# Patient Record
Sex: FEMALE | Race: WHITE | Employment: OTHER | URBAN - METROPOLITAN AREA
[De-identification: names, ages, dates, MRNs, and addresses within clinical notes are randomized per-mention and may not be internally consistent; named-entity substitution may affect disease eponyms.]

---

## 2017-01-25 ENCOUNTER — HOSPITAL ENCOUNTER (OUTPATIENT)
Dept: CT IMAGING | Age: 67
Discharge: HOME | End: 2017-01-25
Attending: ORTHOPAEDIC SURGERY
Payer: MEDICARE

## 2017-01-25 DIAGNOSIS — M48.061 SPINAL STENOSIS, LUMBAR REGION, WITHOUT NEUROGENIC CLAUDICATION: ICD-10-CM

## 2017-01-25 DIAGNOSIS — M43.10 ACQUIRED SPONDYLOLISTHESIS: ICD-10-CM

## 2017-01-25 PROCEDURE — 72131 CT LUMBAR SPINE W/O DYE: CPT

## 2017-03-22 ENCOUNTER — HOSPITAL ENCOUNTER (OUTPATIENT)
Dept: PREADMISSION TESTING | Age: 67
Discharge: HOME | End: 2017-03-22
Payer: MEDICARE

## 2017-03-22 VITALS
TEMPERATURE: 97.7 F | WEIGHT: 292.25 LBS | BODY MASS INDEX: 49.89 KG/M2 | HEIGHT: 64 IN | HEART RATE: 54 BPM | DIASTOLIC BLOOD PRESSURE: 76 MMHG | SYSTOLIC BLOOD PRESSURE: 118 MMHG

## 2017-03-22 LAB
ABO + RH BLD: NORMAL
ANION GAP BLD CALC-SCNC: 6 MMOL/L (ref 5–15)
APPEARANCE UR: CLEAR
ATRIAL RATE: 53 BPM
BACTERIA URNS QL MICRO: NEGATIVE /HPF
BILIRUB UR QL: NEGATIVE
BLOOD GROUP ANTIBODIES SERPL: NORMAL
BUN SERPL-MCNC: 13 MG/DL (ref 6–20)
BUN/CREAT SERPL: 15 (ref 12–20)
CALCIUM SERPL-MCNC: 9 MG/DL (ref 8.5–10.1)
CALCULATED P AXIS, ECG09: 34 DEGREES
CALCULATED R AXIS, ECG10: 1 DEGREES
CALCULATED T AXIS, ECG11: 23 DEGREES
CHLORIDE SERPL-SCNC: 102 MMOL/L (ref 97–108)
CO2 SERPL-SCNC: 32 MMOL/L (ref 21–32)
COLOR UR: NORMAL
CREAT SERPL-MCNC: 0.88 MG/DL (ref 0.55–1.02)
DIAGNOSIS, 93000: NORMAL
EPITH CASTS URNS QL MICRO: NORMAL /LPF
ERYTHROCYTE [DISTWIDTH] IN BLOOD BY AUTOMATED COUNT: 14.7 % (ref 11.5–14.5)
EST. AVERAGE GLUCOSE BLD GHB EST-MCNC: 143 MG/DL
GLUCOSE SERPL-MCNC: 108 MG/DL (ref 65–100)
GLUCOSE UR STRIP.AUTO-MCNC: NEGATIVE MG/DL
HBA1C MFR BLD: 6.6 % (ref 4.2–6.3)
HCT VFR BLD AUTO: 41.5 % (ref 35–47)
HGB BLD-MCNC: 13.3 G/DL (ref 11.5–16)
HGB UR QL STRIP: NEGATIVE
HYALINE CASTS URNS QL MICRO: NORMAL /LPF (ref 0–5)
INR PPP: 1 (ref 0.9–1.1)
KETONES UR QL STRIP.AUTO: NEGATIVE MG/DL
LEUKOCYTE ESTERASE UR QL STRIP.AUTO: NEGATIVE
MCH RBC QN AUTO: 28.7 PG (ref 26–34)
MCHC RBC AUTO-ENTMCNC: 32 G/DL (ref 30–36.5)
MCV RBC AUTO: 89.6 FL (ref 80–99)
NITRITE UR QL STRIP.AUTO: NEGATIVE
P-R INTERVAL, ECG05: 166 MS
PH UR STRIP: 6 [PH] (ref 5–8)
PLATELET # BLD AUTO: 322 K/UL (ref 150–400)
POTASSIUM SERPL-SCNC: 4.1 MMOL/L (ref 3.5–5.1)
PROT UR STRIP-MCNC: NEGATIVE MG/DL
PROTHROMBIN TIME: 10.3 SEC (ref 9–11.1)
Q-T INTERVAL, ECG07: 448 MS
QRS DURATION, ECG06: 76 MS
QTC CALCULATION (BEZET), ECG08: 420 MS
RBC # BLD AUTO: 4.63 M/UL (ref 3.8–5.2)
RBC #/AREA URNS HPF: NORMAL /HPF (ref 0–5)
SODIUM SERPL-SCNC: 140 MMOL/L (ref 136–145)
SP GR UR REFRACTOMETRY: 1.02 (ref 1–1.03)
SPECIMEN EXP DATE BLD: NORMAL
UA: UC IF INDICATED,UAUC: NORMAL
UROBILINOGEN UR QL STRIP.AUTO: 1 EU/DL (ref 0.2–1)
VENTRICULAR RATE, ECG03: 53 BPM
WBC # BLD AUTO: 6.5 K/UL (ref 3.6–11)
WBC URNS QL MICRO: NORMAL /HPF (ref 0–4)

## 2017-03-22 PROCEDURE — 86900 BLOOD TYPING SEROLOGIC ABO: CPT | Performed by: ORTHOPAEDIC SURGERY

## 2017-03-22 PROCEDURE — 36415 COLL VENOUS BLD VENIPUNCTURE: CPT | Performed by: ORTHOPAEDIC SURGERY

## 2017-03-22 PROCEDURE — 93005 ELECTROCARDIOGRAM TRACING: CPT

## 2017-03-22 PROCEDURE — 85610 PROTHROMBIN TIME: CPT | Performed by: ORTHOPAEDIC SURGERY

## 2017-03-22 PROCEDURE — 85027 COMPLETE CBC AUTOMATED: CPT | Performed by: ORTHOPAEDIC SURGERY

## 2017-03-22 PROCEDURE — 81001 URINALYSIS AUTO W/SCOPE: CPT | Performed by: ORTHOPAEDIC SURGERY

## 2017-03-22 PROCEDURE — 83036 HEMOGLOBIN GLYCOSYLATED A1C: CPT | Performed by: ORTHOPAEDIC SURGERY

## 2017-03-22 PROCEDURE — 80048 BASIC METABOLIC PNL TOTAL CA: CPT | Performed by: ORTHOPAEDIC SURGERY

## 2017-03-22 RX ORDER — METOPROLOL TARTRATE 25 MG/1
12.5 TABLET, FILM COATED ORAL
COMMUNITY

## 2017-03-22 RX ORDER — LEVOTHYROXINE SODIUM 75 UG/1
112 TABLET ORAL
COMMUNITY
End: 2022-07-06 | Stop reason: ALTCHOICE

## 2017-03-22 RX ORDER — PAROXETINE HYDROCHLORIDE 40 MG/1
40 TABLET, FILM COATED ORAL
COMMUNITY

## 2017-03-22 NOTE — PERIOP NOTES
PREOPERATIVE INSTRUCTIONS REVIEWED WITH PATIENT. PATIENT GIVEN SIX PACKS OF CHG WIPES. INSTRUCTIONS (REVIEWED IN CLASS) ON USE OF CHG WIPES. PATIENT GIVEN SSI INFECTION SHEET. MRSA/MMSA TREATMENT INSTRUCTION SHEET GIVEN WITH AN EXPLANATION TO PATIENT THAT THEY WILL BE NOTIFIED IF TREATMENT INSTRUCTIONS NEED TO BE INITIATED. PATIENT WAS GIVEN THE OPPORTUNITY TO ASK QUESTIONS ON THE INFORMATION PROVIDED.

## 2017-03-22 NOTE — PROGRESS NOTES
Problem: Patient Education: Go to Patient Education Activity  Goal: Patient/Family Education  Outcome: Progressing Towards Goal  Patient attended pre-op spine class. Questions, concerns, and comments were addressed.

## 2017-03-23 NOTE — PERIOP NOTES
CALLED DR. CANTOR'S OFFICE AND LEFT A VOICEMAIL FOR ARMANI ABOUT ABNORMAL LABS (HGBA1C 6.6). ALL LABS AND EKG HAVE BEEN FAXED OVER TO OFFICE.

## 2017-03-24 LAB
BACTERIA SPEC CULT: NORMAL
BACTERIA SPEC CULT: NORMAL
SERVICE CMNT-IMP: NORMAL

## 2017-03-28 NOTE — H&P
Leticia Caldwell. Kota  Location: [place] [address] [facility]  Patient #: [id number]  : 1950   / Language: Lili Heading / Race: White  Female      History of Present Illness  The patient is a 74year old female who presents for a Recheck of Acute lumbar back pain. The last clinic visit was 5 week(s) ago. Management changes made at the last visit include ordering test(s) (CT Scan). Symptoms include pain. Symptoms are located in the left low back. The pain radiates to the left posterior thigh, left lower leg and left foot. The patient describes the pain as sharp and burning. Onset was gradual 2 month(s) ago. The symptoms occur intermittently. The patient describes this pain as severe and worsening. Symptoms are exacerbated by standing, recumbency, lifting and bending. Symptoms are relieved by rest and opioid analgesics. Current treatment includes opioid analgesics. Pertinent medical history includes previous back surgery (Lumbar fusion 11 with Dr Leoncio Ann). The patient was previously evaluated in this clinic 5 week(s) ago. Past evaluation has included x-ray of the lumbar spine. Past treatment has included opioid analgesics and back surgery. Problem List/Past Medical   Spondylolisthesis (Acquired) (738.4  M43.10)   Lumbar stenosis with neurogenic claudication (724.03  M48.06)     Allergies  No Known Drug Allergies 12/15/2016    Family History  Thyroid problems  Mother. [de-identified] Father, Mother, Son. Heart Disease  Father. Social History  Current work status  Retired. Caffeine use  Tea. No alcohol use   Tobacco use  Never smoker. Tobacco / smoke exposure   No drug use   Seat Belt Use  Always uses seat belts.     Medication History   Medications Reconciled     Pregnancy / Birth History   Pregnant  No.    Past Surgical History   Thyroidectomy; Subtotal   Hemorrhoidectomy   Hysterectomy  non-cancerous: partial  Spinal Surgery   Gallbladder Surgery  laparoscopic  Total Knee Replacement  bilateral  Tonsillectomy   Spinal Fusion  lower back  Other Eye Surgery  bilateral    Diagnostic Studies History   Lumbar Spine X-ray  Date: 12/15/2016, Results: Review of the x-rays demonstrate a stable fusion at L4-5. There is now an anterolisthesis at L3 for approximately 4 mm. CT Scan  Lumbar, Date: 1/25/2017, Results: Review of the CT scan shows she has severe degeneration and facet hypertrophy at L3-4 causing severe spinal stenosis. She also has a grade 1 anterolisthesis at that level. She has a stable L4-5 fusion. Other Problems  Anxiety Disorder   Unspecified Diagnosis   Bladder Problems   Migraine Headache   Gastroesophageal Reflux Disease   Other disease, cancer, significant illness   Thyroid Disease   Osteoporosis   Unspecified Diagnosis         Review of Systems   General Present- Seasonal Allergies and Weight Gain. Not Present- Appetite Loss, Chills, Fatigue, Fever, HIV Exposure, Night Sweats, Persistent Infections and Weight Loss. Skin Not Present- Itching, Nail Changes, Poor Wound Healing, Rash, Skin Color Changes, Suspicious Lesions and Yellowish Skin Color. HEENT Not Present- Decreased Hearing, Double Vision, Earache, Hoarseness, Jaundice/Yellow Eyes, Loose Teeth, Nose Bleed, Ringing in the Ears and Sore Throat. Respiratory Not Present- Bloody sputum, Chronic Cough, Difficulty Breathing, Snoring, Wakes up from Sleep Wheezing or Short of Breath and Wheezing. Gastrointestinal Not Present- Abdominal Pain, Black, Tarry Stool, Change in Bowel Habits, Cirrhosis, Constipation, Diarrhea, Difficulty Swallowing, Nausea and Vomiting. Female Genitourinary Not Present- Blood in Urine, Frequency, Painful Urination, Pelvic Pain, Trouble Starting Urinary Stream and Urgency. Musculoskeletal Present- Back Pain. Not Present- Joint Pain, Joint Stiffness and Joint Swelling. Neurological Present- Unsteadiness.  Not Present- Fainting, Headaches, Memory Loss, Numbness, Seizures, Tingling, Tremor and Weakness. Psychiatric Present- Anxiety. Not Present- Bipolar and Depression. Endocrine Not Present- Cold Intolerance, Excessive Hunger, Excessive Thirst, Excessive Urination and Heat Intolerance. Hematology Present- Enlarged Lymph Nodes. Not Present- Abnormal Bruising , Excessive bleeding and Skin Discoloration. Vitals   Weight: 286 lb   Height: 64 in   Body Surface Area: 2.28 m²   Body Mass Index: 49.09 kg/m²        Physical Exam   Neurologic  Sensory  Light Touch - Intact - Globally. Overall Assessment of Muscle Strength and Tone reveals  Lower Extremities - Right Iliopsoas - 4-/5. Left Iliopsoas - 4-/5. Right Tibialis Anterior - 5/5. Left Tibialis Anterior - 4/5. Right Gastroc-Soleus - 5/5. Left Gastroc-Soleus - 5/5. Right EHL - 5/5. Left EHL - 5/5. General Assessment of Reflexes  Right Ankle - Clonus is not present. Left Ankle - Clonus is not present. Reflexes (Dermatomes)  2/2 Normal - Left Achilles (L5-S2), Left Knee (L2-4), Right Achilles (L5-S2) and Right Knee (L2-4). Musculoskeletal  Global Assessment  Examination of related systems reveals - well-developed, well-nourished, in no acute distress, alert and oriented x 3. Gait and Station - normal gait and station and normal posture. Right Lower Extremity - normal strength and tone, normal range of motion without pain and no instability, subluxation or laxity. Left Lower Extremity - normal strength and tone, normal range of motion without pain and no instability, subluxation or laxity. Spine/Ribs/Pelvis  Cervical Spine - Examination of the cervical spine reveals - no tenderness to palpation, no pain, no swelling, edema or erythema, normal cervical spine movements and normal sensation. Thoracic (Dorsal) Spine - Examination of the thoracic spine reveals - no tenderness over thoracic vertebrae, no pain, normal sensation and normal thoracic spine movements.  Lumbosacral Spine - Examination of the lumbosacral spine reveals - no known fractures or deformities. Inspection and Palpation - Tenderness - moderate. Assessment of pain reveals the following findings - The pain is characterized as - severe, burning, deep, pain accumulates with activity and pain increases with sustained postures. Location - pain refers to lower back bilaterally. ROJM - Trunk Extension - 15 degrees. Lumbar Spine Flexion - 35 °. Lumbosacral Spine - Functional Testing - Babinski Test negative, Prone Knee Bending Test negative, Slump Test negative, Straight Leg Raising Test negative. Assessment & Plan  Spondylolisthesis (Acquired) (738.4  M43.10)  Lumbar stenosis with neurogenic claudication (724.03  M48.06)  Impression: The patient continues with severe neurogenic claudication as well as left-sided radicular symptoms. She has severe spinal stenosis at L3-4 as well as a grade 1 anterolisthesis. She is a candidate for a revision laminectomy from L2-L4 with a revision fusion from L3-L5. Risk and benefits were discussed with her. The risks and benefits were discussed at length with the patient and the patient has elected to proceed. Indications for surgery include failed conservative treatment. Alternative treatments, risks and the perioperative course were discussed with the patient. All questions were answered. The risks and benefits of the procedure were explained. Benefits include definitive diagnosis, relief of pain, elimination of deformity and improved function. Risks of surgery including bleeding, infection, weakness, numbness, CSF leak, failure to improve symptoms, exacerbation of medical co-morbidities and even death were discussed with the patient. Current Plans  Continued Vicodin 5-300MG, 1 (one) Tablet three times daily, as needed, #60, 02/27/2017, No Refill.   Treatment options were discussed with the patient in full.(V65.49)  Current Plans  Presurgical planning was preformed with the patient today  Surgery to be scheduled  Pt Education - How to access health information online: discussed with patient and provided information. Signed by Rosina Wilkins MD    Date of Surgery Update:  Bobby Pinto was seen and examined. History and physical has been reviewed. The patient has been examined.  There have been no significant clinical changes since the completion of the originally dated History and Physical.    Signed By: Rosina Wilkins MD     April 3, 2017 7:47 AM

## 2017-04-03 ENCOUNTER — ANESTHESIA (OUTPATIENT)
Dept: SURGERY | Age: 67
DRG: 460 | End: 2017-04-03
Payer: MEDICARE

## 2017-04-03 ENCOUNTER — APPOINTMENT (OUTPATIENT)
Dept: GENERAL RADIOLOGY | Age: 67
DRG: 460 | End: 2017-04-03
Attending: ORTHOPAEDIC SURGERY
Payer: MEDICARE

## 2017-04-03 ENCOUNTER — HOSPITAL ENCOUNTER (INPATIENT)
Age: 67
LOS: 3 days | Discharge: NURSING, REHAB OR LONG-TERM CARE | DRG: 460 | End: 2017-04-06
Attending: ORTHOPAEDIC SURGERY | Admitting: ORTHOPAEDIC SURGERY
Payer: MEDICARE

## 2017-04-03 ENCOUNTER — ANESTHESIA EVENT (OUTPATIENT)
Dept: SURGERY | Age: 67
DRG: 460 | End: 2017-04-03
Payer: MEDICARE

## 2017-04-03 PROBLEM — M48.062 LUMBAR STENOSIS WITH NEUROGENIC CLAUDICATION: Status: ACTIVE | Noted: 2017-04-03

## 2017-04-03 LAB
GLUCOSE BLD STRIP.AUTO-MCNC: 117 MG/DL (ref 65–100)
SERVICE CMNT-IMP: ABNORMAL

## 2017-04-03 PROCEDURE — 76010000173 HC OR TIME 3 TO 3.5 HR INTENSV-TIER 1: Performed by: ORTHOPAEDIC SURGERY

## 2017-04-03 PROCEDURE — 74011250636 HC RX REV CODE- 250/636

## 2017-04-03 PROCEDURE — 77030029099 HC BN WAX SSPC -A: Performed by: ORTHOPAEDIC SURGERY

## 2017-04-03 PROCEDURE — 77030018859 HC TBNG IRR BPLR MALS J&J -B: Performed by: ORTHOPAEDIC SURGERY

## 2017-04-03 PROCEDURE — 74011250636 HC RX REV CODE- 250/636: Performed by: ANESTHESIOLOGY

## 2017-04-03 PROCEDURE — 77030014650 HC SEAL MTRX FLOSEL BAXT -C: Performed by: ORTHOPAEDIC SURGERY

## 2017-04-03 PROCEDURE — 77030013079 HC BLNKT BAIR HGGR 3M -A: Performed by: ANESTHESIOLOGY

## 2017-04-03 PROCEDURE — 0ST20ZZ RESECTION OF LUMBAR VERTEBRAL DISC, OPEN APPROACH: ICD-10-PCS | Performed by: ORTHOPAEDIC SURGERY

## 2017-04-03 PROCEDURE — 74011250636 HC RX REV CODE- 250/636: Performed by: PHYSICIAN ASSISTANT

## 2017-04-03 PROCEDURE — 77030034479 HC ADH SKN CLSR PRINEO J&J -B: Performed by: ORTHOPAEDIC SURGERY

## 2017-04-03 PROCEDURE — 77030008684 HC TU ET CUF COVD -B: Performed by: ANESTHESIOLOGY

## 2017-04-03 PROCEDURE — 76060000037 HC ANESTHESIA 3 TO 3.5 HR: Performed by: ORTHOPAEDIC SURGERY

## 2017-04-03 PROCEDURE — 76210000017 HC OR PH I REC 1.5 TO 2 HR: Performed by: ORTHOPAEDIC SURGERY

## 2017-04-03 PROCEDURE — 77030012406 HC DRN WND PENRS BARD -A: Performed by: ORTHOPAEDIC SURGERY

## 2017-04-03 PROCEDURE — 77030020782 HC GWN BAIR PAWS FLX 3M -B

## 2017-04-03 PROCEDURE — 77030026438 HC STYL ET INTUB CARD -A: Performed by: ANESTHESIOLOGY

## 2017-04-03 PROCEDURE — 77030012890

## 2017-04-03 PROCEDURE — 0SG00AJ FUSION OF LUMBAR VERTEBRAL JOINT WITH INTERBODY FUSION DEVICE, POSTERIOR APPROACH, ANTERIOR COLUMN, OPEN APPROACH: ICD-10-PCS | Performed by: ORTHOPAEDIC SURGERY

## 2017-04-03 PROCEDURE — 77030034475 HC MISC IMPL SPN: Performed by: ORTHOPAEDIC SURGERY

## 2017-04-03 PROCEDURE — 77030032490 HC SLV COMPR SCD KNE COVD -B: Performed by: ORTHOPAEDIC SURGERY

## 2017-04-03 PROCEDURE — 8E0WXBF COMPUTER ASSISTED PROCEDURE OF TRUNK REGION, WITH FLUOROSCOPY: ICD-10-PCS | Performed by: ORTHOPAEDIC SURGERY

## 2017-04-03 PROCEDURE — 77030021413 HC ROD SPN CRV GLBM -D: Performed by: ORTHOPAEDIC SURGERY

## 2017-04-03 PROCEDURE — 72100 X-RAY EXAM L-S SPINE 2/3 VWS: CPT

## 2017-04-03 PROCEDURE — 74011000250 HC RX REV CODE- 250

## 2017-04-03 PROCEDURE — 65270000029 HC RM PRIVATE

## 2017-04-03 PROCEDURE — 77030031139 HC SUT VCRL2 J&J -A: Performed by: ORTHOPAEDIC SURGERY

## 2017-04-03 PROCEDURE — C1713 ANCHOR/SCREW BN/BN,TIS/BN: HCPCS | Performed by: ORTHOPAEDIC SURGERY

## 2017-04-03 PROCEDURE — 82962 GLUCOSE BLOOD TEST: CPT

## 2017-04-03 PROCEDURE — 77030018836 HC SOL IRR NACL ICUM -A: Performed by: ORTHOPAEDIC SURGERY

## 2017-04-03 PROCEDURE — 77030034850: Performed by: ORTHOPAEDIC SURGERY

## 2017-04-03 PROCEDURE — 76000 FLUOROSCOPY <1 HR PHYS/QHP: CPT

## 2017-04-03 PROCEDURE — 77030004391 HC BUR FLUT MEDT -C: Performed by: ORTHOPAEDIC SURGERY

## 2017-04-03 DEVICE — 5.5MM CURVED ROD, TITANIUM ALLOY, 65MM LENGTH
Type: IMPLANTABLE DEVICE | Site: SPINE LUMBAR | Status: FUNCTIONAL
Brand: CREO

## 2017-04-03 DEVICE — GRAFT BNE SUB 20CC 2 4MM GRAN GROWTH FACT ALLGRFT OSTEOAMP: Type: IMPLANTABLE DEVICE | Site: SPINE LUMBAR | Status: FUNCTIONAL

## 2017-04-03 DEVICE — 5.5MM CURVED ROD, TITANIUM ALLOY, 70MM LENGTH
Type: IMPLANTABLE DEVICE | Site: SPINE LUMBAR | Status: FUNCTIONAL
Brand: CREO

## 2017-04-03 DEVICE — GRAFT BNE SUB M W20XH7XL30MM COMPRESSIBLE SPNG STRP PROVIDE: Type: IMPLANTABLE DEVICE | Site: SPINE LUMBAR | Status: FUNCTIONAL

## 2017-04-03 RX ORDER — BUTALBITAL, ACETAMINOPHEN AND CAFFEINE 50; 325; 40 MG/1; MG/1; MG/1
1 TABLET ORAL
Status: DISCONTINUED | OUTPATIENT
Start: 2017-04-03 | End: 2017-04-06 | Stop reason: HOSPADM

## 2017-04-03 RX ORDER — SODIUM CHLORIDE, SODIUM LACTATE, POTASSIUM CHLORIDE, CALCIUM CHLORIDE 600; 310; 30; 20 MG/100ML; MG/100ML; MG/100ML; MG/100ML
INJECTION, SOLUTION INTRAVENOUS
Status: DISCONTINUED | OUTPATIENT
Start: 2017-04-03 | End: 2017-04-03 | Stop reason: HOSPADM

## 2017-04-03 RX ORDER — OXYCODONE HYDROCHLORIDE 5 MG/1
5 TABLET ORAL
Status: DISCONTINUED | OUTPATIENT
Start: 2017-04-03 | End: 2017-04-06 | Stop reason: HOSPADM

## 2017-04-03 RX ORDER — MIDAZOLAM HYDROCHLORIDE 1 MG/ML
0.5 INJECTION, SOLUTION INTRAMUSCULAR; INTRAVENOUS
Status: DISCONTINUED | OUTPATIENT
Start: 2017-04-03 | End: 2017-04-03 | Stop reason: HOSPADM

## 2017-04-03 RX ORDER — ROPIVACAINE HYDROCHLORIDE 5 MG/ML
150 INJECTION, SOLUTION EPIDURAL; INFILTRATION; PERINEURAL AS NEEDED
Status: DISCONTINUED | OUTPATIENT
Start: 2017-04-03 | End: 2017-04-03 | Stop reason: HOSPADM

## 2017-04-03 RX ORDER — DIPHENHYDRAMINE HYDROCHLORIDE 50 MG/ML
12.5 INJECTION, SOLUTION INTRAMUSCULAR; INTRAVENOUS
Status: ACTIVE | OUTPATIENT
Start: 2017-04-03 | End: 2017-04-04

## 2017-04-03 RX ORDER — PAROXETINE HYDROCHLORIDE 20 MG/1
40 TABLET, FILM COATED ORAL DAILY
Status: DISCONTINUED | OUTPATIENT
Start: 2017-04-04 | End: 2017-04-06 | Stop reason: HOSPADM

## 2017-04-03 RX ORDER — POLYETHYLENE GLYCOL 3350 17 G/17G
17 POWDER, FOR SOLUTION ORAL DAILY
Status: DISCONTINUED | OUTPATIENT
Start: 2017-04-04 | End: 2017-04-06 | Stop reason: HOSPADM

## 2017-04-03 RX ORDER — PROPOFOL 10 MG/ML
INJECTION, EMULSION INTRAVENOUS
Status: DISCONTINUED | OUTPATIENT
Start: 2017-04-03 | End: 2017-04-03 | Stop reason: HOSPADM

## 2017-04-03 RX ORDER — ONDANSETRON 2 MG/ML
4 INJECTION INTRAMUSCULAR; INTRAVENOUS AS NEEDED
Status: DISCONTINUED | OUTPATIENT
Start: 2017-04-03 | End: 2017-04-03 | Stop reason: HOSPADM

## 2017-04-03 RX ORDER — METOPROLOL TARTRATE 25 MG/1
12.5 TABLET, FILM COATED ORAL
Status: DISCONTINUED | OUTPATIENT
Start: 2017-04-03 | End: 2017-04-06 | Stop reason: HOSPADM

## 2017-04-03 RX ORDER — MIDAZOLAM HYDROCHLORIDE 1 MG/ML
INJECTION, SOLUTION INTRAMUSCULAR; INTRAVENOUS AS NEEDED
Status: DISCONTINUED | OUTPATIENT
Start: 2017-04-03 | End: 2017-04-03 | Stop reason: HOSPADM

## 2017-04-03 RX ORDER — SODIUM CHLORIDE 9 MG/ML
25 INJECTION, SOLUTION INTRAVENOUS CONTINUOUS
Status: DISCONTINUED | OUTPATIENT
Start: 2017-04-03 | End: 2017-04-03 | Stop reason: HOSPADM

## 2017-04-03 RX ORDER — OXYCODONE HYDROCHLORIDE 5 MG/1
10 TABLET ORAL
Status: DISCONTINUED | OUTPATIENT
Start: 2017-04-03 | End: 2017-04-06 | Stop reason: HOSPADM

## 2017-04-03 RX ORDER — SODIUM CHLORIDE 9 MG/ML
125 INJECTION, SOLUTION INTRAVENOUS CONTINUOUS
Status: DISPENSED | OUTPATIENT
Start: 2017-04-03 | End: 2017-04-04

## 2017-04-03 RX ORDER — ONDANSETRON 2 MG/ML
INJECTION INTRAMUSCULAR; INTRAVENOUS AS NEEDED
Status: DISCONTINUED | OUTPATIENT
Start: 2017-04-03 | End: 2017-04-03 | Stop reason: HOSPADM

## 2017-04-03 RX ORDER — GLYCOPYRROLATE 0.2 MG/ML
INJECTION INTRAMUSCULAR; INTRAVENOUS AS NEEDED
Status: DISCONTINUED | OUTPATIENT
Start: 2017-04-03 | End: 2017-04-03 | Stop reason: HOSPADM

## 2017-04-03 RX ORDER — AMOXICILLIN 250 MG
1 CAPSULE ORAL 2 TIMES DAILY
Status: DISCONTINUED | OUTPATIENT
Start: 2017-04-03 | End: 2017-04-06 | Stop reason: HOSPADM

## 2017-04-03 RX ORDER — MORPHINE SULFATE 2 MG/ML
2 INJECTION, SOLUTION INTRAMUSCULAR; INTRAVENOUS
Status: ACTIVE | OUTPATIENT
Start: 2017-04-03 | End: 2017-04-04

## 2017-04-03 RX ORDER — LEVOTHYROXINE SODIUM 75 UG/1
75 TABLET ORAL
Status: DISCONTINUED | OUTPATIENT
Start: 2017-04-04 | End: 2017-04-06 | Stop reason: HOSPADM

## 2017-04-03 RX ORDER — FACIAL-BODY WIPES
10 EACH TOPICAL DAILY PRN
Status: DISCONTINUED | OUTPATIENT
Start: 2017-04-05 | End: 2017-04-06 | Stop reason: HOSPADM

## 2017-04-03 RX ORDER — ACETAMINOPHEN 325 MG/1
650 TABLET ORAL EVERY 6 HOURS
Status: DISCONTINUED | OUTPATIENT
Start: 2017-04-04 | End: 2017-04-06 | Stop reason: HOSPADM

## 2017-04-03 RX ORDER — METOPROLOL TARTRATE 25 MG/1
25 TABLET, FILM COATED ORAL DAILY
Status: DISCONTINUED | OUTPATIENT
Start: 2017-04-04 | End: 2017-04-06 | Stop reason: HOSPADM

## 2017-04-03 RX ORDER — SODIUM CHLORIDE, SODIUM LACTATE, POTASSIUM CHLORIDE, CALCIUM CHLORIDE 600; 310; 30; 20 MG/100ML; MG/100ML; MG/100ML; MG/100ML
1000 INJECTION, SOLUTION INTRAVENOUS CONTINUOUS
Status: DISCONTINUED | OUTPATIENT
Start: 2017-04-03 | End: 2017-04-03 | Stop reason: HOSPADM

## 2017-04-03 RX ORDER — MIDAZOLAM HYDROCHLORIDE 1 MG/ML
1 INJECTION, SOLUTION INTRAMUSCULAR; INTRAVENOUS AS NEEDED
Status: DISCONTINUED | OUTPATIENT
Start: 2017-04-03 | End: 2017-04-03 | Stop reason: HOSPADM

## 2017-04-03 RX ORDER — HYDROMORPHONE HYDROCHLORIDE 1 MG/ML
0.2 INJECTION, SOLUTION INTRAMUSCULAR; INTRAVENOUS; SUBCUTANEOUS
Status: COMPLETED | OUTPATIENT
Start: 2017-04-03 | End: 2017-04-03

## 2017-04-03 RX ORDER — SODIUM CHLORIDE 0.9 % (FLUSH) 0.9 %
5-10 SYRINGE (ML) INJECTION AS NEEDED
Status: DISCONTINUED | OUTPATIENT
Start: 2017-04-03 | End: 2017-04-06 | Stop reason: HOSPADM

## 2017-04-03 RX ORDER — MORPHINE SULFATE 5 MG/ML
INJECTION, SOLUTION INTRAVENOUS
Status: DISCONTINUED | OUTPATIENT
Start: 2017-04-03 | End: 2017-04-05

## 2017-04-03 RX ORDER — ROCURONIUM BROMIDE 10 MG/ML
INJECTION, SOLUTION INTRAVENOUS AS NEEDED
Status: DISCONTINUED | OUTPATIENT
Start: 2017-04-03 | End: 2017-04-03 | Stop reason: HOSPADM

## 2017-04-03 RX ORDER — NEOSTIGMINE METHYLSULFATE 1 MG/ML
INJECTION INTRAVENOUS AS NEEDED
Status: DISCONTINUED | OUTPATIENT
Start: 2017-04-03 | End: 2017-04-03 | Stop reason: HOSPADM

## 2017-04-03 RX ORDER — MORPHINE SULFATE 10 MG/ML
2 INJECTION, SOLUTION INTRAMUSCULAR; INTRAVENOUS
Status: DISCONTINUED | OUTPATIENT
Start: 2017-04-03 | End: 2017-04-03 | Stop reason: HOSPADM

## 2017-04-03 RX ORDER — SUCCINYLCHOLINE CHLORIDE 20 MG/ML
INJECTION INTRAMUSCULAR; INTRAVENOUS AS NEEDED
Status: DISCONTINUED | OUTPATIENT
Start: 2017-04-03 | End: 2017-04-03 | Stop reason: HOSPADM

## 2017-04-03 RX ORDER — CYCLOBENZAPRINE HCL 10 MG
10 TABLET ORAL
Status: DISCONTINUED | OUTPATIENT
Start: 2017-04-03 | End: 2017-04-06 | Stop reason: HOSPADM

## 2017-04-03 RX ORDER — FENTANYL CITRATE 50 UG/ML
50 INJECTION, SOLUTION INTRAMUSCULAR; INTRAVENOUS AS NEEDED
Status: DISCONTINUED | OUTPATIENT
Start: 2017-04-03 | End: 2017-04-03 | Stop reason: HOSPADM

## 2017-04-03 RX ORDER — OMEPRAZOLE 40 MG/1
40 CAPSULE, DELAYED RELEASE ORAL DAILY
Status: DISCONTINUED | OUTPATIENT
Start: 2017-04-04 | End: 2017-04-03 | Stop reason: CLARIF

## 2017-04-03 RX ORDER — SODIUM CHLORIDE 0.9 % (FLUSH) 0.9 %
5-10 SYRINGE (ML) INJECTION AS NEEDED
Status: DISCONTINUED | OUTPATIENT
Start: 2017-04-03 | End: 2017-04-03 | Stop reason: HOSPADM

## 2017-04-03 RX ORDER — DEXAMETHASONE SODIUM PHOSPHATE 4 MG/ML
INJECTION, SOLUTION INTRA-ARTICULAR; INTRALESIONAL; INTRAMUSCULAR; INTRAVENOUS; SOFT TISSUE AS NEEDED
Status: DISCONTINUED | OUTPATIENT
Start: 2017-04-03 | End: 2017-04-03 | Stop reason: HOSPADM

## 2017-04-03 RX ORDER — FENTANYL CITRATE 50 UG/ML
25 INJECTION, SOLUTION INTRAMUSCULAR; INTRAVENOUS
Status: DISCONTINUED | OUTPATIENT
Start: 2017-04-03 | End: 2017-04-03 | Stop reason: HOSPADM

## 2017-04-03 RX ORDER — NALOXONE HYDROCHLORIDE 0.4 MG/ML
0.4 INJECTION, SOLUTION INTRAMUSCULAR; INTRAVENOUS; SUBCUTANEOUS AS NEEDED
Status: DISCONTINUED | OUTPATIENT
Start: 2017-04-03 | End: 2017-04-06 | Stop reason: HOSPADM

## 2017-04-03 RX ORDER — PROPOFOL 10 MG/ML
INJECTION, EMULSION INTRAVENOUS AS NEEDED
Status: DISCONTINUED | OUTPATIENT
Start: 2017-04-03 | End: 2017-04-03 | Stop reason: HOSPADM

## 2017-04-03 RX ORDER — PANTOPRAZOLE SODIUM 40 MG/1
40 TABLET, DELAYED RELEASE ORAL
Status: DISCONTINUED | OUTPATIENT
Start: 2017-04-04 | End: 2017-04-06 | Stop reason: HOSPADM

## 2017-04-03 RX ORDER — DIPHENHYDRAMINE HYDROCHLORIDE 50 MG/ML
12.5 INJECTION, SOLUTION INTRAMUSCULAR; INTRAVENOUS AS NEEDED
Status: DISCONTINUED | OUTPATIENT
Start: 2017-04-03 | End: 2017-04-03 | Stop reason: HOSPADM

## 2017-04-03 RX ORDER — ONDANSETRON 2 MG/ML
4 INJECTION INTRAMUSCULAR; INTRAVENOUS
Status: ACTIVE | OUTPATIENT
Start: 2017-04-03 | End: 2017-04-04

## 2017-04-03 RX ORDER — OXYCODONE HYDROCHLORIDE 5 MG/1
5 TABLET ORAL AS NEEDED
Status: DISCONTINUED | OUTPATIENT
Start: 2017-04-03 | End: 2017-04-03 | Stop reason: HOSPADM

## 2017-04-03 RX ORDER — FENTANYL CITRATE 50 UG/ML
INJECTION, SOLUTION INTRAMUSCULAR; INTRAVENOUS AS NEEDED
Status: DISCONTINUED | OUTPATIENT
Start: 2017-04-03 | End: 2017-04-03 | Stop reason: HOSPADM

## 2017-04-03 RX ORDER — SODIUM CHLORIDE 0.9 % (FLUSH) 0.9 %
5-10 SYRINGE (ML) INJECTION EVERY 8 HOURS
Status: DISCONTINUED | OUTPATIENT
Start: 2017-04-04 | End: 2017-04-06 | Stop reason: HOSPADM

## 2017-04-03 RX ORDER — LIDOCAINE HYDROCHLORIDE 20 MG/ML
INJECTION, SOLUTION EPIDURAL; INFILTRATION; INTRACAUDAL; PERINEURAL AS NEEDED
Status: DISCONTINUED | OUTPATIENT
Start: 2017-04-03 | End: 2017-04-03 | Stop reason: HOSPADM

## 2017-04-03 RX ORDER — PHENYLEPHRINE HCL IN 0.9% NACL 0.4MG/10ML
SYRINGE (ML) INTRAVENOUS AS NEEDED
Status: DISCONTINUED | OUTPATIENT
Start: 2017-04-03 | End: 2017-04-03 | Stop reason: HOSPADM

## 2017-04-03 RX ORDER — KETAMINE HYDROCHLORIDE 10 MG/ML
INJECTION, SOLUTION INTRAMUSCULAR; INTRAVENOUS AS NEEDED
Status: DISCONTINUED | OUTPATIENT
Start: 2017-04-03 | End: 2017-04-03 | Stop reason: HOSPADM

## 2017-04-03 RX ORDER — KETOROLAC TROMETHAMINE 30 MG/ML
15 INJECTION, SOLUTION INTRAMUSCULAR; INTRAVENOUS EVERY 6 HOURS
Status: COMPLETED | OUTPATIENT
Start: 2017-04-03 | End: 2017-04-04

## 2017-04-03 RX ORDER — SODIUM CHLORIDE, SODIUM LACTATE, POTASSIUM CHLORIDE, CALCIUM CHLORIDE 600; 310; 30; 20 MG/100ML; MG/100ML; MG/100ML; MG/100ML
100 INJECTION, SOLUTION INTRAVENOUS CONTINUOUS
Status: DISCONTINUED | OUTPATIENT
Start: 2017-04-03 | End: 2017-04-03 | Stop reason: HOSPADM

## 2017-04-03 RX ORDER — ALPRAZOLAM 0.5 MG/1
0.5 TABLET ORAL
Status: DISCONTINUED | OUTPATIENT
Start: 2017-04-03 | End: 2017-04-06

## 2017-04-03 RX ORDER — LIDOCAINE HYDROCHLORIDE 10 MG/ML
0.1 INJECTION, SOLUTION EPIDURAL; INFILTRATION; INTRACAUDAL; PERINEURAL AS NEEDED
Status: DISCONTINUED | OUTPATIENT
Start: 2017-04-03 | End: 2017-04-03 | Stop reason: HOSPADM

## 2017-04-03 RX ORDER — SODIUM CHLORIDE 0.9 % (FLUSH) 0.9 %
5-10 SYRINGE (ML) INJECTION EVERY 8 HOURS
Status: DISCONTINUED | OUTPATIENT
Start: 2017-04-03 | End: 2017-04-03 | Stop reason: HOSPADM

## 2017-04-03 RX ORDER — HYDROMORPHONE HYDROCHLORIDE 1 MG/ML
INJECTION, SOLUTION INTRAMUSCULAR; INTRAVENOUS; SUBCUTANEOUS AS NEEDED
Status: DISCONTINUED | OUTPATIENT
Start: 2017-04-03 | End: 2017-04-03 | Stop reason: HOSPADM

## 2017-04-03 RX ADMIN — PROPOFOL 125 MCG/KG/MIN: 10 INJECTION, EMULSION INTRAVENOUS at 14:14

## 2017-04-03 RX ADMIN — DEXAMETHASONE SODIUM PHOSPHATE 8 MG: 4 INJECTION, SOLUTION INTRA-ARTICULAR; INTRALESIONAL; INTRAMUSCULAR; INTRAVENOUS; SOFT TISSUE at 14:20

## 2017-04-03 RX ADMIN — ROCURONIUM BROMIDE 10 MG: 10 INJECTION, SOLUTION INTRAVENOUS at 14:09

## 2017-04-03 RX ADMIN — CEFAZOLIN 3 G: 1 INJECTION, POWDER, FOR SOLUTION INTRAMUSCULAR; INTRAVENOUS; PARENTERAL at 14:19

## 2017-04-03 RX ADMIN — HYDROMORPHONE HYDROCHLORIDE 0.2 MG: 1 INJECTION, SOLUTION INTRAMUSCULAR; INTRAVENOUS; SUBCUTANEOUS at 18:00

## 2017-04-03 RX ADMIN — KETOROLAC TROMETHAMINE 15 MG: 30 INJECTION, SOLUTION INTRAMUSCULAR at 18:30

## 2017-04-03 RX ADMIN — HYDROMORPHONE HYDROCHLORIDE 0.2 MG: 1 INJECTION, SOLUTION INTRAMUSCULAR; INTRAVENOUS; SUBCUTANEOUS at 17:45

## 2017-04-03 RX ADMIN — Medication 120 MCG: at 14:32

## 2017-04-03 RX ADMIN — FENTANYL CITRATE 25 MCG: 50 INJECTION, SOLUTION INTRAMUSCULAR; INTRAVENOUS at 18:00

## 2017-04-03 RX ADMIN — FENTANYL CITRATE 50 MCG: 50 INJECTION, SOLUTION INTRAMUSCULAR; INTRAVENOUS at 14:09

## 2017-04-03 RX ADMIN — Medication 40 MCG: at 16:48

## 2017-04-03 RX ADMIN — HYDROMORPHONE HYDROCHLORIDE 0.2 MG: 1 INJECTION, SOLUTION INTRAMUSCULAR; INTRAVENOUS; SUBCUTANEOUS at 18:20

## 2017-04-03 RX ADMIN — Medication 80 MCG: at 14:24

## 2017-04-03 RX ADMIN — HYDROMORPHONE HYDROCHLORIDE 0.2 MG: 1 INJECTION, SOLUTION INTRAMUSCULAR; INTRAVENOUS; SUBCUTANEOUS at 18:10

## 2017-04-03 RX ADMIN — SODIUM CHLORIDE, SODIUM LACTATE, POTASSIUM CHLORIDE, CALCIUM CHLORIDE: 600; 310; 30; 20 INJECTION, SOLUTION INTRAVENOUS at 15:44

## 2017-04-03 RX ADMIN — Medication 40 MCG: at 14:42

## 2017-04-03 RX ADMIN — HYDROMORPHONE HYDROCHLORIDE 0.2 MG: 1 INJECTION, SOLUTION INTRAMUSCULAR; INTRAVENOUS; SUBCUTANEOUS at 18:30

## 2017-04-03 RX ADMIN — SUCCINYLCHOLINE CHLORIDE 160 MG: 20 INJECTION INTRAMUSCULAR; INTRAVENOUS at 14:09

## 2017-04-03 RX ADMIN — MIDAZOLAM HYDROCHLORIDE 0.5 MG: 1 INJECTION, SOLUTION INTRAMUSCULAR; INTRAVENOUS at 18:20

## 2017-04-03 RX ADMIN — FENTANYL CITRATE 50 MCG: 50 INJECTION, SOLUTION INTRAMUSCULAR; INTRAVENOUS at 14:46

## 2017-04-03 RX ADMIN — SODIUM CHLORIDE 125 ML/HR: 900 INJECTION, SOLUTION INTRAVENOUS at 18:30

## 2017-04-03 RX ADMIN — MIDAZOLAM HYDROCHLORIDE 2 MG: 1 INJECTION, SOLUTION INTRAMUSCULAR; INTRAVENOUS at 14:05

## 2017-04-03 RX ADMIN — HYDROMORPHONE HYDROCHLORIDE 0.25 MG: 1 INJECTION, SOLUTION INTRAMUSCULAR; INTRAVENOUS; SUBCUTANEOUS at 15:44

## 2017-04-03 RX ADMIN — HYDROMORPHONE HYDROCHLORIDE 0.25 MG: 1 INJECTION, SOLUTION INTRAMUSCULAR; INTRAVENOUS; SUBCUTANEOUS at 16:51

## 2017-04-03 RX ADMIN — GLYCOPYRROLATE 0.4 MG: 0.2 INJECTION INTRAMUSCULAR; INTRAVENOUS at 16:49

## 2017-04-03 RX ADMIN — Medication 40 MCG: at 15:24

## 2017-04-03 RX ADMIN — PROPOFOL 200 MG: 10 INJECTION, EMULSION INTRAVENOUS at 14:09

## 2017-04-03 RX ADMIN — LIDOCAINE HYDROCHLORIDE 100 MG: 20 INJECTION, SOLUTION EPIDURAL; INFILTRATION; INTRACAUDAL; PERINEURAL at 14:09

## 2017-04-03 RX ADMIN — NEOSTIGMINE METHYLSULFATE 2 MG: 1 INJECTION INTRAVENOUS at 16:49

## 2017-04-03 RX ADMIN — HYDROMORPHONE HYDROCHLORIDE 0.25 MG: 1 INJECTION, SOLUTION INTRAMUSCULAR; INTRAVENOUS; SUBCUTANEOUS at 15:08

## 2017-04-03 RX ADMIN — SODIUM CHLORIDE, SODIUM LACTATE, POTASSIUM CHLORIDE, CALCIUM CHLORIDE: 600; 310; 30; 20 INJECTION, SOLUTION INTRAVENOUS at 14:05

## 2017-04-03 RX ADMIN — Medication: at 20:24

## 2017-04-03 RX ADMIN — HYDROMORPHONE HYDROCHLORIDE 0.25 MG: 1 INJECTION, SOLUTION INTRAMUSCULAR; INTRAVENOUS; SUBCUTANEOUS at 16:29

## 2017-04-03 RX ADMIN — MIDAZOLAM HYDROCHLORIDE 0.5 MG: 1 INJECTION, SOLUTION INTRAMUSCULAR; INTRAVENOUS at 18:00

## 2017-04-03 RX ADMIN — Medication: at 18:39

## 2017-04-03 RX ADMIN — KETAMINE HYDROCHLORIDE 30 MG: 10 INJECTION, SOLUTION INTRAMUSCULAR; INTRAVENOUS at 14:20

## 2017-04-03 RX ADMIN — SODIUM CHLORIDE, SODIUM LACTATE, POTASSIUM CHLORIDE, AND CALCIUM CHLORIDE 1000 ML: 600; 310; 30; 20 INJECTION, SOLUTION INTRAVENOUS at 13:18

## 2017-04-03 RX ADMIN — ONDANSETRON 4 MG: 2 INJECTION INTRAMUSCULAR; INTRAVENOUS at 14:19

## 2017-04-03 RX ADMIN — ROCURONIUM BROMIDE 15 MG: 10 INJECTION, SOLUTION INTRAVENOUS at 14:46

## 2017-04-03 RX ADMIN — KETAMINE HYDROCHLORIDE 10 MG: 10 INJECTION, SOLUTION INTRAMUSCULAR; INTRAVENOUS at 15:48

## 2017-04-03 NOTE — PERIOP NOTES
TRANSFER - OUT REPORT:    Verbal report given to NURSE RHODA(name) on Bobby Pinto  being transferred to 536(unit) for routine post - op       Report consisted of patients Situation, Background, Assessment and   Recommendations(SBAR). Time Pre op antibiotic given:ANCEF 3 gm @1419  Anesthesia Stop time: 5856  Rea Present on Transfer to floor:yes  Order for Era on Chart:yes    Information from the following report(s) SBAR, OR Summary, Intake/Output, MAR, Med Rec Status and Cardiac Rhythm NSR was reviewed with the receiving nurse. Opportunity for questions and clarification was provided. Is the patient on 02? YES       L/Min 2         Is the patient on a monitor? NO    Is the nurse transporting with the patient? NO    Surgical Waiting Area notified of patient's transfer from PACU? YES      The following personal items collected during your admission accompanied patient upon transfer:   Dental Appliance: Dental Appliances:  Other (comment) (front teeth bonded, crowns top molars)  Vision:    Hearing Aid:    Jewelry:    Clothing: Clothing: Other (comment) (clothing)  Other Valuables:    Valuables sent to safe:

## 2017-04-03 NOTE — ANESTHESIA PREPROCEDURE EVALUATION
Anesthetic History     PONV          Review of Systems / Medical History  Patient summary reviewed, nursing notes reviewed and pertinent labs reviewed    Pulmonary  Within defined limits                 Neuro/Psych         Headaches and psychiatric history     Cardiovascular    Hypertension        Dysrhythmias : SVT           GI/Hepatic/Renal     GERD           Endo/Other      Hypothyroidism  Morbid obesity and arthritis     Other Findings              Physical Exam    Airway  Mallampati: II  TM Distance: > 6 cm  Neck ROM: normal range of motion   Mouth opening: Normal     Cardiovascular  Regular rate and rhythm,  S1 and S2 normal,  no murmur, click, rub, or gallop             Dental    Dentition: Lower dentition intact and Upper dentition intact     Pulmonary  Breath sounds clear to auscultation               Abdominal  GI exam deferred       Other Findings            Anesthetic Plan    ASA: 3  Anesthesia type: general          Induction: Intravenous  Anesthetic plan and risks discussed with: Patient

## 2017-04-03 NOTE — IP AVS SNAPSHOT
[address] 
[phone] Patient: Krystle Proctor MRN: [id number] :1950 You are allergic to the following Allergen Reactions Adhesive Tape-Silicones Rash Aloe Vera Rash Chlorhexidine Rash Tussin (Dextromethorphan Hbr) Palpitations Recent Documentation Height Weight BMI OB Status Smoking Status 1.626 m 132.6 kg 50.16 kg/m2 Hysterectomy Never Smoker Emergency Contacts Name Discharge Info Relation Home Work Mobile Boo Escudero DISCHARGE CAREGIVER [3] Spouse [3] [phone] About your hospitalization You were admitted on:  April 3, 2017 You last received care in the:  [address] [place] [address] You were discharged on:  2017 Unit phone number:  [phone] Why you were hospitalized Your primary diagnosis was:  Not on File Your diagnoses also included:  Lumbar Stenosis With Neurogenic Claudication Providers Seen During Your Hospitalizations Provider Role Specialty Primary office phone Constantino Dela Cruz MD Attending Provider Orthopedic Surgery [phone] Your Primary Care Physician (PCP) Primary Care Physician Office Phone Office Fax Rochele Antis [facility] Follow-up Information Follow up With Details Comments Contact Info Luz Ross MD   [address] 
[phone] [address] Go on 2017 inpatient rehab 2309 [facility] 6200 [facility] 
[phone] Current Discharge Medication List  
  
START taking these medications Dose & Instructions Dispensing Information Comments Morning Noon Evening Bedtime  
 oxyCODONE IR 5 mg immediate release tablet Commonly known as:  Pocahontas Muster Your last dose was:     
   
Your next dose is:    
   
   
 Dose:  5-10 mg  
 Take 1-2 Tabs by mouth every three (3) hours as needed. Max Daily Amount: 80 mg.  
 Quantity:  60 Tab Refills:  0 CONTINUE these medications which have NOT CHANGED Dose & Instructions Dispensing Information Comments Morning Noon Evening Bedtime FIORICET -40 mg per tablet Generic drug:  butalbital-acetaminophen-caffeine Your last dose was: Your next dose is:    
   
   
 Dose:  1 Tab Take 1 Tab by mouth every six (6) hours as needed for Pain. Refills:  0  
     
   
   
   
  
 levothyroxine 75 mcg tablet Commonly known as:  SYNTHROID Your last dose was: Your next dose is:    
   
   
 Dose:  75 mcg Take 75 mcg by mouth Daily (before breakfast). Refills:  0  
     
   
   
   
  
 * metoprolol tartrate 25 mg tablet Commonly known as:  LOPRESSOR Your last dose was: Your next dose is:    
   
   
 Dose:  25 mg Take 25 mg by mouth daily. Refills:  0  
     
   
   
   
  
 * metoprolol tartrate 25 mg tablet Commonly known as:  LOPRESSOR Your last dose was: Your next dose is:    
   
   
 Dose:  12.5 mg Take 12.5 mg by mouth nightly. Refills:  0  
     
   
   
   
  
 omeprazole 40 mg capsule Commonly known as:  PRILOSEC Your last dose was: Your next dose is:    
   
   
 Dose:  40 mg Take 40 mg by mouth daily. Refills:  0 PARoxetine 40 mg tablet Commonly known as:  PAXIL Your last dose was: Your next dose is:    
   
   
 Dose:  40 mg Take 40 mg by mouth daily. Refills:  0  
     
   
   
   
  
 XANAX 0.5 mg tablet Generic drug:  ALPRAZolam  
   
Your last dose was: Your next dose is:    
   
   
 Dose:  0.5 mg Take 0.5 mg by mouth two (2) times daily as needed. Refills:  0  
     
   
   
   
  
 * Notice:   This list has 2 medication(s) that are the same as other medications prescribed for you. Read the directions carefully, and ask your doctor or other care provider to review them with you. Where to Get Your Medications Information on where to get these meds will be given to you by the nurse or doctor. ! Ask your nurse or doctor about these medications  
  oxyCODONE IR 5 mg immediate release tablet Discharge Instructions Patient meets criteria for BUNDLED PAYMENT  
for Care Improvement Initiative Criteria Contact Information for Orthopedic Nurse Navigator:     
TRENA Khan, RN-BC 
E:[phone] I:[phone] E:[phone] After Hospital Care Plan:  Discharge Instructions Lumbar Fusion Surgery Dr. Agustina Bryant Patient Name: Theresa Dunham Date of procedure: 4/3/2017  Date of discharge: 4/5/2017 Procedure: Procedure(s): 
L2-4 REVISION LAMINECTOMY/ L3-5 REVISION FUSION  PCP: Mackenzie Clifford MD 
 
Follow up appointments 
-follow up with Dr. Dr. Agustina Bryant in 2 weeks. Call [phone] to make an appointment as soon as you get home from the hospital. 
 
[address] [place], [place] [address]y: ____________________   phone: _______________________ The agency will contact you to arrange dates/times for visits. Please call them if you do not hear from them within 24 hours after you are discharged Physical therapy 3 times a week for 3 weeks Nursing-initial assessment and as needed When to call your Orthopaedic Surgeon: 
-Signs of infection-if your incision is red; continues to have drainage; drainage has a foul odor or if you have a persistent fever over 101 degrees for 24 hours 
-Nausea or vomiting, severe headache 
-Loss of bowel or bladder function, inability to urinate 
-Changes in sensation in your arms or legs (numbness, tingling, loss of color) -Increased weakness-greater than before your surgery 
-Severe pain or pain not relieved by medications 
-Signs of a blood clot in your leg-calf pain, tenderness, redness, swelling of lower leg When to call your Primary Care Physician: 
-Concerns about medical conditions such as diabetes, high blood pressure, asthma, congestive heart failure 
-Call if blood sugars are elevated, persistent headache or dizziness, coughing or congestion, constipation or diarrhea, burning with urination, abnormal heart rate When to call 911 and go to the nearest emergency room: 
-Acute onset of chest pain, shortness of breath, difficulty breathing Activity 
-You are going home a well person, be as active as possible. Your only exercise should be walking. Start with short frequent walks and increase your walking distance each day. 
-Limit the amount of time you sit to 20-30 minute intervals. Sitting for prolonged periods of time will be uncomfortable for you following surgery. 
-Do NOT lift anything over 5 pounds 
-Do NOT do any straining, twisting or bending 
-When you are in bed, you may lay on your back or on either side. Do NOT lie on your stomach Brace 
-If you have a back brace, you should wear your brace at all times when you are out of bed. Do not wear the brace while in bed or showering. 
-Remember to always wear a cotton t-shirt underneath your brace. 
-Do not bend or twist when your brace is off Diet 
-Resume usual diet; drink plenty of fluids; eat foods high in fiber 
-It is important to have regular bowel movements. Pain medications may cause constipation. You may want to take a stool softener (such as Senokot-S or Colace) to prevent constipation. 
 -If constipation occurs, take a laxative (such as Dulcolax tablets, Milk of Magnesia, or a suppository). Laxatives should only be used if the above preventable measures have failed and you still have not had a bowel movement after three days Driving 
-You may not drive or return to work until instructed by your physician. However, you may ride in the car for short periods of time. Incision Care Your incision has been closed with absorbable sutures and the Dermabond Prineo skin closure system. This is a combination of a mesh and a liquid adhesive that will assist with healing. The mesh is to remain on your incision for 2 weeks. A dry dressing (ABD and tape) will be placed over it and should be changed daily. Please make sure to wash your hands prior to touching your dressing. You may take brief showers but do not run the water directly onto the wound. After your shower, blot your incision dry with a soft towel and replace the dry dressing. Do not allow the tape to come in contact with the mesh. Do not rub or apply any lotions or ointments to your incision site. Do not soak or scrub your wound. The mesh dressing will be removed during your two week follow-up appointment. If you experience drainage leaking from underneath the mesh or if it peels off before 2 weeks, please contact your orthopedic surgeons office. Showering 
-You may shower in approximately 4 days after your surgery.   
-Leave the dressing on during your shower. Do NOT allow the water to run directly onto your dressing. Once you get out of the shower, gently pat the dressing dry. -Reminder- your brace can be removed while showering. Remember to not bend or twist while your brace is off.   
-Do not take a tub bath. Preventing blood clots 
-You have been given T.E.D. stockings to wear. Continue to wear these for 7 days after your discharge. Put them on in the morning and take them off at night.   
-They are used to increase your circulation and prevent blood clots from forming in your legs 
-T. E.D. stockings can be machine washed, temperature not to exceed 160° F (71°C) and machine dried for 15 to 20 minutes, temperature not to exceed 250° F (121°C). Pain management 
-Take pain medication as prescribed; decrease the amount you use as your pain lessens -DO not wait until you are in extreme pain to take your medication. 
-Avoid alcoholic beverages while taking pain medication Pain Medication Safety DO: 
-Read the Medication Guide  
-Take your medicine exactly as prescribed  
-Store your medicine away from children and in a safe place  
-Flush unused medicine down the toilet  
-Call your healthcare provider for medical advice about side effects. You may report side effects to FDA at 3-691-FDA-6377.  
-Please be aware that many medications contain Tylenol. We do not want you to over medicate so please read the information below as a guide. Do not take more than 4 Grams of Tylenol in a 24 hour period. (There are 1000 milligrams in one Gram) Percocet contains 325 mg of Tylenol per tablet (do not take more than 12 tablets in 24 hours) Lortab contains 500 mg of Tylenol per tablet (do not take more than 8 tablets in 24 hours) Norco contains 325 mg of Tylenol per tablet (do not take more than 12 tablets in 24 hours). DO NOT: 
-Do not give your medicine to others  
-Do not take medicine unless it was prescribed for you  
-Do not stop taking your medicine without talking to your healthcare provider  
-Do not break, chew, crush, dissolve, or inject your medicine. If you cannot swallow your medicine whole, talk to your healthcare provider. 
-Do not drink alcohol while taking this medicine 
-Do not take anti-inflammatory medications or aspirin unless instructed by your     physician. Discharge Orders None Introducing [facility]! Dear Darío Andrade: Thank you for requesting a [employer] account. Our records indicate that you already have an active [employer] account.   You can access your account anytime at [email or web address] Did you know that you can access your hospital and ER discharge instructions at any time in [employer]? You can also review all of your test results from your hospital stay or ER visit. Additional Information If you have questions, please visit the Frequently Asked Questions section of the [employer] website at [email or web address] Remember, [employer] is NOT to be used for urgent needs. For medical emergencies, dial 911. Now available from your iPhone and Android! General Information Please provide this summary of care documentation to your next provider. Patient Signature:  ____________________________________________________________ Date:  ____________________________________________________________  
  
Vickie Storey Provider Signature:  ____________________________________________________________ Date:  ____________________________________________________________

## 2017-04-04 LAB
ANION GAP BLD CALC-SCNC: 9 MMOL/L (ref 5–15)
BUN SERPL-MCNC: 9 MG/DL (ref 6–20)
BUN/CREAT SERPL: 12 (ref 12–20)
CALCIUM SERPL-MCNC: 8.4 MG/DL (ref 8.5–10.1)
CHLORIDE SERPL-SCNC: 105 MMOL/L (ref 97–108)
CO2 SERPL-SCNC: 27 MMOL/L (ref 21–32)
CREAT SERPL-MCNC: 0.78 MG/DL (ref 0.55–1.02)
GLUCOSE BLD STRIP.AUTO-MCNC: 130 MG/DL (ref 65–100)
GLUCOSE SERPL-MCNC: 145 MG/DL (ref 65–100)
HGB BLD-MCNC: 12.5 G/DL (ref 11.5–16)
POTASSIUM SERPL-SCNC: 4.2 MMOL/L (ref 3.5–5.1)
SERVICE CMNT-IMP: ABNORMAL
SODIUM SERPL-SCNC: 141 MMOL/L (ref 136–145)

## 2017-04-04 PROCEDURE — 36415 COLL VENOUS BLD VENIPUNCTURE: CPT | Performed by: PHYSICIAN ASSISTANT

## 2017-04-04 PROCEDURE — 97535 SELF CARE MNGMENT TRAINING: CPT

## 2017-04-04 PROCEDURE — 80048 BASIC METABOLIC PNL TOTAL CA: CPT | Performed by: PHYSICIAN ASSISTANT

## 2017-04-04 PROCEDURE — 82962 GLUCOSE BLOOD TEST: CPT

## 2017-04-04 PROCEDURE — G8987 SELF CARE CURRENT STATUS: HCPCS

## 2017-04-04 PROCEDURE — 97530 THERAPEUTIC ACTIVITIES: CPT

## 2017-04-04 PROCEDURE — 74011250636 HC RX REV CODE- 250/636: Performed by: ORTHOPAEDIC SURGERY

## 2017-04-04 PROCEDURE — 65270000029 HC RM PRIVATE

## 2017-04-04 PROCEDURE — 97165 OT EVAL LOW COMPLEX 30 MIN: CPT

## 2017-04-04 PROCEDURE — 85018 HEMOGLOBIN: CPT | Performed by: PHYSICIAN ASSISTANT

## 2017-04-04 PROCEDURE — G8979 MOBILITY GOAL STATUS: HCPCS

## 2017-04-04 PROCEDURE — G8978 MOBILITY CURRENT STATUS: HCPCS

## 2017-04-04 PROCEDURE — 74011250636 HC RX REV CODE- 250/636: Performed by: PHYSICIAN ASSISTANT

## 2017-04-04 PROCEDURE — 97162 PT EVAL MOD COMPLEX 30 MIN: CPT

## 2017-04-04 PROCEDURE — 74011250637 HC RX REV CODE- 250/637: Performed by: PHYSICIAN ASSISTANT

## 2017-04-04 PROCEDURE — 97116 GAIT TRAINING THERAPY: CPT

## 2017-04-04 PROCEDURE — G8988 SELF CARE GOAL STATUS: HCPCS

## 2017-04-04 RX ORDER — LORAZEPAM 2 MG/ML
1 INJECTION INTRAMUSCULAR
Status: DISCONTINUED | OUTPATIENT
Start: 2017-04-04 | End: 2017-04-06 | Stop reason: HOSPADM

## 2017-04-04 RX ADMIN — ACETAMINOPHEN 650 MG: 325 TABLET, FILM COATED ORAL at 06:41

## 2017-04-04 RX ADMIN — PANTOPRAZOLE SODIUM 40 MG: 40 TABLET, DELAYED RELEASE ORAL at 06:41

## 2017-04-04 RX ADMIN — OXYCODONE HYDROCHLORIDE 10 MG: 5 TABLET ORAL at 20:15

## 2017-04-04 RX ADMIN — LORAZEPAM 1 MG: 2 INJECTION INTRAMUSCULAR at 18:26

## 2017-04-04 RX ADMIN — ACETAMINOPHEN 650 MG: 325 TABLET, FILM COATED ORAL at 23:02

## 2017-04-04 RX ADMIN — LEVOTHYROXINE SODIUM 75 MCG: 75 TABLET ORAL at 06:41

## 2017-04-04 RX ADMIN — CEFAZOLIN 3 G: 1 INJECTION, POWDER, FOR SOLUTION INTRAMUSCULAR; INTRAVENOUS; PARENTERAL at 08:17

## 2017-04-04 RX ADMIN — DOCUSATE SODIUM AND SENNOSIDES 1 TABLET: 8.6; 5 TABLET, FILM COATED ORAL at 19:18

## 2017-04-04 RX ADMIN — ALPRAZOLAM 0.5 MG: 0.5 TABLET ORAL at 07:00

## 2017-04-04 RX ADMIN — ACETAMINOPHEN 650 MG: 325 TABLET, FILM COATED ORAL at 00:15

## 2017-04-04 RX ADMIN — PAROXETINE HYDROCHLORIDE 40 MG: 20 TABLET, FILM COATED ORAL at 08:40

## 2017-04-04 RX ADMIN — POLYETHYLENE GLYCOL 3350 17 G: 17 POWDER, FOR SOLUTION ORAL at 08:41

## 2017-04-04 RX ADMIN — Medication 10 ML: at 15:48

## 2017-04-04 RX ADMIN — CEFAZOLIN 3 G: 1 INJECTION, POWDER, FOR SOLUTION INTRAMUSCULAR; INTRAVENOUS; PARENTERAL at 00:14

## 2017-04-04 RX ADMIN — OXYCODONE HYDROCHLORIDE 10 MG: 5 TABLET ORAL at 12:55

## 2017-04-04 RX ADMIN — PHENOL 1 SPRAY: 1.4 SPRAY ORAL at 08:43

## 2017-04-04 RX ADMIN — KETOROLAC TROMETHAMINE 15 MG: 30 INJECTION, SOLUTION INTRAMUSCULAR at 06:42

## 2017-04-04 RX ADMIN — METOPROLOL TARTRATE 12.5 MG: 25 TABLET ORAL at 23:02

## 2017-04-04 RX ADMIN — OXYCODONE HYDROCHLORIDE 10 MG: 5 TABLET ORAL at 23:06

## 2017-04-04 RX ADMIN — OXYCODONE HYDROCHLORIDE 10 MG: 5 TABLET ORAL at 15:48

## 2017-04-04 RX ADMIN — KETOROLAC TROMETHAMINE 15 MG: 30 INJECTION, SOLUTION INTRAMUSCULAR at 00:15

## 2017-04-04 RX ADMIN — DOCUSATE SODIUM AND SENNOSIDES 1 TABLET: 8.6; 5 TABLET, FILM COATED ORAL at 08:40

## 2017-04-04 RX ADMIN — ACETAMINOPHEN 650 MG: 325 TABLET, FILM COATED ORAL at 12:54

## 2017-04-04 RX ADMIN — KETOROLAC TROMETHAMINE 15 MG: 30 INJECTION, SOLUTION INTRAMUSCULAR at 12:56

## 2017-04-04 RX ADMIN — ALPRAZOLAM 0.5 MG: 0.5 TABLET ORAL at 18:24

## 2017-04-04 RX ADMIN — ACETAMINOPHEN 650 MG: 325 TABLET, FILM COATED ORAL at 19:18

## 2017-04-04 RX ADMIN — DOCUSATE SODIUM AND SENNOSIDES 1 TABLET: 8.6; 5 TABLET, FILM COATED ORAL at 00:18

## 2017-04-04 NOTE — PROGRESS NOTES
Problem: Self Care Deficits Care Plan (Adult)  Goal: *Acute Goals and Plan of Care (Insert Text)  Occupational Therapy Goals  Initiated 4/4/2017    1. Patient will perform lower body dressing with supervision/set-up using AE PRN within 7 days. 2. Patient will perform lower body dressing with supervision/set-up using most appropriate DME within 7 days. 3. Patient will grooming seated at the sink at supervision/set-up within 7 days. 4. Patient will don/doff back brace at supervision/set-up within 7 days. 5. Patient will verbalize/demonstrate 3/3 back precautions during ADL tasks without cues within 7 days. OCCUPATIONAL THERAPY EVALUATION  Patient: Russ Cartagena (89 y.o. female)  Date: 4/4/2017  Primary Diagnosis: STENOSIS,SPONDILOLITHESIS  Lumbar stenosis with neurogenic claudication  Procedure(s) (LRB):  L2-4 REVISION LAMINECTOMY/ L3-5 REVISION FUSION (N/A) 1 Day Post-Op   Precautions:   Back, Fall (TLSO when OOB)      ASSESSMENT :  Based on the objective data described below, the patient presents with generalized weakness, decreased functional reach to feet (baseline for patient;  assists with some LB ADLs), decreased standing tolerance (baseline as well; must sit for grooming at sink and cooking tasks), increased back pain with activity and limitations due to back precautions. Pt is below her functional baseline and currently requires MOD A for LB ADLs, MIN A for standing balance/assist for standing ADLs and MIN A for functional transfers. Pt able to doff her TLSO with MIN A but required total assist to anuel her TLSO (per PT). Pt with extensive medical history and reported being nonambulatory/wc bound prior to her first back surgery but with IP rehab was able to progress to being ambulatory for short distances and able to drive, complete ADLs with MIN A and complete some IADLs on her own such as grocery shopping.   Pt hoping to further increase her function after this back surgery and would benefit from going to inpatient rehab to maximize her functional outcome. Patient will benefit from skilled intervention to address the above impairments. Patients rehabilitation potential is considered to be Good  Factors which may influence rehabilitation potential include:   [ ]             None noted  [X]             Mental ability/status  [ ]             Medical condition  [X]             Home/family situation and support systems  [X]             Safety awareness  [ ]             Pain tolerance/management  [ ]             Other:        PLAN :  Recommendations and Planned Interventions:  [X]               Self Care Training                  [X]        Therapeutic Activities  [X]               Functional Mobility Training    [ ]        Cognitive Retraining  [X]               Therapeutic Exercises           [X]        Endurance Activities  [X]               Balance Training                   [ ]        Neuromuscular Re-Education  [ ]               Visual/Perceptual Training     [X]   Home Safety Training  [X]               Patient Education                 [X]        Family Training/Education  [ ]               Other (comment):     Frequency/Duration: Patient will be followed by occupational therapy 5 times a week to address goals. Discharge Recommendations: Inpatient Rehab  Further Equipment Recommendations for Discharge: TBD       SUBJECTIVE:   Patient stated At home I have to sit to brush my teeth.       OBJECTIVE DATA SUMMARY:   HISTORY:   Past Medical History:   Diagnosis Date    Adverse effect of anesthesia       O2 DROPS WITH ANESTHESIA    Arthritis       KNEES    Cancer ([facility] [place] [address] 2015     tonsils and lymph nodes.   Removed 3-2015 with radiation    GERD (gastroesophageal reflux disease)      Hypertension       NO LONGER ON MEDICATION    Hypothyroid      Migraine      Nausea & vomiting      Obesity      Other ill-defined conditions       chronic back pain    Psychiatric disorder       anxiety    Psychiatric disorder       panic ATTACKS    SVT (supraventricular tachycardia) (HCC)      Ulcerative colitis       Past Surgical History:   Procedure Laterality Date    COLONOSCOPY,DIAGNOSTIC   11/19/2015          HX GI         colonscopy    HX HEENT   03/2015     tonsillectomy (CANCER) AND NECK LYMPH NODES    HX HEENT   2008     PARATHYROID REMOVAL    HX HEENT Left 2002     EYE LASER    HX HYSTERECTOMY   1985    HX KNEE ARTHROSCOPY   1995     left knee surgery, TOTAL LT  and Right KNEE 2013    HX KNEE REPLACEMENT Bilateral 2013, 2014    HX LAP CHOLECYSTECTOMY   2002    HX LUMBAR FUSION   2011     SPINAL FUSION with hardware L4-L5    HX ORTHOPAEDIC   1990     CYST REMOVED RIGHT WRIST    HX OTHER SURGICAL         cyst removal right wrist    HX OTHER SURGICAL   2001, 2016     hemorrhoidectomy X2    HX UROLOGICAL   2010     bladder surgery(interstim device)    AR EGD TRANSORAL BIOPSY SINGLE/MULTIPLE   5/20/2013              Prior Level of Function/Home Situation: Pt was wc bound/non ambulatory for aprox 1 year prior to her first back surgery (2011) but had progressed to being ambulatory using a RW for short distances and able to complete ADLs with MIN A and use of compensatory techniques.   Pt is able to drive and grocery shop for herself but still has limited standing tolerance  Expanded or extensive additional review of patient history:      Home Situation  Home Environment: Private residence  Wheelchair Ramp: Yes  One/Two Story Residence: One story (ramp into family room)  Living Alone: No  Support Systems: Spouse/Significant Other/Partner, Family member(s)  Patient Expects to be Discharged to[de-identified] Unknown  Current DME Used/Available at Home: Wheelchair, Walker, rolling, Tub transfer bench, Raised toilet seat, Adaptive dressing aides  Tub or Shower Type: Tub/Shower combination  [X]  Right hand dominant             [ ]  Left hand dominant     EXAMINATION OF PERFORMANCE DEFICITS:  Cognitive/Behavioral Status:  Neurologic State: Alert; Appropriate for age  Orientation Level: Appropriate for age  Cognition: Appropriate decision making; Appropriate for age attention/concentration; Appropriate safety awareness; Follows commands  Perception: Appears intact  Perseveration: No perseveration noted  Safety/Judgement: Awareness of environment; Fall prevention     Skin: bandage c/d/i; hemovac intact     Edema: None noted     Hearing:        Vision/Perceptual:              Range of Motion:  AROM: Within functional limits     Strength:  Strength: Within functional limits     Coordination:  Coordination: Within functional limits  Fine Motor Skills-Upper: Left Intact; Right Intact    Gross Motor Skills-Upper: Left Intact; Right Intact     Tone & Sensation:  Tone: Normal  Sensation: Intact     Balance:  Sitting: Intact  Standing: Impaired  Standing - Static: Good  Standing - Dynamic : Fair     Functional Mobility and Transfers for ADLs:  Bed Mobility:  Supine to Sit: Minimum assistance;Assist x1  Scooting: Supervision     Transfers:  Sit to Stand: Contact guard assistance;Assist x2; Additional time  Stand to Sit: Contact guard assistance;Assist x1;Additional time  Toilet Transfer : Contact guard assistance     ADL Assessment:  Feeding: Independent     Oral Facial Hygiene/Grooming: Independent (seated; unable to stand at her sink at home)     Bathing: Moderate assistance (will need LHS)     Upper Body Dressing: Setup (MOD A to doff TLSO)     Lower Body Dressing: Moderate assistance (initial instruction on AE for LB dressing)     Toileting: Minimum assistance     ADL Intervention and task modifications:     Upper Body Dressing Assistance  Orthotics(Brace):  (able to doff TLSO with MOD A)     Lower Body Dressing Assistance  Socks: Moderate assistance (to doff/anuel socks using AE)  Leg Crossed Method Used: No (Unable at baseline)  Position Performed: Seated in chair  Adaptive Equipment Used: Reacher;Sock aid; Walker     Cognitive Retraining  Safety/Judgement: Awareness of environment; Fall prevention        Functional Measure:  Barthel Index:      Bathin  Bladder: 0  Bowels: 10  Groomin  Dressin  Feeding: 10  Mobility: 0  Stairs: 0  Toilet Use: 5  Transfer (Bed to Chair and Back): 10  Total: 45         Barthel and G-code impairment scale:  Percentage of impairment CH  0% CI  1-19% CJ  20-39% CK  40-59% CL  60-79% CM  80-99% CN  100%   Barthel Score 0-100 100 99-80 79-60 59-40 20-39 1-19    0   Barthel Score 0-20 20 17-19 13-16 9-12 5-8 1-4 0      The Barthel ADL Index: Guidelines  1. The index should be used as a record of what a patient does, not as a record of what a patient could do. 2. The main aim is to establish degree of independence from any help, physical or verbal, however minor and for whatever reason. 3. The need for supervision renders the patient not independent. 4. A patient's performance should be established using the best available evidence. Asking the patient, friends/relatives and nurses are the usual sources, but direct observation and common sense are also important. However direct testing is not needed. 5. Usually the patient's performance over the preceding 24-48 hours is important, but occasionally longer periods will be relevant. 6. Middle categories imply that the patient supplies over 50 per cent of the effort. 7. Use of aids to be independent is allowed. Becky Wallis., Barthel, D.W. ([id number]). Functional evaluation: the Barthel Index. [address] [facility] (14)2. Jamar Flores, TJJ.[email or web address], Curtis Lemus., Nora Hayes., [place], [address] [place] [address] (). Measuring the change indisability after inpatient rehabilitation; comparison of the responsiveness of the Barthel Index and Functional Hopkins Measure. Journal of Neurology, Neurosurgery, and Psychiatry, 66(4), 555-900.   Minal Amador N.J.A, Augusta Nguyen  W.J.ALEXX, & Bernie Patino, M.A. (2004.) Assessment of post-stroke quality of life in cost-effectiveness studies: The usefulness of the Barthel Index and the EuroQoL-5D. Quality of Life Research, 13, 387-82         G codes: In compliance with CMSs Claims Based Outcome Reporting, the following G-code set was chosen for this patient based on their primary functional limitation being treated: The outcome measure chosen to determine the severity of the functional limitation was the Barthel Index with a score of 45/100 which was correlated with the impairment scale. · Self Care:               - CURRENT STATUS:    CK - 40%-59% impaired, limited or restricted               - GOAL STATUS:           CJ - 20%-39% impaired, limited or restricted               - D/C STATUS:                       ---------------To be determined---------------      Occupational Therapy Evaluation Charge Determination   History Examination Decision-Making   LOW Complexity : Brief history review  LOW Complexity : 1-3 performance deficits relating to physical, cognitive , or psychosocial skils that result in activity limitations and / or participation restrictions  LOW Complexity : No comorbidities that affect functional and no verbal or physical assistance needed to complete eval tasks       Based on the above components, the patient evaluation is determined to be of the following complexity level: LOW   Pain:  Pain Scale 1: Numeric (0 - 10)  Pain Intensity 1: 6  Pain Location 1: Back  Pain Orientation 1: Posterior  Pain Description 1: Aching  Pain Intervention(s) 1: Encouraged PCA  Activity Tolerance:   No s/s of distress; no reports of dizziness; no significant anxiety and responded well to redirection today     Please refer to the flowsheet for vital signs taken during this treatment.   After treatment:   [ ] Patient left in no apparent distress sitting up in chair  [X] Patient left in no apparent distress in bed  [X] Call bell left within reach  [X] Nursing notified  [X] Caregiver present ( present in room)  [ ] Bed alarm activated      COMMUNICATION/EDUCATION:   The patients plan of care was discussed with: Physical Therapist and Registered Nurse.  [X] Home safety education was provided and the patient/caregiver indicated understanding. [X] Patient/family have participated as able in goal setting and plan of care. [X] Patient/family agree to work toward stated goals and plan of care. [ ] Patient understands intent and goals of therapy, but is neutral about his/her participation. [ ] Patient is unable to participate in goal setting and plan of care. This patients plan of care is appropriate for delegation to [facility].      Thank you for this referral.  Salinas Hamilton, OT  Time Calculation: 24 mins

## 2017-04-04 NOTE — ANESTHESIA POSTPROCEDURE EVALUATION
Post-Anesthesia Evaluation and Assessment    Patient: Era Alford MRN: [id number]  SSN: [id number]    YOB: 1950  Age: 74 y.o. Sex: female       Cardiovascular Function/Vital Signs  Visit Vitals    /70    Pulse 83    Temp 36.4 °C (97.5 °F)    Resp 14    Ht 5' 4\" (1.626 m)    Wt 132.6 kg (292 lb 4 oz)    SpO2 94%    BMI 50.16 kg/m2       Patient is status post general anesthesia for Procedure(s):  L2-4 REVISION LAMINECTOMY/ L3-5 REVISION FUSION. Nausea/Vomiting: None    Postoperative hydration reviewed and adequate. Pain:  Pain Scale 1: FLACC (04/03/17 1900)  Pain Intensity 1: 3 (04/03/17 1255)   Managed    Neurological Status:   Neuro (WDL): Within Defined Limits (04/03/17 1730)   At baseline    Mental Status and Level of Consciousness: Arousable    Pulmonary Status:   O2 Device: Nasal cannula (04/03/17 1900)   Adequate oxygenation and airway patent    Complications related to anesthesia: None    Post-anesthesia assessment completed.  No concerns    Signed By: Lazarus Don, MD     April 3, 2017

## 2017-04-04 NOTE — PROGRESS NOTES
Orthopedic Spine Progress Note  Post Op day: 1 Day Post-Op    2017 7:47 AM   Admit Date: 4/3/2017  Procedure: Procedure(s):  L2-4 REVISION LAMINECTOMY/ L3-5 REVISION FUSION    Subjective:     Jaspal Samayoa has no complaints. Some difficulty with anxiety/panic attack overnight. Pain well controlled overall. Tolerating diet. No N/V. Pain Control:   Pain Assessment  Pain Scale 1: Numeric (0 - 10)  Pain Intensity 1: 8  Pain Onset 1: post op  Pain Location 1: Back  Pain Orientation 1: Lower, Left, Right  Pain Description 1: Aching  Pain Intervention(s) 1: Medication (see MAR)    Objective:          Physical Exam:  General:  Alert and oriented. No acute distress. Heart:  Respirations unlabored. Abdomen:   Extremities: Soft, non-tender. No evidence of cyanosis. Pulses palpable in both upper and lower extremities. Neurologic:  Musculoskeletal:  No new motor deficits. Neurovascular exam within normal limits. Sensation stable. Motor: unchanged C5-T1 and L2-S1. Neema's sign negative in bilateral lower extremities. Calves soft, nontender upon palpation and with passive twitch. Moves both upper and lower extremities. Incision: clean, dry, and intact. No significant erythema or swelling. No active drainage noted. Vital Signs:    Blood pressure 121/79, pulse 79, temperature 97.6 °F (36.4 °C), resp. rate 16, height 5' 4\" (1.626 m), weight 132.6 kg (292 lb 4 oz), SpO2 97 %.   Temp (24hrs), Av.6 °F (36.4 °C), Min:97.4 °F (36.3 °C), Max:98 °F (36.7 °C)      LAB:    Recent Labs      17   0411   HGB  12.5     Lab Results   Component Value Date/Time    Sodium 141 2017 04:11 AM    Potassium 4.2 2017 04:11 AM    Chloride 105 2017 04:11 AM    CO2 27 2017 04:11 AM    Glucose 145 2017 04:11 AM    BUN 9 2017 04:11 AM    Creatinine 0.78 2017 04:11 AM    Calcium 8.4 2017 04:11 AM       Intake/Output:    1901 -  0700  In:  [I.V.:2000]  Out: 1 [Urine:600; Drains:220]    PT/OT:   Gait:                    Assessment:   Patient is 1 Day Post-Op s/p Procedure(s):  L2-4 REVISION LAMINECTOMY/ L3-5 REVISION FUSION    Plan:     1. Continue PT/OT  2. Continue established methods of pain control  3. VTE Prophylaxes - TEDS &/or SCDs   4. Continue with current meds for anxiety. Will consult psych if ongoing difficulty. 5. Discharge pending  6.        Signed By: Kate Ness PA-C

## 2017-04-04 NOTE — OP NOTES
[address] [place] [facility] [place] [address], [address]   OP NOTE       Name:  Henrry Mansfield   MR#:  [id number]   :  1950   Account #:  [de-identified]    Surgery Date:  2017   Date of Adm:  2017       PREOPERATIVE DIAGNOSES    1. L3-L4 spondylolisthesis. 2. Lumbar stenosis, L2-L3, L3-L4, status post L4-L5 fusion. POSTOPERATIVE DIAGNOSES    1. L3-L4 spondylolisthesis. 2. Lumbar stenosis, L2-L3, L3-L4, status post L4-L5 fusion. PROCEDURES PERFORMED    1. Exploration of fusion with revision laminectomy, L2-L3 and L3-L4. 2. Posterior lumbar fusion revision, L3-L5. 3. Posterior segmentation L3-L5. 4. Transforaminal interbody fusion, L3-L4. 5. Allograft and autograft. 6. Posterior Lydia osteotomy at L3-L4. SURGEON: Romy Rebollar MD    ASSISTANT: Olga Duncan PA-C      ESTIMATED BLOOD LOSS: Minimal.    SPECIMENS REMOVED: None    ANESTHESIA:  None    COMPLICATIONS: None. INDICATIONS: This is a pleasant 47-year-old female who is   approximately 6 years out from a lumbar fusion at L4-L5. Now with   new instability at L4-L5 with spondylolisthesis as well as stenosis. She   failed conservative treatment, understood and elected to proceed. DESCRIPTION OF PROCEDURE: The patient was identified. She was   brought to the operative suite and underwent general anesthesia   without difficulty. The patient had preop neuromonitoring placed, as   well as antibiotics. The patient placed in the prone position and all   bony prominences well padded and the back was prepped and draped   sterilely. Time-out was performed. We opened the previous surgical   incision, dissected down with exposure to the thoracodorsal fascia split   again with exposure the level of the L4 and L5 fusion. The fusion was   solid with good posterolateral bone graft material noted.  We exposed   the remainder L3-L4 facet as well as the transverse process of L3 and   the remainder of the fusion mass at L4-L5. Deep retractors were placed   bilaterally. Flouroscopy was brought in for localization of appropriate level, at   which time we then started the revision laminectomy. We removed the   remainder of the spinous process of L4 as well as the inferior portion of   L3. The patient had severe lateral recess stenosis at L3-L4 due the   facet hypertrophy as well as ligamentum hypertrophy. Partial   facetectomies were performed as well as removal of the remainder of   ligamentum and a [id number] decompression from L3 to L4 was   then performed. At that time, we then removed instrumentation at the   L4 and L5 level and replaced it with Globus CREO AMP screws, 6.5 x   45 mm screws at L4 and L5 bilaterally. We then cannulated the L3   pedicles using standard bony landmarks, tested with a ball-tipped   probe as well as neuromonitoring. No placed 6.5 x 50 mm Globus   CREO AMP screws there. Wounds were irrigated with a dilute   Betadine solution followed by pulse irrigation. We then did an   annulotomy on the left-hand side and complete diskectomy. This   allowed access to the disk space. We did remove the disk with pituitary   rongeurs and curved curettes. We then rasped the endplates. We   placed a medium OsteoAMP sponge in the anterior 1/3 column from   around to the contralateral side, and then we placed a single Globus   RISE cage 10 x 26 x 8 mm, which was expanded to approximately 12   mm, at which time we irrigated the wound one more time, decorticated   the previous fusion mass as well as the transverse process of L3 and   the L3-L4 facet then packed with local bone graft as well as OsteoAMP   granules into the posterolateral gutters. Longitudinal rods were   attached to the pedicle screws with set screws. Compression across of   the L3-L4 osteotomy site. Final tightening performed. Final x-rays   demonstrated stable fixation.  Interrupted 0 Vicryl in the fascial layer, 2-  0 Vicryl subcutaneous layer, and 3-0 Vicryl in skin after a medium   Hemovac placed. The patient returned to the PACU in stable condition.         MD CHIQUI Robles / YOVANNY   D:  04/03/2017   18:52   T:  04/04/2017   06:38   Job #:  [id number]

## 2017-04-04 NOTE — PROGRESS NOTES
Problem: Mobility Impaired (Adult and Pediatric)  Goal: *Acute Goals and Plan of Care (Insert Text)  Physical Therapy Goals  Initiated 4/4/2017    1. Patient will move from supine to sit and sit to supine and roll side to side in bed with independence within 4 days. 2. Patient will perform sit to stand with modified independence within 4 days. 3. Patient will ambulate with modified independence for 150 feet with the least restrictive device within 4 days. 4. Patient will verbalize and demonstrate understanding of spinal precautions (No bending, lifting greater than 5 lbs, or twisting; log-roll technique; frequent repositioning as instructed) within 4 days. PHYSICAL THERAPY TREATMENT  Patient: Jasmyn Guerrero (60 y.o. female)  Date: 4/4/2017  Diagnosis: STENOSIS,SPONDILOLITHESIS  Lumbar stenosis with neurogenic claudication <principal problem not specified>  Procedure(s) (LRB):  L2-4 REVISION LAMINECTOMY/ L3-5 REVISION FUSION (N/A) 1 Day Post-Op  Precautions: Back, Fall (TLSO when OOB)      ASSESSMENT:  Pt is making slow, steady gains, sitting edge of bed with nurse when therapist entered room, assisted with donning briefs, pt stood at beside bed with CGA x 2 to pull up briefs, she had slight sway anteriorly and provided with minimal assistance to correct balance. Pt had no significant loss of balance and safely assisted back to bed.  Pt was a bit anxious but with reassurance calmed down and able to progress to ambulation with rolling walker x 20 feet with CGA x 2, pt c/o BLE weakness and fatigued quickly, pt reports good pain control, 2/10, pt may benefit from rehab versus home health pending progress  Progression toward goals:   [ ]      Improving appropriately and progressing toward goals  [X]      Improving slowly and progressing toward goals  [ ]      Not making progress toward goals and plan of care will be adjusted       PLAN:  Patient continues to benefit from skilled intervention to address the above impairments. Continue treatment per established plan of care. Discharge Recommendations:  Rehab, Home Health and To Be Determined  Further Equipment Recommendations for Discharge:  May need bariatric rolling walker       SUBJECTIVE:   Patient stated Oh I almost fell!  Pt referring to swaying while standing but no overt loss of balance and assisted to sit edge of bed safely. The patient stated 3/3 back precautions. Reviewed all 3 with patient. OBJECTIVE DATA SUMMARY:   Critical Behavior:  Neurologic State: Alert, Appropriate for age  Orientation Level: Appropriate for age  Cognition: Appropriate decision making, Appropriate for age attention/concentration, Appropriate safety awareness, Follows commands  Safety/Judgement: Awareness of environment, Fall prevention  Functional Mobility Training:  Bed Mobility:  Pt was assisted to sitting edge of bed with nurse     Scooting: Supervision        Brace donned with  maximal assistance   Transfers:   Pt stood at bedside with CGA x 2 to pull up her briefs. She had slight sway anteriorly and provided with minimal assistance to correct balance. Pt had no significant loss of balance and safely assisted back to bed. Pt a bit anxious and stating \"I almost fell\"  Sit to Stand: Contact guard assistance;Assist x2; Additional time  Stand to Sit: Contact guard assistance;Assist x1;Additional time                             Balance:  Sitting: Intact  Standing: Impaired  Standing - Static: Good  Standing - Dynamic : Fair  Ambulation/Gait Training:  Distance (ft): 20 Feet (ft)  Assistive Device: Brace/Splint;Gait belt;Walker, rolling  Ambulation - Level of Assistance: Contact guard assistance;Assist x2        Gait Abnormalities: Antalgic;Decreased step clearance, pt c/o BLE weakness        Base of Support: Widened     Speed/Catherine: Pace decreased (<100 feet/min)  Step Length: Right shortened;Left shortened              Pain:  Pain Scale 1: Numeric (0 - 10)  Pain Intensity 1: 2  Pain Location 1: Back  Pain Orientation 1: Posterior  Pain Description 1: Aching  Pain Intervention(s) 1: see MAR  Activity Tolerance:   Fair, tolerated increased activity, no c/o lightheadedness       After treatment:   [X]  Patient left in no apparent distress sitting up in chair  [ ]  Patient left in no apparent distress in bed  [X]  Call bell left within reach  [X]  Nursing notified  [ ]  Caregiver present  [ ]  Bed alarm activated      COMMUNICATION/COLLABORATION:   The patients plan of care was discussed with: Registered Nurse     Gris Rincon   Time Calculation: 16 mins

## 2017-04-04 NOTE — PROGRESS NOTES
Problem: Mobility Impaired (Adult and Pediatric)  Goal: *Acute Goals and Plan of Care (Insert Text)  Physical Therapy Goals  Initiated 4/4/2017    1. Patient will move from supine to sit and sit to supine and roll side to side in bed with independence within 4 days. 2. Patient will perform sit to stand with modified independence within 4 days. 3. Patient will ambulate with modified independence for 150 feet with the least restrictive device within 4 days. 4. Patient will verbalize and demonstrate understanding of spinal precautions (No bending, lifting greater than 5 lbs, or twisting; log-roll technique; frequent repositioning as instructed) within 4 days.   PHYSICAL THERAPY EVALUATION  Patient: Elizabeth Owens (4 y.o. female)  Date: 4/4/2017  Primary Diagnosis: STENOSIS,SPONDILOLITHESIS  Lumbar stenosis with neurogenic claudication  Procedure(s) (LRB):  L2-4 REVISION LAMINECTOMY/ L3-5 REVISION FUSION (N/A) 1 Day Post-Op   Precautions:   Back, Fall,No bending, no lifting greater than 5 lbs, no twisting, log-roll technique, repositioning every 20-30 min except when sleeping, brace when OOB      ASSESSMENT :  Based on the objective data described below, the patient presents with decreased activity tolerance due to back pain, low BP, anxiety(history of panic attacks), morbid obesity, presents with generalized weakness, decreased standing balance and impaired functional mobility from her baseline level of function, pt reports she mostly stays in the bed at home, ambulates within her home while holding onto furniture, wall, etc, prepares her meals but needs to take frequent breaks in a chair, she drives and does her grocery shopping, pt uses the scooter at the store, and ambulates short distances with a rollator in the community, reports she had some falls in the past year, pt completed supine to sit with minimal assist x 1, sit to stand with CGA x 2, and side stepped to bedside chair with rolling walker and minimal assist x 2, standing BP 96/81, sitting in chair post-activity /56, asymptomatic, pt remained calm with reassurance and explanation of each step during therapy, pt reports moderate back pain but agreeable to remain up in chair, pt may benefit from rehab versus home health PT pending progress     Patient will benefit from skilled intervention to address the above impairments. Patients rehabilitation potential is considered to be Good  Factors which may influence rehabilitation potential include:   [ ]         None noted  [ ]         Mental ability/status  [ ]         Medical condition  [ ]         Home/family situation and support systems  [ ]         Safety awareness  [X]         Pain tolerance/management  [X]         Other: morbid obesity       PLAN :  Recommendations and Planned Interventions:  [X]           Bed Mobility Training             [ ]    Neuromuscular Re-Education  [X]           Transfer Training                   [X]    Orthotic/Prosthetic Training  [X]           Gait Training                         [ ]    Modalities  [ ]           Therapeutic Exercises           [ ]    Edema Management/Control  [ ]           Therapeutic Activities            [X]    Patient and Family Training/Education  [ ]           Other (comment):     Frequency/Duration: Patient will be followed by physical therapy  twice daily to address goals. Discharge Recommendations: Rehab, Home Health and To Be Determined  Further Equipment Recommendations for Discharge: to be determined, pt has a rollator, may need bariatric rolling walker       SUBJECTIVE:   Patient stated I have panic attacks.   Pt only anxious when speaking about her panic attacks. Reviewed back precautions and sitting restrictions. OBJECTIVE DATA SUMMARY:   HISTORY:    Past Medical History:   Diagnosis Date    Adverse effect of anesthesia       O2 DROPS WITH ANESTHESIA    Arthritis       KNEES    Cancer ([facility] [address] 2015     tonsils and lymph nodes.   Removed 3-2015 with radiation    GERD (gastroesophageal reflux disease)      Hypertension       NO LONGER ON MEDICATION    Hypothyroid      Migraine      Nausea & vomiting      Obesity      Other ill-defined conditions       chronic back pain    Psychiatric disorder       anxiety    Psychiatric disorder       panic ATTACKS    [facility]ra[facility]ricular tachycardia) ([facility] [address]      Ulcerative colitis       Past Surgical History:   Procedure Laterality Date    COLONOSCOPY,DIAGNOSTIC   11/19/2015          HX GI         colonscopy    HX HEENT   03/2015     tonsillectomy (CANCER) AND NECK LYMPH NODES    HX HEENT   2008     PARATHYROID REMOVAL    HX HEENT Left 2002     EYE LASER    HX HYSTERECTOMY   1985    HX KNEE ARTHROSCOPY   1995     left knee surgery, TOTAL LT  and Right KNEE 2013    HX KNEE REPLACEMENT Bilateral 2013, 2014    HX LAP CHOLECYSTECTOMY   2002    HX LUMBAR FUSION   2011     SPINAL FUSION with hardware L4-L5    HX ORTHOPAEDIC   1990     CYST REMOVED RIGHT WRIST    HX OTHER SURGICAL         cyst removal right wrist    HX OTHER SURGICAL   2001, 2016     hemorrhoidectomy X2    HX UROLOGICAL   2010     bladder surgery(interstim device)    NC EGD TRANSORAL BIOPSY SINGLE/MULTIPLE   5/20/2013           Prior Level of Function/Home Situation: pt reports she ambulates around her household, holds onto furniture, wall, etc, stays in the bed most of the time per pt, prepare her meals but needs to sit in a chair frequently, drives and goes to grocery store, uses a scooter in the stores, pt reports she uses a rollator when she ambulates short distances in the community, pt reports some \"bad\" falls in the past year   Personal factors and/or comorbidities impacting plan of care:      Home Situation  Home Environment: Private residence  Wheelchair Ramp: Yes  One/Two Story Residence: One story (ramp into family room)  Living Alone: No  Support Systems: Spouse/Significant Other/Partner, Family member(s)  Patient Expects to be Discharged to[de-identified] Unknown  Current DME Used/Available at Home: Wheelchair, Walker, rolling, Tub transfer bench, Raised toilet seat, Adaptive dressing aides  Tub or Shower Type: Tub/Shower combination     EXAMINATION/PRESENTATION/DECISION MAKING:   Critical Behavior:  Neurologic State: Alert, Appropriate for age  Orientation Level: Appropriate for age  Cognition: Appropriate decision making, Appropriate for age attention/concentration, Appropriate safety awareness, Follows commands  Safety/Judgement: Awareness of environment, Fall prevention        Skin:  Dressing, drain intact     Range Of Motion:  AROM: Within functional limits, BLE limited due to body habitus                       Strength:    Strength: generally decreased,  functional                    Tone & Sensation:   Tone: Normal              Sensation: Intact               Coordination:  Coordination: Within functional limits        Functional Mobility:  Bed Mobility:     Supine to Sit: Minimum assistance;Assist x1, uses bed rail     Scooting: Supervision   Donned TLSO with maximum assist  Transfers:  Sit to Stand: Contact guard assistance;Assist x2; Additional time, stands in place with walker support  Stand to Sit: Contact guard assistance;Assist x1;Additional time                       Balance:   Sitting: Intact  Standing: Impaired  Standing - Static: Good  Standing - Dynamic : Fair  Ambulation/Gait Training:   side stepped to chair with rolling walker and minimal assist  x 2         Functional Assessment:    Tinetti test:    Sitting Balance: 1  Arises: 1  Attempts to Rise: 1  Immediate Standing Balance: 1  Standing Balance: 1  Nudged: 0  Eyes Closed: 0  Turn 360 Degrees - Continuous/Discontinuous: 0  Turn 360 Degrees - Steady/Unsteady: 0  Sitting Down: 1  Balance Score: 6  Indication of Gait: 0  R Step Length/Height: 0  L Step Length/Height: 0  R Foot Clearance: 0  L Foot Clearance: 0  Step Symmetry: 0  Step Continuity: 0  Path: 0  Trunk: 0  Walking Time: 0  Gait Score: 0  Total Score: 6       Tinetti Test and G-code impairment scale:  Percentage of Impairment CH    0%   CI    1-19% CJ    20-39% CK    40-59% CL    60-79% CM    80-99% CN     100%   Tinetti  Score 0-28 28 23-27 17-22 12-16 6-11 1-5 0       Tinetti Tool Score Risk of Falls  <19 = High Fall Risk  19-24 = Moderate Fall Risk  25-28 = Low Fall Risk  Tinetti ME. Performance-Oriented Assessment of Mobility Problems in Elderly Patients. [facility] 64; [id number]. (Scoring Description: PT Bulletin Feb. 10, 1993)    Older adults: Bushra Horan et al, 2009; n = 1000 [facility] elderly evaluated with ABC, GILMA, ADL, and IADL)  · Mean GILMA score for males aged 69-68 years = 26.21(3.40)  · Mean GILMA score for females age 69-68 years = 25.16(4.30)  · Mean GILMA score for males over 80 years = 23.29(6.02)  · Mean GILMA score for females over 80 years = 17.20(8.32)       In compliance with CMSs Claims Based Outcome Reporting, the following G-code set was chosen for this patient based on their primary functional limitation being treated: The outcome measure chosen to determine the severity of the functional limitation was the Tinetti with a score of 6/28 which was correlated with the impairment scale.     ? Mobility - Walking and Moving Around:     - CURRENT STATUS: CL - 60%-79% impaired, limited or restricted    - GOAL STATUS: CK - 40%-59% impaired, limited or restricted    - D/C STATUS:  ---------------To be determined---------------                           Physical Therapy Evaluation Charge Determination   History Examination Presentation Decision-Making   MEDIUM  Complexity : 1-2 comorbidities / personal factors will impact the outcome/ POC  MEDIUM Complexity : 3 Standardized tests and measures addressing body structure, function, activity limitation and / or participation in recreation  LOW Complexity : Stable, uncomplicated  MEDIUM Complexity : FOTO score of 26-74      Based on the above components, the patient evaluation is determined to be of the following complexity level: LOW      Pain:  Pain Scale 1: Numeric (0 - 10)  Pain Intensity 1: 6  Pain Location 1: Back  Pain Orientation 1: Posterior  Pain Description 1: Aching  Pain Intervention(s) 1: Encouraged PCA  Activity Tolerance:   Fair, pt anxious but did well with reassurance and explanation for each step of mobility, BP 96/81 with standing, sitting in chair /56, pt denies dizziness   Please refer to the flowsheet for vital signs taken during this treatment. After treatment:   [X]         Patient left in no apparent distress sitting up in chair  [ ]         Patient left in no apparent distress in bed  [X]         Call bell left within reach  [X]         Nursing notified  [ ]         Caregiver present  [ ]         Bed alarm activated      COMMUNICATION/EDUCATION:   The patients plan of care was discussed with: Registered Nurse.  [X]         Fall prevention education was provided and the patient/caregiver indicated understanding. [ ]         Patient/family have participated as able in goal setting and plan of care. [X]         Patient/family agree to work toward stated goals and plan of care. [ ]         Patient understands intent and goals of therapy, but is neutral about his/her participation. [ ]         Patient is unable to participate in goal setting and plan of care.      Thank you for this referral.  Gris Marcus   Time Calculation: 28 mins

## 2017-04-05 LAB — HGB BLD-MCNC: 10.7 G/DL (ref 11.5–16)

## 2017-04-05 PROCEDURE — 74011250637 HC RX REV CODE- 250/637: Performed by: PHYSICIAN ASSISTANT

## 2017-04-05 PROCEDURE — 36415 COLL VENOUS BLD VENIPUNCTURE: CPT | Performed by: PHYSICIAN ASSISTANT

## 2017-04-05 PROCEDURE — 65270000029 HC RM PRIVATE

## 2017-04-05 PROCEDURE — 74011250637 HC RX REV CODE- 250/637: Performed by: NURSE PRACTITIONER

## 2017-04-05 PROCEDURE — 85018 HEMOGLOBIN: CPT | Performed by: PHYSICIAN ASSISTANT

## 2017-04-05 PROCEDURE — 97535 SELF CARE MNGMENT TRAINING: CPT

## 2017-04-05 PROCEDURE — 97530 THERAPEUTIC ACTIVITIES: CPT

## 2017-04-05 PROCEDURE — 97116 GAIT TRAINING THERAPY: CPT

## 2017-04-05 PROCEDURE — C1758 CATHETER, URETERAL: HCPCS

## 2017-04-05 RX ORDER — FACIAL-BODY WIPES
10 EACH TOPICAL DAILY PRN
Status: DISCONTINUED | OUTPATIENT
Start: 2017-04-05 | End: 2017-04-05 | Stop reason: SDUPTHER

## 2017-04-05 RX ORDER — ADHESIVE BANDAGE
30 BANDAGE TOPICAL
Status: COMPLETED | OUTPATIENT
Start: 2017-04-05 | End: 2017-04-05

## 2017-04-05 RX ADMIN — Medication 10 ML: at 13:28

## 2017-04-05 RX ADMIN — OXYCODONE HYDROCHLORIDE 10 MG: 5 TABLET ORAL at 06:37

## 2017-04-05 RX ADMIN — LEVOTHYROXINE SODIUM 75 MCG: 75 TABLET ORAL at 06:36

## 2017-04-05 RX ADMIN — OXYCODONE HYDROCHLORIDE 10 MG: 5 TABLET ORAL at 10:57

## 2017-04-05 RX ADMIN — ACETAMINOPHEN 650 MG: 325 TABLET, FILM COATED ORAL at 06:36

## 2017-04-05 RX ADMIN — OXYCODONE HYDROCHLORIDE 10 MG: 5 TABLET ORAL at 03:34

## 2017-04-05 RX ADMIN — ACETAMINOPHEN 650 MG: 325 TABLET, FILM COATED ORAL at 17:53

## 2017-04-05 RX ADMIN — POLYETHYLENE GLYCOL 3350 17 G: 17 POWDER, FOR SOLUTION ORAL at 10:57

## 2017-04-05 RX ADMIN — Medication 10 ML: at 10:59

## 2017-04-05 RX ADMIN — OXYCODONE HYDROCHLORIDE 10 MG: 5 TABLET ORAL at 15:07

## 2017-04-05 RX ADMIN — PANTOPRAZOLE SODIUM 40 MG: 40 TABLET, DELAYED RELEASE ORAL at 06:36

## 2017-04-05 RX ADMIN — MAGNESIUM HYDROXIDE 30 ML: 400 SUSPENSION ORAL at 10:56

## 2017-04-05 RX ADMIN — DOCUSATE SODIUM AND SENNOSIDES 1 TABLET: 8.6; 5 TABLET, FILM COATED ORAL at 10:57

## 2017-04-05 RX ADMIN — ACETAMINOPHEN 650 MG: 325 TABLET, FILM COATED ORAL at 13:27

## 2017-04-05 RX ADMIN — PAROXETINE HYDROCHLORIDE 40 MG: 20 TABLET, FILM COATED ORAL at 10:56

## 2017-04-05 RX ADMIN — DOCUSATE SODIUM AND SENNOSIDES 1 TABLET: 8.6; 5 TABLET, FILM COATED ORAL at 17:53

## 2017-04-05 NOTE — PROGRESS NOTES
RN was called to pt room. Pt was exhibiting signs of a panic attack -- concerns for respiratory distress, Rapid Response was called. Charge RN entered to assist. Respiratory, CCU, Ozzy, RN Supervisor came in. Determined that symptoms were result of a panic attack. Paged Dr. Judy Shepherd. Call was returned by colleague. Orders received as in chart.

## 2017-04-05 NOTE — PROGRESS NOTES
Problem: Mobility Impaired (Adult and Pediatric)  Goal: *Acute Goals and Plan of Care (Insert Text)  Physical Therapy Goals  Initiated 4/4/2017    1. Patient will move from supine to sit and sit to supine and roll side to side in bed with independence within 4 days. 2. Patient will perform sit to stand with modified independence within 4 days. 3. Patient will ambulate with modified independence for 150 feet with the least restrictive device within 4 days. 4. Patient will verbalize and demonstrate understanding of spinal precautions (No bending, lifting greater than 5 lbs, or twisting; log-roll technique; frequent repositioning as instructed) within 4 days.    PHYSICAL THERAPY TREATMENT  Patient: Layne Ge (58 y.o. female)  Date: 4/5/2017  Diagnosis: STENOSIS,SPONDILOLITHESIS  Lumbar stenosis with neurogenic claudication <principal problem not specified>  Procedure(s) (LRB):  L2-4 REVISION LAMINECTOMY/ L3-5 REVISION FUSION (N/A) 2 Days Post-Op  Precautions: Back, Fall,No bending, no lifting greater than 5 lbs, no twisting, log-roll technique, repositioning every 20-30 min except when sleeping, brace when OOB      ASSESSMENT:  Pt is making slow, steady progress, she continues to have high anxiety and had a near panic attack while sitting on edge of bed, with reassurance and distraction pt able to calm down and participate with therapy,  pt completed log rolling with CGA and cueing, sit to stand with CGA x 2, tolerated ambulation with a rolling walker x 30 feet with minimal assist x 2, pt assisted to bedside chair with breakfast tray set up, pt sat ~ 10 minutes and then reported she needed to return to the bed due to feeling poorly, pt assisted back to bed with minimal assist x 2, her anxiety is limiting her progress, pt concerned about returning home with her  caring for her, pt may benefit from rehab verus home with home health pending progress     Progression toward goals:  [ ]      Improving appropriately and progressing toward goals  [X]      Improving slowly and progressing toward goals  [ ]      Not making progress toward goals and plan of care will be adjusted       PLAN:  Patient continues to benefit from skilled intervention to address the above impairments. Continue treatment per established plan of care. Discharge Recommendations:  Rehab, Home Health and To Be Determined  Further Equipment Recommendations for Discharge: To be determined        SUBJECTIVE:   Patient stated I am going to have another panic attack.   Pt had increased difficulty breathing but was distracted and able to work through her anxiety with reassurance. Pt also voiced concern of her  caring for her at home. Reports he has early dementia. The patient stated 3/3 back precautions. Reviewed all 3 with patient. OBJECTIVE DATA SUMMARY:   Critical Behavior:  Neurologic State: Alert, Appropriate for age  Orientation Level: Oriented X4  Cognition: Follows commands, Appropriate safety awareness, Appropriate for age attention/concentration, Appropriate decision making  Safety/Judgement: Awareness of environment, Fall prevention, Good awareness of safety precautions  Functional Mobility Training:  Bed Mobility:  Log Rolling: Contact guard assistance; Additional time;Assist x1  Supine to Sit: Contact guard assistance; Additional time;Assist x1  Sit to Supine: Minimum assistance; Additional time;Assist x1, pt was assisted back to bed after sitting ~ 10 min due to feeling more anxious   Scooting: Supervision        Brace donned with  maximal assistance   Transfers:  Sit to Stand: Contact guard assistance; Additional time;Assist x2  Stand to Sit: Contact guard assistance; Additional time;Assist x1                       Balance:  Sitting: Intact  Standing: Impaired  Standing - Static: Good  Standing - Dynamic : Good  Ambulation/Gait Training:  Distance (ft): 30 Feet (ft)  Assistive Device: Brace/Splint;Gait belt;Walker, rolling  Ambulation - Level of Assistance: Minimal assistance;Assist x1;Additional time        Gait Abnormalities: Antalgic;Decreased step clearance;Trunk sway increased        Base of Support: Widened     Speed/Catherine: Slow  Step Length: Left shortened;Right shortened              Pain:  Pain Scale 1: Numeric (0 - 10)  Pain Intensity 1: 3  Pain Location 1: Back  Pain Orientation 1: Lower  Pain Description 1: Aching  Pain Intervention(s) 1: Medication (see MAR)  Activity Tolerance:   Poor due to high anxiety and near panic attack     After treatment:   [ ]  Patient left in no apparent distress sitting up in chair  [X]  Patient left in no apparent distress in bed  [X]  Call bell left within reach  [X]  Nursing notified  [ ]  Caregiver present  [ ]  Bed alarm activated      COMMUNICATION/COLLABORATION:   The patients plan of care was discussed with: Registered Nurse

## 2017-04-05 NOTE — PROGRESS NOTES
Spiritual Care Partner Volunteer visited patient in [address] [place] [address] on 04/05/17. Documented by:    Gualberto Nash M.S. MKasandraDiv.   [address] [place] [address] ([phone])

## 2017-04-05 NOTE — PROGRESS NOTES
Problem: Mobility Impaired (Adult and Pediatric)  Goal: *Acute Goals and Plan of Care (Insert Text)  Physical Therapy Goals  Initiated 4/4/2017    1. Patient will move from supine to sit and sit to supine and roll side to side in bed with independence within 4 days. 2. Patient will perform sit to stand with modified independence within 4 days. 3. Patient will ambulate with modified independence for 150 feet with the least restrictive device within 4 days. 4. Patient will verbalize and demonstrate understanding of spinal precautions (No bending, lifting greater than 5 lbs, or twisting; log-roll technique; frequent repositioning as instructed) within 4 days.    PHYSICAL THERAPY TREATMENT  Patient: Bonifacio Stevenson (53 y.o. female)  Date: 4/5/2017  Diagnosis: STENOSIS,SPONDILOLITHESIS  Lumbar stenosis with neurogenic claudication <principal problem not specified>  Procedure(s) (LRB):  L2-4 REVISION LAMINECTOMY/ L3-5 REVISION FUSION (N/A) 2 Days Post-Op  Precautions: Back, Fall, No bending, no lifting greater than 5 lbs, no twisting, log-roll technique, repositioning every 20-30 min except when sleeping, brace when OOB      ASSESSMENT:  Pt is making slow, steady progress, pt was less anxious and agreeable to ambulate with therapy, pt completed log roll transfer to edge of bed with CGA and use a bed rail, sit to stand with CGA x 2, tolerated ambulation with rolling walker x 50 feet with minimal assist, agreeable to remain up in chair, pt reports she did tolerate sitting in chair earlier today x 40 min, encouraged pt to increase her frequency of sitting in chair and suggested that she sit in chair for dinner, pt voices good understanding of the back precautions, pt may benefit from rehab versus home health PT pending progress       Progression toward goals:  [ ]      Improving appropriately and progressing toward goals  [X]      Improving slowly and progressing toward goals  [ ]      Not making progress toward goals and plan of care will be adjusted       PLAN:  Patient continues to benefit from skilled intervention to address the above impairments. Continue treatment per established plan of care. Discharge Recommendations:  Rehab, Home Health and To Be Determined  Further Equipment Recommendations for Discharge: To be determined        SUBJECTIVE:   Patient stated You are always so cheerful.    Pt had a friend visiting and was less anxious. The patient stated 3/3 back precautions. Reviewed all 3 and sitting restrictions with patient. OBJECTIVE DATA SUMMARY:   Critical Behavior:  Neurologic State: Alert, Appropriate for age  Orientation Level: Oriented X4  Cognition: Follows commands, Appropriate safety awareness, Appropriate for age attention/concentration, Appropriate decision making  Safety/Judgement: Awareness of environment, Fall prevention, Good awareness of safety precautions  Functional Mobility Training:  Bed Mobility:  Log Rolling: Contact guard assistance; Additional time;Assist x1  Supine to Sit: Contact guard assistance; Additional time;Assist x1  Scooting: Supervision   pt uses bed rail to assist with transfer     Brace donned with  maximal assistance   Transfers:  Sit to Stand: Contact guard assistance; Additional time;Assist x2  Stand to Sit: Contact guard assistance; Additional time;Assist x1                             Balance:  Sitting: Intact  Standing: Impaired  Standing - Static: Good  Standing - Dynamic : Good  Ambulation/Gait Training:  Distance (ft): 50 Feet (ft)  Assistive Device: Brace/Splint;Gait belt;Walker, rolling  Ambulation - Level of Assistance: Minimal assistance;Assist x1;Additional time        Gait Abnormalities: Antalgic;Decreased step clearance        Base of Support: Widened     Speed/Catherine: Slow  Step Length: Left shortened;Right shortened              Pain:  Pain Scale 1: Numeric (0 - 10)  Pain Intensity 1: 2  Pain Location 1: Back  Pain Orientation 1: Lower  Pain Description 1: Aching  Pain Intervention(s) 1: Medication (see MAR)  Activity Tolerance:   Improved tolerance noted, less anxious      After treatment:   [X]  Patient left in no apparent distress sitting up in chair  [ ]  Patient left in no apparent distress in bed  [X]  Call bell left within reach  [X]  Nursing notified  [ ]  Caregiver present  [ ]  Bed alarm activated      COMMUNICATION/COLLABORATION:   The patients plan of care was discussed with: Registered Nurse     Gris Mukherjeeer   Time Calculation: 15 mins

## 2017-04-05 NOTE — PROGRESS NOTES
Problem: Self Care Deficits Care Plan (Adult)  Goal: *Acute Goals and Plan of Care (Insert Text)  Occupational Therapy Goals  Initiated 4/4/2017    1. Patient will perform lower body dressing with supervision/set-up using AE PRN within 7 days. 2. Patient will perform lower body dressing with supervision/set-up using most appropriate DME within 7 days. 3. Patient will grooming seated at the sink at supervision/set-up within 7 days. 4. Patient will don/doff back brace at supervision/set-up within 7 days. 5. Patient will verbalize/demonstrate 3/3 back precautions during ADL tasks without cues within 7 days. OCCUPATIONAL THERAPY TREATMENT  Patient: Bobby Pinto (51 y.o. female)  Date: 4/5/2017  Diagnosis: STENOSIS,SPONDILOLITHESIS  Lumbar stenosis with neurogenic claudication <principal problem not specified>  Procedure(s) (LRB):  L2-4 REVISION LAMINECTOMY/ L3-5 REVISION FUSION (N/A) 2 Days Post-Op  Precautions: Back, Fall (TLSO when OOB) No bending, no lifting greater than 5 lbs, no twisting, log-roll technique, repositioning every 20-30 min except when sleeping, TLSO brace when OOB      ASSESSMENT:  Pt making steady progress with acute therapy. Pt is able to recall 3/3 back precautions with min cues and demonstrate adherence with bed mobility and transfers with supervision. Pt reports minimal pain during tasks, occasionally SOB and demonstrating anxiety, however, easily redirected to tasks. Pt completed bed mobility at supervision, transfers CGA using RW with no LOB. Pt does require assistance to don TLSO brace seated EOB, however, states  will be able to assists at home. Education/training provided on AE/DME and toileting safety, as she wears briefs during night and has  assists with toilet hygiene.  Training provided on use of toilet tongs to increase her independence while maintaining adherence with back precautions; pt verbalized understanding of use and states she may purchase them once discharged. Pt educated on importance of OOB activity and participation in ADLs as she was initially hesitant to work with OT. Pt states she may be discharging to rehab (as she has been there in the past), however, pending her progression with acute therapy, may benefit from [facility] and discharge home with  assistance. Will continue to assess. Next session recommend bathroom mobility and standing ADLs. Progression toward goals:  [X]          Improving appropriately and progressing toward goals  [ ]          Improving slowly and progressing toward goals  [ ]          Not making progress toward goals and plan of care will be adjusted       PLAN:  Patient continues to benefit from skilled intervention to address the above impairments. Continue treatment per established plan of care. Discharge Recommendations:  Rehab versus Home Health OT pending progress with acute therapy  Further Equipment Recommendations for Discharge: Toilet tongs, sock aide       SUBJECTIVE:   Patient stated I am not myself today; I had a panic attack yesterday.   The patient stated 3/3 back precautions. Reviewed all 3 with patient. OBJECTIVE DATA SUMMARY:   Cognitive/Behavioral Status:  Neurologic State: Alert, Appropriate for age  Orientation Level: Oriented X4  Cognition: Follows commands, Appropriate safety awareness, Appropriate for age attention/concentration, Appropriate decision making  Safety/Judgement: Awareness of environment, Fall prevention, Good awareness of safety precautions, Home safety, Insight into deficits     Functional Mobility and Transfers for ADLs:  Bed Mobility:  Supine to Sit: Supervision; Additional time     Transfers:  Sit to Stand: Contact guard assistance; Additional time;Assist x1        Balance:  Sitting: Intact  Standing: Intact; With support  Standing - Static: Good  Standing - Dynamic : Good     ADL Intervention and Instruction:                          Upper Body Dressing Assistance  Orthotics(Brace): Maximum assistance (to anuel)     Lower Body Dressing Assistance  Slip on Shoes Without Back: Supervision/set-up           Cognitive Retraining  Safety/Judgement: Awareness of environment; Fall prevention;Good awareness of safety precautions; Home safety; Insight into deficits     Dressing brace: Patient instructed and demonstrated to don/doff velcro on brace using dominant side, keeping non-dominant side intact. Patient instructed and demonstrated in meantime of being able to stand with back against wall to don/doff brace, to don/doff seated using lap and bed/chair surface to support brace while manipulating. Dressing lower body: Patient instructed to don brace first and on the benefits to remain seated to don all clothing to increase independence with precautions and pain management. Toileting: Patient instructed on the benefits of using flushable wet wipes and toilet tongs if decreased reach or pain for aleja care. Also, the benefits of a reacher to aid in clothing management. Home safety: Patient instructed and indicated understanding on home modifications and safety (raise height of ADL objects, appropriate height of chair surfaces, recliner safety, change of floor surfaces, clear pathways) to increase independence and fall prevention with RW. Standing: Patient instructed and indicated understanding to walk up to sink/counter top/surfaces, step into walker, square off while using objects, slide objects along surfaces, to increase adherence to back precautions and fall prevention. Patient instructed to increase amount of time standing in order to increase independence and tolerance with ADLs. During prolonged standing, can open cabinet door or place foot on stool to decrease spinal pressure/increase pain.    Tub transfer: Patient instructed and indicated understanding regarding when it is safe to begin transfer into tub (complete stairs with PT, advance exercises with PT high enough to clear tub height, and while clothes donned practice with another person present). Patient instructed and indicated understanding to use the same technique as used with stairs when entering and exiting tub (\"up with good leg, down with bad leg\"). Pt has/uses tub transfer bench. Patient instructed and indicated understanding the benefits of maintaining activity tolerance, functional mobility, and independence with self care tasks during acute stay  to ensure safe return home and to baseline. Encouraged patient to increase frequency and duration OOB, not sitting longer than 30 mins without marching/walking with staff, be out of bed for all meals, perform daily ADLs (as approved by RN/MD regarding bathing etc), and performing functional mobility to/from bathroom. Patient instruction and indicated understanding on body mechanics, ergonomics and gravitational force on the spine during different body positions to plan activities in prep for return home to complete instrumental ADLs and back to work safely. Activity Tolerance:   VSS. Good tolerance. Please refer to the flowsheet for vital signs taken during this treatment.   After treatment:   [X] Patient left in no apparent distress sitting up in chair  [ ] Patient left in no apparent distress in bed  [X] Call bell left within reach  [X] Nursing notified  [ ] Caregiver present  [ ] Bed alarm activated      COMMUNICATION/COLLABORATION:   The patients plan of care was discussed with: Physical Therapist, Registered Nurse and      Gris Muniz OT  Time Calculation: 18 mins

## 2017-04-05 NOTE — PROGRESS NOTES
Chart reviewed for transitions of care needs. Pt admitted on 4/3/17 for L2-4 Revision Laminectomy/L3-5 Revision Fusion. Pt had previous surgery in 2011 and went to rehab post operatively @ [address] [facility]. Pt lives with her spouse in a one level home with a ramp to entrance. Pt has a walker, wheelchair, tub transfer bench, and elevated toilet seat at home. Pt does not use home O2 at baseline. Pt identifies support from her family (lives with spouse and adult son) and friends who live close by. Verified PCP is Dr. Catrina Fu who pt last saw for pre-op clearance. Preferred pharmacy is [facility] on [address]. CM discussed disposition recommendations for rehab vs home health pending progress. Pt has used home health in the past ([facility]). Pt agrees with pursuing rehab as her  is experiencing his own health problems (early dementia) and can provide limited support at home. Pt prefers to return to [address] [facility] for inpatient rehab. With pt permission, CM sent referral to [address] [place] [address] via [employer]. Awaiting response and evaluation for placement. Pt will need BLS transportation at time of discharge to rehab if accepted. CM discussed back up plan if pt is not accepted to inpatient rehab. CM provided opportunity for questions/concerns. None voiced at this time. Emotional support given. Anticipate discharge in 1-2 days to rehab if accepted. CM will continue to follow to confirm final disposition needs. Care Management Interventions  PCP Verified by CM:  Yes (Dr. Catrina Fu)  Mode of Transport at Discharge: BLS  Transition of Care Consult (CM Consult): Discharge Planning  MyChart Signup: No  Discharge Durable Medical Equipment: No  Health Maintenance Reviewed: Yes  Physical Therapy Consult: Yes  Occupational Therapy Consult: Yes  Speech Therapy Consult: No  Current Support Network: Lives with Spouse, Own Home, Family Lives Nearby  Confirm Follow Up Transport: Family  Plan discussed with Pt/Family/Caregiver: Yes  Freedom of Choice Offered: Yes  Discharge Location  Discharge Placement: Rehab hospital/unit acute ([address] referral sent )    KRISTOFER Parikh

## 2017-04-05 NOTE — PROGRESS NOTES
Orthopedic Spine Progress Note  Post Op day: 2 Days Post-Op    2017 7:30 AM   Admit Date: 4/3/2017  Procedure: Procedure(s):  L2-4 REVISION LAMINECTOMY/ L3-5 REVISION FUSION    Subjective:     June Gomez has no complaints. Mild anxiety yesterday but participated well with PT. Some sob. Tolerating diet. No N/V. Pain Control:   Pain Assessment  Pain Scale 1: Numeric (0 - 10)  Pain Intensity 1: 6  Pain Onset 1: post op  Pain Location 1: Back  Pain Orientation 1: Lower  Pain Description 1: Aching  Pain Intervention(s) 1: Medication (see MAR)    Objective:          Physical Exam:  General:  Alert and oriented. No acute distress. Heart:  Respirations unlabored. Abdomen:   Extremities: Soft, non-tender. No evidence of cyanosis. Pulses palpable in both upper and lower extremities. Neurologic:  Musculoskeletal:  No new motor deficits. Neurovascular exam within normal limits. Sensation stable. Motor: unchanged C5-T1 and L2-S1. Neema's sign negative in bilateral lower extremities. Calves soft, nontender upon palpation and with passive twitch. Moves both upper and lower extremities. Incision: clean, dry, and intact. No significant erythema or swelling. No active drainage noted. Vital Signs:    Blood pressure 115/73, pulse 76, temperature 97.9 °F (36.6 °C), resp. rate 18, height 5' 4\" (1.626 m), weight 132.6 kg (292 lb 4 oz), SpO2 100 %.   Temp (24hrs), Av.7 °F (36.5 °C), Min:97.6 °F (36.4 °C), Max:97.9 °F (36.6 °C)      LAB:    Recent Labs      17   0335   HGB  10.7*     Lab Results   Component Value Date/Time    Sodium 141 2017 04:11 AM    Potassium 4.2 2017 04:11 AM    Chloride 105 2017 04:11 AM    CO2 27 2017 04:11 AM    Glucose 145 2017 04:11 AM    BUN 9 2017 04:11 AM    Creatinine 0.78 2017 04:11 AM    Calcium 8.4 2017 04:11 AM       Intake/Output: 07 - 04/05 1900  In: -   Out: 90 [Drains:90]  1901 -  0700  In: -   Out: 0773 [Urine:825; Drains:480]    PT/OT:   Gait:  Gait  Base of Support: Widened  Speed/Catherine: Pace decreased (<100 feet/min)  Step Length: Right shortened, Left shortened  Gait Abnormalities: Antalgic, Decreased step clearance  Ambulation - Level of Assistance: Contact guard assistance, Assist x2  Distance (ft): 20 Feet (ft)  Assistive Device: Brace/Splint, Gait belt, Walker, rolling                 Assessment:   Patient is 2 Days Post-Op s/p Procedure(s):  L2-4 REVISION LAMINECTOMY/ L3-5 REVISION FUSION    Plan:     1. Continue PT/OT  2. Continue established methods of pain control  3. VTE Prophylaxes - TEDS &/or SCDs   4. Continue eval for rehab  5. Continue to monitor for anxiety  6.    Cxray if continues with SOB although likely anxiety related      Signed By: Sandi Florence MD

## 2017-04-05 NOTE — DISCHARGE INSTRUCTIONS
Patient meets criteria for   BUNDLED PAYMENT   for Care Improvement Initiative Criteria    Contact Information for Orthopedic Nurse Navigator:      TRENA Artis, RN-BC  F:[phone]  Y:[phone]  T:[phone]      After Hospital Care Plan:  Discharge Instructions Lumbar Fusion Surgery   Dr. Adriane Garcia   Patient Name: Aurelio Kenyon    Date of procedure: 4/3/2017  Date of discharge: 4/5/2017    Procedure: Procedure(s):  L2-4 REVISION LAMINECTOMY/ L3-5 REVISION FUSION  PCP: Clau Larry MD    Follow up appointments  -follow up with Dr. Dr. Adriane Garcia in 2 weeks. Call [phone] to make an appointment as soon as you get home from the hospital.    [address] [place], [place] [address]y: ____________________   phone: _______________________  The agency will contact you to arrange dates/times for visits.   Please call them if you do not hear from them within 24 hours after you are discharged  Physical therapy 3 times a week for 3 weeks  Nursing-initial assessment and as needed    When to call your Orthopaedic Surgeon:  -Signs of infection-if your incision is red; continues to have drainage; drainage has a foul odor or if you have a persistent fever over 101 degrees for 24 hours  -Nausea or vomiting, severe headache  -Loss of bowel or bladder function, inability to urinate  -Changes in sensation in your arms or legs (numbness, tingling, loss of color)  -Increased weakness-greater than before your surgery  -Severe pain or pain not relieved by medications  -Signs of a blood clot in your leg-calf pain, tenderness, redness, swelling of lower leg    When to call your Primary Care Physician:  -Concerns about medical conditions such as diabetes, high blood pressure, asthma, congestive heart failure  -Call if blood sugars are elevated, persistent headache or dizziness, coughing or congestion, constipation or diarrhea, burning with urination, abnormal heart rate    When to call 531 and go to the nearest emergency room:  -Acute onset of chest pain, shortness of breath, difficulty breathing    Activity  -You are going home a well person, be as active as possible. Your only exercise should be walking. Start with short frequent walks and increase your walking distance each day.  -Limit the amount of time you sit to 20-30 minute intervals. Sitting for prolonged periods of time will be uncomfortable for you following surgery.  -Do NOT lift anything over 5 pounds  -Do NOT do any straining, twisting or bending  -When you are in bed, you may lay on your back or on either side. Do NOT lie on your stomach    Brace  -If you have a back brace, you should wear your brace at all times when you are out of bed. Do not wear the brace while in bed or showering.  -Remember to always wear a cotton t-shirt underneath your brace.  -Do not bend or twist when your brace is off    Diet  -Resume usual diet; drink plenty of fluids; eat foods high in fiber  -It is important to have regular bowel movements. Pain medications may cause constipation. You may want to take a stool softener (such as Senokot-S or Colace) to prevent constipation.   -If constipation occurs, take a laxative (such as Dulcolax tablets, Milk of Magnesia, or a suppository). Laxatives should only be used if the above preventable measures have failed and you still have not had a bowel movement after three days    Driving  -You may not drive or return to work until instructed by your physician. However, you may ride in the car for short periods of time. Incision Care  Your incision has been closed with absorbable sutures and the Dermabond Prineo skin closure system. This is a combination of a mesh and a liquid adhesive that will assist with healing. The mesh is to remain on your incision for 2 weeks. A dry dressing (ABD and tape) will be placed over it and should be changed daily. Please make sure to wash your hands prior to touching your dressing.      You may take brief showers but do not run the water directly onto the wound. After your shower, blot your incision dry with a soft towel and replace the dry dressing. Do not allow the tape to come in contact with the mesh. Do not rub or apply any lotions or ointments to your incision site. Do not soak or scrub your wound. The mesh dressing will be removed during your two week follow-up appointment. If you experience drainage leaking from underneath the mesh or if it peels off before 2 weeks, please contact your orthopedic surgeons office. Showering  -You may shower in approximately 4 days after your surgery.    -Leave the dressing on during your shower. Do NOT allow the water to run directly onto your dressing. Once you get out of the shower, gently pat the dressing dry. -Reminder- your brace can be removed while showering. Remember to not bend or twist while your brace is off.    -Do not take a tub bath. Preventing blood clots  -You have been given T.E.D. stockings to wear. Continue to wear these for 7 days after your discharge. Put them on in the morning and take them off at night.    -They are used to increase your circulation and prevent blood clots from forming in your legs  -T. E.D. stockings can be machine washed, temperature not to exceed 160° F (71°C) and machine dried for 15 to 20 minutes, temperature not to exceed 250° F (121°C). Pain management  -Take pain medication as prescribed; decrease the amount you use as your pain lessens  -DO not wait until you are in extreme pain to take your medication.  -Avoid alcoholic beverages while taking pain medication    Pain Medication Safety  DO:  -Read the Medication Guide   -Take your medicine exactly as prescribed   -Store your medicine away from children and in a safe place   -Flush unused medicine down the toilet   -Call your healthcare provider for medical advice about side effects.  You may report side effects to FDA at 4-332-FDA-5871.   -Please be aware that many medications contain Tylenol. We do not want you to over medicate so please read the information below as a guide. Do not take more than 4 Grams of Tylenol in a 24 hour period. (There are 1000 milligrams in one Gram)                                                                                                                                                                                                                                                Percocet contains 325 mg of Tylenol per tablet (do not take more than 12 tablets in 24 hours)  Lortab contains 500 mg of Tylenol per tablet (do not take more than 8 tablets in 24 hours)  Norco contains 325 mg of Tylenol per tablet (do not take more than 12 tablets in 24 hours). DO NOT:  -Do not give your medicine to others   -Do not take medicine unless it was prescribed for you   -Do not stop taking your medicine without talking to your healthcare provider   -Do not break, chew, crush, dissolve, or inject your medicine. If you cannot swallow your medicine whole, talk to your healthcare provider.  -Do not drink alcohol while taking this medicine  -Do not take anti-inflammatory medications or aspirin unless instructed by your     physician.

## 2017-04-06 ENCOUNTER — HOSPITAL ENCOUNTER (OUTPATIENT)
Age: 67
Discharge: HOME | End: 2017-04-14
Attending: PHYSICAL MEDICINE & REHABILITATION | Admitting: PHYSICAL MEDICINE & REHABILITATION

## 2017-04-06 ENCOUNTER — APPOINTMENT (OUTPATIENT)
Dept: GENERAL RADIOLOGY | Age: 67
DRG: 460 | End: 2017-04-06
Attending: PHYSICIAN ASSISTANT
Payer: MEDICARE

## 2017-04-06 VITALS
HEIGHT: 64 IN | TEMPERATURE: 98.2 F | WEIGHT: 292.25 LBS | OXYGEN SATURATION: 94 % | DIASTOLIC BLOOD PRESSURE: 63 MMHG | RESPIRATION RATE: 17 BRPM | HEART RATE: 85 BPM | BODY MASS INDEX: 49.89 KG/M2 | SYSTOLIC BLOOD PRESSURE: 139 MMHG

## 2017-04-06 LAB
APPEARANCE UR: ABNORMAL
BACTERIA URNS QL MICRO: NEGATIVE /HPF
BILIRUB UR QL: NEGATIVE
COLOR UR: ABNORMAL
EPITH CASTS URNS QL MICRO: ABNORMAL /LPF
GLUCOSE UR STRIP.AUTO-MCNC: NEGATIVE MG/DL
HGB UR QL STRIP: NEGATIVE
HYALINE CASTS URNS QL MICRO: ABNORMAL /LPF (ref 0–5)
KETONES UR QL STRIP.AUTO: NEGATIVE MG/DL
LEUKOCYTE ESTERASE UR QL STRIP.AUTO: NEGATIVE
NITRITE UR QL STRIP.AUTO: NEGATIVE
PH UR STRIP: 7.5 [PH] (ref 5–8)
PROT UR STRIP-MCNC: NEGATIVE MG/DL
RBC #/AREA URNS HPF: ABNORMAL /HPF (ref 0–5)
SP GR UR REFRACTOMETRY: 1.02 (ref 1–1.03)
UROBILINOGEN UR QL STRIP.AUTO: 1 EU/DL (ref 0.2–1)
WBC URNS QL MICRO: ABNORMAL /HPF (ref 0–4)

## 2017-04-06 PROCEDURE — 74011250637 HC RX REV CODE- 250/637: Performed by: PHYSICAL MEDICINE & REHABILITATION

## 2017-04-06 PROCEDURE — 87186 SC STD MICRODIL/AGAR DIL: CPT | Performed by: PHYSICAL MEDICINE & REHABILITATION

## 2017-04-06 PROCEDURE — 87086 URINE CULTURE/COLONY COUNT: CPT | Performed by: PHYSICAL MEDICINE & REHABILITATION

## 2017-04-06 PROCEDURE — 87077 CULTURE AEROBIC IDENTIFY: CPT | Performed by: PHYSICAL MEDICINE & REHABILITATION

## 2017-04-06 PROCEDURE — 74011000250 HC RX REV CODE- 250: Performed by: PHYSICIAN ASSISTANT

## 2017-04-06 PROCEDURE — 81001 URINALYSIS AUTO W/SCOPE: CPT | Performed by: PHYSICAL MEDICINE & REHABILITATION

## 2017-04-06 PROCEDURE — 71010 XR CHEST PORT: CPT

## 2017-04-06 PROCEDURE — 74011250637 HC RX REV CODE- 250/637: Performed by: PHYSICIAN ASSISTANT

## 2017-04-06 RX ORDER — FACIAL-BODY WIPES
10 EACH TOPICAL DAILY PRN
Status: DISCONTINUED | OUTPATIENT
Start: 2017-04-06 | End: 2017-04-14 | Stop reason: HOSPADM

## 2017-04-06 RX ORDER — ACETAMINOPHEN 325 MG/1
650 TABLET ORAL 3 TIMES DAILY
Status: DISCONTINUED | OUTPATIENT
Start: 2017-04-06 | End: 2017-04-14 | Stop reason: HOSPADM

## 2017-04-06 RX ORDER — POLYETHYLENE GLYCOL 3350 17 G/17G
17 POWDER, FOR SOLUTION ORAL DAILY
Status: DISCONTINUED | OUTPATIENT
Start: 2017-04-07 | End: 2017-04-14 | Stop reason: HOSPADM

## 2017-04-06 RX ORDER — LEVOTHYROXINE SODIUM 75 UG/1
75 TABLET ORAL
Status: DISCONTINUED | OUTPATIENT
Start: 2017-04-07 | End: 2017-04-14 | Stop reason: HOSPADM

## 2017-04-06 RX ORDER — PANTOPRAZOLE SODIUM 40 MG/1
40 TABLET, DELAYED RELEASE ORAL
Status: DISCONTINUED | OUTPATIENT
Start: 2017-04-07 | End: 2017-04-14 | Stop reason: HOSPADM

## 2017-04-06 RX ORDER — ALPRAZOLAM 0.5 MG/1
0.5 TABLET ORAL 2 TIMES DAILY
Status: DISCONTINUED | OUTPATIENT
Start: 2017-04-06 | End: 2017-04-06 | Stop reason: HOSPADM

## 2017-04-06 RX ORDER — OXYCODONE HYDROCHLORIDE 5 MG/1
5-10 TABLET ORAL
Qty: 60 TAB | Refills: 0 | Status: SHIPPED | OUTPATIENT
Start: 2017-04-06 | End: 2018-05-27

## 2017-04-06 RX ORDER — BUTALBITAL, ACETAMINOPHEN AND CAFFEINE 50; 325; 40 MG/1; MG/1; MG/1
1 TABLET ORAL
Status: DISCONTINUED | OUTPATIENT
Start: 2017-04-06 | End: 2017-04-14 | Stop reason: HOSPADM

## 2017-04-06 RX ORDER — AMOXICILLIN 250 MG
1 CAPSULE ORAL 2 TIMES DAILY
Status: DISCONTINUED | OUTPATIENT
Start: 2017-04-06 | End: 2017-04-14 | Stop reason: HOSPADM

## 2017-04-06 RX ORDER — ADHESIVE BANDAGE
30 BANDAGE TOPICAL DAILY PRN
Status: DISCONTINUED | OUTPATIENT
Start: 2017-04-06 | End: 2017-04-14 | Stop reason: HOSPADM

## 2017-04-06 RX ORDER — IPRATROPIUM BROMIDE AND ALBUTEROL SULFATE 2.5; .5 MG/3ML; MG/3ML
3 SOLUTION RESPIRATORY (INHALATION)
Status: DISCONTINUED | OUTPATIENT
Start: 2017-04-06 | End: 2017-04-06 | Stop reason: HOSPADM

## 2017-04-06 RX ORDER — PAROXETINE HYDROCHLORIDE 20 MG/1
40 TABLET, FILM COATED ORAL DAILY
Status: DISCONTINUED | OUTPATIENT
Start: 2017-04-07 | End: 2017-04-14 | Stop reason: HOSPADM

## 2017-04-06 RX ORDER — METOPROLOL TARTRATE 25 MG/1
12.5 TABLET, FILM COATED ORAL
Status: DISCONTINUED | OUTPATIENT
Start: 2017-04-06 | End: 2017-04-14 | Stop reason: HOSPADM

## 2017-04-06 RX ORDER — OXYCODONE HYDROCHLORIDE 5 MG/1
5 TABLET ORAL
Status: DISCONTINUED | OUTPATIENT
Start: 2017-04-06 | End: 2017-04-14 | Stop reason: HOSPADM

## 2017-04-06 RX ORDER — OXYCODONE HYDROCHLORIDE 5 MG/1
10 TABLET ORAL
Status: DISCONTINUED | OUTPATIENT
Start: 2017-04-06 | End: 2017-04-14 | Stop reason: HOSPADM

## 2017-04-06 RX ORDER — ONDANSETRON 4 MG/1
4 TABLET, ORALLY DISINTEGRATING ORAL
Status: DISCONTINUED | OUTPATIENT
Start: 2017-04-06 | End: 2017-04-14 | Stop reason: HOSPADM

## 2017-04-06 RX ORDER — ACETAMINOPHEN 325 MG/1
650 TABLET ORAL
Status: DISCONTINUED | OUTPATIENT
Start: 2017-04-06 | End: 2017-04-14 | Stop reason: HOSPADM

## 2017-04-06 RX ORDER — CYCLOBENZAPRINE HCL 10 MG
10 TABLET ORAL
Status: DISCONTINUED | OUTPATIENT
Start: 2017-04-06 | End: 2017-04-14 | Stop reason: HOSPADM

## 2017-04-06 RX ORDER — METOPROLOL TARTRATE 25 MG/1
25 TABLET, FILM COATED ORAL DAILY
Status: DISCONTINUED | OUTPATIENT
Start: 2017-04-07 | End: 2017-04-14 | Stop reason: HOSPADM

## 2017-04-06 RX ORDER — ALPRAZOLAM 0.5 MG/1
0.5 TABLET ORAL 2 TIMES DAILY
Status: DISCONTINUED | OUTPATIENT
Start: 2017-04-06 | End: 2017-04-11

## 2017-04-06 RX ORDER — IPRATROPIUM BROMIDE AND ALBUTEROL SULFATE 2.5; .5 MG/3ML; MG/3ML
3 SOLUTION RESPIRATORY (INHALATION)
Status: DISCONTINUED | OUTPATIENT
Start: 2017-04-06 | End: 2017-04-14 | Stop reason: HOSPADM

## 2017-04-06 RX ADMIN — ACETAMINOPHEN 650 MG: 325 TABLET, FILM COATED ORAL at 04:56

## 2017-04-06 RX ADMIN — ACETAMINOPHEN 650 MG: 325 TABLET, FILM COATED ORAL at 12:33

## 2017-04-06 RX ADMIN — DOCUSATE SODIUM AND SENNOSIDES 1 TABLET: 8.6; 5 TABLET, FILM COATED ORAL at 20:56

## 2017-04-06 RX ADMIN — OXYCODONE HYDROCHLORIDE 10 MG: 5 TABLET ORAL at 17:45

## 2017-04-06 RX ADMIN — METOPROLOL TARTRATE 12.5 MG: 25 TABLET ORAL at 20:56

## 2017-04-06 RX ADMIN — IPRATROPIUM BROMIDE AND ALBUTEROL SULFATE 3 ML: .5; 3 SOLUTION RESPIRATORY (INHALATION) at 05:41

## 2017-04-06 RX ADMIN — BUTALBITAL, ACETAMINOPHEN AND CAFFEINE 1 TABLET: 50; 325; 40 TABLET ORAL at 22:44

## 2017-04-06 RX ADMIN — LEVOTHYROXINE SODIUM 75 MCG: 75 TABLET ORAL at 06:36

## 2017-04-06 RX ADMIN — PANTOPRAZOLE SODIUM 40 MG: 40 TABLET, DELAYED RELEASE ORAL at 06:37

## 2017-04-06 RX ADMIN — POLYETHYLENE GLYCOL 3350 17 G: 17 POWDER, FOR SOLUTION ORAL at 08:56

## 2017-04-06 RX ADMIN — DOCUSATE SODIUM AND SENNOSIDES 1 TABLET: 8.6; 5 TABLET, FILM COATED ORAL at 08:56

## 2017-04-06 RX ADMIN — ACETAMINOPHEN 650 MG: 325 TABLET, FILM COATED ORAL at 20:56

## 2017-04-06 RX ADMIN — PAROXETINE HYDROCHLORIDE 40 MG: 20 TABLET, FILM COATED ORAL at 08:56

## 2017-04-06 RX ADMIN — ALPRAZOLAM 0.5 MG: 0.5 TABLET ORAL at 20:56

## 2017-04-06 RX ADMIN — OXYCODONE HYDROCHLORIDE 5 MG: 5 TABLET ORAL at 08:56

## 2017-04-06 RX ADMIN — OXYCODONE HYDROCHLORIDE 10 MG: 5 TABLET ORAL at 20:56

## 2017-04-06 RX ADMIN — ALPRAZOLAM 0.5 MG: 0.5 TABLET ORAL at 05:35

## 2017-04-06 NOTE — PROGRESS NOTES
Pt accepted to [facility] early this AM. Met with pt at the bedside this AM to confirm disposition plan. CM arranged for BLS transport via [facility] @ 1500 this afternoon. Completed discharge folder which was placed in bedside chart to include: ambulance form, h&p, facesheet, MAR, kardex, discharge summary, prescriptions, EMTALA, and bundled pathway. EMTALA to be signed and completed to travel with pt during transport to facility. Nurse to call report to [phone]. Pt traveling with O2. No barriers to discharge identified. CM provided opportunity for questions/concerns. None voiced. Pt transferring to [facility] for inpatient rehab.     KRISTOFER Owen

## 2017-04-06 NOTE — PROGRESS NOTES
Pt feels like she can't breathe, When listening to lungs she sounds very coarse and tight in her chest with wheezing. IS done and noticed that it was decreased to [phone] and had pt cough and deep breathe which did not seem to help. Paged on call for Dr. Shweta Fermin. Darren Abernathy returned page order for duoneb and CXR     After duoneb Lung sounds seemed to improve and pt is feeling slightly better.

## 2017-04-06 NOTE — PROGRESS NOTES
Orthopedic Spine Progress Note  Post Op day: 3 Days Post-Op    2017 6:39 AM   Admit Date: 4/3/2017  Procedure: Procedure(s):  L2-4 REVISION LAMINECTOMY/ L3-5 REVISION FUSION    Subjective:     Pham Nance has no complaints. Some wheezing and shortness of breath. Tolerating diet. No N/V. Pain Control:   Pain Assessment  Pain Scale 1: Numeric (0 - 10)  Pain Intensity 1: 2  Pain Onset 1: postop  Pain Location 1: Back  Pain Orientation 1: Lower  Pain Description 1: Aching  Pain Intervention(s) 1: Medication (see MAR)    Objective:          Physical Exam:  General:  Alert and oriented. No acute distress. Heart:  Respirations unlabored. Abdomen:   Extremities: Soft, non-tender. No evidence of cyanosis. Pulses palpable in both upper and lower extremities. Neurologic:  Musculoskeletal:  No new motor deficits. Neurovascular exam within normal limits. Sensation stable. Motor: unchanged C5-T1 and L2-S1. Neema's sign negative in bilateral lower extremities. Calves soft, nontender upon palpation and with passive twitch. Moves both upper and lower extremities. Incision: clean, dry, and intact. No significant erythema or swelling. No active drainage noted. Vital Signs:    Blood pressure 144/78, pulse 60, temperature 97.5 °F (36.4 °C), resp. rate 18, height 5' 4\" (1.626 m), weight 132.6 kg (292 lb 4 oz), SpO2 95 %.   Temp (24hrs), Av.7 °F (36.5 °C), Min:97.5 °F (36.4 °C), Max:97.8 °F (36.6 °C)      LAB:    Recent Labs      17   0335   HGB  10.7*     Lab Results   Component Value Date/Time    Sodium 141 2017 04:11 AM    Potassium 4.2 2017 04:11 AM    Chloride 105 2017 04:11 AM    CO2 27 2017 04:11 AM    Glucose 145 2017 04:11 AM    BUN 9 2017 04:11 AM    Creatinine 0.78 2017 04:11 AM    Calcium 8.4 2017 04:11 AM       Intake/Output: 1901 -  0700  In: -   Out: 110 [Drains:110]   07 -  1900  In: -   Out: 360 [Urine:300; Drains:410]    PT/OT:   Gait:  Gait  Base of Support: Widened  Speed/Catherine: Slow  Step Length: Left shortened, Right shortened  Gait Abnormalities: Antalgic, Decreased step clearance, Trunk sway increased  Ambulation - Level of Assistance: Minimal assistance, Assist x1, Additional time  Distance (ft): 50 Feet (ft)  Assistive Device: Brace/Splint, Gait belt, Walker, rolling                 Assessment:   Patient is 3 Days Post-Op s/p Procedure(s):  L2-4 REVISION LAMINECTOMY/ L3-5 REVISION FUSION    Plan:     1. Continue PT/OT  2. Continue established methods of pain control  3. VTE Prophylaxes - TEDS &/or SCDs   4. Regular diet  5.   Chest xray this am  6.  Rehab consult      Signed By: Beatriz Mazariegos MD

## 2017-04-06 NOTE — PROGRESS NOTES
Physical Therapy Note:  Chart reviewed, consulted with nursing. Pt refusing PT at this time. Pt notes that Ramírez has been recently come undone and had to be replaced. She feels she is finally comfortable and could PT come back in a few hours when she is feeling rested.    Plan is for d/c this PM.    Keesha Khan, PT

## 2017-04-06 NOTE — PROGRESS NOTES
Problem: Discharge Planning  Goal: *Discharge to safe environment  Outcome: Resolved/Met Date Met:  04/06/17  Pt discharged from hospital to [address] @ 1500 via [facility] for inpatient rehab.       KRISTOFER Woodard

## 2017-04-06 NOTE — PROGRESS NOTES
TRANSFER - OUT REPORT:    Verbal report given to Vero Escamilla(name) on Margaret Ortega  being transferred to [facility](unit) for routine progression of care       Report consisted of patients Situation, Background, Assessment and   Recommendations(SBAR). Information from the following report(s) SBAR, Kardex, OR Summary, Intake/Output, MAR, Accordion and Recent Results was reviewed with the receiving nurse. Lines:       Opportunity for questions and clarification was provided.       Patient transported with:   O2 @ 1 liters

## 2017-04-07 LAB
ALBUMIN SERPL BCP-MCNC: 2.7 G/DL (ref 3.5–5)
ALBUMIN/GLOB SERPL: 0.8 {RATIO} (ref 1.1–2.2)
ALP SERPL-CCNC: 72 U/L (ref 45–117)
ALT SERPL-CCNC: 17 U/L (ref 12–78)
ANION GAP BLD CALC-SCNC: 3 MMOL/L (ref 5–15)
AST SERPL W P-5'-P-CCNC: 21 U/L (ref 15–37)
BILIRUB SERPL-MCNC: 0.4 MG/DL (ref 0.2–1)
BUN SERPL-MCNC: 6 MG/DL (ref 6–20)
BUN/CREAT SERPL: 9 (ref 12–20)
CALCIUM SERPL-MCNC: 8.5 MG/DL (ref 8.5–10.1)
CHLORIDE SERPL-SCNC: 103 MMOL/L (ref 97–108)
CO2 SERPL-SCNC: 36 MMOL/L (ref 21–32)
CREAT SERPL-MCNC: 0.65 MG/DL (ref 0.55–1.02)
ERYTHROCYTE [DISTWIDTH] IN BLOOD BY AUTOMATED COUNT: 14.6 % (ref 11.5–14.5)
GLOBULIN SER CALC-MCNC: 3.2 G/DL (ref 2–4)
GLUCOSE SERPL-MCNC: 122 MG/DL (ref 65–100)
HCT VFR BLD AUTO: 35.6 % (ref 35–47)
HGB BLD-MCNC: 11 G/DL (ref 11.5–16)
MAGNESIUM SERPL-MCNC: 2.2 MG/DL (ref 1.6–2.4)
MCH RBC QN AUTO: 28.4 PG (ref 26–34)
MCHC RBC AUTO-ENTMCNC: 30.9 G/DL (ref 30–36.5)
MCV RBC AUTO: 92 FL (ref 80–99)
PLATELET # BLD AUTO: 252 K/UL (ref 150–400)
POTASSIUM SERPL-SCNC: 3.6 MMOL/L (ref 3.5–5.1)
PROT SERPL-MCNC: 5.9 G/DL (ref 6.4–8.2)
RBC # BLD AUTO: 3.87 M/UL (ref 3.8–5.2)
SODIUM SERPL-SCNC: 142 MMOL/L (ref 136–145)
WBC # BLD AUTO: 9.3 K/UL (ref 3.6–11)

## 2017-04-07 PROCEDURE — 74011250637 HC RX REV CODE- 250/637: Performed by: PHYSICAL MEDICINE & REHABILITATION

## 2017-04-07 PROCEDURE — 83735 ASSAY OF MAGNESIUM: CPT | Performed by: PHYSICAL MEDICINE & REHABILITATION

## 2017-04-07 PROCEDURE — 80053 COMPREHEN METABOLIC PANEL: CPT | Performed by: PHYSICAL MEDICINE & REHABILITATION

## 2017-04-07 PROCEDURE — 36415 COLL VENOUS BLD VENIPUNCTURE: CPT | Performed by: PHYSICAL MEDICINE & REHABILITATION

## 2017-04-07 PROCEDURE — 85027 COMPLETE CBC AUTOMATED: CPT | Performed by: PHYSICAL MEDICINE & REHABILITATION

## 2017-04-07 PROCEDURE — 74011250637 HC RX REV CODE- 250/637

## 2017-04-07 RX ORDER — FACIAL-BODY WIPES
10 EACH TOPICAL ONCE
Status: ACTIVE | OUTPATIENT
Start: 2017-04-07 | End: 2017-04-08

## 2017-04-07 RX ORDER — MAGNESIUM CITRATE
148 SOLUTION, ORAL ORAL
Status: ACTIVE | OUTPATIENT
Start: 2017-04-07 | End: 2017-04-08

## 2017-04-07 RX ORDER — MAGNESIUM CITRATE
SOLUTION, ORAL ORAL
Status: COMPLETED
Start: 2017-04-07 | End: 2017-04-07

## 2017-04-07 RX ORDER — FACIAL-BODY WIPES
10 EACH TOPICAL DAILY PRN
Status: DISCONTINUED | OUTPATIENT
Start: 2017-04-07 | End: 2017-04-14 | Stop reason: HOSPADM

## 2017-04-07 RX ADMIN — ACETAMINOPHEN 650 MG: 325 TABLET, FILM COATED ORAL at 14:51

## 2017-04-07 RX ADMIN — CYCLOBENZAPRINE HYDROCHLORIDE 10 MG: 10 TABLET, FILM COATED ORAL at 00:28

## 2017-04-07 RX ADMIN — DOCUSATE SODIUM AND SENNOSIDES 1 TABLET: 8.6; 5 TABLET, FILM COATED ORAL at 08:40

## 2017-04-07 RX ADMIN — OXYCODONE HYDROCHLORIDE 10 MG: 5 TABLET ORAL at 00:28

## 2017-04-07 RX ADMIN — ACETAMINOPHEN 650 MG: 325 TABLET, FILM COATED ORAL at 21:17

## 2017-04-07 RX ADMIN — METOPROLOL TARTRATE 12.5 MG: 25 TABLET ORAL at 21:29

## 2017-04-07 RX ADMIN — MAGESIUM CITRATE: 1.75 LIQUID ORAL at 12:14

## 2017-04-07 RX ADMIN — METOPROLOL TARTRATE 25 MG: 25 TABLET ORAL at 08:40

## 2017-04-07 RX ADMIN — ACETAMINOPHEN 650 MG: 325 TABLET, FILM COATED ORAL at 05:05

## 2017-04-07 RX ADMIN — ALPRAZOLAM 0.5 MG: 0.5 TABLET ORAL at 21:19

## 2017-04-07 RX ADMIN — ALPRAZOLAM 0.5 MG: 0.5 TABLET ORAL at 08:40

## 2017-04-07 RX ADMIN — DOCUSATE SODIUM AND SENNOSIDES 1 TABLET: 8.6; 5 TABLET, FILM COATED ORAL at 21:19

## 2017-04-07 RX ADMIN — PAROXETINE 40 MG: 20 TABLET, FILM COATED ORAL at 08:40

## 2017-04-07 RX ADMIN — LEVOTHYROXINE SODIUM 75 MCG: 75 TABLET ORAL at 08:40

## 2017-04-07 RX ADMIN — OXYCODONE HYDROCHLORIDE 10 MG: 5 TABLET ORAL at 05:05

## 2017-04-07 RX ADMIN — POLYETHYLENE GLYCOL 3350 17 G: 17 POWDER, FOR SOLUTION ORAL at 08:40

## 2017-04-07 RX ADMIN — OXYCODONE HYDROCHLORIDE 10 MG: 5 TABLET ORAL at 09:21

## 2017-04-07 RX ADMIN — PANTOPRAZOLE SODIUM 40 MG: 40 TABLET, DELAYED RELEASE ORAL at 08:40

## 2017-04-07 NOTE — DISCHARGE SUMMARY
[address] [place], [place] [address] [address]  [phone]     Discharge Summary       PATIENT ID: Addie Locke  MRN: [id number]   YOB: 1950    DATE OF ADMISSION: 4/3/2017 10:43 AM    DATE OF DISCHARGE: 4/6/2017   PRIMARY CARE PROVIDER: Nelson Jama MD     CONSULTATIONS: None    PROCEDURES/SURGERIES: Procedure(s):  L2-4 REVISION LAMINECTOMY/ L3-5 REVISION FUSION    History of Present Illness:  Addie Locke is a 74 y.o. female with a history of hypertension, GERD, anxiety with panic attacks, hypothyroidism, ulcerative colitis and lumbar stenosis. She underwent a prior L4-5 fusion with Dr. Ban Patel in 2011 for neurogenic claudication. She returned to Dr. Janifer Runner office with complaints of left low back pain and radiation in to the left posterior thigh, left lower leg, and left foot. The pain is sharp and burning in nature. After failing conservative therapy and a discussion of the risks, benefits, alternatives, perioperative course, and potential complications of surgery, she consented to undergo a Procedure(s):  L2-4 REVISION LAMINECTOMY/ L3-5 REVISION FUSION.    Hospital Course:  Leticia Escudero tolerated the procedure well. She was transferred  to the recovery room in stable condition. After a brief stay the patient was then transferred to the Spinal Surgery Unit at [address] [place] [address].  On postoperative day #1, the dressing was clean and dry, she was neurovascularly intact. The patient was afebrile and vital signs were stable. Calves were soft and non-tender bilaterally. The patient was tolerating a regular diet, voiding, and making slow progress with physical therapy. She experienced panic attacks postoperatively with subsequent respiratory distress. Her CXR did not reveal any infectious process. Her PRN Xanax was changed to scheduled. Her hemovac drain was removed on postoperative day #3.      Hemoglobin prior to discharge were   Lab Results   Component Value Date/Time    HGB 11.0 04/07/2017 04:30 AM        Cha Escudero  was discharged to [place] [address] in stable condition on postoperative day 3. She was provided with routine postoperative instructions and advised to follow up with  Dragan Galarza MD  in 2 weeks following discharge from the hospital.      FOLLOW UP APPOINTMENTS:    Follow-up Information     Follow up With Details Comments [address] [place] [address] Dav Rhodes MD   [address] [facility] Go on 4/6/2017 inpatient rehab [address] [id number]           ADDITIONAL CARE RECOMMENDATIONS:   When to call your Orthopaedic Surgeon:  -Signs of infection-if your incision is red; continues to have drainage; drainage has a foul odor or if you have a persistent fever over 101 degrees for 24 hours  -Nausea or vomiting, severe headache  -Loss of bowel or bladder function, inability to urinate  -Changes in sensation in your arms or legs (numbness, tingling, loss of color)  -Increased weakness-greater than before your surgery  -Severe pain or pain not relieved by medications  -Signs of a blood clot in your leg-calf pain, tenderness, redness, swelling of lower leg    When to call your Primary Care Physician:  -Concerns about medical conditions such as diabetes, high blood pressure, asthma, congestive heart failure  -Call if blood sugars are elevated, persistent headache or dizziness, coughing or congestion, constipation or diarrhea, burning with urination, abnormal heart rate    When to call 911 and go to the nearest emergency room:  -Acute onset of chest pain, shortness of breath, difficulty breathing    Activity  -You are going home a well person, be as active as possible. Your only exercise should be walking.   Start with short frequent walks and increase your walking distance each day.  -Limit the amount of time you sit to 20-30 minute intervals. Sitting for prolonged periods of time will be uncomfortable for you following surgery.  -Do NOT lift anything over 5 pounds  -Do NOT do any straining, twisting or bending  -When you are in bed, you may lay on your back or on either side. Do NOT lie on your stomach    Brace  -If you have a back brace, you should wear your brace at all times when you are out of bed. Do not wear the brace while in bed or showering.  -Remember to always wear a cotton t-shirt underneath your brace.  -Do not bend or twist when your brace is off    Diet  -Resume usual diet; drink plenty of fluids; eat foods high in fiber  -It is important to have regular bowel movements. Pain medications may cause constipation. You may want to take a stool softener (such as Senokot-S or Colace) to prevent constipation.   -If constipation occurs, take a laxative (such as Dulcolax tablets, Milk of Magnesia, or a suppository). Laxatives should only be used if the above preventable measures have failed and you still have not had a bowel movement after three days    Driving  -You may not drive or return to work until instructed by your physician. However, you may ride in the car for short periods of time. Incision Care  Your incision has been closed with absorbable sutures and the Dermabond Prineo skin closure system. This is a combination of a mesh and a liquid adhesive that will assist with healing. The mesh is to remain on your incision for 2 weeks. A dry dressing (ABD and tape) will be placed over it and should be changed daily. Please make sure to wash your hands prior to touching your dressing. You may take brief showers but do not run the water directly onto the wound. After your shower, blot your incision dry with a soft towel and replace the dry dressing. Do not allow the tape to come in contact with the mesh. Do not rub or apply any lotions or ointments to your incision site.  Do not soak or scrub your wound. The mesh dressing will be removed during your two week follow-up appointment. If you experience drainage leaking from underneath the mesh or if it peels off before 2 weeks, please contact your orthopedic surgeons office. Showering  -You may shower in approximately 4 days after your surgery.    -Leave the dressing on during your shower. Do NOT allow the water to run directly onto your dressing. Once you get out of the shower, gently pat the dressing dry. -Reminder- your brace can be removed while showering. Remember to not bend or twist while your brace is off.    -Do not take a tub bath. Preventing blood clots  -You have been given T.E.D. stockings to wear. Continue to wear these for 7 days after your discharge. Put them on in the morning and take them off at night.    -They are used to increase your circulation and prevent blood clots from forming in your legs  -T. E.D. stockings can be machine washed, temperature not to exceed 160° F (71°C) and machine dried for 15 to 20 minutes, temperature not to exceed 250° F (121°C). Pain management  -Take pain medication as prescribed; decrease the amount you use as your pain lessens  -DO not wait until you are in extreme pain to take your medication.  -Avoid alcoholic beverages while taking pain medication    Pain Medication Safety  DO:  -Read the Medication Guide   -Take your medicine exactly as prescribed   -Store your medicine away from children and in a safe place   -Flush unused medicine down the toilet   -Call your healthcare provider for medical advice about side effects. You may report side effects to FDA at 8-455-FDA-5591.   -Please be aware that many medications contain Tylenol. We do not want you to over medicate so please read the information below as a guide. Do not take more than 4 Grams of Tylenol in a 24 hour period.   (There are 1000 milligrams in one Gram) Percocet contains 325 mg of Tylenol per tablet (do not take more than 12 tablets in 24 hours)  Lortab contains 500 mg of Tylenol per tablet (do not take more than 8 tablets in 24 hours)  Norco contains 325 mg of Tylenol per tablet (do not take more than 12 tablets in 24 hours). DO NOT:  -Do not give your medicine to others   -Do not take medicine unless it was prescribed for you   -Do not stop taking your medicine without talking to your healthcare provider   -Do not break, chew, crush, dissolve, or inject your medicine. If you cannot swallow your medicine whole, talk to your healthcare provider.  -Do not drink alcohol while taking this medicine  -Do not take anti-inflammatory medications or aspirin unless instructed by your     physician. DISCHARGE MEDICATIONS:  Discharge Medication List as of 4/6/2017 12:13 PM      START taking these medications    Details   oxyCODONE IR (ROXICODONE) 5 mg immediate release tablet Take 1-2 Tabs by mouth every three (3) hours as needed. Max Daily Amount: 80 mg., Print, Disp-60 Tab, R-0         CONTINUE these medications which have NOT CHANGED    Details   levothyroxine (SYNTHROID) 75 mcg tablet Take 75 mcg by mouth Daily (before breakfast). , Historical Med      PARoxetine (PAXIL) 40 mg tablet Take 40 mg by mouth daily. , Historical Med      !! metoprolol tartrate (LOPRESSOR) 25 mg tablet Take 12.5 mg by mouth nightly., Historical Med      !! metoprolol (LOPRESSOR) 25 mg tablet Take 25 mg by mouth daily. , Historical Med      butalbital-acetaminophen-caffeine (FIORICET) -40 mg per tablet Take 1 Tab by mouth every six (6) hours as needed for Pain., Historical Med      omeprazole (PRILOSEC) 40 mg capsule Take 40 mg by mouth daily. , Historical Med      alprazolam (XANAX) 0.5 mg tablet Take 0.5 mg by mouth two (2) times daily as needed., Historical Med       !! - Potential duplicate medications found. Please discuss with provider. PHYSICAL EXAMINATION AT DISCHARGE:  General: Pleasant, alert, cooperative, no distress. EENT: EOMI. Anicteric sclerae. Oral mucous moist, oropharynx benign. Resp: CTA bilaterally. No wheezing/rhonchi/rales. No accessory muscle use. CV: Regular rhythm, normal rate, no murmurs, gallops, rubs. No cyanosis or clubbing. No edema appreciated in the extremities. Gastrointestinal:  Soft, non-tender, non-distended. normoactive bowel sounds, no hepatosplenomegaly  Neurological: Follows commands. [place]. Speech clear. Sensation intact to light touch. Motor: unchanged C5-T1 and L2-S1. Musculoskeletal:  Calves soft, supple, non-tender upon palpation or with passive stretch. Psych: Good insight. Not anxious nor agitated. Skin: Good turgor. No rashes or lesions. Incision - clean, dry and intact. No significant erythema or swelling.       CHRONIC MEDICAL DIAGNOSES:  Problem List as of 4/6/2017  Date Reviewed: 4/3/2017          Codes Class Noted - Resolved    Lumbar stenosis with neurogenic claudication ICD-10-CM: M48.06  ICD-9-CM: 724.03  4/3/2017 - Present        Rectal polyp ICD-10-CM: K62.1  ICD-9-CM: 569.0  4/6/2016 - Present        Morbid obesity ([facility] [address] (Chronic) ICD-10-CM: E66.01  ICD-9-CM: 278.01  2/12/2014 - Present        Right knee DJD ICD-10-CM: M17.11  ICD-9-CM: 715.96  2/11/2014 - Present        Left knee DJD (Chronic) ICD-10-CM: M17.12  ICD-9-CM: 715.96  9/3/2013 - Present              Signed:   Jenny Cox NP  4/7/2017  2:32 PM

## 2017-04-07 NOTE — NURSE NAVIGATOR
[address]  [place] Bundled Payment Handoff     FROM:                                TO: [facility]                                                      ([facility] or Facility name)  [address]  [address] [facility] [address]  Dept: [address]: [phone]                                      Room#:  [id number]                                                     Nurse Navigator:  Hugh Friday, RN           SITUATION      ASAScore: ASA 2 - Patient with mild systemic disease with no functional limitations    Admitted:  4/3/2017  Hospital Day: 4      Attending Provider:  No att. providers found     Consultations:  None    PCP:  Clau Larry MD   [phone]     Admitting Dx:  STENOSIS,SPONDILOLITHESIS  Lumbar stenosis with neurogenic claudication       Active Problems:    Lumbar stenosis with neurogenic claudication (4/3/2017)      4 Days Post-Op of   Procedure(s):  L2-4 REVISION LAMINECTOMY/ L3-5 REVISION FUSION   BY: Madeleine Burns MD             ON: 4/3/2017                  Code Status: Prior             Advance Directive? Yes Not W Pt (Send w/patient)     Isolation:  There are currently no Active Isolations       MDRO: No current active infections    BACKGROUND     Allergies: Allergies   Allergen Reactions    Adhesive Tape-Silicones Rash    Aloe Vera Rash    Chlorhexidine Rash    Tussin [Dextromethorphan Hbr] Palpitations       Past Medical History:   Diagnosis Date    Adverse effect of anesthesia     O2 DROPS WITH ANESTHESIA    Arthritis     KNEES    Cancer ([facility] [address] 2015    tonsils and lymph nodes.   Removed 3-2015 with radiation    GERD (gastroesophageal reflux disease)     Hypertension     NO LONGER ON MEDICATION    Hypothyroid     Migraine     Nausea & vomiting     Obesity     Other ill-defined conditions     chronic back pain    Psychiatric disorder     anxiety    Psychiatric disorder     panic ATTACKS    SVT (supraventricular tachycardia) ([facility] [address]     Ulcerative colitis        Past Surgical History:   Procedure Laterality Date    COLONOSCOPY,DIAGNOSTIC  11/19/2015         HX GI      colonscopy    HX HEENT  03/2015    tonsillectomy (CANCER) AND NECK LYMPH NODES    HX HEENT  2008    PARATHYROID REMOVAL    HX HEENT Left 2002    EYE LASER    HX HYSTERECTOMY  1985    HX KNEE ARTHROSCOPY  1995    left knee surgery, TOTAL LT  and Right KNEE 2013    HX KNEE REPLACEMENT Bilateral 2013, 2014    HX LAP CHOLECYSTECTOMY  2002    HX LUMBAR FUSION  2011    SPINAL FUSION with hardware L4-L5    HX ORTHOPAEDIC  1990    CYST REMOVED RIGHT WRIST    HX OTHER SURGICAL      cyst removal right wrist    HX OTHER SURGICAL  2001, 2016    hemorrhoidectomy X2    HX UROLOGICAL  2010    bladder surgery(interstim device)    MS EGD TRANSORAL BIOPSY SINGLE/MULTIPLE  5/20/2013            Prior to Admission Medications   Prescriptions Last Dose Informant Patient Reported? Taking? PARoxetine (PAXIL) 40 mg tablet 4/2/2017 at Unknown time Self Yes Yes   Sig: Take 40 mg by mouth daily. alprazolam (XANAX) 0.5 mg tablet 4/2/2017 at Unknown time  Yes Yes   Sig: Take 0.5 mg by mouth two (2) times daily as needed. butalbital-acetaminophen-caffeine (FIORICET) -40 mg per tablet 3/3/2017 at Unknown time  Yes Yes   Sig: Take 1 Tab by mouth every six (6) hours as needed for Pain.   levothyroxine (SYNTHROID) 75 mcg tablet 4/2/2017 at Unknown time Self Yes Yes   Sig: Take 75 mcg by mouth Daily (before breakfast). metoprolol (LOPRESSOR) 25 mg tablet 4/2/2017 at 1000 Self Yes Yes   Sig: Take 25 mg by mouth daily. metoprolol tartrate (LOPRESSOR) 25 mg tablet 4/2/2017 at 2130 Self Yes Yes   Sig: Take 12.5 mg by mouth nightly. omeprazole (PRILOSEC) 40 mg capsule 4/2/2017 at Unknown time  Yes Yes   Sig: Take 40 mg by mouth daily.       Facility-Administered Medications: None       Vaccinations:    Immunization History   Administered Date(s) Administered    Influenza Vaccine PF 02/16/2014    Pneumococcal Polysaccharide (PPSV-23) 02/16/2014         ASSESSMENT   Age: 74 y.o. Gender: female        Height: Height: 5' 4\" (162.6 cm)                    Weight:Weight: 132.6 kg (292 lb 4 oz)     No data found. Active Orders   Diet    DIET REGULAR       Orientation: Orientation Level: Oriented X4    Active Lines/Drains:  (Peg Tube / Rea / CL or S/L?):no    Urinary Status: External catheter (purewic)      Last BM: Last Bowel Movement Date: 04/02/17     Skin Integrity: Incision (comment) (surgical back)   Wound Back-DRESSING STATUS: Changed per order    Wound Back-DRESSING TYPE: ABD pad    Mobility: Slightly limited   Weight Bearing Status: WBAT (Weight Bearing as Tolerated)      Gait Training  Assistive Device: Brace/Splint, Gait belt, Walker, rolling  Ambulation - Level of Assistance: Minimal assistance, Assist x1, Additional time  Distance (ft): 50 Feet (ft)         Lab Results   Component Value Date/Time    Glucose 122 04/07/2017 04:30 AM    Hemoglobin A1c 6.6 03/22/2017 01:00 PM    INR 1.0 03/22/2017 01:00 PM    INR 1.4 02/16/2014 02:11 AM    HGB 11.0 04/07/2017 04:30 AM    HGB 10.7 04/05/2017 03:35 AM    HGB 12.5 04/04/2017 04:11 AM    HGB 13.3 03/22/2017 01:00 PM       Readmission Risks:  Score:         RECOMMENDATION     See After Visit Summary (AVS) for:  · Discharge instructions  · After [address]   · Medication Reconciliation          [facility] Orthopaedic Nurse Navigator  TRENA Green, RN-BC       Office  [phone]  Cell      [phone]  Fax      [phone]  [email or web address]             . Livia

## 2017-04-08 PROCEDURE — 74011250637 HC RX REV CODE- 250/637: Performed by: PHYSICAL MEDICINE & REHABILITATION

## 2017-04-08 RX ADMIN — ALPRAZOLAM 0.5 MG: 0.5 TABLET ORAL at 21:44

## 2017-04-08 RX ADMIN — LEVOTHYROXINE SODIUM 75 MCG: 75 TABLET ORAL at 08:00

## 2017-04-08 RX ADMIN — CYCLOBENZAPRINE HYDROCHLORIDE 10 MG: 10 TABLET, FILM COATED ORAL at 21:44

## 2017-04-08 RX ADMIN — OXYCODONE HYDROCHLORIDE 10 MG: 5 TABLET ORAL at 08:44

## 2017-04-08 RX ADMIN — ACETAMINOPHEN 650 MG: 325 TABLET, FILM COATED ORAL at 07:07

## 2017-04-08 RX ADMIN — METOPROLOL TARTRATE 12.5 MG: 25 TABLET ORAL at 21:45

## 2017-04-08 RX ADMIN — ALPRAZOLAM 0.5 MG: 0.5 TABLET ORAL at 08:43

## 2017-04-08 RX ADMIN — ACETAMINOPHEN 650 MG: 325 TABLET, FILM COATED ORAL at 13:30

## 2017-04-08 RX ADMIN — METOPROLOL TARTRATE 25 MG: 25 TABLET ORAL at 08:43

## 2017-04-08 RX ADMIN — ACETAMINOPHEN 650 MG: 325 TABLET, FILM COATED ORAL at 21:43

## 2017-04-08 RX ADMIN — DOCUSATE SODIUM AND SENNOSIDES 1 TABLET: 8.6; 5 TABLET, FILM COATED ORAL at 08:43

## 2017-04-08 RX ADMIN — OXYCODONE HYDROCHLORIDE 10 MG: 5 TABLET ORAL at 21:43

## 2017-04-08 RX ADMIN — PAROXETINE 40 MG: 20 TABLET, FILM COATED ORAL at 08:43

## 2017-04-08 RX ADMIN — PANTOPRAZOLE SODIUM 40 MG: 40 TABLET, DELAYED RELEASE ORAL at 08:00

## 2017-04-09 LAB
BACTERIA SPEC CULT: ABNORMAL
BACTERIA SPEC CULT: ABNORMAL
CC UR VC: ABNORMAL
SERVICE CMNT-IMP: ABNORMAL

## 2017-04-09 PROCEDURE — 74011250637 HC RX REV CODE- 250/637: Performed by: PHYSICAL MEDICINE & REHABILITATION

## 2017-04-09 RX ADMIN — LEVOTHYROXINE SODIUM 75 MCG: 75 TABLET ORAL at 05:07

## 2017-04-09 RX ADMIN — PAROXETINE 40 MG: 20 TABLET, FILM COATED ORAL at 09:05

## 2017-04-09 RX ADMIN — ACETAMINOPHEN 650 MG: 325 TABLET, FILM COATED ORAL at 05:07

## 2017-04-09 RX ADMIN — ACETAMINOPHEN 650 MG: 325 TABLET, FILM COATED ORAL at 20:41

## 2017-04-09 RX ADMIN — OXYCODONE HYDROCHLORIDE 10 MG: 5 TABLET ORAL at 13:36

## 2017-04-09 RX ADMIN — ALPRAZOLAM 0.5 MG: 0.5 TABLET ORAL at 09:04

## 2017-04-09 RX ADMIN — ALPRAZOLAM 0.5 MG: 0.5 TABLET ORAL at 20:40

## 2017-04-09 RX ADMIN — PANTOPRAZOLE SODIUM 40 MG: 40 TABLET, DELAYED RELEASE ORAL at 09:04

## 2017-04-09 RX ADMIN — CYCLOBENZAPRINE HYDROCHLORIDE 10 MG: 10 TABLET, FILM COATED ORAL at 20:40

## 2017-04-09 RX ADMIN — OXYCODONE HYDROCHLORIDE 10 MG: 5 TABLET ORAL at 05:10

## 2017-04-09 RX ADMIN — DOCUSATE SODIUM AND SENNOSIDES 1 TABLET: 8.6; 5 TABLET, FILM COATED ORAL at 20:40

## 2017-04-09 RX ADMIN — DOCUSATE SODIUM AND SENNOSIDES 1 TABLET: 8.6; 5 TABLET, FILM COATED ORAL at 09:05

## 2017-04-09 RX ADMIN — METOPROLOL TARTRATE 12.5 MG: 25 TABLET ORAL at 20:41

## 2017-04-09 RX ADMIN — OXYCODONE HYDROCHLORIDE 10 MG: 5 TABLET ORAL at 20:40

## 2017-04-09 RX ADMIN — METOPROLOL TARTRATE 25 MG: 25 TABLET ORAL at 09:05

## 2017-04-09 RX ADMIN — ACETAMINOPHEN 650 MG: 325 TABLET, FILM COATED ORAL at 13:36

## 2017-04-10 PROCEDURE — 74011250637 HC RX REV CODE- 250/637: Performed by: PHYSICAL MEDICINE & REHABILITATION

## 2017-04-10 RX ADMIN — ACETAMINOPHEN 650 MG: 325 TABLET, FILM COATED ORAL at 05:00

## 2017-04-10 RX ADMIN — PANTOPRAZOLE SODIUM 40 MG: 40 TABLET, DELAYED RELEASE ORAL at 08:35

## 2017-04-10 RX ADMIN — METOPROLOL TARTRATE 25 MG: 25 TABLET ORAL at 08:35

## 2017-04-10 RX ADMIN — OXYCODONE HYDROCHLORIDE 10 MG: 5 TABLET ORAL at 04:49

## 2017-04-10 RX ADMIN — OXYCODONE HYDROCHLORIDE 10 MG: 5 TABLET ORAL at 08:35

## 2017-04-10 RX ADMIN — ACETAMINOPHEN 650 MG: 325 TABLET, FILM COATED ORAL at 13:07

## 2017-04-10 RX ADMIN — OXYCODONE HYDROCHLORIDE 10 MG: 5 TABLET ORAL at 13:07

## 2017-04-10 RX ADMIN — PAROXETINE 40 MG: 20 TABLET, FILM COATED ORAL at 08:35

## 2017-04-10 RX ADMIN — ALPRAZOLAM 0.5 MG: 0.5 TABLET ORAL at 21:04

## 2017-04-10 RX ADMIN — ACETAMINOPHEN 650 MG: 325 TABLET, FILM COATED ORAL at 21:04

## 2017-04-10 RX ADMIN — LEVOTHYROXINE SODIUM 75 MCG: 75 TABLET ORAL at 04:59

## 2017-04-10 RX ADMIN — ALPRAZOLAM 0.5 MG: 0.5 TABLET ORAL at 08:35

## 2017-04-10 RX ADMIN — OXYCODONE HYDROCHLORIDE 10 MG: 5 TABLET ORAL at 19:12

## 2017-04-10 RX ADMIN — DOCUSATE SODIUM AND SENNOSIDES 1 TABLET: 8.6; 5 TABLET, FILM COATED ORAL at 21:04

## 2017-04-10 RX ADMIN — METOPROLOL TARTRATE 12.5 MG: 25 TABLET ORAL at 21:06

## 2017-04-11 PROCEDURE — 74011250637 HC RX REV CODE- 250/637: Performed by: PHYSICAL MEDICINE & REHABILITATION

## 2017-04-11 RX ORDER — ALPRAZOLAM 0.5 MG/1
1 TABLET ORAL 2 TIMES DAILY
Status: DISCONTINUED | OUTPATIENT
Start: 2017-04-11 | End: 2017-04-13

## 2017-04-11 RX ADMIN — ALPRAZOLAM 1 MG: 0.5 TABLET ORAL at 09:44

## 2017-04-11 RX ADMIN — OXYCODONE HYDROCHLORIDE 10 MG: 5 TABLET ORAL at 04:01

## 2017-04-11 RX ADMIN — PAROXETINE 40 MG: 20 TABLET, FILM COATED ORAL at 09:37

## 2017-04-11 RX ADMIN — OXYCODONE HYDROCHLORIDE 10 MG: 5 TABLET ORAL at 09:44

## 2017-04-11 RX ADMIN — METOPROLOL TARTRATE 12.5 MG: 25 TABLET ORAL at 21:22

## 2017-04-11 RX ADMIN — ACETAMINOPHEN 650 MG: 325 TABLET, FILM COATED ORAL at 06:22

## 2017-04-11 RX ADMIN — PANTOPRAZOLE SODIUM 40 MG: 40 TABLET, DELAYED RELEASE ORAL at 09:36

## 2017-04-11 RX ADMIN — ACETAMINOPHEN 650 MG: 325 TABLET, FILM COATED ORAL at 12:49

## 2017-04-11 RX ADMIN — DOCUSATE SODIUM AND SENNOSIDES 1 TABLET: 8.6; 5 TABLET, FILM COATED ORAL at 21:23

## 2017-04-11 RX ADMIN — ALPRAZOLAM 1 MG: 0.5 TABLET ORAL at 21:23

## 2017-04-11 RX ADMIN — LEVOTHYROXINE SODIUM 75 MCG: 75 TABLET ORAL at 06:22

## 2017-04-11 RX ADMIN — ALPRAZOLAM 1 MG: 0.5 TABLET ORAL at 12:48

## 2017-04-11 RX ADMIN — OXYCODONE HYDROCHLORIDE 10 MG: 5 TABLET ORAL at 21:28

## 2017-04-11 RX ADMIN — METOPROLOL TARTRATE 25 MG: 25 TABLET ORAL at 09:37

## 2017-04-11 RX ADMIN — DOCUSATE SODIUM AND SENNOSIDES 1 TABLET: 8.6; 5 TABLET, FILM COATED ORAL at 09:36

## 2017-04-12 PROCEDURE — 74011250637 HC RX REV CODE- 250/637: Performed by: PHYSICAL MEDICINE & REHABILITATION

## 2017-04-12 RX ORDER — ASCORBIC ACID 500 MG
500 TABLET ORAL
Status: DISCONTINUED | OUTPATIENT
Start: 2017-04-13 | End: 2017-04-14 | Stop reason: HOSPADM

## 2017-04-12 RX ORDER — ZINC SULFATE 50(220)MG
1 CAPSULE ORAL
Status: DISCONTINUED | OUTPATIENT
Start: 2017-04-13 | End: 2017-04-14 | Stop reason: HOSPADM

## 2017-04-12 RX ADMIN — ALPRAZOLAM 1 MG: 0.5 TABLET ORAL at 21:58

## 2017-04-12 RX ADMIN — METOPROLOL TARTRATE 12.5 MG: 25 TABLET ORAL at 21:58

## 2017-04-12 RX ADMIN — METOPROLOL TARTRATE 25 MG: 25 TABLET ORAL at 08:41

## 2017-04-12 RX ADMIN — ALPRAZOLAM 1 MG: 0.5 TABLET ORAL at 08:41

## 2017-04-12 RX ADMIN — DOCUSATE SODIUM AND SENNOSIDES 1 TABLET: 8.6; 5 TABLET, FILM COATED ORAL at 08:40

## 2017-04-12 RX ADMIN — ACETAMINOPHEN 650 MG: 325 TABLET, FILM COATED ORAL at 21:58

## 2017-04-12 RX ADMIN — PAROXETINE 40 MG: 20 TABLET, FILM COATED ORAL at 08:41

## 2017-04-12 RX ADMIN — ACETAMINOPHEN 650 MG: 325 TABLET, FILM COATED ORAL at 12:38

## 2017-04-12 RX ADMIN — LEVOTHYROXINE SODIUM 75 MCG: 75 TABLET ORAL at 06:56

## 2017-04-12 RX ADMIN — POLYETHYLENE GLYCOL 3350 17 G: 17 POWDER, FOR SOLUTION ORAL at 08:40

## 2017-04-12 RX ADMIN — OXYCODONE HYDROCHLORIDE 10 MG: 5 TABLET ORAL at 06:56

## 2017-04-12 RX ADMIN — DOCUSATE SODIUM AND SENNOSIDES 1 TABLET: 8.6; 5 TABLET, FILM COATED ORAL at 21:58

## 2017-04-12 RX ADMIN — PANTOPRAZOLE SODIUM 40 MG: 40 TABLET, DELAYED RELEASE ORAL at 08:40

## 2017-04-12 RX ADMIN — ACETAMINOPHEN 650 MG: 325 TABLET, FILM COATED ORAL at 06:56

## 2017-04-13 VITALS
WEIGHT: 293 LBS | BODY MASS INDEX: 41.02 KG/M2 | HEART RATE: 82 BPM | SYSTOLIC BLOOD PRESSURE: 130 MMHG | DIASTOLIC BLOOD PRESSURE: 80 MMHG | HEIGHT: 71 IN

## 2017-04-13 LAB
25(OH)D3 SERPL-MCNC: 27 NG/ML (ref 30–100)
ANION GAP BLD CALC-SCNC: 9 MMOL/L (ref 5–15)
BUN SERPL-MCNC: 16 MG/DL (ref 6–20)
BUN/CREAT SERPL: 22 (ref 12–20)
CALCIUM SERPL-MCNC: 9.3 MG/DL (ref 8.5–10.1)
CHLORIDE SERPL-SCNC: 103 MMOL/L (ref 97–108)
CO2 SERPL-SCNC: 29 MMOL/L (ref 21–32)
CREAT SERPL-MCNC: 0.72 MG/DL (ref 0.55–1.02)
CRP SERPL-MCNC: 2.95 MG/DL (ref 0–0.6)
ERYTHROCYTE [DISTWIDTH] IN BLOOD BY AUTOMATED COUNT: 15 % (ref 11.5–14.5)
ERYTHROCYTE [SEDIMENTATION RATE] IN BLOOD: 84 MM/HR (ref 0–30)
GLUCOSE SERPL-MCNC: 122 MG/DL (ref 65–100)
HCT VFR BLD AUTO: 35.6 % (ref 35–47)
HGB BLD-MCNC: 11.3 G/DL (ref 11.5–16)
MCH RBC QN AUTO: 28 PG (ref 26–34)
MCHC RBC AUTO-ENTMCNC: 31.7 G/DL (ref 30–36.5)
MCV RBC AUTO: 88.3 FL (ref 80–99)
PLATELET # BLD AUTO: 429 K/UL (ref 150–400)
POTASSIUM SERPL-SCNC: 4 MMOL/L (ref 3.5–5.1)
RBC # BLD AUTO: 4.03 M/UL (ref 3.8–5.2)
SODIUM SERPL-SCNC: 141 MMOL/L (ref 136–145)
WBC # BLD AUTO: 9.7 K/UL (ref 3.6–11)

## 2017-04-13 PROCEDURE — 74011250637 HC RX REV CODE- 250/637: Performed by: PHYSICAL MEDICINE & REHABILITATION

## 2017-04-13 PROCEDURE — 85027 COMPLETE CBC AUTOMATED: CPT | Performed by: PHYSICAL MEDICINE & REHABILITATION

## 2017-04-13 PROCEDURE — 80048 BASIC METABOLIC PNL TOTAL CA: CPT | Performed by: PHYSICAL MEDICINE & REHABILITATION

## 2017-04-13 PROCEDURE — 82306 VITAMIN D 25 HYDROXY: CPT | Performed by: PHYSICAL MEDICINE & REHABILITATION

## 2017-04-13 PROCEDURE — 36415 COLL VENOUS BLD VENIPUNCTURE: CPT | Performed by: PHYSICAL MEDICINE & REHABILITATION

## 2017-04-13 PROCEDURE — 85652 RBC SED RATE AUTOMATED: CPT | Performed by: PHYSICAL MEDICINE & REHABILITATION

## 2017-04-13 PROCEDURE — 86140 C-REACTIVE PROTEIN: CPT | Performed by: PHYSICAL MEDICINE & REHABILITATION

## 2017-04-13 RX ORDER — CEPHALEXIN 250 MG/1
500 CAPSULE ORAL 4 TIMES DAILY
Status: DISCONTINUED | OUTPATIENT
Start: 2017-04-13 | End: 2017-04-14 | Stop reason: HOSPADM

## 2017-04-13 RX ORDER — CYCLOBENZAPRINE HCL 10 MG
10 TABLET ORAL
Status: DISCONTINUED | OUTPATIENT
Start: 2017-04-13 | End: 2017-04-14 | Stop reason: HOSPADM

## 2017-04-13 RX ORDER — ALPRAZOLAM 0.5 MG/1
0.5 TABLET ORAL 2 TIMES DAILY
Status: DISCONTINUED | OUTPATIENT
Start: 2017-04-13 | End: 2017-04-14 | Stop reason: HOSPADM

## 2017-04-13 RX ADMIN — PANTOPRAZOLE SODIUM 40 MG: 40 TABLET, DELAYED RELEASE ORAL at 11:56

## 2017-04-13 RX ADMIN — DOCUSATE SODIUM AND SENNOSIDES 1 TABLET: 8.6; 5 TABLET, FILM COATED ORAL at 20:08

## 2017-04-13 RX ADMIN — OXYCODONE HYDROCHLORIDE 10 MG: 5 TABLET ORAL at 08:39

## 2017-04-13 RX ADMIN — ALPRAZOLAM 1 MG: 0.5 TABLET ORAL at 08:38

## 2017-04-13 RX ADMIN — CEPHALEXIN 500 MG: 250 CAPSULE ORAL at 22:31

## 2017-04-13 RX ADMIN — OXYCODONE HYDROCHLORIDE 10 MG: 5 TABLET ORAL at 12:02

## 2017-04-13 RX ADMIN — CEPHALEXIN 500 MG: 250 CAPSULE ORAL at 19:33

## 2017-04-13 RX ADMIN — POLYETHYLENE GLYCOL 3350 17 G: 17 POWDER, FOR SOLUTION ORAL at 08:38

## 2017-04-13 RX ADMIN — PRENATAL WITH FERROUS FUM AND FOLIC ACID 1 TABLET: 3080; 920; 120; 400; 22; 1.84; 3; 20; 10; 1; 12; 200; 27; 25; 2 TABLET ORAL at 08:37

## 2017-04-13 RX ADMIN — METOPROLOL TARTRATE 25 MG: 25 TABLET ORAL at 08:38

## 2017-04-13 RX ADMIN — ACETAMINOPHEN 650 MG: 325 TABLET, FILM COATED ORAL at 20:05

## 2017-04-13 RX ADMIN — ACETAMINOPHEN 650 MG: 325 TABLET, FILM COATED ORAL at 06:14

## 2017-04-13 RX ADMIN — LEVOTHYROXINE SODIUM 75 MCG: 75 TABLET ORAL at 06:14

## 2017-04-13 RX ADMIN — PAROXETINE 40 MG: 20 TABLET, FILM COATED ORAL at 08:38

## 2017-04-13 RX ADMIN — ALPRAZOLAM 0.5 MG: 0.5 TABLET ORAL at 20:08

## 2017-04-13 RX ADMIN — OXYCODONE HYDROCHLORIDE 10 MG: 5 TABLET ORAL at 17:24

## 2017-04-13 RX ADMIN — METOPROLOL TARTRATE 12.5 MG: 25 TABLET ORAL at 20:05

## 2017-04-13 RX ADMIN — CYCLOBENZAPRINE HYDROCHLORIDE 10 MG: 10 TABLET, FILM COATED ORAL at 22:31

## 2017-04-13 RX ADMIN — DOCUSATE SODIUM AND SENNOSIDES 1 TABLET: 8.6; 5 TABLET, FILM COATED ORAL at 08:38

## 2017-04-13 RX ADMIN — OXYCODONE HYDROCHLORIDE 10 MG: 5 TABLET ORAL at 20:05

## 2017-04-13 RX ADMIN — ZINC SULFATE CAP 220 MG (50 MG ELEMENTAL ZN) 220 MG: 220 (50 ZN) CAP at 08:38

## 2017-04-13 RX ADMIN — OXYCODONE HYDROCHLORIDE AND ACETAMINOPHEN 500 MG: 500 TABLET ORAL at 08:38

## 2017-04-14 PROCEDURE — 74011250637 HC RX REV CODE- 250/637: Performed by: PHYSICAL MEDICINE & REHABILITATION

## 2017-04-14 RX ADMIN — METOPROLOL TARTRATE 25 MG: 25 TABLET ORAL at 09:03

## 2017-04-14 RX ADMIN — ALPRAZOLAM 0.5 MG: 0.5 TABLET ORAL at 09:03

## 2017-04-14 RX ADMIN — DOCUSATE SODIUM AND SENNOSIDES 1 TABLET: 8.6; 5 TABLET, FILM COATED ORAL at 09:03

## 2017-04-14 RX ADMIN — OXYCODONE HYDROCHLORIDE AND ACETAMINOPHEN 500 MG: 500 TABLET ORAL at 09:03

## 2017-04-14 RX ADMIN — PAROXETINE 40 MG: 20 TABLET, FILM COATED ORAL at 09:03

## 2017-04-14 RX ADMIN — ZINC SULFATE CAP 220 MG (50 MG ELEMENTAL ZN) 220 MG: 220 (50 ZN) CAP at 09:03

## 2017-04-14 RX ADMIN — OXYCODONE HYDROCHLORIDE 10 MG: 5 TABLET ORAL at 09:03

## 2017-04-14 RX ADMIN — LEVOTHYROXINE SODIUM 75 MCG: 75 TABLET ORAL at 05:12

## 2017-04-14 RX ADMIN — CEPHALEXIN 500 MG: 250 CAPSULE ORAL at 09:03

## 2017-04-14 RX ADMIN — PRENATAL WITH FERROUS FUM AND FOLIC ACID 1 TABLET: 3080; 920; 120; 400; 22; 1.84; 3; 20; 10; 1; 12; 200; 27; 25; 2 TABLET ORAL at 09:03

## 2017-04-14 RX ADMIN — PANTOPRAZOLE SODIUM 40 MG: 40 TABLET, DELAYED RELEASE ORAL at 09:03

## 2017-04-14 RX ADMIN — ACETAMINOPHEN 650 MG: 325 TABLET, FILM COATED ORAL at 05:11

## 2017-04-14 RX ADMIN — OXYCODONE HYDROCHLORIDE 10 MG: 5 TABLET ORAL at 05:12

## 2017-04-17 ENCOUNTER — TELEPHONE (OUTPATIENT)
Dept: MEDSURG UNIT | Age: 67
End: 2017-04-17

## 2017-12-06 ENCOUNTER — HOSPITAL ENCOUNTER (OUTPATIENT)
Dept: MAMMOGRAPHY | Age: 67
Discharge: HOME | End: 2017-12-06
Attending: FAMILY MEDICINE
Payer: MEDICARE

## 2017-12-06 DIAGNOSIS — Z12.31 VISIT FOR SCREENING MAMMOGRAM: ICD-10-CM

## 2017-12-06 PROCEDURE — 77067 SCR MAMMO BI INCL CAD: CPT

## 2018-05-27 ENCOUNTER — APPOINTMENT (OUTPATIENT)
Dept: GENERAL RADIOLOGY | Age: 68
End: 2018-05-27
Payer: MEDICARE

## 2018-05-27 ENCOUNTER — HOSPITAL ENCOUNTER (EMERGENCY)
Age: 68
Discharge: HOME | End: 2018-05-27
Attending: EMERGENCY MEDICINE
Payer: MEDICARE

## 2018-05-27 VITALS
DIASTOLIC BLOOD PRESSURE: 89 MMHG | SYSTOLIC BLOOD PRESSURE: 144 MMHG | WEIGHT: 293 LBS | HEIGHT: 65 IN | TEMPERATURE: 98.1 F | BODY MASS INDEX: 48.82 KG/M2 | OXYGEN SATURATION: 97 % | RESPIRATION RATE: 16 BRPM | HEART RATE: 62 BPM

## 2018-05-27 DIAGNOSIS — S40.011A CONTUSION OF RIGHT SHOULDER, INITIAL ENCOUNTER: Primary | ICD-10-CM

## 2018-05-27 PROCEDURE — 73562 X-RAY EXAM OF KNEE 3: CPT

## 2018-05-27 PROCEDURE — 72050 X-RAY EXAM NECK SPINE 4/5VWS: CPT

## 2018-05-27 PROCEDURE — 99282 EMERGENCY DEPT VISIT SF MDM: CPT

## 2018-05-27 PROCEDURE — 73030 X-RAY EXAM OF SHOULDER: CPT

## 2018-05-27 PROCEDURE — 72100 X-RAY EXAM L-S SPINE 2/3 VWS: CPT

## 2018-05-27 RX ORDER — NAPROXEN 500 MG/1
500 TABLET ORAL
Qty: 20 TAB | Refills: 0 | Status: SHIPPED | OUTPATIENT
Start: 2018-05-27 | End: 2019-06-08

## 2018-05-27 NOTE — ED NOTES
I have assumed care of the patient. The patient has been placed in a position of comfort with the call bell within reach. The patient presents to the Emergency Department with complaints of right sided pain after falling. Denies any LOC.

## 2018-05-27 NOTE — DISCHARGE INSTRUCTIONS
Bruises: Care Instructions  Your Care Instructions    Bruises occur when small blood vessels under the skin tear or rupture, most often from a twist, bump, or fall. Blood leaks into tissues under the skin and causes a black-and-blue spot that often turns colors, including purplish black, reddish blue, or yellowish green, as the bruise heals. Bruises hurt, but most are not serious and will go away on their own within 2 to 4 weeks. Sometimes, gravity causes them to spread down the body. A leg bruise usually will take longer to heal than a bruise on the face or arms. Follow-up care is a key part of your treatment and safety. Be sure to make and go to all appointments, and call your doctor if you are having problems. It's also a good idea to know your test results and keep a list of the medicines you take. How can you care for yourself at home? · Take pain medicines exactly as directed. ¨ If the doctor gave you a prescription medicine for pain, take it as prescribed. ¨ If you are not taking a prescription pain medicine, ask your doctor if you can take an over-the-counter medicine. · Put ice or a cold pack on the area for 10 to 20 minutes at a time. Put a thin cloth between the ice and your skin. · If you can, prop up the bruised area on pillows as much as possible for the next few days. Try to keep the bruise above the level of your heart. When should you call for help? Call your doctor now or seek immediate medical care if:  ? · You have signs of infection, such as:  ¨ Increased pain, swelling, warmth, or redness. ¨ Red streaks leading from the bruise. ¨ Pus draining from the bruise. ¨ A fever. ? · You have a bruise on your leg and signs of a blood clot, such as:  ¨ Increasing redness and swelling along with warmth, tenderness, and pain in the bruised area. ¨ Pain in your calf, back of the knee, thigh, or groin. ¨ Redness and swelling in your leg or groin. ? · Your pain gets worse. ? Watch closely for changes in your health, and be sure to contact your doctor if:  ? · You do not get better as expected. Where can you learn more? Go to http://ras-leatha.info/. Enter [phone] in the search box to learn more about \"Bruises: Care Instructions. \"  Current as of: March 20, 2017  Content Version: 11.4  © 3623-4082 [employer]. Care instructions adapted under license by [employer] (which disclaims liability or warranty for this information). If you have questions about a medical condition or this instruction, always ask your healthcare professional. [place] [address] any warranty or liability for your use of this information.

## 2018-05-27 NOTE — ED PROVIDER NOTES
EMERGENCY DEPARTMENT HISTORY AND PHYSICAL EXAM      Date: 5/27/2018  Patient Name: Rick Gunderson    History of Presenting Illness     Chief Complaint   Patient presents with   [facility] Fall     pt reports to ed complaining of ground level fall yesterday, states that she does not have full range of motion on her right shoulder, right knee pain (joint replacement)     PROVIDER IN TRIAGE NOTE:  2:19 PM  Migdalia Hilton PA-C has evaluated the patient as the Provider in Triage (PIT). Pt presents to the Emergency Dept with c/o R sided pain to shoulder, knee, back s/p fall. Pt with h/o multiple surgeries with hardware placement. They have reviewed the vital signs and the triage nurse assessment. They have talked with the patient and any available family and advised that the appropriate studies have been ordered to initiate the work up based on the clinical presentation during the assessment. The pt has been advised that they will be accommodated in the Main ED as soon as possible. The pt has been requested to contact the triage nurse or PIT immediately if they experiences any changes in their condition during this brief waiting period. History Provided By: Patient    HPI: Rick Gunderson, 69 y.o. female with PMHx significant for chronic back pain, obesity, ulcerative colitis, HTN, GERD, SVT, hypothyroid, tonsil CA, lymph node CA, arthritis, migraines, panic attacks, anxiety who presents ambulatory to the ED s/p GLF that occurred late yesterday afternoon. Pt reports that she ws ambulating down a ramp in her home when she stumbled and fell noting that she landed on her right side. She denies any head injury or LOC. Pt reports that she was not able to get off the floor for several hours following her GLF. She c/o RUE pain greatest in her right upper arm. Pt reports associated right thoracic back pain, a bruise to her posterior right arm, bruise to her right knee, and a bruise to her right buttock.   She denies use of medication to modify her symptoms. Pt notes that she hit a vacuum  during her fall. She specifically denies any fevers, chills, nausea, vomiting, chest pain, shortness of breath, headache, rash, diarrhea, sweating or weight loss. There are no other complaints, changes, or physical findings at this time. PCP: Justyna Cook MD    Current Outpatient Prescriptions   Medication Sig Dispense Refill    naproxen (NAPROSYN) 500 mg tablet Take 1 Tab by mouth every twelve (12) hours as needed for Pain. 20 Tab 0    levothyroxine (SYNTHROID) 75 mcg tablet Take 75 mcg by mouth Daily (before breakfast).  PARoxetine (PAXIL) 40 mg tablet Take 40 mg by mouth daily.  metoprolol tartrate (LOPRESSOR) 25 mg tablet Take 12.5 mg by mouth nightly.  metoprolol (LOPRESSOR) 25 mg tablet Take 25 mg by mouth daily.  butalbital-acetaminophen-caffeine (FIORICET) -40 mg per tablet Take 1 Tab by mouth every six (6) hours as needed for Pain.  omeprazole (PRILOSEC) 40 mg capsule Take 40 mg by mouth daily.  alprazolam (XANAX) 0.5 mg tablet Take 0.5 mg by mouth two (2) times daily as needed. Past History     Past Medical History:  Past Medical History:   Diagnosis Date    Adverse effect of anesthesia     O2 DROPS WITH ANESTHESIA    Arthritis     KNEES    Cancer ([facility] [address] 2015    tonsils and lymph nodes.   Removed 3-2015 with radiation    GERD (gastroesophageal reflux disease)     Hypertension     NO LONGER ON MEDICATION    Hypothyroid     Migraine     Nausea & vomiting     Obesity     Other ill-defined conditions(799.89)     chronic back pain    Psychiatric disorder     anxiety    Psychiatric disorder     panic ATTACKS    SVT (supraventricular tachycardia) (HCC)     Ulcerative colitis        Past Surgical History:  Past Surgical History:   Procedure Laterality Date    COLONOSCOPY,DIAGNOSTIC  11/19/2015         HX GI      colonscopy    HX HEENT  03/2015    tonsillectomy (CANCER) AND NECK LYMPH NODES    HX HEENT  2008    PARATHYROID REMOVAL    HX HEENT Left 2002    EYE LASER    HX HYSTERECTOMY  1985    HX KNEE ARTHROSCOPY  1995    left knee surgery, TOTAL LT  and Right KNEE 2013    HX KNEE REPLACEMENT Bilateral 2013, 2014    HX LAP CHOLECYSTECTOMY  2002    HX LUMBAR FUSION  2011    SPINAL FUSION with hardware L4-L5    HX ORTHOPAEDIC  1990    CYST REMOVED RIGHT WRIST    HX OTHER SURGICAL      cyst removal right wrist    HX OTHER SURGICAL  2001, 2016    hemorrhoidectomy X2    HX UROLOGICAL  2010    bladder surgery(interstim device)    CO EGD TRANSORAL BIOPSY SINGLE/MULTIPLE  5/20/2013            Family History:  Family History   Problem Relation Age of Onset    Cancer Mother      ovarian ca?  Heart Disease Father      MI    Heart Attack Father     Cancer Father      MULTIPLE MYELOMA    Liver Disease Father      FROM MEDICATIONS FOR MULTIPLE MYELOMA    Arthritis-osteo Sister     Arthritis-osteo Brother     Cancer Paternal Grandmother 79     breast ca    Breast Cancer Paternal Grandmother     Breast Cancer Maternal Aunt 55    Breast Cancer Maternal Aunt 61    Breast Cancer Paternal Aunt     Breast Cancer Paternal Aunt     Emphysema Paternal Grandfather     No Known Problems Sister     No Known Problems Brother     No Known Problems Brother     Anesth Problems Neg Hx        Social History:  Social History   Substance Use Topics    Smoking status: Never Smoker    Smokeless tobacco: Never Used    Alcohol use No       Allergies: Allergies   Allergen Reactions    Adhesive Tape-Silicones Rash    Aloe Vera Rash    Chlorhexidine Rash    Tussin [Dextromethorphan Hbr] Palpitations         Review of Systems   Review of Systems   Constitutional: Negative. Negative for activity change, appetite change, chills, fatigue, fever and unexpected weight change. HENT: Negative. Negative for congestion, hearing loss, rhinorrhea, sneezing and voice change. Eyes: Negative.   Negative for pain and visual disturbance. Respiratory: Negative. Negative for apnea, cough, choking, chest tightness and shortness of breath. Cardiovascular: Negative. Negative for chest pain and palpitations. Gastrointestinal: Negative. Negative for abdominal distention, abdominal pain, blood in stool, diarrhea, nausea and vomiting. Genitourinary: Negative. Negative for difficulty urinating, flank pain, frequency and urgency. No discharge   Musculoskeletal: Positive for back pain (right thoracic) and myalgias (RUE < right upper arm). Negative for arthralgias and neck stiffness. Skin: Positive for color change (right knee, right posterior arm, right buttock). Negative for rash. Neurological: Negative. Negative for dizziness, seizures, syncope, speech difficulty, weakness, numbness and headaches. Hematological: Negative for adenopathy. Psychiatric/Behavioral: Negative. Negative for agitation, behavioral problems, dysphoric mood and suicidal ideas. The patient is not nervous/anxious. Physical Exam   Physical Exam   Constitutional: She is oriented to person, place, and time. She appears well-developed and well-nourished. No distress. HENT:   Head: Normocephalic and atraumatic. Mouth/Throat: Oropharynx is clear and moist. No oropharyngeal exudate. Eyes: Conjunctivae and EOM are normal. Pupils are equal, round, and reactive to light. Right eye exhibits no discharge. Left eye exhibits no discharge. Neck: Normal range of motion. Neck supple. Cardiovascular: Normal rate, regular rhythm and intact distal pulses. Exam reveals no gallop and no friction rub. No murmur heard. Pulmonary/Chest: Effort normal and breath sounds normal. No respiratory distress. She has no wheezes. She has no rales. She exhibits no tenderness. Abdominal: Soft. Bowel sounds are normal. She exhibits no distension and no mass. There is no tenderness. There is no rebound and no guarding.    Musculoskeletal: Normal range of motion. She exhibits no edema. TTP over the insertion site of the deltoid   Lymphadenopathy:     She has no cervical adenopathy. Neurological: She is alert and oriented to person, place, and time. No cranial nerve deficit. Coordination normal.   Skin: Skin is warm and dry. No rash noted. No erythema. Psychiatric: She has a normal mood and affect. Nursing note and vitals reviewed. Diagnostic Study Results     Radiologic Studies -   XR KNEE RT 3 V   Final Result   INDICATION:  R knee pain, s/p fall      Exam: AP, lateral, oblique views of the right knee.     FINDINGS: There is no acute fracture-dislocation. There is a right knee  arthroplasty without evidence of orthopedic hardware complication. Osseous  structures are diffusely demineralized.     IMPRESSION  IMPRESSION: No acute fracture, dislocation or evidence of orthopedic hardware  complication. XR SPINE CERV 4 OR 5 V   Final Result   INDICATION:  neck pain, s/p fall      Exam: AP, lateral, bilateral oblique, swimmer's, odontoid views of the cervical  spine.     FINDINGS: There is no acute fracture or subluxation. Prevertebral soft tissues  are within normal limits. Bones are diffusely demineralized.     IMPRESSION  IMPRESSION: No acute fracture or subluxation. XR SHOULDER RT AP/LAT MIN 2 V   Final Result   INDICATION: Ground-level fall. Right shoulder pain.      Exam: Three views of the right shoulder.     FINDINGS: There is no acute fracture or dislocation. Bones are well mineralized  and soft tissues are normal.     IMPRESSION  IMPRESSION: No acute fracture or dislocation. XR SPINE LUMB 2 OR 3 V   Final Result   INDICATION:  fall, low back pain, h/o surgery with hardware      Exam: AP, lateral and cone-down views of the lumbar spine.     Direct comparison is made to prior fluoroscopic images demonstrated April 3,  2017.     FINDINGS: There is no acute fracture.  L3-L5 posterior fusion postoperative  changes are noted and there is no evidence of orthopedic hardware complication. Alignment is stable. There is a 5 mm anterolisthesis of L4 with respect to L5,  unchanged.     IMPRESSION  IMPRESSION: Stable alignment. No acute fracture. No evidence of orthopedic  hardware complication. Medical Decision Making   I am the first provider for this patient. I reviewed the vital signs, available nursing notes, past medical history, past surgical history, family history and social history. Vital Signs-Reviewed the patient's vital signs. Patient Vitals for the past 12 hrs:   Temp Pulse Resp BP SpO2   05/27/18 1418 98.1 °F (36.7 °C) 62 16 144/89 97 %     Records Reviewed: Nursing Notes and Old Medical Records    Provider Notes (Medical Decision Making):     Sprain, strain, fracture, contusion    ED Course:   Initial assessment performed. The patients presenting problems have been discussed, and they are in agreement with the care plan formulated and outlined with them. I have encouraged them to ask questions as they arise throughout their visit. 3:58 PM  The patient states that their symptoms have resolved and they feel much better. There are no other new complaints at this time. Her questions have been answered. We are awaiting final results and those will be reviewed with them when they become available. Disposition:    3:58 PM  Cha Escudero's  results have been reviewed with her. She has been counseled regarding her diagnosis. She verbally conveys understanding and agreement of the signs, symptoms, diagnosis, treatment and prognosis and additionally agrees to follow up as recommended with Dr. Sascha Hughes MD in 24 - 48 hours. She also agrees with the care-plan and conveys that all of her questions have been answered.   I have also put together some discharge instructions for her that include: 1) educational information regarding their diagnosis, 2) how to care for their diagnosis at home, as well a 3) list of reasons why they would want to return to the ED prior to their follow-up appointment, should their condition change. PLAN:  1. Discharge Medication List as of 5/27/2018  3:58 PM      START taking these medications    Details   naproxen (NAPROSYN) 500 mg tablet Take 1 Tab by mouth every twelve (12) hours as needed for Pain., Normal, Disp-20 Tab, R-0         CONTINUE these medications which have NOT CHANGED    Details   oxyCODONE IR (ROXICODONE) 5 mg immediate release tablet Take 1-2 Tabs by mouth every three (3) hours as needed. Max Daily Amount: 80 mg., Print, Disp-60 Tab, R-0      levothyroxine (SYNTHROID) 75 mcg tablet Take 75 mcg by mouth Daily (before breakfast). , Historical Med      PARoxetine (PAXIL) 40 mg tablet Take 40 mg by mouth daily. , Historical Med      !! metoprolol tartrate (LOPRESSOR) 25 mg tablet Take 12.5 mg by mouth nightly., Historical Med      !! metoprolol (LOPRESSOR) 25 mg tablet Take 25 mg by mouth daily. , Historical Med      butalbital-acetaminophen-caffeine (FIORICET) -40 mg per tablet Take 1 Tab by mouth every six (6) hours as needed for Pain., Historical Med      omeprazole (PRILOSEC) 40 mg capsule Take 40 mg by mouth daily. , Historical Med      alprazolam (XANAX) 0.5 mg tablet Take 0.5 mg by mouth two (2) times daily as needed., Historical Med       !! - Potential duplicate medications found. Please discuss with provider. 2.   Follow-up Information     Follow up With Details Comments Quentin Robisons [address] Fela Khalil MD Call in 2 days As needed, If symptoms worsen [id number] [facility] [phone]          Return to ED if worse     Diagnosis     Clinical Impression:   1. Contusion of right shoulder, initial encounter        Attestations: This note is prepared by Lakhwinder Doyle, acting as Scribe for [employer]andra Nunez, [address] Lupis Nunez MD: The scribe's documentation has been prepared under my direction and personally reviewed by me in its entirety.  I confirm that the note above accurately reflects all work, treatment, procedures, and medical decision making performed by me.

## 2018-12-07 ENCOUNTER — HOSPITAL ENCOUNTER (OUTPATIENT)
Dept: MAMMOGRAPHY | Age: 68
Discharge: HOME | End: 2018-12-07
Attending: FAMILY MEDICINE
Payer: MEDICARE

## 2018-12-07 DIAGNOSIS — Z12.39 SCREENING BREAST EXAMINATION: ICD-10-CM

## 2018-12-07 PROCEDURE — 77063 BREAST TOMOSYNTHESIS BI: CPT

## 2019-06-08 ENCOUNTER — HOSPITAL ENCOUNTER (EMERGENCY)
Age: 69
Discharge: HOME | End: 2019-06-08
Attending: EMERGENCY MEDICINE
Payer: MEDICARE

## 2019-06-08 VITALS
RESPIRATION RATE: 18 BRPM | HEIGHT: 65 IN | SYSTOLIC BLOOD PRESSURE: 136 MMHG | OXYGEN SATURATION: 97 % | HEART RATE: 65 BPM | BODY MASS INDEX: 48.41 KG/M2 | TEMPERATURE: 98.1 F | DIASTOLIC BLOOD PRESSURE: 80 MMHG | WEIGHT: 290.57 LBS

## 2019-06-08 DIAGNOSIS — M54.31 SCIATICA OF RIGHT SIDE: Primary | ICD-10-CM

## 2019-06-08 DIAGNOSIS — M48.062 LUMBAR STENOSIS WITH NEUROGENIC CLAUDICATION: ICD-10-CM

## 2019-06-08 PROCEDURE — 99283 EMERGENCY DEPT VISIT LOW MDM: CPT

## 2019-06-08 PROCEDURE — 74011250636 HC RX REV CODE- 250/636: Performed by: PHYSICIAN ASSISTANT

## 2019-06-08 PROCEDURE — A9270 NON-COVERED ITEM OR SERVICE: HCPCS | Performed by: PHYSICIAN ASSISTANT

## 2019-06-08 PROCEDURE — 74011250637 HC RX REV CODE- 250/637: Performed by: PHYSICIAN ASSISTANT

## 2019-06-08 PROCEDURE — 74011636637 HC RX REV CODE- 636/637: Performed by: PHYSICIAN ASSISTANT

## 2019-06-08 PROCEDURE — 96372 THER/PROPH/DIAG INJ SC/IM: CPT

## 2019-06-08 RX ORDER — ONDANSETRON 4 MG/1
4 TABLET, ORALLY DISINTEGRATING ORAL
Status: COMPLETED | OUTPATIENT
Start: 2019-06-08 | End: 2019-06-08

## 2019-06-08 RX ORDER — PREDNISONE 20 MG/1
60 TABLET ORAL
Status: COMPLETED | OUTPATIENT
Start: 2019-06-08 | End: 2019-06-08

## 2019-06-08 RX ORDER — MELOXICAM 15 MG/1
15 TABLET ORAL DAILY
Qty: 10 TAB | Refills: 0 | Status: SHIPPED | OUTPATIENT
Start: 2019-06-08 | End: 2019-06-18

## 2019-06-08 RX ORDER — PREDNISONE 20 MG/1
60 TABLET ORAL DAILY
Qty: 12 TAB | Refills: 0 | Status: SHIPPED | OUTPATIENT
Start: 2019-06-09 | End: 2019-06-13

## 2019-06-08 RX ORDER — OXYCODONE AND ACETAMINOPHEN 5; 325 MG/1; MG/1
1 TABLET ORAL
Qty: 15 TAB | Refills: 0 | Status: SHIPPED | OUTPATIENT
Start: 2019-06-08 | End: 2019-06-13

## 2019-06-08 RX ORDER — MORPHINE SULFATE 2 MG/ML
2 INJECTION, SOLUTION INTRAMUSCULAR; INTRAVENOUS
Status: COMPLETED | OUTPATIENT
Start: 2019-06-08 | End: 2019-06-08

## 2019-06-08 RX ORDER — DIAZEPAM 5 MG/1
5 TABLET ORAL
Status: COMPLETED | OUTPATIENT
Start: 2019-06-08 | End: 2019-06-08

## 2019-06-08 RX ORDER — METHOCARBAMOL 750 MG/1
750 TABLET, FILM COATED ORAL 3 TIMES DAILY
Qty: 15 TAB | Refills: 0 | Status: SHIPPED | OUTPATIENT
Start: 2019-06-08 | End: 2019-06-13

## 2019-06-08 RX ADMIN — MORPHINE SULFATE 2 MG: 2 INJECTION, SOLUTION INTRAMUSCULAR; INTRAVENOUS at 15:43

## 2019-06-08 RX ADMIN — PREDNISONE 60 MG: 20 TABLET ORAL at 15:43

## 2019-06-08 RX ADMIN — DIAZEPAM 5 MG: 5 TABLET ORAL at 15:43

## 2019-06-08 RX ADMIN — ONDANSETRON 4 MG: 4 TABLET, ORALLY DISINTEGRATING ORAL at 15:43

## 2019-06-08 NOTE — DISCHARGE INSTRUCTIONS
Rest, alternate ice/moist heat, gentle stretching, massage. Avoid prolonged sitting. Follow up with primary care for recheck. Follow up with your Orthopedist if symptoms persist.  Return to the Emergency Dept for any continued/worsening pain.

## 2019-06-08 NOTE — ED NOTES
MAIKEL Martin has reviewed discharge instructions with the patient. The patient and spouse verbalized understanding. Pt. A&Ox4, respirations even and unlabored. Wheelchair assist from department; paperwork in hand.

## 2019-06-08 NOTE — ED NOTES
Assumed care of patient from triage. Patient is A&Ox4, respirations even and unlabored. Pt. States she has pain in her right buttocks that radiates down her leg. Pain intermittently x3 weeks. Denies injury. Pt. Florentin Zazueta in low bed in POC, side rail x1, call light within reach.

## 2019-06-08 NOTE — ED PROVIDER NOTES
EMERGENCY DEPARTMENT HISTORY AND PHYSICAL EXAM      Date: 6/8/2019  Patient Name: Peggy Farr    History of Presenting Illness     Chief Complaint   Patient presents with    Leg Pain     RLE pain times three weeks       History Provided By: Patient and Patient's     HPI: Peggy Farr, 77 y.o. female presents ambulatory to the ED with her  in attendance, c/o severe pain to her R gluteus which radiates down the length of her R LE. Pt denied injury/strain. No h/o travel. No personal or family h/o blood clots. Pt denied change in her physical routine. Pt states she has \"an e-stim\" placed by her Urologist which is no longer working. She was initially concerned this device may be causing her discomfort, but she was evaluated recently and advised the symptoms appeared to be sciatica. She states she took the last two pain pills she had at home (from a prior surgery) which appeared to help but \"it is already wearing off\". Pt denied rash/lesion. No swelling. No numbness/tingling. No chest pain or shortness of breath. No fever/chills. She denied h/o similar sx in the past.      Chief Complaint: R gluteal pain radiating into the RLE  Duration: 3 Weeks  Timing:  Acute  Location: R hip/LE  Quality: Stabbing and Sharp  Severity: Severe  Modifying Factors: increased pain with position changes  Associated Symptoms: denies any other associated signs or symptoms        There are no other complaints, changes, or physical findings at this time.     PCP: Peng Henderson MD    Current Facility-Administered Medications   Medication Dose Route Frequency Provider Last Rate Last Dose    ondansetron (ZOFRAN ODT) tablet 4 mg  4 mg Oral NOW Brenita Sessions NahidMartha Lau        morphine injection 2 mg  2 mg IntraMUSCular NOW Brenita Sessions NahidMartha Lau        diazePAM (VALIUM) tablet 5 mg  5 mg Oral NOW Niranjan [place]        predniSONE (DELTASONE) tablet 60 mg  60 mg Oral NOW Brenita Sessions NahidMartha Lau         Current Outpatient Medications   Medication Sig Dispense Refill    naproxen (NAPROSYN) 500 mg tablet Take 1 Tab by mouth every twelve (12) hours as needed for Pain. 20 Tab 0    levothyroxine (SYNTHROID) 75 mcg tablet Take 75 mcg by mouth Daily (before breakfast).  PARoxetine (PAXIL) 40 mg tablet Take 40 mg by mouth daily.  metoprolol tartrate (LOPRESSOR) 25 mg tablet Take 12.5 mg by mouth nightly.  metoprolol (LOPRESSOR) 25 mg tablet Take 25 mg by mouth daily.  butalbital-acetaminophen-caffeine (FIORICET) -40 mg per tablet Take 1 Tab by mouth every six (6) hours as needed for Pain.  omeprazole (PRILOSEC) 40 mg capsule Take 40 mg by mouth daily.  alprazolam (XANAX) 0.5 mg tablet Take 0.5 mg by mouth two (2) times daily as needed. Past History     Past Medical History:  Past Medical History:   Diagnosis Date    Adverse effect of anesthesia     O2 DROPS WITH ANESTHESIA    Arthritis     KNEES    Cancer ([facility] [address] 2015    tonsils and lymph nodes.   Removed 3-2015 with radiation    GERD (gastroesophageal reflux disease)     Hypertension     NO LONGER ON MEDICATION    Hypothyroid     Migraine     Nausea & vomiting     Obesity     Other ill-defined conditions(799.89)     chronic back pain    Psychiatric disorder     anxiety    Psychiatric disorder     panic ATTACKS    SVT (supraventricular tachycardia) ([facility] [address]     Ulcerative colitis        Past Surgical History:  Past Surgical History:   Procedure Laterality Date    COLONOSCOPY,DIAGNOSTIC  11/19/2015         HX GI      colonscopy    HX HEENT  03/2015    tonsillectomy (CANCER) AND NECK LYMPH NODES    HX HEENT  2008    PARATHYROID REMOVAL    HX HEENT Left 2002    EYE LASER    HX HYSTERECTOMY  1985    HX KNEE ARTHROSCOPY  1995    left knee surgery, TOTAL LT  and Right KNEE 2013    HX KNEE REPLACEMENT Bilateral 2013, 2014    HX LAP CHOLECYSTECTOMY  2002    HX LUMBAR FUSION  2011    SPINAL FUSION with hardware L4-L5    HX ORTHOPAEDIC 1990    CYST REMOVED RIGHT WRIST    HX OTHER SURGICAL      cyst removal right wrist    HX OTHER SURGICAL  2001, 2016    hemorrhoidectomy X2    HX UROLOGICAL  2010    bladder surgery(interstim device)    [place] EGD TRANSORAL BIOPSY SINGLE/MULTIPLE  5/20/2013            Family History:  Family History   Problem Relation Age of Onset    Cancer Mother         ovarian ca?  Heart Disease Father         MI    Heart Attack Father     Cancer Father         MULTIPLE MYELOMA    Liver Disease Father         FROM MEDICATIONS FOR MULTIPLE MYELOMA    Arthritis-osteo Sister     Arthritis-osteo Brother     Cancer Paternal Grandmother 79        breast ca    Breast Cancer Paternal Grandmother 79    Breast Cancer Maternal Aunt 55    Breast Cancer Maternal Aunt 61    Breast Cancer Paternal Aunt 43    Breast Cancer Paternal Aunt         52's    Emphysema Paternal Grandfather     No Known Problems Sister     No Known Problems Brother     No Known Problems Brother     Anesth Problems Neg Hx        Social History:  Social History     Tobacco Use    Smoking status: Never Smoker    Smokeless tobacco: Never Used   Substance Use Topics    Alcohol use: No    Drug use: No     Types: Prescription       Allergies: Allergies   Allergen Reactions    Adhesive Tape-Silicones Rash    Aloe Vera Rash    Chlorhexidine Rash    Tussin [Dextromethorphan Hbr] Palpitations         Review of Systems   Review of Systems   Constitutional: Negative for chills and fever. HENT: Negative for congestion, rhinorrhea and sore throat. Respiratory: Negative for cough and shortness of breath. Cardiovascular: Negative for chest pain and palpitations. Gastrointestinal: Negative for abdominal pain, diarrhea, nausea and vomiting. Endocrine: Negative for polydipsia, polyphagia and polyuria. Genitourinary: Negative for dysuria and hematuria. Musculoskeletal: Positive for arthralgias. Negative for neck pain and neck stiffness.    Skin: Negative for rash and wound. Allergic/Immunologic: Positive for environmental allergies. Negative for immunocompromised state. Neurological: Negative for dizziness, weakness, numbness and headaches. Hematological: Negative for adenopathy. Does not bruise/bleed easily. Psychiatric/Behavioral: Negative for agitation and confusion. Physical Exam   Physical Exam   Constitutional: She is oriented to person, place, and time. She appears well-developed and well-nourished. No distress. Obese elderly female, alert, in moderate discomfort   HENT:   Head: Normocephalic and atraumatic. Nose: Nose normal.   Eyes: Conjunctivae and EOM are normal. Right eye exhibits no discharge. Left eye exhibits no discharge. No scleral icterus. Neck: Normal range of motion. Neck supple. Cardiovascular: Normal rate, regular rhythm and normal heart sounds. Pulmonary/Chest: Effort normal and breath sounds normal. No respiratory distress. She has no wheezes. Abdominal: Soft. There is no tenderness. No CVAT   Musculoskeletal: She exhibits tenderness. She exhibits no edema. Decreased A/P ROM to R LE due to tenderness with palpation/movement, +SLR R, tender to R SI joint with palpation, no crepitus, no erythema/rash/lesion/heat. 2+ distal pulses, NVI, sensation grossly intact to light touch. Pt observed to ambulate with mild to moderate discomfort. Neurological: She is alert and oriented to person, place, and time. She exhibits normal muscle tone. Coordination normal.   Skin: Skin is warm and dry. No rash noted. She is not diaphoretic. No erythema. No pallor. Psychiatric: She has a normal mood and affect. Her behavior is normal. Judgment normal.   Nursing note and vitals reviewed. Diagnostic Study Results     Labs -   No results found for this or any previous visit (from the past 12 hour(s)).     Radiologic Studies -   No orders to display         Medical Decision Making   I am the first provider for this patient. I reviewed the vital signs, available nursing notes, past medical history, past surgical history, family history and social history. Vital Signs-Reviewed the patient's vital signs. Patient Vitals for the past 12 hrs:   Temp Pulse Resp BP SpO2   06/08/19 1432 98.1 °F (36.7 °C) 65 18 136/80 97 %         Records Reviewed: Nursing Notes, Old Medical Records, Previous Radiology Studies and Previous Laboratory Studies    Provider Notes (Medical Decision Making):   Sciatica, DDD, muscle spasm    ED Course:   Initial assessment performed. The patients presenting problems have been discussed, and they are in agreement with the care plan formulated and outlined with them. I have encouraged them to ask questions as they arise throughout their visit. DISCHARGE NOTE:  The care plan has been outline with the patient and/or family, who verbally conveyed understanding and agreement. Available results have been reviewed. Patient and/or family understand the follow up plan as outlined and discharge instructions. Should their condition deterioration at any time after discharge the patient agrees to return, follow up sooner than outlined or seek medical assistance at the closest Emergency Room as soon as possible. Questions have been answered. Discharge instructions and educational information regarding the patient's diagnosis as well a list of reasons why the patient would want to seek immediate medical attention, should their condition change, were reviewed directly with the patient/family       PLAN:  1. Current Discharge Medication List      START taking these medications    Details   oxyCODONE-acetaminophen (PERCOCET) 5-325 mg per tablet Take 1 Tab by mouth every eight (8) hours as needed for Pain for up to 5 days. Max Daily Amount: 3 Tabs. Qty: 15 Tab, Refills: 0    Associated Diagnoses: Sciatica of right side      predniSONE (DELTASONE) 20 mg tablet Take 60 mg by mouth daily for 4 days.   Qty: 12 Tab, Refills: 0      methocarbamol (ROBAXIN) 750 mg tablet Take 1 Tab by mouth three (3) times daily for 5 days. Qty: 15 Tab, Refills: 0      meloxicam (MOBIC) 15 mg tablet Take 1 Tab by mouth daily for 10 days. Qty: 10 Tab, Refills: 0           2. Follow-up Information     Follow up With Specialties Details Why Contact Info    [facility] [address] [phone]      [facility] EMERGENCY DEPT Emergency Medicine  If symptoms worsen [address] [place] [address] [address]  [address]  [facility]  [phone]        Return to ED if worse     Diagnosis     Clinical Impression:   1. Sciatica of right side    2.  Lumbar stenosis with neurogenic claudication

## 2019-07-16 ENCOUNTER — HOSPITAL ENCOUNTER (OUTPATIENT)
Dept: CT IMAGING | Age: 69
Discharge: HOME | End: 2019-07-16
Attending: ORTHOPAEDIC SURGERY
Payer: MEDICARE

## 2019-07-16 DIAGNOSIS — M51.36 DDD (DEGENERATIVE DISC DISEASE), LUMBAR: ICD-10-CM

## 2019-07-16 PROCEDURE — 72131 CT LUMBAR SPINE W/O DYE: CPT

## 2019-08-19 ENCOUNTER — HOSPITAL ENCOUNTER (OUTPATIENT)
Dept: INTERVENTIONAL RADIOLOGY/VASCULAR | Age: 69
Discharge: HOME | End: 2019-08-19
Attending: ORTHOPAEDIC SURGERY | Admitting: ORTHOPAEDIC SURGERY
Payer: MEDICARE

## 2019-08-19 VITALS
RESPIRATION RATE: 20 BRPM | BODY MASS INDEX: 48.32 KG/M2 | HEART RATE: 73 BPM | HEIGHT: 65 IN | WEIGHT: 290 LBS | TEMPERATURE: 97.9 F | SYSTOLIC BLOOD PRESSURE: 119 MMHG | OXYGEN SATURATION: 97 % | DIASTOLIC BLOOD PRESSURE: 62 MMHG

## 2019-08-19 DIAGNOSIS — Z98.1 S/P LUMBAR FUSION: ICD-10-CM

## 2019-08-19 DIAGNOSIS — M51.36 DDD (DEGENERATIVE DISC DISEASE), LUMBAR: ICD-10-CM

## 2019-08-19 PROCEDURE — 74011636320 HC RX REV CODE- 636/320: Performed by: RADIOLOGY

## 2019-08-19 PROCEDURE — 74011250636 HC RX REV CODE- 250/636: Performed by: RADIOLOGY

## 2019-08-19 PROCEDURE — 64483 NJX AA&/STRD TFRM EPI L/S 1: CPT

## 2019-08-19 RX ORDER — BUTALBITAL, ACETAMINOPHEN, CAFFEINE AND CODEINE PHOSPHATE 50; 325; 40; 30 MG/1; MG/1; MG/1; MG/1
1 CAPSULE ORAL
COMMUNITY
End: 2020-09-18

## 2019-08-19 RX ORDER — LIDOCAINE HYDROCHLORIDE 10 MG/ML
10 INJECTION, SOLUTION EPIDURAL; INFILTRATION; INTRACAUDAL; PERINEURAL ONCE
Status: COMPLETED | OUTPATIENT
Start: 2019-08-19 | End: 2019-08-19

## 2019-08-19 RX ORDER — DEXAMETHASONE SODIUM PHOSPHATE 10 MG/ML
10 INJECTION INTRAMUSCULAR; INTRAVENOUS ONCE
Status: COMPLETED | OUTPATIENT
Start: 2019-08-19 | End: 2019-08-19

## 2019-08-19 RX ORDER — ALPRAZOLAM 0.5 MG/1
0.5 TABLET ORAL 2 TIMES DAILY
Status: ON HOLD | COMMUNITY
End: 2021-12-09 | Stop reason: SDUPTHER

## 2019-08-19 RX ORDER — GLUCOSAMINE/CHONDR SU A SOD 750-600 MG
1 TABLET ORAL
COMMUNITY
End: 2021-05-04

## 2019-08-19 RX ADMIN — LIDOCAINE HYDROCHLORIDE 10 ML: 10 INJECTION, SOLUTION EPIDURAL; INFILTRATION; INTRACAUDAL; PERINEURAL at 11:40

## 2019-08-19 RX ADMIN — DEXAMETHASONE SODIUM PHOSPHATE 10 MG: 10 INJECTION INTRAMUSCULAR; INTRAVENOUS at 11:39

## 2019-08-19 RX ADMIN — IOHEXOL 10 ML: 180 INJECTION INTRAVENOUS at 11:40

## 2019-08-19 NOTE — ROUTINE PROCESS
Pt. Discharged to home and transported to d/c lot via w/c. Post epidural steroid injection discharge instructions reviewed with and copy given to pt. Pt. Able to stand and lean on stable chair to test strength in right leg post injection. An additional 15 min,. Given for waiting in angio holding due to numbness right leg.

## 2019-08-19 NOTE — DISCHARGE INSTRUCTIONS
Steroid Injection Discharge Instructions    General Information:   A steroid injection was performed today, placing a combination of a steroid and an anesthetic (numbing medicine) into the space around the nerves of your spine. This is done to treat back pain. It may take 7-10 days for the injection to reach its full potential.  This procedure can be done at any level of the spinal column, depending on where your pain is. Your doctor will have ordered the appropriate level to be treated prior to your coming in for the procedure. Home Care Instructions: You can resume your regular diet. Do not drink alcohol today. You may notice that you have to use your pain medications less after your injection. Some people do not notice much of a change in their pain after the first injection. If that is the case, it is worth your time to have a second one done. This is why these injections are sometimes ordered in a series of three. Keep the puncture site clean and dry for 24 hours, and then you may remove the dressing. Showering is acceptable after the bandage is removed. Rest today be aware there maybe numbness or tingling that may last up to 12 hours after the inject. Call If:   You should call your Physician and/or the Radiology Nurse if you have any bleeding other than a small spot on your bandage. Call if you have any signs of infection, fever, increased pain at the puncture site, confusion, or a headache that worsens when you stand and eases when lying flat. Follow-Up Instructions:  Please see your ordering doctor as he/she has requested. Let your doctor know if you have relief from your pain so they may schedule another injection for you if it is indicated. To Reach Us:  Should you experience any significant changes, please call [phone] between the hours of 7:30 am and 10 pm or [phone] after hours.  After hours, ask the  to page the X-ray Technologist, and describe the problem to the technologist.           Date: 8/19/2019  Discharging Nurse:  Ramona Mckeon RN

## 2019-12-30 ENCOUNTER — HOSPITAL ENCOUNTER (OUTPATIENT)
Age: 69
Setting detail: OUTPATIENT SURGERY
Discharge: HOME | End: 2019-12-30
Attending: INTERNAL MEDICINE | Admitting: INTERNAL MEDICINE
Payer: MEDICARE

## 2019-12-30 VITALS
WEIGHT: 281.8 LBS | SYSTOLIC BLOOD PRESSURE: 95 MMHG | HEIGHT: 65 IN | HEART RATE: 66 BPM | TEMPERATURE: 98.3 F | RESPIRATION RATE: 17 BRPM | BODY MASS INDEX: 46.95 KG/M2 | OXYGEN SATURATION: 94 % | DIASTOLIC BLOOD PRESSURE: 47 MMHG

## 2019-12-30 DIAGNOSIS — R93.1 ABNORMAL CT SCAN OF HEART: ICD-10-CM

## 2019-12-30 LAB
ACT BLD: 252 SECS (ref 79–138)
ERYTHROCYTE [DISTWIDTH] IN BLOOD BY AUTOMATED COUNT: 13.9 % (ref 11.5–14.5)
HCT VFR BLD AUTO: 47.2 % (ref 35–47)
HGB BLD-MCNC: 15.4 G/DL (ref 11.5–16)
MCH RBC QN AUTO: 29.2 PG (ref 26–34)
MCHC RBC AUTO-ENTMCNC: 32.6 G/DL (ref 30–36.5)
MCV RBC AUTO: 89.4 FL (ref 80–99)
NRBC # BLD: 0 K/UL (ref 0–0.01)
NRBC BLD-RTO: 0 PER 100 WBC
PLATELET # BLD AUTO: 337 K/UL (ref 150–400)
PMV BLD AUTO: 10.3 FL (ref 8.9–12.9)
RBC # BLD AUTO: 5.28 M/UL (ref 3.8–5.2)
WBC # BLD AUTO: 10.1 K/UL (ref 3.6–11)

## 2019-12-30 PROCEDURE — 77030004522 HC CATH ANGI DX EXPO BSC -A: Performed by: INTERNAL MEDICINE

## 2019-12-30 PROCEDURE — C1769 GUIDE WIRE: HCPCS | Performed by: INTERNAL MEDICINE

## 2019-12-30 PROCEDURE — 99152 MOD SED SAME PHYS/QHP 5/>YRS: CPT | Performed by: INTERNAL MEDICINE

## 2019-12-30 PROCEDURE — 77030019569 HC BND COMPR RAD TERU -B: Performed by: INTERNAL MEDICINE

## 2019-12-30 PROCEDURE — 77030015766: Performed by: INTERNAL MEDICINE

## 2019-12-30 PROCEDURE — 85027 COMPLETE CBC AUTOMATED: CPT

## 2019-12-30 PROCEDURE — 85347 COAGULATION TIME ACTIVATED: CPT

## 2019-12-30 PROCEDURE — 77030010221 HC SPLNT WR POS TELE -B: Performed by: INTERNAL MEDICINE

## 2019-12-30 PROCEDURE — 93454 CORONARY ARTERY ANGIO S&I: CPT | Performed by: INTERNAL MEDICINE

## 2019-12-30 PROCEDURE — 77030013744: Performed by: INTERNAL MEDICINE

## 2019-12-30 PROCEDURE — 74011250636 HC RX REV CODE- 250/636: Performed by: INTERNAL MEDICINE

## 2019-12-30 PROCEDURE — C1894 INTRO/SHEATH, NON-LASER: HCPCS | Performed by: INTERNAL MEDICINE

## 2019-12-30 PROCEDURE — C1887 CATHETER, GUIDING: HCPCS | Performed by: INTERNAL MEDICINE

## 2019-12-30 PROCEDURE — 74011636320 HC RX REV CODE- 636/320: Performed by: INTERNAL MEDICINE

## 2019-12-30 PROCEDURE — 74011250637 HC RX REV CODE- 250/637: Performed by: INTERNAL MEDICINE

## 2019-12-30 PROCEDURE — 74011000258 HC RX REV CODE- 258: Performed by: INTERNAL MEDICINE

## 2019-12-30 PROCEDURE — 77030013715 HC INFL SYS MRTM -B: Performed by: INTERNAL MEDICINE

## 2019-12-30 PROCEDURE — 36415 COLL VENOUS BLD VENIPUNCTURE: CPT

## 2019-12-30 PROCEDURE — 74011000250 HC RX REV CODE- 250: Performed by: INTERNAL MEDICINE

## 2019-12-30 PROCEDURE — 93571 IV DOP VEL&/PRESS C FLO 1ST: CPT | Performed by: INTERNAL MEDICINE

## 2019-12-30 PROCEDURE — 93572 IV DOP VEL&/PRESS C FLO EA: CPT | Performed by: INTERNAL MEDICINE

## 2019-12-30 PROCEDURE — 99153 MOD SED SAME PHYS/QHP EA: CPT | Performed by: INTERNAL MEDICINE

## 2019-12-30 PROCEDURE — 77030004532 HC CATH ANGI DX IMP BSC -A: Performed by: INTERNAL MEDICINE

## 2019-12-30 RX ORDER — NITROGLYCERIN 0.4 MG/1
0.4 TABLET SUBLINGUAL AS NEEDED
Status: DISCONTINUED | OUTPATIENT
Start: 2019-12-30 | End: 2019-12-30 | Stop reason: HOSPADM

## 2019-12-30 RX ORDER — HEPARIN SODIUM 1000 [USP'U]/ML
INJECTION, SOLUTION INTRAVENOUS; SUBCUTANEOUS AS NEEDED
Status: DISCONTINUED | OUTPATIENT
Start: 2019-12-30 | End: 2019-12-30 | Stop reason: HOSPADM

## 2019-12-30 RX ORDER — LIDOCAINE HYDROCHLORIDE 10 MG/ML
INJECTION INFILTRATION; PERINEURAL AS NEEDED
Status: DISCONTINUED | OUTPATIENT
Start: 2019-12-30 | End: 2019-12-30 | Stop reason: HOSPADM

## 2019-12-30 RX ORDER — ACETAMINOPHEN 325 MG/1
650 TABLET ORAL
Status: DISCONTINUED | OUTPATIENT
Start: 2019-12-30 | End: 2019-12-30 | Stop reason: HOSPADM

## 2019-12-30 RX ORDER — SODIUM CHLORIDE 9 MG/ML
1.5 INJECTION, SOLUTION INTRAVENOUS CONTINUOUS
Status: DISPENSED | OUTPATIENT
Start: 2019-12-30 | End: 2019-12-30

## 2019-12-30 RX ORDER — LORAZEPAM 0.5 MG/1
0.5 TABLET ORAL ONCE
Status: COMPLETED | OUTPATIENT
Start: 2019-12-30 | End: 2019-12-30

## 2019-12-30 RX ORDER — SODIUM CHLORIDE 0.9 % (FLUSH) 0.9 %
5-40 SYRINGE (ML) INJECTION AS NEEDED
Status: DISCONTINUED | OUTPATIENT
Start: 2019-12-30 | End: 2019-12-30 | Stop reason: HOSPADM

## 2019-12-30 RX ORDER — HEPARIN SODIUM 200 [USP'U]/100ML
INJECTION, SOLUTION INTRAVENOUS
Status: COMPLETED | OUTPATIENT
Start: 2019-12-30 | End: 2019-12-30

## 2019-12-30 RX ORDER — SODIUM CHLORIDE 9 MG/ML
3 INJECTION, SOLUTION INTRAVENOUS CONTINUOUS
Status: DISPENSED | OUTPATIENT
Start: 2019-12-30 | End: 2019-12-30

## 2019-12-30 RX ORDER — SODIUM CHLORIDE 0.9 % (FLUSH) 0.9 %
5-40 SYRINGE (ML) INJECTION EVERY 8 HOURS
Status: DISCONTINUED | OUTPATIENT
Start: 2019-12-30 | End: 2019-12-30 | Stop reason: HOSPADM

## 2019-12-30 RX ORDER — VERAPAMIL HYDROCHLORIDE 2.5 MG/ML
INJECTION, SOLUTION INTRAVENOUS AS NEEDED
Status: DISCONTINUED | OUTPATIENT
Start: 2019-12-30 | End: 2019-12-30 | Stop reason: HOSPADM

## 2019-12-30 RX ORDER — MIDAZOLAM HYDROCHLORIDE 1 MG/ML
INJECTION, SOLUTION INTRAMUSCULAR; INTRAVENOUS AS NEEDED
Status: DISCONTINUED | OUTPATIENT
Start: 2019-12-30 | End: 2019-12-30 | Stop reason: HOSPADM

## 2019-12-30 RX ORDER — FENTANYL CITRATE 50 UG/ML
INJECTION, SOLUTION INTRAMUSCULAR; INTRAVENOUS AS NEEDED
Status: DISCONTINUED | OUTPATIENT
Start: 2019-12-30 | End: 2019-12-30 | Stop reason: HOSPADM

## 2019-12-30 RX ADMIN — SODIUM CHLORIDE 3 ML/KG/HR: 900 INJECTION, SOLUTION INTRAVENOUS at 11:17

## 2019-12-30 RX ADMIN — LORAZEPAM 0.5 MG: 0.5 TABLET ORAL at 11:36

## 2019-12-30 RX ADMIN — SODIUM CHLORIDE 1.5 ML/KG/HR: 900 INJECTION, SOLUTION INTRAVENOUS at 13:25

## 2019-12-30 NOTE — PROGRESS NOTES
1655 [facility]      1700 - Removed 2cc of air from right radial TR band. See radial site assessments for additional removal of air from TR band. 1830 - TR band removed and dressing applied to site. Verified discharge instructions have been reviewed with patient and her  and they have a written copy of instructions. Patient changed into clothing from home; peripheral IV removed and dressing placed to site. 1900 - Patient discharged via wheelchair in the care of her .

## 2019-12-30 NOTE — PROGRESS NOTES
Cardiac Cath Lab Procedure Area Arrival Note:    Adeola Llanos arrived to Cardiac Cath Lab, Procedure Area. Patient identifiers verified with NAME and DATE OF BIRTH. Procedure verified with patient. Consent forms verified. Allergies verified. Patient informed of procedure and plan of care. Questions answered with review. Patient voiced understanding of procedure and plan of care. Patient on cardiac monitor, non-invasive blood pressure, SPO2 monitor. On RA and placed on O2 @ 2 lpm via NC.  IV of NSS on pump at 198 ml/hr per RUPINDER protocol. Patient status doing well without problems. Patient is A&Ox 4. Patient reports no complaints of pain or shortness of breath. Patient medicated during procedure with orders obtained and verified by Dr. Chacha Jolley. Refer to patients Cardiac Cath Lab PROCEDURE REPORT for vital signs, assessment, status, and response during procedure, printed at end of case. Printed report on chart or scanned into chart. 1430 Transfer to [facility] from Procedure Area    Verbal report given to T. Nancye Rubinstein, RN on Adeola Llanos being transferred to Cardiac Cath Lab  for routine progression of care   Patient is post LHC and iFR and fFR of LAD and circ procedure. Patient stable upon transfer to . Report consisted of patients Situation, Background, Assessment and   Recommendations(SBAR). Information from the following report(s) SBAR, Procedure Summary and MAR was reviewed with the receiving nurse. Opportunity for questions and clarification was provided. Patient medicated during procedure with orders obtained and verified by Dr. Chacha Jolley. Refer to patient PROCEDURE REPORT for vital signs, assessment, status, and response during procedure.

## 2019-12-30 NOTE — PROGRESS NOTES
HOB Elevated 30degrees, right groin and right wrist is with no bleeding and no hematoma. TR Band Continues to have 13cc of air.   Per MD to be start taking down at 4:45pm.

## 2019-12-30 NOTE — PROGRESS NOTES
TRANSFER - IN REPORT:    Verbal report received from JANIS Banks on Cha Escudero, Procedure Cath procedure with no intervention , from the Cardiac Cath lab, for routine progression of care. Report consisted of patients Situation, Background, Assessment and Recommendations(SBAR). Information from the following report(s) Procedure Summary, MAR and Recent Results was reviewed with the receiving clinician. Opportunity for questions and clarification was provided. Assessment completed upon patients arrival to [address] [place] [address] and care assumed. Cardiac Cath Lab Recovery Arrival Note:     Fritz Post arrived to [facility] recovery area. Patient procedure= Cardiac Cath with no intervention. Patient on cardiac monitor, non-invasive blood pressure, Patient status doing well without problems. Patient is A&Ox 4. Patient reports no pain, no chest pain, no n/v. Procedure site without any bleeding and no hematoma.

## 2019-12-30 NOTE — Clinical Note
Lesion: Located in the Proximal LAD. Pressure wire inserted and measurements taken.    iFR value =  0.91.

## 2019-12-30 NOTE — PROGRESS NOTES
Cardiac Cath Lab Recovery Arrival Note:      Adeola Llanos arrived to Cardiac Cath Lab, Recovery Area. Staff introduced to patient. Patient identifiers verified with NAME and DATE OF BIRTH. Procedure verified with patient. Consent forms reviewed and signed by patient or authorized representative and verified. Allergies verified. Patient informed of procedure and plan of care. Questions answered with review. Patient prepped for procedure, per orders from physician, prior to arrival.    Patient on cardiac monitor, non-invasive blood pressure, SPO2 monitor. Patient is A&Ox 4. Patient reports no pain, no chest pain, no n/v. Patient in stretcher, in low position, with side rails up, call bell within reach, patient instructed to call of assistance as needed. Patient prep in: [facility] Recovery Area, Bed# 4.    Family in: : Kayla Becker [phone]  Prep by: Kim Staley RN

## 2019-12-30 NOTE — DISCHARGE INSTRUCTIONS
Radial Cardiac Catheterization / Angiography Discharge Instructions    It is normal to feel tired the first couple days. Take it easy and follow the physicians instructions. CHECK THE CATHETER INSERTION SITE DAILY:  Remove the wrist dressing 24 hours after the procedure. You may shower 24 hours after the procedure. Wash with soap and water and pat dry. Gentle cleaning of the site with soap and water is sufficient, cover with a dry clean dressing or bandage. Do not apply creams or powders to the area. No soaking the wrist for 3 days. Leave the puncture site open to air after 24 hours post-procedure. CALL THE PHYSICIAN:  If the site becomes red, swollen or feels warm to the touch. If there is bleeding or drainage or if there is unusual pain at the radial site. If there is any minor oozing, you may apply a band-aid and remove after 12 hours. If the bleeding continues, hold pressure with the middle finger against the puncture site and the thumb against the back of the wrist, call 911 to be transported to the hospital.  DO NOT DRIVE YOURSELF, [facility] ACTIVITY:  For the first 24 hours do not manipulate the wrist.  No lifting, pushing or pulling over 3-5 pounds with the affected wrist for 7 days and no straining the insertion site. Do not lift grocery bags or the garbage can, do not run the vacuum  or  for 7 days. Start with short walks as in the hospital and gradually increase as tolerated each day. It is recommended to walk 30 minutes 5-7 days per week. Follow your physicians instructions on activity. Avoid walking outside in extremes of heat or cold. Walk inside when it is cold and windy or hot and humid. THINGS TO KEEP IN MIND:  No driving for at least 24 hours, or as designated by your physician. Limit the number of times you go up and down the stairs  Take rests and pace yourself with activity.   Be careful and do not strain with bowel movements. MEDICATIONS:  Take all medications as prescribed. Call your physician if you have any questions. Keep an updated list of your medications with you at all times and give a list to your physician and pharmacist.    SIGNS AND SYMPTOMS:  Be cautions of symptoms of angina or recurrent symptoms such as chest discomfort, unusual shortness of breath or fatigue. These could be symptoms of restenosis, a new blockage or a heart attack. If your symptoms are relieved with rest it is still recommended that you notify your physician of recurrent chest pain or discomfort. For CHEST PAIN or symptoms of angina not relieved with rest:  If the discomfort is not relieved with rest and you have been prescribed Nitroglycerin, take as directed (taken under the tongue, one at a time 5 minutes apart for a total of 3 doses). If the discomfort is not relieved after the 3rd nitroglycerin, call 911. AFTER CARE:  Follow up with you physician as instructed. Follow a heart healthy diet with proper portion control, daily stress management, daily      exercise, blood pressure and cholesterol control, and smoking cessation. CARDIAC CATHETERIZATION/ CATH STENT PLACEMENT   DISCHARGE INSTRUCTIONS    If possible, have someone stay with you for the first night. It is normal to feel tired for the first couple of days. Take it easy and follow your physicians instructions on activity. CHECK THE CATHETER INSERTION SITE DAILY:    If bleeding at the cath site occurs, take a clean washcloth and apply direct pressure just above the puncture site for at least 15 minutes. Call 911 immediately if the bleeding is not controlled. Continue to apply direct pressure until an ambulance gets to your location. Do not try to drive yourself or have someone else drive you to the hospital.  Have the ambulance bring you to the emergency room. You may shower 24 hours after your procedure. Gently remove the bandage during showering. Wash with soap and water and pat dry. To prevent infection, keep the groin area/insertion site clean and dry. Do not apply powders, creams, lotions, or ointments to the site for 5 days. You may cover the site with a fresh Band-Aid each day until well healed. You may notice a small lump at the site. This is normal and may last up to 6 weeks. CALL THE PHYSICIAN:  ? If the site becomes red, swollen, or feels warm to the touch, or is healing poorly    ? If you note any large/extending bruise, fever >101.0 or chills  ? If your extremity has numbness, tingling, discoloration, abnormal swelling, tightness or pain   ? If you have difficulty with urination or develop nausea, vomiting, or severe abdominal pain    ACTIVITY for the first 24-48 hours, or as instructed by your physician:  ? No lifting, pushing or pulling over 10 pounds and no straining the insertion site. Do not lift grocery bags or the garbage can; do not run the vacuum  or  for 7 days. ? You may start walking short distances the day of your procedure. Gradually increase as tolerated each day. Current activity recommendations are 30 minutes of exercise at least 5 days a week. Work up to this as you recover. ? Avoid walking outside in extremes of heat or cold. Walk inside (at home, at the mall, or at a large store) when it is cold and windy or hot and humid. THINGS TO KEEP IN MIND:   ? Do not drive, operate any machinery, or sign any legal documents for 24 hours after your procedure, or as directed by your physician. You must have someone drive you home after your procedure. ? Drink plenty of fluids for 24-48 hours after your procedure to flush the contrast dye from your kidneys. ? Limit the number of times you go up and down the stairs  ? Take rests and pace yourself with activity  ? Be careful and do not strain with bowel movements    MEDICATIONS:  ? Take all medications as prescribed  ?  Call your physician if you have any questions  ? Keep an updated list of your medications with you at all times and give a list to your primary physician and pharmacist  ? You may use Tylenol 325mg 1-2 tablets every 6 hours as needed for pain or discomfort, unless otherwise instructed. If you have significant discomfort more than 48 hours after your procedure, please call your physicians office. SIGNS AND SYMPTOMS:  ? Notify your physician for new or recurrent symptoms of chest discomfort, unusual shortness of breath or fatigue. These could be signs of a problem and should be discussed with your physician. ? For significant chest pain or symptoms of angina not relieved with rest:  if you have been prescribed Nitroglycerin, take as directed (taken under the tongue, one at a time 5 minutes apart for a total of 3 doses, sitting down). If the discomfort is not relieved after the 3rd Nitroglycerin, call 911. If you have not been prescribed Nitroglycerin and your chest discomfort is significant, call 911. Take the ambulance, do not try to drive yourself or have someone else drive you to the hospital.     AFTER CARE:  ? Follow up with your physician as instructed  ? Follow a heart healthy diet with proper portion control, daily stress management, daily exercise, blood pressure and cholesterol control, and smoking cessation. The success of your stent, if you had one placed, and the prevention of future catheterizations heavily depends on your lifestyle changes you make now! ? You may start walking short distances the day of your procedure. Gradually increase as tolerated each day. Current activity recommendations are 30 minutes of exercise at least 5 days a week. Work up to this as you recover. Walk, ride a bike, or choose any other activity you enjoy to reach this activity goal.   ? Avoid walking outside in extremes of heat or cold. Walk inside (at home, at the mall, or at a large store) when it is cold and windy or hot and humid.    ? If you had a stent placed, consider [facility] as a resource to help you make the needed lifestyle changes to live a heart healthy lifestyle. Discuss your candidacy with your physician. If you have questions, call your physicians office or the Cardiac Cath Lab at [phone]. The Cath Lab is operational from 6:30 a.m. to 5:00 p.m., Monday through Friday. After hours, notify your physician. [address] [place] [address] can be reached at [phone]. [facility] is operational Monday-Thursday 8:30 a.m. to 5:00 p.m. and Friday 8:30 a.m. to 12:00 p.m.       Remember:  IN CASE OF BLEEDING: KEEP FIRM PRESSURE ON THE PROCEDURE SITE AND CALL [phone] IF NOT CONTROLLED

## 2020-03-05 ENCOUNTER — HOSPITAL ENCOUNTER (OUTPATIENT)
Dept: GENERAL RADIOLOGY | Age: 70
Discharge: HOME | End: 2020-03-05
Attending: INTERNAL MEDICINE
Payer: MEDICARE

## 2020-03-05 DIAGNOSIS — R06.02 SHORTNESS OF BREATH: ICD-10-CM

## 2020-03-05 PROCEDURE — 71046 X-RAY EXAM CHEST 2 VIEWS: CPT

## 2020-03-13 ENCOUNTER — HOSPITAL ENCOUNTER (OUTPATIENT)
Dept: CT IMAGING | Age: 70
Discharge: HOME | End: 2020-03-13
Attending: PHYSICIAN ASSISTANT
Payer: MEDICARE

## 2020-03-13 ENCOUNTER — HOSPITAL ENCOUNTER (OUTPATIENT)
Dept: GENERAL RADIOLOGY | Age: 70
Discharge: HOME | End: 2020-03-13
Attending: PHYSICIAN ASSISTANT
Payer: MEDICARE

## 2020-03-13 VITALS
SYSTOLIC BLOOD PRESSURE: 119 MMHG | WEIGHT: 282 LBS | OXYGEN SATURATION: 94 % | HEIGHT: 65 IN | RESPIRATION RATE: 18 BRPM | TEMPERATURE: 98.1 F | HEART RATE: 72 BPM | DIASTOLIC BLOOD PRESSURE: 74 MMHG | BODY MASS INDEX: 46.98 KG/M2

## 2020-03-13 DIAGNOSIS — M48.062 LUMBAR STENOSIS WITH NEUROGENIC CLAUDICATION: ICD-10-CM

## 2020-03-13 PROCEDURE — 72132 CT LUMBAR SPINE W/DYE: CPT

## 2020-03-13 PROCEDURE — 74011636320 HC RX REV CODE- 636/320: Performed by: RADIOLOGY

## 2020-03-13 PROCEDURE — 62304 MYELOGRAPHY LUMBAR INJECTION: CPT

## 2020-03-13 RX ORDER — LIDOCAINE HYDROCHLORIDE 10 MG/ML
4 INJECTION, SOLUTION EPIDURAL; INFILTRATION; INTRACAUDAL; PERINEURAL
Status: DISPENSED | OUTPATIENT
Start: 2020-03-13 | End: 2020-03-13

## 2020-03-13 RX ADMIN — IOHEXOL 10 ML: 180 INJECTION INTRAVENOUS at 10:44

## 2020-03-13 NOTE — ROUTINE PROCESS
1130- Pt in post myelogram.  VSS. Drsg to back D/I. Discharge instructions reviewed with pt. Pt verbalized understanding. Disc being processed per CT. [place] PO.

## 2020-03-13 NOTE — DISCHARGE INSTRUCTIONS
[facility]  Special Procedures/Radiology Department    Radiologist: Docagnestamiko Geronimo    Date: March 13, 2020       Myelogram Discharge Instructions    Restrict your activity for the next 48 hours. You may get up and sit in the chair or get up and go to the bathroom with slow movements. You must keep your head above your chest until 8 a.m. tomorrow. Rest as much as possible tonight and tomorrow. Resume your previous diet and follow the medication reconciliation form. Drink plenty of water. Avoid products with caffeine. You may take Tylenol, as directed on the label, for pain or discomfort. Avoid ibuprofen (Advil, Motrin) and aspirin as they may cause bleeding. Avoid lifting anything heavier than 5 pounds. Avoid excessive bending and lifting as this may increase the chance of having a headache. Be sure to follow up with your physician. Take your CD disk with you. If you have severe pain, or if you have any questions or concerns, please call [phone] and ask to speak to the nurse on-call.

## 2020-09-18 ENCOUNTER — APPOINTMENT (OUTPATIENT)
Dept: CT IMAGING | Age: 70
End: 2020-09-18
Attending: EMERGENCY MEDICINE
Payer: MEDICARE

## 2020-09-18 ENCOUNTER — HOSPITAL ENCOUNTER (EMERGENCY)
Age: 70
Discharge: HOME | End: 2020-09-18
Attending: EMERGENCY MEDICINE
Payer: MEDICARE

## 2020-09-18 VITALS
HEIGHT: 65 IN | WEIGHT: 244.93 LBS | OXYGEN SATURATION: 68 % | RESPIRATION RATE: 18 BRPM | HEART RATE: 68 BPM | DIASTOLIC BLOOD PRESSURE: 72 MMHG | SYSTOLIC BLOOD PRESSURE: 110 MMHG | BODY MASS INDEX: 40.81 KG/M2 | TEMPERATURE: 97.5 F

## 2020-09-18 DIAGNOSIS — S39.012A BACK STRAIN, INITIAL ENCOUNTER: Primary | ICD-10-CM

## 2020-09-18 LAB
ALBUMIN SERPL-MCNC: 3.3 G/DL (ref 3.5–5)
ALBUMIN/GLOB SERPL: 0.8 {RATIO} (ref 1.1–2.2)
ALP SERPL-CCNC: 107 U/L (ref 45–117)
ALT SERPL-CCNC: 25 U/L (ref 12–78)
ANION GAP SERPL CALC-SCNC: 3 MMOL/L (ref 5–15)
APPEARANCE UR: ABNORMAL
AST SERPL-CCNC: 23 U/L (ref 15–37)
BACTERIA URNS QL MICRO: ABNORMAL /HPF
BASOPHILS # BLD: 0 K/UL (ref 0–0.1)
BASOPHILS NFR BLD: 0 % (ref 0–1)
BILIRUB SERPL-MCNC: 0.8 MG/DL (ref 0.2–1)
BILIRUB UR QL CFM: NEGATIVE
BUN SERPL-MCNC: 16 MG/DL (ref 6–20)
BUN/CREAT SERPL: 18 (ref 12–20)
CALCIUM SERPL-MCNC: 9.1 MG/DL (ref 8.5–10.1)
CHLORIDE SERPL-SCNC: 104 MMOL/L (ref 97–108)
CO2 SERPL-SCNC: 31 MMOL/L (ref 21–32)
COLOR UR: ABNORMAL
CREAT SERPL-MCNC: 0.91 MG/DL (ref 0.55–1.02)
DIFFERENTIAL METHOD BLD: ABNORMAL
EOSINOPHIL # BLD: 0.4 K/UL (ref 0–0.4)
EOSINOPHIL NFR BLD: 4 % (ref 0–7)
EPITH CASTS URNS QL MICRO: ABNORMAL /LPF
ERYTHROCYTE [DISTWIDTH] IN BLOOD BY AUTOMATED COUNT: 13.5 % (ref 11.5–14.5)
GLOBULIN SER CALC-MCNC: 3.9 G/DL (ref 2–4)
GLUCOSE SERPL-MCNC: 93 MG/DL (ref 65–100)
GLUCOSE UR STRIP.AUTO-MCNC: NEGATIVE MG/DL
HCT VFR BLD AUTO: 44.1 % (ref 35–47)
HGB BLD-MCNC: 14.4 G/DL (ref 11.5–16)
HGB UR QL STRIP: NEGATIVE
IMM GRANULOCYTES # BLD AUTO: 0.1 K/UL (ref 0–0.04)
IMM GRANULOCYTES NFR BLD AUTO: 1 % (ref 0–0.5)
KETONES UR QL STRIP.AUTO: ABNORMAL MG/DL
LEUKOCYTE ESTERASE UR QL STRIP.AUTO: ABNORMAL
LIPASE SERPL-CCNC: 75 U/L (ref 73–393)
LYMPHOCYTES # BLD: 2.4 K/UL (ref 0.8–3.5)
LYMPHOCYTES NFR BLD: 25 % (ref 12–49)
MCH RBC QN AUTO: 30.1 PG (ref 26–34)
MCHC RBC AUTO-ENTMCNC: 32.7 G/DL (ref 30–36.5)
MCV RBC AUTO: 92.3 FL (ref 80–99)
MONOCYTES # BLD: 0.9 K/UL (ref 0–1)
MONOCYTES NFR BLD: 10 % (ref 5–13)
NEUTS SEG # BLD: 5.7 K/UL (ref 1.8–8)
NEUTS SEG NFR BLD: 60 % (ref 32–75)
NITRITE UR QL STRIP.AUTO: NEGATIVE
NRBC # BLD: 0 K/UL (ref 0–0.01)
NRBC BLD-RTO: 0 PER 100 WBC
PH UR STRIP: 5.5 [PH] (ref 5–8)
PLATELET # BLD AUTO: 324 K/UL (ref 150–400)
PMV BLD AUTO: 10.5 FL (ref 8.9–12.9)
POTASSIUM SERPL-SCNC: 3.5 MMOL/L (ref 3.5–5.1)
PROT SERPL-MCNC: 7.2 G/DL (ref 6.4–8.2)
PROT UR STRIP-MCNC: 30 MG/DL
RBC # BLD AUTO: 4.78 M/UL (ref 3.8–5.2)
RBC #/AREA URNS HPF: ABNORMAL /HPF (ref 0–5)
SODIUM SERPL-SCNC: 138 MMOL/L (ref 136–145)
UA: UC IF INDICATED,UAUC: ABNORMAL
UROBILINOGEN UR QL STRIP.AUTO: 4 EU/DL (ref 0.2–1)
WBC # BLD AUTO: 9.5 K/UL (ref 3.6–11)
WBC URNS QL MICRO: ABNORMAL /HPF (ref 0–4)

## 2020-09-18 PROCEDURE — 80053 COMPREHEN METABOLIC PANEL: CPT

## 2020-09-18 PROCEDURE — 74011000636 HC RX REV CODE- 636: Performed by: EMERGENCY MEDICINE

## 2020-09-18 PROCEDURE — 99284 EMERGENCY DEPT VISIT MOD MDM: CPT

## 2020-09-18 PROCEDURE — 96374 THER/PROPH/DIAG INJ IV PUSH: CPT

## 2020-09-18 PROCEDURE — 85025 COMPLETE CBC W/AUTO DIFF WBC: CPT

## 2020-09-18 PROCEDURE — 36415 COLL VENOUS BLD VENIPUNCTURE: CPT

## 2020-09-18 PROCEDURE — 81001 URINALYSIS AUTO W/SCOPE: CPT

## 2020-09-18 PROCEDURE — 96375 TX/PRO/DX INJ NEW DRUG ADDON: CPT

## 2020-09-18 PROCEDURE — 74011250636 HC RX REV CODE- 250/636: Performed by: EMERGENCY MEDICINE

## 2020-09-18 PROCEDURE — 74177 CT ABD & PELVIS W/CONTRAST: CPT

## 2020-09-18 PROCEDURE — 83690 ASSAY OF LIPASE: CPT

## 2020-09-18 RX ORDER — SODIUM CHLORIDE 0.9 % (FLUSH) 0.9 %
10 SYRINGE (ML) INJECTION
Status: COMPLETED | OUTPATIENT
Start: 2020-09-18 | End: 2020-09-18

## 2020-09-18 RX ORDER — OXYCODONE HYDROCHLORIDE 5 MG/1
5 TABLET ORAL
Qty: 12 TAB | Refills: 0 | Status: SHIPPED | OUTPATIENT
Start: 2020-09-18 | End: 2020-09-21

## 2020-09-18 RX ORDER — MORPHINE SULFATE 4 MG/ML
4 INJECTION INTRAVENOUS ONCE
Status: COMPLETED | OUTPATIENT
Start: 2020-09-18 | End: 2020-09-18

## 2020-09-18 RX ORDER — MORPHINE SULFATE 4 MG/ML
4 INJECTION INTRAVENOUS ONCE
Status: DISCONTINUED | OUTPATIENT
Start: 2020-09-18 | End: 2020-09-18 | Stop reason: ALTCHOICE

## 2020-09-18 RX ORDER — LIDOCAINE 50 MG/G
PATCH TOPICAL
Qty: 5 EACH | Refills: 0 | Status: SHIPPED | OUTPATIENT
Start: 2020-09-18 | End: 2021-05-04

## 2020-09-18 RX ORDER — ONDANSETRON 2 MG/ML
4 INJECTION INTRAMUSCULAR; INTRAVENOUS
Status: COMPLETED | OUTPATIENT
Start: 2020-09-18 | End: 2020-09-18

## 2020-09-18 RX ORDER — LIDOCAINE 50 MG/G
PATCH TOPICAL
Qty: 1 EACH | Refills: 0 | Status: SHIPPED | OUTPATIENT
Start: 2020-09-18 | End: 2020-09-18

## 2020-09-18 RX ORDER — KETOROLAC TROMETHAMINE 30 MG/ML
15 INJECTION, SOLUTION INTRAMUSCULAR; INTRAVENOUS
Status: COMPLETED | OUTPATIENT
Start: 2020-09-18 | End: 2020-09-18

## 2020-09-18 RX ADMIN — MORPHINE SULFATE 4 MG: 4 INJECTION, SOLUTION INTRAMUSCULAR; INTRAVENOUS at 14:58

## 2020-09-18 RX ADMIN — KETOROLAC TROMETHAMINE 15 MG: 30 INJECTION, SOLUTION INTRAMUSCULAR at 14:58

## 2020-09-18 RX ADMIN — ONDANSETRON 4 MG: 2 INJECTION INTRAMUSCULAR; INTRAVENOUS at 14:58

## 2020-09-18 RX ADMIN — Medication 10 ML: at 16:26

## 2020-09-18 RX ADMIN — IOPAMIDOL 100 ML: 755 INJECTION, SOLUTION INTRAVENOUS at 16:26

## 2020-09-18 NOTE — ED PROVIDER NOTES
EMERGENCY DEPARTMENT HISTORY AND PHYSICAL EXAM      Date: 9/18/2020  Patient Name: Vidya Horan    Please note that this dictation was completed with [employer], the computer voice recognition software. Quite often unanticipated grammatical, syntax, homophones, and other interpretive errors are inadvertently transcribed by the computer software. Please disregard these errors. Please excuse any errors that have escaped final proofreading. History of Presenting Illness     Chief Complaint   Patient presents with    Flank Pain     Patient reports onset 4 days ago. Patient reports she saw her PCP who was going to call the ambulance for her. History Provided By: Patient     HPI: Vidya Horan, 68 y.o. female, is into the emergency department complaining of right flank pain. Pain started 4 days ago. Rated as severe, waxes and wanes, worse with movement. Radiates to the right lower quadrant. Associated with dark-colored urine. Denies any fever or chills. No pain with urination. She has not tried any medications that help. Denies any change in stool, no vaginal bleeding or discharge. No other exacerbating or relieving factors or associated symptoms. Pain at this time is rated as severe. PCP: Humza Walsh MD    No current facility-administered medications on file prior to encounter. Current Outpatient Medications on File Prior to Encounter   Medication Sig Dispense Refill    ALPRAZolam (XANAX) 0.5 mg tablet Take 0.5 mg by mouth two (2) times a day.  Biotin 2,500 mcg cap Take 1 Tab by mouth.  levothyroxine (SYNTHROID) 75 mcg tablet Take 112 mcg by mouth Daily (before breakfast).  PARoxetine (PAXIL) 40 mg tablet Take 40 mg by mouth daily.  metoprolol tartrate (LOPRESSOR) 25 mg tablet Take 12.5 mg by mouth nightly.  metoprolol (LOPRESSOR) 25 mg tablet Take 25 mg by mouth daily.  omeprazole (PRILOSEC) 40 mg capsule Take 40 mg by mouth daily.       [DISCONTINUED] codeine-butalbital-acetaminophen-caffeine (FIORICET WITH CODEINE) -41-30 mg capsule Take 1 Cap by mouth every six (6) hours as needed for Headache. Past History     Past Medical History:  Past Medical History:   Diagnosis Date    Adverse effect of anesthesia     O2 DROPS WITH ANESTHESIA    Arthritis     KNEES    Cancer (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    GERD (gastroesophageal reflux disease)     Hypertension     NO LONGER ON MEDICATION    Hypothyroid     Migraine     Nausea & vomiting     Obesity     Other ill-defined conditions(799.89)     chronic back pain    Psychiatric disorder     anxiety    Psychiatric disorder     panic ATTACKS    SVT (supraventricular tachycardia) (HCC)     Ulcerative colitis        Past Surgical History:  Past Surgical History:   Procedure Laterality Date    COLONOSCOPY,DIAGNOSTIC  11/19/2015         HX GI      colonscopy    HX HEENT  03/2015    tonsillectomy (CANCER) AND NECK LYMPH NODES    HX HEENT  2008    PARATHYROID REMOVAL    HX HEENT Left 2002    EYE LASER    HX HYSTERECTOMY  1985    HX KNEE ARTHROSCOPY  1995    left knee surgery, TOTAL LT  and Right KNEE 2013    HX KNEE REPLACEMENT Bilateral 2013, 2014    HX LAP CHOLECYSTECTOMY  2002    HX LUMBAR FUSION  2011    SPINAL FUSION with hardware L4-L5    HX ORTHOPAEDIC  1990    CYST REMOVED RIGHT WRIST    HX OTHER SURGICAL      cyst removal right wrist    HX OTHER SURGICAL  2001, 2016    hemorrhoidectomy X2    HX UROLOGICAL  2010    bladder surgery(interstim device)    IR INJ FORAMIN EPID LUMB ANES/STER SNGL  8/19/2019    NC EGD TRANSORAL BIOPSY SINGLE/MULTIPLE  5/20/2013            Family History:  Family History   Problem Relation Age of Onset    Cancer Mother         ovarian ca?     Heart Disease Father         MI    Heart Attack Father     Cancer Father         MULTIPLE MYELOMA    Liver Disease Father         FROM MEDICATIONS FOR MULTIPLE MYELOMA    Arthritis-osteo Sister     Arthritis-osteo Brother     Cancer Paternal Grandmother 79        breast ca    Breast Cancer Paternal Grandmother 79    Breast Cancer Maternal Aunt 55    Breast Cancer Maternal Aunt 61    Breast Cancer Paternal Aunt 43    Breast Cancer Paternal Aunt         52's    Emphysema Paternal Grandfather     No Known Problems Sister     No Known Problems Brother     No Known Problems Brother     Anesth Problems Neg Hx        Social History:  Social History     Tobacco Use    Smoking status: Never Smoker    Smokeless tobacco: Never Used   Substance Use Topics    Alcohol use: No    Drug use: No     Types: Prescription       Allergies: Allergies   Allergen Reactions    Adhesive Tape-Silicones Rash    Aloe Vera Rash    Chlorhexidine Rash    Tussin [Dextromethorphan Hbr] Palpitations         Review of Systems   Review of Systems   Constitutional: Negative for chills and fever. HENT: Negative for congestion and sore throat. Eyes: Negative for visual disturbance. Respiratory: Negative for cough and shortness of breath. Cardiovascular: Negative for chest pain and leg swelling. Gastrointestinal: Positive for abdominal distention. Negative for abdominal pain, blood in stool, diarrhea, nausea and vomiting. Endocrine: Negative for polyuria. Genitourinary: Positive for flank pain. Negative for dysuria, vaginal bleeding and vaginal discharge. Musculoskeletal: Positive for back pain. Negative for myalgias. Skin: Negative for rash. Allergic/Immunologic: Negative for immunocompromised state. Neurological: Negative for weakness and headaches. Psychiatric/Behavioral: Negative for confusion. Physical Exam   Physical Exam  Vitals signs and nursing note reviewed. Constitutional:       Appearance: She is well-developed. Comments: Uncomfortable appearing female   HENT:      Head: Normocephalic and atraumatic. Eyes:      General:         Right eye: No discharge. Left eye: No discharge. Conjunctiva/sclera: Conjunctivae normal.      Pupils: Pupils are equal, round, and reactive to light. Neck:      Musculoskeletal: Normal range of motion and neck supple. Trachea: No tracheal deviation. Cardiovascular:      Rate and Rhythm: Normal rate and regular rhythm. Heart sounds: Normal heart sounds. No murmur. Pulmonary:      Effort: Pulmonary effort is normal. No respiratory distress. Breath sounds: Normal breath sounds. No wheezing or rales. Abdominal:      General: Bowel sounds are normal.      Palpations: Abdomen is soft. Tenderness: There is abdominal tenderness. There is right CVA tenderness. There is no guarding or rebound. Comments: Right upper quadrant and right lower quadrant. Musculoskeletal: Normal range of motion. General: No tenderness or deformity. Skin:     General: Skin is warm and dry. Findings: No erythema or rash. Neurological:      Mental Status: She is alert and oriented to person, place, and time. Psychiatric:         Behavior: Behavior normal.         Diagnostic Study Results     Labs -     Recent Results (from the past 12 hour(s))   CBC WITH AUTOMATED DIFF    Collection Time: 09/18/20 12:40 PM   Result Value Ref Range    WBC 9.5 3.6 - 11.0 K/uL    RBC 4.78 3.80 - 5.20 M/uL    HGB 14.4 11.5 - 16.0 g/dL    HCT 44.1 35.0 - 47.0 %    MCV 92.3 80.0 - 99.0 FL    MCH 30.1 26.0 - 34.0 PG    MCHC 32.7 30.0 - 36.5 g/dL    RDW 13.5 11.5 - 14.5 %    PLATELET 543 091 - 032 K/uL    MPV 10.5 8.9 - 12.9 FL    NRBC 0.0 0  WBC    ABSOLUTE NRBC 0.00 0.00 - 0.01 K/uL    NEUTROPHILS 60 32 - 75 %    LYMPHOCYTES 25 12 - 49 %    MONOCYTES 10 5 - 13 %    EOSINOPHILS 4 0 - 7 %    BASOPHILS 0 0 - 1 %    IMMATURE GRANULOCYTES 1 (H) 0.0 - 0.5 %    ABS. NEUTROPHILS 5.7 1.8 - 8.0 K/UL    ABS. LYMPHOCYTES 2.4 0.8 - 3.5 K/UL    ABS. MONOCYTES 0.9 0.0 - 1.0 K/UL    ABS. EOSINOPHILS 0.4 0.0 - 0.4 K/UL    ABS.  BASOPHILS 0.0 0.0 - 0.1 K/UL    ABS. IMM. GRANS. 0.1 (H) 0.00 - 0.04 K/UL    DF AUTOMATED     METABOLIC PANEL, COMPREHENSIVE    Collection Time: 09/18/20 12:40 PM   Result Value Ref Range    Sodium 138 136 - 145 mmol/L    Potassium 3.5 3.5 - 5.1 mmol/L    Chloride 104 97 - 108 mmol/L    CO2 31 21 - 32 mmol/L    Anion gap 3 (L) 5 - 15 mmol/L    Glucose 93 65 - 100 mg/dL    BUN 16 6 - 20 MG/DL    Creatinine 0.91 0.55 - 1.02 MG/DL    BUN/Creatinine ratio 18 12 - 20      GFR est AA >60 >60 ml/min/1.73m2    GFR est non-AA >60 >60 ml/min/1.73m2    Calcium 9.1 8.5 - 10.1 MG/DL    Bilirubin, total 0.8 0.2 - 1.0 MG/DL    ALT (SGPT) 25 12 - 78 U/L    AST (SGOT) 23 15 - 37 U/L    Alk. phosphatase 107 45 - 117 U/L    Protein, total 7.2 6.4 - 8.2 g/dL    Albumin 3.3 (L) 3.5 - 5.0 g/dL    Globulin 3.9 2.0 - 4.0 g/dL    A-G Ratio 0.8 (L) 1.1 - 2.2     LIPASE    Collection Time: 09/18/20 12:40 PM   Result Value Ref Range    Lipase 75 73 - 393 U/L   URINALYSIS W/ REFLEX CULTURE    Collection Time: 09/18/20  2:43 PM    Specimen: Urine    Urine specimen   Result Value Ref Range    Color DARK YELLOW      Appearance CLOUDY (A) CLEAR      pH (UA) 5.5 5.0 - 8.0      Protein 30 (A) NEG mg/dL    Glucose Negative NEG mg/dL    Ketone TRACE (A) NEG mg/dL    Blood Negative NEG      Urobilinogen 4.0 (H) 0.2 - 1.0 EU/dL    Nitrites Negative NEG      Leukocyte Esterase SMALL (A) NEG      WBC 5-10 0 - 4 /hpf    RBC 0-5 0 - 5 /hpf    Epithelial cells MANY (A) FEW /lpf    Bacteria 1+ (A) NEG /hpf    UA:UC IF INDICATED CULTURE NOT INDICATED BY UA RESULT CNI     BILIRUBIN, CONFIRM    Collection Time: 09/18/20  2:43 PM   Result Value Ref Range    Bilirubin UA, confirm Negative NEG         Radiologic Studies -   CT ABD PELV W CONT   Final Result   IMPRESSION: No acute findings or findings to correlate with right lower quadrant   abdominal pain. Incidental findings as above.         CT Results  (Last 48 hours)               09/18/20 1625  CT ABD PELV W CONT Final result Impression:  IMPRESSION: No acute findings or findings to correlate with right lower quadrant   abdominal pain. Incidental findings as above. Narrative:  EXAM: CT ABD PELV W CONT       INDICATION: Flank pain/RLQ pain        COMPARISON: Ultrasound 4/16/2013. CONTRAST: 100 mL of Isovue-370. TECHNIQUE:    Following the uneventful intravenous administration of contrast, thin axial   images were obtained through the abdomen and pelvis. Coronal and sagittal   reconstructions were generated. Oral contrast was not administered. CT dose   reduction was achieved through use of a standardized protocol tailored for this   examination and automatic exposure control for dose modulation. FINDINGS:    LOWER THORAX: No significant abnormality in the incidentally imaged lower chest.   LIVER: No mass. BILIARY TREE: The bladder is surgically absent. CBD is not dilated. SPLEEN: within normal limits. PANCREAS: No mass or ductal dilatation. ADRENALS: Unremarkable. 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 Otherwise   unremarkable with no stone, other mass, or hydronephrosis. Ureters are   nondilated with no ureteral calculus. STOMACH: Unremarkable. SMALL BOWEL: No dilatation or wall thickening. COLON: No dilatation or wall thickening. APPENDIX: Not seen. No secondary signs of appendicitis   PERITONEUM: No ascites or pneumoperitoneum. RETROPERITONEUM: Atherosclerotic calcination without aneurysm. No enlarged   lymphadenopathy. REPRODUCTIVE ORGANS: Uterus and ovaries are surgically absent. URINARY BLADDER: No mass or calculus. BONES: Degenerative spine changes with bilateral stephane and screw fixation at   L3-L4-L5. No acute fracture or aggressive lesion. ABDOMINAL WALL: No mass or hernia. ADDITIONAL COMMENTS: N/A               CXR Results  (Last 48 hours)    None            Medical Decision Making   I am the first provider for this patient.     I reviewed the vital signs, available nursing notes, past medical history, past surgical history, family history and social history. Vital Signs-Reviewed the patient's vital signs. Patient Vitals for the past 12 hrs:   Temp Pulse Resp BP SpO2   09/18/20 1733  68  110/72    09/18/20 1545    99/69 (!) 68 %   09/18/20 1530    (!) 119/95 92 %   09/18/20 1500    102/73 (!) 75 %   09/18/20 1200 97.5 °F (36.4 °C) (!) 55 18 115/70 96 %         Records Reviewed:   Nursing notes, Prior visits     Provider Notes (Medical Decision Making):   Patient presents with abdominal pain. DDx: Gastroenteritis, SBO, appendicitis, colitis, IBD, diverticulitis, mesenteric ischemia, AAA or descending dissection, ACS, ureteral stone. Will get labs and CT Abdomen/pelvis. Pain could be musculoskeletal, if CT imaging is unremarkable will consider this most likely musculoskeletal flank pain. ED Course:   Initial assessment performed. The patients presenting problems have been discussed, and they are in agreement with the care plan formulated and outlined with them. I have encouraged them to ask questions as they arise throughout their visit. Critical Care Time:   none    Disposition:  DISCHARGE NOTE  Patients results have been reviewed with them. Patient and/or family have verbally conveyed their understanding and agreement of the patient's signs, symptoms, diagnosis, treatment and prognosis and additionally agree to follow up as recommended or return to the Emergency Room should their condition change or have any new concerns prior to their follow-up appointment. Patient verbally agrees with the care-plan and verbally conveys that all of their questions have been answered.    Discharge instructions have also been provided to the patient with some educational information regarding their diagnosis as well a list of reasons why they would want to return to the ER prior to their follow-up appointment should their condition change. PLAN:  1. Discharge Medication List as of 9/18/2020  5:19 PM      START taking these medications    Details   oxyCODONE IR (Roxicodone) 5 mg immediate release tablet Take 1 Tab by mouth every six (6) hours as needed for Pain for up to 3 days. Max Daily Amount: 20 mg., Normal, Disp-12 Tab,R-0      lidocaine (Lidoderm) 5 % Apply patch to the affected area for 12 hours a day and remove for 12 hours a day., Normal, Disp-5 Each,R-0         CONTINUE these medications which have NOT CHANGED    Details   ALPRAZolam (XANAX) 0.5 mg tablet Take 0.5 mg by mouth two (2) times a day., Historical Med      Biotin 2,500 mcg cap Take 1 Tab by mouth., Historical Med      levothyroxine (SYNTHROID) 75 mcg tablet Take 112 mcg by mouth Daily (before breakfast). , Historical Med      PARoxetine (PAXIL) 40 mg tablet Take 40 mg by mouth daily. , Historical Med      !! metoprolol tartrate (LOPRESSOR) 25 mg tablet Take 12.5 mg by mouth nightly., Historical Med      !! metoprolol (LOPRESSOR) 25 mg tablet Take 25 mg by mouth daily. , Historical Med      omeprazole (PRILOSEC) 40 mg capsule Take 40 mg by mouth daily. , Historical Med       !! - Potential duplicate medications found. Please discuss with provider. 2.   Follow-up Information     Follow up With Specialties Details Why Contact Info    Pa Brink MD Family Medicine Schedule an appointment as soon as possible for a visit  [address] [place] [address] [phone]      [facility] EMERGENCY DEPT Emergency Medicine  If symptoms worsen [address] [place] [address]  [phone]    Ruy Shell MD Orthopedic Surgery In 2 weeks if symptoms are not improving. [address] [facility] [address]  [phone]            Return to ED if worse     Diagnosis     Clinical Impression:   1. Back strain, initial encounter        Attestations:   This note was completed by Stefania Torres DO

## 2020-09-18 NOTE — ED NOTES
Dr. Maria Elena Robertson reviewed discharge instructions with the patient. The patient verbalized understanding.

## 2020-09-18 NOTE — DISCHARGE INSTRUCTIONS
Patient Education        Back Strain: Care Instructions  Overview     A back strain happens when you overstretch, or pull, a muscle in your back. You may hurt your back in an accident or when you exercise or lift something. Sometimes you may not know how you hurt your back. Most back pain will get better with rest and time. You can take care of yourself at home to help your back heal.  Follow-up care is a key part of your treatment and safety. Be sure to make and go to all appointments, and call your doctor if you are having problems. It's also a good idea to know your test results and keep a list of the medicines you take. How can you care for yourself at home? · Try to stay as active as you can, but stop or reduce any activity that causes pain. · Put ice or a cold pack on the sore muscle for 10 to 20 minutes at a time to stop swelling. Try this every 1 to 2 hours for 3 days (when you are awake) or until the swelling goes down. Put a thin cloth between the ice pack and your skin. · After 2 or 3 days, apply a heating pad on low or a warm cloth to your back. Some doctors suggest that you go back and forth between hot and cold treatments. · Take pain medicines exactly as directed. ? If the doctor gave you a prescription medicine for pain, take it as prescribed. ? If you are not taking a prescription pain medicine, ask your doctor if you can take an over-the-counter medicine. · Try sleeping on your side with a pillow between your legs. Or put a pillow under your knees when you lie on your back. These measures can ease pain in your lower back. · Return to your usual level of activity slowly. When should you call for help? Call 911 anytime you think you may need emergency care. For example, call if:    · You are unable to move a leg at all. Call your doctor now or seek immediate medical care if:    · You have new or worse symptoms in your legs, belly, or buttocks.  Symptoms may include:  ? Numbness or tingling. ? Weakness. ? Pain.     · You lose bladder or bowel control. Watch closely for changes in your health, and be sure to contact your doctor if:    · You have a fever, lose weight, or don't feel well.     · You are not getting better as expected. Where can you learn more? Go to http://www.gray.com/  Enter E018 in the search box to learn more about \"Back Strain: Care Instructions. \"  Current as of: March 2, 2020               Content Version: 12.6  © 9109-5109 [employer]. Care instructions adapted under license by [employer] (which disclaims liability or warranty for this information). If you have questions about a medical condition or this instruction, always ask your healthcare professional. [address] any warranty or liability for your use of this information.

## 2021-02-12 ENCOUNTER — TRANSCRIBE ORDER (OUTPATIENT)
Dept: SCHEDULING | Age: 71
End: 2021-02-12

## 2021-02-12 DIAGNOSIS — Z98.1 HISTORY OF LUMBAR FUSION: ICD-10-CM

## 2021-02-12 DIAGNOSIS — M48.062 LUMBAR STENOSIS WITH NEUROGENIC CLAUDICATION: Primary | ICD-10-CM

## 2021-02-12 DIAGNOSIS — M54.41 ACUTE BILATERAL LOW BACK PAIN WITH RIGHT-SIDED SCIATICA: ICD-10-CM

## 2021-02-12 DIAGNOSIS — M51.36 DEGENERATION OF INTERVERTEBRAL DISC OF LUMBAR REGION: ICD-10-CM

## 2021-02-12 DIAGNOSIS — M54.31 SCIATICA OF RIGHT SIDE: ICD-10-CM

## 2021-03-16 ENCOUNTER — HOSPITAL ENCOUNTER (OUTPATIENT)
Dept: CT IMAGING | Age: 71
Discharge: HOME | End: 2021-03-16
Attending: PHYSICIAN ASSISTANT
Payer: MEDICARE

## 2021-03-16 DIAGNOSIS — Z98.1 HISTORY OF LUMBAR FUSION: ICD-10-CM

## 2021-03-16 DIAGNOSIS — M54.41 ACUTE BILATERAL LOW BACK PAIN WITH RIGHT-SIDED SCIATICA: ICD-10-CM

## 2021-03-16 DIAGNOSIS — M51.36 DEGENERATION OF INTERVERTEBRAL DISC OF LUMBAR REGION: ICD-10-CM

## 2021-03-16 DIAGNOSIS — M48.062 LUMBAR STENOSIS WITH NEUROGENIC CLAUDICATION: ICD-10-CM

## 2021-03-16 DIAGNOSIS — M54.31 SCIATICA OF RIGHT SIDE: ICD-10-CM

## 2021-03-16 PROCEDURE — 72131 CT LUMBAR SPINE W/O DYE: CPT

## 2021-05-03 ENCOUNTER — TRANSCRIBE ORDER (OUTPATIENT)
Dept: REGISTRATION | Age: 71
End: 2021-05-03

## 2021-05-03 DIAGNOSIS — Z01.812 PRE-PROCEDURE LAB EXAM: Primary | ICD-10-CM

## 2021-05-03 NOTE — H&P
[facility]  Location: [place] [address] [facility]  Patient #: [id number]  : 1950   / Language: English / Race: White  Female      History of Present Illness  The patient is a 65year old female who presents with a complaint of Low Back Pain. Note for \"Low Back Pain\": At this point she comes in today for an evaluation for ongoing chronic severe worsening lower back pain and bilateral radicular pain.  She has right leg pain roughly in the L5 distribution.  She is status post L3-L5 fusion.  She has new CAT scan for evaluation.  She has pain in the right leg with standing and walking.  She does take Roxicodone for the pain but understands this is not a long-term answer. Problem List/Past Medical  Follow-up after surgery (V67.00  Z09)    Spondylolisthesis (Acquired) (738.4  M43.10)    REVIEW OF SYSTEMS: Systems were reviewed by the provider.    Lumbar stenosis with neurogenic claudication (724.03  M48.062)    Disc degeneration of lumbar region (722.52  M51.36)    Acute bilateral low back pain with right-sided sciatica (724.2  M54.41)    History of lumbar fusion (V45.4  Z98.1)    Sciatica, right side (724.3  M54.31)      Allergies   Tussin *COUGH/COLD/ALLERGY*   [2021 03:34 PM]:    Family History   Thyroid problems   Mother. Heart Disease   Father. Cancer   Father, Mother, Son. Social History   Seat Belt Use   Always uses seat belts. Caffeine use   Tea. No drug use    Tobacco / smoke exposure    Current work status   Retired. Tobacco use   Never smoker. No alcohol use      Medication History   Roxicodone  (5MG Tablet, 1-2 Oral every six hours, as needed, Taken starting 2021) Active. ALPRAZolam  (0.5MG Tablet, Oral) Active. Omeprazole  (40MG Capsule DR, Oral) Active. PARoxetine HCl  (40MG Tablet, Oral) Active. Levothyroxine Sodium  (100MCG Tablet, Oral) Active. Metoprolol Tartrate  (25MG Tablet, Oral) Active.   Atorvastatin Calcium  (40MG Tablet, Oral) Active. metFORMIN HCl  (500MG Tablet, Oral) Active. Pravastatin Sodium  (40MG Tablet, Oral) Active. Medications Reconciled     Pregnancy / Birth History   Pregnant   No.    Past Surgical History   Gallbladder Surgery   laparoscopic  Tonsillectomy    Other Eye Surgery   bilateral  Thyroidectomy; Subtotal    Spinal Fusion   lower back  Hysterectomy   non-cancerous: partial  Spinal Surgery    Hemorrhoidectomy    Total Knee Replacement   bilateral    Diagnostic Studies History  CT Scan   L SPINE DONE AT [facility], Date: 3/18/2021, Results:    Other Problems   Gastroesophageal Reflux Disease    Migraine Headache    Other disease, cancer, significant illness    Osteoporosis    Bladder Problems    Thyroid Disease    Anxiety Disorder        Review of Systems  General Present- Seasonal Allergies and Weight Gain. Not Present- Appetite Loss, Chills, Fatigue, Fever, HIV Exposure, Night Sweats, Persistent Infections and Weight Loss. Skin Not Present- Itching, Nail Changes, Poor Wound Healing, Rash, Skin Color Changes, Suspicious Lesions and Yellowish Skin Color. HEENT Not Present- Decreased Hearing, Double Vision, Earache, Hoarseness, Jaundice/Yellow Eyes, Loose Teeth, Nose Bleed, Ringing in the Ears and Sore Throat. Respiratory Not Present- Bloody sputum, Chronic Cough, Difficulty Breathing, Snoring, Wakes up from Sleep Wheezing or Short of Breath and Wheezing. Gastrointestinal Not Present- Abdominal Pain, Black, Tarry Stool, Change in Bowel Habits, Cirrhosis, Constipation, Diarrhea, Difficulty Swallowing, Nausea and Vomiting. Female Genitourinary Not Present- Blood in Urine, Frequency, Painful Urination, Pelvic Pain, Trouble Starting Urinary Stream and Urgency. Musculoskeletal Present- Back Pain. Not Present- Joint Pain, Joint Stiffness and Joint Swelling. Neurological Present- Unsteadiness. Not Present- Fainting, Headaches, Memory Loss, Numbness, Seizures, Tingling, Tremor and Weakness. Psychiatric Present- Anxiety.  Not Present- Bipolar and Depression. Endocrine Not Present- Cold Intolerance, Excessive Hunger, Excessive Thirst, Excessive Urination and Heat Intolerance. Hematology Present- Enlarged Lymph Nodes. Not Present- Abnormal Bruising , Excessive bleeding and Skin Discoloration. Physical Exam   Neurologic  Sensory  Light Touch - Intact - Globally. Overall Assessment of Muscle Strength and Tone reveals  Lower Extremities - Right Iliopsoas - 4-/5. Left Iliopsoas - 4-/5. Right Tibialis Anterior - 5/5. Left Tibialis Anterior - 4/5. Right Gastroc-Soleus - 5/5. Left Gastroc-Soleus - 5/5. Right EHL - 5/5. Left EHL - 5/5. General Assessment of Reflexes  Right Ankle - Clonus is not present. Left Ankle - Clonus is not present. Reflexes (Dermatomes)  2/2 Normal - Left Achilles (L5-S2), Left Knee (L2-4), Right Achilles (L5-S2) and Right Knee (L2-4). Musculoskeletal  Global Assessment  Examination of related systems reveals - well-developed, well-nourished, in no acute distress, alert and oriented x 3 (Patient is morbidly obese.) . Gait and Station - Note: Patient arrives in a wheelchair today. Karen Merrill is able to ambulate and stand cautiously. Right Lower Extremity - normal strength and tone, normal range of motion without pain and no instability, subluxation or laxity. Left Lower Extremity - normal strength and tone, normal range of motion without pain and no instability, subluxation or laxity. Spine/Ribs/Pelvis  Cervical Spine - Examination of the cervical spine reveals - no tenderness to palpation, no pain, no swelling, edema or erythema, normal cervical spine movements and normal sensation. Thoracic (Dorsal) Spine - Examination of the thoracic spine reveals - no tenderness over thoracic vertebrae, no pain, normal sensation and normal thoracic spine movements. Lumbosacral Spine - Examination of the lumbosacral spine reveals - no known fractures or deformities. Inspection and Palpation - Tenderness - moderate.  Assessment of pain reveals the following findings - The pain is characterized as - severe, burning, deep, pain accumulates with activity and pain increases with sustained postures. Location - pain refers to lower back bilaterally. Lumbosacral Spine - Functional Testing - Babinski Test negative, Prone Knee Bending Test negative, Slump Test negative, Straight Leg Raising Test negative. Assessment & Plan     REVIEW OF SYSTEMS: Systems were reviewed by the provider.(V49.9)    Current Plans  Pt Education - How to [facility] using Patient Portal and 3rd Party Apps: discussed with patient and provided information. Acute bilateral low back pain with right-sided sciatica (724.2  M54.41)  Impression: Persistent chronic low back pain with right lower extremity radiculopathy. No relief with epidural spinal injections at L5/S1. We have lengthy discussion about treatment options. Her CT scan does show degeneration above and below her prior fusion. Spondylosis is worse at L5-S1. She has bilateral foraminal narrowing that is noted. She also has a flat back deformity with a lumbar lordosis and pelvic incidence mismatch of approximately 20 degrees. I think she is an excellent candidate for a revision laminectomy at L2-3 and L5-S1 with a posterior sedrick osteotomy at L2-3 and L3-4 with a revision L2-S1 fusion. The risks and benefits were discussed at length with the patient and the patient has elected to proceed. Indications for surgery include failed conservative treatment. Alternative treatments, risks and the perioperative course were discussed with the patient. All questions were answered. The risks and benefits of the procedure were explained. Benefits include definitive diagnosis, relief of pain, elimination of deformity and improved function.  Risks of surgery including bleeding, infection, weakness, numbness, CSF leak, failure to improve symptoms, exacerbation of medical co-morbidities and even death were discussed with the patient.       Disc degeneration of lumbar region (722.52  M51.36)      History of lumbar fusion (V45.4  Z98.1)      Treatment options were discussed with the patient in full.(V65.49)    Current Plans  Presurgical planning was preformed with the patient today  Surgery to be scheduled  Procedure: L2-3 and L5-S1 lumbar laminectomy with Lydia osteotomy L2-3 and L3-4 with PLF L2-S1        Signed by Eloy Cordero MD

## 2021-05-04 ENCOUNTER — HOSPITAL ENCOUNTER (OUTPATIENT)
Dept: PREADMISSION TESTING | Age: 71
Discharge: HOME | End: 2021-05-04
Payer: MEDICARE

## 2021-05-04 VITALS
TEMPERATURE: 98 F | SYSTOLIC BLOOD PRESSURE: 136 MMHG | BODY MASS INDEX: 45.62 KG/M2 | HEART RATE: 92 BPM | DIASTOLIC BLOOD PRESSURE: 85 MMHG | WEIGHT: 267.2 LBS | HEIGHT: 64 IN

## 2021-05-04 LAB
ABO + RH BLD: NORMAL
AMORPH CRY URNS QL MICRO: ABNORMAL
APPEARANCE UR: ABNORMAL
BACTERIA URNS QL MICRO: ABNORMAL /HPF
BILIRUB UR QL: NEGATIVE
BLOOD GROUP ANTIBODIES SERPL: NORMAL
COLOR UR: ABNORMAL
EPITH CASTS URNS QL MICRO: ABNORMAL /LPF
GLUCOSE UR STRIP.AUTO-MCNC: NEGATIVE MG/DL
HGB UR QL STRIP: NEGATIVE
HYALINE CASTS URNS QL MICRO: ABNORMAL /LPF (ref 0–5)
KETONES UR QL STRIP.AUTO: ABNORMAL MG/DL
LEUKOCYTE ESTERASE UR QL STRIP.AUTO: ABNORMAL
NITRITE UR QL STRIP.AUTO: NEGATIVE
PH UR STRIP: 5.5 [PH] (ref 5–8)
PROT UR STRIP-MCNC: NEGATIVE MG/DL
RBC #/AREA URNS HPF: ABNORMAL /HPF (ref 0–5)
SP GR UR REFRACTOMETRY: 1.02 (ref 1–1.03)
SPECIMEN EXP DATE BLD: NORMAL
UA: UC IF INDICATED,UAUC: ABNORMAL
UROBILINOGEN UR QL STRIP.AUTO: 1 EU/DL (ref 0.2–1)
WBC URNS QL MICRO: ABNORMAL /HPF (ref 0–4)

## 2021-05-04 PROCEDURE — 36415 COLL VENOUS BLD VENIPUNCTURE: CPT

## 2021-05-04 PROCEDURE — 81001 URINALYSIS AUTO W/SCOPE: CPT

## 2021-05-04 PROCEDURE — 86901 BLOOD TYPING SEROLOGIC RH(D): CPT

## 2021-05-04 PROCEDURE — 93005 ELECTROCARDIOGRAM TRACING: CPT

## 2021-05-04 RX ORDER — AMITRIPTYLINE HYDROCHLORIDE 50 MG/1
50 TABLET, FILM COATED ORAL
COMMUNITY

## 2021-05-04 RX ORDER — ATORVASTATIN CALCIUM 40 MG/1
40 TABLET, FILM COATED ORAL
COMMUNITY

## 2021-05-04 RX ORDER — METFORMIN HYDROCHLORIDE 500 MG/1
500 TABLET ORAL 2 TIMES DAILY WITH MEALS
COMMUNITY
End: 2022-07-06 | Stop reason: ALTCHOICE

## 2021-05-04 RX ORDER — ASPIRIN 81 MG/1
81 TABLET ORAL DAILY
COMMUNITY

## 2021-05-04 NOTE — PERIOP NOTES
Patient given surgical site infection FAQs handout and hand hygiene tips sheet. Pre-operative instructions reviewed and patient verbalizes understanding of instructions. Patient has been given the opportunity to ask additional questions. Pt given 2 bottles of CHG soap and instructed in use. Pt given clear liquid diet instructions. Pt given COVID-19 instructions. 5/4/21 @ 1415 - SPOKE [address] [place] [address] @ PT'S PCP OFFICE TO OBTAIN MOST RECENT LABS FAXED TO PAT. RECEIVED LABS.

## 2021-05-05 LAB
ATRIAL RATE: 79 BPM
BACTERIA SPEC CULT: NORMAL
BACTERIA SPEC CULT: NORMAL
CALCULATED P AXIS, ECG09: 48 DEGREES
CALCULATED R AXIS, ECG10: -5 DEGREES
CALCULATED T AXIS, ECG11: 30 DEGREES
DIAGNOSIS, 93000: NORMAL
P-R INTERVAL, ECG05: 180 MS
Q-T INTERVAL, ECG07: 400 MS
QRS DURATION, ECG06: 74 MS
QTC CALCULATION (BEZET), ECG08: 458 MS
SERVICE CMNT-IMP: NORMAL
VENTRICULAR RATE, ECG03: 79 BPM

## 2021-05-05 NOTE — PERIOP NOTES
CALLING DR. MALDONADO'S OFFICE FOR LOV NOTES AND ANY TESTING TO BE FAXED TO PAT, FAXED NUMBER GIVEN SPOKE [phone],       LABS FAXED TO SURGEON'S OFFICE VIA CC, OUTSIDE LAB FAXED TO SURGEON'S OFFICE

## 2021-05-05 NOTE — PERIOP NOTES
PAT Nurse Practitioner   Pre-Operative Chart Review/Assessment:-ORTHOPEDIC/NEUROSURGICAL SPINE                Patient Name:  Bull Calderon                                                           Age:   65 y.o.    :  1950     Today's Date:  2021     Date of PAT:   2021      Date of Surgery:    2021      Procedure(s):  L2-L3 & L5-S1 Laminectomy w/ Lydia Osteotomy L2-L4 and Plf L2-S1     Surgeon:   Dr. Smita Henriquez                       PLAN:       1)  PCP: Dr. Alcira Garcia        2)  Cardiac Clearance: Pt followed by Dr. Nas Anthony. LOV 21. Clearance obtained. OV note on chart. 3)  Diabetic Treatment Consult: Not indicated. A1c-7.6 in PCP office. 4)  Sleep Apnea evaluation:  HAFSA score of 4. Pt reports witnessed pauses in breathing and a diagnosis of HTN. Pt denies loud snoring that can be heard through a closed door and daytime fatigue. Referral sent to PCP for F/U and recommendations. 5)  Treatment for MRSA/Staph Aureus: Neg       6)  Additional Concerns: PONV, HTN, GERD, Morbid Obesity, UC, panic/anxiety, migraines, hx of SVT              Vital Signs:         Vitals:    21 1220   BP: 136/85   Pulse: 92   Temp: 98 °F (36.7 °C)   Weight: 121.2 kg (267 lb 3.2 oz)   Height: 5' 4.25\" (1.632 m)            ____________________________________________  PAST MEDICAL HISTORY  Past Medical History:   Diagnosis Date    Adverse effect of anesthesia     O2 DROPS WITH ANESTHESIA    Arthritis     KNEES    Cancer ([facility] [address] 2015    SQUAMOUS CELL CARCINOMA; tonsils and lymph nodes.   Removed 3-2015 with radiation    GERD (gastroesophageal reflux disease)     Hypertension     NO LONGER ON MEDICATION    Hypothyroid     HYPOTHYROIDISM    Migraine     Nausea & vomiting     Obesity     Other ill-defined conditions(799.89)     chronic back pain    Psychiatric disorder     anxiety    Psychiatric disorder     panic ATTACKS    SVT (supraventricular tachycardia) ([facility] [address] 2014  Ulcerative colitis       ____________________________________________  PAST SURGICAL HISTORY  Past Surgical History:   Procedure Laterality Date    COLONOSCOPY,DIAGNOSTIC  11/19/2015         HX GI      colonscopy    HX HEENT  03/2015    tonsillectomy (CANCER) AND NECK LYMPH NODES    HX HEENT  2008    PARATHYROID REMOVAL    HX HEENT Left 2002    EYE LASER    HX HEMORRHOIDECTOMY  2001, 2016     X2    HX HYSTERECTOMY  1985    HX KNEE ARTHROSCOPY  1995    left knee surgery, TOTAL LT  and Right KNEE 2013    HX KNEE REPLACEMENT Bilateral 2013, 2014    HX LAP CHOLECYSTECTOMY  2002    HX LUMBAR FUSION  2011    SPINAL FUSION with hardware L4-L5    HX ORTHOPAEDIC  1990    CYST REMOVED RIGHT WRIST    HX ORTHOPAEDIC  05/12/2021    L2-3 & L5-21 LUMBAR LAMINECTOMY WITH ANDREWS OSTEOTOMY    HX OTHER SURGICAL      cyst removal right wrist    HX UROLOGICAL  2010    bladder surgery(interstim device)    IR INJ FORAMIN EPID LUMB ANES/STER SNGL  8/19/2019    MT EGD TRANSORAL BIOPSY SINGLE/MULTIPLE  5/20/2013           ____________________________________________  HOME MEDICATIONS    Current Outpatient Medications   Medication Sig    amitriptyline (ELAVIL) 50 mg tablet Take 50 mg by mouth nightly.  atorvastatin (LIPITOR) 40 mg tablet Take 40 mg by mouth nightly.  metFORMIN (GLUCOPHAGE) 500 mg tablet Take 500 mg by mouth two (2) times daily (with meals).  butalb/acetaminophen/caffeine (FIORICET PO) Take 1 Tab by mouth as needed.  aspirin delayed-release 81 mg tablet Take 81 mg by mouth daily.  ALPRAZolam (XANAX) 0.5 mg tablet Take 0.5 mg by mouth two (2) times a day.  levothyroxine (SYNTHROID) 75 mcg tablet Take 112 mcg by mouth Daily (before breakfast).  PARoxetine (PAXIL) 40 mg tablet Take 40 mg by mouth every morning.  metoprolol tartrate (LOPRESSOR) 25 mg tablet Take 12.5 mg by mouth nightly.  metoprolol (LOPRESSOR) 25 mg tablet Take 25 mg by mouth every morning.     omeprazole (PRILOSEC) 40 mg capsule Take 40 mg by mouth every morning. No current facility-administered medications for this encounter.       ____________________________________________  ALLERGIES  Allergies   Allergen Reactions    Adhesive Tape-Silicones Rash    Aloe Vera Rash    Chlorhexidine Rash    Tussin [Dextromethorphan Hbr] Palpitations      ____________________________________________  SOCIAL HISTORY  Social History     Tobacco Use    Smoking status: Never Smoker    Smokeless tobacco: Never Used   Substance Use Topics    Alcohol use: No      ____________________________________________        Labs:     Hospital Outpatient Visit on 05/04/2021   Component Date Value Ref Range Status    Crossmatch Expiration 05/04/2021 05/15/2021,2359   Final    ABO/Rh(D) 05/04/2021 O POSITIVE   Final    Antibody screen 05/04/2021 NEG   Final    Color 05/04/2021 YELLOW/STRAW    Final    Color Reference Range: Straw, Yellow or Dark Yellow    Appearance 05/04/2021 CLOUDY* CLEAR   Final    Specific gravity 05/04/2021 1.025  1.003 - 1.030   Final    pH (UA) 05/04/2021 5.5  5.0 - 8.0   Final    Protein 05/04/2021 Negative  NEG mg/dL Final    Glucose 05/04/2021 Negative  NEG mg/dL Final    Ketone 05/04/2021 TRACE* NEG mg/dL Final    Bilirubin 05/04/2021 Negative  NEG   Final    Blood 05/04/2021 Negative  NEG   Final    Urobilinogen 05/04/2021 1.0  0.2 - 1.0 EU/dL Final    Nitrites 05/04/2021 Negative  NEG   Final    Leukocyte Esterase 05/04/2021 TRACE* NEG   Final    WBC 05/04/2021 5-10  0 - 4 /hpf Final    CLUMPING IS PRESENT.  RBC 05/04/2021 0-5  0 - 5 /hpf Final    Epithelial cells 05/04/2021 FEW  FEW /lpf Final    Epithelial cell category consists of squamous cells and /or transitional urothelial cells. Renal tubular cells, if present, are separately identified as such.     Bacteria 05/04/2021 3+* NEG /hpf Final    UA:UC IF INDICATED 05/04/2021 CULTURE NOT INDICATED BY UA RESULT  CNI   Final    Amorphous Crystals 05/04/2021 2+* NEG Final    Hyaline cast 05/04/2021 0-2  0 - 5 /lpf Final    Ventricular Rate 05/04/2021 79  BPM Final    Atrial Rate 05/04/2021 79  BPM Final    P-R Interval 05/04/2021 180  ms Final    QRS Duration 05/04/2021 74  ms Final    Q-T Interval 05/04/2021 400  ms Final    QTC Calculation (Bezet) 05/04/2021 458  ms Final    Calculated P Axis 05/04/2021 48  degrees Final    Calculated R Axis 05/04/2021 -5  degrees Final    Calculated T Axis 05/04/2021 30  degrees Final    Diagnosis 05/04/2021    Final                    Value:Normal sinus rhythm  Normal ECG  When compared with ECG of 22-MAR-2017 13:00,  Vent. rate has increased BY  26 BPM  Confirmed by Aravind Ruiz ([id number]) on 5/5/2021 5:48:31 PM      Special Requests: 05/04/2021 NO SPECIAL REQUESTS    Final    Culture result: 05/04/2021 MRSA NOT PRESENT    Final       Skin:     Denies open wounds, cuts, sores, rashes or other areas of concern in PAT assessment.         Kacey Mo NP

## 2021-05-08 ENCOUNTER — HOSPITAL ENCOUNTER (OUTPATIENT)
Dept: PREADMISSION TESTING | Age: 71
Discharge: HOME | End: 2021-05-08
Payer: MEDICARE

## 2021-05-08 DIAGNOSIS — Z01.812 PRE-PROCEDURE LAB EXAM: ICD-10-CM

## 2021-05-08 PROCEDURE — U0005 INFEC AGEN DETEC AMPLI PROBE: HCPCS

## 2021-05-09 LAB — SARS-COV-2, COV2NT: NOT DETECTED

## 2021-05-11 RX ORDER — MIDAZOLAM HYDROCHLORIDE 1 MG/ML
0.5 INJECTION, SOLUTION INTRAMUSCULAR; INTRAVENOUS
Status: CANCELLED | OUTPATIENT
Start: 2021-05-11

## 2021-05-11 RX ORDER — ONDANSETRON 2 MG/ML
4 INJECTION INTRAMUSCULAR; INTRAVENOUS AS NEEDED
Status: CANCELLED | OUTPATIENT
Start: 2021-05-11

## 2021-05-11 RX ORDER — OXYCODONE AND ACETAMINOPHEN 5; 325 MG/1; MG/1
1 TABLET ORAL AS NEEDED
Status: CANCELLED | OUTPATIENT
Start: 2021-05-11

## 2021-05-11 RX ORDER — SODIUM CHLORIDE, SODIUM LACTATE, POTASSIUM CHLORIDE, CALCIUM CHLORIDE 600; 310; 30; 20 MG/100ML; MG/100ML; MG/100ML; MG/100ML
125 INJECTION, SOLUTION INTRAVENOUS CONTINUOUS
Status: CANCELLED | OUTPATIENT
Start: 2021-05-11

## 2021-05-11 RX ORDER — SODIUM CHLORIDE 0.9 % (FLUSH) 0.9 %
5-40 SYRINGE (ML) INJECTION AS NEEDED
Status: CANCELLED | OUTPATIENT
Start: 2021-05-11

## 2021-05-11 RX ORDER — MORPHINE SULFATE 2 MG/ML
2 INJECTION, SOLUTION INTRAMUSCULAR; INTRAVENOUS
Status: CANCELLED | OUTPATIENT
Start: 2021-05-11

## 2021-05-11 RX ORDER — DIPHENHYDRAMINE HYDROCHLORIDE 50 MG/ML
12.5 INJECTION, SOLUTION INTRAMUSCULAR; INTRAVENOUS AS NEEDED
Status: CANCELLED | OUTPATIENT
Start: 2021-05-11 | End: 2021-05-11

## 2021-05-11 RX ORDER — SODIUM CHLORIDE 0.9 % (FLUSH) 0.9 %
5-40 SYRINGE (ML) INJECTION EVERY 8 HOURS
Status: CANCELLED | OUTPATIENT
Start: 2021-05-11

## 2021-05-11 RX ORDER — FENTANYL CITRATE 50 UG/ML
25 INJECTION, SOLUTION INTRAMUSCULAR; INTRAVENOUS
Status: CANCELLED | OUTPATIENT
Start: 2021-05-11

## 2021-05-11 RX ORDER — HYDROMORPHONE HYDROCHLORIDE 1 MG/ML
0.2 INJECTION, SOLUTION INTRAMUSCULAR; INTRAVENOUS; SUBCUTANEOUS
Status: CANCELLED | OUTPATIENT
Start: 2021-05-11

## 2021-05-12 ENCOUNTER — HOSPITAL ENCOUNTER (OUTPATIENT)
Age: 71
Setting detail: OUTPATIENT SURGERY
Discharge: HOME | End: 2021-05-12
Attending: ORTHOPAEDIC SURGERY | Admitting: ORTHOPAEDIC SURGERY

## 2021-05-12 VITALS
HEART RATE: 64 BPM | BODY MASS INDEX: 45.58 KG/M2 | SYSTOLIC BLOOD PRESSURE: 108 MMHG | WEIGHT: 267 LBS | TEMPERATURE: 98.2 F | HEIGHT: 64 IN | OXYGEN SATURATION: 93 % | RESPIRATION RATE: 20 BRPM | DIASTOLIC BLOOD PRESSURE: 63 MMHG

## 2021-05-12 RX ORDER — SODIUM CHLORIDE 9 MG/ML
25 INJECTION, SOLUTION INTRAVENOUS CONTINUOUS
Status: DISCONTINUED | OUTPATIENT
Start: 2021-05-12 | End: 2021-05-12 | Stop reason: HOSPADM

## 2021-05-12 RX ORDER — SODIUM CHLORIDE 0.9 % (FLUSH) 0.9 %
5-40 SYRINGE (ML) INJECTION AS NEEDED
Status: DISCONTINUED | OUTPATIENT
Start: 2021-05-12 | End: 2021-05-12 | Stop reason: HOSPADM

## 2021-05-12 RX ORDER — SODIUM CHLORIDE, SODIUM LACTATE, POTASSIUM CHLORIDE, CALCIUM CHLORIDE 600; 310; 30; 20 MG/100ML; MG/100ML; MG/100ML; MG/100ML
125 INJECTION, SOLUTION INTRAVENOUS CONTINUOUS
Status: DISCONTINUED | OUTPATIENT
Start: 2021-05-12 | End: 2021-05-12 | Stop reason: HOSPADM

## 2021-05-12 RX ORDER — MIDAZOLAM HYDROCHLORIDE 1 MG/ML
1 INJECTION, SOLUTION INTRAMUSCULAR; INTRAVENOUS AS NEEDED
Status: DISCONTINUED | OUTPATIENT
Start: 2021-05-12 | End: 2021-05-12 | Stop reason: HOSPADM

## 2021-05-12 RX ORDER — SODIUM CHLORIDE 0.9 % (FLUSH) 0.9 %
5-40 SYRINGE (ML) INJECTION EVERY 8 HOURS
Status: DISCONTINUED | OUTPATIENT
Start: 2021-05-12 | End: 2021-05-12 | Stop reason: HOSPADM

## 2021-05-12 RX ORDER — LIDOCAINE HYDROCHLORIDE 10 MG/ML
0.1 INJECTION, SOLUTION EPIDURAL; INFILTRATION; INTRACAUDAL; PERINEURAL AS NEEDED
Status: DISCONTINUED | OUTPATIENT
Start: 2021-05-12 | End: 2021-05-12 | Stop reason: HOSPADM

## 2021-05-12 RX ORDER — FENTANYL CITRATE 50 UG/ML
50 INJECTION, SOLUTION INTRAMUSCULAR; INTRAVENOUS AS NEEDED
Status: DISCONTINUED | OUTPATIENT
Start: 2021-05-12 | End: 2021-05-12 | Stop reason: HOSPADM

## 2021-05-12 RX ORDER — ACETAMINOPHEN 325 MG/1
650 TABLET ORAL ONCE
Status: DISCONTINUED | OUTPATIENT
Start: 2021-05-12 | End: 2021-05-12 | Stop reason: HOSPADM

## 2021-05-12 NOTE — PERIOP NOTES
Anesthesia concerned about pt cardiac status; spoke to patient and patient wants to speak with Dr Leonette Castleman. Called Dr Leonette Castleman and MAIKEL Haddad and was unable to reach them.  Will hold off on PIV and meds until she can speak with Dr Leonette Castleman

## 2021-05-12 NOTE — ROUTINE PROCESS
Occupational Therapy Screening:  Services are indicated. Order is required s/p surgical intervention. An InBasket screening referral was triggered for occupational therapy based on results obtained during the nursing admission assessment. The patients chart was reviewed and the patient is appropriate for a skilled therapy evaluation. Please order a consult for occupational therapy if you are in agreement and would like an evaluation to be completed. Thank you.     Jeanine Dumont OT

## 2021-05-12 NOTE — PROGRESS NOTES
Card eval states intermediate risk. Pt/surgeon and myself discussed pt's risk assessment and since this case is elective, both would prefer further cardiac eval and mitigation strategies in place prior to back surgery. All questions answered.

## 2021-05-26 ENCOUNTER — TRANSCRIBE ORDER (OUTPATIENT)
Dept: REGISTRATION | Age: 71
End: 2021-05-26

## 2021-05-26 DIAGNOSIS — Z01.812 PRE-PROCEDURE LAB EXAM: Primary | ICD-10-CM

## 2021-05-26 NOTE — H&P
Genetta Bal  Location: [place] [address] [address]  Patient #: [id number]  : 1950   / Language: English / Race: White  Female        History of Present Illness  The patient is a 65year old female who presents with a complaint of Low Back Pain. Note for \"Low Back Pain\": At this point she comes in today for an evaluation for ongoing chronic severe worsening lower back pain and bilateral radicular pain.  She has right leg pain roughly in the L5 distribution.  She is status post L3-L5 fusion.  She has new CAT scan for evaluation.  She has pain in the right leg with standing and walking.  She does take Roxicodone for the pain but understands this is not a long-term answer.        Problem List/Past Medical  Follow-up after surgery (V67.00  Z09)    Spondylolisthesis (Acquired) (738.4  M43.10)    REVIEW OF SYSTEMS: Systems were reviewed by the provider.    Lumbar stenosis with neurogenic claudication (724.03  M48.062)    Disc degeneration of lumbar region (722.52  M51.36)    Acute bilateral low back pain with right-sided sciatica (724.2  M54.41)    History of lumbar fusion (V45.4  Z98.1)    Sciatica, right side (724.3  M54.31)       Allergies   Tussin *COUGH/COLD/ALLERGY*   [2021 03:34 PM]:     Family History   Thyroid problems   Mother. Heart Disease   Father. Cancer   Father, Mother, Son.     Social History   Seat Belt Use   Always uses seat belts. Caffeine use   Tea. No drug use    Tobacco / smoke exposure    Current work status   Retired. Tobacco use   Never smoker. No alcohol use       Medication History   Roxicodone  (5MG Tablet, 1-2 Oral every six hours, as needed, Taken starting 2021) Active. ALPRAZolam  (0.5MG Tablet, Oral) Active. Omeprazole  (40MG Capsule DR, Oral) Active. PARoxetine HCl  (40MG Tablet, Oral) Active. Levothyroxine Sodium  (100MCG Tablet, Oral) Active. Metoprolol Tartrate  (25MG Tablet, Oral) Active.   Atorvastatin Calcium  (40MG Tablet, Oral) Active. metFORMIN HCl  (500MG Tablet, Oral) Active. Pravastatin Sodium  (40MG Tablet, Oral) Active. Medications Reconciled      Pregnancy / Birth History   Pregnant   No.     Past Surgical History   Gallbladder Surgery   laparoscopic  Tonsillectomy    Other Eye Surgery   bilateral  Thyroidectomy; Subtotal    Spinal Fusion   lower back  Hysterectomy   non-cancerous: partial  Spinal Surgery    Hemorrhoidectomy    Total Knee Replacement   bilateral     Diagnostic Studies History  CT Scan   L SPINE DONE AT [facility], Date: 3/18/2021, Results:     Other Problems   Gastroesophageal Reflux Disease    Migraine Headache    Other disease, cancer, significant illness    Osteoporosis    Bladder Problems    Thyroid Disease    Anxiety Disorder          Review of Systems  General Present- Seasonal Allergies and Weight Gain. Not Present- Appetite Loss, Chills, Fatigue, Fever, HIV Exposure, Night Sweats, Persistent Infections and Weight Loss. Skin Not Present- Itching, Nail Changes, Poor Wound Healing, Rash, Skin Color Changes, Suspicious Lesions and Yellowish Skin Color. HEENT Not Present- Decreased Hearing, Double Vision, Earache, Hoarseness, Jaundice/Yellow Eyes, Loose Teeth, Nose Bleed, Ringing in the Ears and Sore Throat. Respiratory Not Present- Bloody sputum, Chronic Cough, Difficulty Breathing, Snoring, Wakes up from Sleep Wheezing or Short of Breath and Wheezing. Gastrointestinal Not Present- Abdominal Pain, Black, Tarry Stool, Change in Bowel Habits, Cirrhosis, Constipation, Diarrhea, Difficulty Swallowing, Nausea and Vomiting. Female Genitourinary Not Present- Blood in Urine, Frequency, Painful Urination, Pelvic Pain, Trouble Starting Urinary Stream and Urgency. Musculoskeletal Present- Back Pain. Not Present- Joint Pain, Joint Stiffness and Joint Swelling. Neurological Present- Unsteadiness. Not Present- Fainting, Headaches, Memory Loss, Numbness, Seizures, Tingling, Tremor and Weakness.   Psychiatric Present- Anxiety. Not Present- Bipolar and Depression. Endocrine Not Present- Cold Intolerance, Excessive Hunger, Excessive Thirst, Excessive Urination and Heat Intolerance. Hematology Present- Enlarged Lymph Nodes. Not Present- Abnormal Bruising , Excessive bleeding and Skin Discoloration.        Physical Exam   Neurologic  Sensory  Light Touch - Intact - Globally. Overall Assessment of Muscle Strength and Tone reveals  Lower Extremities - Right Iliopsoas - 4-/5. Left Iliopsoas - 4-/5. Right Tibialis Anterior - 5/5. Left Tibialis Anterior - 4/5. Right Gastroc-Soleus - 5/5. Left Gastroc-Soleus - 5/5. Right EHL - 5/5. Left EHL - 5/5. General Assessment of Reflexes  Right Ankle - Clonus is not present. Left Ankle - Clonus is not present. Reflexes (Dermatomes)  2/2 Normal - Left Achilles (L5-S2), Left Knee (L2-4), Right Achilles (L5-S2) and Right Knee (L2-4).    Musculoskeletal  Global Assessment  Examination of related systems reveals - well-developed, well-nourished, in no acute distress, alert and oriented x 3 (Patient is morbidly obese.) . Gait and Station - Note: Patient arrives in a wheelchair today. Iliana Campbell is able to ambulate and stand cautiously. Right Lower Extremity - normal strength and tone, normal range of motion without pain and no instability, subluxation or laxity. Left Lower Extremity - normal strength and tone, normal range of motion without pain and no instability, subluxation or laxity. Spine/Ribs/Pelvis  Cervical Spine - Examination of the cervical spine reveals - no tenderness to palpation, no pain, no swelling, edema or erythema, normal cervical spine movements and normal sensation. Thoracic (Dorsal) Spine - Examination of the thoracic spine reveals - no tenderness over thoracic vertebrae, no pain, normal sensation and normal thoracic spine movements. Lumbosacral Spine - Examination of the lumbosacral spine reveals - no known fractures or deformities. Inspection and Palpation - Tenderness - moderate. Assessment of pain reveals the following findings - The pain is characterized as - severe, burning, deep, pain accumulates with activity and pain increases with sustained postures. Location - pain refers to lower back bilaterally. Lumbosacral Spine - Functional Testing - Babinski Test negative, Prone Knee Bending Test negative, Slump Test negative, Straight Leg Raising Test negative.           Assessment & Plan      REVIEW OF SYSTEMS: Systems were reviewed by the provider.(V49.9)     Current Plans  Pt Education - How to Access Health Information Online using Patient Portal and 3rd Party Apps: discussed with patient and provided information.     Acute bilateral low back pain with right-sided sciatica (724.2  M54.41)  Impression: Persistent chronic low back pain with right lower extremity radiculopathy. No relief with epidural spinal injections at L5/S1. We have lengthy discussion about treatment options. Her CT scan does show degeneration above and below her prior fusion. Spondylosis is worse at L5-S1. She has bilateral foraminal narrowing that is noted. She also has a flat back deformity with a lumbar lordosis and pelvic incidence mismatch of approximately 20 degrees. I think she is an excellent candidate for a revision laminectomy at L2-3 and L5-S1 with a posterior sedrick osteotomy at L2-3 and L3-4 with a revision L2-S1 fusion.     The risks and benefits were discussed at length with the patient and the patient has elected to proceed. Indications for surgery include failed conservative treatment. Alternative treatments, risks and the perioperative course were discussed with the patient. All questions were answered. The risks and benefits of the procedure were explained. Benefits include definitive diagnosis, relief of pain, elimination of deformity and improved function.  Risks of surgery including bleeding, infection, weakness, numbness, CSF leak, failure to improve symptoms, exacerbation of medical co-morbidities and even death were discussed with the patient.        Disc degeneration of lumbar region (722.52  M51.36)        History of lumbar fusion (V45.4  Z98.1)        Treatment options were discussed with the patient in full.(V65.49)     Current Plans  Presurgical planning was preformed with the patient today  Surgery to be scheduled  Procedure: L2-3 and L5-S1 lumbar laminectomy with Lydia osteotomy L2-3 and L3-4 with PLF L2-S1      Date of Surgery Update:  Luly Odell was seen and examined. History and physical has been reviewed. The patient has been examined.  There have been no significant clinical changes since the completion of the originally dated History and Physical.    Signed By: Yaneth Hernandez MD     June 2, 2021 2:53 PM              Signed by Yaneth Hernandez MD

## 2021-05-29 ENCOUNTER — HOSPITAL ENCOUNTER (OUTPATIENT)
Dept: PREADMISSION TESTING | Age: 71
Discharge: HOME | End: 2021-05-29
Payer: MEDICARE

## 2021-05-29 DIAGNOSIS — Z01.812 PRE-PROCEDURE LAB EXAM: ICD-10-CM

## 2021-05-29 PROCEDURE — U0003 INFECTIOUS AGENT DETECTION BY NUCLEIC ACID (DNA OR RNA); SEVERE ACUTE RESPIRATORY SYNDROME CORONAVIRUS 2 (SARS-COV-2) (CORONAVIRUS DISEASE [COVID-19]), AMPLIFIED PROBE TECHNIQUE, MAKING USE OF HIGH THROUGHPUT TECHNOLOGIES AS DESCRIBED BY CMS-2020-01-R: HCPCS

## 2021-05-30 LAB — SARS-COV-2, COV2NT: NOT DETECTED

## 2021-06-01 ENCOUNTER — ANESTHESIA EVENT (OUTPATIENT)
Dept: SURGERY | Age: 71
DRG: 460 | End: 2021-06-01
Payer: MEDICARE

## 2021-06-01 NOTE — PERIOP NOTES
Patient has Chg soap for previous PAT and instructions in use. Patient verbalizes understanding. Preop instructions reviewed and patient verbalizes understanding of instructions. Patient has been given the opportunity to ask additional questions.

## 2021-06-02 ENCOUNTER — APPOINTMENT (OUTPATIENT)
Dept: GENERAL RADIOLOGY | Age: 71
DRG: 460 | End: 2021-06-02
Attending: ORTHOPAEDIC SURGERY
Payer: MEDICARE

## 2021-06-02 ENCOUNTER — ANESTHESIA (OUTPATIENT)
Dept: SURGERY | Age: 71
DRG: 460 | End: 2021-06-02
Payer: MEDICARE

## 2021-06-02 ENCOUNTER — HOSPITAL ENCOUNTER (INPATIENT)
Age: 71
LOS: 10 days | Discharge: NURSING, REHAB OR LONG-TERM CARE | DRG: 460 | End: 2021-06-12
Attending: ORTHOPAEDIC SURGERY | Admitting: ORTHOPAEDIC SURGERY
Payer: MEDICARE

## 2021-06-02 DIAGNOSIS — M48.062 LUMBAR STENOSIS WITH NEUROGENIC CLAUDICATION: Primary | ICD-10-CM

## 2021-06-02 LAB
ABO + RH BLD: NORMAL
ANION GAP SERPL CALC-SCNC: 5 MMOL/L (ref 5–15)
BLOOD GROUP ANTIBODIES SERPL: NORMAL
BUN SERPL-MCNC: 14 MG/DL (ref 6–20)
BUN/CREAT SERPL: 17 (ref 12–20)
CALCIUM SERPL-MCNC: 9 MG/DL (ref 8.5–10.1)
CHLORIDE SERPL-SCNC: 105 MMOL/L (ref 97–108)
CO2 SERPL-SCNC: 27 MMOL/L (ref 21–32)
CREAT SERPL-MCNC: 0.84 MG/DL (ref 0.55–1.02)
ERYTHROCYTE [DISTWIDTH] IN BLOOD BY AUTOMATED COUNT: 13.7 % (ref 11.5–14.5)
GLUCOSE BLD STRIP.AUTO-MCNC: 149 MG/DL (ref 65–117)
GLUCOSE BLD STRIP.AUTO-MCNC: 274 MG/DL (ref 65–117)
GLUCOSE SERPL-MCNC: 138 MG/DL (ref 65–100)
HCT VFR BLD AUTO: 42.4 % (ref 35–47)
HGB BLD-MCNC: 13.8 G/DL (ref 11.5–16)
MCH RBC QN AUTO: 28.7 PG (ref 26–34)
MCHC RBC AUTO-ENTMCNC: 32.5 G/DL (ref 30–36.5)
MCV RBC AUTO: 88.1 FL (ref 80–99)
NRBC # BLD: 0 K/UL (ref 0–0.01)
NRBC BLD-RTO: 0 PER 100 WBC
PLATELET # BLD AUTO: 329 K/UL (ref 150–400)
PMV BLD AUTO: 10.1 FL (ref 8.9–12.9)
POTASSIUM SERPL-SCNC: 4 MMOL/L (ref 3.5–5.1)
RBC # BLD AUTO: 4.81 M/UL (ref 3.8–5.2)
SERVICE CMNT-IMP: ABNORMAL
SERVICE CMNT-IMP: ABNORMAL
SODIUM SERPL-SCNC: 137 MMOL/L (ref 136–145)
SPECIMEN EXP DATE BLD: NORMAL
WBC # BLD AUTO: 9.4 K/UL (ref 3.6–11)

## 2021-06-02 PROCEDURE — 74011000258 HC RX REV CODE- 258: Performed by: NURSE ANESTHETIST, CERTIFIED REGISTERED

## 2021-06-02 PROCEDURE — 74011636637 HC RX REV CODE- 636/637: Performed by: STUDENT IN AN ORGANIZED HEALTH CARE EDUCATION/TRAINING PROGRAM

## 2021-06-02 PROCEDURE — 01NB0ZZ RELEASE LUMBAR NERVE, OPEN APPROACH: ICD-10-PCS | Performed by: ORTHOPAEDIC SURGERY

## 2021-06-02 PROCEDURE — 77030029099 HC BN WAX SSPC -A: Performed by: ORTHOPAEDIC SURGERY

## 2021-06-02 PROCEDURE — 74011250636 HC RX REV CODE- 250/636: Performed by: STUDENT IN AN ORGANIZED HEALTH CARE EDUCATION/TRAINING PROGRAM

## 2021-06-02 PROCEDURE — 36415 COLL VENOUS BLD VENIPUNCTURE: CPT

## 2021-06-02 PROCEDURE — 77030012894

## 2021-06-02 PROCEDURE — 74011250636 HC RX REV CODE- 250/636: Performed by: NURSE ANESTHETIST, CERTIFIED REGISTERED

## 2021-06-02 PROCEDURE — 2709999900 HC NON-CHARGEABLE SUPPLY: Performed by: ORTHOPAEDIC SURGERY

## 2021-06-02 PROCEDURE — 86901 BLOOD TYPING SEROLOGIC RH(D): CPT

## 2021-06-02 PROCEDURE — 76010000174 HC OR TIME 3.5 TO 4 HR INTENSV-TIER 1: Performed by: ORTHOPAEDIC SURGERY

## 2021-06-02 PROCEDURE — 77030032958 HC GRFT BN SUB ELITE TRNTY MUSC -K1: Performed by: ORTHOPAEDIC SURGERY

## 2021-06-02 PROCEDURE — 65270000029 HC RM PRIVATE

## 2021-06-02 PROCEDURE — C1713 ANCHOR/SCREW BN/BN,TIS/BN: HCPCS | Performed by: ORTHOPAEDIC SURGERY

## 2021-06-02 PROCEDURE — 82962 GLUCOSE BLOOD TEST: CPT

## 2021-06-02 PROCEDURE — 74011250636 HC RX REV CODE- 250/636: Performed by: ANESTHESIOLOGY

## 2021-06-02 PROCEDURE — 77030038600 HC TU BPLR IRR DISP STRY -B: Performed by: ORTHOPAEDIC SURGERY

## 2021-06-02 PROCEDURE — 77030031139 HC SUT VCRL2 J&J -A: Performed by: ORTHOPAEDIC SURGERY

## 2021-06-02 PROCEDURE — 74011250637 HC RX REV CODE- 250/637: Performed by: ANESTHESIOLOGY

## 2021-06-02 PROCEDURE — 77030020268 HC MISC GENERAL SUPPLY: Performed by: ORTHOPAEDIC SURGERY

## 2021-06-02 PROCEDURE — 74011000250 HC RX REV CODE- 250: Performed by: NURSE ANESTHETIST, CERTIFIED REGISTERED

## 2021-06-02 PROCEDURE — 74011250636 HC RX REV CODE- 250/636: Performed by: ORTHOPAEDIC SURGERY

## 2021-06-02 PROCEDURE — 0SG10K1 FUSION OF 2 OR MORE LUMBAR VERTEBRAL JOINTS WITH NONAUTOLOGOUS TISSUE SUBSTITUTE, POSTERIOR APPROACH, POSTERIOR COLUMN, OPEN APPROACH: ICD-10-PCS | Performed by: ORTHOPAEDIC SURGERY

## 2021-06-02 PROCEDURE — 77030014650 HC SEAL MTRX FLOSEL BAXT -C: Performed by: ORTHOPAEDIC SURGERY

## 2021-06-02 PROCEDURE — 74011000250 HC RX REV CODE- 250: Performed by: ORTHOPAEDIC SURGERY

## 2021-06-02 PROCEDURE — 72020 X-RAY EXAM OF SPINE 1 VIEW: CPT

## 2021-06-02 PROCEDURE — 80048 BASIC METABOLIC PNL TOTAL CA: CPT

## 2021-06-02 PROCEDURE — 77030038552 HC DRN WND MDII -A: Performed by: ORTHOPAEDIC SURGERY

## 2021-06-02 PROCEDURE — 77030005513 HC CATH URETH FOL11 MDII -B: Performed by: ORTHOPAEDIC SURGERY

## 2021-06-02 PROCEDURE — 77030013079 HC BLNKT BAIR HGGR 3M -A: Performed by: ANESTHESIOLOGY

## 2021-06-02 PROCEDURE — 76210000017 HC OR PH I REC 1.5 TO 2 HR: Performed by: ORTHOPAEDIC SURGERY

## 2021-06-02 PROCEDURE — 0SG30K1 FUSION OF LUMBOSACRAL JOINT WITH NONAUTOLOGOUS TISSUE SUBSTITUTE, POSTERIOR APPROACH, POSTERIOR COLUMN, OPEN APPROACH: ICD-10-PCS | Performed by: ORTHOPAEDIC SURGERY

## 2021-06-02 PROCEDURE — 77030004391 HC BUR FLUT MEDT -C: Performed by: ORTHOPAEDIC SURGERY

## 2021-06-02 PROCEDURE — 77030035236 HC SUT PDS STRATFX BARB J&J -B: Performed by: ORTHOPAEDIC SURGERY

## 2021-06-02 PROCEDURE — 74011000258 HC RX REV CODE- 258: Performed by: STUDENT IN AN ORGANIZED HEALTH CARE EDUCATION/TRAINING PROGRAM

## 2021-06-02 PROCEDURE — 74011250636 HC RX REV CODE- 250/636: Performed by: PHYSICIAN ASSISTANT

## 2021-06-02 PROCEDURE — 77030040361 HC SLV COMPR DVT MDII -B: Performed by: ORTHOPAEDIC SURGERY

## 2021-06-02 PROCEDURE — 77030008684 HC TU ET CUF COVD -B: Performed by: ANESTHESIOLOGY

## 2021-06-02 PROCEDURE — 77030026438 HC STYL ET INTUB CARD -A: Performed by: ANESTHESIOLOGY

## 2021-06-02 PROCEDURE — 76060000038 HC ANESTHESIA 3.5 TO 4 HR: Performed by: ORTHOPAEDIC SURGERY

## 2021-06-02 PROCEDURE — 74011250637 HC RX REV CODE- 250/637: Performed by: STUDENT IN AN ORGANIZED HEALTH CARE EDUCATION/TRAINING PROGRAM

## 2021-06-02 PROCEDURE — 85027 COMPLETE CBC AUTOMATED: CPT

## 2021-06-02 PROCEDURE — 77030040922 HC BLNKT HYPOTHRM STRY -A

## 2021-06-02 DEVICE — GRAFT BNE SUB L CANC FRZN MORSELIZED W/ VIABLE CELL TRINITY: Type: IMPLANTABLE DEVICE | Site: SPINE LUMBAR | Status: FUNCTIONAL

## 2021-06-02 DEVICE — GRAFT BNE SUB 15GM GRN CA COMPND PTTY AND SILICATE BASE INJ: Type: IMPLANTABLE DEVICE | Site: BACK | Status: FUNCTIONAL

## 2021-06-02 DEVICE — SCREW 55840006550 5.5/6 MAS 6.5X50 CC
Type: IMPLANTABLE DEVICE | Site: SPINE LUMBAR | Status: FUNCTIONAL
Brand: CD HORIZON® SPINAL SYSTEM

## 2021-06-02 RX ORDER — POLYETHYLENE GLYCOL 3350 17 G/17G
17 POWDER, FOR SOLUTION ORAL DAILY
Status: DISCONTINUED | OUTPATIENT
Start: 2021-06-03 | End: 2021-06-12 | Stop reason: HOSPADM

## 2021-06-02 RX ORDER — METOPROLOL TARTRATE 25 MG/1
12.5 TABLET, FILM COATED ORAL
Status: DISCONTINUED | OUTPATIENT
Start: 2021-06-02 | End: 2021-06-09

## 2021-06-02 RX ORDER — PAROXETINE HYDROCHLORIDE 20 MG/1
40 TABLET, FILM COATED ORAL
Status: DISCONTINUED | OUTPATIENT
Start: 2021-06-03 | End: 2021-06-12 | Stop reason: HOSPADM

## 2021-06-02 RX ORDER — EPHEDRINE SULFATE/0.9% NACL/PF 50 MG/5 ML
SYRINGE (ML) INTRAVENOUS AS NEEDED
Status: DISCONTINUED | OUTPATIENT
Start: 2021-06-02 | End: 2021-06-02 | Stop reason: HOSPADM

## 2021-06-02 RX ORDER — ACETAMINOPHEN 325 MG/1
650 TABLET ORAL ONCE
Status: COMPLETED | OUTPATIENT
Start: 2021-06-02 | End: 2021-06-02

## 2021-06-02 RX ORDER — ROPIVACAINE HYDROCHLORIDE 5 MG/ML
30 INJECTION, SOLUTION EPIDURAL; INFILTRATION; PERINEURAL AS NEEDED
Status: DISCONTINUED | OUTPATIENT
Start: 2021-06-02 | End: 2021-06-02 | Stop reason: HOSPADM

## 2021-06-02 RX ORDER — GABAPENTIN 100 MG/1
100 CAPSULE ORAL 3 TIMES DAILY
Status: DISCONTINUED | OUTPATIENT
Start: 2021-06-02 | End: 2021-06-12 | Stop reason: HOSPADM

## 2021-06-02 RX ORDER — HYDROMORPHONE HYDROCHLORIDE 1 MG/ML
0.2 INJECTION, SOLUTION INTRAMUSCULAR; INTRAVENOUS; SUBCUTANEOUS
Status: DISCONTINUED | OUTPATIENT
Start: 2021-06-02 | End: 2021-06-02 | Stop reason: HOSPADM

## 2021-06-02 RX ORDER — MORPHINE SULFATE 2 MG/ML
2 INJECTION, SOLUTION INTRAMUSCULAR; INTRAVENOUS
Status: DISCONTINUED | OUTPATIENT
Start: 2021-06-02 | End: 2021-06-02 | Stop reason: HOSPADM

## 2021-06-02 RX ORDER — MAGNESIUM SULFATE 100 %
4 CRYSTALS MISCELLANEOUS AS NEEDED
Status: DISCONTINUED | OUTPATIENT
Start: 2021-06-02 | End: 2021-06-12 | Stop reason: HOSPADM

## 2021-06-02 RX ORDER — SODIUM CHLORIDE 0.9 % (FLUSH) 0.9 %
5-40 SYRINGE (ML) INJECTION AS NEEDED
Status: DISCONTINUED | OUTPATIENT
Start: 2021-06-02 | End: 2021-06-12 | Stop reason: HOSPADM

## 2021-06-02 RX ORDER — MIDAZOLAM HYDROCHLORIDE 1 MG/ML
1 INJECTION, SOLUTION INTRAMUSCULAR; INTRAVENOUS AS NEEDED
Status: DISCONTINUED | OUTPATIENT
Start: 2021-06-02 | End: 2021-06-02 | Stop reason: HOSPADM

## 2021-06-02 RX ORDER — MORPHINE SULFATE IN 0.9 % NACL 150MG/30ML
PATIENT CONTROLLED ANALGESIA SYRINGE INTRAVENOUS
Status: DISCONTINUED | OUTPATIENT
Start: 2021-06-02 | End: 2021-06-04

## 2021-06-02 RX ORDER — NALOXONE HYDROCHLORIDE 0.4 MG/ML
0.4 INJECTION, SOLUTION INTRAMUSCULAR; INTRAVENOUS; SUBCUTANEOUS AS NEEDED
Status: DISCONTINUED | OUTPATIENT
Start: 2021-06-02 | End: 2021-06-12 | Stop reason: HOSPADM

## 2021-06-02 RX ORDER — ONDANSETRON 2 MG/ML
4 INJECTION INTRAMUSCULAR; INTRAVENOUS
Status: ACTIVE | OUTPATIENT
Start: 2021-06-02 | End: 2021-06-03

## 2021-06-02 RX ORDER — CYCLOBENZAPRINE HCL 10 MG
10 TABLET ORAL
Status: DISCONTINUED | OUTPATIENT
Start: 2021-06-02 | End: 2021-06-12 | Stop reason: HOSPADM

## 2021-06-02 RX ORDER — SODIUM CHLORIDE 9 MG/ML
25 INJECTION, SOLUTION INTRAVENOUS CONTINUOUS
Status: DISCONTINUED | OUTPATIENT
Start: 2021-06-02 | End: 2021-06-02 | Stop reason: HOSPADM

## 2021-06-02 RX ORDER — LEVOTHYROXINE SODIUM 112 UG/1
112 TABLET ORAL
Status: DISCONTINUED | OUTPATIENT
Start: 2021-06-03 | End: 2021-06-12 | Stop reason: HOSPADM

## 2021-06-02 RX ORDER — ONDANSETRON 2 MG/ML
INJECTION INTRAMUSCULAR; INTRAVENOUS AS NEEDED
Status: DISCONTINUED | OUTPATIENT
Start: 2021-06-02 | End: 2021-06-02 | Stop reason: HOSPADM

## 2021-06-02 RX ORDER — OXYCODONE HYDROCHLORIDE 5 MG/1
5 TABLET ORAL
Status: DISCONTINUED | OUTPATIENT
Start: 2021-06-02 | End: 2021-06-12 | Stop reason: HOSPADM

## 2021-06-02 RX ORDER — ALPRAZOLAM 0.5 MG/1
0.5 TABLET ORAL 2 TIMES DAILY
Status: DISCONTINUED | OUTPATIENT
Start: 2021-06-02 | End: 2021-06-12 | Stop reason: HOSPADM

## 2021-06-02 RX ORDER — BUTALBITAL, ACETAMINOPHEN AND CAFFEINE 50; 325; 40 MG/1; MG/1; MG/1
1 TABLET ORAL
Status: DISCONTINUED | OUTPATIENT
Start: 2021-06-02 | End: 2021-06-12 | Stop reason: HOSPADM

## 2021-06-02 RX ORDER — SUCCINYLCHOLINE CHLORIDE 20 MG/ML
INJECTION INTRAMUSCULAR; INTRAVENOUS AS NEEDED
Status: DISCONTINUED | OUTPATIENT
Start: 2021-06-02 | End: 2021-06-02 | Stop reason: HOSPADM

## 2021-06-02 RX ORDER — SODIUM CHLORIDE, SODIUM LACTATE, POTASSIUM CHLORIDE, CALCIUM CHLORIDE 600; 310; 30; 20 MG/100ML; MG/100ML; MG/100ML; MG/100ML
1000 INJECTION, SOLUTION INTRAVENOUS CONTINUOUS
Status: DISCONTINUED | OUTPATIENT
Start: 2021-06-02 | End: 2021-06-02 | Stop reason: HOSPADM

## 2021-06-02 RX ORDER — ACETAMINOPHEN 500 MG
1000 TABLET ORAL EVERY 6 HOURS
Status: DISCONTINUED | OUTPATIENT
Start: 2021-06-03 | End: 2021-06-12 | Stop reason: HOSPADM

## 2021-06-02 RX ORDER — PROPOFOL 10 MG/ML
INJECTION, EMULSION INTRAVENOUS AS NEEDED
Status: DISCONTINUED | OUTPATIENT
Start: 2021-06-02 | End: 2021-06-02 | Stop reason: HOSPADM

## 2021-06-02 RX ORDER — AMOXICILLIN 250 MG
1 CAPSULE ORAL 2 TIMES DAILY
Status: DISCONTINUED | OUTPATIENT
Start: 2021-06-02 | End: 2021-06-12 | Stop reason: HOSPADM

## 2021-06-02 RX ORDER — FACIAL-BODY WIPES
10 EACH TOPICAL DAILY PRN
Status: DISCONTINUED | OUTPATIENT
Start: 2021-06-04 | End: 2021-06-12 | Stop reason: HOSPADM

## 2021-06-02 RX ORDER — FENTANYL CITRATE 50 UG/ML
50 INJECTION, SOLUTION INTRAMUSCULAR; INTRAVENOUS AS NEEDED
Status: DISCONTINUED | OUTPATIENT
Start: 2021-06-02 | End: 2021-06-02 | Stop reason: HOSPADM

## 2021-06-02 RX ORDER — ATORVASTATIN CALCIUM 40 MG/1
40 TABLET, FILM COATED ORAL
Status: DISCONTINUED | OUTPATIENT
Start: 2021-06-02 | End: 2021-06-12 | Stop reason: HOSPADM

## 2021-06-02 RX ORDER — SODIUM CHLORIDE 0.9 % (FLUSH) 0.9 %
5-40 SYRINGE (ML) INJECTION EVERY 8 HOURS
Status: DISCONTINUED | OUTPATIENT
Start: 2021-06-02 | End: 2021-06-12 | Stop reason: HOSPADM

## 2021-06-02 RX ORDER — SODIUM CHLORIDE, SODIUM LACTATE, POTASSIUM CHLORIDE, CALCIUM CHLORIDE 600; 310; 30; 20 MG/100ML; MG/100ML; MG/100ML; MG/100ML
INJECTION, SOLUTION INTRAVENOUS
Status: DISCONTINUED | OUTPATIENT
Start: 2021-06-02 | End: 2021-06-02

## 2021-06-02 RX ORDER — HYDROCODONE BITARTRATE AND ACETAMINOPHEN 5; 325 MG/1; MG/1
1 TABLET ORAL AS NEEDED
Status: DISCONTINUED | OUTPATIENT
Start: 2021-06-02 | End: 2021-06-02 | Stop reason: HOSPADM

## 2021-06-02 RX ORDER — INSULIN LISPRO 100 [IU]/ML
INJECTION, SOLUTION INTRAVENOUS; SUBCUTANEOUS
Status: DISCONTINUED | OUTPATIENT
Start: 2021-06-02 | End: 2021-06-12 | Stop reason: HOSPADM

## 2021-06-02 RX ORDER — KETAMINE HYDROCHLORIDE 10 MG/ML
INJECTION, SOLUTION INTRAMUSCULAR; INTRAVENOUS AS NEEDED
Status: DISCONTINUED | OUTPATIENT
Start: 2021-06-02 | End: 2021-06-02 | Stop reason: HOSPADM

## 2021-06-02 RX ORDER — PANTOPRAZOLE SODIUM 40 MG/1
40 TABLET, DELAYED RELEASE ORAL EVERY EVENING
Status: DISCONTINUED | OUTPATIENT
Start: 2021-06-02 | End: 2021-06-12 | Stop reason: HOSPADM

## 2021-06-02 RX ORDER — SODIUM CHLORIDE 9 MG/ML
125 INJECTION, SOLUTION INTRAVENOUS CONTINUOUS
Status: DISPENSED | OUTPATIENT
Start: 2021-06-02 | End: 2021-06-03

## 2021-06-02 RX ORDER — FENTANYL CITRATE 50 UG/ML
25 INJECTION, SOLUTION INTRAMUSCULAR; INTRAVENOUS
Status: DISCONTINUED | OUTPATIENT
Start: 2021-06-02 | End: 2021-06-02 | Stop reason: HOSPADM

## 2021-06-02 RX ORDER — HYDROMORPHONE HYDROCHLORIDE 2 MG/ML
INJECTION, SOLUTION INTRAMUSCULAR; INTRAVENOUS; SUBCUTANEOUS AS NEEDED
Status: DISCONTINUED | OUTPATIENT
Start: 2021-06-02 | End: 2021-06-02 | Stop reason: HOSPADM

## 2021-06-02 RX ORDER — MIDAZOLAM HYDROCHLORIDE 1 MG/ML
INJECTION, SOLUTION INTRAMUSCULAR; INTRAVENOUS AS NEEDED
Status: DISCONTINUED | OUTPATIENT
Start: 2021-06-02 | End: 2021-06-02 | Stop reason: HOSPADM

## 2021-06-02 RX ORDER — DEXAMETHASONE SODIUM PHOSPHATE 4 MG/ML
INJECTION, SOLUTION INTRA-ARTICULAR; INTRALESIONAL; INTRAMUSCULAR; INTRAVENOUS; SOFT TISSUE AS NEEDED
Status: DISCONTINUED | OUTPATIENT
Start: 2021-06-02 | End: 2021-06-02 | Stop reason: HOSPADM

## 2021-06-02 RX ORDER — ASPIRIN 81 MG/1
81 TABLET ORAL DAILY
Status: DISCONTINUED | OUTPATIENT
Start: 2021-06-03 | End: 2021-06-12 | Stop reason: HOSPADM

## 2021-06-02 RX ORDER — PHENYLEPHRINE HCL IN 0.9% NACL 0.4MG/10ML
SYRINGE (ML) INTRAVENOUS AS NEEDED
Status: DISCONTINUED | OUTPATIENT
Start: 2021-06-02 | End: 2021-06-02 | Stop reason: HOSPADM

## 2021-06-02 RX ORDER — MIDAZOLAM HYDROCHLORIDE 1 MG/ML
0.5 INJECTION, SOLUTION INTRAMUSCULAR; INTRAVENOUS
Status: DISCONTINUED | OUTPATIENT
Start: 2021-06-02 | End: 2021-06-02 | Stop reason: HOSPADM

## 2021-06-02 RX ORDER — MORPHINE SULFATE 2 MG/ML
2 INJECTION, SOLUTION INTRAMUSCULAR; INTRAVENOUS
Status: ACTIVE | OUTPATIENT
Start: 2021-06-02 | End: 2021-06-03

## 2021-06-02 RX ORDER — OXYCODONE HYDROCHLORIDE 5 MG/1
10 TABLET ORAL
Status: DISCONTINUED | OUTPATIENT
Start: 2021-06-02 | End: 2021-06-12 | Stop reason: HOSPADM

## 2021-06-02 RX ORDER — METFORMIN HYDROCHLORIDE 500 MG/1
500 TABLET ORAL 2 TIMES DAILY WITH MEALS
Status: DISCONTINUED | OUTPATIENT
Start: 2021-06-03 | End: 2021-06-12 | Stop reason: HOSPADM

## 2021-06-02 RX ORDER — LIDOCAINE HYDROCHLORIDE 10 MG/ML
0.1 INJECTION, SOLUTION EPIDURAL; INFILTRATION; INTRACAUDAL; PERINEURAL AS NEEDED
Status: DISCONTINUED | OUTPATIENT
Start: 2021-06-02 | End: 2021-06-02 | Stop reason: HOSPADM

## 2021-06-02 RX ORDER — VANCOMYCIN HYDROCHLORIDE 1 G/20ML
INJECTION, POWDER, LYOPHILIZED, FOR SOLUTION INTRAVENOUS AS NEEDED
Status: DISCONTINUED | OUTPATIENT
Start: 2021-06-02 | End: 2021-06-02 | Stop reason: HOSPADM

## 2021-06-02 RX ORDER — GLYCOPYRROLATE 0.2 MG/ML
0.2 INJECTION INTRAMUSCULAR; INTRAVENOUS
Status: DISCONTINUED | OUTPATIENT
Start: 2021-06-02 | End: 2021-06-02 | Stop reason: HOSPADM

## 2021-06-02 RX ORDER — DEXTROSE 50 % IN WATER (D50W) INTRAVENOUS SYRINGE
12.5-25 AS NEEDED
Status: DISCONTINUED | OUTPATIENT
Start: 2021-06-02 | End: 2021-06-12 | Stop reason: HOSPADM

## 2021-06-02 RX ORDER — METOPROLOL TARTRATE 25 MG/1
25 TABLET, FILM COATED ORAL
Status: DISCONTINUED | OUTPATIENT
Start: 2021-06-03 | End: 2021-06-09

## 2021-06-02 RX ORDER — AMITRIPTYLINE HYDROCHLORIDE 50 MG/1
50 TABLET, FILM COATED ORAL
Status: DISCONTINUED | OUTPATIENT
Start: 2021-06-02 | End: 2021-06-12 | Stop reason: HOSPADM

## 2021-06-02 RX ORDER — ROCURONIUM BROMIDE 10 MG/ML
INJECTION, SOLUTION INTRAVENOUS AS NEEDED
Status: DISCONTINUED | OUTPATIENT
Start: 2021-06-02 | End: 2021-06-02 | Stop reason: HOSPADM

## 2021-06-02 RX ORDER — LIDOCAINE HYDROCHLORIDE 20 MG/ML
INJECTION, SOLUTION EPIDURAL; INFILTRATION; INTRACAUDAL; PERINEURAL AS NEEDED
Status: DISCONTINUED | OUTPATIENT
Start: 2021-06-02 | End: 2021-06-02 | Stop reason: HOSPADM

## 2021-06-02 RX ORDER — FENTANYL CITRATE 50 UG/ML
INJECTION, SOLUTION INTRAMUSCULAR; INTRAVENOUS AS NEEDED
Status: DISCONTINUED | OUTPATIENT
Start: 2021-06-02 | End: 2021-06-02 | Stop reason: HOSPADM

## 2021-06-02 RX ORDER — DIPHENHYDRAMINE HYDROCHLORIDE 50 MG/ML
12.5 INJECTION, SOLUTION INTRAMUSCULAR; INTRAVENOUS
Status: ACTIVE | OUTPATIENT
Start: 2021-06-02 | End: 2021-06-03

## 2021-06-02 RX ORDER — PROPOFOL 10 MG/ML
VIAL (ML) INTRAVENOUS
Status: DISCONTINUED | OUTPATIENT
Start: 2021-06-02 | End: 2021-06-02 | Stop reason: HOSPADM

## 2021-06-02 RX ORDER — DIPHENHYDRAMINE HYDROCHLORIDE 50 MG/ML
12.5 INJECTION, SOLUTION INTRAMUSCULAR; INTRAVENOUS AS NEEDED
Status: DISCONTINUED | OUTPATIENT
Start: 2021-06-02 | End: 2021-06-02 | Stop reason: HOSPADM

## 2021-06-02 RX ORDER — ALBUTEROL SULFATE 0.83 MG/ML
2.5 SOLUTION RESPIRATORY (INHALATION) AS NEEDED
Status: DISCONTINUED | OUTPATIENT
Start: 2021-06-02 | End: 2021-06-02 | Stop reason: HOSPADM

## 2021-06-02 RX ORDER — ONDANSETRON 2 MG/ML
4 INJECTION INTRAMUSCULAR; INTRAVENOUS AS NEEDED
Status: DISCONTINUED | OUTPATIENT
Start: 2021-06-02 | End: 2021-06-02 | Stop reason: HOSPADM

## 2021-06-02 RX ADMIN — LIDOCAINE HYDROCHLORIDE 100 MG: 20 INJECTION, SOLUTION EPIDURAL; INFILTRATION; INTRACAUDAL; PERINEURAL at 14:26

## 2021-06-02 RX ADMIN — PROPOFOL 25 MG: 10 INJECTION, EMULSION INTRAVENOUS at 17:34

## 2021-06-02 RX ADMIN — ROCURONIUM BROMIDE 10 MG: 10 SOLUTION INTRAVENOUS at 14:26

## 2021-06-02 RX ADMIN — Medication 10 MG: at 15:13

## 2021-06-02 RX ADMIN — CEFAZOLIN 3 G: 10 INJECTION, POWDER, FOR SOLUTION INTRAVENOUS at 23:06

## 2021-06-02 RX ADMIN — TRANEXAMIC ACID 1 G: 100 INJECTION, SOLUTION INTRAVENOUS at 15:00

## 2021-06-02 RX ADMIN — PROPOFOL 75 MCG/KG/MIN: 10 INJECTION, EMULSION INTRAVENOUS at 14:59

## 2021-06-02 RX ADMIN — MIDAZOLAM HYDROCHLORIDE 0.5 MG: 1 INJECTION, SOLUTION INTRAMUSCULAR; INTRAVENOUS at 18:23

## 2021-06-02 RX ADMIN — Medication 10 MG: at 14:42

## 2021-06-02 RX ADMIN — HYDROMORPHONE HYDROCHLORIDE 1 MG: 2 INJECTION, SOLUTION INTRAMUSCULAR; INTRAVENOUS; SUBCUTANEOUS at 15:37

## 2021-06-02 RX ADMIN — PROPOFOL 150 MG: 10 INJECTION, EMULSION INTRAVENOUS at 14:26

## 2021-06-02 RX ADMIN — PANTOPRAZOLE SODIUM 40 MG: 40 TABLET, DELAYED RELEASE ORAL at 22:48

## 2021-06-02 RX ADMIN — DOCUSATE SODIUM 50 MG AND SENNOSIDES 8.6 MG 1 TABLET: 8.6; 5 TABLET, FILM COATED ORAL at 22:45

## 2021-06-02 RX ADMIN — Medication 120 MCG: at 15:19

## 2021-06-02 RX ADMIN — ATORVASTATIN CALCIUM 40 MG: 40 TABLET, FILM COATED ORAL at 22:46

## 2021-06-02 RX ADMIN — SODIUM CHLORIDE, POTASSIUM CHLORIDE, SODIUM LACTATE AND CALCIUM CHLORIDE: 600; 310; 30; 20 INJECTION, SOLUTION INTRAVENOUS at 15:55

## 2021-06-02 RX ADMIN — KETAMINE HYDROCHLORIDE 10 MG: 10 INJECTION, SOLUTION INTRAMUSCULAR; INTRAVENOUS at 17:38

## 2021-06-02 RX ADMIN — SODIUM CHLORIDE, POTASSIUM CHLORIDE, SODIUM LACTATE AND CALCIUM CHLORIDE 1000 ML: 600; 310; 30; 20 INJECTION, SOLUTION INTRAVENOUS at 11:19

## 2021-06-02 RX ADMIN — SUCCINYLCHOLINE CHLORIDE 160 MG: 20 INJECTION, SOLUTION INTRAMUSCULAR; INTRAVENOUS at 14:27

## 2021-06-02 RX ADMIN — PROPOFOL 25 MG: 10 INJECTION, EMULSION INTRAVENOUS at 17:45

## 2021-06-02 RX ADMIN — AMITRIPTYLINE HYDROCHLORIDE 50 MG: 50 TABLET, FILM COATED ORAL at 22:46

## 2021-06-02 RX ADMIN — METOPROLOL TARTRATE 12.5 MG: 25 TABLET, FILM COATED ORAL at 22:46

## 2021-06-02 RX ADMIN — ALPRAZOLAM 0.5 MG: 0.5 TABLET ORAL at 22:45

## 2021-06-02 RX ADMIN — KETAMINE HYDROCHLORIDE 10 MG: 10 INJECTION, SOLUTION INTRAMUSCULAR; INTRAVENOUS at 17:48

## 2021-06-02 RX ADMIN — FENTANYL CITRATE 50 MCG: 50 INJECTION, SOLUTION INTRAMUSCULAR; INTRAVENOUS at 14:26

## 2021-06-02 RX ADMIN — SODIUM CHLORIDE: 900 INJECTION, SOLUTION INTRAVENOUS at 17:30

## 2021-06-02 RX ADMIN — HYDROMORPHONE HYDROCHLORIDE 0.5 MG: 2 INJECTION, SOLUTION INTRAMUSCULAR; INTRAVENOUS; SUBCUTANEOUS at 18:00

## 2021-06-02 RX ADMIN — ROCURONIUM BROMIDE 10 MG: 10 SOLUTION INTRAVENOUS at 14:37

## 2021-06-02 RX ADMIN — DEXAMETHASONE SODIUM PHOSPHATE 8 MG: 4 INJECTION, SOLUTION INTRAMUSCULAR; INTRAVENOUS at 15:04

## 2021-06-02 RX ADMIN — ONDANSETRON HYDROCHLORIDE 4 MG: 2 INJECTION, SOLUTION INTRAMUSCULAR; INTRAVENOUS at 17:25

## 2021-06-02 RX ADMIN — KETAMINE HYDROCHLORIDE 30 MG: 10 INJECTION, SOLUTION INTRAMUSCULAR; INTRAVENOUS at 15:05

## 2021-06-02 RX ADMIN — GABAPENTIN 100 MG: 100 CAPSULE ORAL at 22:48

## 2021-06-02 RX ADMIN — Medication: at 18:40

## 2021-06-02 RX ADMIN — FENTANYL CITRATE 50 MCG: 50 INJECTION, SOLUTION INTRAMUSCULAR; INTRAVENOUS at 15:05

## 2021-06-02 RX ADMIN — CEFAZOLIN 3 G: 1 INJECTION, POWDER, FOR SOLUTION INTRAMUSCULAR; INTRAVENOUS; PARENTERAL at 14:55

## 2021-06-02 RX ADMIN — PHENYLEPHRINE HYDROCHLORIDE 25 MCG/MIN: 10 INJECTION INTRAVENOUS at 15:22

## 2021-06-02 RX ADMIN — Medication 40 MCG: at 14:44

## 2021-06-02 RX ADMIN — Medication 10 MG: at 14:44

## 2021-06-02 RX ADMIN — ACETAMINOPHEN 650 MG: 325 TABLET ORAL at 10:59

## 2021-06-02 RX ADMIN — HYDROMORPHONE HYDROCHLORIDE 0.5 MG: 2 INJECTION, SOLUTION INTRAMUSCULAR; INTRAVENOUS; SUBCUTANEOUS at 18:06

## 2021-06-02 RX ADMIN — INSULIN LISPRO 3 UNITS: 100 INJECTION, SOLUTION INTRAVENOUS; SUBCUTANEOUS at 22:45

## 2021-06-02 RX ADMIN — MIDAZOLAM 2 MG: 1 INJECTION INTRAMUSCULAR; INTRAVENOUS at 14:16

## 2021-06-02 RX ADMIN — Medication 10 ML: at 22:55

## 2021-06-02 NOTE — ANESTHESIA PREPROCEDURE EVALUATION
Relevant Problems   ENDOCRINE   (+) Morbid obesity ([facility], [address]       Anesthetic History   No history of anesthetic complications            Review of Systems / Medical History  Patient summary reviewed, nursing notes reviewed and pertinent labs reviewed    Pulmonary  Within defined limits                 Neuro/Psych   Within defined limits      Psychiatric history     Cardiovascular  Within defined limits  Hypertension        Dysrhythmias : SVT      Exercise tolerance: >4 METS  Comments: Cath 12/19:  70% LAD w/good flow and 30% Cx lesion. No stenting.    GI/Hepatic/Renal  Within defined limits   GERD: well controlled      PUD     Endo/Other    Diabetes: well controlled, type 2  Hypothyroidism: well controlled  Morbid obesity and arthritis     Other Findings   Comments: Signif back/leg pain         Physical Exam    Airway  Mallampati: II  TM Distance: > 6 cm  Neck ROM: normal range of motion   Mouth opening: Normal     Cardiovascular  Regular rate and rhythm,  S1 and S2 normal,  no murmur, click, rub, or gallop             Dental  No notable dental hx       Pulmonary  Breath sounds clear to auscultation               Abdominal  GI exam deferred       Other Findings            Anesthetic Plan    ASA: 3  Anesthesia type: general          Induction: Intravenous  Anesthetic plan and risks discussed with: Patient

## 2021-06-02 NOTE — PROGRESS NOTES
Called and spoke to patient sister Kush Barron and informed her that surgery was delayed.     2:13 spoke to patient sister and  informed her that surgery should be happening in the next 30 minutes

## 2021-06-02 NOTE — PROGRESS NOTES
Occupational Therapy Screening:  Services are indicated. Order is required s/p surgery. An InBasket screening referral was triggered for occupational therapy based on results obtained during the nursing admission assessment. The patients chart was reviewed and the patient is appropriate for a skilled therapy evaluation. Please order a consult for occupational therapy if you are in agreement and would like an evaluation to be completed. Thank you.     Surya Estrada OT

## 2021-06-02 NOTE — BRIEF OP NOTE
Brief Postoperative Note    Patient: Armando Morales  YOB: 1950  MRN: [id number]    Date of Procedure: 6/2/2021     Pre-Op Diagnosis: SCIATICA  DDD  SPONDYLOLISTHESIS    Post-Op Diagnosis: Same as preoperative diagnosis. Procedure(s):  L2-3 AND L5-S1  LUMBAR LAMINECTOMY WITH ANDREWS OSTEOTOMY L2-3 AND L3-4 WITH PLF L2-S1    Surgeon(s):  Ute Colby MD    Surgical Assistant: Physician Assistant: MAIKEL Escalona    Anesthesia: General     Estimated Blood Loss (mL): 680    Complications: None    Specimens: * No specimens in log *     Implants:   Implant Name Type Inv. Item Serial No.  Lot No. LRB No. Used Action   GRAFT BNE SUB L CANC FRZN MORSELIZED W/ VIABLE CELL INDERJIT - [id number]  GRAFT BNE SUB L CANC FRZN MORSELIZED W/ VIABLE CELL INDERJIT [id number] [facility] [id number] N/A 1 Implanted   GRAFT BNE SUB L CANC FRZN MORSELIZED W/ VIABLE CELL INDERJIT - [id number]  GRAFT BNE SUB L CANC FRZN MORSELIZED W/ VIABLE CELL INDERJIT [id number] [facility] [id number] N/A 1 Implanted   GRAFT BNE SUB L CANC FRZN MORSELIZED W/ VIABLE CELL INDERJIT - [id number]  GRAFT BNE SUB L CANC FRZN MORSELIZED W/ VIABLE CELL INDERJIT [id number] [facility] [id number] N/A 1 Implanted   SCREW SPNL L45MM DIA6. 5MM POST THORACOLUMBOSACRAL CO CHROM - SNA  SCREW SPNL L45MM DIA6. 5MM POST THORACOLUMBOSACRAL CO CHROM NA [employer] DANEK_WD NA N/A 6 Implanted   SCREW SPNL L50MM DIA6. 5MM POST THORACOLUMBOSACRAL CO CHROM - SNA  SCREW SPNL L50MM DIA6. 5MM POST THORACOLUMBOSACRAL CO CHROM NA [employer] SOFAMOR DANEK_WD NA N/A 2 Implanted   SCREW SPNL L40MM DIA7. 5MM POST THORACOLUMBOSACRAL CO CHROM - SNA  SCREW SPNL L40MM DIA7. 5MM POST THORACOLUMBOSACRAL CO CHROM NA [employer] SOFAMOR DANEK_WD NA N/A 2 Implanted   SET SCR SPNL L6MM DIA5. 5MM TI BRK OFF STARR W/ DETACH [address] - SNA  SET SCR SPNL L6MM DIA5. 5MM TI BRK OFF STARR W/ DETACH [address] NA [employer] SOFAMOR DANEK_WD NA N/A 10 Implanted   SAIRA SPNL L120MM DIA5. 5MM TI ANT POST THORACOLUMBOSACRAL CRV - SNA  SAIRA SPNL L120MM DIA5. 5MM TI ANT POST THORACOLUMBOSACRAL CRV NA [employer] SOFAMOR DANEK_WD NA N/A 2 Implanted   GRAFT BNE SUB 15GM GRN CA COMPND PTTY AND SILICATE BASE INJ - SNA  GRAFT BNE SUB 15GM GRN CA COMPND PTTY AND SILICATE BASE INJ NA [employer] [id number] N/A 1 Implanted   GRAFT BNE SUB 15GM GRN CA COMPND PTTY AND SILICATE BASE INJ - SNA  GRAFT BNE SUB 15GM GRN CA COMPND PTTY AND SILICATE BASE INJ NA [employer] [id number] N/A 1 Implanted       Drains:   Hemovac Right; Lower Back (Active)   Site Assessment Clean, dry, & intact 06/02/21 1720   Dressing Status Clean, dry, & intact 06/02/21 1720   Status Charged 06/02/21 1720       Findings: Degenerative Disc Disease, spondylolisthesis     Electronically Signed by MAIKEL Monaco on 6/2/2021 at 5:41 PM

## 2021-06-02 NOTE — PERIOP NOTES
1508: Sister Pa Harris [phone] updated on start of surgical procedure by RN.     [phone]: sister Abdullahi Hobson updated on surgery progress

## 2021-06-02 NOTE — PERIOP NOTES
TRANSFER - OUT REPORT:    Verbal report given to Vania(name) on Cha Escudero  being transferred to 534(unit) for routine post - op       Report consisted of patients Situation, Background, Assessment and   Recommendations(SBAR). Time Pre op antibiotic given:1455  Anesthesia Stop time: 1371  Rea Present on Transfer to floor:no  Order for Rea on Chart:no  Discharge Prescriptions with Chart:no    Information from the following report(s) SBAR, Kardex, OR Summary, Procedure Summary, Intake/Output, MAR, Recent Results and Med Rec Status was reviewed with the receiving nurse. Opportunity for questions and clarification was provided. Is the patient on 02? YES       L/Min 2       Other     Is the patient on a monitor? NO    Is the nurse transporting with the patient? NO    Surgical Waiting Area notified of patient's transfer from PACU? YES      The following personal items collected during your admission accompanied patient upon transfer:   Dental Appliance: Dental Appliances: None  Vision: Visual Aid: Glasses  Hearing Aid:    Jewelry:    Clothing: Clothing: Pants, Shirt, Footwear (clothing to [employer])  Other Valuables:    Valuables sent to safe:      Clothes and glasses(in clothes bag) sent to floor.

## 2021-06-02 NOTE — ROUTINE PROCESS
Patient: Bull Calderon MRN: [id number]  SSN: [id number] YOB: 1950  Age: 65 y.o. Sex: female Patient is status post Procedure(s): 
L2-3 AND L5-S1  LUMBAR LAMINECTOMY WITH ANDREWS OSTEOTOMY L2-3 AND L3-4 WITH PLF L2-S1. Surgeon(s) and Role: Gardenia Baez MD - Primary Local/Dose/Irrigation:  See Sarika Barajas Peripheral IV 06/02/21 Anterior;Left;Proximal Forearm (Active) Site Assessment Clean, dry, & intact 06/02/21 1118 Phlebitis Assessment 0 06/02/21 1118 Infiltration Assessment 0 06/02/21 1118 Dressing Status Clean, dry, & intact 06/02/21 1118 Dressing Type Transparent;Tape 06/02/21 1118 Hub Color/Line Status Blue; Infusing 06/02/21 1118 Hemovac Right; Lower Back (Active) Site Assessment Clean, dry, & intact 06/02/21 1720 Dressing Status Clean, dry, & intact 06/02/21 1720 Status Charged 06/02/21 1720 Dressing/Packing:    Wound Back-Dressing Type : Other (Comment) (Staples, ABD, tape) (06/02/21 1725)

## 2021-06-03 ENCOUNTER — HOME HEALTH ADMISSION (OUTPATIENT)
Dept: HOME HEALTH SERVICES | Facility: HOME HEALTH | Age: 71
End: 2021-06-03

## 2021-06-03 LAB
ANION GAP SERPL CALC-SCNC: 5 MMOL/L (ref 5–15)
BUN SERPL-MCNC: 18 MG/DL (ref 6–20)
BUN/CREAT SERPL: 20 (ref 12–20)
CALCIUM SERPL-MCNC: 7.8 MG/DL (ref 8.5–10.1)
CHLORIDE SERPL-SCNC: 106 MMOL/L (ref 97–108)
CO2 SERPL-SCNC: 26 MMOL/L (ref 21–32)
CREAT SERPL-MCNC: 0.91 MG/DL (ref 0.55–1.02)
GLUCOSE BLD STRIP.AUTO-MCNC: 147 MG/DL (ref 65–117)
GLUCOSE BLD STRIP.AUTO-MCNC: 157 MG/DL (ref 65–117)
GLUCOSE BLD STRIP.AUTO-MCNC: 216 MG/DL (ref 65–117)
GLUCOSE BLD STRIP.AUTO-MCNC: 226 MG/DL (ref 65–117)
GLUCOSE SERPL-MCNC: 232 MG/DL (ref 65–100)
HGB BLD-MCNC: 11.2 G/DL (ref 11.5–16)
POTASSIUM SERPL-SCNC: 4.8 MMOL/L (ref 3.5–5.1)
SERVICE CMNT-IMP: ABNORMAL
SODIUM SERPL-SCNC: 137 MMOL/L (ref 136–145)

## 2021-06-03 PROCEDURE — 74011636637 HC RX REV CODE- 636/637: Performed by: STUDENT IN AN ORGANIZED HEALTH CARE EDUCATION/TRAINING PROGRAM

## 2021-06-03 PROCEDURE — 82962 GLUCOSE BLOOD TEST: CPT

## 2021-06-03 PROCEDURE — 97116 GAIT TRAINING THERAPY: CPT

## 2021-06-03 PROCEDURE — 36415 COLL VENOUS BLD VENIPUNCTURE: CPT

## 2021-06-03 PROCEDURE — 97530 THERAPEUTIC ACTIVITIES: CPT

## 2021-06-03 PROCEDURE — 74011250636 HC RX REV CODE- 250/636: Performed by: STUDENT IN AN ORGANIZED HEALTH CARE EDUCATION/TRAINING PROGRAM

## 2021-06-03 PROCEDURE — 97166 OT EVAL MOD COMPLEX 45 MIN: CPT

## 2021-06-03 PROCEDURE — 74011250637 HC RX REV CODE- 250/637: Performed by: STUDENT IN AN ORGANIZED HEALTH CARE EDUCATION/TRAINING PROGRAM

## 2021-06-03 PROCEDURE — 77010033678 HC OXYGEN DAILY

## 2021-06-03 PROCEDURE — 97535 SELF CARE MNGMENT TRAINING: CPT

## 2021-06-03 PROCEDURE — 85018 HEMOGLOBIN: CPT

## 2021-06-03 PROCEDURE — 80048 BASIC METABOLIC PNL TOTAL CA: CPT

## 2021-06-03 PROCEDURE — 97161 PT EVAL LOW COMPLEX 20 MIN: CPT

## 2021-06-03 PROCEDURE — 51798 US URINE CAPACITY MEASURE: CPT

## 2021-06-03 PROCEDURE — 74011000258 HC RX REV CODE- 258: Performed by: STUDENT IN AN ORGANIZED HEALTH CARE EDUCATION/TRAINING PROGRAM

## 2021-06-03 PROCEDURE — 65270000029 HC RM PRIVATE

## 2021-06-03 RX ORDER — NALOXONE HYDROCHLORIDE 4 MG/.1ML
SPRAY NASAL
Qty: 1 EACH | Refills: 0 | Status: SHIPPED | OUTPATIENT
Start: 2021-06-03 | End: 2022-01-27 | Stop reason: SDUPTHER

## 2021-06-03 RX ORDER — OXYCODONE HYDROCHLORIDE 5 MG/1
5-10 TABLET ORAL
Qty: 60 TABLET | Refills: 0 | Status: SHIPPED | OUTPATIENT
Start: 2021-06-03 | End: 2021-06-10 | Stop reason: SDUPTHER

## 2021-06-03 RX ADMIN — GABAPENTIN 100 MG: 100 CAPSULE ORAL at 15:30

## 2021-06-03 RX ADMIN — Medication 10 ML: at 22:51

## 2021-06-03 RX ADMIN — POLYETHYLENE GLYCOL 3350 17 G: 17 POWDER, FOR SOLUTION ORAL at 09:15

## 2021-06-03 RX ADMIN — ACETAMINOPHEN 1000 MG: 500 TABLET ORAL at 12:14

## 2021-06-03 RX ADMIN — PANTOPRAZOLE SODIUM 40 MG: 40 TABLET, DELAYED RELEASE ORAL at 18:40

## 2021-06-03 RX ADMIN — ACETAMINOPHEN 1000 MG: 500 TABLET ORAL at 00:56

## 2021-06-03 RX ADMIN — GABAPENTIN 100 MG: 100 CAPSULE ORAL at 09:14

## 2021-06-03 RX ADMIN — METFORMIN HYDROCHLORIDE 500 MG: 500 TABLET ORAL at 07:08

## 2021-06-03 RX ADMIN — ALPRAZOLAM 0.5 MG: 0.5 TABLET ORAL at 07:08

## 2021-06-03 RX ADMIN — ALPRAZOLAM 0.5 MG: 0.5 TABLET ORAL at 22:50

## 2021-06-03 RX ADMIN — OXYCODONE 5 MG: 5 TABLET ORAL at 09:20

## 2021-06-03 RX ADMIN — METFORMIN HYDROCHLORIDE 500 MG: 500 TABLET ORAL at 18:40

## 2021-06-03 RX ADMIN — INSULIN LISPRO 2 UNITS: 100 INJECTION, SOLUTION INTRAVENOUS; SUBCUTANEOUS at 22:46

## 2021-06-03 RX ADMIN — PAROXETINE HYDROCHLORIDE 40 MG: 20 TABLET, FILM COATED ORAL at 07:08

## 2021-06-03 RX ADMIN — OXYCODONE 10 MG: 5 TABLET ORAL at 23:26

## 2021-06-03 RX ADMIN — GABAPENTIN 100 MG: 100 CAPSULE ORAL at 22:50

## 2021-06-03 RX ADMIN — INSULIN LISPRO 3 UNITS: 100 INJECTION, SOLUTION INTRAVENOUS; SUBCUTANEOUS at 07:14

## 2021-06-03 RX ADMIN — Medication 10 ML: at 13:27

## 2021-06-03 RX ADMIN — ACETAMINOPHEN 1000 MG: 500 TABLET ORAL at 05:23

## 2021-06-03 RX ADMIN — CEFAZOLIN 3 G: 10 INJECTION, POWDER, FOR SOLUTION INTRAVENOUS at 07:21

## 2021-06-03 RX ADMIN — LEVOTHYROXINE SODIUM 112 MCG: 0.11 TABLET ORAL at 07:08

## 2021-06-03 RX ADMIN — ASPIRIN 81 MG: 81 TABLET, COATED ORAL at 09:14

## 2021-06-03 RX ADMIN — DOCUSATE SODIUM 50 MG AND SENNOSIDES 8.6 MG 1 TABLET: 8.6; 5 TABLET, FILM COATED ORAL at 09:20

## 2021-06-03 RX ADMIN — AMITRIPTYLINE HYDROCHLORIDE 50 MG: 50 TABLET, FILM COATED ORAL at 22:50

## 2021-06-03 RX ADMIN — ATORVASTATIN CALCIUM 40 MG: 40 TABLET, FILM COATED ORAL at 22:50

## 2021-06-03 RX ADMIN — SODIUM CHLORIDE 125 ML/HR: 9 INJECTION, SOLUTION INTRAVENOUS at 01:29

## 2021-06-03 RX ADMIN — METOPROLOL TARTRATE 12.5 MG: 25 TABLET, FILM COATED ORAL at 23:03

## 2021-06-03 RX ADMIN — INSULIN LISPRO 2 UNITS: 100 INJECTION, SOLUTION INTRAVENOUS; SUBCUTANEOUS at 18:45

## 2021-06-03 RX ADMIN — DOCUSATE SODIUM 50 MG AND SENNOSIDES 8.6 MG 1 TABLET: 8.6; 5 TABLET, FILM COATED ORAL at 18:40

## 2021-06-03 RX ADMIN — ACETAMINOPHEN 1000 MG: 500 TABLET ORAL at 18:40

## 2021-06-03 RX ADMIN — Medication 10 ML: at 05:23

## 2021-06-03 RX ADMIN — INSULIN LISPRO 2 UNITS: 100 INJECTION, SOLUTION INTRAVENOUS; SUBCUTANEOUS at 12:14

## 2021-06-03 NOTE — ANESTHESIA POSTPROCEDURE EVALUATION
Post-Anesthesia Evaluation and Assessment    Patient: Bebe Gonzalez MRN: [id number]  SSN: [id number]    YOB: 1950  Age: 65 y.o. Sex: female      I have evaluated the patient and they are stable and ready for discharge from the PACU. Cardiovascular Function/Vital Signs  Visit Vitals  BP (!) 92/56   Pulse 76   Temp 36.5 °C (97.7 °F)   Resp 16   Ht 5' 4\" (1.626 m)   Wt 122 kg (269 lb)   SpO2 92%   BMI 46.17 kg/m²       Patient is status post General anesthesia for Procedure(s):  L2-3 AND L5-S1  LUMBAR LAMINECTOMY WITH ANDREWS OSTEOTOMY L2-3 AND L3-4 WITH PLF L2-S1. Nausea/Vomiting: None    Postoperative hydration reviewed and adequate. Pain:  Pain Scale 1: Numeric (0 - 10) (06/03/21 0415)  Pain Intensity 1: 0 (06/03/21 0415)   Managed    Neurological Status:   Neuro (WDL): Exceptions to WDL (06/02/21 1909)  Neuro  Neurologic State: Alert (06/02/21 2200)  LUE Motor Response: Purposeful (06/02/21 2200)  LLE Motor Response: Purposeful;Weak (06/02/21 2200)  RUE Motor Response: Purposeful (06/02/21 2200)  RLE Motor Response: Purposeful;Weak (06/02/21 2200)   At baseline    Mental Status, Level of Consciousness: Alert and  oriented to person, place, and time    Pulmonary Status:   O2 Device: Nasal cannula (06/03/21 0415)   Adequate oxygenation and airway patent    Complications related to anesthesia: None    Post-anesthesia assessment completed. No concerns    Signed By: Miranda Ruiz MD     Lennie 3, 2021              Procedure(s):  L2-3 AND L5-S1  LUMBAR LAMINECTOMY WITH ANDREWS OSTEOTOMY L2-3 AND L3-4 WITH PLF L2-S1.    general    <BSHSIANPOST>    INITIAL Post-op Vital signs:   Vitals Value Taken Time   BP 98/55 06/02/21 1930   Temp 36.1 °C (96.9 °F) 06/02/21 1810   Pulse 90 06/02/21 1933   Resp 14 06/02/21 1933   SpO2 93 % 06/02/21 1933   Vitals shown include unvalidated device data.

## 2021-06-03 NOTE — DISCHARGE INSTRUCTIONS
After Hospital Care Plan:  Discharge Instructions Lumbar Fusion Surgery   Dr. Danelle Vargas     Patient Name (Malgorzata Cao)  Date of procedure (@ORDATE)  Date of discharge:  Procedure (Procedure(s):  L2-3 AND L5-S1  LUMBAR LAMINECTOMY WITH ANDREWS OSTEOTOMY L2-3 AND L3-4 WITH PLF L2-S1)  Surgeon (Surgeon(s) and Role: Harsh Richardson MD - Primary)   PCP:    Follow up appointments  -follow up with Dr. Danelle Vargas in 2 weeks. Call [phone] to make an appointment as soon as you get home from the hospital.    [address] [place], [place] [address]y: _________________________   phone:_______________________  The agency will contact you to arrange dates/times for visits. Please call them if you do not hear from them within 24 hours after you are discharged      When to call your Orthopaedic Surgeon:  -Signs of infection-if your incision is red; continues to have drainage; drainage has a foul odor or if you have a persistent fever over 101 degrees for 24 hours  -nausea or vomiting, severe headache  -loss of bowel or bladder function, inability to urinate  -changes in sensation in your arms or legs (numbness, tingling, loss of color)  -increased weakness-greater than before your surgery  -severe pain or pain not relieved by medications  -Signs of a blood clot in your leg-calf pain, tenderness, redness, swelling of lower leg    When to call your Primary Care Physician:  -Concerns about medical conditions such as diabetes, high blood pressure, asthma, congestive heart failure  -Call if blood sugars are elevated, persistent headache or dizziness, coughing or congestion, constipation or diarrhea, burning with urination, abnormal heart rate  When to call 911and go to the nearest emergency room  -acute onset of chest pain, shortness of breath, difficulty breathing    Activity  - Your only exercise should be walking.   Start with short frequent walks and increase your walking distance each day.  -Limit the amount of time you sit to 20-30 minute intervals. Sitting for prolonged periods of time will be uncomfortable for you following surgery.  -Do NOT lift anything over 5 pounds  -Do NOT do any straining, twisting or bending  -When you are in bed, you may lay on your back or on either side. Do NOT lie on your stomach    Brace - if ordered by your surgeon  -If you have a back brace, you should wear your brace at all times when you are out of bed. Do not wear the brace while in bed or showering.  -Remember to always wear a cotton t-shirt underneath your brace.  -Do not bend or twist when your brace is off    Driving  -You may not drive or return to work until instructed by your physician. However, you may ride in the car for short periods of time. Incision Care  -The waterproof brown dressing on your back (Aquacel) is to remain in place for 6 days after your discharge from the hospital.  Have somebody gently peel the dressing off on _________________________________. -To remove the dressing, carefully lift the edge of the dressing and stretch it slightly to break the adhesive seal and remove. See separate sheet for visual instructions.  -You probably have steri strips on your incision (small, white pieces of tape). The dressing might take some of them off as you remove the dressing. This is okay. Any that stay in place will be removed when you see your physician.  -Once you remove the dressing on the 6th day, you may leave your incision open to air, you do not need to apply another dressing.  -Do not rub or apply any lotions or ointments to your incision site. Showering  -You may shower in approximately 4 days after your surgery.      -Leave the dressing on during your shower allowing the water to run over it. Once you get out of the shower, pat the dressing dry. -Reminder- your brace can be removed while showering. Remember to not bend or twist while your brace is off.      -Do not take a tub bath.     Preventing blood clots  -You have been given T.E.D. stockings to wear. Continue to wear these for 7 days after your discharge. Put them on in the morning and take them off at night.      -They are used to increase your circulation and prevent blood clots from forming in your legs    Pain management  -take pain medication as prescribed; decrease the amount you use as your pain lessens  -avoid alcoholic beverages while taking pain medication  -avoid NSAIDS (Aleve, Ibuprofen, Aspirin) until your surgeon approves it  -Please be aware that many medications contain Tylenol. We do not want you to over medicate so please read the information below as a guide. Do not take more than 4 Grams of Tylenol in a 24 hour period. (There are 1000 milligrams in one Gram)  Percocet contains 325 mg of Tylenol per tablet (do not take more than 12 tablets in 24 hours)  Lortab contains 500 mg of Tylenol per tablet (do not take more than 8 tablets in 24 hours)  Norco contains 325 mg of Tylenol per tablet (do not take more than 12 tablets in 24 hours). Diet  -resume usual diet; drink plenty of fluids; eat foods high in fiber  -It is important to have regular bowel movements. Pain medications may cause constipation. You may want to take a stool softener (such as Senokot-S or Colace) to prevent constipation. If constipation occurs, take a laxative (such as Dulcolax tablets, Milk of Magnesia, or a suppository).   Laxatives should only be used if the above preventable measures have failed and you still have not had a bowel movement after three days

## 2021-06-03 NOTE — OP NOTES
[address]   OPERATIVE REPORT    Name:  Wil Lozano  MR#:  [id number]  :  1950  ACCOUNT #:  [de-identified]  DATE OF SERVICE:  2021      PREOPERATIVE DIAGNOSES:  1. 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 2.  Lumbar stenosis L5-S1. POSTOPERATIVE DIAGNOSES:  1. 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 2.  Lumbar stenosis L5-S1. PROCEDURES PERFORMED:  Exploration of fusion, revision laminectomy L2-3, L5-S1 with a Lydia osteotomy at L2-3. Posterior revision fusion L2 to S1 with segmental instrumentation. SURGEON:  Corrine Garcia MD    ASSISTANT:  Ernie Bryant, [facility]    ANESTHESIA:  General    COMPLICATIONS:  No complications. SPECIMENS REMOVED:  NONE    IMPLANTS:  Instrumentation was Medtronic Solera screws. ESTIMATED BLOOD LOSS:  Approximately 300 mL. DRAINS:  One mini Hemovac was placed. INDICATIONS:  This is a very pleasant 64-year-old female with a history of chronic back pain, previous L3 to L5 fusion several years ago, now with junctional stenosis and kyphosis at L2-3 and L3-4 above her previous L3 to L5 fusion. Also junctional stenosis at L5-S1 with radicular symptoms. The patient understood the risks and benefits and elected to proceed. PROCEDURE:  The patient was identified, brought to the operative suite, underwent general anesthesia without difficulty. 2 g of Ancef was given to the patient preoperatively. The patient was placed prone on the open Freddie Yinka table with all bony prominences well padded. Back was prepped and draped sterilely. After prepping and draping, time-out was confirmed. A midline incision was made using the previous surgical site. We dissected above and below. We exposed the previous instrumentation hardware which demonstrated stable fusion at L3 to L5.   We cleared off the fusion mass as well as transverse processes from L2 as well as the L5-S1 facet and the sacral ala. At that time, we started a wide revision laminectomy, removed the remainder of the L3 lamina and the inferior portion of L2 and did a wide lumbar decompression for the central canal stenosis. Ligamentum was resected as well as the hypertrophic facets and we continued the decompression through the pars region bilaterally for Lydia osteotomies, particularly at the L2-3 level. We also then explored L5-S1 level, removed the interspinous ligament at L5-S1. The residual L5 lamina was identified. Central laminectomy was started with undercutting the L5 lamina until there was release of the ligamentum. We resected the ligamentum and did partial facet resection for decompression of the exiting S1 nerve roots bilaterally as well as undercutting the superior articular process for decompression of the exiting L5 nerve roots as well. After satisfactory decompressions, we used the standard bony landmarks to cannulate the pedicles of S1 and we tapped these and then placed 7.5 x 40 mm Medtronic Solera screws. At L2, we did similar technique. We used a  hole to start the pedicle using standard bony landmark as a target followed by a gearshift to 45 mm. We then tested with a ball tip probe as well as neuromonitoring and then we placed Medtronic Solera screws there as well. These tested out greater than 20 milliamps. X-rays demonstrated good fixation. We redirected the pedicle screws of L5 and then removed the previous pedicle screws at L3 and L4 and replaced them with Medtronic Solera screws. Wounds were irrigated with Irrisept as well as pulse irrigation followed by decortication of the previous fusion mass as well as transverse processes at L2, L3, and then sacral ala and the facet joint. We then contoured 120 mm rods to the pedicle screws and attached to the pedicle screws and set screws and final tightening was performed. X-rays demonstrated stable fixation.   Wounds were thoroughly irrigated. Bone graft was packed into the lateral gutters. Mini Hemovac placed. Interrupted Stratafix for the fascial layer, 2-0 Stratafix for the subcutaneous layer, and ZipLine on the skin. The patient returned to the PACU in stable condition. The physician assistant was present during the entire operative procedure and assisted in all critical elements of the surgery. No surgical assist or resident was available.      Coreen Garcia MD      [id number]  D:  06/03/2021 8:35  T:  06/03/2021 16:46  JOB #:  [id number]

## 2021-06-03 NOTE — PROGRESS NOTES
Problem: Falls - Risk of  Goal: *Absence of Falls  Description: Document Jigar Griffith Fall Risk and appropriate interventions in the flowsheet.   Outcome: Progressing Towards Goal  Note: Fall Risk Interventions:  Mobility Interventions: PT Consult for mobility concerns, PT Consult for assist device competence, Patient to call before getting OOB, OT consult for ADLs         Medication Interventions: Teach patient to arise slowly, Patient to call before getting OOB    Elimination Interventions: Call light in reach    History of Falls Interventions: Room close to nurse's station, Door open when patient unattended         Problem: Patient Education: Go to Patient Education Activity  Goal: Patient/Family Education  Outcome: Progressing Towards Goal     Problem: Patient Education: Go to Patient Education Activity  Goal: Patient/Family Education  Outcome: Progressing Towards Goal     Problem: Patient Education: Go to Patient Education Activity  Goal: Patient/Family Education  Outcome: Progressing Towards Goal

## 2021-06-03 NOTE — ROUTINE PROCESS
Primary Nurse Tom Pantoja RN and Nayana Aguilera RN performed a dual skin assessment on this patient No impairment noted Neal score is 21

## 2021-06-03 NOTE — PROGRESS NOTES
Problem: Mobility Impaired (Adult and Pediatric)  Goal: *Acute Goals and Plan of Care (Insert Text)  Description: FUNCTIONAL STATUS PRIOR TO ADMISSION: Patient was modified independent using no device for ambulation but sits in wheelchair during ADLs d/t back pain. HOME SUPPORT PRIOR TO ADMISSION: The patient lived with  who can assist with dressing ADLs as needed  can not provide physical assist.    Physical Therapy Goals  Initiated 6/3/2021    1. Patient will move from supine to sit and sit to supine , scoot up and down, and roll side to side in bed with modified independence within 4 days. 2. Patient will perform sit to stand with modified independence within 4 days. 3. Patient will ambulate with modified independence for 125 feet with the least restrictive device within 4 days. 4. Patient will ascend/descend ramp with bilateral railings with modified independence within 4 days. 5. Patient will verbalize and demonstrate understanding of spinal precautions (No bending, lifting greater than 5 lbs, or twisting; log-roll technique; frequent repositioning as instructed) within 4 days. 6/3/2021 1517 by Columba Barrow PT  Outcome: Progressing Towards Goal   PHYSICAL THERAPY TREATMENT  Patient: Regino Cancer (6 y.o. female)  Date: 6/3/2021  Diagnosis: Lumbar stenosis with neurogenic claudication [M48.062] <principal problem not specified>  Procedure(s) (LRB):  L2-3 AND L5-S1  LUMBAR LAMINECTOMY WITH ANDREWS OSTEOTOMY L2-3 AND L3-4 WITH PLF L2-S1 (N/A) 1 Day Post-Op  Precautions: Fall, Back No bending, no lifting greater than 5 lbs, no twisting, log-roll technique, repositioning every 20-30 min except when sleeping, brace when OOB (if ordered)  Chart, physical therapy assessment, plan of care and goals were reviewed. ASSESSMENT  Patient continues with skilled PT services and is progressing towards goals. Pt continues with hypotension however stable and increasing with activity.  Pt requires less assistance this afternoon and mobilizes to EOB with Min A, stands with support from [address] and ambulates short distance into hallway. Noted bilateral knee softening with initial standing balance and pre-gait activities at EOB though once mobilizing remains steady. Tolerated short gait distance well. One report of dizziness with 180 degree turn in hallway. Left up in chair with all needs in reach. Pt requires frequent reinforcement of directions and back precautions for safety. Pt's  has dementia and she reports he falls daily. She endorses that she plans to continue assisting him with transfers upon discharge. Educated on importance of not assisting with transfers d/t high fall risk, inability to maintain neutral spine, increased load on spine and impaired balance. Pt reluctant to verbalize understanding. She would benefit from increased assistance for the care of her  at discharge. Current Level of Function Impacting Discharge (mobility/balance): Min A bed mobility, ambulated 40ft    Other factors to consider for discharge: cares for  with dementia         PLAN :  Patient continues to benefit from skilled intervention to address the above impairments. Continue treatment per established plan of care. to address goals. Recommendation for discharge: (in order for the patient to meet his/her long term goals)  Physical therapy at least 2 days/week in the home +home care aides for her     This discharge recommendation:  Has been made in collaboration with the attending provider and/or case management    IF patient discharges home will need the following DME: patient owns DME required for discharge       SUBJECTIVE:   Patient stated I can use the walker.  They told me I needed to.    OBJECTIVE DATA SUMMARY:   Critical Behavior:  Neurologic State: Alert     Cognition: Follows commands       Spinal diagnosis intervention:  The patient stated 2/3 back precautions when prompted. Reviewed all 3 back precautions, log roll technique, and sitting for 30 minutes at a time. The patient required verbal cues to maintain back precautions during functional activity. Reviewed back brace application and wear schedule. Brace donned with minimal assistance. Functional Mobility Training:    Bed Mobility:  Log Rolling: Minimum assistance  Supine to Sit: Minimum assistance  Sit to Supine: Moderate assistance  Scooting: Stand-by assistance        Transfers:  Sit to Stand: Minimum assistance;Assist x1  Stand to Sit: Minimum assistance;Assist x1                             Balance:  Sitting: Intact  Standing: Impaired  Standing - Static: Good;Constant support  Standing - Dynamic : Fair  Ambulation/Gait Training:  Distance (ft): 40 Feet (ft)  Assistive Device: Gait belt;Walker, rolling;Brace/Splint  Ambulation - Level of Assistance: Contact guard assistance (chair follow for safety)        Gait Abnormalities: Decreased step clearance;Trunk sway increased        Base of Support: Widened     Speed/Catherine: Pace decreased (<100 feet/min)  Step Length: Right shortened;Left shortened                   Activity Tolerance:   Fair and requires rest breaks    After treatment patient left in no apparent distress:   Sitting in chair and Call bell within reach    COMMUNICATION/COLLABORATION:   The patients plan of care was discussed with: Registered nurse.      Mohini Bishop, PT   Time Calculation: 38 mins

## 2021-06-03 NOTE — PROGRESS NOTES
Orthopedic Spine Progress Note  Post Op day: 1 Day Post-Op    Lennie 3, 2021 8:30 AM   Admit Date: 2021  Procedure: Procedure(s):  L2-3 AND L5-S1  LUMBAR LAMINECTOMY WITH ANDREWS OSTEOTOMY L2-3 AND L3-4 WITH PLF L2-S1    Subjective:     Henrry Rashid has no complaints. No leg pain. Moving well  Tolerating diet. No N/V. Pain Control:   Pain Assessment  Pain Scale 1: Numeric (0 - 10)  Pain Intensity 1: 0  Pain Intervention(s) 1: Medication (see MAR) (medicated by CRNA at handoff)    Objective:          Physical Exam:  General:  Alert and oriented. No acute distress. Heart:  Respirations unlabored. Abdomen:   Extremities: Soft, non-tender. No evidence of cyanosis. Pulses palpable in both upper and lower extremities. Neurologic:  Musculoskeletal:  No new motor deficits. Neurovascular exam within normal limits. Sensation stable. Motor: unchanged C5-T1 and L2-S1. Neema's sign negative in bilateral lower extremities. Calves soft, nontender upon palpation and with passive twitch. Moves both upper and lower extremities. Incision: clean, dry, and intact. No significant erythema or swelling. No active drainage noted. Vital Signs:    Blood pressure (!) 92/56, pulse 76, temperature 97.7 °F (36.5 °C), resp. rate 16, height 5' 4\" (1.626 m), weight 122 kg (269 lb), SpO2 92 %. Temp (24hrs), Av.3 °F (36.3 °C), Min:96.6 °F (35.9 °C), Max:98.4 °F (36.9 °C)      LAB:    Recent Labs     21  0521  1125   HCT  --  42.4   HGB 11.2* 13.8   PLT  --  329     Lab Results   Component Value Date/Time    Sodium 137 2021 05:21 AM    Potassium 4.8 2021 05:21 AM    Chloride 106 2021 05:21 AM    CO2 26 2021 05:21 AM    Glucose 232 (H) 2021 05:21 AM    BUN 18 2021 05:21 AM    Creatinine 0.91 2021 05:21 AM    Calcium 7.8 (L) 2021 05:21 AM       Intake/Output:No intake/output data recorded.    190 -  0700  In: 2300 [I.V.:2300]  Out: 630 [Urine:100; Drains:230]    PT/OT:   Gait:                    Assessment:   Patient is 1 Day Post-Op s/p Procedure(s):  L2-3 AND L5-S1  LUMBAR LAMINECTOMY WITH ANDREWS OSTEOTOMY L2-3 AND L3-4 WITH PLF L2-S1    Plan:     1. Continue PT/OT  2. Continue established methods of pain control  3. VTE Prophylaxes - TEDS &/or SCDs   4. Regular diet today  5.   Home health consult      Signed By: Herman Phelps MD

## 2021-06-03 NOTE — PROGRESS NOTES
Problem: Self Care Deficits Care Plan (Adult)  Goal: *Therapy Goal (Edit Goal, Insert Text)  Description: FUNCTIONAL STATUS PRIOR TO ADMISSION: Patient required minimal assistance for basic and instrumental ADLs. HOME SUPPORT: The patient lived with spouse who provides assistance with footwear management, family assists with home management tasks. Occupational Therapy Goals  Initiated 6/3/2021    1. Patient will perform lower body dressing with supervision/set-up using AE PRN within 4 days. 2.  Patient will perform toileting with supervision/set-up using most appropriate DME within 4 days. 3.  Patient will toilet transfer at supervision/set-up within 4 days. 4.  Patient will don/doff back brace at supervision/set-up within 4 days. 5.  Patient will verbalize/demonstrate 3/3 back precautions during ADL tasks without cues within 4 days. Outcome: Not Met  OCCUPATIONAL THERAPY EVALUATION  Patient: Bebe Gonzalez (20 y.o. female)  Date: 6/3/2021  Primary Diagnosis: Lumbar stenosis with neurogenic claudication [M48.062]  Procedure(s) (LRB):  L2-3 AND L5-S1  LUMBAR LAMINECTOMY WITH ANDREWS OSTEOTOMY L2-3 AND L3-4 WITH PLF L2-S1 (N/A) 1 Day Post-Op   Precautions:   Fall, Back    ASSESSMENT  Based on the objective data described below, the patient presents with limited participation due to low BP. Patient completed basic ADL tasks and related mobility with Mod I with the following exceptions: spouse assist for footwear management, and very poor activity endurance reported. From history provided, patient struggled with completing home management tasks, and was at increased risk for falls due to \"furniture walking\" over use of ambulation with appropriate A.D. Patient has goals for improved participation in IADL tasks.  Home health OT services recommended at [place] to aid in safe transition home, and increased ADL/IADL independence     Current Level of Function Impacting Discharge (ADLs/self-care): min A UB ADL, Max A LB ADL, Mod-max A toileting     Functional Outcome Measure: The patient scored Total: 55/100 on the Barthel Index outcome measure which is indicative of 45% impaired ability to care for basic self needs/dependency on others; inferred 100% dependency on others for instrumental ADLs. Other factors to consider for discharge: spouse has dementia and other health issues- is unable to provide assistance      Patient will benefit from skilled therapy intervention to address the above noted impairments. PLAN :  Recommendations and Planned Interventions: self care training, functional mobility training, therapeutic exercise, balance training, therapeutic activities, endurance activities, neuromuscular re-education, patient education, home safety training, and family training/education    Frequency/Duration: Patient will be followed by occupational therapy 5 times a week to address goals. Recommendation for discharge: (in order for the patient to meet his/her long term goals)  Occupational therapy at least 2 days/week in the home     This discharge recommendation:  Has been made in collaboration with the attending provider and/or case management    IF patient discharges home will need the following DME: patient owns DME required for discharge       SUBJECTIVE:   Patient pleasant and cooperative    OBJECTIVE DATA SUMMARY:   HISTORY:   Past Medical History:   Diagnosis Date    Adverse effect of anesthesia     O2 DROPS WITH ANESTHESIA    Arthritis     KNEES    Cancer ([facility] [address] 03/13/2015    SQUAMOUS CELL CARCINOMA; tonsils and lymph nodes.   Removed 3-2015 with radiation    GERD (gastroesophageal reflux disease)     Hypertension     NO LONGER ON MEDICATION    Hypothyroid     HYPOTHYROIDISM    Migraine     Nausea & vomiting     Obesity     Other ill-defined conditions(799.89)     chronic back pain    Psychiatric disorder     anxiety    Psychiatric disorder     panic ATTACKS    SVT (supraventricular tachycardia) ([facility] [address] 05/24/2014    Ulcerative colitis      Past Surgical History:   Procedure Laterality Date    COLONOSCOPY,DIAGNOSTIC  11/19/2015         HX GI      colonscopy    HX HEENT  03/2015    tonsillectomy (CANCER) AND NECK LYMPH NODES    HX HEENT  2008    PARATHYROID REMOVAL    HX HEENT Left 2002    EYE LASER    HX HEMORRHOIDECTOMY  2001, 2016     X2    HX HYSTERECTOMY  1985    HX KNEE ARTHROSCOPY  1995    left knee surgery, TOTAL LT  and Right KNEE 2013    HX KNEE REPLACEMENT Bilateral 2013, 2014    HX LAP CHOLECYSTECTOMY  2002    HX LUMBAR FUSION  2011    SPINAL FUSION with hardware L4-L5    HX ORTHOPAEDIC  1990    CYST REMOVED RIGHT WRIST    HX ORTHOPAEDIC  05/12/2021    L2-3 & L5-21 LUMBAR LAMINECTOMY WITH ANDREWS OSTEOTOMY    HX OTHER SURGICAL      cyst removal right wrist    HX UROLOGICAL  2010    bladder surgery(interstim device)    IR INJ FORAMIN EPID LUMB ANES/STER SNGL  8/19/2019    DE EGD TRANSORAL BIOPSY SINGLE/MULTIPLE  5/20/2013            Expanded or extensive additional review of patient history:     Home Situation  Home Environment: Private residence  # Steps to Enter:  (Ramp)  Rails to Enter: Yes  Hand Rails : Bilateral  Wheelchair Ramp: Yes  One/Two Story Residence: One story  Living Alone: No  Support Systems: Spouse/Significant Other/Partner  Patient Expects to be Discharged to[de-identified] [employer]  Current DME Used/[facility] at [facility]: Wheelchair, Transfer bench, Commode, bedside, Walker, rolling, Cane, straight  Tub or Shower Type: Tub/Shower combination    Hand dominance: Right    EXAMINATION OF PERFORMANCE DEFICITS:  Cognitive/Behavioral Status:  Neurologic State: Alert     Cognition: Follows commands        Hearing:   Auditory  Auditory Impairment: None  Hearing Aids/Status: Does not own    Vision/Perceptual:                                Corrective Lenses: Glasses    Range of Motion:    AROM: Generally decreased, functional                         Strength:    Strength: Generally decreased, functional Coordination:  Coordination: Within functional limits  Fine Motor Skills-Upper: Left Intact; Right Intact    Gross Motor Skills-Upper: Left Intact; Right Intact    Tone & Sensation:    Tone: Normal  Sensation: Impaired                      Balance:  Sitting: Intact  Standing: Impaired  Standing - Static: Fair  Standing - Dynamic : Fair    Functional Mobility and Transfers for ADLs:  Bed Mobility:  Rolling: Minimum assistance  Supine to Sit: Moderate assistance  Sit to Supine: Moderate assistance  Scooting: Minimum assistance    Transfers:  Sit to Stand: Minimum assistance;Assist x2  Stand to Sit: Minimum assistance;Assist x2    ADL Assessment:  Feeding: Independent    Oral Facial Hygiene/Grooming: Supervision    Bathing: Moderate assistance    Upper Body Dressing: Minimum assistance    Lower Body Dressing: Maximum assistance    Toileting: Moderate assistance                ADL Intervention and task modifications:     Patient educated briefly on use of AE for LB ADL tasks. Patient is familiar with some of the AE, continued instruction recommended             Functional Measure:  Barthel Index:    Bathin  Bladder: 10  Bowels: 10  Groomin  Dressin  Feeding: 10  Mobility: 5  Stairs: 0  Toilet Use: 5  Transfer (Bed to Chair and Back): 5  Total: 55/100        The Barthel ADL Index: Guidelines  1. The index should be used as a record of what a patient does, not as a record of what a patient could do. 2. The main aim is to establish degree of independence from any help, physical or verbal, however minor and for whatever reason. 3. The need for supervision renders the patient not independent. 4. A patient's performance should be established using the best available evidence. Asking the patient, friends/relatives and nurses are the usual sources, but direct observation and common sense are also important. However direct testing is not needed.   5. Usually the patient's performance over the preceding 24-48 hours is important, but occasionally longer periods will be relevant. 6. Middle categories imply that the patient supplies over 50 per cent of the effort. 7. Use of aids to be independent is allowed. [employer] Barthel, D.W. (0100). Functional evaluation: the Barthel Index. [facility] (14)2. MU Melchor, Mary Ann Cadena., Ede Cespedes., [place], [address] [place] [address] (1999). Measuring the change indisability after inpatient rehabilitation; comparison of the responsiveness of the Barthel Index and Functional [place] Measure. Journal of Neurology, Neurosurgery, and Psychiatry, 66(4), 053-435. Ace Pandey, N.J.A, KATIE Hunter, & Terri Zapata M.A. (2004.) Assessment of post-stroke quality of life in cost-effectiveness studies: The usefulness of the Barthel Index and the EuroQoL-5D. Quality of Life Research, 15, 755-99         Occupational Therapy Evaluation Charge Determination   History Examination Decision-Making   MEDIUM Complexity : Expanded review of history including physical, cognitive and psychosocial  history  MEDIUM Complexity : 3-5 performance deficits relating to physical, cognitive , or psychosocial skils that result in activity limitations and / or participation restrictions MEDIUM Complexity : Patient may present with comorbidities that affect occupational performnce.  Miniml to moderate modification of tasks or assistance (eg, physical or verbal ) with assesment(s) is necessary to enable patient to complete evaluation       Based on the above components, the patient evaluation is determined to be of the following complexity level: MEDIUM  Pain Rating:  Controlled during session     Activity Tolerance:   Fair  Low BP limiting participation    After treatment patient left in no apparent distress:    Supine in bed, Call bell within reach, and Caregiver / family present    COMMUNICATION/EDUCATION:   The patients plan of care was discussed with: Physical therapist.     Home safety education was provided and the patient/caregiver indicated understanding. and Patient/family have participated as able in goal setting and plan of care. This patients plan of care is appropriate for delegation to SANDRINE.     Thank you for this referral.  Ani Danielle OT  Time Calculation: 32 mins

## 2021-06-03 NOTE — PROGRESS NOTES
Problem: Mobility Impaired (Adult and Pediatric)  Goal: *Acute Goals and Plan of Care (Insert Text)  Description: FUNCTIONAL STATUS PRIOR TO ADMISSION: Patient was modified independent using no device for ambulation but sits in wheelchair during ADLs d/t back pain. HOME SUPPORT PRIOR TO ADMISSION: The patient lived with  who can assist with dressing ADLs as needed but not physical assistance. Physical Therapy Goals  Initiated 6/3/2021    1. Patient will move from supine to sit and sit to supine , scoot up and down, and roll side to side in bed with modified independence within 4 days. 2. Patient will perform sit to stand with modified independence within 4 days. 3. Patient will ambulate with modified independence for 125 feet with the least restrictive device within 4 days. 4. Patient will ascend/descend ramp with bilateral railings with modified independence within 4 days. 5. Patient will verbalize and demonstrate understanding of spinal precautions (No bending, lifting greater than 5 lbs, or twisting; log-roll technique; frequent repositioning as instructed) within 4 days. Outcome: Progressing Towards Goal   PHYSICAL THERAPY EVALUATION  Patient: Mirian Giordano (24 y.o. female)  Date: 6/3/2021  Primary Diagnosis: Lumbar stenosis with neurogenic claudication [M48.062]  Procedure(s) (LRB):  L2-3 AND L5-S1  LUMBAR LAMINECTOMY WITH ANDREWS OSTEOTOMY L2-3 AND L3-4 WITH PLF L2-S1 (N/A) 1 Day Post-Op   Precautions:   Fall, Back    ASSESSMENT  Based on the objective data described below, the patient presents with hypotension, dizziness in standing, generalized weakness, impaired standing balance, high anxiety in regards to pain and mobility and functional mobility below baseline following lumbar lami/fusion POD#1. Pt reports this is her third back surgery. Educated on back precautions, log rolling and brace use.  Pt requires Mod A to come to sitting EOB, displays intact balance and performs three stands from EOB with Min A x 2(bilateral HHA). Pt with increased dizziness on each subsequent stand with BPs 80s/50s(baseline supine as well). Returned to supine d/t continued dizziness and increased drainage from below incision with hemovac intact and charged. Set up with breakfast tray. Sister in room and all needs in reach. Current Level of Function Impacting Discharge (mobility/balance): Mod A bed mobility, Min A x 2 to stand    Functional Outcome Measure: The patient scored 6/28 on the Tinetti outcome measure which is indicative of high fall risk. Other factors to consider for discharge: hypotensive, dizzy, incision drainage,  can not provide physical assist     Patient will benefit from skilled therapy intervention to address the above noted impairments. PLAN :  Recommendations and Planned Interventions: bed mobility training, transfer training, gait training, therapeutic exercises, neuromuscular re-education, patient and family training/education, and therapeutic activities      Frequency/Duration: Patient will be followed by physical therapy:  twice daily to address goals. Recommendation for discharge: (in order for the patient to meet his/her long term goals)  Physical therapy at least 2 days/week in the home pending progress acutely possibly IPR    This discharge recommendation:  Has been made in collaboration with the attending provider and/or case management    IF patient discharges home will need the following DME: patient owns DME required for discharge         SUBJECTIVE:   Patient stated Maxwell Morley had to sit down in my wheelchair. I couldn't stand to cook.     OBJECTIVE DATA SUMMARY:   HISTORY:    Past Medical History:   Diagnosis Date    Adverse effect of anesthesia     O2 DROPS WITH ANESTHESIA    Arthritis     KNEES    Cancer ([facility] [address] 03/13/2015    SQUAMOUS CELL CARCINOMA; tonsils and lymph nodes.   Removed 3-2015 with radiation    GERD (gastroesophageal reflux disease)     Hypertension     NO LONGER ON MEDICATION    Hypothyroid     HYPOTHYROIDISM    Migraine     Nausea & vomiting     Obesity     Other ill-defined conditions(799.89)     chronic back pain    Psychiatric disorder     anxiety    Psychiatric disorder     panic ATTACKS    SVT (supraventricular tachycardia) ([facility] [place] [address] 05/24/2014    Ulcerative colitis      Past Surgical History:   Procedure Laterality Date    COLONOSCOPY,DIAGNOSTIC  11/19/2015         HX GI      colonscopy    HX HEENT  03/2015    tonsillectomy (CANCER) AND NECK LYMPH NODES    HX HEENT  2008    PARATHYROID REMOVAL    HX HEENT Left 2002    EYE LASER    HX HEMORRHOIDECTOMY  2001, 2016     X2    HX HYSTERECTOMY  1985    HX KNEE ARTHROSCOPY  1995    left knee surgery, TOTAL LT  and Right KNEE 2013    HX KNEE REPLACEMENT Bilateral 2013, 2014    HX LAP CHOLECYSTECTOMY  2002    HX LUMBAR FUSION  2011    SPINAL FUSION with hardware L4-L5    HX ORTHOPAEDIC  1990    CYST REMOVED RIGHT WRIST    HX ORTHOPAEDIC  05/12/2021    L2-3 & L5-21 LUMBAR LAMINECTOMY WITH ANDREWS OSTEOTOMY    HX OTHER SURGICAL      cyst removal right wrist    HX UROLOGICAL  2010    bladder surgery(interstim device)    IR INJ FORAMIN EPID LUMB ANES/STER SNGL  8/19/2019    CA EGD TRANSORAL BIOPSY SINGLE/MULTIPLE  5/20/2013            Personal factors and/or comorbidities impacting plan of care: prior back surgeries, hypotension, chronic pain, arthritis, cancer, anxiety    Home Situation  Home Environment: Private residence  # Steps to Enter:  (Ramp)  Rails to Enter: Yes  Hand Rails : Bilateral  Wheelchair Ramp: Yes  One/Two Story Residence: One story  Living Alone: No  Support Systems: Spouse/Significant Other/Partner  Patient Expects to be Discharged to[de-identified] [employer]  Current DME Used/Available at [facility]: Wheelchair, Transfer bench, Commode, bedside, Walker, rolling, Cane, straight  Tub or Shower Type: Tub/Shower combination    EXAMINATION/PRESENTATION/DECISION MAKING:   Critical Behavior:  Neurologic State: Alert     Cognition: Follows commands     Hearing: Auditory  Auditory Impairment: None  Hearing Aids/Status: Does not own  Skin:    Edema:   Range Of Motion:  AROM: Generally decreased, functional                       Strength:    Strength: Generally decreased, functional                    Tone & Sensation:   Tone: Normal              Sensation: Impaired               Coordination:  Coordination: Within functional limits  Vision:   Corrective Lenses: Glasses  Functional Mobility:  Bed Mobility:  Rolling: Minimum assistance  Supine to Sit: Moderate assistance  Sit to Supine: Moderate assistance  Scooting: Minimum assistance  Transfers:  Sit to Stand: Minimum assistance;Assist x2  Stand to Sit: Minimum assistance;Assist x2                       Balance:   Sitting: Intact  Standing: Impaired  Standing - Static: Fair  Standing - Dynamic : Fair  Ambulation/Gait Training:                                                         Stairs: Therapeutic Exercises:       Functional Measure:  Tinetti test:    Sitting Balance: 1  Arises: 0  Attempts to Rise: 0  Immediate Standing Balance: 1  Standing Balance: 0  Nudged: 0  Eyes Closed: 0  Turn 360 Degrees - Continuous/Discontinuous: 0  Turn 360 Degrees - Steady/Unsteady: 0  Sitting Down: 1  Balance Score: 3 Balance total score  Indication of Gait: 1  R Step Length/Height: 0  L Step Length/Height: 0  R Foot Clearance: 1  L Foot Clearance: 1  Step Symmetry: 0  Step Continuity: 0  Path: 0  Trunk: 0  Walking Time: 0  Gait Score: 3 Gait total score  Total Score: 6/28 Overall total score         Tinetti Tool Score Risk of Falls  <19 = High Fall Risk  19-24 = Moderate Fall Risk  25-28 = Low Fall Risk  Tinetti ME. Performance-Oriented Assessment of Mobility Problems in Elderly Patients. [address]; [id number].  (Scoring Description: PT Bulletin Feb. 10, 1993)    Older adults: Jim Mcgrath al, 2009; n = [address] [facility] elderly evaluated with ABC, GILMA, ADL, and IADL)  · Mean GILMA score for males aged 69-68 years = 26.21(3.40)  · Mean GILMA score for females age 69-68 years = 25.16(4.30)  · Mean GILMA score for males over 80 years = 23.29(6.02)  · Mean GILMA score for females over [de-identified] years = 17.20(8.32)            Physical Therapy Evaluation Charge Determination   History Examination Presentation Decision-Making   HIGH Complexity :3+ comorbidities / personal factors will impact the outcome/ POC  MEDIUM Complexity : 3 Standardized tests and measures addressing body structure, function, activity limitation and / or participation in recreation  LOW Complexity : Stable, uncomplicated  Other outcome measures Tinetti  LOW       Based on the above components, the patient evaluation is determined to be of the following complexity level: LOW     Pain Rating:  3/10    Activity Tolerance:   Fair    After treatment patient left in no apparent distress:   Supine in bed, Heels elevated for pressure relief, Call bell within reach, Caregiver / family present, and Side rails x 3    COMMUNICATION/EDUCATION:   The patients plan of care was discussed with: Registered nurse. Fall prevention education was provided and the patient/caregiver indicated understanding., Patient/family have participated as able in goal setting and plan of care. , and Patient/family agree to work toward stated goals and plan of care.     Thank you for this referral.  Song Neal, PT   Time Calculation: 40 mins

## 2021-06-03 NOTE — PROGRESS NOTES
Problem: Falls - Risk of  Goal: *Absence of Falls  Description: Document Lynette Portillo Fall Risk and appropriate interventions in the flowsheet.   Outcome: Progressing Towards Goal  Note: Fall Risk Interventions:  Mobility Interventions: Communicate number of staff needed for ambulation/transfer, PT Consult for mobility concerns, PT Consult for assist device competence, Patient to call before getting OOB         Medication Interventions: Evaluate medications/consider consulting pharmacy, Patient to call before getting OOB, Teach patient to arise slowly    Elimination Interventions: Call light in reach, Patient to call for help with toileting needs, Stay With Me (per policy), Toilet paper/wipes in reach    History of Falls Interventions: Door open when patient unattended, Evaluate medications/consider consulting pharmacy, Room close to nurse's station

## 2021-06-03 NOTE — PROGRESS NOTES
ENEIDA:    RUR 13%    Disposition: Home with HH PT and OT. [facility] accepted patient. Transportation: Son to transport    Primary Contact: Milton Hall) [phone]    Follow up: PCP/Specialist    Transition of Care Plan:     The Plan for Transition of Care is related to the following treatment goals: PT/OT    The Patient and/or patient representative  was provided with a choice of provider and agrees  with the discharge plan. Yes [x] No []    A Freedom of choice list was provided with basic dialogue that supports the patient's individualized plan of care/goals and shares the quality data associated with the providers. Yes [x] No []    Care Management Interventions  PCP Verified by CM:  Yes  Palliative Care Criteria Met (RRAT>21 & CHF Dx)?: No  Mode of Transport at Discharge:  (Son to transport home.)  Transition of Care Consult (CM Consult): [address]: Yes  MyChart Signup: Yes  Discharge Durable Medical Equipment: No  Physical Therapy Consult: Yes  Occupational Therapy Consult: Yes  Speech Therapy Consult: No  Current Support Network: Lives with Spouse  Confirm Follow Up Transport: Family  The Patient and/or Patient Representative was Provided with a Choice of Provider and Agrees with the Discharge Plan?: Yes  Freedom of Choice List was Provided with Basic Dialogue that Supports the Patient's Individualized Plan of Care/Goals, Treatment Preferences and Shares the Quality Data Associated with the Providers?: Yes  Discharge Location  Discharge Placement: Home with home health    Reason for Admission:   L2-3 AND L5-S1  LUMBAR LAMINECTOMY WITH ANDREWS OSTEOTOMY L2-3 AND L3-4 WITH PLF L2-S1                     RUR Score:    13%                 Plan for utilizing home health:      [facility] accepted    PCP: First and Last name:  Madi Fragoso MD     Name of Practice:    Are you a current patient: Yes/No: Yes   Approximate date of last visit: 2 weeks ago   Can you participate in a virtual visit with your PCP: Yes                    Current Advanced Directive/Advance Care Plan: Full Code      Healthcare Decision Maker:   Click here to complete [employer] including selection of the Healthcare Decision Maker Relationship (ie \"Primary\")                             Transition of Care Plan:                    CM met with patient to introduce CM role, verify demographics and begin discharge planning. Patient lives at home with her  in a one story house. There is a ramp to enter the house. Patient is primary caregiver for . Patient has the following DME:Mobile cart, scooter, walker, rollater, BS commode, Shower chair. Patient gets prescriptions filled at [facility]. Insurance verified: Alex Almonte    Patient's son will transport at discharge.     Jerri Gutiérrez RN/CRM  [phone]

## 2021-06-04 LAB
GLUCOSE BLD STRIP.AUTO-MCNC: 147 MG/DL (ref 65–117)
GLUCOSE BLD STRIP.AUTO-MCNC: 151 MG/DL (ref 65–117)
GLUCOSE BLD STRIP.AUTO-MCNC: 164 MG/DL (ref 65–117)
GLUCOSE BLD STRIP.AUTO-MCNC: 184 MG/DL (ref 65–117)
HGB BLD-MCNC: 10.4 G/DL (ref 11.5–16)
SERVICE CMNT-IMP: ABNORMAL

## 2021-06-04 PROCEDURE — 97110 THERAPEUTIC EXERCISES: CPT

## 2021-06-04 PROCEDURE — 65270000029 HC RM PRIVATE

## 2021-06-04 PROCEDURE — 97116 GAIT TRAINING THERAPY: CPT

## 2021-06-04 PROCEDURE — 74011250636 HC RX REV CODE- 250/636: Performed by: PHYSICIAN ASSISTANT

## 2021-06-04 PROCEDURE — 97530 THERAPEUTIC ACTIVITIES: CPT

## 2021-06-04 PROCEDURE — 74011250637 HC RX REV CODE- 250/637: Performed by: STUDENT IN AN ORGANIZED HEALTH CARE EDUCATION/TRAINING PROGRAM

## 2021-06-04 PROCEDURE — 82962 GLUCOSE BLOOD TEST: CPT

## 2021-06-04 PROCEDURE — 74011636637 HC RX REV CODE- 636/637: Performed by: STUDENT IN AN ORGANIZED HEALTH CARE EDUCATION/TRAINING PROGRAM

## 2021-06-04 PROCEDURE — 85018 HEMOGLOBIN: CPT

## 2021-06-04 PROCEDURE — 36415 COLL VENOUS BLD VENIPUNCTURE: CPT

## 2021-06-04 RX ADMIN — GABAPENTIN 100 MG: 100 CAPSULE ORAL at 09:34

## 2021-06-04 RX ADMIN — ALPRAZOLAM 0.5 MG: 0.5 TABLET ORAL at 21:24

## 2021-06-04 RX ADMIN — GABAPENTIN 100 MG: 100 CAPSULE ORAL at 21:25

## 2021-06-04 RX ADMIN — METFORMIN HYDROCHLORIDE 500 MG: 500 TABLET ORAL at 18:09

## 2021-06-04 RX ADMIN — ACETAMINOPHEN 1000 MG: 500 TABLET ORAL at 12:02

## 2021-06-04 RX ADMIN — PANTOPRAZOLE SODIUM 40 MG: 40 TABLET, DELAYED RELEASE ORAL at 18:09

## 2021-06-04 RX ADMIN — INSULIN LISPRO 2 UNITS: 100 INJECTION, SOLUTION INTRAVENOUS; SUBCUTANEOUS at 12:02

## 2021-06-04 RX ADMIN — ALPRAZOLAM 0.5 MG: 0.5 TABLET ORAL at 09:03

## 2021-06-04 RX ADMIN — POLYETHYLENE GLYCOL 3350 17 G: 17 POWDER, FOR SOLUTION ORAL at 09:34

## 2021-06-04 RX ADMIN — LEVOTHYROXINE SODIUM 112 MCG: 0.11 TABLET ORAL at 09:03

## 2021-06-04 RX ADMIN — DOCUSATE SODIUM 50 MG AND SENNOSIDES 8.6 MG 1 TABLET: 8.6; 5 TABLET, FILM COATED ORAL at 18:09

## 2021-06-04 RX ADMIN — ACETAMINOPHEN 1000 MG: 500 TABLET ORAL at 05:43

## 2021-06-04 RX ADMIN — AMITRIPTYLINE HYDROCHLORIDE 50 MG: 50 TABLET, FILM COATED ORAL at 21:25

## 2021-06-04 RX ADMIN — SODIUM CHLORIDE 500 ML: 9 INJECTION, SOLUTION INTRAVENOUS at 16:04

## 2021-06-04 RX ADMIN — PAROXETINE HYDROCHLORIDE 40 MG: 20 TABLET, FILM COATED ORAL at 09:03

## 2021-06-04 RX ADMIN — ASPIRIN 81 MG: 81 TABLET, COATED ORAL at 09:34

## 2021-06-04 RX ADMIN — INSULIN LISPRO 2 UNITS: 100 INJECTION, SOLUTION INTRAVENOUS; SUBCUTANEOUS at 09:01

## 2021-06-04 RX ADMIN — DOCUSATE SODIUM 50 MG AND SENNOSIDES 8.6 MG 1 TABLET: 8.6; 5 TABLET, FILM COATED ORAL at 09:34

## 2021-06-04 RX ADMIN — METOPROLOL TARTRATE 12.5 MG: 25 TABLET, FILM COATED ORAL at 21:25

## 2021-06-04 RX ADMIN — ATORVASTATIN CALCIUM 40 MG: 40 TABLET, FILM COATED ORAL at 21:24

## 2021-06-04 RX ADMIN — CYCLOBENZAPRINE 10 MG: 10 TABLET, FILM COATED ORAL at 05:43

## 2021-06-04 RX ADMIN — INSULIN LISPRO 2 UNITS: 100 INJECTION, SOLUTION INTRAVENOUS; SUBCUTANEOUS at 18:09

## 2021-06-04 RX ADMIN — GABAPENTIN 100 MG: 100 CAPSULE ORAL at 15:19

## 2021-06-04 RX ADMIN — ACETAMINOPHEN 1000 MG: 500 TABLET ORAL at 18:09

## 2021-06-04 RX ADMIN — METFORMIN HYDROCHLORIDE 500 MG: 500 TABLET ORAL at 09:03

## 2021-06-04 RX ADMIN — Medication 10 ML: at 14:06

## 2021-06-04 RX ADMIN — METOPROLOL TARTRATE 25 MG: 25 TABLET, FILM COATED ORAL at 09:03

## 2021-06-04 NOTE — PROGRESS NOTES
Problem: Self Care Deficits Care Plan (Adult)  Goal: *Therapy Goal (Edit Goal, Insert Text)  Description: FUNCTIONAL STATUS PRIOR TO ADMISSION: Patient required minimal assistance for basic and instrumental ADLs. HOME SUPPORT: The patient lived with spouse who provides assistance with footwear management, family assists with home management tasks. DME: Wheelchair, Transfer bench, Commode, bedside, Walker, rolling, Cane, straight      Occupational Therapy Goals  Initiated 6/3/2021    1. Patient will perform lower body dressing with supervision/set-up using AE PRN within 4 days. 2.  Patient will perform toileting with supervision/set-up using most appropriate DME within 4 days. 3.  Patient will toilet transfer at supervision/set-up within 4 days. 4.  Patient will don/doff back brace at supervision/set-up within 4 days. 5.  Patient will verbalize/demonstrate 3/3 back precautions during ADL tasks without cues within 4 days. Outcome: Progressing Towards Goal   OCCUPATIONAL THERAPY TREATMENT  Patient: Russ Handy (71 y.o. female)  Date: 6/4/2021  Diagnosis: Lumbar stenosis with neurogenic claudication [M48.062] <principal problem not specified>  Procedure(s) (LRB):  L2-3 AND L5-S1  LUMBAR LAMINECTOMY WITH ANDREWS OSTEOTOMY L2-3 AND L3-4 WITH PLF L2-S1 (N/A) 2 Days Post-Op  Precautions: Fall, Back  Chart, occupational therapy assessment, plan of care, and goals were reviewed. ASSESSMENT  Patient continues with skilled OT services and is progressing towards goals. ADLs limited today by functional reach, standing tolerance, standing balance, back precautions and pain management. Current Level of Function Impacting Discharge (ADLs): moderate assistance lower body ADLs    Other factors to consider for discharge:  to assist          PLAN :  Patient continues to benefit from skilled intervention to address the above impairments.   Continue treatment per established plan of care to address goals. Recommend with staff: continued OOB to chair, bathroom, completing upper body ADLs    Recommend next OT session: standing ADLs at sink, exercise    Recommendation for discharge: (in order for the patient to meet his/her long term goals)  Occupational therapy at least 2 days/week in the home     This discharge recommendation:  Has not yet been discussed the attending provider and/or case management    IF patient discharges home will need the following DME: AE: long handled bathing and AE: long handled dressing       SUBJECTIVE:   Patient stated This is my 3rd back surgery.     OBJECTIVE DATA SUMMARY:   Cognitive/Behavioral Status:  Neurologic State: Alert; Appropriate for age     Cognition: Appropriate decision making; Appropriate for age attention/concentration; Appropriate safety awareness             ADL Intervention:   Patient received and left right side lying 2* nursing and physician addressing wound care but stepped out for the moment. Patient to remain flat to wait for physician clearance prior to mobilizing. Back precautions, ECT, and techniques handout provided to increase independence with ADLs with back precaution application. Therapeutic Exercises:   Patient instructed on the benefits shoulder flexion exercises to increase independence with ADLs, active ROM, and strength of spine. Patient demonstrated 3 reps and 3 set(s) while supine with Minimum assistance. Patient instructed and demonstrated techniques of activating abdomen and pelvic floor muscles. Patient instructed and indicated understanding how to progress reps, sets, against gravity to then working up to 5 lbs until surgeon clears for increased weight, supine to sitting and then standing. Can use household items as weights. Handout provided.      Pain:  back    Activity Tolerance:   Good    After treatment patient left in no apparent distress:   Supine in bed, Call bell within reach, and Side rails x 3; side lying R    COMMUNICATION/COLLABORATION:   The patients plan of care was discussed with: Registered nurse.      Tran Walker  Time Calculation: 17 mins

## 2021-06-04 NOTE — PROGRESS NOTES
Orthopedic Spine Progress Note  Post Op day: 2 Days Post-Op    2021 7:12 AM   Admit Date: 2021  Procedure: Procedure(s):  L2-3 AND L5-S1  LUMBAR LAMINECTOMY WITH ANDREWS OSTEOTOMY L2-3 AND L3-4 WITH PLF L2-S1    Subjective:     Henrry Rashid has no complaints. Sleeping deeply this am.  Tolerated PT yesterday. Tolerating diet. No N/V. Pain Control:   Pain Assessment  Pain Scale 1: Numeric (0 - 10)  Pain Intensity 1: 7  Pain Onset 1: post op  Pain Location 1: Back  Pain Orientation 1: Lower  Pain Description 1: Aching  Pain Intervention(s) 1: Medication (see MAR)    Objective:          Physical Exam:  General:  Alert and oriented. No acute distress. Heart:  Respirations unlabored. Abdomen:   Extremities: Soft, non-tender. No evidence of cyanosis. Pulses palpable in both upper and lower extremities. Neurologic:  Musculoskeletal:  No new motor deficits. Neurovascular exam within normal limits. Sensation stable. Motor: unchanged C5-T1 and L2-S1. Neema's sign negative in bilateral lower extremities. Calves soft, nontender upon palpation and with passive twitch. Moves both upper and lower extremities. Incision: clean, dry, and intact. No significant erythema or swelling. No active drainage noted. Vital Signs:    Blood pressure (!) 101/59, pulse 73, temperature 98.6 °F (37 °C), resp. rate 18, height 5' 4\" (1.626 m), weight 132 kg (291 lb 0.1 oz), SpO2 90 %.   Temp (24hrs), Av.2 °F (31.2 °C), Min:37.4 °F (3 °C), Max:99.8 °F (37.7 °C)      LAB:    Recent Labs     21  0536 21  1125   HCT  --  42.4   HGB 10.4* 13.8   PLT  --  329     Lab Results   Component Value Date/Time    Sodium 137 2021 05:21 AM    Potassium 4.8 2021 05:21 AM    Chloride 106 2021 05:21 AM    CO2 26 2021 05:21 AM    Glucose 232 (H) 2021 05:21 AM    BUN 18 2021 05:21 AM    Creatinine 0.91 2021 05:21 AM    Calcium 7.8 (L) 2021 05:21 AM Intake/Output:No intake/output data recorded. 06/02 1901 - 06/04 0700  In: 300 [I.V.:300]  Out: 1660 [Urine:1250; Drains:410]    PT/OT:   Gait:  Gait  Base of Support: Widened  Speed/Catherine: Pace decreased (<100 feet/min)  Step Length: Right shortened, Left shortened  Gait Abnormalities: Decreased step clearance, Trunk sway increased  Ambulation - Level of Assistance: Contact guard assistance (chair follow for safety)  Distance (ft): 40 Feet (ft)  Assistive Device: Gait belt, Walker, rolling, Brace/Splint                 Assessment:   Patient is 2 Days Post-Op s/p Procedure(s):  L2-3 AND L5-S1  LUMBAR LAMINECTOMY WITH ANDREWS OSTEOTOMY L2-3 AND L3-4 WITH PLF L2-S1    Plan:     1. Continue PT/OT  2. Continue established methods of pain control  3. VTE Prophylaxes - TEDS &/or SCDs   4. Monitor Hgb  5. Rehab consult  6.   Regular diet      Signed By: Serafin Rader MD

## 2021-06-04 NOTE — PROGRESS NOTES
RN made me aware that incision was draining a lot. Incision is closed with staples but due to body habitus is in a very difficult area for healing. Pt has had wound healing issues with previous spine surgeries. HVAC drain clotted off, little output. Drain removed and blood continuously leaking from incision saturating multiple dressings. Continue packings/dressing changes for now per Dr. Starlet Lundborg team. Patient also states she does not feel well. She states \"overall she just feels terrible\". BP has been low. Saline bolus now. Hgb 10.4 today, recheck tomorrow AM. Hold ASA. Monitor vitals closely. Dr. Ashley Mendez is aware.

## 2021-06-04 NOTE — PROGRESS NOTES
Physical Therapy 6/4/2021    Chart reviewed up to date. Pt reporting fatigue and resting in bed, RN aware. Will follow-up as appropriate. Recommend with nursing patient to complete as able in order to maintain strength, endurance and independence: OOB to chair 3x/day and ambulating with 1 assist x RW. Thank you.   Shari Samayoa, PT, DPT

## 2021-06-04 NOTE — PROGRESS NOTES
Problem: Falls - Risk of  Goal: *Absence of Falls  Description: Document Lani Alfonso Fall Risk and appropriate interventions in the flowsheet.   Outcome: Progressing Towards Goal  Note: Fall Risk Interventions:  Mobility Interventions: OT consult for ADLs, Patient to call before getting OOB, PT Consult for mobility concerns, PT Consult for assist device competence         Medication Interventions: Patient to call before getting OOB, Teach patient to arise slowly    Elimination Interventions: Call light in reach    History of Falls Interventions: Room close to nurse's station, Investigate reason for fall         Problem: Patient Education: Go to Patient Education Activity  Goal: Patient/Family Education  Outcome: Progressing Towards Goal     Problem: Patient Education: Go to Patient Education Activity  Goal: Patient/Family Education  Outcome: Progressing Towards Goal     Problem: Patient Education: Go to Patient Education Activity  Goal: Patient/Family Education  Outcome: Progressing Towards Goal     Problem: Discharge Planning  Goal: *Discharge to safe environment  Outcome: Progressing Towards Goal

## 2021-06-04 NOTE — PROGRESS NOTES
Problem: Falls - Risk of  Goal: *Absence of Falls  Description: Document Jorgito Israel Fall Risk and appropriate interventions in the flowsheet.   Outcome: Progressing Towards Goal  Note: Fall Risk Interventions:  Mobility Interventions: OT consult for ADLs, PT Consult for mobility concerns         Medication Interventions: Patient to call before getting OOB    Elimination Interventions: Call light in reach    History of Falls Interventions: Door open when patient unattended, Room close to nurse's station

## 2021-06-04 NOTE — PROGRESS NOTES
Problem: Mobility Impaired (Adult and Pediatric)  Goal: *Acute Goals and Plan of Care (Insert Text)  Description: FUNCTIONAL STATUS PRIOR TO ADMISSION: Patient was modified independent using no device for ambulation but sits in wheelchair during ADLs d/t back pain. HOME SUPPORT PRIOR TO ADMISSION: The patient lived with  who can assist with dressing ADLs as needed  can not provide physical assist.    Physical Therapy Goals  Initiated 6/3/2021    1. Patient will move from supine to sit and sit to supine , scoot up and down, and roll side to side in bed with modified independence within 4 days. 2. Patient will perform sit to stand with modified independence within 4 days. 3. Patient will ambulate with modified independence for 125 feet with the least restrictive device within 4 days. 4. Patient will ascend/descend ramp with bilateral railings with modified independence within 4 days. 5. Patient will verbalize and demonstrate understanding of spinal precautions (No bending, lifting greater than 5 lbs, or twisting; log-roll technique; frequent repositioning as instructed) within 4 days. Outcome: Progressing Towards Goal   PHYSICAL THERAPY TREATMENT  Patient: Kady Van (29 y.o. female)  Date: 6/4/2021  Diagnosis: Lumbar stenosis with neurogenic claudication [M48.062] <principal problem not specified>  Procedure(s) (LRB):  L2-3 AND L5-S1  LUMBAR LAMINECTOMY WITH ANDREWS OSTEOTOMY L2-3 AND L3-4 WITH PLF L2-S1 (N/A) 2 Days Post-Op  Precautions: Fall, Back No bending, no lifting greater than 5 lbs, no twisting, log-roll technique, repositioning every 20-30 min except when sleeping, brace when OOB (if ordered)  Chart, physical therapy assessment, plan of care and goals were reviewed. ASSESSMENT  Patient continues with skilled PT services and is progressing towards goals. Patient received in bed, good carryover of log roll technique. Pt demonstrates improvements in transfers and balance.  Pt ambulated short distance noting fair steadiness and ability to maintain static balance without support for 15 seconds. Pt utilized commode prior to transferring to chair. Continue to anticipate HHPT upon d/c to further progress with functional mobility independence. Current Level of Function Impacting Discharge (mobility/balance): supervision for ambulation    Other factors to consider for discharge: fall risk, ramp to enter, will have support from friend         PLAN :  Patient continues to benefit from skilled intervention to address the above impairments. Continue treatment per established plan of care. to address goals. Recommendation for discharge: (in order for the patient to meet his/her long term goals)  Physical therapy at least 2 days/week in the home     This discharge recommendation:  Has been made in collaboration with the attending provider and/or case management    IF patient discharges home will need the following DME: patient owns DME required for discharge       SUBJECTIVE:   Patient stated I can get up.     OBJECTIVE DATA SUMMARY:   Critical Behavior:  Neurologic State: Alert     Cognition: Follows commands       Spinal diagnosis intervention:  The patient stated 3/3 back precautions when prompted. Reviewed all 3 back precautions, log roll technique, and sitting for 30 minutes at a time. The patient required verbal cues to maintain back precautions during functional activity. Reviewed back brace application and wear schedule. Brace donned with supervision/set-up      Functional Mobility Training:    Bed Mobility:  Log    Supine to Sit: Contact guard assistance              Transfers:  Sit to Stand: Supervision  Stand to Sit: Supervision                             Balance:  Sitting: Intact  Standing: Intact; With support  Ambulation/Gait Training:  Distance (ft): 60 Feet (ft) (x2)  Assistive Device: Gait belt;Walker, rolling  Ambulation - Level of Assistance: Supervision; Adaptive equipment; Additional time        Gait Abnormalities: Decreased step clearance        Base of Support: Widened     Speed/Catherine: Slow;Pace decreased (<100 feet/min)  \    Activity Tolerance:   Good    After treatment patient left in no apparent distress:   Sitting in chair and Call bell within reach    COMMUNICATION/COLLABORATION:   The patients plan of care was discussed with: Registered nurse.      Osmani Muniz, PT   Time Calculation: 27 mins

## 2021-06-05 LAB
GLUCOSE BLD STRIP.AUTO-MCNC: 124 MG/DL (ref 65–117)
GLUCOSE BLD STRIP.AUTO-MCNC: 142 MG/DL (ref 65–117)
GLUCOSE BLD STRIP.AUTO-MCNC: 156 MG/DL (ref 65–117)
GLUCOSE BLD STRIP.AUTO-MCNC: 174 MG/DL (ref 65–117)
HGB BLD-MCNC: 10.3 G/DL (ref 11.5–16)
SERVICE CMNT-IMP: ABNORMAL

## 2021-06-05 PROCEDURE — 85018 HEMOGLOBIN: CPT

## 2021-06-05 PROCEDURE — 97530 THERAPEUTIC ACTIVITIES: CPT

## 2021-06-05 PROCEDURE — 97535 SELF CARE MNGMENT TRAINING: CPT

## 2021-06-05 PROCEDURE — 82962 GLUCOSE BLOOD TEST: CPT

## 2021-06-05 PROCEDURE — 65270000029 HC RM PRIVATE

## 2021-06-05 PROCEDURE — 74011250637 HC RX REV CODE- 250/637: Performed by: STUDENT IN AN ORGANIZED HEALTH CARE EDUCATION/TRAINING PROGRAM

## 2021-06-05 PROCEDURE — 97116 GAIT TRAINING THERAPY: CPT

## 2021-06-05 PROCEDURE — 36415 COLL VENOUS BLD VENIPUNCTURE: CPT

## 2021-06-05 PROCEDURE — 74011636637 HC RX REV CODE- 636/637: Performed by: STUDENT IN AN ORGANIZED HEALTH CARE EDUCATION/TRAINING PROGRAM

## 2021-06-05 RX ADMIN — ACETAMINOPHEN 1000 MG: 500 TABLET ORAL at 23:29

## 2021-06-05 RX ADMIN — OXYCODONE 10 MG: 5 TABLET ORAL at 22:15

## 2021-06-05 RX ADMIN — GABAPENTIN 100 MG: 100 CAPSULE ORAL at 22:07

## 2021-06-05 RX ADMIN — PANTOPRAZOLE SODIUM 40 MG: 40 TABLET, DELAYED RELEASE ORAL at 17:01

## 2021-06-05 RX ADMIN — ALPRAZOLAM 0.5 MG: 0.5 TABLET ORAL at 07:48

## 2021-06-05 RX ADMIN — METFORMIN HYDROCHLORIDE 500 MG: 500 TABLET ORAL at 16:58

## 2021-06-05 RX ADMIN — METOPROLOL TARTRATE 12.5 MG: 25 TABLET, FILM COATED ORAL at 22:07

## 2021-06-05 RX ADMIN — Medication 10 ML: at 07:47

## 2021-06-05 RX ADMIN — ACETAMINOPHEN 1000 MG: 500 TABLET ORAL at 17:01

## 2021-06-05 RX ADMIN — GABAPENTIN 100 MG: 100 CAPSULE ORAL at 10:42

## 2021-06-05 RX ADMIN — POLYETHYLENE GLYCOL 3350 17 G: 17 POWDER, FOR SOLUTION ORAL at 10:42

## 2021-06-05 RX ADMIN — PAROXETINE HYDROCHLORIDE 40 MG: 20 TABLET, FILM COATED ORAL at 07:51

## 2021-06-05 RX ADMIN — INSULIN LISPRO 2 UNITS: 100 INJECTION, SOLUTION INTRAVENOUS; SUBCUTANEOUS at 07:48

## 2021-06-05 RX ADMIN — GABAPENTIN 100 MG: 100 CAPSULE ORAL at 16:57

## 2021-06-05 RX ADMIN — AMITRIPTYLINE HYDROCHLORIDE 50 MG: 50 TABLET, FILM COATED ORAL at 22:07

## 2021-06-05 RX ADMIN — DOCUSATE SODIUM 50 MG AND SENNOSIDES 8.6 MG 1 TABLET: 8.6; 5 TABLET, FILM COATED ORAL at 17:00

## 2021-06-05 RX ADMIN — ACETAMINOPHEN 1000 MG: 500 TABLET ORAL at 07:46

## 2021-06-05 RX ADMIN — ACETAMINOPHEN 1000 MG: 500 TABLET ORAL at 13:05

## 2021-06-05 RX ADMIN — Medication 10 ML: at 13:07

## 2021-06-05 RX ADMIN — ACETAMINOPHEN 1000 MG: 500 TABLET ORAL at 02:07

## 2021-06-05 RX ADMIN — Medication 10 ML: at 22:08

## 2021-06-05 RX ADMIN — METFORMIN HYDROCHLORIDE 500 MG: 500 TABLET ORAL at 10:51

## 2021-06-05 RX ADMIN — INSULIN LISPRO 2 UNITS: 100 INJECTION, SOLUTION INTRAVENOUS; SUBCUTANEOUS at 13:05

## 2021-06-05 RX ADMIN — ATORVASTATIN CALCIUM 40 MG: 40 TABLET, FILM COATED ORAL at 22:07

## 2021-06-05 RX ADMIN — ALPRAZOLAM 0.5 MG: 0.5 TABLET ORAL at 22:07

## 2021-06-05 RX ADMIN — LEVOTHYROXINE SODIUM 112 MCG: 0.11 TABLET ORAL at 07:30

## 2021-06-05 RX ADMIN — DOCUSATE SODIUM 50 MG AND SENNOSIDES 8.6 MG 1 TABLET: 8.6; 5 TABLET, FILM COATED ORAL at 10:42

## 2021-06-05 NOTE — PROGRESS NOTES
Problem: Mobility Impaired (Adult and Pediatric)  Goal: *Acute Goals and Plan of Care (Insert Text)  Description: FUNCTIONAL STATUS PRIOR TO ADMISSION: Patient was modified independent using no device for ambulation but sits in wheelchair during ADLs d/t back pain. HOME SUPPORT PRIOR TO ADMISSION: The patient lived with  who can assist with dressing ADLs as needed  can not provide physical assist.    Physical Therapy Goals  Initiated 6/3/2021    1. Patient will move from supine to sit and sit to supine , scoot up and down, and roll side to side in bed with modified independence within 4 days. 2. Patient will perform sit to stand with modified independence within 4 days. 3. Patient will ambulate with modified independence for 125 feet with the least restrictive device within 4 days. 4. Patient will ascend/descend ramp with bilateral railings with modified independence within 4 days. 5. Patient will verbalize and demonstrate understanding of spinal precautions (No bending, lifting greater than 5 lbs, or twisting; log-roll technique; frequent repositioning as instructed) within 4 days. Outcome: Progressing Towards Goal     PHYSICAL THERAPY TREATMENT  Patient: Sunita Cheney (8 y.o. female)  Date: 6/5/2021  Diagnosis: Lumbar stenosis with neurogenic claudication [M48.062] <principal problem not specified>  Procedure(s) (LRB):  L2-3 AND L5-S1  LUMBAR LAMINECTOMY WITH ANDREWS OSTEOTOMY L2-3 AND L3-4 WITH PLF L2-S1 (N/A) 3 Days Post-Op  Precautions: Fall, Back No bending, no lifting greater than 5 lbs, no twisting, log-roll technique, repositioning every 20-30 min except when sleeping, brace when OOB (if ordered)  Chart, physical therapy assessment, plan of care and goals were reviewed. ASSESSMENT  Patient continues with skilled PT services and is progressing towards goals. Pt generally mobilized at a MIN A to MOD Independent level.  Pt required greatest level of assistance for LE advancement and bed mobility. Session started with pt received in chair w/ brace donned. Pt able to verbalized 3/3 back precautions. Pt ambulated without any LOB however required 1 standing rest break during trip. Pt returned to room where pt when stand>sit w/ SPV before removing brace and going sit>supine with MIN A for LE advancement. 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 Current Level of Function Impacting Discharge (mobility/balance): endurance, Min A for bed mobility. SPV for transitional movements    Other factors to consider for discharge: primary caregiver for          PLAN :  Patient continues to benefit from skilled intervention to address the above impairments. Continue treatment per established plan of care. to address goals. Recommendation for discharge: (in order for the patient to meet his/her long term goals)  Physical therapy at least 2 days/week in the home     This discharge recommendation:  Has been made in collaboration with the attending provider and/or case management    IF patient discharges home will need the following DME: patient owns DME required for discharge       SUBJECTIVE:   Patient stated if I'm going to start watching [employer], I should probably start with the first movie right? Sabine Oliver    OBJECTIVE DATA SUMMARY:   Critical Behavior:  Neurologic State: Alert     Cognition: Appropriate decision making, Appropriate for age attention/concentration, Appropriate safety awareness, Follows commands       Spinal diagnosis intervention:  The patient stated 3/3 back precautions when prompted. Reviewed all 3 back precautions, log roll technique, and sitting for 30 minutes at a time.     Functional Mobility Training:    Bed Mobility:  Log Rolling: Minimum assistance  Supine to Sit: Minimum assistance  Sit to Supine: Minimum assistance  Scooting: Minimum assistance        Transfers:  Sit to Stand: Supervision  Stand to Sit: Supervision        Bed to Chair: Contact guard assistance (RW)                    Balance:  Sitting: Intact  Standing: Intact; With support  Standing - Static: Constant support;Good  Standing - Dynamic : Constant support; Fair  Ambulation/Gait Training:  Distance (ft): 75 Feet (ft)  Assistive Device: Brace/Splint; Walker, rolling;Gait belt  Ambulation - Level of Assistance: Supervision        Gait Abnormalities: Decreased step clearance        Base of Support: Narrowed     Speed/Catherine: Shuffled;Slow;Pace decreased (<100 feet/min)  Step Length: Left shortened;Right shortened    Pain Rating:  Pt did not relate any pain during session    Activity Tolerance:   Fair, SpO2 stable on RA, and requires rest breaks    After treatment patient left in no apparent distress:   Supine in bed, Call bell within reach, and Side rails x 3    COMMUNICATION/COLLABORATION:   The patients plan of care was discussed with: Registered nurse.      Richard Parr, PT   Time Calculation: 25 mins

## 2021-06-05 NOTE — PROGRESS NOTES
Patient seen and examined. Reports that the type of bed is making her get stuck into the side of the bed. .  Recent Results (from the past 12 hour(s))   GLUCOSE, POC    Collection Time: 06/04/21  9:41 PM   Result Value Ref Range    Glucose (POC) 164 (H) 65 - 117 mg/dL    Performed by Ashley CHEN (PCT)    HEMOGLOBIN    Collection Time: 06/05/21  1:24 AM   Result Value Ref Range    HGB 10.3 (L) 11.5 - 16.0 g/dL   GLUCOSE, POC    Collection Time: 06/05/21  7:04 AM   Result Value Ref Range    Glucose (POC) 156 (H) 65 - 117 mg/dL    Performed by Lanetta Sport      PE - BLE - nvi     - motor intact    - BCR     - no calf TTP     - spine - dressing wet with blood but no active bleeding from wound    .   Recent Results (from the past 12 hour(s))   GLUCOSE, POC    Collection Time: 06/04/21  9:41 PM   Result Value Ref Range    Glucose (POC) 164 (H) 65 - 117 mg/dL    Performed by Ashley CHEN (PCT)    HEMOGLOBIN    Collection Time: 06/05/21  1:24 AM   Result Value Ref Range    HGB 10.3 (L) 11.5 - 16.0 g/dL   GLUCOSE, POC    Collection Time: 06/05/21  7:04 AM   Result Value Ref Range    Glucose (POC) 156 (H) 65 - 117 mg/dL    Performed by Yarely Ortiz - doing well post-op    Plan - will change bed type to standard bariatric bed   - PT   - pain control   - D/c planning - will need rehab

## 2021-06-05 NOTE — PROGRESS NOTES
When we were trying to move pt back and forth on this bed, pt's wound started draining again. Dressing was reinforced. Talked with  this morning and he ordered her a bariatric bed.

## 2021-06-05 NOTE — PROGRESS NOTES
Problem: Self Care Deficits Care Plan (Adult)  Goal: *Therapy Goal (Edit Goal, Insert Text)  Description: FUNCTIONAL STATUS PRIOR TO ADMISSION: Patient required minimal assistance for basic and instrumental ADLs. HOME SUPPORT: The patient lived with spouse who provides assistance with footwear management, family assists with home management tasks. DME: Wheelchair, Transfer bench, Commode, bedside, Walker, rolling, Cane, straight    Occupational Therapy Goals  Initiated 6/3/2021    1. Patient will perform lower body dressing with supervision/set-up using AE PRN within 4 days. 2.  Patient will perform toileting with supervision/set-up using most appropriate DME within 4 days. 3.  Patient will toilet transfer at supervision/set-up within 4 days. 4.  Patient will don/doff back brace at supervision/set-up within 4 days. 5.  Patient will verbalize/demonstrate 3/3 back precautions during ADL tasks without cues within 4 days. Outcome: Progressing Towards Goal   OCCUPATIONAL THERAPY TREATMENT  Patient: Dinesh Acostas (10 y.o. female)  Date: 6/5/2021  Diagnosis: Lumbar stenosis with neurogenic claudication [M48.062] <principal problem not specified>  Procedure(s) (LRB):  L2-3 AND L5-S1  LUMBAR LAMINECTOMY WITH ANDREWS OSTEOTOMY L2-3 AND L3-4 WITH PLF L2-S1 (N/A) 3 Days Post-Op  Precautions: Fall, Back  Chart, occupational therapy assessment, plan of care, and goals were reviewed. ASSESSMENT  Patient continues with skilled OT services and is progressing towards goals. Per MD note this morning, patient to continue PT and nursing cleared for therapy. Noted what appears to be old drainage on wound dressing. Patient reports 2-3/10 back pain with activity. She reports fear of falling but demonstrates good safety and ability to complete sit <> stand with CGA at RW level. Good understanding and demonstrations of back precautions and reacher use for LB dressing. Left up in chair finishing seated grooming tasks. Current Level of Function Impacting Discharge (ADLs): Brace mod A, underwear min A for assist with pulling up/adjusting, and self care transfer with CGA. Other factors to consider for discharge: She is hopeful to dc home with support from family. No OOB OT yesterday, good participation and motivation today. PLAN :  Patient continues to benefit from skilled intervention to address the above impairments. Continue treatment per established plan of care to address goals. Recommend with staff: CGA for self care transfers, up in chair for meals    Recommend next OT session: progression to bathroom for toileting training as appropriate    Recommendation for discharge: (in order for the patient to meet his/her long term goals)  Occupational therapy at least 2 days/week in the home AND ensure assist and/or supervision for safety with ADL tasks as needed    This discharge recommendation:  Has not yet been discussed the attending provider and/or case management    IF patient discharges home will need the following DME: patient owns DME required for discharge       SUBJECTIVE:   Patient stated I use the reacher at home.     OBJECTIVE DATA SUMMARY:   Cognitive/Behavioral Status:                      Functional Mobility and Transfers for ADLs:  Bed Mobility:  Rolling: Minimum assistance  Supine to Sit: Minimum assistance    Transfers:  Sit to Stand: Contact guard assistance     Bed to Chair: Contact guard assistance (RW)    Balance:  Sitting: Intact  Standing: Intact; With support  Standing - Static: Constant support;Good  Standing - Dynamic : Fair    ADL Intervention:       Grooming  Position Performed: Seated in chair  Brushing Teeth: Set-up  Brushing/Combing Hair: Set-up      Upper Body Dressing Assistance  Bra: Set-up; Supervision  Orthotics(Brace):  Moderate assistance  Hospital Gown: Minimum  assistance    Lower Body Dressing Assistance  Underpants: Minimum assistance (assist to finish pulling up)  Position Performed: Seated edge of bed;Standing  Adaptive Equipment Used: Reacher              The patient recalled and demonstrated 3/3 back precautions. Reviewed all 3 with patient. Dressing brace: Patient instructed and demonstrated to don/doff velcro on brace with mod A to initially place brace to limit twisting. Good ability to verbalize sequencing of brace donning. Dressing lower body: Patient instructed to don brace first and on the benefits to remain seated to don all clothing to increase independence with precautions and pain management. Good use of reacher with understanding from previous surgeries and she uses it at baseline. Home safety: Patient instructed and indicated understanding on home modifications and safety (raise height of ADL objects, appropriate height of chair surfaces, recliner safety, change of floor surfaces, clear pathways) to increase independence and fall prevention. Standing: Patient instructed and indicated understanding to walk up to sink/counter top/surfaces, step into walker, square off while using objects, slide objects along surfaces, to increase adherence to back precautions and fall prevention. Patient instructed to increase amount of time standing in order to increase independence and tolerance with ADLs. Patient instructed and indicated understanding the benefits of maintaining activity tolerance, functional mobility, and independence with self care tasks during acute stay  to ensure safe return home and to baseline. Encouraged patient to increase frequency and duration OOB, not sitting longer than 30 mins without marching/walking with staff, be out of bed for all meals, perform daily ADLs (as approved by RN/MD regarding bathing etc), and performing functional mobility to/from bathroom.  Patient instruction and indicated understanding on body mechanics, ergonomics and gravitational force on the spine during different body positions to plan activities in prep for return home to complete instrumental ADLs and back to work safely. Pain:  2-3/10 in back with activity    Activity Tolerance:   Fair and requires rest breaks    After treatment patient left in no apparent distress:   Sitting in chair and Call bell within reach    COMMUNICATION/COLLABORATION:   The patients plan of care was discussed with: Physical therapist and Registered nurse.      Tj Carpio OT  Time Calculation: 30 mins

## 2021-06-06 LAB
GLUCOSE BLD STRIP.AUTO-MCNC: 120 MG/DL (ref 65–117)
GLUCOSE BLD STRIP.AUTO-MCNC: 140 MG/DL (ref 65–117)
GLUCOSE BLD STRIP.AUTO-MCNC: 148 MG/DL (ref 65–117)
GLUCOSE BLD STRIP.AUTO-MCNC: 178 MG/DL (ref 65–117)
SERVICE CMNT-IMP: ABNORMAL

## 2021-06-06 PROCEDURE — 97116 GAIT TRAINING THERAPY: CPT | Performed by: PHYSICAL THERAPIST

## 2021-06-06 PROCEDURE — 74011250637 HC RX REV CODE- 250/637: Performed by: STUDENT IN AN ORGANIZED HEALTH CARE EDUCATION/TRAINING PROGRAM

## 2021-06-06 PROCEDURE — 65270000029 HC RM PRIVATE

## 2021-06-06 PROCEDURE — 82962 GLUCOSE BLOOD TEST: CPT

## 2021-06-06 PROCEDURE — 74011636637 HC RX REV CODE- 636/637: Performed by: STUDENT IN AN ORGANIZED HEALTH CARE EDUCATION/TRAINING PROGRAM

## 2021-06-06 RX ADMIN — POLYETHYLENE GLYCOL 3350 17 G: 17 POWDER, FOR SOLUTION ORAL at 09:37

## 2021-06-06 RX ADMIN — PAROXETINE HYDROCHLORIDE 40 MG: 20 TABLET, FILM COATED ORAL at 06:14

## 2021-06-06 RX ADMIN — Medication 10 ML: at 15:47

## 2021-06-06 RX ADMIN — ACETAMINOPHEN 1000 MG: 500 TABLET ORAL at 12:13

## 2021-06-06 RX ADMIN — OXYCODONE 10 MG: 5 TABLET ORAL at 04:19

## 2021-06-06 RX ADMIN — INSULIN LISPRO 2 UNITS: 100 INJECTION, SOLUTION INTRAVENOUS; SUBCUTANEOUS at 12:13

## 2021-06-06 RX ADMIN — GABAPENTIN 100 MG: 100 CAPSULE ORAL at 15:47

## 2021-06-06 RX ADMIN — GABAPENTIN 100 MG: 100 CAPSULE ORAL at 09:37

## 2021-06-06 RX ADMIN — METFORMIN HYDROCHLORIDE 500 MG: 500 TABLET ORAL at 17:47

## 2021-06-06 RX ADMIN — ALPRAZOLAM 0.5 MG: 0.5 TABLET ORAL at 06:13

## 2021-06-06 RX ADMIN — METFORMIN HYDROCHLORIDE 500 MG: 500 TABLET ORAL at 09:37

## 2021-06-06 RX ADMIN — Medication 10 ML: at 06:10

## 2021-06-06 RX ADMIN — INSULIN LISPRO 2 UNITS: 100 INJECTION, SOLUTION INTRAVENOUS; SUBCUTANEOUS at 17:46

## 2021-06-06 RX ADMIN — LEVOTHYROXINE SODIUM 112 MCG: 0.11 TABLET ORAL at 06:13

## 2021-06-06 RX ADMIN — DOCUSATE SODIUM 50 MG AND SENNOSIDES 8.6 MG 1 TABLET: 8.6; 5 TABLET, FILM COATED ORAL at 09:37

## 2021-06-06 RX ADMIN — PANTOPRAZOLE SODIUM 40 MG: 40 TABLET, DELAYED RELEASE ORAL at 17:47

## 2021-06-06 RX ADMIN — DOCUSATE SODIUM 50 MG AND SENNOSIDES 8.6 MG 1 TABLET: 8.6; 5 TABLET, FILM COATED ORAL at 17:47

## 2021-06-06 RX ADMIN — ACETAMINOPHEN 1000 MG: 500 TABLET ORAL at 17:47

## 2021-06-06 RX ADMIN — METOPROLOL TARTRATE 25 MG: 25 TABLET, FILM COATED ORAL at 06:13

## 2021-06-06 NOTE — PROGRESS NOTES
Patient seen and examined. Happier with bariatric bed. .  Recent Results (from the past 12 hour(s))   GLUCOSE, POC    Collection Time: 06/05/21  9:41 PM   Result Value Ref Range    Glucose (POC) 174 (H) 65 - 117 mg/dL    Performed by Barry Brito (CON)    GLUCOSE, POC    Collection Time: 06/06/21  5:35 AM   Result Value Ref Range    Glucose (POC) 120 (H) 65 - 117 mg/dL    Performed by Kole PANTOJA (CON)      PE - BLE - nvi     - motor intact    - BCR     - no calf TTP     - spine -dressing intact    . Recent Results (from the past 12 hour(s))   GLUCOSE, POC    Collection Time: 06/05/21  9:41 PM   Result Value Ref Range    Glucose (POC) 174 (H) 65 - 117 mg/dL    Performed by Barry Brito (CON)    GLUCOSE, POC    Collection Time: 06/06/21  5:35 AM   Result Value Ref Range    Glucose (POC) 120 (H) 65 - 117 mg/dL    Performed by Barry Brito (CON)      Assessment - doing well post-op    Plan -    - PT   - pain control   - D/c planning - sign out to me was for rehab, but patient seemed surprised by that and want to go home. Will see how she progresses with PT today and defer decision to primary team Monday.

## 2021-06-06 NOTE — PROGRESS NOTES
Problem: Mobility Impaired (Adult and Pediatric)  Goal: *Acute Goals and Plan of Care (Insert Text)  Description: FUNCTIONAL STATUS PRIOR TO ADMISSION: Patient was modified independent using no device for ambulation but sits in wheelchair during ADLs d/t back pain. HOME SUPPORT PRIOR TO ADMISSION: The patient lived with  who can assist with dressing ADLs as needed  can not provide physical assist.    Physical Therapy Goals  Initiated 6/3/2021    1. Patient will move from supine to sit and sit to supine , scoot up and down, and roll side to side in bed with modified independence within 4 days. 2. Patient will perform sit to stand with modified independence within 4 days. 3. Patient will ambulate with modified independence for 125 feet with the least restrictive device within 4 days. 4. Patient will ascend/descend ramp with bilateral railings with modified independence within 4 days. 5. Patient will verbalize and demonstrate understanding of spinal precautions (No bending, lifting greater than 5 lbs, or twisting; log-roll technique; frequent repositioning as instructed) within 4 days. Outcome: Progressing Towards Goal   PHYSICAL THERAPY TREATMENT  Patient: Radha Reese (69 y.o. female)  Date: 6/6/2021  Diagnosis: Lumbar stenosis with neurogenic claudication [M48.062] <principal problem not specified>  Procedure(s) (LRB):  L2-3 AND L5-S1  LUMBAR LAMINECTOMY WITH ANDREWS OSTEOTOMY L2-3 AND L3-4 WITH PLF L2-S1 (N/A) 4 Days Post-Op  Precautions: Fall, Back No bending, no lifting greater than 5 lbs, no twisting, log-roll technique, repositioning every 20-30 min except when sleeping, brace when OOB (if ordered)  Chart, physical therapy assessment, plan of care and goals were reviewed. ASSESSMENT  Patient continues with skilled PT services and is progressing towards goals. Patient overall limited by pain, generalized weakness and decreased functional mobility.   Patient currently needing modA to roll and Manuel for sit to stand. Has fair upright balance and reporting high pain levels and feeling like legs were going to give way. Able to amb approx 10 feet to the chair with CGA  and RW. Patient continues to want to DC home with [place] PT but has to help her disabled . Will minimally need  PT. Other factors to consider for discharge: at risk for falls, below baseline         PLAN :  Patient continues to benefit from skilled intervention to address the above impairments. Continue treatment per established plan of care. to address goals. Recommendation for discharge: (in order for the patient to meet his/her long term goals)  Physical therapy at least 2 days/week in the home vs SNF rehab       IF patient discharges home will need the following DME: patient owns DME required for discharge       SUBJECTIVE:   Patient stated I'm just really feeling bad.     OBJECTIVE DATA SUMMARY:   Critical Behavior:  Neurologic State: Appropriate for age, Alert, Eyes open spontaneously     Cognition: Appropriate decision making, Appropriate for age attention/concentration, Appropriate safety awareness       Spinal diagnosis intervention:  The patient stated 3/3 back precautions when prompted. Reviewed all 3 back precautions, log roll technique, and sitting for 30 minutes at a time. Functional Mobility Training:    Bed Mobility:  Log Rolling: Minimum assistance  Supine to Sit: Moderate assistance;Assist x1  Sit to Supine: Moderate assistance;Assist x1  Scooting: Supervision; Additional time        Transfers:  Sit to Stand: Minimum assistance;Assist x1  Stand to Sit: Minimum assistance;Assist x1                             Balance:  Sitting: Intact  Standing: Intact; With support  Standing - Static: Constant support;Good  Standing - Dynamic : Constant support; Fair  Ambulation/Gait Training:  Distance (ft): 10 Feet (ft)  Assistive Device: Brace/Splint;Gait belt;Walker, rolling  Ambulation - Level of Assistance: Minimal assistance;Assist x1        Gait Abnormalities: Decreased step clearance        Base of Support: Widened     Speed/Catherine: Pace decreased (<100 feet/min); Shuffled; Slow  Step Length: Left shortened;Right shortened           Pain Rating:  Reports pain with activity but does not rate    Activity Tolerance:   Good and requires rest breaks    After treatment patient left in no apparent distress:   Sitting in chair and Call bell within reach    COMMUNICATION/COLLABORATION:   The patients plan of care was discussed with: Physical therapist, Occupational therapist, and Registered nurse.      Karrie Leija PT, DPT   Time Calculation: 16 mins

## 2021-06-07 LAB
GLUCOSE BLD STRIP.AUTO-MCNC: 134 MG/DL (ref 65–117)
GLUCOSE BLD STRIP.AUTO-MCNC: 135 MG/DL (ref 65–117)
GLUCOSE BLD STRIP.AUTO-MCNC: 135 MG/DL (ref 65–117)
GLUCOSE BLD STRIP.AUTO-MCNC: 139 MG/DL (ref 65–117)
SERVICE CMNT-IMP: ABNORMAL

## 2021-06-07 PROCEDURE — 77030038269 HC DRN EXT URIN PURWCK BARD -A

## 2021-06-07 PROCEDURE — 77030013076 HC PCH OST BAG COLO -A

## 2021-06-07 PROCEDURE — 82962 GLUCOSE BLOOD TEST: CPT

## 2021-06-07 PROCEDURE — 97530 THERAPEUTIC ACTIVITIES: CPT

## 2021-06-07 PROCEDURE — 65270000029 HC RM PRIVATE

## 2021-06-07 PROCEDURE — 2709999900 HC NON-CHARGEABLE SUPPLY

## 2021-06-07 PROCEDURE — 97116 GAIT TRAINING THERAPY: CPT

## 2021-06-07 PROCEDURE — 74011250636 HC RX REV CODE- 250/636: Performed by: PHYSICIAN ASSISTANT

## 2021-06-07 PROCEDURE — 2W15X6Z COMPRESSION OF BACK USING PRESSURE DRESSING: ICD-10-PCS | Performed by: ORTHOPAEDIC SURGERY

## 2021-06-07 PROCEDURE — 77030040162

## 2021-06-07 PROCEDURE — 77030018717 HC DRSG GRNUFM KCON -B

## 2021-06-07 PROCEDURE — 74011250637 HC RX REV CODE- 250/637: Performed by: STUDENT IN AN ORGANIZED HEALTH CARE EDUCATION/TRAINING PROGRAM

## 2021-06-07 RX ORDER — MORPHINE SULFATE 2 MG/ML
2 INJECTION, SOLUTION INTRAMUSCULAR; INTRAVENOUS ONCE
Status: COMPLETED | OUTPATIENT
Start: 2021-06-07 | End: 2021-06-07

## 2021-06-07 RX ADMIN — ACETAMINOPHEN 1000 MG: 500 TABLET ORAL at 00:06

## 2021-06-07 RX ADMIN — DOCUSATE SODIUM 50 MG AND SENNOSIDES 8.6 MG 1 TABLET: 8.6; 5 TABLET, FILM COATED ORAL at 18:32

## 2021-06-07 RX ADMIN — METFORMIN HYDROCHLORIDE 500 MG: 500 TABLET ORAL at 16:15

## 2021-06-07 RX ADMIN — PAROXETINE HYDROCHLORIDE 40 MG: 20 TABLET, FILM COATED ORAL at 07:04

## 2021-06-07 RX ADMIN — Medication 10 ML: at 23:22

## 2021-06-07 RX ADMIN — DOCUSATE SODIUM 50 MG AND SENNOSIDES 8.6 MG 1 TABLET: 8.6; 5 TABLET, FILM COATED ORAL at 08:55

## 2021-06-07 RX ADMIN — ALPRAZOLAM 0.5 MG: 0.5 TABLET ORAL at 07:05

## 2021-06-07 RX ADMIN — LEVOTHYROXINE SODIUM 112 MCG: 0.11 TABLET ORAL at 07:04

## 2021-06-07 RX ADMIN — Medication 10 ML: at 00:04

## 2021-06-07 RX ADMIN — ACETAMINOPHEN 1000 MG: 500 TABLET ORAL at 07:06

## 2021-06-07 RX ADMIN — AMITRIPTYLINE HYDROCHLORIDE 50 MG: 50 TABLET, FILM COATED ORAL at 23:20

## 2021-06-07 RX ADMIN — METOPROLOL TARTRATE 12.5 MG: 25 TABLET, FILM COATED ORAL at 00:01

## 2021-06-07 RX ADMIN — ALPRAZOLAM 0.5 MG: 0.5 TABLET ORAL at 23:19

## 2021-06-07 RX ADMIN — ACETAMINOPHEN 1000 MG: 500 TABLET ORAL at 12:41

## 2021-06-07 RX ADMIN — ATORVASTATIN CALCIUM 40 MG: 40 TABLET, FILM COATED ORAL at 00:02

## 2021-06-07 RX ADMIN — POLYETHYLENE GLYCOL 3350 17 G: 17 POWDER, FOR SOLUTION ORAL at 08:55

## 2021-06-07 RX ADMIN — AMITRIPTYLINE HYDROCHLORIDE 50 MG: 50 TABLET, FILM COATED ORAL at 00:02

## 2021-06-07 RX ADMIN — GABAPENTIN 100 MG: 100 CAPSULE ORAL at 00:01

## 2021-06-07 RX ADMIN — GABAPENTIN 100 MG: 100 CAPSULE ORAL at 16:15

## 2021-06-07 RX ADMIN — GABAPENTIN 100 MG: 100 CAPSULE ORAL at 23:19

## 2021-06-07 RX ADMIN — ALPRAZOLAM 0.5 MG: 0.5 TABLET ORAL at 00:02

## 2021-06-07 RX ADMIN — Medication 10 ML: at 14:00

## 2021-06-07 RX ADMIN — ATORVASTATIN CALCIUM 40 MG: 40 TABLET, FILM COATED ORAL at 23:19

## 2021-06-07 RX ADMIN — METFORMIN HYDROCHLORIDE 500 MG: 500 TABLET ORAL at 07:05

## 2021-06-07 RX ADMIN — ACETAMINOPHEN 1000 MG: 500 TABLET ORAL at 18:32

## 2021-06-07 RX ADMIN — PANTOPRAZOLE SODIUM 40 MG: 40 TABLET, DELAYED RELEASE ORAL at 18:32

## 2021-06-07 RX ADMIN — METOPROLOL TARTRATE 25 MG: 25 TABLET, FILM COATED ORAL at 07:04

## 2021-06-07 RX ADMIN — MORPHINE SULFATE 2 MG: 2 INJECTION, SOLUTION INTRAMUSCULAR; INTRAVENOUS at 11:12

## 2021-06-07 RX ADMIN — METOPROLOL TARTRATE 12.5 MG: 25 TABLET, FILM COATED ORAL at 23:19

## 2021-06-07 RX ADMIN — GABAPENTIN 100 MG: 100 CAPSULE ORAL at 08:55

## 2021-06-07 NOTE — PROGRESS NOTES
Occupational Therapy    Completed chart review and discussed patient with nursing. Patient currently receiving wound care on spinal incision. Will hold for OT and continue to follow as appropriate.        Thank you,    Tatiana Gosselin, OT

## 2021-06-07 NOTE — WOUND CARE
WOCN Note:     New consult placed for assessment and VAC application the lumbar incision.  6.2.2021 s/p Revision laminectomy  Assessed in room 534 with Kole KO. Chart reviewed. Assessment:   Patient is A&O x 3, communicative and turns independently. Bed: bariatric  Patient has a Pure wick. Patient reports no pain. Heels intact without erythema. Sacrum and buttocks intact without erythema. 1. Lumbar, incision with staples with a large amount of serosang exudate; 6 staples mid incision were removed by the PA leaving a 6 cm long open area; 100% red; no odor. Periwound without erythema. Periwound cleansed with betadine; NPWT @ 125 mmHG initiated using mepitel one over the remaining staples and around the opened wound; 1 white foam to the wound bed; and 2 black foam (one over the distal staples and one to bridge to the left flank). Wound, Pressure Prevention & Skin Care Recommendations:    1. Minimize layers of linen/pads under patient to optimize support surface. 2.  Turn/reposition approximately every 2 hours and offload heels. 3.  Manage moisture/ Keep skin folds clean and dry. 4.  Lumbar incision:  Continue NPWT at 125 mmHg. Discussed above plan with patient, Beena KO and Gris BRUCE.     Transition of Care: Plan to follow as needed while admitted to hospital.    TRENA Garcia RN [facility] Inpatient Wound Care  Available on [employer] Serve  Pager [phone]  Office [phone]

## 2021-06-07 NOTE — PROCEDURES
After sterile prep, I removed 8 staples from the middle portion of the wound in order to place a wound vac. Patient tolerated the procedure well.

## 2021-06-07 NOTE — PROGRESS NOTES
Orthopedic Spine Progress Note  Post Op day: 5 Days Post-Op    2021 9:51 AM   Admit Date: 2021  Procedure: Procedure(s):  L2-3 AND L5-S1  LUMBAR LAMINECTOMY WITH ANDREWS OSTEOTOMY L2-3 AND L3-4 WITH PLF L2-S1    Subjective:     Mckay Emery is doing well this AM. She states she has not been able to work with PT due to her BP dropping when she stands up. Tolerating diet. No N/V. Pain Control:   Pain Assessment  Pain Scale 1: Numeric (0 - 10)  Pain Intensity 1: 0  Pain Onset 1: post opt   Pain Location 1: Back  Pain Orientation 1: Posterior  Pain Description 1: Sore  Pain Intervention(s) 1: Position    Objective:          Physical Exam:  General:  Alert and oriented. No acute distress. Heart:  Respirations unlabored. Abdomen:   Extremities: Soft, non-tender. No evidence of cyanosis. Pulses palpable in both upper and lower extremities. Neurologic:  Musculoskeletal:  No new motor deficits. Neurovascular exam within normal limits. Sensation stable. Motor: unchanged C5-T1 and L2-S1. Neema's sign negative in bilateral lower extremities. Calves soft, nontender upon palpation and with passive twitch. Moves both upper and lower extremities. Incision: clean, dry, and intact. No significant erythema or swelling. No active drainage noted. Vital Signs:    Blood pressure 107/66, pulse 68, temperature 97.7 °F (36.5 °C), resp. rate 18, height 5' 4\" (1.626 m), weight 132 kg (291 lb 0.1 oz), SpO2 92 %.   Temp (24hrs), Av.2 °F (36.8 °C), Min:97.7 °F (36.5 °C), Max:98.7 °F (37.1 °C)      LAB:    Recent Labs     21  0124   HGB 10.3*     Lab Results   Component Value Date/Time    Sodium 137 2021 05:21 AM    Potassium 4.8 2021 05:21 AM    Chloride 106 2021 05:21 AM    CO2 26 2021 05:21 AM    Glucose 232 (H) 2021 05:21 AM    BUN 18 2021 05:21 AM    Creatinine 0.91 2021 05:21 AM    Calcium 7.8 (L) 2021 05:21 AM       Intake/Output:No intake/output data recorded. 06/05 1901 - 06/07 0700  In: 120 [P.O.:120]  Out: 3150 [Urine:3150]    PT/OT:   Gait:  Gait  Base of Support: Widened  Speed/Catherine: Pace decreased (<100 feet/min), Shuffled, Slow  Step Length: Left shortened, Right shortened  Gait Abnormalities: Decreased step clearance  Ambulation - Level of Assistance: Minimal assistance, Assist x1  Distance (ft): 10 Feet (ft)  Assistive Device: Brace/Splint, Gait belt, Walker, rolling                 Assessment:   Patient is 5 Days Post-Op s/p Procedure(s):  L2-3 AND L5-S1  LUMBAR LAMINECTOMY WITH ANDREWS OSTEOTOMY L2-3 AND L3-4 WITH PLF L2-S1    Plan:     1. Continue PT/OT  2. Continue established methods of pain control  3. VTE Prophylaxes - TEDS &/or SCDs   4. Awaiting wound care eval  5.  D/c pending PT clearance        Signed By: Suzi Morales PA-C

## 2021-06-07 NOTE — PROGRESS NOTES
Problem: Mobility Impaired (Adult and Pediatric)  Goal: *Acute Goals and Plan of Care (Insert Text)  Description: FUNCTIONAL STATUS PRIOR TO ADMISSION: Patient was modified independent using no device for ambulation but sits in wheelchair during ADLs d/t back pain. HOME SUPPORT PRIOR TO ADMISSION: The patient lived with  who can assist with dressing ADLs as needed  can not provide physical assist.    Physical Therapy Goals  Initiated 6/3/2021    1. Patient will move from supine to sit and sit to supine , scoot up and down, and roll side to side in bed with modified independence within 4 days. 2. Patient will perform sit to stand with modified independence within 4 days. 3. Patient will ambulate with modified independence for 125 feet with the least restrictive device within 4 days. 4. Patient will ascend/descend ramp with bilateral railings with modified independence within 4 days. 5. Patient will verbalize and demonstrate understanding of spinal precautions (No bending, lifting greater than 5 lbs, or twisting; log-roll technique; frequent repositioning as instructed) within 4 days. Outcome: Progressing Towards Goal  PHYSICAL THERAPY TREATMENT  Patient: Mirian Giordano (27 y.o. female)  Date: 6/7/2021  Diagnosis: Lumbar stenosis with neurogenic claudication [M48.062] <principal problem not specified>  Procedure(s) (LRB):  L2-3 AND L5-S1  LUMBAR LAMINECTOMY WITH ANDREWS OSTEOTOMY L2-3 AND L3-4 WITH PLF L2-S1 (N/A) 5 Days Post-Op  Precautions: Fall, Back No bending, no lifting greater than 5 lbs, no twisting, log-roll technique, repositioning every 20-30 min except when sleeping, brace when OOB (if ordered)  Chart, physical therapy assessment, plan of care and goals were reviewed. ASSESSMENT  Patient continues with skilled PT services and is gradually progressing towards goals.  Pt presents with limited mobility due to pain, B LE weakness, decreased standing balance while walking, dizziness with position changes, and overall not feeling well. Pt was able to verbalize precautions prior to mobility. Pt required Manuel for bed mobility, CGA for sit <-> stand, and Manuel for gait training with rolling walker. Pt verbalized dizziness standing and during gait training and presented with a decrease BP. During ambulation, pt was cued to stand up straight. At session end, pt was engaged in discussion regarding discharge planning and returning to care for her  and educated on the importance of getting out of bed for at least 2x per day for meals and to use bedside commode. Current Level of Function Impacting Discharge (mobility/balance):  Manuel for bed mobility, CGA for sit <-> stand, and Manuel for gait training with rolling walker x20 ft    Other factors to consider for discharge: Caregiver for , slowly progressing toward goals, fall risk, below her baseline         PLAN :  Patient continues to benefit from skilled intervention to address the above impairments. Continue treatment per established plan of care. to address goals. Recommendation for discharge: (in order for the patient to meet his/her long term goals)  Therapy 3 hours per day 5-7 days per week, if not accepted then SNF recommended. This discharge recommendation:  Has been made in collaboration with the attending provider and/or case management    IF patient discharges home will need the following DME: patient owns DME required for discharge       SUBJECTIVE:   Patient stated I'm ready to try.     OBJECTIVE DATA SUMMARY:   Critical Behavior:  Neurologic State: Appropriate for age, Alert, Eyes open spontaneously     Cognition: Appropriate decision making, Appropriate for age attention/concentration, Appropriate safety awareness       Spinal diagnosis intervention:  The patient stated 3/3 back precautions when prompted. Reviewed all 3 back precautions, log roll technique, and sitting for 30 minutes at a time.  The patient required verbal cues to maintain back precautions during functional activity. Reviewed back brace application and wear schedule. Brace donned with moderate assistance       Functional Mobility Training:    Bed Mobility:  Sidelying to Sit: Minimum assistance;Assist x1;Additional time; Adaptive equipment     Scooting: Supervision        Transfers:  Sit to Stand: Contact guard assistance;Assist x1;Adaptive equipment; Additional time  Stand to Sit: Contact guard assistance; Adaptive equipment; Additional time;Assist x1                             Balance:  Sitting: Intact  Standing: Impaired  Standing - Static: Good  Standing - Dynamic : Fair (With walker support)  Ambulation/Gait Training:  Distance (ft): 20 Feet (ft)  Assistive Device: Walker, rolling  Ambulation - Level of Assistance: Minimal assistance;Assist x1        Gait Abnormalities: Antalgic;Trunk sway increased;Decreased step clearance (Flexed posture)        Base of Support: Widened     Speed/Catherine: Slow  Step Length: Left shortened;Right shortened    Pain Rating:  Verbal: 6-7/10  Location: Back    Activity Tolerance:   Fair and signs and symptoms of orthostatic hypotension  Vitals:    06/07/21 1444 06/07/21 1543 06/07/21 1550 06/07/21 1554   BP: 112/62 (P) 138/78 (P) 99/66 (P) 104/68   BP 1 Location: Left upper arm (P) Left upper arm (P) Left upper arm (P) Left upper arm   BP Patient Position: Lying right side (P) Sitting (P) Standing (P) Sitting   Pulse: 77 (P) 80 (P) 94 (P) 91   Temp: 97.8 °F (36.6 °C)      Resp: 18      Height:       Weight:       SpO2: 96%         After treatment patient left in no apparent distress:   Sitting in chair and Call bell within reach    COMMUNICATION/COLLABORATION:   The patients plan of care was discussed with: Registered nurse.      Gris Puentes PT  Sally Zaman SPT   Time Calculation: 39 mins

## 2021-06-08 LAB
GLUCOSE BLD STRIP.AUTO-MCNC: 116 MG/DL (ref 65–117)
GLUCOSE BLD STRIP.AUTO-MCNC: 150 MG/DL (ref 65–117)
GLUCOSE BLD STRIP.AUTO-MCNC: 157 MG/DL (ref 65–117)
GLUCOSE BLD STRIP.AUTO-MCNC: 164 MG/DL (ref 65–117)
SERVICE CMNT-IMP: ABNORMAL
SERVICE CMNT-IMP: NORMAL

## 2021-06-08 PROCEDURE — 82962 GLUCOSE BLOOD TEST: CPT

## 2021-06-08 PROCEDURE — 97530 THERAPEUTIC ACTIVITIES: CPT

## 2021-06-08 PROCEDURE — 74011250637 HC RX REV CODE- 250/637: Performed by: STUDENT IN AN ORGANIZED HEALTH CARE EDUCATION/TRAINING PROGRAM

## 2021-06-08 PROCEDURE — 65270000029 HC RM PRIVATE

## 2021-06-08 PROCEDURE — 74011636637 HC RX REV CODE- 636/637: Performed by: STUDENT IN AN ORGANIZED HEALTH CARE EDUCATION/TRAINING PROGRAM

## 2021-06-08 PROCEDURE — 97535 SELF CARE MNGMENT TRAINING: CPT

## 2021-06-08 RX ADMIN — POLYETHYLENE GLYCOL 3350 17 G: 17 POWDER, FOR SOLUTION ORAL at 08:57

## 2021-06-08 RX ADMIN — DOCUSATE SODIUM 50 MG AND SENNOSIDES 8.6 MG 1 TABLET: 8.6; 5 TABLET, FILM COATED ORAL at 18:32

## 2021-06-08 RX ADMIN — GABAPENTIN 100 MG: 100 CAPSULE ORAL at 23:22

## 2021-06-08 RX ADMIN — METFORMIN HYDROCHLORIDE 500 MG: 500 TABLET ORAL at 16:56

## 2021-06-08 RX ADMIN — METOPROLOL TARTRATE 25 MG: 25 TABLET, FILM COATED ORAL at 08:11

## 2021-06-08 RX ADMIN — Medication 10 ML: at 14:06

## 2021-06-08 RX ADMIN — ALPRAZOLAM 0.5 MG: 0.5 TABLET ORAL at 23:26

## 2021-06-08 RX ADMIN — INSULIN LISPRO 2 UNITS: 100 INJECTION, SOLUTION INTRAVENOUS; SUBCUTANEOUS at 16:55

## 2021-06-08 RX ADMIN — Medication 10 ML: at 23:26

## 2021-06-08 RX ADMIN — INSULIN LISPRO 2 UNITS: 100 INJECTION, SOLUTION INTRAVENOUS; SUBCUTANEOUS at 12:18

## 2021-06-08 RX ADMIN — GABAPENTIN 100 MG: 100 CAPSULE ORAL at 15:08

## 2021-06-08 RX ADMIN — ACETAMINOPHEN 1000 MG: 500 TABLET ORAL at 08:11

## 2021-06-08 RX ADMIN — ATORVASTATIN CALCIUM 40 MG: 40 TABLET, FILM COATED ORAL at 23:21

## 2021-06-08 RX ADMIN — ACETAMINOPHEN 1000 MG: 500 TABLET ORAL at 23:21

## 2021-06-08 RX ADMIN — PANTOPRAZOLE SODIUM 40 MG: 40 TABLET, DELAYED RELEASE ORAL at 18:32

## 2021-06-08 RX ADMIN — PAROXETINE HYDROCHLORIDE 40 MG: 20 TABLET, FILM COATED ORAL at 08:11

## 2021-06-08 RX ADMIN — METOPROLOL TARTRATE 12.5 MG: 25 TABLET, FILM COATED ORAL at 23:22

## 2021-06-08 RX ADMIN — ACETAMINOPHEN 1000 MG: 500 TABLET ORAL at 12:18

## 2021-06-08 RX ADMIN — LEVOTHYROXINE SODIUM 112 MCG: 0.11 TABLET ORAL at 08:11

## 2021-06-08 RX ADMIN — ALPRAZOLAM 0.5 MG: 0.5 TABLET ORAL at 08:11

## 2021-06-08 RX ADMIN — METFORMIN HYDROCHLORIDE 500 MG: 500 TABLET ORAL at 08:56

## 2021-06-08 RX ADMIN — DOCUSATE SODIUM 50 MG AND SENNOSIDES 8.6 MG 1 TABLET: 8.6; 5 TABLET, FILM COATED ORAL at 08:57

## 2021-06-08 RX ADMIN — GABAPENTIN 100 MG: 100 CAPSULE ORAL at 08:56

## 2021-06-08 RX ADMIN — AMITRIPTYLINE HYDROCHLORIDE 50 MG: 50 TABLET, FILM COATED ORAL at 23:22

## 2021-06-08 RX ADMIN — ACETAMINOPHEN 1000 MG: 500 TABLET ORAL at 18:32

## 2021-06-08 RX ADMIN — INSULIN LISPRO 2 UNITS: 100 INJECTION, SOLUTION INTRAVENOUS; SUBCUTANEOUS at 08:10

## 2021-06-08 NOTE — PROGRESS NOTES
Problem: Self Care Deficits Care Plan (Adult)  Goal: *Therapy Goal (Edit Goal, Insert Text)  Description: FUNCTIONAL STATUS PRIOR TO ADMISSION: Patient required minimal assistance for basic and instrumental ADLs. HOME SUPPORT: The patient lived with spouse who provides assistance with footwear management, family assists with home management tasks. DME: Wheelchair, Transfer bench, Commode, bedside, Walker, rolling, Cane, straight    Occupational Therapy Goals  Initiated 6/3/2021    1. Patient will perform lower body dressing with supervision/set-up using AE PRN within 4 days. 2.  Patient will perform toileting with supervision/set-up using most appropriate DME within 4 days. 3.  Patient will toilet transfer at supervision/set-up within 4 days. 4.  Patient will don/doff back brace at supervision/set-up within 4 days. 5.  Patient will verbalize/demonstrate 3/3 back precautions during ADL tasks without cues within 4 days. Outcome: Progressing Towards Goal   OCCUPATIONAL THERAPY TREATMENT  Patient: Teddy Lamb (62 y.o. female)  Date: 6/8/2021  Diagnosis: Lumbar stenosis with neurogenic claudication [M48.062] <principal problem not specified>  Procedure(s) (LRB):  L2-3 AND L5-S1  LUMBAR LAMINECTOMY WITH ANDREWS OSTEOTOMY L2-3 AND L3-4 WITH PLF L2-S1 (N/A) 6 Days Post-Op  Precautions: Fall, Back  Chart, occupational therapy assessment, plan of care, and goals were reviewed. ASSESSMENT  Patient continues with skilled OT services and is progressing towards goals. Pt motivated to participate. Today, pt presents at min assist with bed mobility, SBA to don slip-on shoes using LH shoe horn and min assist with sit to stand using RW. Pt limited by low BP in standing. Notified nursing. Pt wants to see her brother who is currently on the 2nd floor. Pt will need to get clearance to go via w/c if medically cleared. Will con't to follow and address listed goals.    Vitals:    06/08/21 1503 06/08/21 1513 06/08/21 1517 06/08/21 1528   BP: (!) 101/51 109/64 (!) 75/55 104/64   BP 1 Location: Left upper arm Left upper arm Left upper arm Left upper arm   BP Patient Position: Supine Sitting Standing Lying right side   Pulse: 71 82 96 74   Temp:       Resp:       Height:       Weight:       SpO2:             Current Level of Function Impacting Discharge (ADLs): min assist with mobility    Other factors to consider for discharge: lives with supportive          PLAN :  Patient continues to benefit from skilled intervention to address the above impairments. Continue treatment per established plan of care to address goals. Recommend with staff: Asmita Hernandez for meals if BP stable    Recommend next OT session: access bathroom if BP stable, LB dressing using AE    Recommendation for discharge: (in order for the patient to meet his/her long term goals)  Therapy 3 hours per day 5-7 days per week    This discharge recommendation:  A follow-up discussion with the attending provider and/or case management is planned    IF patient discharges home will need the following DME: none       SUBJECTIVE:   Patient stated I don't know why it's so low.     OBJECTIVE DATA SUMMARY:   Cognitive/Behavioral Status:  Neurologic State: Alert  Orientation Level: Oriented X4  Cognition: Appropriate decision making        Safety/Judgement: Awareness of environment    Functional Mobility and Transfers for ADLs:  Bed Mobility:  Supine to Sit: Minimum assistance    Transfers:  Sit to Stand: Minimum assistance;Assist x1          Balance:  Sitting: Intact  Standing: Impaired    ADL Intervention:            Lower Body Dressing Assistance  Slip on Shoes with Back: Stand-by assistance  Leg Crossed Method Used: No  Adaptive Equipment Used: Long handled shoe horn         Cognitive Retraining  Safety/Judgement: Awareness of environment    Therapeutic Exercises:       Pain:  No c/o pain    Activity Tolerance:   Fair    After treatment patient left in no apparent distress:   Supine in bed and Call bell within reach    COMMUNICATION/COLLABORATION:   The patients plan of care was discussed with: Physical therapist and Registered nurse.      Kim Jimenes, OTR/L  Time Calculation: 30 mins

## 2021-06-08 NOTE — PROGRESS NOTES
Orthopedic Spine Progress Note  Post Op day: 6 Days Post-Op    2021 9:52 AM   Admit Date: 2021  Procedure: Procedure(s):  L2-3 AND L5-S1  LUMBAR LAMINECTOMY WITH ANDREWS OSTEOTOMY L2-3 AND L3-4 WITH PLF L2-S1    Subjective:     Radha Reese has no complaints. Continues with wound vac. Pain well managed. Tolerating diet. No N/V. Pain Control:   Pain Assessment  Pain Scale 1: Numeric (0 - 10)  Pain Intensity 1: 7  Pain Onset 1: post opt  Pain Location 1: Back  Pain Orientation 1: Lower  Pain Description 1: Aching  Pain Intervention(s) 1: Declines    Objective:          Physical Exam:  General:  Alert and oriented. No acute distress. Heart:  Respirations unlabored. Abdomen:   Extremities: Soft, non-tender. No evidence of cyanosis. Pulses palpable in both upper and lower extremities. Neurologic:  Musculoskeletal:  No new motor deficits. Neurovascular exam within normal limits. Sensation stable. Motor: unchanged C5-T1 and L2-S1. Neema's sign negative in bilateral lower extremities. Calves soft, nontender upon palpation and with passive twitch. Moves both upper and lower extremities. Incision: clean, dry, and intact. No significant erythema or swelling. No active drainage noted. Vital Signs:    Blood pressure (!) 92/52, pulse 83, temperature 97.9 °F (36.6 °C), resp. rate 18, height 5' 4\" (1.626 m), weight 132 kg (291 lb 0.1 oz), SpO2 93 %. Temp (24hrs), Av.1 °F (36.7 °C), Min:97.8 °F (36.6 °C), Max:98.7 °F (37.1 °C)      LAB:    No results for input(s): HCT, HGB, PLT, HCTEXT, HGBEXT, PLTEXT in the last 72 hours.   Lab Results   Component Value Date/Time    Sodium 137 2021 05:21 AM    Potassium 4.8 2021 05:21 AM    Chloride 106 2021 05:21 AM    CO2 26 2021 05:21 AM    Glucose 232 (H) 2021 05:21 AM    BUN 18 2021 05:21 AM    Creatinine 0.91 2021 05:21 AM    Calcium 7.8 (L) 2021 05:21 AM       Intake/Output:No intake/output data recorded. 06/06 1901 - 06/08 0700  In: -   Out: 900 [Urine:900]    PT/OT:   Gait:  Gait  Base of Support: Widened  Speed/Catherine: Slow  Step Length: Left shortened, Right shortened  Gait Abnormalities: Antalgic, Trunk sway increased, Decreased step clearance (Flexed posture)  Ambulation - Level of Assistance: Minimal assistance, Assist x1  Distance (ft): 20 Feet (ft)  Assistive Device: Walker, rolling                 Assessment:   Patient is 6 Days Post-Op s/p Procedure(s):  L2-3 AND L5-S1  LUMBAR LAMINECTOMY WITH ANDREWS OSTEOTOMY L2-3 AND L3-4 WITH PLF L2-S1    Plan:     1. Continue PT/OT  2. Continue established methods of pain control  3. VTE Prophylaxes - TEDS &/or SCDs   4. Continue with wound care/vac per wound care team  5. Discharge pending final recs from PT--rehab vs   6.        Signed By: MAIKEL Greene

## 2021-06-08 NOTE — PROGRESS NOTES
Problem: Mobility Impaired (Adult and Pediatric)  Goal: *Acute Goals and Plan of Care (Insert Text)  Description: FUNCTIONAL STATUS PRIOR TO ADMISSION: Patient was modified independent using no device for ambulation but sits in wheelchair during ADLs d/t back pain. HOME SUPPORT PRIOR TO ADMISSION: The patient lived with  who can assist with dressing ADLs as needed  can not provide physical assist.    Physical Therapy Goals  Initiated 6/3/2021    1. Patient will move from supine to sit and sit to supine , scoot up and down, and roll side to side in bed with modified independence within 4 days. 2. Patient will perform sit to stand with modified independence within 4 days. 3. Patient will ambulate with modified independence for 125 feet with the least restrictive device within 4 days. 4. Patient will ascend/descend ramp with bilateral railings with modified independence within 4 days. 5. Patient will verbalize and demonstrate understanding of spinal precautions (No bending, lifting greater than 5 lbs, or twisting; log-roll technique; frequent repositioning as instructed) within 4 days. Outcome: Not Progressing Towards Goal   PHYSICAL THERAPY TREATMENT  Patient: Jose Jhaveri (72 y.o. female)  Date: 6/8/2021  Diagnosis: Lumbar stenosis with neurogenic claudication [M48.062] <principal problem not specified>  Procedure(s) (LRB):  L2-3 AND L5-S1  LUMBAR LAMINECTOMY WITH ANDREWS OSTEOTOMY L2-3 AND L3-4 WITH PLF L2-S1 (N/A) 6 Days Post-Op  Precautions: Fall, Back No bending, no lifting greater than 5 lbs, no twisting, log-roll technique, repositioning every 20-30 min except when sleeping, brace when OOB (if ordered)  Chart, physical therapy assessment, plan of care and goals were reviewed. ASSESSMENT  Patient continues with skilled PT services and is not progressing towards goals.  Pt presents with decreased activity tolerance due to orthostatic hypotension, symptomatic, poor standing tolerance, moderate back pain, and impaired functional mobility below her baseline. Pt received in R side lying and agreeable to work with therapy. Reviewed back precautions with pt and donning brace in sitting. Pt transferred to sit EOB and stood with rolling walker with minimal assistance. Pt complains of lightheadedness and noted increased RR in standing. She requested to sit down prior to completing BP on 1st attempt. She stood again after a brief rest and was able to remain standing for BP with encouragement however needed to sit down immediately afterwards. Standing BP 75/55. Pt returned to supine at session end. Notified RN and PA regarding BP. Current Level of Function Impacting Discharge (mobility/balance): minimal assist bed mobility and transfers, unable to tolerate ambulation today due to orthostatic hypotension     Other factors to consider for discharge: slow progress, below her baseline, fall risk, Caregiver for ,           PLAN :  Patient continues to benefit from skilled intervention to address the above impairments. Continue treatment per established plan of care. to address goals. Recommendation for discharge: (in order for the patient to meet his/her long term goals)  Therapy 3 hours per day 5-7 days per week, if not accepted then SNF recommended    This discharge recommendation:  A follow-up discussion with the attending provider and/or case management is planned    IF patient discharges home will need the following DME: patient owns DME required for discharge       SUBJECTIVE:   Patient stated Can I sit down.     OBJECTIVE DATA SUMMARY:   Critical Behavior:  Neurologic State: Alert  Orientation Level: Oriented X4  Cognition: Appropriate decision making  Safety/Judgement: Awareness of environment    Spinal diagnosis intervention:  The patient stated 3/3 back precautions when prompted. Reviewed all 3 back precautions, log roll technique, and sitting for 30 minutes at a time. Reviewed back brace application and wear schedule. Brace donned with moderate assistance       Functional Mobility Training:    Bed Mobility:     Side lying to Sit: Minimum assistance              Transfers:  Sit to Stand: Minimum assistance;Assist x1  Stand to Sit: Minimum assistance;Assist x2                             Balance:  Sitting: Intact  Standing: Impaired  Pain Rating:  Verbal: 6  Location: back     Activity Tolerance:   Poor, requires frequent rest breaks, observed SOB with activity, and signs and symptoms of orthostatic hypotension  Vitals:    06/08/21 1503 06/08/21 1513 06/08/21 1517 06/08/21 1528   BP: (!) 101/51 109/64 (!) 75/55 104/64   BP 1 Location: Left upper arm Left upper arm Left upper arm Left upper arm   BP Patient Position: Supine Sitting Standing Lying right side   Pulse: 71 82 96 74   Temp:       Resp:       Height:       Weight:       SpO2:          After treatment patient left in no apparent distress:   Supine in bed and Call bell within reach    COMMUNICATION/COLLABORATION:   The patients plan of care was discussed with: Registered nurse.      Gris Rosario   Time Calculation: 42 mins

## 2021-06-08 NOTE — PROGRESS NOTES
ENEIDA:    RUR 10%    Dis[facility]: [facility] [employer]. Transition of Care Plan:     The Plan for Transition of Care is related to the following treatment goals: Rehab    The Patient and/or patient representativewas provided with a choice of provider and agrees  with the discharge plan. Yes [x] No []    A Freedom of choice list was provided with basic dialogue that supports the patient's individualized plan of care/goals and shares the quality data associated with the providers.        Yes [x] No []    Transportation: [facility]    Follow Up: PCP    Will Dejesus RN/CRM  [phone]

## 2021-06-08 NOTE — WOUND CARE
WOCN Note      Follow up for management of alarming VAC. Reinforced drape at the distal end of VAC. Will continue follow up.     TRENA Arceo RN [facility] Inpatient Wound Care  Available on [employer]  Pager [phone]  Office [phone]

## 2021-06-08 NOTE — PROGRESS NOTES
Problem: Falls - Risk of  Goal: *Absence of Falls  Description: Document Susan Mehta Fall Risk and appropriate interventions in the flowsheet.   Outcome: Progressing Towards Goal  Note: Fall Risk Interventions:  Mobility Interventions: OT consult for ADLs, Patient to call before getting OOB, PT Consult for mobility concerns, PT Consult for assist device competence         Medication Interventions: Teach patient to arise slowly, Patient to call before getting OOB    Elimination Interventions: Call light in reach, Patient to call for help with toileting needs    History of Falls Interventions: Door open when patient unattended, Room close to nurse's station         Problem: Patient Education: Go to Patient Education Activity  Goal: Patient/Family Education  Outcome: Progressing Towards Goal     Problem: Patient Education: Go to Patient Education Activity  Goal: Patient/Family Education  Outcome: Progressing Towards Goal     Problem: Patient Education: Go to Patient Education Activity  Goal: Patient/Family Education  Outcome: Progressing Towards Goal     Problem: Discharge Planning  Goal: *Discharge to safe environment  Outcome: Progressing Towards Goal

## 2021-06-09 LAB
ANION GAP SERPL CALC-SCNC: 6 MMOL/L (ref 5–15)
BUN SERPL-MCNC: 16 MG/DL (ref 6–20)
BUN/CREAT SERPL: 27 (ref 12–20)
CALCIUM SERPL-MCNC: 9 MG/DL (ref 8.5–10.1)
CHLORIDE SERPL-SCNC: 104 MMOL/L (ref 97–108)
CO2 SERPL-SCNC: 28 MMOL/L (ref 21–32)
CREAT SERPL-MCNC: 0.6 MG/DL (ref 0.55–1.02)
ERYTHROCYTE [DISTWIDTH] IN BLOOD BY AUTOMATED COUNT: 13.8 % (ref 11.5–14.5)
GLUCOSE BLD STRIP.AUTO-MCNC: 115 MG/DL (ref 65–117)
GLUCOSE BLD STRIP.AUTO-MCNC: 115 MG/DL (ref 65–117)
GLUCOSE BLD STRIP.AUTO-MCNC: 135 MG/DL (ref 65–117)
GLUCOSE BLD STRIP.AUTO-MCNC: 190 MG/DL (ref 65–117)
GLUCOSE SERPL-MCNC: 135 MG/DL (ref 65–100)
HCT VFR BLD AUTO: 35.4 % (ref 35–47)
HGB BLD-MCNC: 11.4 G/DL (ref 11.5–16)
MCH RBC QN AUTO: 28.8 PG (ref 26–34)
MCHC RBC AUTO-ENTMCNC: 32.2 G/DL (ref 30–36.5)
MCV RBC AUTO: 89.4 FL (ref 80–99)
NRBC # BLD: 0 K/UL (ref 0–0.01)
NRBC BLD-RTO: 0 PER 100 WBC
PLATELET # BLD AUTO: 343 K/UL (ref 150–400)
PMV BLD AUTO: 9.7 FL (ref 8.9–12.9)
POTASSIUM SERPL-SCNC: 3.7 MMOL/L (ref 3.5–5.1)
RBC # BLD AUTO: 3.96 M/UL (ref 3.8–5.2)
SERVICE CMNT-IMP: ABNORMAL
SERVICE CMNT-IMP: ABNORMAL
SERVICE CMNT-IMP: NORMAL
SERVICE CMNT-IMP: NORMAL
SODIUM SERPL-SCNC: 138 MMOL/L (ref 136–145)
WBC # BLD AUTO: 9.6 K/UL (ref 3.6–11)

## 2021-06-09 PROCEDURE — 97116 GAIT TRAINING THERAPY: CPT

## 2021-06-09 PROCEDURE — 82962 GLUCOSE BLOOD TEST: CPT

## 2021-06-09 PROCEDURE — 74011250636 HC RX REV CODE- 250/636: Performed by: PHYSICIAN ASSISTANT

## 2021-06-09 PROCEDURE — 74011250637 HC RX REV CODE- 250/637: Performed by: STUDENT IN AN ORGANIZED HEALTH CARE EDUCATION/TRAINING PROGRAM

## 2021-06-09 PROCEDURE — 85027 COMPLETE CBC AUTOMATED: CPT

## 2021-06-09 PROCEDURE — 74011636637 HC RX REV CODE- 636/637: Performed by: STUDENT IN AN ORGANIZED HEALTH CARE EDUCATION/TRAINING PROGRAM

## 2021-06-09 PROCEDURE — 65270000029 HC RM PRIVATE

## 2021-06-09 PROCEDURE — 97535 SELF CARE MNGMENT TRAINING: CPT

## 2021-06-09 PROCEDURE — 80048 BASIC METABOLIC PNL TOTAL CA: CPT

## 2021-06-09 PROCEDURE — 97530 THERAPEUTIC ACTIVITIES: CPT

## 2021-06-09 PROCEDURE — 36415 COLL VENOUS BLD VENIPUNCTURE: CPT

## 2021-06-09 RX ORDER — SODIUM CHLORIDE 9 MG/ML
50 INJECTION, SOLUTION INTRAVENOUS CONTINUOUS
Status: DISCONTINUED | OUTPATIENT
Start: 2021-06-09 | End: 2021-06-12 | Stop reason: HOSPADM

## 2021-06-09 RX ADMIN — PANTOPRAZOLE SODIUM 40 MG: 40 TABLET, DELAYED RELEASE ORAL at 17:08

## 2021-06-09 RX ADMIN — GABAPENTIN 100 MG: 100 CAPSULE ORAL at 21:28

## 2021-06-09 RX ADMIN — ATORVASTATIN CALCIUM 40 MG: 40 TABLET, FILM COATED ORAL at 21:28

## 2021-06-09 RX ADMIN — DOCUSATE SODIUM 50 MG AND SENNOSIDES 8.6 MG 1 TABLET: 8.6; 5 TABLET, FILM COATED ORAL at 09:00

## 2021-06-09 RX ADMIN — ACETAMINOPHEN 1000 MG: 500 TABLET ORAL at 11:54

## 2021-06-09 RX ADMIN — GABAPENTIN 100 MG: 100 CAPSULE ORAL at 16:10

## 2021-06-09 RX ADMIN — SODIUM CHLORIDE 50 ML/HR: 9 INJECTION, SOLUTION INTRAVENOUS at 16:10

## 2021-06-09 RX ADMIN — ACETAMINOPHEN 1000 MG: 500 TABLET ORAL at 17:08

## 2021-06-09 RX ADMIN — METFORMIN HYDROCHLORIDE 500 MG: 500 TABLET ORAL at 09:00

## 2021-06-09 RX ADMIN — Medication 10 ML: at 14:18

## 2021-06-09 RX ADMIN — Medication 10 ML: at 07:14

## 2021-06-09 RX ADMIN — POLYETHYLENE GLYCOL 3350 17 G: 17 POWDER, FOR SOLUTION ORAL at 08:59

## 2021-06-09 RX ADMIN — OXYCODONE 5 MG: 5 TABLET ORAL at 12:39

## 2021-06-09 RX ADMIN — LEVOTHYROXINE SODIUM 112 MCG: 0.11 TABLET ORAL at 07:14

## 2021-06-09 RX ADMIN — OXYCODONE 5 MG: 5 TABLET ORAL at 21:28

## 2021-06-09 RX ADMIN — METFORMIN HYDROCHLORIDE 500 MG: 500 TABLET ORAL at 16:10

## 2021-06-09 RX ADMIN — AMITRIPTYLINE HYDROCHLORIDE 50 MG: 50 TABLET, FILM COATED ORAL at 21:28

## 2021-06-09 RX ADMIN — INSULIN LISPRO 2 UNITS: 100 INJECTION, SOLUTION INTRAVENOUS; SUBCUTANEOUS at 11:54

## 2021-06-09 RX ADMIN — METOPROLOL TARTRATE 25 MG: 25 TABLET, FILM COATED ORAL at 07:14

## 2021-06-09 RX ADMIN — PAROXETINE HYDROCHLORIDE 40 MG: 20 TABLET, FILM COATED ORAL at 07:14

## 2021-06-09 RX ADMIN — ALPRAZOLAM 0.5 MG: 0.5 TABLET ORAL at 07:14

## 2021-06-09 RX ADMIN — Medication 10 ML: at 21:29

## 2021-06-09 RX ADMIN — DOCUSATE SODIUM 50 MG AND SENNOSIDES 8.6 MG 1 TABLET: 8.6; 5 TABLET, FILM COATED ORAL at 17:08

## 2021-06-09 RX ADMIN — GABAPENTIN 100 MG: 100 CAPSULE ORAL at 09:00

## 2021-06-09 RX ADMIN — ALPRAZOLAM 0.5 MG: 0.5 TABLET ORAL at 21:28

## 2021-06-09 NOTE — PROGRESS NOTES
Problem: Self Care Deficits Care Plan (Adult)  Goal: *Therapy Goal (Edit Goal, Insert Text)  Description: FUNCTIONAL STATUS PRIOR TO ADMISSION: Patient required minimal assistance for basic and instrumental ADLs. HOME SUPPORT: The patient lived with spouse who provides assistance with footwear management, family assists with home management tasks. DME: Wheelchair, Transfer bench, Commode, bedside, Walker, rolling, Cane, straight    Occupational Therapy Goals  Initiated 6/3/2021    1. Patient will perform lower body dressing with supervision/set-up using AE PRN within 4 days. 2.  Patient will perform toileting with supervision/set-up using most appropriate DME within 4 days. 3.  Patient will toilet transfer at supervision/set-up within 4 days. 4.  Patient will don/doff back brace at supervision/set-up within 4 days. 5.  Patient will verbalize/demonstrate 3/3 back precautions during ADL tasks without cues within 4 days. Outcome: Progressing Towards Goal   OCCUPATIONAL THERAPY TREATMENT  Patient: Mckay Emery (30 y.o. female)  Date: 6/9/2021  Diagnosis: Lumbar stenosis with neurogenic claudication [M48.062] <principal problem not specified>  Procedure(s) (LRB):  L2-3 AND L5-S1  LUMBAR LAMINECTOMY WITH ANDREWS OSTEOTOMY L2-3 AND L3-4 WITH PLF L2-S1 (N/A) 7 Days Post-Op  Precautions: Fall, Back  Chart, occupational therapy assessment, plan of care, and goals were reviewed. ASSESSMENT  Patient continues with skilled OT services and is progressing towards goals. Pt very pleasant and motivated to participate. Vitals remained stable throughout session (see below). SBA with bed mobility, min assist with donning brace and CGA with functional transfers. Pt set-up to complete grooming/make-up seated in chair. Will con't to follow and address listed goals.    Vitals:    06/09/21 1350 06/09/21 1443 06/09/21 1448 06/09/21 1455   BP: 121/77 100/62 101/69 112/76   BP 1 Location:  Left upper arm Left upper arm Left upper arm   BP Patient Position:  Sitting Sitting; Other (Comment)  Comment: post activity Sitting; Other (Comment)  Comment: after standing for 3 min   Pulse: 76   83   Temp: 98.8 °F (37.1 °C)      Resp: 18      Height:       Weight:       SpO2: 95%            Current Level of Function Impacting Discharge (ADLs): mod assist with LB self-care (toileting, hygiene)    Other factors to consider for discharge: wound vac         PLAN :  Patient continues to benefit from skilled intervention to address the above impairments. Continue treatment per established plan of care to address goals. Recommend with staff:     Recommend next OT session: see goals    Recommendation for discharge: (in order for the patient to meet his/her long term goals)  Therapy 3 hours per day 5-7 days per week    This discharge recommendation:  A follow-up discussion with the attending provider and/or case management is planned    IF patient discharges home will need the following DME: none       SUBJECTIVE:   Patient stated I feel good today.     OBJECTIVE DATA SUMMARY:   Cognitive/Behavioral Status:  Neurologic State: Alert  Orientation Level: Oriented X4  Cognition: Appropriate decision making        Safety/Judgement: Awareness of environment    Functional Mobility and Transfers for ADLs:  Bed Mobility:  Supine to Sit: Stand-by assistance    Transfers:  Sit to Stand: Contact guard assistance  Functional Transfers  Adaptive Equipment: Walker (comment)  Bed to Chair: Contact guard assistance    Balance:  Sitting: Intact  Standing: Intact; With support    ADL Intervention:       Grooming  Grooming Assistance: Independent  Position Performed: Seated in chair  Applying Makeup: Independent         Lower Body Dressing Assistance  Slip on Shoes with Back: Set-up  Position Performed: Seated edge of bed  Adaptive Equipment Used: Long handled shoe horn         Cognitive Retraining  Safety/Judgement: Awareness of environment    Therapeutic Exercises: Pain:  No c/o pain    Activity Tolerance:   Good    After treatment patient left in no apparent distress:   Sitting in chair and Call bell within reach    COMMUNICATION/COLLABORATION:   The patients plan of care was discussed with: Registered nurse.      YADIRA Martinez/L  Time Calculation: 35 mins

## 2021-06-09 NOTE — PROGRESS NOTES
Orthopedic Spine Progress Note  Post Op day: 7 Days Post-Op    2021 8:29 AM   Admit Date: 2021  Procedure: Procedure(s):  L2-3 AND L5-S1  LUMBAR LAMINECTOMY WITH ANDREWS OSTEOTOMY L2-3 AND L3-4 WITH PLF L2-S1    Subjective:     Lina Graves has complaints of dizziness. Pain is well controlled. Tolerating diet. No N/V. Pain Control:   Pain Assessment  Pain Scale 1: Numeric (0 - 10)  Pain Intensity 1: 7  Pain Onset 1: post opt  Pain Location 1: Back  Pain Orientation 1: Lower  Pain Description 1: Aching  Pain Intervention(s) 1: Declines    Objective:          Physical Exam:  General:  Alert and oriented. No acute distress. Heart:  Respirations unlabored. Abdomen:   Extremities: Soft, non-tender. No evidence of cyanosis. Pulses palpable in both upper and lower extremities. Neurologic:  Musculoskeletal:  No new motor deficits. Neurovascular exam within normal limits. Sensation stable. Motor: unchanged C5-T1 and L2-S1. Neema's sign negative in bilateral lower extremities. Calves soft, nontender upon palpation and with passive twitch. Moves both upper and lower extremities. Incision: clean, dry, and intact. No significant erythema or swelling. No active drainage noted. Vital Signs:    Blood pressure 101/68, pulse 63, temperature 97.9 °F (36.6 °C), resp. rate 16, height 5' 4\" (1.626 m), weight 132 kg (291 lb 0.1 oz), SpO2 98 %. Temp (24hrs), Av.8 °F (36.6 °C), Min:97.6 °F (36.4 °C), Max:98.1 °F (36.7 °C)      LAB:    Recent Labs     21  0500   HCT 35.4   HGB 11.4*        Lab Results   Component Value Date/Time    Sodium 138 2021 05:00 AM    Potassium 3.7 2021 05:00 AM    Chloride 104 2021 05:00 AM    CO2 28 2021 05:00 AM    Glucose 135 (H) 2021 05:00 AM    BUN 16 2021 05:00 AM    Creatinine 0.60 2021 05:00 AM    Calcium 9.0 2021 05:00 AM       Intake/Output:No intake/output data recorded.   1901 -  0700  In: -   Out: 450 [Urine:450]    PT/OT:   Gait:  Gait  Base of Support: Widened  Speed/Catherine: Slow  Step Length: Left shortened, Right shortened  Gait Abnormalities: Antalgic, Trunk sway increased, Decreased step clearance (Flexed posture)  Ambulation - Level of Assistance: Minimal assistance, Assist x1  Distance (ft): 20 Feet (ft)  Assistive Device: Walker, rolling                 Assessment:   Patient is 7 Days Post-Op s/p Procedure(s):  L2-3 AND L5-S1  LUMBAR LAMINECTOMY WITH ANDREWS OSTEOTOMY L2-3 AND L3-4 WITH PLF L2-S1    Plan:     1. Continue PT/OT  2. Continue established methods of pain control  3. VTE Prophylaxes - TEDS &/or SCDs   4. Pt continues to move slowly with pt. Having some difficulty with hypotension. 5. Discharge pending--rehab vs SNF  6.        Signed By: MAIKEL Melchor

## 2021-06-09 NOTE — PROGRESS NOTES
Problem: Mobility Impaired (Adult and Pediatric)  Goal: *Acute Goals and Plan of Care (Insert Text)  Description: FUNCTIONAL STATUS PRIOR TO ADMISSION: Patient was modified independent using no device for ambulation but sits in wheelchair during ADLs d/t back pain. HOME SUPPORT PRIOR TO ADMISSION: The patient lived with  who can assist with dressing ADLs as needed  can not provide physical assist.    Physical Therapy Goals  Initiated 6/3/2021    1. Patient will move from supine to sit and sit to supine , scoot up and down, and roll side to side in bed with modified independence within 4 days. 2. Patient will perform sit to stand with modified independence within 4 days. 3. Patient will ambulate with modified independence for 125 feet with the least restrictive device within 4 days. 4. Patient will ascend/descend ramp with bilateral railings with modified independence within 4 days. 5. Patient will verbalize and demonstrate understanding of spinal precautions (No bending, lifting greater than 5 lbs, or twisting; log-roll technique; frequent repositioning as instructed) within 4 days. Outcome: Progressing Towards Goal   PHYSICAL THERAPY TREATMENT  Patient: Teddy Lamb (81 y.o. female)  Date: 6/9/2021  Diagnosis: Lumbar stenosis with neurogenic claudication [M48.062] <principal problem not specified>  Procedure(s) (LRB):  L2-3 AND L5-S1  LUMBAR LAMINECTOMY WITH ANDREWS OSTEOTOMY L2-3 AND L3-4 WITH PLF L2-S1 (N/A) 7 Days Post-Op  Precautions: Fall, Back No bending, no lifting greater than 5 lbs, no twisting, log-roll technique, repositioning every 20-30 min except when sleeping, brace when OOB (if ordered)  Chart, physical therapy assessment, plan of care and goals were reviewed. ASSESSMENT  Patient continues with skilled PT services and is progressing towards goals. Pt began session in chair with brace donned.  Pt tolerated ambulation better than previous session and did not complain of dizziness. Pt ambulated 80 ft with CGAx1 and rolling walker and complained of weakness while exhibiting signs of dyspnea. BP was stable throughout treatment. Pt was able to state back precautions but needed verbal reminder not to twist during stand to sit. Current Level of Function Impacting Discharge (mobility/balance): CGAx1 for transfers and gait    Other factors to consider for discharge: Primary caretaker for , Dyspnea on exertion, Below her baseline, Fall risk          PLAN :  Patient continues to benefit from skilled intervention to address the above impairments. Continue treatment per established plan of care. to address goals. Recommendation for discharge: (in order for the patient to meet his/her long term goals)  Therapy 3 hours per day 5-7 days per week    This discharge recommendation:  Has been made in collaboration with the attending provider and/or case management    IF patient discharges home will need the following DME: patient owns DME required for discharge       SUBJECTIVE:   Patient stated I call that thing a gotcha (reacher).     OBJECTIVE DATA SUMMARY:   Critical Behavior:  Neurologic State: Alert  Orientation Level: Oriented X4  Cognition: Appropriate decision making  Safety/Judgement: Awareness of environment    Spinal diagnosis intervention:  The patient stated 3/3 back precautions when prompted. Reviewed all 3 back precautions, log roll technique, and sitting for 30 minutes at a time. The patient required verbal cues to maintain back precautions during functional activity.      Functional Mobility Training:    Transfers:  Sit to Stand: Contact guard assistance;Assist x1  Stand to Sit: Contact guard assistance;Assist x1       Balance:  Sitting: Intact  Standing: Impaired (With rolling walker)  Standing - Static: Good  Standing - Dynamic : Good  Ambulation/Gait Training:  Distance (ft): 80 Feet (ft)  Assistive Device: Brace/Splint;Gait belt;Walker, rolling  Ambulation - Level of Assistance: Contact guard assistance;Assist x1        Gait Abnormalities: Antalgic (Flexed posture); Pt required 2 rest breaks due to AVALOS              Speed/Catherine: Slow  Step Length: Left shortened;Right shortened          Pain Rating:  Verbal: 6  Location: Low back    Activity Tolerance:   Fair and observed SOB with activity    After treatment patient left in no apparent distress:   Sitting in chair and Call bell within reach    COMMUNICATION/COLLABORATION:   The patients plan of care was discussed with: Registered nurse.      Gris Garcia PT  Sally Zaman SPT   Time Calculation: 17 mins

## 2021-06-09 NOTE — PROGRESS NOTES
Problem: Falls - Risk of  Goal: *Absence of Falls  Description: Document Pascual Broad Fall Risk and appropriate interventions in the flowsheet.   Outcome: Progressing Towards Goal  Note: Fall Risk Interventions:  Mobility Interventions: OT consult for ADLs, Patient to call before getting OOB, PT Consult for mobility concerns, PT Consult for assist device competence         Medication Interventions: Teach patient to arise slowly, Patient to call before getting OOB    Elimination Interventions: Call light in reach    History of Falls Interventions: Door open when patient unattended, Room close to nurse's station         Problem: Patient Education: Go to Patient Education Activity  Goal: Patient/Family Education  Outcome: Progressing Towards Goal     Problem: Patient Education: Go to Patient Education Activity  Goal: Patient/Family Education  Outcome: Progressing Towards Goal     Problem: Patient Education: Go to Patient Education Activity  Goal: Patient/Family Education  Outcome: Progressing Towards Goal     Problem: Discharge Planning  Goal: *Discharge to safe environment  Outcome: Progressing Towards Goal

## 2021-06-09 NOTE — PROGRESS NOTES
ENEIDA:    RUR 11%    Disposition: [facility] accepted. Authorization pending.     Transportation: \[facility] [place] [facility]\""    [facility] RN/CRM  [phone]

## 2021-06-09 NOTE — CONSULTS
S Cardiology Consult Note    Date of consult:  06/09/21  Date of admission: 6/2/2021  Primary Cardiologist: Dr Cheryl Harada  Physician Requesting consult: Dr Chappell Cooper [facility] / Reason for consult: Hypotension    History of the presenting illness:  Becky Florez is a 65 y.o. F with a history of lumbar stenosis who underwent revision of laminectomy on 6/2. Generally has had an uneventful post-op course, but has noted to be have some low BP readings at times. Feels ok. No chest pain, no dyspnea. She has had a fairly recent cath in Dec 2019 that showed non obstructive CAD. I saw her recently in the office since her BP was noted to be low prior to surgery and her surgery was postponed due to this. In the office her BP was fine. Past Medical History:   Diagnosis Date    Adverse effect of anesthesia     O2 DROPS WITH ANESTHESIA    Arthritis     KNEES    Cancer ([facility] [address] 03/13/2015    SQUAMOUS CELL CARCINOMA; tonsils and lymph nodes. Removed 3-2015 with radiation    GERD (gastroesophageal reflux disease)     Hypertension     NO LONGER ON MEDICATION    Hypothyroid     HYPOTHYROIDISM    Migraine     Nausea & vomiting     Obesity     Other ill-defined conditions(799.89)     chronic back pain    Psychiatric disorder     anxiety    Psychiatric disorder     panic ATTACKS    SVT (supraventricular tachycardia) ([facility] [address] 05/24/2014    Ulcerative colitis        Prior to Admission medications    Medication Sig Start Date End Date Taking? Authorizing Provider   oxyCODONE IR (ROXICODONE) 5 mg immediate release tablet Take 1-2 Tablets by mouth every four (4) hours as needed for Pain for up to 7 days. Max Daily Amount: 60 mg. 6/3/21 6/10/21 Yes MAIKEL Gilmore   naloxone [facility]) 4 mg/actuation nasal spray Use 1 spray intranasally, then discard. Repeat with new spray every 2 min as needed for opioid overdose symptoms, alternating nostrils.  6/3/21  Yes Virgen Cma PA   amitriptyline (ELAVIL) 50 mg tablet Take 50 mg by mouth nightly. Yes Provider, Historical   atorvastatin (LIPITOR) 40 mg tablet Take 40 mg by mouth nightly. Yes Provider, Historical   metFORMIN (GLUCOPHAGE) 500 mg tablet Take 500 mg by mouth two (2) times daily (with meals). Yes Provider, Historical   aspirin delayed-release 81 mg tablet Take 81 mg by mouth daily. Yes Provider, Historical   ALPRAZolam (XANAX) 0.5 mg tablet Take 0.5 mg by mouth two (2) times a day. Yes Provider, Historical   levothyroxine (SYNTHROID) 75 mcg tablet Take 112 mcg by mouth Daily (before breakfast). Yes Provider, Historical   PARoxetine (PAXIL) 40 mg tablet Take 40 mg by mouth every morning. Yes Provider, Historical   metoprolol tartrate (LOPRESSOR) 25 mg tablet Take 12.5 mg by mouth nightly. Yes Provider, Historical   metoprolol (LOPRESSOR) 25 mg tablet Take 25 mg by mouth every morning. Yes Provider, Historical   omeprazole (PRILOSEC) 40 mg capsule Take 40 mg by mouth every morning. Yes Provider, Historical   butalb/acetaminophen/caffeine (FIORICET PO) Take 1 Tab by mouth as needed. Patient not taking: Reported on 6/2/2021    Provider, Historical       Allergies   Allergen Reactions    Adhesive Tape-Silicones Rash    Aloe Vera Rash    Chlorhexidine Rash    Tussin [Dextromethorphan Hbr] Palpitations        Family History   Problem Relation Age of Onset    Cancer Mother         ovarian ca?     Heart Disease Father         MI    Heart Attack Father     Cancer Father         MULTIPLE MYELOMA    Liver Disease Father         FROM MEDICATIONS FOR MULTIPLE MYELOMA    Arthritis-osteo Sister     Arthritis-osteo Brother     Cancer Paternal Grandmother 79        breast ca    Breast Cancer Paternal Grandmother 79    Breast Cancer Maternal Aunt 55    Breast Cancer Maternal Aunt 61    Breast Cancer Paternal Aunt 43    Breast Cancer Paternal Aunt         52's    Emphysema Paternal Grandfather     No Known Problems Sister     No Known Problems Brother     No Known Problems Brother     Anesth Problems Neg Hx        Social History     Socioeconomic History    Marital status:      Spouse name: Not on file    Number of children: Not on file    Years of education: Not on file    Highest education level: Not on file   Occupational History    Not on file   Tobacco Use    Smoking status: Never Smoker    Smokeless tobacco: Never Used   Vaping Use    Vaping Use: Never used   Substance and Sexual Activity    Alcohol use: No    Drug use: No     Types: Prescription    Sexual activity: Not Currently   Other Topics Concern     Service Not Asked    Blood Transfusions Not Asked    Caffeine Concern Not Asked    Occupational Exposure Not Asked    Hobby Hazards Not Asked    Sleep Concern Not Asked    Stress Concern Not Asked    Weight Concern Not Asked    Special Diet Not Asked    Back Care Not Asked    Exercise Not Asked    Bike Helmet Not Asked   [address] Not Asked    Self-Exams Not Asked   Social History Narrative    Not on file     Social Determinants of Health     Financial Resource Strain:     Difficulty of Paying Living Expenses:    Food Insecurity:     Worried About Running Out of Food in the Last Year:     Ran Out of Food in the Last Year:    Transportation Needs:     Lack of Transportation (Medical):      Lack of Transportation (Non-Medical):    Physical Activity:     Days of Exercise per Week:     Minutes of Exercise per Session:    Stress:     Feeling of Stress :    Social Connections:     Frequency of Communication with Friends and Family:     Frequency of Social Gatherings with Friends and Family:     Attends Orthodox Services:     Active Member of Clubs or Organizations:     Attends Club or Organization Meetings:     Marital Status:    Intimate Partner Violence:     Fear of Current or Ex-Partner:     Emotionally Abused:     Physically Abused:     Sexually Abused:        Review of Systems Constitutional: Negative for chills and fever. HENT: Negative for hearing loss and nosebleeds. Eyes: Negative for blurred vision and double vision. Respiratory: Negative for cough and sputum production. Cardiovascular: Negative for chest pain. Gastrointestinal: Negative for abdominal pain, nausea and vomiting. Genitourinary: Negative for frequency and urgency. Musculoskeletal: Positive for back pain. Negative for joint pain. Skin: Negative for itching and rash. Neurological: Negative for dizziness and headaches. Endo/Heme/Allergies: Negative for environmental allergies. Does not bruise/bleed easily. Visit Vitals  /68   Pulse 63   Temp 97.9 °F (36.6 °C)   Resp 16   Ht 5' 4\" (1.626 m)   Wt 132 kg (291 lb 0.1 oz)   SpO2 98%   BMI 49.95 kg/m²     Physical Exam  Constitutional:       Appearance: She is obese. HENT:      Head: Normocephalic and atraumatic. Eyes:      General: No scleral icterus. Conjunctiva/sclera: Conjunctivae normal.   Neck:      Vascular: No JVD. Cardiovascular:      Rate and Rhythm: Normal rate and regular rhythm. Heart sounds: Normal heart sounds. No murmur heard. No gallop. Pulmonary:      Effort: Pulmonary effort is normal. No respiratory distress. Breath sounds: Normal breath sounds. No stridor. No wheezing. Abdominal:      General: There is no distension. Palpations: Abdomen is soft. Musculoskeletal:         General: No deformity. Normal range of motion. Skin:     General: Skin is warm and dry. Neurological:      Mental Status: She is alert and oriented to person, place, and time.    Psychiatric:         Mood and Affect: Mood and affect normal.         Lab review:  BMP:   Lab Results   Component Value Date/Time     06/09/2021 05:00 AM    K 3.7 06/09/2021 05:00 AM     06/09/2021 05:00 AM    CO2 28 06/09/2021 05:00 AM    AGAP 6 06/09/2021 05:00 AM     (H) 06/09/2021 05:00 AM    BUN 16 06/09/2021 05:00 AM CREA 0.60 06/09/2021 05:00 AM    GFRAA >60 06/09/2021 05:00 AM    GFRNA >60 06/09/2021 05:00 AM        CBC:  Lab Results   Component Value Date/Time    WBC 9.6 06/09/2021 05:00 AM    Hemoglobin (POC) 15.6 02/10/2011 08:20 PM    HGB 11.4 (L) 06/09/2021 05:00 AM    Hematocrit (POC) 46 02/10/2011 08:20 PM    HCT 35.4 06/09/2021 05:00 AM    PLATELET 014 38/29/0956 05:00 AM    MCV 89.4 06/09/2021 05:00 AM       All Cardiac Markers in the last 24 hours:  No results found for: CPK, CK, CKMMB, CKMB, RCK3, CKMBT, CKMBPOC, CKNDX, CKND1, SANTIAGO, TROPT, TROIQ, KARLA, TROPT, TNIPOC, BNP, BNPP, BNPNT    Lab Results   Component Value Date/Time    Cholesterol, total 232 (H) 07/13/2010 12:05 PM    HDL Cholesterol 38 07/13/2010 12:05 PM    LDL, calculated 164 (H) 07/13/2010 12:05 PM    VLDL, calculated 30 07/13/2010 12:05 PM    Triglyceride 150 (H) 07/13/2010 12:05 PM    CHOL/HDL Ratio 6.1 (H) 07/13/2010 12:05 PM        Data review:  EKG tracing personally reviewed:  5/4/21: NSR      Assessment:    1. Transient hypotension  Currently within normal limits  Nothing to suggest acute cardiac issues    2. Lumbar spinal stenosis s/p revision of lumbar laminectomy     3. CAD:   Prior cath in 12/2019 showed non-obstructive CAD    4. Hyperlipidemia    Recommendations / Plan:    D/c metoprolol  Ensure well hydrated  No further cardiac interventions / work-up anticipated    Signing off  Available to see while inpatient upon request    Signed:  Meghna MORGAN  [facility]  Interventional Cardiology  06/09/21

## 2021-06-09 NOTE — WOUND CARE
WOUND VAC alarming. Distal end of dressing was lifted up, cut away loose drape, dried area with 4 x 4, apply skin prep, drape applied. Wound VAC reset.     TRENA Sarmiento, RN, [facility]  Office x [phone]  Pager x [phone]

## 2021-06-10 LAB
GLUCOSE BLD STRIP.AUTO-MCNC: 104 MG/DL (ref 65–117)
GLUCOSE BLD STRIP.AUTO-MCNC: 116 MG/DL (ref 65–117)
GLUCOSE BLD STRIP.AUTO-MCNC: 138 MG/DL (ref 65–117)
GLUCOSE BLD STRIP.AUTO-MCNC: 189 MG/DL (ref 65–117)
SARS-COV-2, COV2: NORMAL
SERVICE CMNT-IMP: ABNORMAL
SERVICE CMNT-IMP: ABNORMAL
SERVICE CMNT-IMP: NORMAL
SERVICE CMNT-IMP: NORMAL

## 2021-06-10 PROCEDURE — 77030040162

## 2021-06-10 PROCEDURE — 97535 SELF CARE MNGMENT TRAINING: CPT

## 2021-06-10 PROCEDURE — 82962 GLUCOSE BLOOD TEST: CPT

## 2021-06-10 PROCEDURE — 65270000029 HC RM PRIVATE

## 2021-06-10 PROCEDURE — 74011636637 HC RX REV CODE- 636/637: Performed by: STUDENT IN AN ORGANIZED HEALTH CARE EDUCATION/TRAINING PROGRAM

## 2021-06-10 PROCEDURE — 2709999900 HC NON-CHARGEABLE SUPPLY

## 2021-06-10 PROCEDURE — 74011250637 HC RX REV CODE- 250/637: Performed by: STUDENT IN AN ORGANIZED HEALTH CARE EDUCATION/TRAINING PROGRAM

## 2021-06-10 PROCEDURE — U0005 INFEC AGEN DETEC AMPLI PROBE: HCPCS

## 2021-06-10 PROCEDURE — 77030018717 HC DRSG GRNUFM KCON -B

## 2021-06-10 PROCEDURE — 77030037255 HC PCH OST FLX SENSURA COLO -A

## 2021-06-10 PROCEDURE — 97116 GAIT TRAINING THERAPY: CPT

## 2021-06-10 PROCEDURE — 77030010520

## 2021-06-10 RX ORDER — OXYCODONE HYDROCHLORIDE 5 MG/1
5-10 TABLET ORAL
Qty: 60 TABLET | Refills: 0 | Status: SHIPPED | OUTPATIENT
Start: 2021-06-10 | End: 2021-06-24

## 2021-06-10 RX ADMIN — POLYETHYLENE GLYCOL 3350 17 G: 17 POWDER, FOR SOLUTION ORAL at 09:44

## 2021-06-10 RX ADMIN — ACETAMINOPHEN 1000 MG: 500 TABLET ORAL at 23:13

## 2021-06-10 RX ADMIN — ACETAMINOPHEN 1000 MG: 500 TABLET ORAL at 11:50

## 2021-06-10 RX ADMIN — AMITRIPTYLINE HYDROCHLORIDE 50 MG: 50 TABLET, FILM COATED ORAL at 22:09

## 2021-06-10 RX ADMIN — Medication 10 ML: at 13:16

## 2021-06-10 RX ADMIN — DOCUSATE SODIUM 50 MG AND SENNOSIDES 8.6 MG 1 TABLET: 8.6; 5 TABLET, FILM COATED ORAL at 09:44

## 2021-06-10 RX ADMIN — Medication 10 ML: at 07:11

## 2021-06-10 RX ADMIN — METFORMIN HYDROCHLORIDE 500 MG: 500 TABLET ORAL at 17:12

## 2021-06-10 RX ADMIN — PANTOPRAZOLE SODIUM 40 MG: 40 TABLET, DELAYED RELEASE ORAL at 17:12

## 2021-06-10 RX ADMIN — ALPRAZOLAM 0.5 MG: 0.5 TABLET ORAL at 22:09

## 2021-06-10 RX ADMIN — Medication 10 ML: at 22:11

## 2021-06-10 RX ADMIN — PAROXETINE HYDROCHLORIDE 40 MG: 20 TABLET, FILM COATED ORAL at 07:11

## 2021-06-10 RX ADMIN — INSULIN LISPRO 2 UNITS: 100 INJECTION, SOLUTION INTRAVENOUS; SUBCUTANEOUS at 11:50

## 2021-06-10 RX ADMIN — LEVOTHYROXINE SODIUM 112 MCG: 0.11 TABLET ORAL at 07:11

## 2021-06-10 RX ADMIN — METFORMIN HYDROCHLORIDE 500 MG: 500 TABLET ORAL at 09:44

## 2021-06-10 RX ADMIN — ATORVASTATIN CALCIUM 40 MG: 40 TABLET, FILM COATED ORAL at 22:09

## 2021-06-10 RX ADMIN — DOCUSATE SODIUM 50 MG AND SENNOSIDES 8.6 MG 1 TABLET: 8.6; 5 TABLET, FILM COATED ORAL at 17:13

## 2021-06-10 RX ADMIN — ACETAMINOPHEN 1000 MG: 500 TABLET ORAL at 17:12

## 2021-06-10 RX ADMIN — GABAPENTIN 100 MG: 100 CAPSULE ORAL at 09:44

## 2021-06-10 RX ADMIN — ALPRAZOLAM 0.5 MG: 0.5 TABLET ORAL at 07:11

## 2021-06-10 RX ADMIN — GABAPENTIN 100 MG: 100 CAPSULE ORAL at 15:33

## 2021-06-10 RX ADMIN — GABAPENTIN 100 MG: 100 CAPSULE ORAL at 22:09

## 2021-06-10 RX ADMIN — ACETAMINOPHEN 1000 MG: 500 TABLET ORAL at 07:11

## 2021-06-10 NOTE — WOUND CARE
WOCN Note:  
  
Follow up for VAC change. 6.2.2021 s/p Revision laminectomy Assessed in room 534. Chart reviewed. 
  
Assessment:  
Patient is A&O x 3, communicative and turns independently. Bed: bariatric Patient has a Pure wick. Patient reports no pain. Heels intact without erythema. Sacrum and buttocks intact without erythema.  
  
1. Lumbar, incision/open surgical wound with staples proximally and distally; 6 x 0.5 x 3 cm; 100% red; no odor. Periwound without erythema. Removed VAC dressing (2 black and 1 white); irrigated with saline; resumed NPWT @ 125 mmHg using mepitel one over the remaining staples and around the opened wound; 1 white foam to the wound bed; and 2 black foam (one over the incision/wound and one to bridge to the left flank). Using ostomy ring at the distal end of drape dressing to help maintain seal.  
  
Wound, Pressure Prevention & Skin Care Recommendations: 1. Minimize layers of linen/pads under patient to optimize support surface. 2.  Turn/reposition approximately every 2 hours and offload heels. 3.  Manage moisture/ Keep skin folds clean and dry. 4.  Lumbar incision:  Continue NPWT at 125 mmHg. 
  
Discussed above plan with patient and Codie Vásquez RN. 
  
Transition of Care: Plan to follow as needed while admitted to hospital. 
  
TRENA Gutierrez RN [facility] Inpatient Wound Care Available on Perfect [employer] Pager [phone] Office [phone]

## 2021-06-10 NOTE — PROGRESS NOTES
Problem: Self Care Deficits Care Plan (Adult)  Goal: *Therapy Goal (Edit Goal, Insert Text)  Description: FUNCTIONAL STATUS PRIOR TO ADMISSION: Patient required minimal assistance for basic and instrumental ADLs. HOME SUPPORT: The patient lived with spouse who provides assistance with footwear management, family assists with home management tasks. DME: Wheelchair, Transfer bench, Commode, bedside, Walker, rolling, Cane, straight    Occupational Therapy Goals  Initiated 6/3/2021; Goals reviewed 6/10/2021, all goals remain appropriate. 1.  Patient will perform lower body dressing with supervision/set-up using AE PRN within 7 days. 2.  Patient will perform toileting with supervision/set-up using most appropriate DME within 7 days. 3.  Patient will toilet transfer at supervision/set-up within 7 days. 4.  Patient will don/doff back brace at supervision/set-up within 7 days. 5.  Patient will verbalize/demonstrate 3/3 back precautions during ADL tasks without cues within 7 days. Outcome: Progressing Towards Goal     OCCUPATIONAL THERAPY TREATMENT/WEEKLY RE-ASSESSMENT  Patient: Catherine Gould (89 y.o. female)  Date: 6/10/2021  Diagnosis: Lumbar stenosis with neurogenic claudication [M48.062] <principal problem not specified>  Procedure(s) (LRB):  L2-3 AND L5-S1  LUMBAR LAMINECTOMY WITH ANDREWS OSTEOTOMY L2-3 AND L3-4 WITH PLF L2-S1 (N/A) 8 Days Post-Op  Precautions: Fall, Back  Chart, occupational therapy assessment, plan of care, and goals were reviewed. ASSESSMENT  Patient continues with skilled OT services and is progressing towards goals. Patient has progressed to completing toileting at [facility] with continued assistance for hygiene/clothing mgmt and has initiated AE training for LB ADLs. She continues to be most limited by anxiety and pain, with some low BP during tx sessions (none this session).  She benefits from seated rest breaks during activity and is receptive to education on activity pacing and energy conservation. Pt able to recall back precautions and self-correct during LB dressing task this session. During standing clothing mgmt for brief change, she is able to remove hands from RW to assist with pulling clothes up over hips. She requests return to supine at end of session due to pain/discomfort from recent wound vac change. Continue to recommend IPR at discharge. Current Level of Function Impacting Discharge (ADLs): up to min/mod A for LB dressing and toileting; CGA for ADL transfers    Other factors to consider for discharge: lives with spouse; back precautions; fall risk         PLAN :  Goals have been updated based on progression since last assessment. Patient continues to benefit from skilled intervention to address the above impairments. Continue to follow patient 5 times a week to address goals. Recommend with staff: OOB to chair for meals; mobility to bathroom vs. SPT to [facility] for toileting; ADLs as able with assistance    Recommend next OT session: continue with AE; standing tolerance (task at sink)    Recommendation for discharge: (in order for the patient to meet his/her long term goals)  Therapy 3 hours per day 5-7 days per week    This discharge recommendation:  Has been made in collaboration with the attending provider and/or case management    IF patient discharges home will need the following DME: TBD       SUBJECTIVE:   Patient stated My backside hurts.     OBJECTIVE DATA SUMMARY:   Cognitive/Behavioral Status:  Neurologic State: Alert; Appropriate for age  Orientation Level: Oriented X4  Cognition: Appropriate decision making; Appropriate for age attention/concentration; Appropriate safety awareness; Follows commands  Perception: Appears intact  Perseveration: No perseveration noted  Safety/Judgement: Awareness of environment; Fall prevention;Good awareness of safety precautions; Home safety; Insight into deficits    Functional Mobility and Transfers for ADLs:  Bed Mobility:  Supine to Sit: Stand-by assistance; Additional time  Sit to Supine: Minimum assistance    Transfers:  Sit to Stand: Contact guard assistance    Balance:  Sitting: Intact  Standing: Impaired; With support  Standing - Static: Good;Constant support  Standing - Dynamic : Good;Constant support    ADL Intervention:    Lower Body Dressing Assistance  Underpants: Minimum assistance; Moderate assistance (A to pull up around buttocks; A during distal mgmt)  Slip on Shoes with Back: Set-up  Leg Crossed Method Used: No  Position Performed: Seated edge of bed  Cues: Don;Physical assistance;Verbal cues provided  Adaptive Equipment Used: Reacher;Long handled shoe horn    Cognitive Retraining  Safety/Judgement: Awareness of environment; Fall prevention;Good awareness of safety precautions; Home safety; Insight into deficits    Functional Outcome Measure:   Barthel Index:    Bathin  Bladder: 5  Bowels: 10  Groomin  Dressin  Feeding: 10  Mobility: 5  Stairs: 0  Toilet Use: 5  Transfer (Bed to Chair and Back): 10  Total: 55/100        The Barthel ADL Index: Guidelines  1. The index should be used as a record of what a patient does, not as a record of what a patient could do. 2. The main aim is to establish degree of independence from any help, physical or verbal, however minor and for whatever reason. 3. The need for supervision renders the patient not independent. 4. A patient's performance should be established using the best available evidence. Asking the patient, friends/relatives and nurses are the usual sources, but direct observation and common sense are also important. However direct testing is not needed. 5. Usually the patient's performance over the preceding 24-48 hours is important, but occasionally longer periods will be relevant. 6. Middle categories imply that the patient supplies over 50 per cent of the effort. 7. Use of aids to be independent is allowed. [employer] Barthel, D.W. (2157).  Functional evaluation: the Barthel Index. [address] [facility] (14)2. MU Nesbitt, Alanna Fragoso., Brice Boykin., Danyelle Bingham, [address] (1999). Measuring the change indisability after inpatient rehabilitation; comparison of the responsiveness of the Barthel Index and Functional [place] Measure. Journal of Neurology, Neurosurgery, and Psychiatry, 66(4), 081-504. JEFF Holland, KATIE Hunter, & Kashif West MKasandraA. (2004.) Assessment of post-stroke quality of life in cost-effectiveness studies: The usefulness of the Barthel Index and the EuroQoL-5D. Quality of Life Research, 13, 427-43     Pain:  Pt reporting increased pain due to recent wound vac change    Activity Tolerance:   Fair    After treatment patient left in no apparent distress:   Supine in bed, Patient positioned in R sidelying for pressure relief, Call bell within reach and Side rails x 3    COMMUNICATION/COLLABORATION:   The patients plan of care was discussed with: Physical therapist and Registered nurse.      Elly Yost OT  Time Calculation: 17 mins

## 2021-06-10 NOTE — PROGRESS NOTES
Problem: Mobility Impaired (Adult and Pediatric)  Goal: *Acute Goals and Plan of Care (Insert Text)  Description: FUNCTIONAL STATUS PRIOR TO ADMISSION: Patient was modified independent using no device for ambulation but sits in wheelchair during ADLs d/t back pain. HOME SUPPORT PRIOR TO ADMISSION: The patient lived with  who can assist with dressing ADLs as needed  can not provide physical assist.    Physical Therapy Goals  Initiated 6/3/2021    1. Patient will move from supine to sit and sit to supine , scoot up and down, and roll side to side in bed with modified independence within 4 days. 2. Patient will perform sit to stand with modified independence within 4 days. 3. Patient will ambulate with modified independence for 125 feet with the least restrictive device within 4 days. 4. Patient will ascend/descend ramp with bilateral railings with modified independence within 4 days. 5. Patient will verbalize and demonstrate understanding of spinal precautions (No bending, lifting greater than 5 lbs, or twisting; log-roll technique; frequent repositioning as instructed) within 4 days. Outcome: Progressing Towards Goal     PHYSICAL THERAPY TREATMENT  Patient: Jose Jhaveri (1 y.o. female)  Date: 6/10/2021  Diagnosis: Lumbar stenosis with neurogenic claudication [M48.062] <principal problem not specified>  Procedure(s) (LRB):  L2-3 AND L5-S1  LUMBAR LAMINECTOMY WITH ANDREWS OSTEOTOMY L2-3 AND L3-4 WITH PLF L2-S1 (N/A) 8 Days Post-Op  Precautions: Fall, Back  Chart, physical therapy assessment, plan of care and goals were reviewed. ASSESSMENT  Patient continues with skilled PT services and is progressing towards goals. Pt participates in standing from the [facility], assist for hygiene and brief management, controlled movement to bed. Pt able to participate in standing and gait training for 60ft to the end of the hendrix and leaned against the wall.  Pt becomes SOB with O2 97%, demos increased anxiety and requires sitting rest break. Pt tolerates gait training back to room after sitting rest break. Pt returned to sitting and left in chair with call bell. Current Level of Function Impacting Discharge (mobility/balance): decreased endurance    Other factors to consider for discharge:          PLAN :  Patient continues to benefit from skilled intervention to address the above impairments. Continue treatment per established plan of care. to address goals. Recommendation for discharge: (in order for the patient to meet his/her long term goals)  Therapy 3 hours per day 5-7 days per week    This discharge recommendation:  Has been made in collaboration with the attending provider and/or case management    IF patient discharges home will need the following DME: rolling walker       SUBJECTIVE:   Patient stated I just feel tired.     OBJECTIVE DATA SUMMARY:   Critical Behavior:  Neurologic State: Alert, Appropriate for age  Orientation Level: Oriented X4  Cognition: Appropriate decision making, Appropriate for age attention/concentration, Appropriate safety awareness  Safety/Judgement: Awareness of environment  Functional Mobility Training:  Bed Mobility:                    Transfers:  Sit to Stand: Supervision;Contact guard assistance  Stand to Sit: Supervision;Contact guard assistance                             Balance:  Sitting: Intact  Standing: Impaired; Without support  Standing - Static: Good;Constant support  Standing - Dynamic : Good;Constant support  Ambulation/Gait Training:  Distance (ft): 60 Feet (ft) (60+60)  Assistive Device: Brace/Splint;Gait belt;Walker, rolling  Ambulation - Level of Assistance: Contact guard assistance;Assist x1        Gait Abnormalities: Antalgic; Shuffling gait        Base of Support: Widened  Stance: Time;Weight shift  Speed/Catherine: Shuffled; Slow  Step Length: Right shortened;Left shortened  Swing Pattern: Right symmetrical;Left symmetrical     Pain Rating:  controlled    Activity Tolerance:   Fair and requires rest breaks    After treatment patient left in no apparent distress:   Sitting in chair and Call bell within reach    COMMUNICATION/COLLABORATION:   The patients plan of care was discussed with: Registered nurse and Case management.      Kacey Mccarthy, PT   Time Calculation: 23 mins

## 2021-06-10 NOTE — PROGRESS NOTES
Problem: Falls - Risk of  Goal: *Absence of Falls  Description: Document Christopher Gomez Fall Risk and appropriate interventions in the flowsheet.   Outcome: Progressing Towards Goal  Note: Fall Risk Interventions:  Mobility Interventions: PT Consult for assist device competence, PT Consult for mobility concerns, Patient to call before getting OOB, OT consult for ADLs         Medication Interventions: Teach patient to arise slowly, Patient to call before getting OOB    Elimination Interventions: Call light in reach, Patient to call for help with toileting needs    History of Falls Interventions: Door open when patient unattended, Room close to nurse's station         Problem: Patient Education: Go to Patient Education Activity  Goal: Patient/Family Education  Outcome: Progressing Towards Goal     Problem: Patient Education: Go to Patient Education Activity  Goal: Patient/Family Education  Outcome: Progressing Towards Goal     Problem: Patient Education: Go to Patient Education Activity  Goal: Patient/Family Education  Outcome: Progressing Towards Goal     Problem: Discharge Planning  Goal: *Discharge to safe environment  Outcome: Progressing Towards Goal

## 2021-06-10 NOTE — PROGRESS NOTES
Orthopedic Spine Progress Note  Post Op day: 8 Days Post-Op    Lennie 10, 2021 7:47 AM   Admit Date: 2021  Procedure: Procedure(s):  L2-3 AND L5-S1  LUMBAR LAMINECTOMY WITH ANDREWS OSTEOTOMY L2-3 AND L3-4 WITH PLF L2-S1    Subjective:     Javier Mcclain has no complaints this AM. Has been progress with PT. Tolerating diet. No N/V. Pain Control:   Pain Assessment  Pain Scale 1: Numeric (0 - 10)  Pain Intensity 1: 6  Pain Onset 1: post opt  Pain Location 1: Back  Pain Orientation 1: Lower  Pain Description 1: Aching  Pain Intervention(s) 1: Medication (see MAR)    Objective:          Physical Exam:  General:  Alert and oriented. No acute distress. Heart:  Respirations unlabored. Abdomen:   Extremities: Soft, non-tender. No evidence of cyanosis. Pulses palpable in both upper and lower extremities. Neurologic:  Musculoskeletal:  No new motor deficits. Neurovascular exam within normal limits. Sensation stable. Motor: unchanged C5-T1 and L2-S1. Neema's sign negative in bilateral lower extremities. Calves soft, nontender upon palpation and with passive twitch. Moves both upper and lower extremities. Incision: clean, dry, and intact. No significant erythema or swelling. No active drainage noted. Vital Signs:    Blood pressure (!) 104/59, pulse 64, temperature 97.7 °F (36.5 °C), resp. rate 16, height 5' 4\" (1.626 m), weight 132 kg (291 lb 0.1 oz), SpO2 97 %. Temp (24hrs), Av °F (36.7 °C), Min:97.6 °F (36.4 °C), Max:98.8 °F (37.1 °C)      LAB:    Recent Labs     21  0500   HCT 35.4   HGB 11.4*        Lab Results   Component Value Date/Time    Sodium 138 2021 05:00 AM    Potassium 3.7 2021 05:00 AM    Chloride 104 2021 05:00 AM    CO2 28 2021 05:00 AM    Glucose 135 (H) 2021 05:00 AM    BUN 16 2021 05:00 AM    Creatinine 0.60 2021 05:00 AM    Calcium 9.0 2021 05:00 AM       Intake/Output:No intake/output data recorded.   1901 - 06/10 0700  In: -   Out: 1350 [Urine:1350]    PT/OT:   Gait:  Gait  Base of Support: Widened  Speed/Catherine: Slow  Step Length: Left shortened, Right shortened  Gait Abnormalities: Antalgic (Flexed posture)  Ambulation - Level of Assistance: Contact guard assistance, Assist x1  Distance (ft): 80 Feet (ft)  Assistive Device: Brace/Splint, Gait belt, Walker, rolling                 Assessment:   Patient is 8 Days Post-Op s/p Procedure(s):  L2-3 AND L5-S1  LUMBAR LAMINECTOMY WITH ANDREWS OSTEOTOMY L2-3 AND L3-4 WITH PLF L2-S1    Plan:     1. Continue PT/OT  2. Continue established methods of pain control  3. VTE Prophylaxes - TEDS &/or SCDs   4. Continue wound vac  5.  D/c [facility] once cleared      Signed By: Scooter Cain PA-C

## 2021-06-11 LAB
GLUCOSE BLD STRIP.AUTO-MCNC: 126 MG/DL (ref 65–117)
GLUCOSE BLD STRIP.AUTO-MCNC: 134 MG/DL (ref 65–117)
GLUCOSE BLD STRIP.AUTO-MCNC: 138 MG/DL (ref 65–117)
GLUCOSE BLD STRIP.AUTO-MCNC: 145 MG/DL (ref 65–117)
SARS-COV-2, XPLCVT: NOT DETECTED
SERVICE CMNT-IMP: ABNORMAL
SOURCE, COVRS: NORMAL

## 2021-06-11 PROCEDURE — 74011250637 HC RX REV CODE- 250/637: Performed by: STUDENT IN AN ORGANIZED HEALTH CARE EDUCATION/TRAINING PROGRAM

## 2021-06-11 PROCEDURE — 74011636637 HC RX REV CODE- 636/637: Performed by: STUDENT IN AN ORGANIZED HEALTH CARE EDUCATION/TRAINING PROGRAM

## 2021-06-11 PROCEDURE — 65270000029 HC RM PRIVATE

## 2021-06-11 PROCEDURE — 97535 SELF CARE MNGMENT TRAINING: CPT

## 2021-06-11 PROCEDURE — 82962 GLUCOSE BLOOD TEST: CPT

## 2021-06-11 RX ADMIN — POLYETHYLENE GLYCOL 3350 17 G: 17 POWDER, FOR SOLUTION ORAL at 08:23

## 2021-06-11 RX ADMIN — Medication 10 ML: at 21:15

## 2021-06-11 RX ADMIN — GABAPENTIN 100 MG: 100 CAPSULE ORAL at 17:16

## 2021-06-11 RX ADMIN — PANTOPRAZOLE SODIUM 40 MG: 40 TABLET, DELAYED RELEASE ORAL at 17:17

## 2021-06-11 RX ADMIN — GABAPENTIN 100 MG: 100 CAPSULE ORAL at 08:24

## 2021-06-11 RX ADMIN — OXYCODONE 10 MG: 5 TABLET ORAL at 21:12

## 2021-06-11 RX ADMIN — ACETAMINOPHEN 1000 MG: 500 TABLET ORAL at 07:41

## 2021-06-11 RX ADMIN — ACETAMINOPHEN 1000 MG: 500 TABLET ORAL at 17:17

## 2021-06-11 RX ADMIN — PAROXETINE HYDROCHLORIDE 40 MG: 20 TABLET, FILM COATED ORAL at 07:41

## 2021-06-11 RX ADMIN — ATORVASTATIN CALCIUM 40 MG: 40 TABLET, FILM COATED ORAL at 21:13

## 2021-06-11 RX ADMIN — OXYCODONE 10 MG: 5 TABLET ORAL at 08:24

## 2021-06-11 RX ADMIN — Medication 10 ML: at 17:17

## 2021-06-11 RX ADMIN — METFORMIN HYDROCHLORIDE 500 MG: 500 TABLET ORAL at 17:16

## 2021-06-11 RX ADMIN — LEVOTHYROXINE SODIUM 112 MCG: 0.11 TABLET ORAL at 07:41

## 2021-06-11 RX ADMIN — INSULIN LISPRO 2 UNITS: 100 INJECTION, SOLUTION INTRAVENOUS; SUBCUTANEOUS at 12:43

## 2021-06-11 RX ADMIN — ACETAMINOPHEN 1000 MG: 500 TABLET ORAL at 23:49

## 2021-06-11 RX ADMIN — AMITRIPTYLINE HYDROCHLORIDE 50 MG: 50 TABLET, FILM COATED ORAL at 21:13

## 2021-06-11 RX ADMIN — ALPRAZOLAM 0.5 MG: 0.5 TABLET ORAL at 07:41

## 2021-06-11 RX ADMIN — GABAPENTIN 100 MG: 100 CAPSULE ORAL at 21:13

## 2021-06-11 RX ADMIN — Medication 10 ML: at 08:24

## 2021-06-11 RX ADMIN — METFORMIN HYDROCHLORIDE 500 MG: 500 TABLET ORAL at 07:41

## 2021-06-11 RX ADMIN — DOCUSATE SODIUM 50 MG AND SENNOSIDES 8.6 MG 1 TABLET: 8.6; 5 TABLET, FILM COATED ORAL at 08:23

## 2021-06-11 RX ADMIN — DOCUSATE SODIUM 50 MG AND SENNOSIDES 8.6 MG 1 TABLET: 8.6; 5 TABLET, FILM COATED ORAL at 17:17

## 2021-06-11 RX ADMIN — ACETAMINOPHEN 1000 MG: 500 TABLET ORAL at 12:43

## 2021-06-11 RX ADMIN — ALPRAZOLAM 0.5 MG: 0.5 TABLET ORAL at 21:12

## 2021-06-11 NOTE — PROGRESS NOTES
ENEIDA:    RUR 12%    Disposition: [facility] accepted and Insurance auth obtained. Per Bryan Zepeda, they can admit tomorrow.     Transportation: [facility], RN/CRM  [phone]

## 2021-06-11 NOTE — PROGRESS NOTES
Orthopedic Spine Progress Note  Post Op day: 9 Days Post-Op    2021 8:58 AM   Admit Date: 2021  Procedure: Procedure(s):  L2-3 AND L5-S1  LUMBAR LAMINECTOMY WITH ANDREWS OSTEOTOMY L2-3 AND L3-4 WITH PLF L2-S1    Subjective:     Cha Escudero complains of some increased back pain after having her dressing changed yesterday. No leg pain. .   Tolerating diet. No N/V. Pain Control:   Pain Assessment  Pain Scale 1: Numeric (0 - 10)  Pain Intensity 1: 2  Pain Onset 1: post opt  Pain Location 1: Back  Pain Orientation 1: Lower  Pain Description 1: Aching  Pain Intervention(s) 1: Declines    Objective:          Physical Exam:  General:  Alert and oriented. No acute distress. Heart:  Respirations unlabored. Abdomen:   Extremities: Soft, non-tender. No evidence of cyanosis. Pulses palpable in both upper and lower extremities. Neurologic:  Musculoskeletal:  No new motor deficits. Neurovascular exam within normal limits. Sensation stable. Motor: unchanged C5-T1 and L2-S1. Neema's sign negative in bilateral lower extremities. Calves soft, nontender upon palpation and with passive twitch. Moves both upper and lower extremities. Incision: clean, dry, and intact. No significant erythema or swelling. No active drainage noted. Vital Signs:    Blood pressure 123/72, pulse 80, temperature 97.5 °F (36.4 °C), resp. rate 16, height 5' 4\" (1.626 m), weight 132 kg (291 lb 0.1 oz), SpO2 97 %.   Temp (24hrs), Av.5 °F (36.4 °C), Min:97.4 °F (36.3 °C), Max:97.7 °F (36.5 °C)      LAB:    Recent Labs     21  0500   HCT 35.4   HGB 11.4*        Lab Results   Component Value Date/Time    Sodium 138 2021 05:00 AM    Potassium 3.7 2021 05:00 AM    Chloride 104 2021 05:00 AM    CO2 28 2021 05:00 AM    Glucose 135 (H) 2021 05:00 AM    BUN 16 2021 05:00 AM    Creatinine 0.60 2021 05:00 AM    Calcium 9.0 2021 05:00 AM       Intake/Output:No intake/output data recorded. 06/09 1901 - 06/11 0700  In: -   Out: 1700 [Urine:1700]    PT/OT:   Gait:  Gait  Base of Support: Widened  Speed/Catherine: Shuffled, Slow  Step Length: Right shortened, Left shortened  Swing Pattern: Right symmetrical, Left symmetrical  Stance: Time, Weight shift  Gait Abnormalities: Antalgic, Shuffling gait  Ambulation - Level of Assistance: Contact guard assistance, Assist x1  Distance (ft): 60 Feet (ft) (60+60)  Assistive Device: Brace/Splint, Gait belt, Walker, rolling                 Assessment:   Patient is 9 Days Post-Op s/p Procedure(s):  L2-3 AND L5-S1  LUMBAR LAMINECTOMY WITH ANDREWS OSTEOTOMY L2-3 AND L3-4 WITH PLF L2-S1    Plan:     1. Continue PT/OT  2. Continue established methods of pain control  3. VTE Prophylaxes - TEDS &/or SCDs   4. Continue wound vac   5.   Discharge to [facility] once cleared      Signed By: MAIKEL Monaco

## 2021-06-11 NOTE — PROGRESS NOTES
Problem: Self Care Deficits Care Plan (Adult)  Goal: *Therapy Goal (Edit Goal, Insert Text)  Description: FUNCTIONAL STATUS PRIOR TO ADMISSION: Patient required minimal assistance for basic and instrumental ADLs. HOME SUPPORT: The patient lived with spouse who provides assistance with footwear management, family assists with home management tasks. DME: Wheelchair, Transfer bench, Commode, bedside, Walker, rolling, Cane, straight    Occupational Therapy Goals  Initiated 6/3/2021; Goals reviewed 6/10/2021, all goals remain appropriate. 1.  Patient will perform lower body dressing with supervision/set-up using AE PRN within 7 days. 2.  Patient will perform toileting with supervision/set-up using most appropriate DME within 7 days. 3.  Patient will toilet transfer at supervision/set-up within 7 days. 4.  Patient will don/doff back brace at supervision/set-up within 7 days. 5.  Patient will verbalize/demonstrate 3/3 back precautions during ADL tasks without cues within 7 days. Outcome: Progressing Towards Goal     OCCUPATIONAL THERAPY TREATMENT  Patient: Iwona Kendall (77 y.o. female)  Date: 6/11/2021  Diagnosis: Lumbar stenosis with neurogenic claudication [M48.062] <principal problem not specified>  Procedure(s) (LRB):  L2-3 AND L5-S1  LUMBAR LAMINECTOMY WITH ANDREWS OSTEOTOMY L2-3 AND L3-4 WITH PLF L2-S1 (N/A) 9 Days Post-Op  Precautions: Fall, Back  Chart, occupational therapy assessment, plan of care, and goals were reviewed. ASSESSMENT  Patient continues with skilled OT services and is progressing towards goals. Pt continues to offer good efforts during therapy sessions, this session transfers to EOB with SBA and manages LSO with CGA to min A. She continues to require occasional cues for adherence to back precautions during functional activity but otherwise demonstrating good improvement.  She requests to stay EOB at end of session to organize her belongings and reports she anticipates d/c to [facility] soon. Current Level of Function Impacting Discharge (ADLs): up to mod A for LB dressing; set up to min A UB dressing (including LSO); up to mod A for toileting; CGA for ADL transfers    Other factors to consider for discharge: back precautions; fall risk         PLAN :  Patient continues to benefit from skilled intervention to address the above impairments. Continue treatment per established plan of care to address goals. Recommend with staff: OOB to chair for meals; mobility to bathroom for toileting; ADLs as able with assistance    Recommend next OT session: standing task at sink    Recommendation for discharge: (in order for the patient to meet his/her long term goals)  Therapy 3 hours per day 5-7 days per week    This discharge recommendation:  Has been made in collaboration with the attending provider and/or case management    IF patient discharges home will need the following DME: TBD       SUBJECTIVE:   Patient stated I'm hoping I'll go to [facility] soon, maybe even today.     OBJECTIVE DATA SUMMARY:   Cognitive/Behavioral Status:  Neurologic State: Alert  Orientation Level: Oriented X4  Cognition: Appropriate decision making; Appropriate for age attention/concentration; Appropriate safety awareness; Follows commands  Perception: Appears intact  Perseveration: No perseveration noted  Safety/Judgement: Awareness of environment; Fall prevention;Good awareness of safety precautions; Insight into deficits    Functional Mobility and Transfers for ADLs:  Bed Mobility:  Supine to Sit: Stand-by assistance  Scooting: Supervision    Balance:  Sitting: Intact    ADL Intervention:  Upper Body Dressing Assistance  Orthotics(Brace): Minimum assistance; Compensatory technique training  Cues: Physical assistance; Tactile cues provided;Verbal cues provided;Visual cues provided    Cognitive Retraining  Safety/Judgement: Awareness of environment; Fall prevention;Good awareness of safety precautions; Insight into deficits    The patient recalled and demonstrated 3/3 back precautions. Reviewed all 3 with patient. Dressing brace: Patient instructed and demonstrated to don/doff velcro on brace using dominant side, keeping non-dominant side intact. Patient instructed and demonstrated in meantime of being able to stand with back against wall to don/doff brace, to don/doff seated using lap and bed/chair surface to support brace while manipulating. Patient instructed and indicated understanding the benefits of maintaining activity tolerance, functional mobility, and independence with self care tasks during acute stay  to ensure safe return home and to baseline. Encouraged patient to increase frequency and duration OOB, not sitting longer than 30 mins without marching/walking with staff, be out of bed for all meals, perform daily ADLs (as approved by RN/MD regarding bathing etc), and performing functional mobility to/from bathroom. Patient instruction and indicated understanding on body mechanics, ergonomics and gravitational force on the spine during different body positions to plan activities in prep for return home to complete instrumental ADLs and back to work safely. Pain:  Pt reporting minimal pain this session    Activity Tolerance:   Fair    After treatment patient left in no apparent distress:   Seated EOB with Call bell within reach and Side rails x 3    COMMUNICATION/COLLABORATION:   The patients plan of care was discussed with: Physical therapist and Registered nurse.      Lyly Moyer OT  Time Calculation: 8 mins

## 2021-06-11 NOTE — PROGRESS NOTES
Pt attempted for PT session-- started session and patient noted to have hypotension at sitting and standing. Returned to supine and session aborted.      Patient Vitals for the past 4 hrs:   Position Pulse Resp BP SpO2   06/11/21 1520 Standing UNREADABLE  (!) 70/52    06/11/21 1518 Sitting  (!) 120  (!) 96/58    06/11/21 1517 Supine  (!) 118  97/60    06/11/21 1401 97.9 °F (36.6 °C) 92 18 103/64 96 %         Niharika Tiwari, DPT, PT

## 2021-06-11 NOTE — PROGRESS NOTES
Problem: Falls - Risk of  Goal: *Absence of Falls  Description: Document Angel Ku Fall Risk and appropriate interventions in the flowsheet.   Outcome: Progressing Towards Goal  Note: Fall Risk Interventions:  Mobility Interventions: Communicate number of staff needed for ambulation/transfer, OT consult for ADLs, Patient to call before getting OOB, PT Consult for mobility concerns, PT Consult for assist device competence         Medication Interventions: Patient to call before getting OOB, Teach patient to arise slowly    Elimination Interventions: Call light in reach, Patient to call for help with toileting needs, Toileting schedule/hourly rounds    History of Falls Interventions: Door open when patient unattended, Investigate reason for fall, Room close to nurse's station

## 2021-06-12 VITALS
DIASTOLIC BLOOD PRESSURE: 75 MMHG | WEIGHT: 291.01 LBS | SYSTOLIC BLOOD PRESSURE: 119 MMHG | RESPIRATION RATE: 16 BRPM | OXYGEN SATURATION: 96 % | HEART RATE: 98 BPM | TEMPERATURE: 97.8 F | BODY MASS INDEX: 49.68 KG/M2 | HEIGHT: 64 IN

## 2021-06-12 LAB
GLUCOSE BLD STRIP.AUTO-MCNC: 105 MG/DL (ref 65–117)
GLUCOSE BLD STRIP.AUTO-MCNC: 115 MG/DL (ref 65–117)
GLUCOSE BLD STRIP.AUTO-MCNC: 141 MG/DL (ref 65–117)
SERVICE CMNT-IMP: ABNORMAL
SERVICE CMNT-IMP: NORMAL
SERVICE CMNT-IMP: NORMAL

## 2021-06-12 PROCEDURE — 74011636637 HC RX REV CODE- 636/637: Performed by: STUDENT IN AN ORGANIZED HEALTH CARE EDUCATION/TRAINING PROGRAM

## 2021-06-12 PROCEDURE — 82962 GLUCOSE BLOOD TEST: CPT

## 2021-06-12 PROCEDURE — 94760 N-INVAS EAR/PLS OXIMETRY 1: CPT

## 2021-06-12 PROCEDURE — 74011250637 HC RX REV CODE- 250/637: Performed by: STUDENT IN AN ORGANIZED HEALTH CARE EDUCATION/TRAINING PROGRAM

## 2021-06-12 RX ADMIN — POLYETHYLENE GLYCOL 3350 17 G: 17 POWDER, FOR SOLUTION ORAL at 09:18

## 2021-06-12 RX ADMIN — ALPRAZOLAM 0.5 MG: 0.5 TABLET ORAL at 07:05

## 2021-06-12 RX ADMIN — ACETAMINOPHEN 1000 MG: 500 TABLET ORAL at 17:02

## 2021-06-12 RX ADMIN — METFORMIN HYDROCHLORIDE 500 MG: 500 TABLET ORAL at 07:31

## 2021-06-12 RX ADMIN — Medication 10 ML: at 05:07

## 2021-06-12 RX ADMIN — OXYCODONE 10 MG: 5 TABLET ORAL at 11:37

## 2021-06-12 RX ADMIN — METFORMIN HYDROCHLORIDE 500 MG: 500 TABLET ORAL at 17:02

## 2021-06-12 RX ADMIN — GABAPENTIN 100 MG: 100 CAPSULE ORAL at 15:54

## 2021-06-12 RX ADMIN — LEVOTHYROXINE SODIUM 112 MCG: 0.11 TABLET ORAL at 07:05

## 2021-06-12 RX ADMIN — DOCUSATE SODIUM 50 MG AND SENNOSIDES 8.6 MG 1 TABLET: 8.6; 5 TABLET, FILM COATED ORAL at 09:18

## 2021-06-12 RX ADMIN — OXYCODONE 10 MG: 5 TABLET ORAL at 15:54

## 2021-06-12 RX ADMIN — PAROXETINE HYDROCHLORIDE 40 MG: 20 TABLET, FILM COATED ORAL at 07:05

## 2021-06-12 RX ADMIN — GABAPENTIN 100 MG: 100 CAPSULE ORAL at 09:18

## 2021-06-12 RX ADMIN — ACETAMINOPHEN 1000 MG: 500 TABLET ORAL at 11:37

## 2021-06-12 RX ADMIN — DOCUSATE SODIUM 50 MG AND SENNOSIDES 8.6 MG 1 TABLET: 8.6; 5 TABLET, FILM COATED ORAL at 17:02

## 2021-06-12 RX ADMIN — INSULIN LISPRO 2 UNITS: 100 INJECTION, SOLUTION INTRAVENOUS; SUBCUTANEOUS at 17:03

## 2021-06-12 RX ADMIN — PANTOPRAZOLE SODIUM 40 MG: 40 TABLET, DELAYED RELEASE ORAL at 17:02

## 2021-06-12 NOTE — PROGRESS NOTES
Problem: Falls - Risk of  Goal: *Absence of Falls  Description: Document Onalee Gum Fall Risk and appropriate interventions in the flowsheet.   Outcome: Progressing Towards Goal  Note: Fall Risk Interventions:  Mobility Interventions: Communicate number of staff needed for ambulation/transfer, OT consult for ADLs, Patient to call before getting OOB, PT Consult for mobility concerns, PT Consult for assist device competence         Medication Interventions: Patient to call before getting OOB, Teach patient to arise slowly    Elimination Interventions: Call light in reach, Patient to call for help with toileting needs, Toileting schedule/hourly rounds    History of Falls Interventions: Door open when patient unattended, Investigate reason for fall, Room close to nurse's station

## 2021-06-12 NOTE — PROGRESS NOTES
Problem: Falls - Risk of  Goal: *Absence of Falls  Description: Document Ayaz Lange Fall Risk and appropriate interventions in the flowsheet.   Outcome: Resolved/Met

## 2021-06-12 NOTE — PROGRESS NOTES
Orthopedic Spine Progress Note  Post Op day: 10 Days Post-Op    2021 8:58 AM   Admit Date: 2021  Procedure: Procedure(s):  L2-3 AND L5-S1  LUMBAR LAMINECTOMY WITH ANDREWS OSTEOTOMY L2-3 AND L3-4 WITH PLF L2-S1    Subjective:     Po Lopez reports her pain is well controlled this morning. She has no complaints. Feels ready for rehab. Reports her pre-op right leg pain is significantly improved. Tolerating diet. No N/V. Pain Control:   Pain Assessment  Pain Scale 1: Numeric (0 - 10)  Pain Intensity 1: 0  Pain Onset 1: post op  Pain Location 1: Back  Pain Orientation 1: Lower  Pain Description 1: Aching  Pain Intervention(s) 1: Medication (see MAR)    Objective:          Physical Exam:  General:  Alert and oriented. No acute distress. Heart:  Respirations unlabored. Abdomen:   Extremities: Soft, non-tender. No evidence of cyanosis. Pulses palpable in both upper and lower extremities. Neurologic:  Musculoskeletal:  No new motor deficits. Neurovascular exam within normal limits. Sensation stable. Motor: unchanged C5-T1 and L2-S1. Neema's sign negative in bilateral lower extremities. Calves soft, nontender upon palpation and with passive twitch. Moves both upper and lower extremities. Incision: clean, dry, and intact. No significant erythema or swelling. No active drainage noted. Vital Signs:    Blood pressure 117/73, pulse 93, temperature 97.5 °F (36.4 °C), resp. rate 18, height 5' 4\" (1.626 m), weight 132 kg (291 lb 0.1 oz), SpO2 95 %. Temp (24hrs), Av.6 °F (36.4 °C), Min:97.4 °F (36.3 °C), Max:97.9 °F (36.6 °C)      LAB:    No results for input(s): HCT, HGB, PLT, HCTEXT, HGBEXT, PLTEXT, HCTEXT, HGBEXT, PLTEXT in the last 72 hours.   Lab Results   Component Value Date/Time    Sodium 138 2021 05:00 AM    Potassium 3.7 2021 05:00 AM    Chloride 104 2021 05:00 AM    CO2 28 2021 05:00 AM    Glucose 135 (H) 2021 05:00 AM    BUN 16 2021 05:00 AM    Creatinine 0.60 06/09/2021 05:00 AM    Calcium 9.0 06/09/2021 05:00 AM       Intake/Output:No intake/output data recorded. 06/10 1901 - 06/12 0700  In: 1120 [P.O.:120; I.V.:1000]  Out: 1700 [Urine:1700]    PT/OT:   Gait:  Gait  Base of Support: Widened  Speed/Catherine: Shuffled, Slow  Step Length: Right shortened, Left shortened  Swing Pattern: Right symmetrical, Left symmetrical  Stance: Time, Weight shift  Gait Abnormalities: Antalgic, Shuffling gait  Ambulation - Level of Assistance: Contact guard assistance, Assist x1  Distance (ft): 60 Feet (ft) (60+60)  Assistive Device: Brace/Splint, Gait belt, Walker, rolling                 Assessment:   Patient is 10 Days Post-Op s/p Procedure(s):  L2-3 AND L5-S1  LUMBAR LAMINECTOMY WITH ANDREWS OSTEOTOMY L2-3 AND L3-4 WITH PLF L2-S1    Plan:     1. Continue PT/OT  2. Continue established methods of pain control  3. VTE Prophylaxes - TEDS &/or SCDs   4. Continue wound vac   5.   Discharge to [facility] once cleared      Signed By: Joellen Sexton MD

## 2021-06-12 NOTE — PROGRESS NOTES
Transitions for care - Discharge note    Ms. Escudero was seen. She was informed that she had been accepted at [address] [place] [address] for acute rehab. She requested an ambulance transport. The other options of private vehicle or wheelchair Marisol Osler were offered to her. She is having some blood pressure issues. Her blood pressure falls when she stands. It was determined that she is a high fall risk and ambulance is the safest form of transport. Ms. Andreia Mayen was informed that if her insurance denies the ambulance charge, she will need to appeal the denial to her insurance company. She reported that she would do this. She was offered for me to call someone to keep them informed of her discharge. She stated that she will call her sister and . [facility] transport was called. A 4:00pm transport was arranged. The PCS was completed. Ms. Andreia Mayen and nursing were informed of the pickup time. Their questions were answered. The EMTALA documentation was initiated. Nicolette Dejesus called back. Dr. Vernell Quezada is the accepting physician. Mrs. Andreia Mayen will be going to room [id number]. The report number is [phone].     Signed By: Javier Gandara LCSW     June 12, 2021

## 2021-06-12 NOTE — ROUTINE PROCESS
TRANSFER - OUT REPORT:    Verbal report given to Abner(name) on Jose Jhaveri  being transferred to [facility](unit) for routine progression of care       Report consisted of patients Situation, Background, Assessment and   Recommendations(SBAR). Information from the following report(s) SBAR was reviewed with the receiving nurse. Lines:   Peripheral IV 06/06/21 Anterior; Left Forearm (Active)   Site Assessment Clean, dry, & intact 06/11/21 2028   Phlebitis Assessment 0 06/11/21 2028   Infiltration Assessment 0 06/11/21 2028   Dressing Status Clean, dry, & intact 06/11/21 2028   Dressing Type Transparent 06/11/21 2028   Hub Color/Line Status Capped 06/11/21 2028   Action Taken Open ports on tubing capped 06/11/21 0744   Alcohol Cap Used Yes 06/11/21 0744        Opportunity for questions and clarification was provided.       Patient transported with:   Monitor

## 2021-06-12 NOTE — PROGRESS NOTES
Transitions of Care    Luanne Tamayoanna was called with [facility]. She informed me that Ms. Escudero is accepted today. She would like her after 2:00pm this afternoon. Ms. Logan Dee will call me back with the physician name, room number, and report number. Will continue to follow for discharge planning.   Signed By: Raymundo Arnold LCSW     June 12, 2021

## 2021-06-12 NOTE — ROUTINE PROCESS
Patient HR was 126, MD. [facility] was notified and order was received to continue discharging patient.

## 2021-06-24 NOTE — DISCHARGE SUMMARY
Procedure(s):  L2-3 AND L5-S1  LUMBAR LAMINECTOMY WITH ANDREWS OSTEOTOMY L2-3 AND L3-4 WITH PLF L2-S1 Operative Report      Date of Surgery: 6/2/2021     Preoperative Diagnosis:  SCIATICA  DDD  SPONDYLOLISTHESIS    Postoperative Diagnosis: SCIATICA  DDD  SPONDYLOLISTHESIS    Procedure: Procedure(s):  L2-3 AND L5-S1  LUMBAR LAMINECTOMY WITH ANDREWS OSTEOTOMY L2-3 AND L3-4 WITH PLF L2-S1     Surgeon: Lencho Rivera MD    History and Hospital Course:  Yahir Deleon is a pleasant 65y.o. year old female who has complaints of low back pain and bilateral leg pain. Diagnostic testing found junctional stenosis above and below her prior fusion and bilateral foraminal narrowing. Having failed conservative treatment, the patient elected to undergo operative intervention. She tolerated the procedure well and was admitted post-operatively for antibiotics and pain control. On post-op day 1, the patient was doing well. She had some complaints of lower back pain but no leg pain. She was started on a clear liquid diet and then advanced to a normal diet. She was allowed to dangle on the edge of the bed with physical therapy. PCA was discontinued and oral pain medications were started. IV antibiotics were continued. On post-op day 2, the patient continued to progress well. She was started on a regular diet. She was allowed to start getting out of bed with physical therapy. On post-op day 3-4 she had notes of increased wound drainage from surgical incision and had staples removed and a wound vac placed on post op day 5. On post op day 5 through 9 she continued with physical therapy, occupational therapy and wound care while awaiting rehab placement. She was seen by cardiologist consult on post op day 7 for low blood pressure, where they discontinued her metoprolol. On post-op day 10, the patient was deemed ready for discharge. She was discharged to [facility].   She was tolerating a regular diet and pain was well controlled with oral pain medications. The patient was discharged with prescriptions for pain medications. She was instructed to wear her brace at all times when out of bed. She was instructed to limit her bending, lifting and twisting. The patient will follow-up in 10-14 days for repeat x-rays and a wound check.       Signed By: Malina Glynn MD

## 2021-07-08 ENCOUNTER — APPOINTMENT (OUTPATIENT)
Dept: GENERAL RADIOLOGY | Age: 71
DRG: 537 | End: 2021-07-08
Attending: EMERGENCY MEDICINE
Payer: MEDICARE

## 2021-07-08 ENCOUNTER — APPOINTMENT (OUTPATIENT)
Dept: MRI IMAGING | Age: 71
DRG: 537 | End: 2021-07-08
Attending: EMERGENCY MEDICINE
Payer: MEDICARE

## 2021-07-08 ENCOUNTER — HOSPITAL ENCOUNTER (INPATIENT)
Age: 71
LOS: 12 days | Discharge: NURSING, REHAB OR LONG-TERM CARE | DRG: 537 | End: 2021-07-20
Attending: EMERGENCY MEDICINE | Admitting: INTERNAL MEDICINE
Payer: MEDICARE
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
## 2021-07-08 DIAGNOSIS — E87.1 HYPONATREMIA: ICD-10-CM

## 2021-07-08 DIAGNOSIS — R74.01 TRANSAMINITIS: ICD-10-CM

## 2021-07-08 DIAGNOSIS — T81.89XA NON-HEALING SURGICAL WOUND, INITIAL ENCOUNTER: Primary | ICD-10-CM

## 2021-07-08 DIAGNOSIS — M25.551 RIGHT HIP PAIN: ICD-10-CM

## 2021-07-08 LAB
ALBUMIN SERPL-MCNC: 2.6 G/DL (ref 3.5–5)
ALBUMIN/GLOB SERPL: 0.5 {RATIO} (ref 1.1–2.2)
ALP SERPL-CCNC: 247 U/L (ref 45–117)
ALT SERPL-CCNC: 125 U/L (ref 12–78)
ANION GAP SERPL CALC-SCNC: 7 MMOL/L (ref 5–15)
AST SERPL-CCNC: 63 U/L (ref 15–37)
BASOPHILS # BLD: 0.1 K/UL (ref 0–0.1)
BASOPHILS NFR BLD: 0 % (ref 0–1)
BILIRUB SERPL-MCNC: 0.5 MG/DL (ref 0.2–1)
BUN SERPL-MCNC: 17 MG/DL (ref 6–20)
BUN/CREAT SERPL: 17 (ref 12–20)
CALCIUM SERPL-MCNC: 9.7 MG/DL (ref 8.5–10.1)
CHLORIDE SERPL-SCNC: 92 MMOL/L (ref 97–108)
CO2 SERPL-SCNC: 27 MMOL/L (ref 21–32)
COMMENT, HOLDF: NORMAL
CREAT SERPL-MCNC: 1.02 MG/DL (ref 0.55–1.02)
DIFFERENTIAL METHOD BLD: ABNORMAL
EOSINOPHIL # BLD: 0.2 K/UL (ref 0–0.4)
EOSINOPHIL NFR BLD: 2 % (ref 0–7)
ERYTHROCYTE [DISTWIDTH] IN BLOOD BY AUTOMATED COUNT: 13.6 % (ref 11.5–14.5)
GLOBULIN SER CALC-MCNC: 5.2 G/DL (ref 2–4)
GLUCOSE SERPL-MCNC: 443 MG/DL (ref 65–100)
HCT VFR BLD AUTO: 39.3 % (ref 35–47)
HGB BLD-MCNC: 12.8 G/DL (ref 11.5–16)
IMM GRANULOCYTES # BLD AUTO: 0.1 K/UL (ref 0–0.04)
IMM GRANULOCYTES NFR BLD AUTO: 1 % (ref 0–0.5)
LYMPHOCYTES # BLD: 1.4 K/UL (ref 0.8–3.5)
LYMPHOCYTES NFR BLD: 10 % (ref 12–49)
MCH RBC QN AUTO: 27.6 PG (ref 26–34)
MCHC RBC AUTO-ENTMCNC: 32.6 G/DL (ref 30–36.5)
MCV RBC AUTO: 84.7 FL (ref 80–99)
MONOCYTES # BLD: 1.3 K/UL (ref 0–1)
MONOCYTES NFR BLD: 10 % (ref 5–13)
NEUTS SEG # BLD: 10.3 K/UL (ref 1.8–8)
NEUTS SEG NFR BLD: 77 % (ref 32–75)
NRBC # BLD: 0 K/UL (ref 0–0.01)
NRBC BLD-RTO: 0 PER 100 WBC
PLATELET # BLD AUTO: 536 K/UL (ref 150–400)
PMV BLD AUTO: 10.2 FL (ref 8.9–12.9)
POTASSIUM SERPL-SCNC: 4 MMOL/L (ref 3.5–5.1)
PROT SERPL-MCNC: 7.8 G/DL (ref 6.4–8.2)
RBC # BLD AUTO: 4.64 M/UL (ref 3.8–5.2)
SAMPLES BEING HELD,HOLD: NORMAL
SODIUM SERPL-SCNC: 126 MMOL/L (ref 136–145)
WBC # BLD AUTO: 13.4 K/UL (ref 3.6–11)

## 2021-07-08 PROCEDURE — 80053 COMPREHEN METABOLIC PANEL: CPT

## 2021-07-08 PROCEDURE — 96361 HYDRATE IV INFUSION ADD-ON: CPT

## 2021-07-08 PROCEDURE — 65660000000 HC RM CCU STEPDOWN

## 2021-07-08 PROCEDURE — 71045 X-RAY EXAM CHEST 1 VIEW: CPT

## 2021-07-08 PROCEDURE — 99285 EMERGENCY DEPT VISIT HI MDM: CPT

## 2021-07-08 PROCEDURE — 74011250636 HC RX REV CODE- 250/636

## 2021-07-08 PROCEDURE — 74011250636 HC RX REV CODE- 250/636: Performed by: INTERNAL MEDICINE

## 2021-07-08 PROCEDURE — 96375 TX/PRO/DX INJ NEW DRUG ADDON: CPT

## 2021-07-08 PROCEDURE — 85025 COMPLETE CBC W/AUTO DIFF WBC: CPT

## 2021-07-08 PROCEDURE — 72148 MRI LUMBAR SPINE W/O DYE: CPT

## 2021-07-08 PROCEDURE — 36415 COLL VENOUS BLD VENIPUNCTURE: CPT

## 2021-07-08 PROCEDURE — 72146 MRI CHEST SPINE W/O DYE: CPT

## 2021-07-08 PROCEDURE — 96374 THER/PROPH/DIAG INJ IV PUSH: CPT

## 2021-07-08 PROCEDURE — 73502 X-RAY EXAM HIP UNI 2-3 VIEWS: CPT

## 2021-07-08 PROCEDURE — 74011250636 HC RX REV CODE- 250/636: Performed by: EMERGENCY MEDICINE

## 2021-07-08 PROCEDURE — 74011250636 HC RX REV CODE- 250/636: Performed by: STUDENT IN AN ORGANIZED HEALTH CARE EDUCATION/TRAINING PROGRAM

## 2021-07-08 PROCEDURE — 72100 X-RAY EXAM L-S SPINE 2/3 VWS: CPT

## 2021-07-08 RX ORDER — LORAZEPAM 2 MG/ML
1 INJECTION INTRAMUSCULAR
Status: COMPLETED | OUTPATIENT
Start: 2021-07-08 | End: 2021-07-08

## 2021-07-08 RX ORDER — MORPHINE SULFATE 4 MG/ML
4 INJECTION INTRAVENOUS ONCE
Status: COMPLETED | OUTPATIENT
Start: 2021-07-08 | End: 2021-07-08

## 2021-07-08 RX ORDER — HYDROMORPHONE HYDROCHLORIDE 1 MG/ML
1 INJECTION, SOLUTION INTRAMUSCULAR; INTRAVENOUS; SUBCUTANEOUS ONCE
Status: COMPLETED | OUTPATIENT
Start: 2021-07-08 | End: 2021-07-08

## 2021-07-08 RX ORDER — SODIUM CHLORIDE 9 MG/ML
75 INJECTION, SOLUTION INTRAVENOUS CONTINUOUS
Status: DISCONTINUED | OUTPATIENT
Start: 2021-07-08 | End: 2021-07-09

## 2021-07-08 RX ORDER — MORPHINE SULFATE 2 MG/ML
2 INJECTION, SOLUTION INTRAMUSCULAR; INTRAVENOUS
Status: DISCONTINUED | OUTPATIENT
Start: 2021-07-08 | End: 2021-07-14

## 2021-07-08 RX ORDER — ONDANSETRON 2 MG/ML
4 INJECTION INTRAMUSCULAR; INTRAVENOUS
Status: COMPLETED | OUTPATIENT
Start: 2021-07-08 | End: 2021-07-08

## 2021-07-08 RX ORDER — LORAZEPAM 2 MG/ML
INJECTION INTRAMUSCULAR
Status: COMPLETED
Start: 2021-07-08 | End: 2021-07-08

## 2021-07-08 RX ADMIN — SODIUM CHLORIDE 1000 ML: 9 INJECTION, SOLUTION INTRAVENOUS at 15:09

## 2021-07-08 RX ADMIN — SODIUM CHLORIDE 75 ML/HR: 9 INJECTION, SOLUTION INTRAVENOUS at 22:45

## 2021-07-08 RX ADMIN — MORPHINE SULFATE 4 MG: 4 INJECTION INTRAVENOUS at 13:57

## 2021-07-08 RX ADMIN — ONDANSETRON 4 MG: 2 INJECTION INTRAMUSCULAR; INTRAVENOUS at 13:58

## 2021-07-08 RX ADMIN — LORAZEPAM 1 MG: 2 INJECTION INTRAMUSCULAR; INTRAVENOUS at 19:15

## 2021-07-08 RX ADMIN — LORAZEPAM 1 MG: 2 INJECTION INTRAMUSCULAR at 19:15

## 2021-07-08 RX ADMIN — HYDROMORPHONE HYDROCHLORIDE 1 MG: 1 INJECTION, SOLUTION INTRAMUSCULAR; INTRAVENOUS; SUBCUTANEOUS at 20:51

## 2021-07-08 NOTE — ED PROVIDER NOTES
77-year-old female history of squamous cell cancer, GERD, hypertension, hypothyroidism, nausea and vomiting, migraines, obesity presents to the emergency department with right hip pain and difficulty walking. She had a lumbar fusion several weeks ago. She was healing well at home when she fell several days ago. She has been nonambulatory since the fall. She tells me she fell with her right hip into a side railing and has not been able to walk since that time. She tells me that her back is healing with some drainage. No fevers. No nausea or vomiting. The history is provided by the patient and medical records. Hip Injury   This is a new problem. The current episode started more than 2 days ago. The problem occurs constantly. The problem has been rapidly worsening. Associated symptoms include limited range of motion and back pain. Pertinent negatives include no numbness. There has been a history of trauma. Past Medical History:   Diagnosis Date    Adverse effect of anesthesia     O2 DROPS WITH ANESTHESIA    Arthritis     KNEES    Cancer ([facility] [address] 03/13/2015    SQUAMOUS CELL CARCINOMA; tonsils and lymph nodes.   Removed 3-2015 with radiation    GERD (gastroesophageal reflux disease)     Hypertension     NO LONGER ON MEDICATION    Hypothyroid     HYPOTHYROIDISM    Migraine     Nausea & vomiting     Obesity     Other ill-defined conditions(799.89)     chronic back pain    Psychiatric disorder     anxiety    Psychiatric disorder     panic ATTACKS    SVT (supraventricular tachycardia) ([facility] [address] 05/24/2014    Ulcerative colitis        Past Surgical History:   Procedure Laterality Date    COLONOSCOPY,DIAGNOSTIC  11/19/2015         HX GI      colonscopy    HX HEENT  03/2015    tonsillectomy (CANCER) AND NECK LYMPH NODES    HX HEENT  2008    PARATHYROID REMOVAL    HX HEENT Left 2002    EYE LASER    HX HEMORRHOIDECTOMY  2001, 2016     X2    HX HYSTERECTOMY  1985    HX KNEE ARTHROSCOPY  1995 left knee surgery, TOTAL LT  and Right KNEE 2013    HX KNEE REPLACEMENT Bilateral 2013, 2014    HX LAP CHOLECYSTECTOMY  2002    HX LUMBAR FUSION  2011    SPINAL FUSION with hardware L4-L5    HX ORTHOPAEDIC  1990    CYST REMOVED RIGHT WRIST    HX ORTHOPAEDIC  05/12/2021    L2-3 & L5-21 LUMBAR LAMINECTOMY WITH ANDREWS OSTEOTOMY    HX OTHER SURGICAL      cyst removal right wrist    HX UROLOGICAL  2010    bladder surgery(interstim device)    IR INJ FORAMIN EPID LUMB ANES/STER SNGL  8/19/2019    NM EGD TRANSORAL BIOPSY SINGLE/MULTIPLE  5/20/2013              Family History:   Problem Relation Age of Onset    Cancer Mother         ovarian ca?     Heart Disease Father         MI    Heart Attack Father     Cancer Father         MULTIPLE MYELOMA    Liver Disease Father         FROM MEDICATIONS FOR MULTIPLE MYELOMA    Arthritis-osteo Sister     Arthritis-osteo Brother     Cancer Paternal Grandmother 79        breast ca    Breast Cancer Paternal Grandmother 79    Breast Cancer Maternal Aunt 55    Breast Cancer Maternal Aunt 61    Breast Cancer Paternal Aunt 43    Breast Cancer Paternal Aunt         52's    Emphysema Paternal Grandfather     No Known Problems Sister     No Known Problems Brother     No Known Problems Brother     Anesth Problems Neg Hx        Social History     Socioeconomic History    Marital status:      Spouse name: Not on file    Number of children: Not on file    Years of education: Not on file    Highest education level: Not on file   Occupational History    Not on file   Tobacco Use    Smoking status: Never Smoker    Smokeless tobacco: Never Used   Vaping Use    Vaping Use: Never used   Substance and Sexual Activity    Alcohol use: No    Drug use: No     Types: Prescription    Sexual activity: Not Currently   Other Topics Concern     Service Not Asked    Blood Transfusions Not Asked    Caffeine Concern Not Asked    Occupational Exposure Not Asked    Hobby Hazards Not Asked    Sleep Concern Not Asked    Stress Concern Not Asked    Weight Concern Not Asked    Special Diet Not Asked    Back Care Not Asked    Exercise Not Asked    Bike Helmet Not Asked   [address] Not Asked    Self-Exams Not Asked   Social History Narrative    Not on file     Social Determinants of Health     Financial Resource Strain:     Difficulty of Paying Living Expenses:    Food Insecurity:     Worried About Running Out of Food in the Last Year:     [address] in the Last Year:    Transportation Needs:     Lack of Transportation (Medical):  Lack of Transportation (Non-Medical):    Physical Activity:     Days of Exercise per Week:     Minutes of Exercise per Session:    Stress:     Feeling of Stress :    Social Connections:     Frequency of Communication with Friends and Family:     Frequency of Social Gatherings with Friends and Family:     Attends L[employer] day Services:     Active Member of Clubs or Organizations:     Attends Club or Organization Meetings:     Marital Status:    Intimate Partner Violence:     Fear of Current or Ex-Partner:     Emotionally Abused:     Physically Abused:     Sexually Abused: ALLERGIES: Adhesive tape-silicones, Aloe vera, Chlorhexidine, and Tussin [dextromethorphan hbr]    Review of Systems   Constitutional: Negative for fatigue and fever. HENT: Negative for sneezing and sore throat. Respiratory: Negative for cough and shortness of breath. Cardiovascular: Negative for chest pain and leg swelling. Gastrointestinal: Negative for abdominal pain, diarrhea, nausea and vomiting. Genitourinary: Negative for difficulty urinating and dysuria. Musculoskeletal: Positive for back pain and gait problem. Negative for arthralgias and myalgias. Skin: Negative for color change and rash. Neurological: Negative for weakness, numbness and headaches.    Psychiatric/Behavioral: Negative for agitation and behavioral problems. Vitals:    07/08/21 1314   Pulse: 98   Resp: 26   SpO2: 95%   Weight: 132 kg (291 lb)   Height: 5' 4\" (1.626 m)            Physical Exam  Vitals and nursing note reviewed. Constitutional:       General: She is not in acute distress. Appearance: Normal appearance. She is well-developed. She is obese. She is not ill-appearing, toxic-appearing or diaphoretic. HENT:      Head: Normocephalic and atraumatic. Nose: Nose normal.      Mouth/Throat:      Mouth: Mucous membranes are moist.      Pharynx: Oropharynx is clear. Eyes:      Extraocular Movements: Extraocular movements intact. Conjunctiva/sclera: Conjunctivae normal.      Pupils: Pupils are equal, round, and reactive to light. Cardiovascular:      Rate and Rhythm: Normal rate and regular rhythm. Pulses: Normal pulses. Heart sounds: Normal heart sounds. Pulmonary:      Effort: Pulmonary effort is normal. No respiratory distress. Breath sounds: Normal breath sounds. No wheezing. Chest:      Chest wall: No tenderness. Abdominal:      General: Abdomen is flat. There is no distension. Palpations: Abdomen is soft. Tenderness: There is no abdominal tenderness. There is no guarding or rebound. Musculoskeletal:         General: Tenderness present. No swelling, deformity or signs of injury. Cervical back: Normal range of motion and neck supple. No rigidity. No muscular tenderness. Right lower leg: No edema. Left lower leg: No edema. Comments: Midline incision over the lumbar spine with mild serous drainage, no significant erythema. Pain with range of motion of the right hip. No significant tenderness to palpation. Distally neurovascularly intact. Skin:     General: Skin is warm and dry. Capillary Refill: Capillary refill takes less than 2 seconds. Neurological:      General: No focal deficit present. Mental Status: She is alert and oriented to person, place, and time. Psychiatric:         Mood and Affect: Mood normal.         Behavior: Behavior normal.          MDM       Procedures               6:04 PM  Change of shift. Care of patient signed over to Dr. [facility]. Handoff complete. Perfect Serve Consult for Admission  9:37 PM    ED Room Number: ER29/29  Patient Name and age:  Jade Eastman 65 y.o.  female  Working Diagnosis:   1. Non-healing surgical wound, initial encounter    2. Fall, initial encounter    3. Hyponatremia    4.  Transaminitis        COVID-19 Suspicion:  no  Sepsis present:  no  Reassessment needed: no  Code Status:  Full Code  Readmission: no  Isolation Requirements:  no  Recommended Level of Care:  Med/surg  Department:[facility] Adult ED - 21   Other:

## 2021-07-08 NOTE — ED TRIAGE NOTES
TRIAGE: Pt arrives via EMS from home w c/o sharp 8/10 R hip pain after she fell into a bedside commode this past Saturday. Pain worse with movement. Pt had a lumbar fusion on 6/2. Denies ability to weight bear and no deformity noted upon arrival. +pulse/sensation.

## 2021-07-08 NOTE — ED NOTES
Bedside and Verbal shift change report given to Yessica Pimentel RN (oncoming nurse) by Yarely Crowell RN (offgoing nurse). Report included the following information SBAR, ED Summary, MAR and Recent Results.

## 2021-07-08 NOTE — ED NOTES
Patient O2 sats noted to continuously drop down to 90% on RA. Patient placed on 2L via NC for comfort. Patient also reporting she is unable to have MRI d/t \"bladder wires and knee replacements. \" Christi Fontenot MD notified.

## 2021-07-09 ENCOUNTER — APPOINTMENT (OUTPATIENT)
Dept: CT IMAGING | Age: 71
DRG: 537 | End: 2021-07-09
Attending: INTERNAL MEDICINE
Payer: MEDICARE

## 2021-07-09 ENCOUNTER — APPOINTMENT (OUTPATIENT)
Dept: VASCULAR SURGERY | Age: 71
DRG: 537 | End: 2021-07-09
Attending: INTERNAL MEDICINE
Payer: MEDICARE

## 2021-07-09 LAB
ALBUMIN SERPL-MCNC: 2.2 G/DL (ref 3.5–5)
ALBUMIN/GLOB SERPL: 0.5 {RATIO} (ref 1.1–2.2)
ALP SERPL-CCNC: 181 U/L (ref 45–117)
ALT SERPL-CCNC: 87 U/L (ref 12–78)
ANION GAP SERPL CALC-SCNC: 7 MMOL/L (ref 5–15)
AST SERPL-CCNC: 36 U/L (ref 15–37)
BASOPHILS # BLD: 0.1 K/UL (ref 0–0.1)
BASOPHILS NFR BLD: 0 % (ref 0–1)
BILIRUB SERPL-MCNC: 0.7 MG/DL (ref 0.2–1)
BUN SERPL-MCNC: 15 MG/DL (ref 6–20)
BUN/CREAT SERPL: 22 (ref 12–20)
CALCIUM SERPL-MCNC: 9.1 MG/DL (ref 8.5–10.1)
CHLORIDE SERPL-SCNC: 99 MMOL/L (ref 97–108)
CO2 SERPL-SCNC: 26 MMOL/L (ref 21–32)
CREAT SERPL-MCNC: 0.69 MG/DL (ref 0.55–1.02)
DIFFERENTIAL METHOD BLD: ABNORMAL
EOSINOPHIL # BLD: 0.2 K/UL (ref 0–0.4)
EOSINOPHIL NFR BLD: 2 % (ref 0–7)
ERYTHROCYTE [DISTWIDTH] IN BLOOD BY AUTOMATED COUNT: 13.6 % (ref 11.5–14.5)
GLOBULIN SER CALC-MCNC: 4.4 G/DL (ref 2–4)
GLUCOSE BLD STRIP.AUTO-MCNC: 210 MG/DL (ref 65–117)
GLUCOSE BLD STRIP.AUTO-MCNC: 226 MG/DL (ref 65–117)
GLUCOSE BLD STRIP.AUTO-MCNC: 259 MG/DL (ref 65–117)
GLUCOSE BLD STRIP.AUTO-MCNC: 279 MG/DL (ref 65–117)
GLUCOSE SERPL-MCNC: 263 MG/DL (ref 65–100)
HCT VFR BLD AUTO: 35 % (ref 35–47)
HGB BLD-MCNC: 10.9 G/DL (ref 11.5–16)
IMM GRANULOCYTES # BLD AUTO: 0.2 K/UL (ref 0–0.04)
IMM GRANULOCYTES NFR BLD AUTO: 1 % (ref 0–0.5)
LIPASE SERPL-CCNC: 40 U/L (ref 73–393)
LYMPHOCYTES # BLD: 1.7 K/UL (ref 0.8–3.5)
LYMPHOCYTES NFR BLD: 12 % (ref 12–49)
MAGNESIUM SERPL-MCNC: 1.9 MG/DL (ref 1.6–2.4)
MCH RBC QN AUTO: 27.1 PG (ref 26–34)
MCHC RBC AUTO-ENTMCNC: 31.1 G/DL (ref 30–36.5)
MCV RBC AUTO: 87.1 FL (ref 80–99)
MONOCYTES # BLD: 1.5 K/UL (ref 0–1)
MONOCYTES NFR BLD: 10 % (ref 5–13)
NEUTS SEG # BLD: 11 K/UL (ref 1.8–8)
NEUTS SEG NFR BLD: 75 % (ref 32–75)
NRBC # BLD: 0 K/UL (ref 0–0.01)
NRBC BLD-RTO: 0 PER 100 WBC
PHOSPHATE SERPL-MCNC: 3.1 MG/DL (ref 2.6–4.7)
PLATELET # BLD AUTO: 459 K/UL (ref 150–400)
PMV BLD AUTO: 10.3 FL (ref 8.9–12.9)
POTASSIUM SERPL-SCNC: 4.1 MMOL/L (ref 3.5–5.1)
PROT SERPL-MCNC: 6.6 G/DL (ref 6.4–8.2)
RBC # BLD AUTO: 4.02 M/UL (ref 3.8–5.2)
SERVICE CMNT-IMP: ABNORMAL
SODIUM SERPL-SCNC: 132 MMOL/L (ref 136–145)
TSH SERPL DL<=0.05 MIU/L-ACNC: 2.4 UIU/ML (ref 0.36–3.74)
WBC # BLD AUTO: 14.7 K/UL (ref 3.6–11)

## 2021-07-09 PROCEDURE — 36415 COLL VENOUS BLD VENIPUNCTURE: CPT

## 2021-07-09 PROCEDURE — 71275 CT ANGIOGRAPHY CHEST: CPT

## 2021-07-09 PROCEDURE — 77030037877 HC DRSG MEPILEX >48IN BORD MOLN -A

## 2021-07-09 PROCEDURE — 77030041076 HC DRSG AG OPTICELL MDII -A

## 2021-07-09 PROCEDURE — 85025 COMPLETE CBC W/AUTO DIFF WBC: CPT

## 2021-07-09 PROCEDURE — 72125 CT NECK SPINE W/O DYE: CPT

## 2021-07-09 PROCEDURE — 80053 COMPREHEN METABOLIC PANEL: CPT

## 2021-07-09 PROCEDURE — 82962 GLUCOSE BLOOD TEST: CPT

## 2021-07-09 PROCEDURE — 83690 ASSAY OF LIPASE: CPT

## 2021-07-09 PROCEDURE — 65660000000 HC RM CCU STEPDOWN

## 2021-07-09 PROCEDURE — 93970 EXTREMITY STUDY: CPT

## 2021-07-09 PROCEDURE — 74011636637 HC RX REV CODE- 636/637: Performed by: INTERNAL MEDICINE

## 2021-07-09 PROCEDURE — 74011250637 HC RX REV CODE- 250/637: Performed by: INTERNAL MEDICINE

## 2021-07-09 PROCEDURE — 74011000258 HC RX REV CODE- 258: Performed by: INTERNAL MEDICINE

## 2021-07-09 PROCEDURE — 70450 CT HEAD/BRAIN W/O DYE: CPT

## 2021-07-09 PROCEDURE — 84443 ASSAY THYROID STIM HORMONE: CPT

## 2021-07-09 PROCEDURE — 84100 ASSAY OF PHOSPHORUS: CPT

## 2021-07-09 PROCEDURE — 74011250636 HC RX REV CODE- 250/636: Performed by: INTERNAL MEDICINE

## 2021-07-09 PROCEDURE — 83735 ASSAY OF MAGNESIUM: CPT

## 2021-07-09 PROCEDURE — 74177 CT ABD & PELVIS W/CONTRAST: CPT

## 2021-07-09 PROCEDURE — 74011000636 HC RX REV CODE- 636: Performed by: RADIOLOGY

## 2021-07-09 RX ORDER — ALPRAZOLAM 0.25 MG/1
0.5 TABLET ORAL 2 TIMES DAILY
Status: DISCONTINUED | OUTPATIENT
Start: 2021-07-09 | End: 2021-07-13

## 2021-07-09 RX ORDER — ATORVASTATIN CALCIUM 40 MG/1
40 TABLET, FILM COATED ORAL
Status: DISCONTINUED | OUTPATIENT
Start: 2021-07-09 | End: 2021-07-20 | Stop reason: HOSPADM

## 2021-07-09 RX ORDER — PANTOPRAZOLE SODIUM 40 MG/1
40 TABLET, DELAYED RELEASE ORAL
Status: DISCONTINUED | OUTPATIENT
Start: 2021-07-09 | End: 2021-07-20 | Stop reason: HOSPADM

## 2021-07-09 RX ORDER — POLYETHYLENE GLYCOL 3350 17 G/17G
17 POWDER, FOR SOLUTION ORAL DAILY PRN
Status: DISCONTINUED | OUTPATIENT
Start: 2021-07-09 | End: 2021-07-20 | Stop reason: HOSPADM

## 2021-07-09 RX ORDER — PAROXETINE HYDROCHLORIDE 20 MG/1
40 TABLET, FILM COATED ORAL
Status: DISCONTINUED | OUTPATIENT
Start: 2021-07-09 | End: 2021-07-20 | Stop reason: HOSPADM

## 2021-07-09 RX ORDER — ACETAMINOPHEN 325 MG/1
650 TABLET ORAL
Status: DISCONTINUED | OUTPATIENT
Start: 2021-07-09 | End: 2021-07-20 | Stop reason: HOSPADM

## 2021-07-09 RX ORDER — ONDANSETRON 2 MG/ML
4 INJECTION INTRAMUSCULAR; INTRAVENOUS
Status: DISCONTINUED | OUTPATIENT
Start: 2021-07-09 | End: 2021-07-20 | Stop reason: HOSPADM

## 2021-07-09 RX ORDER — AMITRIPTYLINE HYDROCHLORIDE 50 MG/1
50 TABLET, FILM COATED ORAL
Status: DISCONTINUED | OUTPATIENT
Start: 2021-07-09 | End: 2021-07-20 | Stop reason: HOSPADM

## 2021-07-09 RX ORDER — MAGNESIUM SULFATE 100 %
4 CRYSTALS MISCELLANEOUS AS NEEDED
Status: DISCONTINUED | OUTPATIENT
Start: 2021-07-09 | End: 2021-07-20 | Stop reason: HOSPADM

## 2021-07-09 RX ORDER — ONDANSETRON 4 MG/1
4 TABLET, ORALLY DISINTEGRATING ORAL
Status: DISCONTINUED | OUTPATIENT
Start: 2021-07-09 | End: 2021-07-20 | Stop reason: HOSPADM

## 2021-07-09 RX ORDER — SODIUM CHLORIDE 0.9 % (FLUSH) 0.9 %
5-40 SYRINGE (ML) INJECTION EVERY 8 HOURS
Status: DISCONTINUED | OUTPATIENT
Start: 2021-07-09 | End: 2021-07-20 | Stop reason: HOSPADM

## 2021-07-09 RX ORDER — SODIUM CHLORIDE 0.9 % (FLUSH) 0.9 %
5-40 SYRINGE (ML) INJECTION AS NEEDED
Status: DISCONTINUED | OUTPATIENT
Start: 2021-07-09 | End: 2021-07-20 | Stop reason: HOSPADM

## 2021-07-09 RX ORDER — METOPROLOL TARTRATE 25 MG/1
12.5 TABLET, FILM COATED ORAL
Status: DISCONTINUED | OUTPATIENT
Start: 2021-07-09 | End: 2021-07-20 | Stop reason: HOSPADM

## 2021-07-09 RX ORDER — METOPROLOL TARTRATE 25 MG/1
25 TABLET, FILM COATED ORAL
Status: DISCONTINUED | OUTPATIENT
Start: 2021-07-09 | End: 2021-07-20 | Stop reason: HOSPADM

## 2021-07-09 RX ORDER — SODIUM CHLORIDE 9 MG/ML
75 INJECTION, SOLUTION INTRAVENOUS CONTINUOUS
Status: DISCONTINUED | OUTPATIENT
Start: 2021-07-09 | End: 2021-07-14

## 2021-07-09 RX ORDER — LEVOTHYROXINE SODIUM 112 UG/1
112 TABLET ORAL
Status: DISCONTINUED | OUTPATIENT
Start: 2021-07-09 | End: 2021-07-20 | Stop reason: HOSPADM

## 2021-07-09 RX ORDER — BUTALBITAL, ACETAMINOPHEN AND CAFFEINE 50; 325; 40 MG/1; MG/1; MG/1
1 TABLET ORAL
Status: DISCONTINUED | OUTPATIENT
Start: 2021-07-09 | End: 2021-07-20 | Stop reason: HOSPADM

## 2021-07-09 RX ORDER — DEXTROSE 50 % IN WATER (D50W) INTRAVENOUS SYRINGE
12.5-25 AS NEEDED
Status: DISCONTINUED | OUTPATIENT
Start: 2021-07-09 | End: 2021-07-20 | Stop reason: HOSPADM

## 2021-07-09 RX ORDER — INSULIN LISPRO 100 [IU]/ML
INJECTION, SOLUTION INTRAVENOUS; SUBCUTANEOUS
Status: DISCONTINUED | OUTPATIENT
Start: 2021-07-09 | End: 2021-07-20 | Stop reason: HOSPADM

## 2021-07-09 RX ORDER — HEPARIN SODIUM 5000 [USP'U]/ML
5000 INJECTION, SOLUTION INTRAVENOUS; SUBCUTANEOUS EVERY 8 HOURS
Status: DISCONTINUED | OUTPATIENT
Start: 2021-07-09 | End: 2021-07-20 | Stop reason: HOSPADM

## 2021-07-09 RX ORDER — ACETAMINOPHEN 650 MG/1
650 SUPPOSITORY RECTAL
Status: DISCONTINUED | OUTPATIENT
Start: 2021-07-09 | End: 2021-07-20 | Stop reason: HOSPADM

## 2021-07-09 RX ORDER — ASPIRIN 81 MG/1
81 TABLET ORAL DAILY
Status: DISCONTINUED | OUTPATIENT
Start: 2021-07-09 | End: 2021-07-20 | Stop reason: HOSPADM

## 2021-07-09 RX ADMIN — METOPROLOL TARTRATE 12.5 MG: 25 TABLET, FILM COATED ORAL at 21:07

## 2021-07-09 RX ADMIN — ATORVASTATIN CALCIUM 40 MG: 40 TABLET, FILM COATED ORAL at 04:42

## 2021-07-09 RX ADMIN — LEVOTHYROXINE SODIUM 112 MCG: 0.11 TABLET ORAL at 07:10

## 2021-07-09 RX ADMIN — Medication 10 ML: at 21:13

## 2021-07-09 RX ADMIN — PANTOPRAZOLE SODIUM 40 MG: 40 TABLET, DELAYED RELEASE ORAL at 07:10

## 2021-07-09 RX ADMIN — INSULIN LISPRO 5 UNITS: 100 INJECTION, SOLUTION INTRAVENOUS; SUBCUTANEOUS at 18:03

## 2021-07-09 RX ADMIN — METOPROLOL TARTRATE 25 MG: 25 TABLET, FILM COATED ORAL at 07:10

## 2021-07-09 RX ADMIN — SODIUM CHLORIDE 75 ML/HR: 9 INJECTION, SOLUTION INTRAVENOUS at 07:07

## 2021-07-09 RX ADMIN — IOPAMIDOL 100 ML: 755 INJECTION, SOLUTION INTRAVENOUS at 09:00

## 2021-07-09 RX ADMIN — INSULIN LISPRO 3 UNITS: 100 INJECTION, SOLUTION INTRAVENOUS; SUBCUTANEOUS at 12:15

## 2021-07-09 RX ADMIN — INSULIN LISPRO 2 UNITS: 100 INJECTION, SOLUTION INTRAVENOUS; SUBCUTANEOUS at 22:06

## 2021-07-09 RX ADMIN — DOXYCYCLINE 100 MG: 100 INJECTION, POWDER, LYOPHILIZED, FOR SOLUTION INTRAVENOUS at 09:12

## 2021-07-09 RX ADMIN — AMITRIPTYLINE HYDROCHLORIDE 50 MG: 50 TABLET, FILM COATED ORAL at 04:42

## 2021-07-09 RX ADMIN — DOXYCYCLINE 100 MG: 100 INJECTION, POWDER, LYOPHILIZED, FOR SOLUTION INTRAVENOUS at 21:12

## 2021-07-09 RX ADMIN — AMITRIPTYLINE HYDROCHLORIDE 50 MG: 50 TABLET, FILM COATED ORAL at 21:07

## 2021-07-09 RX ADMIN — PAROXETINE 40 MG: 20 TABLET, FILM COATED ORAL at 07:10

## 2021-07-09 RX ADMIN — HEPARIN SODIUM 5000 UNITS: 5000 INJECTION INTRAVENOUS; SUBCUTANEOUS at 14:24

## 2021-07-09 RX ADMIN — ASPIRIN 81 MG: 81 TABLET, COATED ORAL at 08:51

## 2021-07-09 RX ADMIN — HEPARIN SODIUM 5000 UNITS: 5000 INJECTION INTRAVENOUS; SUBCUTANEOUS at 07:09

## 2021-07-09 RX ADMIN — ALPRAZOLAM 0.5 MG: 0.25 TABLET ORAL at 07:10

## 2021-07-09 RX ADMIN — HEPARIN SODIUM 5000 UNITS: 5000 INJECTION INTRAVENOUS; SUBCUTANEOUS at 21:10

## 2021-07-09 RX ADMIN — CEFTRIAXONE SODIUM 1 G: 1 INJECTION, POWDER, FOR SOLUTION INTRAMUSCULAR; INTRAVENOUS at 07:06

## 2021-07-09 RX ADMIN — INSULIN LISPRO 5 UNITS: 100 INJECTION, SOLUTION INTRAVENOUS; SUBCUTANEOUS at 08:24

## 2021-07-09 RX ADMIN — Medication 1 CAPSULE: at 08:51

## 2021-07-09 RX ADMIN — MORPHINE SULFATE 2 MG: 2 INJECTION, SOLUTION INTRAMUSCULAR; INTRAVENOUS at 16:02

## 2021-07-09 RX ADMIN — Medication 10 ML: at 06:00

## 2021-07-09 RX ADMIN — ATORVASTATIN CALCIUM 40 MG: 40 TABLET, FILM COATED ORAL at 21:08

## 2021-07-09 RX ADMIN — Medication 10 ML: at 18:04

## 2021-07-09 RX ADMIN — ALPRAZOLAM 0.5 MG: 0.25 TABLET ORAL at 21:08

## 2021-07-09 NOTE — PROGRESS NOTES
[address] [facility] Adult  Hospitalist Group                                                                                          Hospitalist Progress Note  Ema Whitten MD    Contact hospitalist group for  and on due to provider change        Date of Service:  2021  NAME:  Melanie Emerson  :  1950  MRN:  [id number]      Admission Summary:   \"HISTORY OF PRESENT ILLNESS:  This is a 78-year-old woman with a past medical history significant for degenerative disk disease, dyslipidemia, hypothyroidism, type 2 diabetes, anxiety disorder, was in her usual state of health until the day of her presentation at the emergency room when the patient developed right hip pain. The pain is constant, sharp pain worse with movement involving the right hip, slight relief at rest, 8/10 in severity. The patient stated that she fell at home prior to the onset of the right hip pain and sustained injury to the right hip. The patient was brought to the emergency room for further evaluation on 2021. The patient was admitted to this hospital and underwent lumbar laminectomy. After 5 days of hospitalization, the patient was discharged by the orthopedic service to rehab facility. The patient has since been discharged from rehab and she is back at home. When the patient arrived at the emergency room, x-ray of the right hip was obtained, which did not show any acute pathology. Because the patient recently had back surgery, MRI of the lumbar spine as well as the thoracic spine were obtained, which did not show any acute pathology and no infection. The patient was referred to the hospitalist service for evaluation for admission because of the intractable right hip pain. \"    Interval history / Subjective:   No complaints when seen at bedside     Assessment & Plan:     Intractable right hip pain in setting of fall POA  Improved pain control  L2-L5 laminectomy. Ortho note:  \"S/p L2-S1 revision fusion.   S/p fall with right hip pain.   1. Recommend wound care; ? Wound vac vs dressing changes. MRI does not show any collection   2. Right hip pain. . No acute fracture on CT. Recommend PT evaluation. If pain persist then possible MRI of hip. \"  -Physical therapy consult    Suspected bacterial pneumonia POA. (This is based on the chest x-ray result as per admitting hospitalist)  Resume dual IV abx for now. Thrombocytosis, thought to be reactive  Trend, already on aspirin, also on heparin dvt prophylaxis dose    Hyponatremia POA  Improved with hydration, continue to monitor    Morbid obesity based on BMI (refer to epic)    Dyslipidemia  Resume statin    Hypothyroidism  On replacement    Type 2 diabetes with hyperglycemia POA  On sliding scale    Abnormal liver function tests POA   Asymptomatic, trend    Anxiety disorder  Calm at bedside  Resume home meds    Code status: Full  DVT prophylaxis: heparin    Care Plan discussed with:   Anticipated Disposition: tbd  Anticipated Discharge: tbd     Hospital Problems  Date Reviewed: 7/9/2021        Codes Class Noted POA    * (Principal) Right hip pain ICD-10-CM: M25.551  ICD-9-CM: 719.45  7/8/2021 Yes                Review of Systems:   Negative other than noted above       Vital Signs:    Last 24hrs VS reviewed since prior progress note.  Most recent are:  Visit Vitals  /74 (BP 1 Location: Right upper arm, BP Patient Position: At rest)   Pulse 75   Temp 97.7 °F (36.5 °C)   Resp 17   Ht 5' 4\" (1.626 m)   Wt 132 kg (291 lb)   SpO2 94%   BMI 49.95 kg/m²         Intake/Output Summary (Last 24 hours) at 7/9/2021 1635  Last data filed at 7/9/2021 3269  Gross per 24 hour   Intake 300 ml   Output    Net 300 ml        Physical Examination:     I had a face to face encounter with this patient and independently examined them on 7/9/2021 as outlined below:          Constitutional:  No acute distress, cooperative, pleasant    ENT:  Oral mucosa moist, anicteric    Resp:  limited exam, breath sounds heard bilaterally, no obvious wheezing/rhonchi/rales. No accessory muscle use   CV:  Regular rhythm, normal rate, no rubs    GI:  Soft, non distended, non tender. normoactive bowel sounds    Musculoskeletal:  No edema, warm, 2+ pulses throughout    Neurologic:  Moves all extremities.   AAOx3   Psych: calm, not agitated, not anxious, does not voice si/hi            Data Review:         Labs:     Recent Labs     07/09/21  0501 07/08/21  1334   WBC 14.7* 13.4*   HGB 10.9* 12.8   HCT 35.0 39.3   * 536*     Recent Labs     07/09/21  0501 07/08/21  1334   * 126*   K 4.1 4.0   CL 99 92*   CO2 26 27   BUN 15 17   CREA 0.69 1.02   * 443*   CA 9.1 9.7   MG 1.9  --    PHOS 3.1  --      Recent Labs     07/09/21  0501 07/08/21  1334   ALT 87* 125*   * 247*   TBILI 0.7 0.5   TP 6.6 7.8   ALB 2.2* 2.6*   GLOB 4.4* 5.2*   LPSE 40*  --        Lab Results   Component Value Date/Time    Glucose (POC) 279 (H) 07/09/2021 04:24 PM    Glucose (POC) 226 (H) 07/09/2021 12:08 PM    Glucose (POC) 259 (H) 07/09/2021 07:23 AM    Glucose (POC) 141 (H) 06/12/2021 04:24 PM    Glucose (POC) 115 06/12/2021 12:13 PM     Lab Results   Component Value Date/Time    Color YELLOW/STRAW 05/04/2021 12:26 PM    Appearance CLOUDY (A) 05/04/2021 12:26 PM    Specific gravity 1.025 05/04/2021 12:26 PM    Specific gravity 1.010 04/06/2011 10:20 AM    pH (UA) 5.5 05/04/2021 12:26 PM    Protein Negative 05/04/2021 12:26 PM    Glucose Negative 05/04/2021 12:26 PM    Ketone TRACE (A) 05/04/2021 12:26 PM    Bilirubin Negative 05/04/2021 12:26 PM    Urobilinogen 1.0 05/04/2021 12:26 PM    Nitrites Negative 05/04/2021 12:26 PM    Leukocyte Esterase TRACE (A) 05/04/2021 12:26 PM    Epithelial cells FEW 05/04/2021 12:26 PM    Bacteria 3+ (A) 05/04/2021 12:26 PM    WBC 5-10 05/04/2021 12:26 PM    RBC 0-5 05/04/2021 12:26 PM         Medications Reviewed:     Current Facility-Administered Medications   Medication Dose Route Frequency    ALPRAZolam (XANAX) tablet 0.5 mg  0.5 mg Oral BID    amitriptyline (ELAVIL) tablet 50 mg  50 mg Oral QHS    aspirin delayed-release tablet 81 mg  81 mg Oral DAILY    atorvastatin (LIPITOR) tablet 40 mg  40 mg Oral QHS    butalbital-acetaminophen-caffeine (FIORICET, ESGIC) -40 mg per tablet 1 Tablet  1 Tablet Oral DAILY PRN    levothyroxine (SYNTHROID) tablet 112 mcg  112 mcg Oral ACB    metoprolol tartrate (LOPRESSOR) tablet 25 mg  25 mg Oral 7am    metoprolol tartrate (LOPRESSOR) tablet 12.5 mg  12.5 mg Oral QHS    pantoprazole (PROTONIX) tablet 40 mg  40 mg Oral ACB    PARoxetine (PAXIL) tablet 40 mg  40 mg Oral 7am    sodium chloride (NS) flush 5-40 mL  5-40 mL IntraVENous Q8H    sodium chloride (NS) flush 5-40 mL  5-40 mL IntraVENous PRN    acetaminophen (TYLENOL) tablet 650 mg  650 mg Oral Q6H PRN    Or    acetaminophen (TYLENOL) suppository 650 mg  650 mg Rectal Q6H PRN    polyethylene glycol (MIRALAX) packet 17 g  17 g Oral DAILY PRN    ondansetron (ZOFRAN ODT) tablet 4 mg  4 mg Oral Q8H PRN    Or    ondansetron (ZOFRAN) injection 4 mg  4 mg IntraVENous Q6H PRN    heparin (porcine) injection 5,000 Units  5,000 Units SubCUTAneous Q8H    insulin lispro (HUMALOG) injection   SubCUTAneous AC&HS    glucose chewable tablet 16 g  4 Tablet Oral PRN    dextrose (D50W) injection syrg 12.5-25 g  12.5-25 g IntraVENous PRN    glucagon (GLUCAGEN) injection 1 mg  1 mg IntraMUSCular PRN    L.acidophilus-paracasei-S.thermophil-bifidobacter (RISAQUAD) 8 billion cell capsule  1 Capsule Oral DAILY    doxycycline (VIBRAMYCIN) 100 mg in 0.9% sodium chloride (MBP/ADV) 100 mL MBP  100 mg IntraVENous Q12H    cefTRIAXone (ROCEPHIN) 1 g in 0.9% sodium chloride (MBP/ADV) 50 mL MBP  1 g IntraVENous Q24H    0.9% sodium chloride infusion  75 mL/hr IntraVENous CONTINUOUS    morphine injection 2 mg  2 mg IntraVENous Q4H PRN     ______________________________________________________________________  EXPECTED LENGTH OF STAY: 3d 19h  ACTUAL LENGTH OF STAY:          1                 Blake Smith MD

## 2021-07-09 NOTE — CONSULTS
SPINE CONSULT    Subjective:     Date of Consultation:  July 9, 2021    Referring Physician:  Darío Bhardwaj is a 65 y.o.  female who is being seen for wound issues and right hip pain after a fall. Workup has revealed no acute fracture. Patient Active Problem List    Diagnosis Date Noted    Right hip pain 07/08/2021    Lumbar stenosis with neurogenic claudication 04/03/2017    Rectal polyp 04/06/2016    Morbid obesity ([address] 02/12/2014    Right knee DJD 02/11/2014    Left knee DJD 09/03/2013     Family History   Problem Relation Age of Onset    Cancer Mother         ovarian ca?  Heart Disease Father         MI    Heart Attack Father     Cancer Father         MULTIPLE MYELOMA    Liver Disease Father         FROM MEDICATIONS FOR MULTIPLE MYELOMA    Arthritis-osteo Sister     Arthritis-osteo Brother     Cancer Paternal Grandmother 79        breast ca    Breast Cancer Paternal Grandmother 79    Breast Cancer Maternal Aunt 55    Breast Cancer Maternal Aunt 61    Breast Cancer Paternal Aunt 43    Breast Cancer Paternal Aunt         52's    Emphysema Paternal Grandfather     No Known Problems Sister     No Known Problems Brother     No Known Problems Brother     Anesth Problems Neg Hx       Social History     Tobacco Use    Smoking status: Never Smoker    Smokeless tobacco: Never Used   Substance Use Topics    Alcohol use: No     Past Medical History:   Diagnosis Date    Adverse effect of anesthesia     O2 DROPS WITH ANESTHESIA    Arthritis     KNEES    Cancer [address] [address] 03/13/2015    SQUAMOUS CELL CARCINOMA; tonsils and lymph nodes.   Removed 3-2015 with radiation    GERD (gastroesophageal reflux disease)     Hypertension     NO LONGER ON MEDICATION    Hypothyroid     HYPOTHYROIDISM    Migraine     Nausea & vomiting     Obesity     Other ill-defined conditions(799.89)     chronic back pain    Psychiatric disorder     anxiety    Psychiatric disorder     panic ATTACKS    SVT (supraventricular tachycardia) (HCC) 05/24/2014    Ulcerative colitis       Past Surgical History:   Procedure Laterality Date    COLONOSCOPY,DIAGNOSTIC  11/19/2015         HX GI      colonscopy    HX HEENT  03/2015    tonsillectomy (CANCER) AND NECK LYMPH NODES    HX HEENT  2008    PARATHYROID REMOVAL    HX HEENT Left 2002    EYE LASER    HX HEMORRHOIDECTOMY  2001, 2016     X2    HX HYSTERECTOMY  1985    HX KNEE ARTHROSCOPY  1995    left knee surgery, TOTAL LT  and Right KNEE 2013    HX KNEE REPLACEMENT Bilateral 2013, 2014    HX LAP CHOLECYSTECTOMY  2002    HX LUMBAR FUSION  2011    SPINAL FUSION with hardware L4-L5    HX ORTHOPAEDIC  1990    CYST REMOVED RIGHT WRIST    HX ORTHOPAEDIC  05/12/2021    L2-3 & L5-21 LUMBAR LAMINECTOMY WITH ANDREWS OSTEOTOMY    HX OTHER SURGICAL      cyst removal right wrist    HX UROLOGICAL  2010    bladder surgery(interstim device)    IR INJ FORAMIN EPID LUMB ANES/STER SNGL  8/19/2019    [place] EGD TRANSORAL BIOPSY SINGLE/MULTIPLE  5/20/2013           Prior to Admission medications    Medication Sig Start Date End Date Taking? Authorizing Provider   naloxone [facility]) 4 mg/actuation nasal spray Use 1 spray intranasally, then discard. Repeat with new spray every 2 min as needed for opioid overdose symptoms, alternating nostrils. 6/3/21   MAIKEL Lindsey   amitriptyline (ELAVIL) 50 mg tablet Take 50 mg by mouth nightly. Provider, Historical   atorvastatin (LIPITOR) 40 mg tablet Take 40 mg by mouth nightly. Provider, Historical   metFORMIN (GLUCOPHAGE) 500 mg tablet Take 500 mg by mouth two (2) times daily (with meals). Provider, Historical   butalb/acetaminophen/caffeine (FIORICET PO) Take 1 Tab by mouth as needed. Patient not taking: Reported on 6/2/2021    Provider, Historical   aspirin delayed-release 81 mg tablet Take 81 mg by mouth daily. Provider, Historical   ALPRAZolam (XANAX) 0.5 mg tablet Take 0.5 mg by mouth two (2) times a day. Provider, Historical   levothyroxine (SYNTHROID) 75 mcg tablet Take 112 mcg by mouth Daily (before breakfast). Provider, Historical   PARoxetine (PAXIL) 40 mg tablet Take 40 mg by mouth every morning. Provider, Historical   metoprolol tartrate (LOPRESSOR) 25 mg tablet Take 12.5 mg by mouth nightly. Provider, Historical   metoprolol (LOPRESSOR) 25 mg tablet Take 25 mg by mouth every morning. Provider, Historical   omeprazole (PRILOSEC) 40 mg capsule Take 40 mg by mouth every morning.     Provider, Historical     Current Facility-Administered Medications   Medication Dose Route Frequency    ALPRAZolam (XANAX) tablet 0.5 mg  0.5 mg Oral BID    amitriptyline (ELAVIL) tablet 50 mg  50 mg Oral QHS    aspirin delayed-release tablet 81 mg  81 mg Oral DAILY    atorvastatin (LIPITOR) tablet 40 mg  40 mg Oral QHS    butalbital-acetaminophen-caffeine (FIORICET, ESGIC) -40 mg per tablet 1 Tablet  1 Tablet Oral DAILY PRN    levothyroxine (SYNTHROID) tablet 112 mcg  112 mcg Oral ACB    metoprolol tartrate (LOPRESSOR) tablet 25 mg  25 mg Oral 7am    metoprolol tartrate (LOPRESSOR) tablet 12.5 mg  12.5 mg Oral QHS    pantoprazole (PROTONIX) tablet 40 mg  40 mg Oral ACB    PARoxetine (PAXIL) tablet 40 mg  40 mg Oral 7am    sodium chloride (NS) flush 5-40 mL  5-40 mL IntraVENous Q8H    sodium chloride (NS) flush 5-40 mL  5-40 mL IntraVENous PRN    acetaminophen (TYLENOL) tablet 650 mg  650 mg Oral Q6H PRN    Or    acetaminophen (TYLENOL) suppository 650 mg  650 mg Rectal Q6H PRN    polyethylene glycol (MIRALAX) packet 17 g  17 g Oral DAILY PRN    ondansetron (ZOFRAN ODT) tablet 4 mg  4 mg Oral Q8H PRN    Or    ondansetron (ZOFRAN) injection 4 mg  4 mg IntraVENous Q6H PRN    heparin (porcine) injection 5,000 Units  5,000 Units SubCUTAneous Q8H    insulin lispro (HUMALOG) injection   SubCUTAneous AC&HS    glucose chewable tablet 16 g  4 Tablet Oral PRN    dextrose (D50W) injection syrg 12.5-25 g 12.5-25 g IntraVENous PRN    glucagon (GLUCAGEN) injection 1 mg  1 mg IntraMUSCular PRN    L.acidophilus-paracasei-S.thermophil-bifidobacter (RISAQUAD) 8 billion cell capsule  1 Capsule Oral DAILY    doxycycline (VIBRAMYCIN) 100 mg in 0.9% sodium chloride (MBP/ADV) 100 mL MBP  100 mg IntraVENous Q12H    cefTRIAXone (ROCEPHIN) 1 g in 0.9% sodium chloride (MBP/ADV) 50 mL MBP  1 g IntraVENous Q24H    0.9% sodium chloride infusion  75 mL/hr IntraVENous CONTINUOUS    morphine injection 2 mg  2 mg IntraVENous Q4H PRN     Allergies   Allergen Reactions    Adhesive Tape-Silicones Rash    Aloe Vera Rash    Chlorhexidine Rash    Tussin [Dextromethorphan Hbr] Palpitations        Review of Systems:  A comprehensive review of systems was negative except for: Musculoskeletal: positive for bone pain    Objective:     Patient Vitals for the past 8 hrs:   BP Temp Pulse Resp SpO2   21 0814 108/74 97.7 °F (36.5 °C) 75 17 94 %   21 0709 130/79  72       Temp (24hrs), Av.8 °F (36.6 °C), Min:97.5 °F (36.4 °C), Max:98.5 °F (36.9 °C)        EXAM:  General: alert, cooperative, no distress, appears stated age  Musculoskeletal: negative  Neurological: negative  Skin:Normal.    Data Review   Recent Results (from the past 24 hour(s))   METABOLIC PANEL, COMPREHENSIVE    Collection Time: 21  5:01 AM   Result Value Ref Range    Sodium 132 (L) 136 - 145 mmol/L    Potassium 4.1 3.5 - 5.1 mmol/L    Chloride 99 97 - 108 mmol/L    CO2 26 21 - 32 mmol/L    Anion gap 7 5 - 15 mmol/L    Glucose 263 (H) 65 - 100 mg/dL    BUN 15 6 - 20 MG/DL    Creatinine 0.69 0.55 - 1.02 MG/DL    BUN/Creatinine ratio 22 (H) 12 - 20      GFR est AA >60 >60 ml/min/1.73m2    GFR est non-AA >60 >60 ml/min/1.73m2    Calcium 9.1 8.5 - 10.1 MG/DL    Bilirubin, total 0.7 0.2 - 1.0 MG/DL    ALT (SGPT) 87 (H) 12 - 78 U/L    AST (SGOT) 36 15 - 37 U/L    Alk.  phosphatase 181 (H) 45 - 117 U/L    Protein, total 6.6 6.4 - 8.2 g/dL    Albumin 2.2 (L) 3.5 - 5.0 g/dL    Globulin 4.4 (H) 2.0 - 4.0 g/dL    A-G Ratio 0.5 (L) 1.1 - 2.2     CBC WITH AUTOMATED DIFF    Collection Time: 07/09/21  5:01 AM   Result Value Ref Range    WBC 14.7 (H) 3.6 - 11.0 K/uL    RBC 4.02 3.80 - 5.20 M/uL    HGB 10.9 (L) 11.5 - 16.0 g/dL    HCT 35.0 35.0 - 47.0 %    MCV 87.1 80.0 - 99.0 FL    MCH 27.1 26.0 - 34.0 PG    MCHC 31.1 30.0 - 36.5 g/dL    RDW 13.6 11.5 - 14.5 %    PLATELET 564 (H) 184 - 400 K/uL    MPV 10.3 8.9 - 12.9 FL    NRBC 0.0 0  WBC    ABSOLUTE NRBC 0.00 0.00 - 0.01 K/uL    NEUTROPHILS 75 32 - 75 %    LYMPHOCYTES 12 12 - 49 %    MONOCYTES 10 5 - 13 %    EOSINOPHILS 2 0 - 7 %    BASOPHILS 0 0 - 1 %    IMMATURE GRANULOCYTES 1 (H) 0.0 - 0.5 %    ABS. NEUTROPHILS 11.0 (H) 1.8 - 8.0 K/UL    ABS. LYMPHOCYTES 1.7 0.8 - 3.5 K/UL    ABS. MONOCYTES 1.5 (H) 0.0 - 1.0 K/UL    ABS. EOSINOPHILS 0.2 0.0 - 0.4 K/UL    ABS. BASOPHILS 0.1 0.0 - 0.1 K/UL    ABS. IMM. GRANS. 0.2 (H) 0.00 - 0.04 K/UL    DF AUTOMATED     TSH 3RD GENERATION    Collection Time: 07/09/21  5:01 AM   Result Value Ref Range    TSH 2.40 0.36 - 3.74 uIU/mL   MAGNESIUM    Collection Time: 07/09/21  5:01 AM   Result Value Ref Range    Magnesium 1.9 1.6 - 2.4 mg/dL   PHOSPHORUS    Collection Time: 07/09/21  5:01 AM   Result Value Ref Range    Phosphorus 3.1 2.6 - 4.7 MG/DL   LIPASE    Collection Time: 07/09/21  5:01 AM   Result Value Ref Range    Lipase 40 (L) 73 - 393 U/L   GLUCOSE, POC    Collection Time: 07/09/21  7:23 AM   Result Value Ref Range    Glucose (POC) 259 (H) 65 - 117 mg/dL    Performed by Elizabeth Chris, POC    Collection Time: 07/09/21 12:08 PM   Result Value Ref Range    Glucose (POC) 226 (H) 65 - 117 mg/dL    Performed by Donte Villanueva          Assessment/Plan:     S/p L2-S1 revision fusion. S/p fall with right hip pain. 1. Recommend wound care; ? Wound vac vs dressing changes. MRI does not show any collection    2. Right hip pain. . No acute fracture on CT. Recommend PT evaluation. If pain persist then possible MRI of hip.         Christine Lu MD

## 2021-07-09 NOTE — WOUND CARE
WOCN Note:     New consult for lumbar wounds    Chart shows:  Patient admitted on 7/8/21 with right hip pain post fall at home  Past Medical History:   Diagnosis Date    Adverse effect of anesthesia     O2 DROPS WITH ANESTHESIA    Arthritis     KNEES    Cancer ([facility] [address] 03/13/2015    SQUAMOUS CELL CARCINOMA; tonsils and lymph nodes. Removed 3-2015 with radiation    GERD (gastroesophageal reflux disease)     Hypertension     NO LONGER ON MEDICATION    Hypothyroid     HYPOTHYROIDISM    Migraine     Nausea & vomiting     Obesity     Other ill-defined conditions(799.89)     chronic back pain    Psychiatric disorder     anxiety    Psychiatric disorder     panic ATTACKS    SV[facility]) 05/24/2014    Ulcerative colitis      7/9/21  WBC = 14.7  Hgb = 10.9    MRI to spine on 7/8/21  Posterior fusion L2-S1. No discitis or osteomyelitis. Nonspecific posterior soft  tissue swelling and fluid. Multilevel lumbar spine stenoses. Assessment:   A&O x 4, Appropriately conversational   Reports  pain in right hip. Pre-medicated by RN   Mobility: moderate assistance with repositioning  Continence: Incontinent of urine, Wearing briefs, has a Pure Wick   Last Neal Score: 17  Surface: foam mattress    Bilateral heel, buttocks, and sacral skin intact and without erythema   Heels offloaded with pillows     Wound History: S/p revision laminectomy 6/2/21, non-healing open surgical incision. Was started of NPWT during last hospital admission, [facility] and NPWT continued. pateint was discharged home with home health. Per patient home health nurse was having difficulty maintaining a seal with the NPWT    1.  POA distal lumbar incisional wound   Full thickness   1.8 x 0.6 x 7.8 cm   Tunneling at 9 o'clock at 9 cm and 12 o'clock at 3.5 cm  Unable to visualize wound base   copious amount tan purulent exudate  no odor   Well defined edges  Periwound intact & without erythema   Treatment: Wound irrigated with saline, packed with opticel ag rope, covered with foam border dressing    2. POA mid lumbar incisional wound   Full thickness   2 x 0.3 x 0.2 cm  Base is 100% pink   small amount serous exudate  no odor   Well defined edges  Periwound intact & without erythema   Treatment: Wound cleansed with saline, puracol plus ag applied, covered with foam border dressing    3. POA proximal lumbar incisional wound   Full thickness   1 x 0.3 x 0.3 cm  Base is 100% pink   small amount serous exudate  no odor   Well defined edges  Periwound intact & without erythema  Treatment: Treatment: Wound cleansed with saline, puracol plus ag applied, covered with foam border dressing    Wound Recommendations:      Cleanse 3 wounds to incision line to mid back with normal saline (irrigate distal wound gently with saline)   1) Upper and mid wound, apply piece of Puracol Plus AG to wound beds then moisten with saline, cover with foam border dressing   2) Lower wound pack with Opticel AG rope, cover with foam border dressing   Change dressings daily and prn if becomes soiled      PI Prevention:  Turn/reposition approximately every 2 hours  Offload heels with pillows at all times in bed. Keep HOB 30 degrees or less to decrease shearing and pressure unless medically contraindicated. If HOB is to be over 30 degrees, raise knees first then [facility] to prevent sliding   Minimize layers of linen/pads under patient to optimize support surface to one sheet and one incontinence pad     Discussed with Dr. Aixa Banuelos.  Per MD, will not apply wound vac and no wound culture at this time    Transition of Care: Plan to follow weekly and as needed while admitted to hospital.     Geronimo WHITEN, RN, [facility], [address] [place] [address]  Certified Wound and Ostomy Nurse  office [phone]  Can be reached through [address] [place] [address]  pager [phone] or call  to page

## 2021-07-09 NOTE — PROGRESS NOTES
0101: TRANSFER - IN REPORT:    Verbal report received from [address] (name) on Alyson Mims  being received from Emergency Room (unit) for routine progression of care    Report consisted of patients Situation, Background, Assessment and   Recommendations(SBAR). Information from the following report(s) SBAR, Kardex, Procedure Summary, Intake/Output, MAR, Accordion and Recent Results was reviewed with the receiving nurse. Opportunity for questions and clarification was provided. Assessment completed upon patients arrival to unit and care assumed. 6836: Primary Nurse Aziza Robles RN and Aleks Leavitt RN performed a dual skin assessment on this patient Impairment noted- see wound doc flow sheet  Neal score is 17. Pt has non-healing surgical wound on lower back that has small serosanguineous drainage noted as well as a rash on right hip.    0825: Bedside shift change report given to Cindi Skinner RN (oncoming nurse) by Ritu Gayle RN (offgoing nurse). Report included the following information SBAR, Kardex, Procedure Summary, Intake/Output, MAR, Accordion and Recent Results.

## 2021-07-09 NOTE — H&P
[address]  HISTORY AND PHYSICAL    Name:  Hazel Chavez  MR#:  [id number]  :  1950  ACCOUNT #:  [de-identified]  ADMIT DATE:  2021      The patient was seen, evaluated, and admitted by me on 2021. PRIMARY CARE PHYSICIAN:  Ramo Unger MD    SOURCE OF INFORMATION:  The patient and review of ED and old electronic medical records. CHIEF COMPLAINT:  Right hip pain. HISTORY OF PRESENT ILLNESS:  This is a 27-year-old woman with a past medical history significant for degenerative disk disease, dyslipidemia, hypothyroidism, type 2 diabetes, anxiety disorder, was in her usual state of health until the day of her presentation at the emergency room when the patient developed right hip pain. The pain is constant, sharp pain worse with movement involving the right hip, slight relief at rest, 8/10 in severity. The patient stated that she fell at home prior to the onset of the right hip pain and sustained injury to the right hip. The patient was brought to the emergency room for further evaluation on 2021. The patient was admitted to this hospital and underwent lumbar laminectomy. After 5 days of hospitalization, the patient was discharged by the orthopedic service to rehab facility. The patient has since been discharged from rehab and she is back at home. When the patient arrived at the emergency room, x-ray of the right hip was obtained, which did not show any acute pathology. Because the patient recently had back surgery, MRI of the lumbar spine as well as the thoracic spine were obtained, which did not show any acute pathology and no infection. The patient was referred to the hospitalist service for evaluation for admission because of the intractable right hip pain. PAST MEDICAL HISTORY:  Type 2 diabetes, anxiety disorder, hypothyroidism, dyslipidemia, degenerative disk disease. ALLERGIES:  THE PATIENT IS ALLERGIC TO TAPE.     MEDICATIONS:  Xanax 0.5 mg twice daily, Elavil 50 mg daily at bedtime, aspirin 81 mg daily, Lipitor 40 mg daily, Fioricet 1 tablet as needed for headache, Synthroid 112 mcg daily, Glucophage 500 mg twice daily, Lopressor 25 mg twice daily, Prilosec 40 mg daily, Paxil 40 mg daily in the morning. FAMILY HISTORY:  This was reviewed. Her mother had ovarian cancer. Her father had heart disease and multiple myeloma. PAST SURGICAL HISTORY:  This is significant for multiple back surgeries, cholecystectomy, bilateral knee replacement, hysterectomy. SOCIAL HISTORY:  No history of alcohol or tobacco abuse. REVIEW OF SYSTEMS:  HEAD, EYES, EARS, NOSE AND THROAT:  No headache, no dizziness, no blurring of vision, no photophobia. RESPIRATORY SYSTEM:  No cough, no shortness of breath, no hemoptysis. CARDIOVASCULAR SYSTEM:  No chest pain. No orthopnea. No palpitation. GASTROINTESTINAL SYSTEM:  No nausea or vomiting. No diarrhea. No constipation. GENITOURINARY SYSTEM:  No dysuria, no urgency, and no frequency. All other systems are reviewed and they are negative. PHYSICAL EXAMINATION:  GENERAL APPEARANCE:  The patient appeared ill, in moderate distress. VITAL SIGNS:  On arrival at the emergency room, temperature 97.1, pulse 98, respiratory rate of 26, blood pressure 162/91, oxygen saturation 95% on room air. HEENT:  Head:  Normocephalic. Atraumatic. Eyes:  Normal eye movement. No redness, no drainage, no discharge. Ears:  Normal external ears with no evidence of drainage. Nose:  No deformity and no drainage. Mouth and Throat:  No visible oral lesion. NECK:  Neck is supple. No JVD, no thyromegaly. CHEST:  Clear breath sounds. No wheezing, no crackles. HEART:  Normal S1 and S2, regular. No clinically appreciable murmur. ABDOMEN:  Soft, obese, nontender. Normal bowel sounds. CNS:  Alert, oriented x3. No gross focal neurological deficit. EXTREMITIES:  No edema, pulses 2+ bilaterally.   MUSCULOSKELETAL SYSTEM:  Tenderness right hip with no obvious deformity noted. SKIN:  Clean surgical wound at the lower back with no significant drainage. PSYCHIATRY:  Normal mood and affect. LYMPHATIC SYSTEM:  No cervical lymphadenopathy. DIAGNOSTIC DATA:  X-ray of the lumbar spine, no acute abnormalities. X-ray of the right hip, no acute abnormalities. Chest x-ray shows mild left basilar opacity, may represent atelectasis, edema, or infection. MRI of the thoracic spine, no acute pathology. MRI of the lumbar spine, no osteomyelitis. LABORATORY DATA:  Hematology:  WBC 13.4, hemoglobin at 12.8, hematocrit 39.3, platelets 108. Chemistry:  Sodium 126, potassium 4.0, chloride 92, CO2 27, glucose 443, BUN 17, creatinine 1.02, calcium 9.7, total bilirubin 0.5, , AST 63, alkaline phosphatase 247, total protein at 7.8, albumin level 2.6, globulin at 5.2. ASSESSMENT:  1. Intractable right hip pain. 2.  Fall at home. 3.  Hyponatremia. 4.  Morbid obesity. 5.  Thrombocytosis. 6.  Status post L2-L5 laminectomy. 7.  Dyslipidemia. 8.  Hypothyroidism. 9.  Type 2 diabetes with hyperglycemia. 10.  Abnormal liver function tests. 11.  Anxiety disorder. 12.  Suspected bacterial pneumonia. PLAN:  1. Intractable right hip pain. We will admit the patient for further evaluation and treatment. We will carry out pain control. We will obtain CT scan of the abdomen and pelvis to evaluate the patient for occult fracture. Orthopedic consult will be requested to assist in further evaluation and treatment. We will obtain ultrasound of the lower extremities to evaluate the patient for DVT. 2.  Fall at home. We will obtain CT scan of the head and cervical spine to evaluate the patient for fracture. 3.  Hyponatremia. This is most likely due to volume depletion. We will carry out fluid therapy and repeat the patient's sodium level. 4.  Morbid obesity. Diet and exercise advised. Consider inpatient dietary consult if indicated.   5. Thrombocytosis. This is most likely reactive thrombocytosis. We will continue to monitor the patient's platelet count. 6.  L2-L5 laminectomy. Orthopedic consult will be requested for continuation of care. MRI of the lumbar spine did not show any evidence of infection. 7.  Dyslipidemia. We will continue with Lipitor. 8.  Hypothyroidism. We will continue with Synthroid and check TSH level. 9.  Type 2 diabetes with hyperglycemia. The patient will be placed on sliding scale with insulin coverage. We will check hemoglobin A1c level if one has not been done recently. We will hold oral agents until the time of discharge from the hospital.  10.  Abnormal liver function tests. We will check lipase level. We will await the results of CT scan of the abdomen and pelvis for further evaluation. The patient may require Hepatology consult if the abnormal liver function tests is getting worse. 11. Anxiety disorder. We will resume her preadmission medication. 12.  Suspected bacterial pneumonia. This is based on the chest x-ray result. We will start the patient on Rocephin and doxycycline. We will obtain CTA of the chest for further evaluation and treatment. OTHER ISSUES:  Code Status: The patient is a full code. We will place the patient on heparin for DVT prophylaxis. FUNCTIONAL STATUS PRIOR TO ADMISSION:  The patient came from home. The patient is ambulatory with assistive device. COVID PRECAUTION:  The patient was wearing a facemask. I was wearing a facemask and gloves for this patient's encounter.         MD TRISTON [email or web address]  D:  07/09/2021 6:03  T:  07/09/2021 8:01  JOB #:  [id number]  CC:

## 2021-07-09 NOTE — CONSULTS
Consult order was fulfilled today, 7/9/21 with Dr. Manohar Ty at 6012. Pt had right hip pain with paresthesias to the lateral thigh following a fall over the weekend. She also stated she had weakness in her bilateral legs. CT of chest/abd/pelvis shows no evidence of fx. She is s/p L2-S1 revision fusion by Dr. Manohar Ty on 6/2/21. D/w Dr. June Carlson who will defer to ortho spine, Dr. Manohar Ty.

## 2021-07-09 NOTE — ROUTINE PROCESS
TRANSFER - OUT REPORT:    Verbal report given to JANIS Gillis(name) on Shahla Ruelas  being transferred to [address] [place] [address](unit) for routine progression of care       Report consisted of patients Situation, Background, Assessment and   Recommendations(SBAR). Information from the following report(s) SBAR, ED Summary, Intake/Output, MAR and Recent Results was reviewed with the receiving nurse. Lines:   Peripheral IV 07/08/21 Left Antecubital (Active)   Site Assessment Clean, dry, & intact 07/08/21 1347   Phlebitis Assessment 0 07/08/21 1347   Infiltration Assessment 0 07/08/21 1347   Dressing Status Clean, dry, & intact 07/08/21 1347   Dressing Type Transparent 07/08/21 1347   Hub Color/Line Status Pink;Flushed;Patent 07/08/21 1347   Action Taken Blood drawn 07/08/21 1347        Opportunity for questions and clarification was provided.       Patient transported with:   transport, 1 L NC

## 2021-07-10 LAB
ALBUMIN SERPL-MCNC: 2 G/DL (ref 3.5–5)
ALBUMIN/GLOB SERPL: 0.5 {RATIO} (ref 1.1–2.2)
ALP SERPL-CCNC: 169 U/L (ref 45–117)
ALT SERPL-CCNC: 79 U/L (ref 12–78)
ANION GAP SERPL CALC-SCNC: 6 MMOL/L (ref 5–15)
AST SERPL-CCNC: 48 U/L (ref 15–37)
BASOPHILS # BLD: 0 K/UL (ref 0–0.1)
BASOPHILS NFR BLD: 0 % (ref 0–1)
BILIRUB DIRECT SERPL-MCNC: 0.2 MG/DL (ref 0–0.2)
BILIRUB SERPL-MCNC: 0.5 MG/DL (ref 0.2–1)
BNP SERPL-MCNC: 514 PG/ML
BUN SERPL-MCNC: 9 MG/DL (ref 6–20)
BUN/CREAT SERPL: 15 (ref 12–20)
CALCIUM SERPL-MCNC: 8.8 MG/DL (ref 8.5–10.1)
CHLORIDE SERPL-SCNC: 103 MMOL/L (ref 97–108)
CO2 SERPL-SCNC: 27 MMOL/L (ref 21–32)
COMMENT, HOLDF: NORMAL
CREAT SERPL-MCNC: 0.61 MG/DL (ref 0.55–1.02)
DIFFERENTIAL METHOD BLD: ABNORMAL
EOSINOPHIL # BLD: 0.5 K/UL (ref 0–0.4)
EOSINOPHIL NFR BLD: 4 % (ref 0–7)
ERYTHROCYTE [DISTWIDTH] IN BLOOD BY AUTOMATED COUNT: 13.7 % (ref 11.5–14.5)
EST. AVERAGE GLUCOSE BLD GHB EST-MCNC: 171 MG/DL
GLOBULIN SER CALC-MCNC: 4.2 G/DL (ref 2–4)
GLUCOSE BLD STRIP.AUTO-MCNC: 162 MG/DL (ref 65–117)
GLUCOSE BLD STRIP.AUTO-MCNC: 162 MG/DL (ref 65–117)
GLUCOSE BLD STRIP.AUTO-MCNC: 215 MG/DL (ref 65–117)
GLUCOSE BLD STRIP.AUTO-MCNC: 256 MG/DL (ref 65–117)
GLUCOSE SERPL-MCNC: 168 MG/DL (ref 65–100)
HBA1C MFR BLD: 7.6 % (ref 4–5.6)
HCT VFR BLD AUTO: 32.2 % (ref 35–47)
HGB BLD-MCNC: 10.1 G/DL (ref 11.5–16)
IMM GRANULOCYTES # BLD AUTO: 0.1 K/UL (ref 0–0.04)
IMM GRANULOCYTES NFR BLD AUTO: 1 % (ref 0–0.5)
LYMPHOCYTES # BLD: 2.3 K/UL (ref 0.8–3.5)
LYMPHOCYTES NFR BLD: 19 % (ref 12–49)
MCH RBC QN AUTO: 27 PG (ref 26–34)
MCHC RBC AUTO-ENTMCNC: 31.4 G/DL (ref 30–36.5)
MCV RBC AUTO: 86.1 FL (ref 80–99)
MONOCYTES # BLD: 1.2 K/UL (ref 0–1)
MONOCYTES NFR BLD: 10 % (ref 5–13)
NEUTS SEG # BLD: 8.2 K/UL (ref 1.8–8)
NEUTS SEG NFR BLD: 66 % (ref 32–75)
NRBC # BLD: 0 K/UL (ref 0–0.01)
NRBC BLD-RTO: 0 PER 100 WBC
PLATELET # BLD AUTO: 426 K/UL (ref 150–400)
PMV BLD AUTO: 10.1 FL (ref 8.9–12.9)
POTASSIUM SERPL-SCNC: 3.6 MMOL/L (ref 3.5–5.1)
PROT SERPL-MCNC: 6.2 G/DL (ref 6.4–8.2)
RBC # BLD AUTO: 3.74 M/UL (ref 3.8–5.2)
SAMPLES BEING HELD,HOLD: NORMAL
SERVICE CMNT-IMP: ABNORMAL
SODIUM SERPL-SCNC: 136 MMOL/L (ref 136–145)
WBC # BLD AUTO: 12.2 K/UL (ref 3.6–11)

## 2021-07-10 PROCEDURE — 2709999900 HC NON-CHARGEABLE SUPPLY

## 2021-07-10 PROCEDURE — 74011636637 HC RX REV CODE- 636/637: Performed by: INTERNAL MEDICINE

## 2021-07-10 PROCEDURE — 97162 PT EVAL MOD COMPLEX 30 MIN: CPT

## 2021-07-10 PROCEDURE — 82962 GLUCOSE BLOOD TEST: CPT

## 2021-07-10 PROCEDURE — 85025 COMPLETE CBC W/AUTO DIFF WBC: CPT

## 2021-07-10 PROCEDURE — 65660000000 HC RM CCU STEPDOWN

## 2021-07-10 PROCEDURE — 74011250637 HC RX REV CODE- 250/637: Performed by: INTERNAL MEDICINE

## 2021-07-10 PROCEDURE — 74011250636 HC RX REV CODE- 250/636: Performed by: INTERNAL MEDICINE

## 2021-07-10 PROCEDURE — 83036 HEMOGLOBIN GLYCOSYLATED A1C: CPT

## 2021-07-10 PROCEDURE — 74011000258 HC RX REV CODE- 258: Performed by: INTERNAL MEDICINE

## 2021-07-10 PROCEDURE — 97530 THERAPEUTIC ACTIVITIES: CPT

## 2021-07-10 PROCEDURE — 77030041076 HC DRSG AG OPTICELL MDII -A

## 2021-07-10 PROCEDURE — 80048 BASIC METABOLIC PNL TOTAL CA: CPT

## 2021-07-10 PROCEDURE — 80076 HEPATIC FUNCTION PANEL: CPT

## 2021-07-10 PROCEDURE — 97161 PT EVAL LOW COMPLEX 20 MIN: CPT

## 2021-07-10 PROCEDURE — 36415 COLL VENOUS BLD VENIPUNCTURE: CPT

## 2021-07-10 PROCEDURE — 83880 ASSAY OF NATRIURETIC PEPTIDE: CPT

## 2021-07-10 RX ORDER — INSULIN GLARGINE 100 [IU]/ML
3 INJECTION, SOLUTION SUBCUTANEOUS DAILY
Status: DISCONTINUED | OUTPATIENT
Start: 2021-07-10 | End: 2021-07-20 | Stop reason: HOSPADM

## 2021-07-10 RX ADMIN — CEFTRIAXONE SODIUM 1 G: 1 INJECTION, POWDER, FOR SOLUTION INTRAMUSCULAR; INTRAVENOUS at 06:42

## 2021-07-10 RX ADMIN — SODIUM CHLORIDE 75 ML/HR: 9 INJECTION, SOLUTION INTRAVENOUS at 06:47

## 2021-07-10 RX ADMIN — INSULIN LISPRO 3 UNITS: 100 INJECTION, SOLUTION INTRAVENOUS; SUBCUTANEOUS at 06:40

## 2021-07-10 RX ADMIN — DOXYCYCLINE 100 MG: 100 INJECTION, POWDER, LYOPHILIZED, FOR SOLUTION INTRAVENOUS at 11:20

## 2021-07-10 RX ADMIN — Medication 10 ML: at 06:43

## 2021-07-10 RX ADMIN — ATORVASTATIN CALCIUM 40 MG: 40 TABLET, FILM COATED ORAL at 22:00

## 2021-07-10 RX ADMIN — MORPHINE SULFATE 2 MG: 2 INJECTION, SOLUTION INTRAMUSCULAR; INTRAVENOUS at 14:00

## 2021-07-10 RX ADMIN — AMITRIPTYLINE HYDROCHLORIDE 50 MG: 50 TABLET, FILM COATED ORAL at 21:59

## 2021-07-10 RX ADMIN — METOPROLOL TARTRATE 25 MG: 25 TABLET, FILM COATED ORAL at 06:41

## 2021-07-10 RX ADMIN — Medication 10 ML: at 22:00

## 2021-07-10 RX ADMIN — HEPARIN SODIUM 5000 UNITS: 5000 INJECTION INTRAVENOUS; SUBCUTANEOUS at 22:03

## 2021-07-10 RX ADMIN — ALPRAZOLAM 0.5 MG: 0.25 TABLET ORAL at 07:41

## 2021-07-10 RX ADMIN — INSULIN GLARGINE 3 UNITS: 100 INJECTION, SOLUTION SUBCUTANEOUS at 12:05

## 2021-07-10 RX ADMIN — MORPHINE SULFATE 2 MG: 2 INJECTION, SOLUTION INTRAMUSCULAR; INTRAVENOUS at 01:55

## 2021-07-10 RX ADMIN — INSULIN LISPRO 2 UNITS: 100 INJECTION, SOLUTION INTRAVENOUS; SUBCUTANEOUS at 16:30

## 2021-07-10 RX ADMIN — HEPARIN SODIUM 5000 UNITS: 5000 INJECTION INTRAVENOUS; SUBCUTANEOUS at 06:41

## 2021-07-10 RX ADMIN — ACETAMINOPHEN 650 MG: 325 TABLET ORAL at 12:16

## 2021-07-10 RX ADMIN — DOXYCYCLINE 100 MG: 100 INJECTION, POWDER, LYOPHILIZED, FOR SOLUTION INTRAVENOUS at 22:01

## 2021-07-10 RX ADMIN — HEPARIN SODIUM 5000 UNITS: 5000 INJECTION INTRAVENOUS; SUBCUTANEOUS at 14:00

## 2021-07-10 RX ADMIN — MORPHINE SULFATE 2 MG: 2 INJECTION, SOLUTION INTRAMUSCULAR; INTRAVENOUS at 20:18

## 2021-07-10 RX ADMIN — ALPRAZOLAM 0.5 MG: 0.25 TABLET ORAL at 20:39

## 2021-07-10 RX ADMIN — Medication 1 CAPSULE: at 11:19

## 2021-07-10 RX ADMIN — Medication 10 ML: at 14:07

## 2021-07-10 RX ADMIN — PAROXETINE 40 MG: 20 TABLET, FILM COATED ORAL at 06:41

## 2021-07-10 RX ADMIN — INSULIN LISPRO 5 UNITS: 100 INJECTION, SOLUTION INTRAVENOUS; SUBCUTANEOUS at 12:06

## 2021-07-10 RX ADMIN — METOPROLOL TARTRATE 12.5 MG: 25 TABLET, FILM COATED ORAL at 21:59

## 2021-07-10 RX ADMIN — PANTOPRAZOLE SODIUM 40 MG: 40 TABLET, DELAYED RELEASE ORAL at 06:41

## 2021-07-10 RX ADMIN — ASPIRIN 81 MG: 81 TABLET, COATED ORAL at 11:19

## 2021-07-10 RX ADMIN — LEVOTHYROXINE SODIUM 112 MCG: 0.11 TABLET ORAL at 06:41

## 2021-07-10 NOTE — PROGRESS NOTES
Transition of Care Plan   RUR- Low     DISPOSITION: Patient indicated that she dc from [address] [place] [address] and then home with [address] [place] [address]   F/U with PCP/Specialist     Transport: [facility] likely          Reason for Admission:  Hip pain                     RUR Score:          10%           Plan for utilizing home health:      Currently working with [address] [place] [address] after leaving [address]    PCP: First and Last name:  Ramos Thao MD     Name of Practice: unsure   Are you a current patient: Yes/No:  yes   Approximate date of last visit: unsure   Can you participate in a virtual visit with your PCP: unsure                    Current Advanced Directive/Advance Care Plan: Full Code      Healthcare Decision Maker:   Click here to complete [address] including selection of the Healthcare Decision Maker Relationship (ie \"Primary\") No ACP noted and patient stated she will consider this discussion at another time            Declined re-admission assessment. Patient stated that she fell at home near the bathroom getting to the toilet. Transition of Care Plan: This CM met with patient and explained CM role in support of dc planning. Patient was alert and oriented during this initial assessment. Patient explained that her  is her emergency contact Gianluca Hobson [phone], but she did mention that he also has medical concerns    Patient explained that she left [facility] 6/23/2021 and went home with [address] [place] [address]. Patient lives at home with her  in a one story house. There is a ramp to enter the house. Patient is primary caregiver for .   Patient has the following DME:Mobile cart, scooter, walker, rollater, BS commode, Shower chair.     Patient gets prescriptions filled at [facility] verified: Romy Jason Medicare    Patient would need Authorization is needing to return to rehab versus SNF, CM will need to follow any MD and or therapy assessments    Care Management Interventions  PCP Verified by CM:  Yes  Palliative Care Criteria Met (RRAT>21 & CHF Dx)?: No  Mode of Transport at Discharge: [facility]  Transition of Care Consult (CM Consult):  (Came from [facility])  Physical Therapy Consult: Yes  Current Support Network: Lives with Spouse, Own Home  Discharge Location  Discharge Placement:  (TBD/ CM will need to follow)  Trena Lehman  2:11 PM

## 2021-07-10 NOTE — PROGRESS NOTES
Problem: Mobility Impaired (Adult and Pediatric)  Goal: *Acute Goals and Plan of Care (Insert Text)  Outcome: Not Met  Note: FUNCTIONAL STATUS PRIOR TO ADMISSION: Patient was modified independent using a walker for functional mobility. HOME SUPPORT PRIOR TO ADMISSION: The patient lived with  who she is the primary caregiver for. Physical Therapy Goals  Initiated 7/10/2021  1. Patient will move from supine to sit and sit to supine  in bed with modified independence within 7 day(s). 2.  Patient will transfer from bed to chair and chair to bed with modified independence using the least restrictive device within 7 day(s). 3.  Patient will perform sit to stand with modified independence within 7 day(s). 4.  Patient will ambulate with modified independence for 300 feet with the least restrictive device within 7 day(s). PHYSICAL THERAPY EVALUATION  Patient: Matthew Cisneros (38 y.o. female)  Date: 7/10/2021  Primary Diagnosis: Right hip pain [M25.551]        Precautions: fall         ASSESSMENT  Based on the objective data described below, the patient presents with decreased mobility, increased pain, increased fear, and decreased functional mobility. Pt was I with all ADLs and ambulating without AD prior to previous hospital stay. Upon d/c from hospital pt [facility] [address] [place] [address] [facility]. Pt then suffered a fall at home while on the toilet. Pt generally mobilized at a MAX A level during session. Pt displayed extreme fear avoidance during session 2/2 belief of R hip pain. With max VC and coaching, pt was able to roll L and go supine>sit with MAX A. Pt was able to scoot to head of bed with SPV and max VC. Pt sat EOB for approx 5 mins but could not be encouraged to attempt sit>stand at this time. Pt then returned to supine with Mod A for LE advancement and positioned for comfort at end of session.  Pt is primary caregiver for  who has mobility issues and has no other home support at this time. Current Level of Function Impacting Discharge (mobility/balance): Max A for bed mobility, will not attempt standing/gait    Functional Outcome Measure: The patient scored Total: 25/100 on the Barthel Index which is indicative of 75% impaired ability to care for basic self needs/dependency on others. Other factors to consider for discharge: poor home support,      Patient will benefit from skilled therapy intervention to address the above noted impairments. PLAN :  Recommendations and Planned Interventions: bed mobility training, transfer training, gait training, therapeutic exercises, neuromuscular re-education, patient and family training/education, and therapeutic activities      Frequency/Duration: Patient will be followed by physical therapy:  5 times a week to address goals. Recommendation for discharge: (in order for the patient to meet his/her long term goals)  To be determined: IP vs SNF vs HHPT    This discharge recommendation:  Has not yet been discussed the attending provider and/or case management    IF patient discharges home will need the following DME: patient owns DME required for discharge         SUBJECTIVE:   Patient stated i'm in too much pain.     OBJECTIVE DATA SUMMARY:   HISTORY:    Past Medical History:   Diagnosis Date    Adverse effect of anesthesia     O2 DROPS WITH ANESTHESIA    Arthritis     KNEES    [facility]    SQUAMOUS CELL CARCINOMA; tonsils and lymph nodes.   Removed 3-2015 with radiation    GERD (gastroesophageal reflux disease)     Hypertension     NO LONGER ON MEDICATION    Hypothyroid     HYPOTHYROIDISM    Migraine     Nausea & vomiting     Obesity     Other ill-defined conditions(799.89)     chronic back pain    Psychiatric disorder     anxiety    Psychiatric disorder     panic ATTACKS    SV[facility]supravent[facility] tachycardia) [facility] 05/24/2014    Ulcerative colitis      Past Surgical History:   Procedure Laterality Date    COLONOSCOPY,DIAGNOSTIC  11/19/2015         HX GI      colonscopy    HX HEENT  03/2015    tonsillectomy (CANCER) AND NECK LYMPH NODES    HX HEENT  2008    PARATHYROID REMOVAL    HX HEENT Left 2002    EYE LASER    HX HEMORRHOIDECTOMY  2001, 2016     X2    HX HYSTERECTOMY  1985    HX KNEE ARTHROSCOPY  1995    left knee surgery, TOTAL LT  and Right KNEE 2013    HX KNEE REPLACEMENT Bilateral 2013, 2014    HX LAP CHOLECYSTECTOMY  2002    HX LUMBAR FUSION  2011    SPINAL FUSION with hardware L4-L5    HX ORTHOPAEDIC  1990    CYST REMOVED RIGHT WRIST    HX ORTHOPAEDIC  05/12/2021    L2-3 & L5-21 LUMBAR LAMINECTOMY WITH ANDREWS OSTEOTOMY    HX OTHER SURGICAL      cyst removal right wrist    HX UROLOGICAL  2010    bladder surgery(interstim device)    IR INJ FORAMIN EPID LUMB ANES/STER SNGL  8/19/2019    NE EGD TRANSORAL BIOPSY SINGLE/MULTIPLE  5/20/2013            Personal factors and/or comorbidities impacting plan of care: main caregiver for     Home Situation  Home Environment: Private residence  # Steps to Enter: 0  Wheelchair Ramp: Yes  One/Two Story Residence: One story  Living Alone: No (main care giver for )  Support Systems: Spouse/Significant Other/Partner  Patient Expects to be Discharged to[de-identified] Unknown  Current DME Used/Available at Home: Transfer bench, Shower chair, Gaila Homme, rollator, Wheelchair, Raised toilet seat  Tub or Shower Type: Tub    EXAMINATION/PRESENTATION/DECISION MAKING:   Critical Behavior:  Neurologic State: Alert  Orientation Level: Oriented X4  Cognition: Appropriate decision making     Hearing: Auditory  Auditory Impairment: None    Range Of Motion:    WFL except R LE. Unable to assess 2/2 fear of pain. Strength:    WFL except R LE. Unable to assess 2/2 fear of pain    Functional Mobility:  Bed Mobility:  Rolling: Maximum assistance  Supine to Sit: Maximum assistance  Sit to Supine:  Moderate assistance (for LE advancement)  Scooting: Supervision    Balance: Sitting: Intact; Without support    Functional Measure:  Barthel Index:    Bathin  Bladder: 0  Bowels: 5  Groomin  Dressin  Feeding: 10  Mobility: 0  Stairs: 0  Toilet Use: 0  Transfer (Bed to Chair and Back): 5  Total: 25/100       The Barthel ADL Index: Guidelines  1. The index should be used as a record of what a patient does, not as a record of what a patient could do. 2. The main aim is to establish degree of independence from any help, physical or verbal, however minor and for whatever reason. 3. The need for supervision renders the patient not independent. 4. A patient's performance should be established using the best available evidence. Asking the patient, friends/relatives and nurses are the usual sources, but direct observation and common sense are also important. However direct testing is not needed. 5. Usually the patient's performance over the preceding 24-48 hours is important, but occasionally longer periods will be relevant. 6. Middle categories imply that the patient supplies over 50 per cent of the effort. 7. Use of aids to be independent is allowed. Demetrice Fraire, Barthel, D.W. (1833). Functional evaluation: the Barthel Index. [facility] (14)2. MU Garcia, Tian Kelly., Beatriz Pappas, [place], [facility] (). Measuring the change indisability after inpatient rehabilitation; comparison of the responsiveness of the Barthel Index and Functional [place] Measure. Journal of Neurology, Neurosurgery, and Psychiatry, 66(4), 414-550. Nallely Vaughan N.J.A, KATIE Hunter, & Dione Coe M.A. (2004.) Assessment of post-stroke quality of life in cost-effectiveness studies: The usefulness of the Barthel Index and the EuroQoL-5D.  Quality of Life Research, 15, 99-79        Physical Therapy Evaluation Charge Determination   History Examination Presentation Decision-Making   HIGH Complexity :3+ comorbidities / personal factors will impact the outcome/ POC MEDIUM Complexity : 3 Standardized tests and measures addressing body structure, function, activity limitation and / or participation in recreation  MEDIUM Complexity : Evolving with changing characteristics  Other outcome measures Barthel  HIGH       Based on the above components, the patient evaluation is determined to be of the following complexity level: MEDIUM    Pain Rating:  Pt stated she was in extreme pain during session. Pt positioned for comfort and given ice pack for R hip at end of session    Activity Tolerance:   Fair and requires frequent rest breaks    After treatment patient left in no apparent distress:   Supine in bed, Call bell within reach, and Side rails x 3    COMMUNICATION/EDUCATION:   The patients plan of care was discussed with: Registered nurse. Fall prevention education was provided and the patient/caregiver indicated understanding., Patient/family have participated as able in goal setting and plan of care. , and Patient/family agree to work toward stated goals and plan of care.     Thank you for this referral.  Treasure Vera, PT   Time Calculation: 32 mins

## 2021-07-10 NOTE — PROGRESS NOTES
[facility] Adult  Hospitalist Group                                                                                          Hospitalist Progress Note  Indy Vance MD    Contact hospitalist group for  and on due to provider change        Date of Service:  7/10/2021  NAME:  Pavithra Peck  :  1950  MRN:  [id number]      Admission Summary:   \"HISTORY OF PRESENT ILLNESS:  This is a 17-year-old woman with a past medical history significant for degenerative disk disease, dyslipidemia, hypothyroidism, type 2 diabetes, anxiety disorder, was in her usual state of health until the day of her presentation at the emergency room when the patient developed right hip pain. The pain is constant, sharp pain worse with movement involving the right hip, slight relief at rest, 8/10 in severity. The patient stated that she fell at home prior to the onset of the right hip pain and sustained injury to the right hip. The patient was brought to the emergency room for further evaluation on 2021. The patient was admitted to this hospital and underwent lumbar laminectomy. After 5 days of hospitalization, the patient was discharged by the orthopedic service to rehab facility. The patient has since been discharged from rehab and she is back at home. When the patient arrived at the emergency room, x-ray of the right hip was obtained, which did not show any acute pathology. Because the patient recently had back surgery, MRI of the lumbar spine as well as the thoracic spine were obtained, which did not show any acute pathology and no infection. The patient was referred to the hospitalist service for evaluation for admission because of the intractable right hip pain. \"    Interval history / Subjective:   No complaints     Assessment & Plan:     Intractable right hip pain in setting of fall POA  Improved pain control  L2-L5 laminectomy. Ortho note:  \"S/p L2-S1 revision fusion.   S/p fall with right hip pain.   1. Recommend wound care; ? Wound vac vs dressing changes. MRI does not show any collection   2. Right hip pain. . No acute fracture on CT. Recommend PT evaluation. If pain persist then possible MRI of hip. \"  -Physical therapy consult was ordered    Suspected bacterial pneumonia POA. (This is based on the chest x-ray result as per admitting hospitalist)  Afebrile, and leukcytosis improving, Resume dual IV abx for now. Thrombocytosis, thought to be reactive  Trend, already on aspirin, also on heparin dvt prophylaxis dose  7/10 plt [id number]    Hyponatremia POA  Resolved with IVF hydration  Continue to monitor, resume IVF for now    Morbid obesity based on BMI (refer to epic)    Dyslipidemia  Resume statin, no myalgias reported    Hypothyroidism  On replacement    Type 2 diabetes with hyperglycemia POA  On sliding scale  -7/10 started low dose glargine due to persistently elevated glucose readings, resume ssi    Abnormal liver function tests POA   Asymptomatic, trend    Anxiety disorder  Calm at bedside  Resume home meds    Code status: Full  DVT prophylaxis: heparin    Care Plan discussed with:   Anticipated Disposition: tbd  Anticipated Discharge: tbd     Hospital Problems  Date Reviewed: 7/9/2021        Codes Class Noted POA    * (Principal) Right hip pain ICD-10-CM: M25.551  ICD-9-CM: 719.45  7/8/2021 Yes                Review of Systems:   Negative other than noted above       Vital Signs:    Last 24hrs VS reviewed since prior progress note.  Most recent are:  Visit Vitals  /61   Pulse 81   Temp 98.1 °F (36.7 °C)   Resp 16   Ht 5' 4\" (1.626 m)   Wt 121.7 kg (268 lb 6.4 oz)   SpO2 93%   BMI 46.07 kg/m²         Intake/Output Summary (Last 24 hours) at 7/10/2021 5901  Last data filed at 7/10/2021 0425  Gross per 24 hour   Intake    Output 700 ml   Net -700 ml        Physical Examination:     I had a face to face encounter with this patient and independently examined them on 7/10/2021 as outlined below:          Constitutional:  No acute distress, cooperative, pleasant    ENT:  Oral mucosa moist, anicteric    Resp:  limited exam, breath sounds heard bilaterally, no obvious wheezing/rhonchi/rales. No accessory muscle use   CV:  Regular rhythm, normal rate, no rubs    GI:  Soft, non distended, non tender. normoactive bowel sounds    Musculoskeletal:  No edema, warm, 2+ pulses throughout    Neurologic:  Moves all extremities.   AAOx3   Psych: calm, not agitated, not anxious, does not voice si/hi            Data Review:         Labs:     Recent Labs     07/10/21  0222 07/09/21  0501   WBC 12.2* 14.7*   HGB 10.1* 10.9*   HCT 32.2* 35.0   * 459*     Recent Labs     07/10/21  0222 07/09/21  0501 07/08/21  1334    132* 126*   K 3.6 4.1 4.0    99 92*   CO2 27 26 27   BUN 9 15 17   CREA 0.61 0.69 1.02   * 263* 443*   CA 8.8 9.1 9.7   MG  --  1.9  --    PHOS  --  3.1  --      Recent Labs     07/10/21  0222 07/09/21  0501 07/08/21  1334   ALT 79* 87* 125*   * 181* 247*   TBILI 0.5 0.7 0.5   TP 6.2* 6.6 7.8   ALB 2.0* 2.2* 2.6*   GLOB 4.2* 4.4* 5.2*   LPSE  --  40*  --        Lab Results   Component Value Date/Time    Glucose (POC) 215 (H) 07/10/2021 06:34 AM    Glucose (POC) 210 (H) 07/09/2021 09:41 PM    Glucose (POC) 279 (H) 07/09/2021 04:24 PM    Glucose (POC) 226 (H) 07/09/2021 12:08 PM    Glucose (POC) 259 (H) 07/09/2021 07:23 AM     Lab Results   Component Value Date/Time    Color YELLOW/STRAW 05/04/2021 12:26 PM    Appearance CLOUDY (A) 05/04/2021 12:26 PM    Specific gravity 1.025 05/04/2021 12:26 PM    Specific gravity 1.010 04/06/2011 10:20 AM    pH (UA) 5.5 05/04/2021 12:26 PM    Protein Negative 05/04/2021 12:26 PM    Glucose Negative 05/04/2021 12:26 PM    Ketone TRACE (A) 05/04/2021 12:26 PM    Bilirubin Negative 05/04/2021 12:26 PM    Urobilinogen 1.0 05/04/2021 12:26 PM    Nitrites Negative 05/04/2021 12:26 PM    Leukocyte Esterase TRACE (A) 05/04/2021 12:26 PM Epithelial cells FEW 05/04/2021 12:26 PM    Bacteria 3+ (A) 05/04/2021 12:26 PM    WBC 5-10 05/04/2021 12:26 PM    RBC 0-5 05/04/2021 12:26 PM         Medications Reviewed:     Current Facility-Administered Medications   Medication Dose Route Frequency    ALPRAZolam (XANAX) tablet 0.5 mg  0.5 mg Oral BID    amitriptyline (ELAVIL) tablet 50 mg  50 mg Oral QHS    aspirin delayed-release tablet 81 mg  81 mg Oral DAILY    atorvastatin (LIPITOR) tablet 40 mg  40 mg Oral QHS    butalbital-acetaminophen-caffeine (FIORICET, ESGIC) -40 mg per tablet 1 Tablet  1 Tablet Oral DAILY PRN    levothyroxine (SYNTHROID) tablet 112 mcg  112 mcg Oral ACB    metoprolol tartrate (LOPRESSOR) tablet 25 mg  25 mg Oral 7am    metoprolol tartrate (LOPRESSOR) tablet 12.5 mg  12.5 mg Oral QHS    pantoprazole (PROTONIX) tablet 40 mg  40 mg Oral ACB    PARoxetine (PAXIL) tablet 40 mg  40 mg Oral 7am    sodium chloride (NS) flush 5-40 mL  5-40 mL IntraVENous Q8H    sodium chloride (NS) flush 5-40 mL  5-40 mL IntraVENous PRN    acetaminophen (TYLENOL) tablet 650 mg  650 mg Oral Q6H PRN    Or    acetaminophen (TYLENOL) suppository 650 mg  650 mg Rectal Q6H PRN    polyethylene glycol (MIRALAX) packet 17 g  17 g Oral DAILY PRN    ondansetron (ZOFRAN ODT) tablet 4 mg  4 mg Oral Q8H PRN    Or    ondansetron (ZOFRAN) injection 4 mg  4 mg IntraVENous Q6H PRN    heparin (porcine) injection 5,000 Units  5,000 Units SubCUTAneous Q8H    insulin lispro (HUMALOG) injection   SubCUTAneous AC&HS    glucose chewable tablet 16 g  4 Tablet Oral PRN    dextrose (D50W) injection syrg 12.5-25 g  12.5-25 g IntraVENous PRN    glucagon (GLUCAGEN) injection 1 mg  1 mg IntraMUSCular PRN    L.acidophilus-paracasei-S.thermophil-bifidobacter (RISAQUAD) 8 billion cell capsule  1 Capsule Oral DAILY    doxycycline (VIBRAMYCIN) 100 mg in 0.9% sodium chloride (MBP/ADV) 100 mL MBP  100 mg IntraVENous Q12H    cefTRIAXone (ROCEPHIN) 1 g in 0.9% sodium chloride (MBP/ADV) 50 mL MBP  1 g IntraVENous Q24H    0.9% sodium chloride infusion  75 mL/hr IntraVENous CONTINUOUS    morphine injection 2 mg  2 mg IntraVENous Q4H PRN     ______________________________________________________________________  EXPECTED LENGTH OF STAY: 3d 19h  ACTUAL LENGTH OF STAY:          2                 Ann-Marie Barrios MD

## 2021-07-10 NOTE — PROGRESS NOTES
Bedside and Verbal shift change report given to Shelley Campbell RN (oncoming nurse) by Leonor Mak RN (offgoing nurse). Report included the following information SBAR, Kardex, Intake/Output, MAR and Recent Results.

## 2021-07-11 ENCOUNTER — APPOINTMENT (OUTPATIENT)
Dept: MRI IMAGING | Age: 71
DRG: 537 | End: 2021-07-11
Attending: HOSPITALIST
Payer: MEDICARE

## 2021-07-11 LAB
GLUCOSE BLD STRIP.AUTO-MCNC: 134 MG/DL (ref 65–117)
GLUCOSE BLD STRIP.AUTO-MCNC: 140 MG/DL (ref 65–117)
GLUCOSE BLD STRIP.AUTO-MCNC: 181 MG/DL (ref 65–117)
GLUCOSE BLD STRIP.AUTO-MCNC: 186 MG/DL (ref 65–117)
SERVICE CMNT-IMP: ABNORMAL

## 2021-07-11 PROCEDURE — 74011636637 HC RX REV CODE- 636/637: Performed by: INTERNAL MEDICINE

## 2021-07-11 PROCEDURE — 74011250636 HC RX REV CODE- 250/636: Performed by: INTERNAL MEDICINE

## 2021-07-11 PROCEDURE — 74011000258 HC RX REV CODE- 258: Performed by: INTERNAL MEDICINE

## 2021-07-11 PROCEDURE — 73721 MRI JNT OF LWR EXTRE W/O DYE: CPT

## 2021-07-11 PROCEDURE — 74011250637 HC RX REV CODE- 250/637: Performed by: INTERNAL MEDICINE

## 2021-07-11 PROCEDURE — 65660000000 HC RM CCU STEPDOWN

## 2021-07-11 PROCEDURE — 82962 GLUCOSE BLOOD TEST: CPT

## 2021-07-11 RX ADMIN — HEPARIN SODIUM 5000 UNITS: 5000 INJECTION INTRAVENOUS; SUBCUTANEOUS at 22:00

## 2021-07-11 RX ADMIN — METOPROLOL TARTRATE 12.5 MG: 25 TABLET, FILM COATED ORAL at 22:00

## 2021-07-11 RX ADMIN — LEVOTHYROXINE SODIUM 112 MCG: 0.11 TABLET ORAL at 06:39

## 2021-07-11 RX ADMIN — ALPRAZOLAM 0.5 MG: 0.25 TABLET ORAL at 21:41

## 2021-07-11 RX ADMIN — Medication 10 ML: at 16:45

## 2021-07-11 RX ADMIN — HEPARIN SODIUM 5000 UNITS: 5000 INJECTION INTRAVENOUS; SUBCUTANEOUS at 06:38

## 2021-07-11 RX ADMIN — MORPHINE SULFATE 2 MG: 2 INJECTION, SOLUTION INTRAMUSCULAR; INTRAVENOUS at 16:39

## 2021-07-11 RX ADMIN — CEFTRIAXONE SODIUM 1 G: 1 INJECTION, POWDER, FOR SOLUTION INTRAMUSCULAR; INTRAVENOUS at 06:35

## 2021-07-11 RX ADMIN — DOXYCYCLINE 100 MG: 100 INJECTION, POWDER, LYOPHILIZED, FOR SOLUTION INTRAVENOUS at 21:44

## 2021-07-11 RX ADMIN — ALPRAZOLAM 0.5 MG: 0.25 TABLET ORAL at 07:00

## 2021-07-11 RX ADMIN — HEPARIN SODIUM 5000 UNITS: 5000 INJECTION INTRAVENOUS; SUBCUTANEOUS at 16:13

## 2021-07-11 RX ADMIN — INSULIN LISPRO 2 UNITS: 100 INJECTION, SOLUTION INTRAVENOUS; SUBCUTANEOUS at 16:37

## 2021-07-11 RX ADMIN — MORPHINE SULFATE 2 MG: 2 INJECTION, SOLUTION INTRAMUSCULAR; INTRAVENOUS at 21:51

## 2021-07-11 RX ADMIN — AMITRIPTYLINE HYDROCHLORIDE 50 MG: 50 TABLET, FILM COATED ORAL at 21:41

## 2021-07-11 RX ADMIN — INSULIN GLARGINE 3 UNITS: 100 INJECTION, SOLUTION SUBCUTANEOUS at 10:03

## 2021-07-11 RX ADMIN — Medication 10 ML: at 16:13

## 2021-07-11 RX ADMIN — PAROXETINE 40 MG: 20 TABLET, FILM COATED ORAL at 06:39

## 2021-07-11 RX ADMIN — Medication 10 ML: at 06:37

## 2021-07-11 RX ADMIN — ASPIRIN 81 MG: 81 TABLET, COATED ORAL at 10:04

## 2021-07-11 RX ADMIN — ATORVASTATIN CALCIUM 40 MG: 40 TABLET, FILM COATED ORAL at 21:40

## 2021-07-11 RX ADMIN — METOPROLOL TARTRATE 25 MG: 25 TABLET, FILM COATED ORAL at 06:38

## 2021-07-11 RX ADMIN — DOXYCYCLINE 100 MG: 100 INJECTION, POWDER, LYOPHILIZED, FOR SOLUTION INTRAVENOUS at 10:04

## 2021-07-11 RX ADMIN — PANTOPRAZOLE SODIUM 40 MG: 40 TABLET, DELAYED RELEASE ORAL at 06:39

## 2021-07-11 RX ADMIN — INSULIN LISPRO 2 UNITS: 100 INJECTION, SOLUTION INTRAVENOUS; SUBCUTANEOUS at 06:46

## 2021-07-11 RX ADMIN — Medication 1 CAPSULE: at 10:03

## 2021-07-11 RX ADMIN — Medication 10 ML: at 21:42

## 2021-07-11 RX ADMIN — SODIUM CHLORIDE 75 ML/HR: 9 INJECTION, SOLUTION INTRAVENOUS at 16:29

## 2021-07-11 NOTE — PROGRESS NOTES
[address] [facility] Adult  Hospitalist Group                                                                                          Hospitalist Progress Note  Marina Simon MD    Contact hospitalist group for  and on due to provider change        Date of Service:  2021  NAME:  Magdi Winter  :  1950  MRN:  [id number]      Admission Summary:   \"HISTORY OF PRESENT ILLNESS:  This is a 68-year-old woman with a past medical history significant for degenerative disk disease, dyslipidemia, hypothyroidism, type 2 diabetes, anxiety disorder, was in her usual state of health until the day of her presentation at the emergency room when the patient developed right hip pain. The pain is constant, sharp pain worse with movement involving the right hip, slight relief at rest, 8/10 in severity. The patient stated that she fell at home prior to the onset of the right hip pain and sustained injury to the right hip. The patient was brought to the emergency room for further evaluation on 2021. The patient was admitted to this hospital and underwent lumbar laminectomy. After 5 days of hospitalization, the patient was discharged by the orthopedic service to rehab facility. The patient has since been discharged from rehab and she is back at home. When the patient arrived at the emergency room, x-ray of the right hip was obtained, which did not show any acute pathology. Because the patient recently had back surgery, MRI of the lumbar spine as well as the thoracic spine were obtained, which did not show any acute pathology and no infection. The patient was referred to the hospitalist service for evaluation for admission because of the intractable right hip pain. \"    Interval history / Subjective:   No new complaints. She had hip pain yesterday . Not co operative with exam      Assessment & Plan:     Intractable right hip pain in setting of fall POA  Improved pain control  L2-L5 laminectomy.   Ortho note:  \"S/p L2-S1 revision fusion. S/p fall with right hip pain.   1. Recommend wound care; ? Wound vac vs dressing changes. MRI does not show any collection   2. Right hip pain. . No acute fracture on CT. Recommend PT evaluation. If pain persist then possible MRI of hip. \"  -Physical therapy consult was ordered  Obtain MRI of Right Hip     Suspected bacterial pneumonia POA. (This is based on the chest x-ray result as per admitting hospitalist)  Afebrile, and leukcytosis improving, On  dual IV abx clinically denies any cough repeat Cxray in am consider stopping Abiotics soon. Thrombocytosis, thought to be reactive  Trend, already on aspirin, also on heparin dvt prophylaxis dose  7/10 plt [id number]    Hyponatremia POA  Resolved with IVF hydration  Continue to monitor, resume IVF for now    Morbid obesity based on BMI (refer to epic)    Dyslipidemia  Resume statin, no myalgias reported    Hypothyroidism  On replacement    Type 2 diabetes with hyperglycemia POA  On sliding scale  -7/10 started low dose glargine due to persistently elevated glucose readings, resume ssi  -7/11: ideally should be on Metformin  (Obese with normal renal function) rather than Insulin add metformin on discharge. A1c is 7.6. Abnormal liver function tests POA   Asymptomatic, coming down ? CARDENAS     Anxiety disorder  Calm at bedside  Resume home meds    Code status: Full  DVT prophylaxis: heparin    Care Plan discussed with:   Anticipated Disposition: tbd  Anticipated Discharge: tbd     Hospital Problems  Date Reviewed: 7/9/2021        Codes Class Noted POA    * (Principal) Right hip pain ICD-10-CM: M25.551  ICD-9-CM: 719.45  7/8/2021 Yes                Review of Systems:   Negative other than noted above       Vital Signs:    Last 24hrs VS reviewed since prior progress note.  Most recent are:  Visit Vitals  BP (!) 108/55   Pulse 73   Temp 98.4 °F (36.9 °C)   Resp 17   Ht 5' 4\" (1.626 m)   Wt 121.7 kg (268 lb 6.4 oz)   SpO2 92%   BMI 46.07 kg/m²         Intake/Output Summary (Last 24 hours) at 7/11/2021 0916  Last data filed at 7/11/2021 8904  Gross per 24 hour   Intake    Output 1200 ml   Net -1200 ml        Physical Examination:     I had a face to face encounter with this patient and independently examined them on 7/11/2021 as outlined below:          Constitutional:  No acute distress, cooperative, pleasant    ENT:  Oral mucosa moist, anicteric    Resp:  limited exam, breath sounds heard bilaterally, no obvious wheezing/rhonchi/rales. No accessory muscle use   CV:  Regular rhythm, normal rate, no rubs    GI:  Soft, non distended, non tender. normoactive bowel sounds    Musculoskeletal:  No edema, warm, 2+ pulses throughout    Neurologic:  Moves all extremities.   AAOx3   Psych: calm, not agitated, not anxious, does not voice si/hi            Data Review:         Labs:     Recent Labs     07/10/21  0222 07/09/21  0501   WBC 12.2* 14.7*   HGB 10.1* 10.9*   HCT 32.2* 35.0   * 459*     Recent Labs     07/10/21  0222 07/09/21  0501 07/08/21  1334    132* 126*   K 3.6 4.1 4.0    99 92*   CO2 27 26 27   BUN 9 15 17   CREA 0.61 0.69 1.02   * 263* 443*   CA 8.8 9.1 9.7   MG  --  1.9  --    PHOS  --  3.1  --      Recent Labs     07/10/21  0222 07/09/21  0501 07/08/21  1334   ALT 79* 87* 125*   * 181* 247*   TBILI 0.5 0.7 0.5   TP 6.2* 6.6 7.8   ALB 2.0* 2.2* 2.6*   GLOB 4.2* 4.4* 5.2*   LPSE  --  40*  --        Lab Results   Component Value Date/Time    Glucose (POC) 186 (H) 07/11/2021 06:25 AM    Glucose (POC) 162 (H) 07/10/2021 09:33 PM    Glucose (POC) 162 (H) 07/10/2021 04:33 PM    Glucose (POC) 256 (H) 07/10/2021 11:10 AM    Glucose (POC) 215 (H) 07/10/2021 06:34 AM     Lab Results   Component Value Date/Time    Color YELLOW/STRAW 05/04/2021 12:26 PM    Appearance CLOUDY (A) 05/04/2021 12:26 PM    Specific gravity 1.025 05/04/2021 12:26 PM    Specific gravity 1.010 04/06/2011 10:20 AM    pH (UA) 5.5 05/04/2021 12:26 PM Protein Negative 05/04/2021 12:26 PM    Glucose Negative 05/04/2021 12:26 PM    Ketone TRACE (A) 05/04/2021 12:26 PM    Bilirubin Negative 05/04/2021 12:26 PM    Urobilinogen 1.0 05/04/2021 12:26 PM    Nitrites Negative 05/04/2021 12:26 PM    Leukocyte Esterase TRACE (A) 05/04/2021 12:26 PM    Epithelial cells FEW 05/04/2021 12:26 PM    Bacteria 3+ (A) 05/04/2021 12:26 PM    WBC 5-10 05/04/2021 12:26 PM    RBC 0-5 05/04/2021 12:26 PM         Medications Reviewed:     Current Facility-Administered Medications   Medication Dose Route Frequency    insulin glargine (LANTUS) injection 3 Units  3 Units SubCUTAneous DAILY    ALPRAZolam (XANAX) tablet 0.5 mg  0.5 mg Oral BID    amitriptyline (ELAVIL) tablet 50 mg  50 mg Oral QHS    aspirin delayed-release tablet 81 mg  81 mg Oral DAILY    atorvastatin (LIPITOR) tablet 40 mg  40 mg Oral QHS    butalbital-acetaminophen-caffeine (FIORICET, ESGIC) -40 mg per tablet 1 Tablet  1 Tablet Oral DAILY PRN    levothyroxine (SYNTHROID) tablet 112 mcg  112 mcg Oral ACB    metoprolol tartrate (LOPRESSOR) tablet 25 mg  25 mg Oral 7am    metoprolol tartrate (LOPRESSOR) tablet 12.5 mg  12.5 mg Oral QHS    pantoprazole (PROTONIX) tablet 40 mg  40 mg Oral ACB    PARoxetine (PAXIL) tablet 40 mg  40 mg Oral 7am    sodium chloride (NS) flush 5-40 mL  5-40 mL IntraVENous Q8H    sodium chloride (NS) flush 5-40 mL  5-40 mL IntraVENous PRN    acetaminophen (TYLENOL) tablet 650 mg  650 mg Oral Q6H PRN    Or    acetaminophen (TYLENOL) suppository 650 mg  650 mg Rectal Q6H PRN    polyethylene glycol (MIRALAX) packet 17 g  17 g Oral DAILY PRN    ondansetron (ZOFRAN ODT) tablet 4 mg  4 mg Oral Q8H PRN    Or    ondansetron (ZOFRAN) injection 4 mg  4 mg IntraVENous Q6H PRN    heparin (porcine) injection 5,000 Units  5,000 Units SubCUTAneous Q8H    insulin lispro (HUMALOG) injection   SubCUTAneous AC&HS    glucose chewable tablet 16 g  4 Tablet Oral PRN    dextrose (D50W) injection syrg 12.5-25 g  12.5-25 g IntraVENous PRN    glucagon (GLUCAGEN) injection 1 mg  1 mg IntraMUSCular PRN    L.acidophilus-paracasei-S.thermophil-bifidobacter (RISAQUAD) 8 billion cell capsule  1 Capsule Oral DAILY    doxycycline (VIBRAMYCIN) 100 mg in 0.9% sodium chloride (MBP/ADV) 100 mL MBP  100 mg IntraVENous Q12H    cefTRIAXone (ROCEPHIN) 1 g in 0.9% sodium chloride (MBP/ADV) 50 mL MBP  1 g IntraVENous Q24H    0.9% sodium chloride infusion  75 mL/hr IntraVENous CONTINUOUS    morphine injection 2 mg  2 mg IntraVENous Q4H PRN     ______________________________________________________________________  EXPECTED LENGTH OF STAY: 3d 19h  ACTUAL LENGTH OF STAY:          [id number] Seymour, MD

## 2021-07-11 NOTE — PROGRESS NOTES
0730: Bedside and Verbal shift change report given to Karinemika De Anda, [address] [place] [address] (oncoming nurse) by Holland Curry RN (offgoing nurse). Report included the following information SBAR, Kardex, Intake/Output, MAR and Accordion. 1530: Bedside and Verbal shift change report given to [address] [place] [address] (oncoming nurse) by Karine De Anda [address] [place] [address] (offgoing nurse). Report included the following information SBAR, Kardex, Intake/Output, MAR and Accordion.

## 2021-07-12 ENCOUNTER — APPOINTMENT (OUTPATIENT)
Dept: GENERAL RADIOLOGY | Age: 71
DRG: 537 | End: 2021-07-12
Attending: HOSPITALIST
Payer: MEDICARE

## 2021-07-12 LAB
ALBUMIN SERPL-MCNC: 1.7 G/DL (ref 3.5–5)
ALBUMIN/GLOB SERPL: 0.4 {RATIO} (ref 1.1–2.2)
ALP SERPL-CCNC: 167 U/L (ref 45–117)
ALT SERPL-CCNC: 82 U/L (ref 12–78)
ANION GAP SERPL CALC-SCNC: 6 MMOL/L (ref 5–15)
AST SERPL-CCNC: 68 U/L (ref 15–37)
BILIRUB SERPL-MCNC: 0.4 MG/DL (ref 0.2–1)
BUN SERPL-MCNC: 8 MG/DL (ref 6–20)
BUN/CREAT SERPL: 12 (ref 12–20)
CALCIUM SERPL-MCNC: 8.7 MG/DL (ref 8.5–10.1)
CHLORIDE SERPL-SCNC: 107 MMOL/L (ref 97–108)
CO2 SERPL-SCNC: 23 MMOL/L (ref 21–32)
CREAT SERPL-MCNC: 0.68 MG/DL (ref 0.55–1.02)
ERYTHROCYTE [DISTWIDTH] IN BLOOD BY AUTOMATED COUNT: 13.8 % (ref 11.5–14.5)
GLOBULIN SER CALC-MCNC: 4.4 G/DL (ref 2–4)
GLUCOSE BLD STRIP.AUTO-MCNC: 156 MG/DL (ref 65–117)
GLUCOSE BLD STRIP.AUTO-MCNC: 172 MG/DL (ref 65–117)
GLUCOSE BLD STRIP.AUTO-MCNC: 180 MG/DL (ref 65–117)
GLUCOSE BLD STRIP.AUTO-MCNC: 191 MG/DL (ref 65–117)
GLUCOSE SERPL-MCNC: 146 MG/DL (ref 65–100)
HCT VFR BLD AUTO: 36 % (ref 35–47)
HGB BLD-MCNC: 10.9 G/DL (ref 11.5–16)
MCH RBC QN AUTO: 26.5 PG (ref 26–34)
MCHC RBC AUTO-ENTMCNC: 30.3 G/DL (ref 30–36.5)
MCV RBC AUTO: 87.6 FL (ref 80–99)
NRBC # BLD: 0 K/UL (ref 0–0.01)
NRBC BLD-RTO: 0 PER 100 WBC
PLATELET # BLD AUTO: 472 K/UL (ref 150–400)
PMV BLD AUTO: 10.2 FL (ref 8.9–12.9)
POTASSIUM SERPL-SCNC: 3.9 MMOL/L (ref 3.5–5.1)
PROT SERPL-MCNC: 6.1 G/DL (ref 6.4–8.2)
RBC # BLD AUTO: 4.11 M/UL (ref 3.8–5.2)
SERVICE CMNT-IMP: ABNORMAL
SODIUM SERPL-SCNC: 136 MMOL/L (ref 136–145)
WBC # BLD AUTO: 10.3 K/UL (ref 3.6–11)

## 2021-07-12 PROCEDURE — 74011250637 HC RX REV CODE- 250/637: Performed by: INTERNAL MEDICINE

## 2021-07-12 PROCEDURE — 74011250636 HC RX REV CODE- 250/636: Performed by: INTERNAL MEDICINE

## 2021-07-12 PROCEDURE — 74011636637 HC RX REV CODE- 636/637: Performed by: INTERNAL MEDICINE

## 2021-07-12 PROCEDURE — 74011250637 HC RX REV CODE- 250/637: Performed by: FAMILY MEDICINE

## 2021-07-12 PROCEDURE — 65660000000 HC RM CCU STEPDOWN

## 2021-07-12 PROCEDURE — 85027 COMPLETE CBC AUTOMATED: CPT

## 2021-07-12 PROCEDURE — 74011000258 HC RX REV CODE- 258: Performed by: INTERNAL MEDICINE

## 2021-07-12 PROCEDURE — 82962 GLUCOSE BLOOD TEST: CPT

## 2021-07-12 PROCEDURE — 71046 X-RAY EXAM CHEST 2 VIEWS: CPT

## 2021-07-12 PROCEDURE — 80053 COMPREHEN METABOLIC PANEL: CPT

## 2021-07-12 PROCEDURE — 36415 COLL VENOUS BLD VENIPUNCTURE: CPT

## 2021-07-12 RX ORDER — SENNOSIDES 8.6 MG/1
2 TABLET ORAL DAILY
Status: DISCONTINUED | OUTPATIENT
Start: 2021-07-12 | End: 2021-07-20 | Stop reason: HOSPADM

## 2021-07-12 RX ORDER — DOCUSATE SODIUM 100 MG/1
100 CAPSULE, LIQUID FILLED ORAL 2 TIMES DAILY
Status: DISCONTINUED | OUTPATIENT
Start: 2021-07-12 | End: 2021-07-20 | Stop reason: HOSPADM

## 2021-07-12 RX ORDER — ADHESIVE BANDAGE
30 BANDAGE TOPICAL DAILY
Status: DISCONTINUED | OUTPATIENT
Start: 2021-07-12 | End: 2021-07-20 | Stop reason: HOSPADM

## 2021-07-12 RX ADMIN — INSULIN GLARGINE 3 UNITS: 100 INJECTION, SOLUTION SUBCUTANEOUS at 09:08

## 2021-07-12 RX ADMIN — ASPIRIN 81 MG: 81 TABLET, COATED ORAL at 09:08

## 2021-07-12 RX ADMIN — ATORVASTATIN CALCIUM 40 MG: 40 TABLET, FILM COATED ORAL at 23:05

## 2021-07-12 RX ADMIN — PANTOPRAZOLE SODIUM 40 MG: 40 TABLET, DELAYED RELEASE ORAL at 06:21

## 2021-07-12 RX ADMIN — HEPARIN SODIUM 5000 UNITS: 5000 INJECTION INTRAVENOUS; SUBCUTANEOUS at 23:06

## 2021-07-12 RX ADMIN — HEPARIN SODIUM 5000 UNITS: 5000 INJECTION INTRAVENOUS; SUBCUTANEOUS at 06:18

## 2021-07-12 RX ADMIN — INSULIN LISPRO 2 UNITS: 100 INJECTION, SOLUTION INTRAVENOUS; SUBCUTANEOUS at 06:30

## 2021-07-12 RX ADMIN — METOPROLOL TARTRATE 25 MG: 25 TABLET, FILM COATED ORAL at 06:21

## 2021-07-12 RX ADMIN — DOCUSATE SODIUM 100 MG: 100 CAPSULE, LIQUID FILLED ORAL at 17:16

## 2021-07-12 RX ADMIN — INSULIN LISPRO 2 UNITS: 100 INJECTION, SOLUTION INTRAVENOUS; SUBCUTANEOUS at 17:16

## 2021-07-12 RX ADMIN — CEFTRIAXONE SODIUM 1 G: 1 INJECTION, POWDER, FOR SOLUTION INTRAMUSCULAR; INTRAVENOUS at 06:09

## 2021-07-12 RX ADMIN — PAROXETINE 40 MG: 20 TABLET, FILM COATED ORAL at 06:21

## 2021-07-12 RX ADMIN — DOCUSATE SODIUM 100 MG: 100 CAPSULE, LIQUID FILLED ORAL at 12:35

## 2021-07-12 RX ADMIN — SODIUM CHLORIDE 75 ML/HR: 9 INJECTION, SOLUTION INTRAVENOUS at 06:08

## 2021-07-12 RX ADMIN — Medication 17.2 MG: at 12:35

## 2021-07-12 RX ADMIN — Medication 1 CAPSULE: at 09:08

## 2021-07-12 RX ADMIN — HEPARIN SODIUM 5000 UNITS: 5000 INJECTION INTRAVENOUS; SUBCUTANEOUS at 14:00

## 2021-07-12 RX ADMIN — LEVOTHYROXINE SODIUM 112 MCG: 0.11 TABLET ORAL at 06:21

## 2021-07-12 RX ADMIN — Medication 10 ML: at 06:18

## 2021-07-12 RX ADMIN — DOXYCYCLINE 100 MG: 100 INJECTION, POWDER, LYOPHILIZED, FOR SOLUTION INTRAVENOUS at 23:09

## 2021-07-12 RX ADMIN — MAGNESIUM HYDROXIDE 30 ML: 400 SUSPENSION ORAL at 12:35

## 2021-07-12 RX ADMIN — MORPHINE SULFATE 2 MG: 2 INJECTION, SOLUTION INTRAMUSCULAR; INTRAVENOUS at 23:03

## 2021-07-12 RX ADMIN — AMITRIPTYLINE HYDROCHLORIDE 50 MG: 50 TABLET, FILM COATED ORAL at 23:05

## 2021-07-12 RX ADMIN — ALPRAZOLAM 0.5 MG: 0.25 TABLET ORAL at 06:20

## 2021-07-12 RX ADMIN — METOPROLOL TARTRATE 12.5 MG: 25 TABLET, FILM COATED ORAL at 23:05

## 2021-07-12 RX ADMIN — ALPRAZOLAM 0.5 MG: 0.25 TABLET ORAL at 23:05

## 2021-07-12 RX ADMIN — DOXYCYCLINE 100 MG: 100 INJECTION, POWDER, LYOPHILIZED, FOR SOLUTION INTRAVENOUS at 09:09

## 2021-07-12 RX ADMIN — INSULIN LISPRO 2 UNITS: 100 INJECTION, SOLUTION INTRAVENOUS; SUBCUTANEOUS at 12:35

## 2021-07-12 NOTE — PROGRESS NOTES
Hospitalist Progress Note          Yann Douglas MD  Please call  and page for questions. Call physician on-call through the  7pm-7am    Daily Progress Note: 7/12/2021    Primary care provider:Morgan Colmenares MD    Date of admission: 7/8/2021  1:08 PM    Admission summery and hospital course:  77-year-old woman with a past medical history significant for degenerative disk disease, dyslipidemia, hypothyroidism, type 2 diabetes, anxiety disorder, was in her usual state of health until the day of her presentation at the emergency room when the patient developed right hip pain. The patient stated that she fell at home prior to the onset of the right hip pain and sustained injury to the right hip. The patient was brought to the emergency room for further evaluation on 06/02/2021. The patient was admitted to this hospital and underwent lumbar laminectomy. After 5 days of hospitalization, the patient was discharged by the orthopedic service to rehab facility. The patient has since been discharged from rehab and she is back at home. When the patient arrived at the emergency room, x-ray of the right hip was obtained, which did not show any acute pathology. Because the patient recently had back surgery, MRI of the lumbar spine as well as the thoracic spine were obtained, which did not show any acute pathology and no infection. Subjective:   Patient said she still has considerable pain at the right hip. Patient said she does not have BM since 07/07/21. Patient said she has low appetite. Patient denied any nausea, vomiting. Patient has been passing gas. Patient said she takes care of her  at home. Assessment/Plan:   Intractable right hip pain in setting of fall POA  Improved pain control  MRI on 07/11 showed edema of the right iliopsoas muscle centered at the myotendinous junction compatible with a strain. L2-L5 laminectomy 06/2021.   Patient was seen by orthopedics and they recommended for continue wound care. Continue PT and OT and follow.       Suspected bacterial pneumonia POA. Based on the chest x-ray result as per admitting hospitalist.   Afebrile, and leukcytosis improving. Continue doxycycline while she is at the hospital.     Thrombocytosis, thought to be reactive  Continue to monitor. Patient is on aspirin and heparin.     Hyponatremia POA  Resolved with IVF hydration  Continue to monitor on IV fluid for now.     Morbid obesity based on BMI (refer to epic)     Dyslipidemia  Resume statin, no myalgias reported     Hypothyroidism  On levothyroxine.       Type 2 diabetes mellitus  Uncontrolled with hyperglycemia, present on admission   7/10 started low dose glargine due to persistently elevated glucose readings, resume ssi  7/11: ideally should be on Metformin  (Obese with normal renal function) rather than Insulin add metformin on discharge. A1c is 7.6. Continue on Metformin at the time of discharge. Abnormal liver function tests POA   Asymptomatic, trending towards normal. ? CARDENAS      Anxiety disorder  Continue her Xanax. Constipation:  Bowel regimen started today. See orders for other plans. VTE prophylaxis: Heparin  Code status: Full code  Discussed plan of care with Patient/Family and Nurse. Pre-admission lived at home. Discharge planning: Follow-up PT/OT and  input.         Review of Systems:     Review of Systems:  Symptom  Y/N  Comments   Symptom  Y/N  Comments    Fever/Chills   n   Chest Pain  n    Poor Appetite  y    Edema  n     Cough  n   Abdominal Pain   n    Sputum  n   Joint Pain      SOB/AVALOS  n   Pruritis/Rash      Nausea/vomit  n   Tolerating PT/OT      Diarrhea     Tolerating Diet      Constipation  y   Other      Could not obtain due to:         Objective:   Physical Exam:     Visit Vitals  /68   Pulse 72   Temp 98.2 °F (36.8 °C)   Resp 16   Ht 5' 4\" (1.626 m)   Wt 121.7 kg (268 lb 6.4 oz) SpO2 92%   BMI 46.07 kg/m²    O2 Flow Rate (L/min): 2 l/min O2 Device: None (Room air)    Temp (24hrs), Av.1 °F (36.7 °C), Min:97.8 °F (36.6 °C), Max:98.5 °F (36.9 °C)    No intake/output data recorded. 07/10 1901 -  0700  In: 240 [P.O.:240]  Out: 3900 [Urine:3900]      General:  Alert, cooperative, no distress, appears stated age. Lungs:   Clear to auscultation bilaterally. Chest wall:  No tenderness or deformity. Heart:  Regular rate and rhythm, S1, S2 normal, no murmur. Abdomen:   Obese, soft, non-tender. Bowel sounds normal.    Extremities: No cyanosis or edema. Dressing at the back is dry and clean. Pulses: 2+ and symmetric all extremities. Neurologic: Patient has clear voice. LIZBETH-XII intact. Data Review:       Recent Days:  Recent Labs     07/12/21  0107 07/10/21  0222   WBC 10.3 12.2*   HGB 10.9* 10.1*   HCT 36.0 32.2*   * 426*     Recent Labs     07/12/21  0107 07/10/21  0222    136   K 3.9 3.6    103   CO2 23 27   * 168*   BUN 8 9   CREA 0.68 0.61   CA 8.7 8.8   ALB 1.7* 2.0*   ALT 82* 79*     No results for input(s): PH, PCO2, PO2, HCO3, FIO2 in the last 72 hours.     24 Hour Results:  Recent Results (from the past 24 hour(s))   GLUCOSE, POC    Collection Time: 21 11:15 AM   Result Value Ref Range    Glucose (POC) 134 (H) 65 - 117 mg/dL    Performed by [id number], POC    Collection Time: 21  4:23 PM   Result Value Ref Range    Glucose (POC) 181 (H) 65 - 117 mg/dL    Performed by Poppy Harden, POC    Collection Time: 21  9:35 PM   Result Value Ref Range    Glucose (POC) 140 (H) 65 - 117 mg/dL    Performed by Shahla Campbell    CBC W/O DIFF    Collection Time: 21  1:07 AM   Result Value Ref Range    WBC 10.3 3.6 - 11.0 K/uL    RBC 4.11 3.80 - 5.20 M/uL    HGB 10.9 (L) 11.5 - 16.0 g/dL    HCT 36.0 35.0 - 47.0 %    MCV 87.6 80.0 - 99.0 FL    MCH 26.5 26.0 - 34.0 PG    MCHC 30.3 30.0 - 36.5 g/dL    RDW 13.8 11.5 - 14.5 %    PLATELET 469 (H) 964 - 400 K/uL    MPV 10.2 8.9 - 12.9 FL    NRBC 0.0 0  WBC    ABSOLUTE NRBC 0.00 0.00 - 1.32 K/uL   METABOLIC PANEL, COMPREHENSIVE    Collection Time: 07/12/21  1:07 AM   Result Value Ref Range    Sodium 136 136 - 145 mmol/L    Potassium 3.9 3.5 - 5.1 mmol/L    Chloride 107 97 - 108 mmol/L    CO2 23 21 - 32 mmol/L    Anion gap 6 5 - 15 mmol/L    Glucose 146 (H) 65 - 100 mg/dL    BUN 8 6 - 20 MG/DL    Creatinine 0.68 0.55 - 1.02 MG/DL    BUN/Creatinine ratio 12 12 - 20      GFR est AA >60 >60 ml/min/1.73m2    GFR est non-AA >60 >60 ml/min/1.73m2    Calcium 8.7 8.5 - 10.1 MG/DL    Bilirubin, total 0.4 0.2 - 1.0 MG/DL    ALT (SGPT) 82 (H) 12 - 78 U/L    AST (SGOT) 68 (H) 15 - 37 U/L    Alk.  phosphatase 167 (H) 45 - 117 U/L    Protein, total 6.1 (L) 6.4 - 8.2 g/dL    Albumin 1.7 (L) 3.5 - 5.0 g/dL    Globulin 4.4 (H) 2.0 - 4.0 g/dL    A-G Ratio 0.4 (L) 1.1 - 2.2     GLUCOSE, POC    Collection Time: 07/12/21  6:17 AM   Result Value Ref Range    Glucose (POC) 180 (H) 65 - 117 mg/dL    Performed by [facility]        Problem List:  Problem List as of 7/12/2021 Date Reviewed: 7/9/2021        Codes Class Noted - Resolved    * (Principal) Right hip pain ICD-10-CM: M25.551  ICD-9-CM: 719.45  7/8/2021 - Present        Lumbar stenosis with neurogenic claudication ICD-10-CM: M48.062  ICD-9-CM: 724.03  4/3/2017 - Present        Rectal polyp ICD-10-CM: K62.1  ICD-9-CM: 569.0  4/6/2016 - Present        Morbid obesity ([address] (Chronic) ICD-10-CM: E66.01  ICD-9-CM: 278.01  2/12/2014 - Present        Right knee DJD ICD-10-CM: M17.11  ICD-9-CM: 715.96  2/11/2014 - Present        Left knee DJD (Chronic) ICD-10-CM: M17.12  ICD-9-CM: 715.96  9/3/2013 - Present              Medications reviewed  Current Facility-Administered Medications   Medication Dose Route Frequency    insulin glargine (LANTUS) injection 3 Units  3 Units SubCUTAneous DAILY    ALPRAZolam (XANAX) tablet 0.5 mg  0.5 mg Oral BID    amitriptyline (ELAVIL) tablet 50 mg  50 mg Oral QHS    aspirin delayed-release tablet 81 mg  81 mg Oral DAILY    atorvastatin (LIPITOR) tablet 40 mg  40 mg Oral QHS    butalbital-acetaminophen-caffeine (FIORICET, ESGIC) -40 mg per tablet 1 Tablet  1 Tablet Oral DAILY PRN    levothyroxine (SYNTHROID) tablet 112 mcg  112 mcg Oral ACB    metoprolol tartrate (LOPRESSOR) tablet 25 mg  25 mg Oral 7am    metoprolol tartrate (LOPRESSOR) tablet 12.5 mg  12.5 mg Oral QHS    pantoprazole (PROTONIX) tablet 40 mg  40 mg Oral ACB    PARoxetine (PAXIL) tablet 40 mg  40 mg Oral 7am    sodium chloride (NS) flush 5-40 mL  5-40 mL IntraVENous Q8H    sodium chloride (NS) flush 5-40 mL  5-40 mL IntraVENous PRN    acetaminophen (TYLENOL) tablet 650 mg  650 mg Oral Q6H PRN    Or    acetaminophen (TYLENOL) suppository 650 mg  650 mg Rectal Q6H PRN    polyethylene glycol (MIRALAX) packet 17 g  17 g Oral DAILY PRN    ondansetron (ZOFRAN ODT) tablet 4 mg  4 mg Oral Q8H PRN    Or    ondansetron (ZOFRAN) injection 4 mg  4 mg IntraVENous Q6H PRN    heparin (porcine) injection 5,000 Units  5,000 Units SubCUTAneous Q8H    insulin lispro (HUMALOG) injection   SubCUTAneous AC&HS    glucose chewable tablet 16 g  4 Tablet Oral PRN    dextrose (D50W) injection syrg 12.5-25 g  12.5-25 g IntraVENous PRN    glucagon (GLUCAGEN) injection 1 mg  1 mg IntraMUSCular PRN    L.acidophilus-paracasei-S.thermophil-bifidobacter (RISAQUAD) 8 billion cell capsule  1 Capsule Oral DAILY    doxycycline (VIBRAMYCIN) 100 mg in 0.9% sodium chloride (MBP/ADV) 100 mL MBP  100 mg IntraVENous Q12H    cefTRIAXone (ROCEPHIN) 1 g in 0.9% sodium chloride (MBP/ADV) 50 mL MBP  1 g IntraVENous Q24H    0.9% sodium chloride infusion  75 mL/hr IntraVENous CONTINUOUS    morphine injection 2 mg  2 mg IntraVENous Q4H PRN       Care Plan discussed with: Patient/Family, Nurse and     Total time spent with patient: 40 minutes.     Radha Whitaker MD

## 2021-07-12 NOTE — PROGRESS NOTES
Physician Progress Note      James Martinez  CSN #:                  [id number]  :                       1950  ADMIT DATE:       2021 1:08 PM  [address] [place] DATE:  RESPONDING  PROVIDER #:        Catherine Jimenez MD          QUERY TEXT:    Pt admitted with right hip pain from fall. Pt noted to have non-healing open surgical incision per WCN. If possible, please document in progress notes and discharge summary the present on admission status of non-healing open surgical incision : The medical record reflects the following:  Risk Factors: 71yo S/p revision laminectomy 21    Clinical Indicators:  WCN note:  - Wound History: S/p revision laminectomy 21, non-healing open surgical incision. Was started of NPWT during last hospital admission, was d/c'd to [facility] and NPWT continued. pateint was discharged home with home health. Per patient home health nurse was having difficulty maintaining a seal with the NPWT    1. POA distal lumbar incisional wound  Full thickness  1.8 x 0.6 x 7.8 cm  Tunneling at 9 o'clock at 9 cm and 12 o'clock at 3.5 cm  Unable to visualize wound base  copious amount tan purulent exudate  no odor  Well defined edges  Periwound intact & without erythema    2. POA mid lumbar incisional wound  Full thickness  2 x 0.3 x 0.2 cm  Base is 100% pink  small amount serous exudate  no odor  Well defined edges  Periwound intact & without erythema    3.  POA proximal lumbar incisional wound  Full thickness  1 x 0.3 x 0.3 cm  Base is 100% pink  small amount serous exudate  no odor  Well defined edges  Periwound intact & without erythema    Per MD, will not apply wound vac and no wound culture at this time    Treatment: WCN following, 'Treatment: Wound cleansed with saline, puracol plus ag applied, covered with foam border dressing', daily dressing changes, Keep HOB 30 degrees or less to decrease shearing and pressure unless medically contraindicated    Thank Elena green  [phone]  Options provided:  -- Yes, non-healing open surgical incision due to disruption in closure to the fascia was present at the time of the order to admit to the hospital  -- Yes, non-healing open surgical incision due to disruption in closure to the muscle was present at the time of the order to admit to the hospital  -- Yes, non-healing open surgical incision due to disruption in closure to the skin/subcutaneous tissue was present at the time of the order to admit to the hospital  -- Yes, non-healing open surgical incision was present at the time of the order to admit to the hospital  -- Other - I will add my own diagnosis  -- Disagree - Not applicable / Not valid  -- Disagree - Clinically unable to determine / Unknown  -- Refer to Clinical Documentation Reviewer    PROVIDER RESPONSE TEXT:    Yes, non-healing open surgical incision due to disruption in closure to the skin/subcutaneous tissue was present at the time of the order to admit to the hospital.    Query created by: Reema Handy on 7/12/2021 11:39 AM      Electronically signed by:  Corinne Ortiz MD 7/12/2021 5:51 PM

## 2021-07-12 NOTE — PROGRESS NOTES
\"Vince Escudero\" called asking for patient information.  I checked with pt and they said it would be ok for me to talk to this person about that over the phone,

## 2021-07-12 NOTE — PROGRESS NOTES
Physical Therapy  7/12/2021    Chart reviewed. Attempted to see pt this morning, however transport present, preparing to take pt DUSTIN for testing (Xray). Will defer and follow up at later time as able and appropriate.      Jo Means, PTA

## 2021-07-13 LAB
GLUCOSE BLD STRIP.AUTO-MCNC: 135 MG/DL (ref 65–117)
GLUCOSE BLD STRIP.AUTO-MCNC: 138 MG/DL (ref 65–117)
GLUCOSE BLD STRIP.AUTO-MCNC: 160 MG/DL (ref 65–117)
GLUCOSE BLD STRIP.AUTO-MCNC: 200 MG/DL (ref 65–117)
SERVICE CMNT-IMP: ABNORMAL

## 2021-07-13 PROCEDURE — 74011636637 HC RX REV CODE- 636/637: Performed by: INTERNAL MEDICINE

## 2021-07-13 PROCEDURE — 77030038269 HC DRN EXT URIN PURWCK BARD -A

## 2021-07-13 PROCEDURE — 74011250636 HC RX REV CODE- 250/636: Performed by: INTERNAL MEDICINE

## 2021-07-13 PROCEDURE — 65270000029 HC RM PRIVATE

## 2021-07-13 PROCEDURE — 74011000258 HC RX REV CODE- 258: Performed by: INTERNAL MEDICINE

## 2021-07-13 PROCEDURE — 82962 GLUCOSE BLOOD TEST: CPT

## 2021-07-13 PROCEDURE — 74011250637 HC RX REV CODE- 250/637: Performed by: FAMILY MEDICINE

## 2021-07-13 PROCEDURE — 74011250637 HC RX REV CODE- 250/637: Performed by: INTERNAL MEDICINE

## 2021-07-13 PROCEDURE — 97530 THERAPEUTIC ACTIVITIES: CPT | Performed by: PHYSICAL THERAPIST

## 2021-07-13 PROCEDURE — 97165 OT EVAL LOW COMPLEX 30 MIN: CPT

## 2021-07-13 RX ORDER — ALPRAZOLAM 0.25 MG/1
0.25 TABLET ORAL 2 TIMES DAILY
Status: DISCONTINUED | OUTPATIENT
Start: 2021-07-13 | End: 2021-07-20 | Stop reason: HOSPADM

## 2021-07-13 RX ADMIN — INSULIN LISPRO 2 UNITS: 100 INJECTION, SOLUTION INTRAVENOUS; SUBCUTANEOUS at 22:00

## 2021-07-13 RX ADMIN — ALPRAZOLAM 0.5 MG: 0.25 TABLET ORAL at 07:23

## 2021-07-13 RX ADMIN — MAGNESIUM HYDROXIDE 30 ML: 400 SUSPENSION ORAL at 09:09

## 2021-07-13 RX ADMIN — LEVOTHYROXINE SODIUM 112 MCG: 0.11 TABLET ORAL at 07:23

## 2021-07-13 RX ADMIN — PANTOPRAZOLE SODIUM 40 MG: 40 TABLET, DELAYED RELEASE ORAL at 07:23

## 2021-07-13 RX ADMIN — DOXYCYCLINE 100 MG: 100 INJECTION, POWDER, LYOPHILIZED, FOR SOLUTION INTRAVENOUS at 11:53

## 2021-07-13 RX ADMIN — INSULIN LISPRO 2 UNITS: 100 INJECTION, SOLUTION INTRAVENOUS; SUBCUTANEOUS at 11:55

## 2021-07-13 RX ADMIN — ASPIRIN 81 MG: 81 TABLET, COATED ORAL at 09:06

## 2021-07-13 RX ADMIN — Medication 17.2 MG: at 09:06

## 2021-07-13 RX ADMIN — INSULIN GLARGINE 3 UNITS: 100 INJECTION, SOLUTION SUBCUTANEOUS at 09:13

## 2021-07-13 RX ADMIN — DOXYCYCLINE 100 MG: 100 INJECTION, POWDER, LYOPHILIZED, FOR SOLUTION INTRAVENOUS at 23:40

## 2021-07-13 RX ADMIN — CEFTRIAXONE SODIUM 1 G: 1 INJECTION, POWDER, FOR SOLUTION INTRAMUSCULAR; INTRAVENOUS at 07:22

## 2021-07-13 RX ADMIN — AMITRIPTYLINE HYDROCHLORIDE 50 MG: 50 TABLET, FILM COATED ORAL at 22:21

## 2021-07-13 RX ADMIN — Medication 10 ML: at 13:36

## 2021-07-13 RX ADMIN — Medication 1 CAPSULE: at 09:06

## 2021-07-13 RX ADMIN — HEPARIN SODIUM 5000 UNITS: 5000 INJECTION INTRAVENOUS; SUBCUTANEOUS at 13:36

## 2021-07-13 RX ADMIN — DOCUSATE SODIUM 100 MG: 100 CAPSULE, LIQUID FILLED ORAL at 17:58

## 2021-07-13 RX ADMIN — ACETAMINOPHEN 650 MG: 325 TABLET ORAL at 16:14

## 2021-07-13 RX ADMIN — HEPARIN SODIUM 5000 UNITS: 5000 INJECTION INTRAVENOUS; SUBCUTANEOUS at 07:22

## 2021-07-13 RX ADMIN — DOCUSATE SODIUM 100 MG: 100 CAPSULE, LIQUID FILLED ORAL at 09:06

## 2021-07-13 RX ADMIN — METOPROLOL TARTRATE 12.5 MG: 25 TABLET, FILM COATED ORAL at 22:21

## 2021-07-13 RX ADMIN — ALPRAZOLAM 0.25 MG: 0.25 TABLET ORAL at 22:21

## 2021-07-13 RX ADMIN — ATORVASTATIN CALCIUM 40 MG: 40 TABLET, FILM COATED ORAL at 22:21

## 2021-07-13 RX ADMIN — ACETAMINOPHEN 650 MG: 325 TABLET ORAL at 09:06

## 2021-07-13 RX ADMIN — HEPARIN SODIUM 5000 UNITS: 5000 INJECTION INTRAVENOUS; SUBCUTANEOUS at 22:21

## 2021-07-13 RX ADMIN — PAROXETINE 40 MG: 20 TABLET, FILM COATED ORAL at 07:23

## 2021-07-13 NOTE — PROGRESS NOTES
Problem: Mobility Impaired (Adult and Pediatric)  Goal: *Acute Goals and Plan of Care (Insert Text)  Description: Note: FUNCTIONAL STATUS PRIOR TO ADMISSION: Patient was modified independent using a walker for functional mobility. HOME SUPPORT PRIOR TO ADMISSION: The patient lived with  who she is the primary caregiver for. Physical Therapy Goals  Initiated 7/10/2021  1. Patient will move from supine to sit and sit to supine  in bed with modified independence within 7 day(s). 2.  Patient will transfer from bed to chair and chair to bed with modified independence using the least restrictive device within 7 day(s). 3.  Patient will perform sit to stand with modified independence within 7 day(s). 4.  Patient will ambulate with modified independence for 300 feet with the least restrictive device within 7 day(s). 7/13/2021 1149 by Shabnam Pang, PT, DPT  Outcome: Progressing Towards Goal  PHYSICAL THERAPY TREATMENT  Patient: Zulema Guadalupe (58 y.o. female)  Date: 7/13/2021  Diagnosis: Right hip pain [M25.551] Right hip pain       Precautions: Fall, WBAT  Chart, physical therapy assessment, plan of care and goals were reviewed. ASSESSMENT  Patient continues with skilled PT services and is progressing towards goals. Patient continues to be limited by pain and fear of pain,  unable to stand secondary to pain and unable to ambulate secondary to pain. Patient currently needing Manuel to come to EOB with HOB elevated and use of rails to bring self to EOB. Has high pain levels but they subside with sitting. Worked on seated LE exercises and scooting to [facility] with Manuel. Able to shift weight forward as well as laterally in order to scoot. Anticipate she will be ready for standing next session. Recommend SNF rehab to progress to more independent level of function.      Other factors to consider for discharge: at risk for falls, below baseline, cares for disable          PLAN :  Patient continues to benefit from skilled intervention to address the above impairments. Continue treatment per established plan of care. to address goals. Recommendation for discharge: (in order for the patient to meet his/her long term goals)  Therapy up to 5 days/week in SNF setting        IF patient discharges home will need the following DME: patient owns DME required for discharge       SUBJECTIVE:   Patient stated I'm really having so much pain. I was doing so well at home before.     OBJECTIVE DATA SUMMARY:   Critical Behavior:  Neurologic State: Alert  Orientation Level: Oriented X4  Cognition: Appropriate decision making, Appropriate for age attention/concentration, Appropriate safety awareness, Follows commands     Functional Mobility Training:  Bed Mobility:  Rolling: Minimum assistance  Supine to Sit: Minimum assistance; Additional time;Assist x1;Bed Modified (HOB elevated and use of rails)  Sit to Supine: Moderate assistance;Assist x1;Bed Modified (use of rails and management of LE)  Scooting: Minimum assistance (scoot in sitting to [facility] with cues )        Transfers:   Not tested, does shift weight anteriorly well as well as laterally in prep for transfers                                Balance:  Sitting: Intact; With support  Ambulation/Gait Training:            Therapeutic Exercises:   Seated: Ankle pumps  LAQ    Pain Rating:  Reports intermittent sharp, shooting pain, but does not rate    Activity Tolerance:   Fair and requires rest breaks    After treatment patient left in no apparent distress:   Supine in bed, Call bell within reach, and Side rails x 3    COMMUNICATION/COLLABORATION:   The patients plan of care was discussed with: Physical therapist, Occupational therapist, and Registered nurse.      Devora Booker, PT, DPT   Time Calculation: 25 mins

## 2021-07-13 NOTE — PROGRESS NOTES
Hospitalist Progress Note          Lola Vergara MD  Please call  and page for questions. Call physician on-call through the  7pm-7am    Daily Progress Note: 7/13/2021    Primary care provider:Darline Colmenares MD    Date of admission: 7/8/2021  1:08 PM    Admission summery and hospital course:  77-year-old woman with a past medical history significant for degenerative disk disease, dyslipidemia, hypothyroidism, type 2 diabetes, anxiety disorder, was in her usual state of health until the day of her presentation at the emergency room when the patient developed right hip pain.  The patient stated that she fell at home prior to the onset of the right hip pain and sustained injury to the right hip.  The patient was brought to the emergency room for further evaluation on 06/02/2021.  The patient was admitted to this hospital and underwent lumbar laminectomy.  After 5 days of hospitalization, the patient was discharged by the orthopedic service to rehab facility.  The patient has since been discharged from rehab and she is back at home. Francois Ban the patient arrived at the emergency room, x-ray of the right hip was obtained, which did not show any acute pathology.  Because the patient recently had back surgery, MRI of the lumbar spine as well as the thoracic spine were obtained, which did not show any acute pathology and no infection.      Subjective:   Patient said she still feels very weak and she is ready to go to skilled nursing facility or rehab if necessary. Patient denied any other acute issues today. Assessment/Plan:   Intractable right hip pain in setting of fall POA  Improved pain control  MRI on 07/11 showed edema of the right iliopsoas muscle centered at the myotendinous junction compatible with a strain. L2-L5 laminectomy 06/2021. Patient was seen by orthopedics and they recommended for continue wound care.     Continue PT and OT and follow.       Suspected bacterial pneumonia POA. Based on the chest x-ray result as per admitting hospitalist.   Afebrile, and leukcytosis improving. Continue doxycycline and Rocephin until tonight to complete the course. Repeat chest x-ray PA view on 07/12/2021 was negative for acute process. .     Thrombocytosis, thought to be reactive  Proving, continue to monitor. Patient is on aspirin and heparin.     Hyponatremia POA  Resolved with IVF hydration  Continue to monitor on IV fluid for now.     Morbid obesity based on BMI (refer to epic)     Dyslipidemia  Resume statin, no myalgias reported     Hypothyroidism  On levothyroxine.       Type 2 diabetes mellitus  Uncontrolled with hyperglycemia, present on admission   7/10 started low dose glargine due to persistently elevated glucose readings, resume ssi  7/11: ideally should be on Metformin  (Obese with normal renal function) rather than Insulin add metformin on discharge. A1c is 7.6. Continue on Metformin at the time of discharge.     Abnormal liver function tests POA   Asymptomatic, trending towards normal. ? CARDENAS      Anxiety disorder  Continue her Xanax.      Constipation:  Bowel regimen started today.       See orders for other plans. VTE prophylaxis: Heparin  Code status: Full code  Discussed plan of care with Patient/Family and Nurse. Pre-admission lived at home. Discharge planning: Follow-up PT/OT and  input, discharge either to SNF for rehab tomorrow if possible.        Review of Systems:     Review of Systems:  Symptom  Y/N  Comments   Symptom  Y/N  Comments    Fever/Chills      Chest Pain      Poor Appetite      Edema       Cough     Abdominal Pain       Sputum     Joint Pain      SOB/AVALOS     Pruritis/Rash      Nausea/vomit     Tolerating PT/OT      Diarrhea     Tolerating Diet      Constipation     Other      Could not obtain due to:         Objective:   Physical Exam:     Visit Vitals  BP 99/61 (BP 1 Location: Left upper arm, BP Patient Position: Lying) Pulse 69   Temp 97.8 °F (36.6 °C)   Resp 16   Ht 5' 4\" (1.626 m)   Wt 121.7 kg (268 lb 6.4 oz)   SpO2 93%   BMI 46.07 kg/m²    O2 Flow Rate (L/min): 2 l/min O2 Device: None (Room air)    Temp (24hrs), Av.9 °F (36.6 °C), Min:97.5 °F (36.4 °C), Max:98.3 °F (36.8 °C)    701 - 1900  In: -   Out: 400 [Urine:400]   1901 - 700  In: 320 [P.O.:320]  Out: 3025 [[facility]]    General:   Patient is comfortably laying in the bed. Alert, cooperative, no distress, appears stated age. Lungs:   Clear to auscultation bilaterally. Chest wall:  No tenderness or deformity. Heart:  Regular rate and rhythm, S1, S2 normal, no murmur.    Abdomen:   Obese, soft, non-tender. Bowel sounds normal.    Extremities: No cyanosis or edema. Dressing at the back is dry and clean.    Pulses: 2+ and symmetric all extremities. Neurologic: Patient has clear voice.  Patient talks appropriately. Patient moves her extremities equally.          Data Review:       Recent Days:  Recent Labs     21  010   WBC 10.3   HGB 10.9*   HCT 36.0   *     Recent Labs     21      K 3.9      CO2 23   *   BUN 8   CREA 0.68   CA 8.7   ALB 1.7*   ALT 82*     No results for input(s): PH, PCO2, PO2, HCO3, FIO2 in the last 72 hours.     24 Hour Results:  Recent Results (from the past 24 hour(s))   GLUCOSE, POC    Collection Time: 21  9:47 PM   Result Value Ref Range    Glucose (POC) 172 (H) 65 - 117 mg/dL    Performed by [id number], POC    Collection Time: 21  6:14 AM   Result Value Ref Range    Glucose (POC) 138 (H) 65 - 117 mg/dL    Performed by [id number], POC    Collection Time: 21 11:25 AM   Result Value Ref Range    Glucose (POC) 160 (H) 65 - 117 mg/dL    Performed by Christos Guzman  PCT    GLUCOSE, POC    Collection Time: 21  4:09 PM   Result Value Ref Range    Glucose (POC) 135 (H) 65 - 117 mg/dL    Performed by Jeanette Roque        Problem List:  Problem List as of 7/13/2021 Date Reviewed: 7/9/2021        Codes Class Noted - Resolved    * (Principal) Right hip pain ICD-10-CM: M25.551  ICD-9-CM: 719.45  7/8/2021 - Present        Lumbar stenosis with neurogenic claudication ICD-10-CM: M48.062  ICD-9-CM: 724.03  4/3/2017 - Present        Rectal polyp ICD-10-CM: K62.1  ICD-9-CM: 569.0  4/6/2016 - Present        Morbid obesity ([address] (Chronic) ICD-10-CM: E66.01  ICD-9-CM: 278.01  2/12/2014 - Present        Right knee DJD ICD-10-CM: M17.11  ICD-9-CM: 715.96  2/11/2014 - Present        Left knee DJD (Chronic) ICD-10-CM: M17.12  ICD-9-CM: 715.96  9/3/2013 - Present              Medications reviewed  Current Facility-Administered Medications   Medication Dose Route Frequency    docusate sodium (COLACE) capsule 100 mg  100 mg Oral BID    senna (SENOKOT) tablet 17.2 mg  2 Tablet Oral DAILY    magnesium hydroxide (MILK OF MAGNESIA) 400 mg/5 mL oral suspension 30 mL  30 mL Oral DAILY    insulin glargine (LANTUS) injection 3 Units  3 Units SubCUTAneous DAILY    ALPRAZolam (XANAX) tablet 0.5 mg  0.5 mg Oral BID    amitriptyline (ELAVIL) tablet 50 mg  50 mg Oral QHS    aspirin delayed-release tablet 81 mg  81 mg Oral DAILY    atorvastatin (LIPITOR) tablet 40 mg  40 mg Oral QHS    butalbital-acetaminophen-caffeine (FIORICET, ESGIC) -40 mg per tablet 1 Tablet  1 Tablet Oral DAILY PRN    levothyroxine (SYNTHROID) tablet 112 mcg  112 mcg Oral ACB    metoprolol tartrate (LOPRESSOR) tablet 25 mg  25 mg Oral 7am    metoprolol tartrate (LOPRESSOR) tablet 12.5 mg  12.5 mg Oral QHS    pantoprazole (PROTONIX) tablet 40 mg  40 mg Oral ACB    PARoxetine (PAXIL) tablet 40 mg  40 mg Oral 7am    sodium chloride (NS) flush 5-40 mL  5-40 mL IntraVENous Q8H    sodium chloride (NS) flush 5-40 mL  5-40 mL IntraVENous PRN    acetaminophen (TYLENOL) tablet 650 mg  650 mg Oral Q6H PRN    Or    acetaminophen (TYLENOL) suppository 650 mg 650 mg Rectal Q6H PRN    polyethylene glycol (MIRALAX) packet 17 g  17 g Oral DAILY PRN    ondansetron (ZOFRAN ODT) tablet 4 mg  4 mg Oral Q8H PRN    Or    ondansetron (ZOFRAN) injection 4 mg  4 mg IntraVENous Q6H PRN    heparin (porcine) injection 5,000 Units  5,000 Units SubCUTAneous Q8H    insulin lispro (HUMALOG) injection   SubCUTAneous AC&HS    glucose chewable tablet 16 g  4 Tablet Oral PRN    dextrose (D50W) injection syrg 12.5-25 g  12.5-25 g IntraVENous PRN    glucagon (GLUCAGEN) injection 1 mg  1 mg IntraMUSCular PRN    L.acidophilus-paracasei-S.thermophil-bifidobacter (RISAQUAD) 8 billion cell capsule  1 Capsule Oral DAILY    doxycycline (VIBRAMYCIN) 100 mg in 0.9% sodium chloride (MBP/ADV) 100 mL MBP  100 mg IntraVENous Q12H    0.9% sodium chloride infusion  75 mL/hr IntraVENous CONTINUOUS    morphine injection 2 mg  2 mg IntraVENous Q4H PRN       Care Plan discussed with: Patient/Family, Nurse and     Total time spent with patient: 40 minutes.     Toyin Bowden MD

## 2021-07-13 NOTE — PROGRESS NOTES
Bedside shift change report given to Marina Mehta RN (oncoming nurse) by Nahid Parra (offgoing nurse). Report included the following information SBAR, Kardex, Procedure Summary, Intake/Output, MAR and Recent Results.

## 2021-07-13 NOTE — ADT AUTH CERT NOTES
Musculoskeletal Disease [facility] - Care Day 5 (7/12/2021) by Shelley Valera       Review Status Review Entered   Completed 7/13/2021 12:01      Criteria Review      Care Day: 5 Care Date: 7/12/2021 Level of Care: Telemetry    Guideline Day 3    Level Of Care    (X) * Activity level acceptable    7/13/2021 12:01:11 EDT by Shelley Valera      activity as tolerated with assist    ( ) * Complete discharge planning    Clinical Status    (X) * Temperature status acceptable    7/13/2021 12:01:11 EDT by Shelley Valera      Temp 98.5    ( ) * No infection, or status acceptable    7/13/2021 12:01:11 EDT by Shelley Valera      Suspected bacterial pneumonia POA.    (X) * C-reactive protein stable, declining, or not indicated    7/13/2021 12:01:11 EDT by Shelley Valera      none noted    (X) * Respiratory status acceptable    7/13/2021 12:01:11 EDT by Shelley Valera      RR 16, 92% on RA    (X) * Neurologic status acceptable    7/13/2021 12:01:11 EDT by Shelley Valera      LOC alert    (X) * Vascular, soft tissue, and wound status acceptable    7/13/2021 12:01:11 EDT by Shelley Valera      Extremities:No cyanosis or edema. Dressing at the back is dry and clean. ( ) * Pain and nausea absent or adequately managed    7/13/2021 12:01:11 EDT by Shelley Valera      Patient said she still has considerable pain at the right hip.     (X) * Fracture or injury absent or status acceptable    7/13/2021 12:01:11 EDT by Shelley Valera      none noted    (X) * No bone and joint infection, or status acceptable    7/13/2021 12:01:11 EDT by Shelley Valera      none noted    (X) * Rheumatologic and vasculitic status acceptable    7/13/2021 12:01:11 EDT by Shelley Valera      none noted    ( ) * General Discharge Criteria met    Interventions    (X) * Intake acceptable    7/13/2021 12:01:11 EDT by Jami Rashid adult regular diet    ( ) * No inpatient interventions needed    7/13/2021 12:01:11 EDT by Jorden Sandoval      activity as tolerated with assist, fall precautions, PT/OT, sot check oximetry, POC glucose QID ACHS, wound care daily incision line to mid back, IV antibiotics    * Milestone   Additional Notes   Date of care: 7/12/2021      IP- LOC-Telemetry      Hospitalist PN: Subjective:   Patient said she still has considerable pain at the right hip. Patient said she does not have BM since 07/07/21. Patient said she has low appetite. Patient denied any nausea, vomiting. Patient has been passing gas. Patient said she takes care of her  at home.      Assessment/Plan:   Intractable right hip pain in setting of fall POA   Improved pain control   MRI on 07/11 showed edema of the right iliopsoas muscle centered at the myotendinous junction compatible with a strain. L2-L5 laminectomy 06/2021. Patient was seen by orthopedics and they recommended for continue wound care.     Continue PT and OT and follow.         Suspected bacterial pneumonia POA. Based on the chest x-ray result as per admitting hospitalist.    Afebrile, and leukcytosis improving. Continue doxycycline while she is at the hospital.       Thrombocytosis, thought to be reactive   Continue to monitor. Patient is on aspirin and heparin.       Hyponatremia POA   Resolved with IVF hydration   Continue to monitor on IV fluid for now.       Morbid obesity based on BMI (refer to epic)       Dyslipidemia   Resume statin, no myalgias reported       Hypothyroidism   On levothyroxine.         Type 2 diabetes mellitus   Uncontrolled with hyperglycemia, present on admission    7/10 started low dose glargine due to persistently elevated glucose readings, resume ssi   7/11: ideally should be on Metformin  (Obese with normal renal function) rather than Insulin add metformin on discharge. A1c is 7.6.    Continue on Metformin at the time of discharge.       Abnormal liver function tests POA    Asymptomatic, trending towards normal. ? CARDENAS     Anxiety disorder   Continue her Xanax.        Constipation:   Bowel regimen started today. General: Alert, cooperative, no distress, appears stated age. Lungs:   Clear to auscultation bilaterally. Chest wall: No tenderness or deformity. Heart: Regular rate and rhythm, S1, S2 normal, no murmur. Abdomen:   Obese, soft, non-tender. Bowel sounds normal.    Extremities: No cyanosis or edema. Dressing at the back is dry and clean. Pulses: 2+ and symmetric all extremities. Neurologic: Patient has clear voice. LIZBETH-XII intact. Vitals:  Temp 98.5, HR 72, /80, RR 16, 92% on RA      Labs:   7/12/2021 01:07   WBC: 10.3   RBC: 4.11   HGB: 10.9 (L)   HCT: 36.0   RDW: 13.8   PLATELET: 505 (H)   Sodium: 136   Potassium: 3.9   Chloride: 107   CO2: 23   Anion gap: 6   Glucose: 146 (H)   BUN/Creatinine ratio: 12   Calcium: 8.7   GFR est non-AA: >60   Protein, total: 6.1 (L)   Albumin: 1.7 (L)   Globulin: 4.4 (H)   A-G Ratio: 0.4 (L)   ALT: 82 (H)   AST: 68 (H)   Alk.  phosphatase: 167 (H)   Chest x-ray: IMPRESSION   No acute cardiopulmonary process         Medications:   NS 75mL/hr IV   Xanax 0.5mg PO BID   Amitriptyline 50mg PO every bedtime   Aspirin 81mg PO daily   Atorvastatin 40mg PO every bedtime   Colace 100mg PO BID   Doxycycline 100mg IV q12   Heparin 5,000units SC q8   Insulin lantus 3 units SC daily   Insulin lispro SC QID ACHS SSI   Risaquad 8 billion cell 2 cap PO daily   Levothyroxine 112mcg PO daily before breakfast   Milk of magnesia 400mg/5mL 30mL PO daily   Metoprolol 12.5mg PO every bedtime   Metoprolol 25mg PO 0700   Morphine 2mg IV q4 PRN x1   Protonix 40mg PO daily before breakfast   Paxil 40mg PO 0700   Senna 17.2mg PO daily   Ceftriaxone 1g IV q24      Plan: adult regular diet, activity as tolerated with assist, fall precautions, PT/OT, sot check oximetry, POC glucose QID ACHS, wound care daily incision line to mid back                              Musculoskeletal Disease [facility] - Care Day 4 (7/11/2021) by Kina Bernstein       Review Status Review Entered   Completed 7/13/2021 11:54      Criteria Review      Care Day: 4 Care Date: 7/11/2021 Level of Care: Telemetry    Guideline Day 3    Level Of Care    (X) * Activity level acceptable    7/13/2021 11:54:33 EDT by Kina Bernstein      activity as tolerated with assist    ( ) * Complete discharge planning    Clinical Status    (X) * Temperature status acceptable    7/13/2021 11:54:33 EDT by Kina Bernstein      Temp 98.5    ( ) * No infection, or status acceptable    7/13/2021 11:54:33 EDT by Kina Bernstein      Suspected bacterial pneumonia POA    (X) * C-reactive protein stable, declining, or not indicated    7/13/2021 11:54:33 EDT by Kina Bernstein      none noted    (X) * Respiratory status acceptable    7/13/2021 11:54:33 EDT by Kina Bernstein      RR 18, 92% on RA    (X) * Neurologic status acceptable    7/13/2021 11:54:33 EDT by Kina Bernstein      AAOx3    (X) * Vascular, soft tissue, and wound status acceptable    7/13/2021 11:54:33 EDT by Kina Bernstein      Musculoskeletal: No edema, warm, 2+ pulses throughout    (X) * Pain and nausea absent or adequately managed    7/13/2021 11:54:33 EDT by Kina Bernstein      Improved pain control    (X) * Fracture or injury absent or status acceptable    7/13/2021 11:54:33 EDT by Kina Bernstein      none noted    (X) * No bone and joint infection, or status acceptable    7/13/2021 11:54:33 EDT by Kina Bernstein      none noted    (X) * Rheumatologic and vasculitic status acceptable    7/13/2021 11:54:33 EDT by Kina Bernstein      none noted    ( ) * General Discharge Criteria met    Interventions    (X) * Intake acceptable    7/13/2021 11:54:33 EDT by Gerson munoz regular diet, PO meds    ( ) * No inpatient interventions needed    7/13/2021 11:54:33 EDT by Kina Slack      activity as tolerated with assist, fall precautions, PT/OT, sot check oximetry, POC glucose QID ACHS, wound care daily incision line to mid back, IV antibiotics    * Milestone   Additional Notes   Date of care: 7/11/2021      IP- LOC-Telemetry      Hospitalist PN: Interval history / Subjective:   No new complaints. She had hip pain yesterday . Not co operative with exam    Assessment & Plan:   Intractable right hip pain in setting of fall POA   Improved pain control   L2-L5 laminectomy. Ortho note:   \"S/p L2-S1 revision fusion.  S/p fall with right hip pain.    1. Recommend wound care; ? Wound vac vs dressing changes.  MRI does not show any collection    2. Right hip pain. . No acute fracture on CT. Recommend PT evaluation.  If pain persist then possible MRI of hip. \"   -Physical therapy consult was ordered   Obtain MRI of Right Hip        Suspected bacterial pneumonia POA. (This is based on the chest x-ray result as per admitting hospitalist)   Afebrile, and leukcytosis improving, On  dual IV abx clinically denies any cough repeat Cxray in am consider stopping Abiotics soon.       Thrombocytosis, thought to be reactive   Trend, already on aspirin, also on heparin dvt prophylaxis dose   7/10 plt [id number]       Hyponatremia POA   Resolved with IVF hydration   Continue to monitor, resume IVF for now       Morbid obesity based on BMI (refer to epic)       Dyslipidemia   Resume statin, no myalgias reported       Hypothyroidism   On replacement       Type 2 diabetes with hyperglycemia POA   On sliding scale   -7/10 started low dose glargine due to persistently elevated glucose readings, resume ssi   -7/11: ideally should be on Metformin  (Obese with normal renal function) rather than Insulin add metformin on discharge. A1c is 7.6.       Abnormal liver function tests POA    Asymptomatic, coming down ?  CARDENAS        Anxiety disorder   Calm at bedside   Resume home meds   Constitutional: No acute distress, cooperative, pleasant    ENT: Oral mucosa moist, anicteric    Resp: limited exam, breath sounds heard bilaterally, no obvious wheezing/rhonchi/rales. No accessory muscle use   CV: Regular rhythm, normal rate, no rubs    GI: Soft, non distended, non tender. normoactive bowel sounds    Musculoskeletal: No edema, warm, 2+ pulses throughout    Neurologic: Moves all extremities.  AAOx3    Psych: calm, not agitated, not anxious, does not voice si/hi      Vitals:  Temp 98.5, HR 74, /53, RR 18, 92% on RA      Labs:   B, 181, 140   Hip MRI: IMPRESSION   1.  Edema of the right iliopsoas muscle centered at the myotendinous junction   compatible with a strain.         Medications:   NS 75mL/hr IV   Xanax 0.5mg PO BID   Amitriptyline 50mg PO every bedtime   Aspirin 81mg PO daily   Atorvastatin 40mg PO every bedtime   Doxycycline 100mg IV q12   Heparin 5,000units SC q8   Insulin lantus 3 units SC daily   Insulin lispro SC QID ACHS SSI   Risaquad 8 billion cell 2 cap PO daily   Levothyroxine 112mcg PO daily before breakfast   Metoprolol 12.5mg PO every bedtime   Metoprolol 25mg PO 0700   Morphine 2mg IV q4 PRN x2   Protonix 40mg PO daily before breakfast   Paxil 40mg PO 0700   Ceftriaxone 1g IV q24      Plan: adult regular diet, activity as tolerated with assist, fall precautions, PT/OT, sot check oximetry, POC glucose QID ACHS, wound care daily incision line to mid back                              Musculoskeletal Disease [facility] - Care Day 3 (7/10/2021) by Amy Guerra       Review Status Review Entered   Completed 2021 11:48      Criteria Review      Care Day: 3 Care Date: 7/10/2021 Level of Care: Telemetry    Guideline Day 3    Level Of Care    (X) * Activity level acceptable    2021 11:48:38 EDT by Amy Guerra      activity as tolerated with assist    ( ) * Complete discharge planning    Clinical Status    (X) * Temperature status acceptable    2021 11:48:38 EDT by Amy Guerra      Temp 98.1    ( ) * No infection, or status acceptable    2021 11:48:38 EDT by Natan Herron      Suspected bacterial pneumonia POA    (X) * C-reactive protein stable, declining, or not indicated    7/13/2021 11:48:39 EDT by Natan Herron      none noted    (X) * Respiratory status acceptable    7/13/2021 11:48:39 EDT by Natan Herron      RR 16, 93% on RA    (X) * Neurologic status acceptable    7/13/2021 11:48:39 EDT by Natan Herron      AAOx3    (X) * Vascular, soft tissue, and wound status acceptable    7/13/2021 11:48:39 EDT by Natan Herron      Musculoskeletal: No edema, warm, 2+ pulses throughout    (X) * Pain and nausea absent or adequately managed    7/13/2021 11:48:39 EDT by Natan Herron      Improved pain control    (X) * Fracture or injury absent or status acceptable    7/13/2021 11:48:39 EDT by Natan Herron      Right hip pain. . No acute fracture on CT    (X) * No bone and joint infection, or status acceptable    7/13/2021 11:48:39 EDT by Natan Herron      none noted    (X) * Rheumatologic and vasculitic status acceptable    7/13/2021 11:48:39 EDT by Natan Herron      none noted    ( ) * General Discharge Criteria met    Interventions    (X) * Intake acceptable    7/13/2021 11:48:39 EDT by Natan Herron      adult regular diet, PO meds    ( ) * No inpatient interventions needed    7/13/2021 11:48:39 EDT by Natan Herron      activity as tolerated with assist, fall precautions, PT/OT, sot check oximetry, POC glucose QID ACHS, wound care daily incision line to mid back, IV antibiotics, IV fluids    * Milestone   Additional Notes   Date of care: 7/10/2021      IP- LOC-Telemetry      Hospitalist PN: Interval history / Subjective:   No complaints   Assessment & Plan:   Intractable right hip pain in setting of fall POA   Improved pain control   L2-L5 laminectomy. Ortho note:   \"S/p L2-S1 revision fusion.  S/p fall with right hip pain.    1. Recommend wound care; ?  Wound vac vs dressing changes.  MRI does not show any collection    2. Right hip pain. . No acute fracture on CT. Recommend PT evaluation.  If pain persist then possible MRI of hip. \"   -Physical therapy consult was ordered       Suspected bacterial pneumonia POA. (This is based on the chest x-ray result as per admitting hospitalist)   Afebrile, and leukcytosis improving, Resume dual IV abx for now.       Thrombocytosis, thought to be reactive   Trend, already on aspirin, also on heparin dvt prophylaxis dose   7/10 plt [id number]       Hyponatremia POA   Resolved with IVF hydration   Continue to monitor, resume IVF for now       Morbid obesity based on BMI (refer to epic)       Dyslipidemia   Resume statin, no myalgias reported       Hypothyroidism   On replacement       Type 2 diabetes with hyperglycemia POA   On sliding scale   -7/10 started low dose glargine due to persistently elevated glucose readings, resume ssi       Abnormal liver function tests POA    Asymptomatic, trend       Anxiety disorder   Calm at bedside   Resume home meds   Constitutional: No acute distress, cooperative, pleasant    ENT: Oral mucosa moist, anicteric    Resp: limited exam, breath sounds heard bilaterally, no obvious wheezing/rhonchi/rales. No accessory muscle use   CV: Regular rhythm, normal rate, no rubs    GI: Soft, non distended, non tender. normoactive bowel sounds    Musculoskeletal: No edema, warm, 2+ pulses throughout    Neurologic: Moves all extremities.  AAOx3    Psych: calm, not agitated, not anxious, does not voice si/hi      Vitals:  Temp 98.1, HR 79, /61, RR 16, 93% on RA      Labs:   7/10/2021 02:22   WBC: 12.2 (H)   RBC: 3.74 (L)   HGB: 10.1 (L)   HCT: 32.2 (L)   RDW: 13.7   PLATELET: 814 (H)   NEUTROPHILS: 66   LYMPHOCYTES: 19   IMMATURE GRANULOCYTES: 1 (H)   ABS. NEUTROPHILS: 8.2 (H)   ABS. IMM. GRANS.: 0.1 (H)   ABS. MONOCYTES: 1.2 (H)   ABS.  EOSINOPHILS: 0.5 (H)   Sodium: 136   Potassium: 3.6   Chloride: 103   CO2: 27   Anion gap: 6   Glucose: 168 (H)   BUN/Creatinine ratio: 15   Calcium: 8.8   GFR est non-AA: >60   Protein, total: 6.2 (L)   Albumin: 2.0 (L)   Globulin: 4.2 (H)   A-G Ratio: 0.5 (L)   ALT: 79 (H)   AST: 48 (H)   Alk.  phosphatase: 169 (H)   NT pro-BNP: 514 (H)   7/10/2021 02:28   Hemoglobin A1c, (calculated): 7.6 (H)         Medications:   NS 75mL/hr IV   Acetaminophen 650mg PO q6 PRN x1   Xanax 0.5mg PO BID   Amitriptyline 50mg PO every bedtime   Aspirin 81mg PO daily   Atorvastatin 40mg PO every bedtime   Doxycycline 100mg IV q12   Heparin 5,000units SC q8   Insulin lantus 3 units SC daily   Insulin lispro SC QID ACHS SSI   Risaquad 8 billion cell 2 cap PO daily   Levothyroxine 112mcg PO daily before breakfast   Metoprolol 12.5mg PO every bedtime   Metoprolol 25mg PO 0700   Morphine 2mg IV q4 PRN x3   Protonix 40mg PO daily before breakfast   Paxil 40mg PO 0700   Ceftriaxone 1g IV q24      Plan: adult regular diet, activity as tolerated with assist, fall precautions, PT/OT, sot check oximetry, POC glucose QID ACHS, wound care daily incision line to mid back

## 2021-07-13 NOTE — PROGRESS NOTES
ENEIDA: The patient plans to go to SNF for rehab services with transport TBD. Pending SNF choices by patient. RUR: 18%    CM met with the patient and gave the patient a SNF list. Awaiting choices. CM called Nichol Ngo ([phone]) at [facility] to see if she is their patient and Nichol Ngo stated the patient is not a [facility] patient. CM following for discharge needs.     Verito Fernandez RN/CRM

## 2021-07-13 NOTE — PROGRESS NOTES
Problem: Self Care Deficits Care Plan (Adult)  Goal: *Acute Goals and Plan of Care (Insert Text)  Description: Occupational Therapy Goals  Initiated: 7/13/2021   1. Patient will perform grooming with supervision/set-up sitting in chair within 7 day(s). 2.  Patient will perform bathing with mod A from chair within 7 day(s). 3.  Patient will perform upper body dressing and lower body dressing with mod A within 7 day(s). 4.  Patient will perform toilet transfers with mod A within 7 day(s). 5.  Patient will perform all aspects of toileting with min A within 7 day(s). 6.  Patient will maintain spine precautions with ADL activities independently within 7 days. FUNCTIONAL STATUS PRIOR TO ADMISSION: She had recent spine fusion in beginning of June. Discharged to [facility] and returned home with [facility] therapy. Pt was at mod I level in her home and was using a corset TLSO. HOME SUPPORT: Receiving [facility] services. Outcome: Progressing Towards Goal    OCCUPATIONAL THERAPY EVALUATION  Patient: Abbe Phillips (56 y.o. female)  Date: 7/13/2021  Primary Diagnosis: Right hip pain [M25.551]        Precautions:   Fall, WBAT    ASSESSMENT  Based on the objective data described below, the patient presents with decreased independence with all self care and functional mobility following admission for right hip pain following a fall but resulting with no fracture. She refused to progress OOB with OT but agreed to change positions in bed to provide pressure relief and setup for self feeding tasks. Pt also noted with falling asleep during intervention and requires cues to remain alert and answer questions. Pt unsafe to discharge home in current state due to lethargy and impaired mobility overall. Recommend discharge to rehab setting to maximize safety and independence with all tasks. Current Level of Function Impacting Discharge (ADLs/self-care): mod A for rolling in bed. Functional Outcome Measure:   The patient scored 35 on the Barthel Index outcome measure which is indicative of 65% impairment with ADL activities. Other factors to consider for discharge: debility, pain, falls     Patient will benefit from skilled therapy intervention to address the above noted impairments. PLAN :  Recommendations and Planned Interventions: self care training, functional mobility training, therapeutic exercise, balance training, therapeutic activities, endurance activities, patient education, home safety training, and family training/education    Frequency/Duration: Patient will be followed by occupational therapy 5 times a week to address goals. Recommendation for discharge: (in order for the patient to meet his/her long term goals)  Therapy up to 5 days/week in SNF setting    This discharge recommendation:  Has been made in collaboration with the attending provider and/or case management    IF patient discharges home will need the following DME: none       SUBJECTIVE:   Patient stated I am so sleepy but I just woke up.     OBJECTIVE DATA SUMMARY:   HISTORY:   Past Medical History:   Diagnosis Date    Adverse effect of anesthesia     O2 DROPS WITH ANESTHESIA    Arthritis     KNEES    Cancer ([facility] [address] 03/13/2015    SQUAMOUS CELL CARCINOMA; tonsils and lymph nodes.   Removed 3-2015 with radiation    GERD (gastroesophageal reflux disease)     Hypertension     NO LONGER ON MEDICATION    Hypothyroid     HYPOTHYROIDISM    Migraine     Nausea & vomiting     Obesity     Other ill-defined conditions(799.89)     chronic back pain    Psychiatric disorder     anxiety    Psychiatric disorder     panic ATTACKS    SVT (supraventricular tachycardia) ([facility] [address] 05/24/2014    Ulcerative colitis      Past Surgical History:   Procedure Laterality Date    COLONOSCOPY,DIAGNOSTIC  11/19/2015         HX GI      colonscopy    HX HEENT  03/2015    tonsillectomy (CANCER) AND NECK LYMPH NODES    HX HEENT  2008    PARATHYROID REMOVAL    HX HEENT Left 2002    EYE LASER HX HEMORRHOIDECTOMY  2001, 2016     [address] [facility] [employer] [place]    HX KNEE ARTHROSCOPY  1995    left knee surgery, TOTAL LT  and Right KNEE 2013    HX KNEE REPLACEMENT Bilateral 2013, 2014    HX LAP CHOLECYSTECTOMY  2002    HX LUMBAR FUSION  2011    SPINAL FUSION with hardware L4-L5    HX ORTHOPAEDIC  1990    CYST REMOVED RIGHT WRIST    HX ORTHOPAEDIC  05/12/2021    L2-3 & L5-21 LUMBAR LAMINECTOMY WITH ANDREWS OSTEOTOMY    HX OTHER SURGICAL      cyst removal right wrist    HX UROLOGICAL  2010    bladder surgery(interstim device)    IR INJ FORAMIN EPID LUMB ANES/STER SNGL  8/19/2019    MA EGD TRANSORAL BIOPSY SINGLE/MULTIPLE  5/20/2013            Expanded or extensive additional review of patient history:     Home Situation  Home Environment: Private residence  # Steps to Enter: 0  Wheelchair Ramp: Yes  One/Two Story Residence: One story  Living Alone: No  Support Systems: Spouse/Significant Other/Partner  Patient Expects to be Discharged to[de-identified] Rehabilitation facility  Current DME Used/Available at Home: Transfer bench, Walker, rolling, Walker, rollator, Cane, straight, Commode, bedside  Tub or Shower Type: Tub/Shower combination    Hand dominance: Right    EXAMINATION OF PERFORMANCE DEFICITS:  Cognitive/Behavioral Status:  Neurologic State: Alert  Orientation Level: Oriented X4  Cognition: Appropriate for age attention/concentration  Perception: Appears intact  Perseveration: No perseveration noted  Safety/Judgement: Awareness of environment    Skin: see nursing notes    Edema:  none noted    Hearing: Auditory  Auditory Impairment: None    Vision/Perceptual:                           Acuity: Within Defined Limits         Range of Motion:    AROM: Within functional limits  PROM: Within functional limits                      Strength:    Strength: Within functional limits                Coordination:  Coordination: Within functional limits  Fine Motor Skills-Upper: Right Intact; Left Intact    Gross Motor Skills-Upper: Right Intact; Left Intact    Tone & Sensation:    Tone: Normal  Sensation: Intact                      Balance:  Sitting:  (pt refused to progress to EOB)  Standing:  (pt refused to progress to EOB)    Functional Mobility and Transfers for ADLs:  Bed Mobility:  Rolling:  (pt refused to progress to EOB)  Supine to Sit:  (pt refused to progress to EOB)  Sit to Supine:  (pt refused to progress to EOB)  Scooting:  (pt refused to progress to EOB)    Transfers:  Sit to Stand:  (pt refused to progress to EOB)  Stand to Sit:  (pt refused to progress to EOB)  Bed to Chair:  (pt refused to progress to EOB)  Bathroom Mobility:  (pt refused to progress to EOB)  Toilet Transfer :  (pt refused to progress to EOB)    ADL Assessment:  Feeding: Setup    Oral Facial Hygiene/Grooming: Setup    Bathing: Total assistance    Upper Body Dressing: Total assistance    Lower Body Dressing: Total assistance    Toileting: Total assistance                ADL Intervention and task modifications:   Pt progressed from supine in bed to EOB. Pt then returned to supine in bed. Cognitive Retraining  Safety/Judgement: Awareness of environment    Functional Measure:  Barthel Index:    Bathin  Bladder: 5  Bowels: 10  Groomin  Dressin  Feeding: 10  Mobility: 0  Stairs: 0  Toilet Use: 0  Transfer (Bed to Chair and Back): 5  Total: 35/100        The Barthel ADL Index: Guidelines  1. The index should be used as a record of what a patient does, not as a record of what a patient could do. 2. The main aim is to establish degree of independence from any help, physical or verbal, however minor and for whatever reason. 3. The need for supervision renders the patient not independent. 4. A patient's performance should be established using the best available evidence. Asking the patient, friends/relatives and nurses are the usual sources, but direct observation and common sense are also important.  However direct testing is not needed. 5. Usually the patient's performance over the preceding 24-48 hours is important, but occasionally longer periods will be relevant. 6. Middle categories imply that the patient supplies over 50 per cent of the effort. 7. Use of aids to be independent is allowed. Margueritte Hone., Barthel, D.W. ([id number]). Functional evaluation: the Barthel Index. [address] [facility] (14)2. MU Kaba, Aide Wagner., bOi Conrad., [place], [address] [place] [address] (1999). Measuring the change indisability after inpatient rehabilitation; comparison of the responsiveness of the Barthel Index and Functional [place] Measure. Journal of Neurology, Neurosurgery, and Psychiatry, 66(4), 905-013. JEFF Ding, KATIE Hunter, & Maurice Manjarrez MGARRETT. (2004.) Assessment of post-stroke quality of life in cost-effectiveness studies: The usefulness of the Barthel Index and the EuroQoL-5D. Quality of Life Research, 15, 542-37     Occupational Therapy Evaluation Charge Determination   History Examination Decision-Making   LOW Complexity : Brief history review  HIGH Complexity : 5 or more performance deficits relating to physical, cognitive , or psychosocial skils that result in activity limitations and / or participation restrictions HIGH Complexity : Patient presents with comorbidities that affect occupational performance. Signifigant modification of tasks or assistance (eg, physical or verbal) with assessment (s) is necessary to enable patient to complete evaluation       Based on the above components, the patient evaluation is determined to be of the following complexity level: LOW   Pain Rating:  No pain    Activity Tolerance:   Good    After treatment patient left in no apparent distress:    Sitting in chair, Call bell within reach, and Caregiver / family present    COMMUNICATION/EDUCATION:   The patients plan of care was discussed with: Physical therapist and Registered nurse.      Home safety education was provided and the patient/caregiver indicated understanding. and Patient/family have participated as able in goal setting and plan of care. This patients plan of care is appropriate for delegation to SANDRINE.     Thank you for this referral.  Rasta Ca OT  Time Calculation: 15 mins

## 2021-07-14 LAB
ALBUMIN SERPL-MCNC: 2 G/DL (ref 3.5–5)
ALBUMIN/GLOB SERPL: 0.5 {RATIO} (ref 1.1–2.2)
ALP SERPL-CCNC: 170 U/L (ref 45–117)
ALT SERPL-CCNC: 73 U/L (ref 12–78)
ANION GAP SERPL CALC-SCNC: 6 MMOL/L (ref 5–15)
AST SERPL-CCNC: 47 U/L (ref 15–37)
BASOPHILS # BLD: 0 K/UL (ref 0–0.1)
BASOPHILS NFR BLD: 0 % (ref 0–1)
BILIRUB SERPL-MCNC: 0.4 MG/DL (ref 0.2–1)
BUN SERPL-MCNC: 6 MG/DL (ref 6–20)
BUN/CREAT SERPL: 11 (ref 12–20)
CALCIUM SERPL-MCNC: 8.9 MG/DL (ref 8.5–10.1)
CHLORIDE SERPL-SCNC: 107 MMOL/L (ref 97–108)
CO2 SERPL-SCNC: 27 MMOL/L (ref 21–32)
CREAT SERPL-MCNC: 0.55 MG/DL (ref 0.55–1.02)
DIFFERENTIAL METHOD BLD: ABNORMAL
EOSINOPHIL # BLD: 0.6 K/UL (ref 0–0.4)
EOSINOPHIL NFR BLD: 8 % (ref 0–7)
ERYTHROCYTE [DISTWIDTH] IN BLOOD BY AUTOMATED COUNT: 13.8 % (ref 11.5–14.5)
GLOBULIN SER CALC-MCNC: 4.2 G/DL (ref 2–4)
GLUCOSE BLD STRIP.AUTO-MCNC: 101 MG/DL (ref 65–117)
GLUCOSE BLD STRIP.AUTO-MCNC: 123 MG/DL (ref 65–117)
GLUCOSE BLD STRIP.AUTO-MCNC: 175 MG/DL (ref 65–117)
GLUCOSE BLD STRIP.AUTO-MCNC: 195 MG/DL (ref 65–117)
GLUCOSE SERPL-MCNC: 136 MG/DL (ref 65–100)
HCT VFR BLD AUTO: 35.9 % (ref 35–47)
HGB BLD-MCNC: 11.1 G/DL (ref 11.5–16)
IMM GRANULOCYTES # BLD AUTO: 0.1 K/UL (ref 0–0.04)
IMM GRANULOCYTES NFR BLD AUTO: 1 % (ref 0–0.5)
LYMPHOCYTES # BLD: 2 K/UL (ref 0.8–3.5)
LYMPHOCYTES NFR BLD: 26 % (ref 12–49)
MCH RBC QN AUTO: 26.8 PG (ref 26–34)
MCHC RBC AUTO-ENTMCNC: 30.9 G/DL (ref 30–36.5)
MCV RBC AUTO: 86.7 FL (ref 80–99)
MONOCYTES # BLD: 0.8 K/UL (ref 0–1)
MONOCYTES NFR BLD: 10 % (ref 5–13)
NEUTS SEG # BLD: 4 K/UL (ref 1.8–8)
NEUTS SEG NFR BLD: 55 % (ref 32–75)
NRBC # BLD: 0 K/UL (ref 0–0.01)
NRBC BLD-RTO: 0 PER 100 WBC
PLATELET # BLD AUTO: 542 K/UL (ref 150–400)
PMV BLD AUTO: 9.7 FL (ref 8.9–12.9)
POTASSIUM SERPL-SCNC: 3.7 MMOL/L (ref 3.5–5.1)
PROT SERPL-MCNC: 6.2 G/DL (ref 6.4–8.2)
RBC # BLD AUTO: 4.14 M/UL (ref 3.8–5.2)
SERVICE CMNT-IMP: ABNORMAL
SERVICE CMNT-IMP: NORMAL
SODIUM SERPL-SCNC: 140 MMOL/L (ref 136–145)
WBC # BLD AUTO: 7.4 K/UL (ref 3.6–11)

## 2021-07-14 PROCEDURE — 2709999900 HC NON-CHARGEABLE SUPPLY

## 2021-07-14 PROCEDURE — 36415 COLL VENOUS BLD VENIPUNCTURE: CPT

## 2021-07-14 PROCEDURE — 97530 THERAPEUTIC ACTIVITIES: CPT

## 2021-07-14 PROCEDURE — 65270000029 HC RM PRIVATE

## 2021-07-14 PROCEDURE — 74011250636 HC RX REV CODE- 250/636: Performed by: INTERNAL MEDICINE

## 2021-07-14 PROCEDURE — 74011250637 HC RX REV CODE- 250/637: Performed by: FAMILY MEDICINE

## 2021-07-14 PROCEDURE — 74011636637 HC RX REV CODE- 636/637: Performed by: INTERNAL MEDICINE

## 2021-07-14 PROCEDURE — 80053 COMPREHEN METABOLIC PANEL: CPT

## 2021-07-14 PROCEDURE — 97116 GAIT TRAINING THERAPY: CPT

## 2021-07-14 PROCEDURE — 82962 GLUCOSE BLOOD TEST: CPT

## 2021-07-14 PROCEDURE — 77030038269 HC DRN EXT URIN PURWCK BARD -A

## 2021-07-14 PROCEDURE — 74011250637 HC RX REV CODE- 250/637: Performed by: INTERNAL MEDICINE

## 2021-07-14 PROCEDURE — 85025 COMPLETE CBC W/AUTO DIFF WBC: CPT

## 2021-07-14 PROCEDURE — 97535 SELF CARE MNGMENT TRAINING: CPT

## 2021-07-14 RX ORDER — OXYCODONE HYDROCHLORIDE 5 MG/1
5 TABLET ORAL
Status: DISCONTINUED | OUTPATIENT
Start: 2021-07-14 | End: 2021-07-20 | Stop reason: HOSPADM

## 2021-07-14 RX ADMIN — INSULIN GLARGINE 3 UNITS: 100 INJECTION, SOLUTION SUBCUTANEOUS at 09:57

## 2021-07-14 RX ADMIN — INSULIN LISPRO 2 UNITS: 100 INJECTION, SOLUTION INTRAVENOUS; SUBCUTANEOUS at 11:34

## 2021-07-14 RX ADMIN — AMITRIPTYLINE HYDROCHLORIDE 50 MG: 50 TABLET, FILM COATED ORAL at 21:17

## 2021-07-14 RX ADMIN — HEPARIN SODIUM 5000 UNITS: 5000 INJECTION INTRAVENOUS; SUBCUTANEOUS at 13:29

## 2021-07-14 RX ADMIN — Medication 10 ML: at 13:30

## 2021-07-14 RX ADMIN — HEPARIN SODIUM 5000 UNITS: 5000 INJECTION INTRAVENOUS; SUBCUTANEOUS at 21:19

## 2021-07-14 RX ADMIN — METOPROLOL TARTRATE 12.5 MG: 25 TABLET, FILM COATED ORAL at 21:17

## 2021-07-14 RX ADMIN — METOPROLOL TARTRATE 25 MG: 25 TABLET, FILM COATED ORAL at 07:23

## 2021-07-14 RX ADMIN — PANTOPRAZOLE SODIUM 40 MG: 40 TABLET, DELAYED RELEASE ORAL at 07:23

## 2021-07-14 RX ADMIN — Medication 10 ML: at 21:18

## 2021-07-14 RX ADMIN — PAROXETINE 40 MG: 20 TABLET, FILM COATED ORAL at 07:23

## 2021-07-14 RX ADMIN — ATORVASTATIN CALCIUM 40 MG: 40 TABLET, FILM COATED ORAL at 21:17

## 2021-07-14 RX ADMIN — ASPIRIN 81 MG: 81 TABLET, COATED ORAL at 09:54

## 2021-07-14 RX ADMIN — ALPRAZOLAM 0.25 MG: 0.25 TABLET ORAL at 21:16

## 2021-07-14 RX ADMIN — ACETAMINOPHEN 650 MG: 325 TABLET ORAL at 09:55

## 2021-07-14 RX ADMIN — LEVOTHYROXINE SODIUM 112 MCG: 0.11 TABLET ORAL at 07:23

## 2021-07-14 RX ADMIN — OXYCODONE HYDROCHLORIDE 5 MG: 5 TABLET ORAL at 14:01

## 2021-07-14 RX ADMIN — Medication 1 CAPSULE: at 09:54

## 2021-07-14 RX ADMIN — INSULIN LISPRO 2 UNITS: 100 INJECTION, SOLUTION INTRAVENOUS; SUBCUTANEOUS at 17:03

## 2021-07-14 RX ADMIN — HEPARIN SODIUM 5000 UNITS: 5000 INJECTION INTRAVENOUS; SUBCUTANEOUS at 06:23

## 2021-07-14 RX ADMIN — ALPRAZOLAM 0.25 MG: 0.25 TABLET ORAL at 07:23

## 2021-07-14 NOTE — PROGRESS NOTES
ENEIDA: The patient prefers to go to [address] [place] [address] for rehab services when stable for discharge. Transport TBD. RUR: 18%    CM sent referral to [address] [place] [address] with attending MD's approval. Referral pending. If [facility] does not work out the patient has chosen the following SNF's to send referrals. 1st choice: [facility]   2nd choice: [facility]  3rd choice: [address] [place] [address] and [facility] for discharge needs.     Verito Fernandez RN/CRM

## 2021-07-14 NOTE — PROGRESS NOTES
Pts RN asked me to see this patient and evaluate her incision. Ms. Salomón Shultz underwent surgery on 6/2/21 for \"Exploration of fusion, revision laminectomy L2-3, L5-S1 with a Lydia osteotomy at L2-3. Posterior revision fusion L2 to S1 with segmental instrumentation. \" She has experienced some delayed healing of her would likely secondary to third spacing and body habitus. MRI looked OK with normal post-operative changes; results are as follows \"Posterior fusion L2-S1. No discitis or osteomyelitis. Nonspecific posterior soft tissue swelling and fluid. Multilevel lumbar spine stenoses. \" No evidence of infection. Pt has had wound healing issues in the past after a previous spine surgery. Wound care consult placed last week. We appreciate their recommendations and I will ask them to re-evaluate her today as incision continues to drain and now surrounding skin is erythematous and slightly macerated from moisture. When I went to examine her, aquacel dressing was bunched up and not covering incision. Will cont to follow. Will likely benefit from IPR.

## 2021-07-14 NOTE — PROGRESS NOTES
Bedside and Verbal shift change report given to Nahum Helm (oncoming nurse) by Deatra Hamman (offgoing nurse). Report included the following information SBAR, Kardex, Procedure Summary, Intake/Output and MAR.

## 2021-07-14 NOTE — PROGRESS NOTES
Hospitalist Progress Note          Kamila Crouch MD  Please call  and page for questions. Call physician on-call through the  7pm-7am    Daily Progress Note: 7/14/2021    Primary care provider:Adrian Colmenares MD    Date of admission: 7/8/2021  1:08 PM    Admission summery and hospital course:  61-year-old woman with a past medical history significant for degenerative disk disease, dyslipidemia, hypothyroidism, type 2 diabetes, anxiety disorder, was in her usual state of health until the day of her presentation at the emergency room when the patient developed right hip pain.  The patient stated that she fell at home prior to the onset of the right hip pain and sustained injury to the right hip.  The patient was brought to the emergency room for further evaluation on 06/02/2021.  The patient was admitted to this hospital and underwent lumbar laminectomy.  After 5 days of hospitalization, the patient was discharged by the orthopedic service to rehab facility.  The patient has since been discharged from rehab and she is back at home. [facility] the patient arrived at the emergency room, x-ray of the right hip was obtained, which did not show any acute pathology.  Because the patient recently had back surgery, MRI of the lumbar spine as well as the thoracic spine were obtained, which did not show any acute pathology and no infection.      Subjective:   Patient said she is doing fine. Patient said she prefers to go to rehab to [facility]. Assessment/Plan:   Intractable right hip pain in setting of fall POA  Improved pain control  MRI on 07/11 showed edema of the right iliopsoas muscle centered at the myotendinous junction compatible with a strain. L2-L5 laminectomy 06/2021, seen by Dr Gilma Turk on 07/14/21, recommended for wound care.   Patient was seen by orthopedics and they recommended for continue wound care.    Continue PT and OT and follow.       Suspected bacterial pneumonia POA. Based on the chest x-ray result as per admitting hospitalist.   Afebrile, and leukcytosis improving.   Complete 5 days of IV doxycycline and Rocephin. Repeat chest x-ray PA view on 07/12/2021 was negative for acute process. .     Thrombocytosis, thought to be reactive  Proving, continue to monitor. Patient is on aspirin and heparin.     Type 2 diabetes mellitus  Uncontrolled with hyperglycemia, present on admission   7/10 started low dose glargine due to persistently elevated glucose readings, resume ssi  7/11: ideally should be on Metformin  (Obese with normal renal function) rather than Insulin add metformin on discharge. A1c is 7.6. Continue on Metformin at the time of discharge. Morbid obesity based on BMI (refer to epic)   Dyslipidemia: Resume statin, no myalgias reported   Hypothyroidism: On levothyroxine  Hyponatremia POA: Resolved with IVF hydration. Discontinue IV fluid today. Abnormal liver function tests POA: Asymptomatic, trending towards normal. ? CARDENAS.    Anxiety disorder: Continue her Xanax.    Constipation: Monitor with bowel regimen.       See orders for other plans. VTE prophylaxis: Heparin  Code status: Full code  Discussed plan of care with Patient/Family and Nurse. Pre-admission lived at home. Discharge planning: Patient is medically stable to be discharged to SNF or rehab. Follow PT/OT/ recommendation.          Review of Systems:     Review of Systems:  Symptom  Y/N  Comments   Symptom  Y/N  Comments    Fever/Chills  n    Chest Pain  n    Poor Appetite  n    Edema  n     Cough  n   Abdominal Pain  n     Sputum  n   Joint Pain      SOB/AVALOS  n   Pruritis/Rash      Nausea/vomit  n   Tolerating PT/OT      Diarrhea     Tolerating Diet      Constipation     Other      Could not obtain due to:         Objective:   Physical Exam:     Visit Vitals  /72 (BP 1 Location: Left upper arm)   Pulse 67   Temp 97.3 °F (36.3 °C)   Resp 16   Ht 5' 4\" (1.626 m)   Wt 121.7 kg (268 lb 6.4 oz)   SpO2 96%   BMI 46.07 kg/m²    O2 Flow Rate (L/min): 2 l/min O2 Device: None (Room air)    Temp (24hrs), Av.6 °F (36.4 °C), Min:97.3 °F (36.3 °C), Max:97.9 °F (36.6 °C)    No intake/output data recorded.  1901 -  0700  In: -   Out: 4295 [Urine:2825]      General:   Patient is comfortably laying in the bed. Alert, cooperative, no distress, appears stated age. Lungs:   Clear to auscultation bilaterally. Chest wall:  No tenderness or deformity. Heart:  Regular rate and rhythm, S1, S2 normal, no murmur.    Abdomen:   Obese, soft, non-tender. Bowel sounds normal.    Extremities: No cyanosis or edema. Dressing at the back is dry and clean.    Pulses: 2+ and symmetric all extremities. Neurologic: Patient has clear voice.  Patient talks appropriately. Patient moves her extremities equally.        Data Review:       Recent Days:  Recent Labs     21  0339 21  0107   WBC 7.4 10.3   HGB 11.1* 10.9*   HCT 35.9 36.0   * 472*     Recent Labs     21  0339 21  0107    136   K 3.7 3.9    107   CO2 27 23   * 146*   BUN 6 8   CREA 0.55 0.68   CA 8.9 8.7   ALB 2.0* 1.7*   ALT 73 82*     No results for input(s): PH, PCO2, PO2, HCO3, FIO2 in the last 72 hours.     24 Hour Results:  Recent Results (from the past 24 hour(s))   GLUCOSE, POC    Collection Time: 21  4:09 PM   Result Value Ref Range    Glucose (POC) 135 (H) 65 - 117 mg/dL    Performed by Hayley Kramer, POC    Collection Time: 21 10:18 PM   Result Value Ref Range    Glucose (POC) 200 (H) 65 - 117 mg/dL    Performed by Nichole Blood    CBC WITH AUTOMATED DIFF    Collection Time: 21  3:39 AM   Result Value Ref Range    WBC 7.4 3.6 - 11.0 K/uL    RBC 4.14 3.80 - 5.20 M/uL    HGB 11.1 (L) 11.5 - 16.0 g/dL    HCT 35.9 35.0 - 47.0 %    MCV 86.7 80.0 - 99.0 FL    MCH 26.8 26.0 - 34.0 PG    MCHC 30.9 30.0 - 36.5 g/dL    RDW 13.8 11.5 - 14.5 % PLATELET 857 (H) 219 - 400 K/uL    MPV 9.7 8.9 - 12.9 FL    NRBC 0.0 0  WBC    ABSOLUTE NRBC 0.00 0.00 - 0.01 K/uL    NEUTROPHILS 55 32 - 75 %    LYMPHOCYTES 26 12 - 49 %    MONOCYTES 10 5 - 13 %    EOSINOPHILS 8 (H) 0 - 7 %    BASOPHILS 0 0 - 1 %    IMMATURE GRANULOCYTES 1 (H) 0.0 - 0.5 %    ABS. NEUTROPHILS 4.0 1.8 - 8.0 K/UL    ABS. LYMPHOCYTES 2.0 0.8 - 3.5 K/UL    ABS. MONOCYTES 0.8 0.0 - 1.0 K/UL    ABS. EOSINOPHILS 0.6 (H) 0.0 - 0.4 K/UL    ABS. BASOPHILS 0.0 0.0 - 0.1 K/UL    ABS. IMM. GRANS. 0.1 (H) 0.00 - 0.04 K/UL    DF AUTOMATED     METABOLIC PANEL, COMPREHENSIVE    Collection Time: 07/14/21  3:39 AM   Result Value Ref Range    Sodium 140 136 - 145 mmol/L    Potassium 3.7 3.5 - 5.1 mmol/L    Chloride 107 97 - 108 mmol/L    CO2 27 21 - 32 mmol/L    Anion gap 6 5 - 15 mmol/L    Glucose 136 (H) 65 - 100 mg/dL    BUN 6 6 - 20 MG/DL    Creatinine 0.55 0.55 - 1.02 MG/DL    BUN/Creatinine ratio 11 (L) 12 - 20      GFR est AA >60 >60 ml/min/1.73m2    GFR est non-AA >60 >60 ml/min/1.73m2    Calcium 8.9 8.5 - 10.1 MG/DL    Bilirubin, total 0.4 0.2 - 1.0 MG/DL    ALT (SGPT) 73 12 - 78 U/L    AST (SGOT) 47 (H) 15 - 37 U/L    Alk.  phosphatase 170 (H) 45 - 117 U/L    Protein, total 6.2 (L) 6.4 - 8.2 g/dL    Albumin 2.0 (L) 3.5 - 5.0 g/dL    Globulin 4.2 (H) 2.0 - 4.0 g/dL    A-G Ratio 0.5 (L) 1.1 - 2.2     GLUCOSE, POC    Collection Time: 07/14/21  6:30 AM   Result Value Ref Range    Glucose (POC) 123 (H) 65 - 117 mg/dL    Performed by Lauren Clemente    GLUCOSE, POC    Collection Time: 07/14/21 10:56 AM   Result Value Ref Range    Glucose (POC) 175 (H) 65 - 117 mg/dL    Performed by Susi Kimbrough        Problem List:  Problem List as of 7/14/2021 Date Reviewed: 7/9/2021        Codes Class Noted - Resolved    * (Principal) Right hip pain ICD-10-CM: M25.551  ICD-9-CM: 719.45  7/8/2021 - Present        Lumbar stenosis with neurogenic claudication ICD-10-CM: Q40.037  ICD-9-CM: 724.03  4/3/2017 - Present Rectal polyp ICD-10-CM: K62.1  ICD-9-CM: 569.0  4/6/2016 - Present        Morbid obesity ([address] (Chronic) ICD-10-CM: E66.01  ICD-9-CM: 278.01  2/12/2014 - Present        Right knee DJD ICD-10-CM: M17.11  ICD-9-CM: 715.96  2/11/2014 - Present        Left knee DJD (Chronic) ICD-10-CM: M17.12  ICD-9-CM: 715.96  9/3/2013 - Present              Medications reviewed  Current Facility-Administered Medications   Medication Dose Route Frequency    oxyCODONE IR (ROXICODONE) tablet 5 mg  5 mg Oral Q6H PRN    ALPRAZolam (XANAX) tablet 0.25 mg  0.25 mg Oral BID    docusate sodium (COLACE) capsule 100 mg  100 mg Oral BID    senna (SENOKOT) tablet 17.2 mg  2 Tablet Oral DAILY    magnesium hydroxide (MILK OF MAGNESIA) 400 mg/5 mL oral suspension 30 mL  30 mL Oral DAILY    insulin glargine (LANTUS) injection 3 Units  3 Units SubCUTAneous DAILY    amitriptyline (ELAVIL) tablet 50 mg  50 mg Oral QHS    aspirin delayed-release tablet 81 mg  81 mg Oral DAILY    atorvastatin (LIPITOR) tablet 40 mg  40 mg Oral QHS    butalbital-acetaminophen-caffeine (FIORICET, ESGIC) -40 mg per tablet 1 Tablet  1 Tablet Oral DAILY PRN    levothyroxine (SYNTHROID) tablet 112 mcg  112 mcg Oral ACB    metoprolol tartrate (LOPRESSOR) tablet 25 mg  25 mg Oral 7am    metoprolol tartrate (LOPRESSOR) tablet 12.5 mg  12.5 mg Oral QHS    pantoprazole (PROTONIX) tablet 40 mg  40 mg Oral ACB    PARoxetine (PAXIL) tablet 40 mg  40 mg Oral 7am    sodium chloride (NS) flush 5-40 mL  5-40 mL IntraVENous Q8H    sodium chloride (NS) flush 5-40 mL  5-40 mL IntraVENous PRN    acetaminophen (TYLENOL) tablet 650 mg  650 mg Oral Q6H PRN    Or    acetaminophen (TYLENOL) suppository 650 mg  650 mg Rectal Q6H PRN    polyethylene glycol (MIRALAX) packet 17 g  17 g Oral DAILY PRN    ondansetron (ZOFRAN ODT) tablet 4 mg  4 mg Oral Q8H PRN    Or    ondansetron (ZOFRAN) injection 4 mg  4 mg IntraVENous Q6H PRN    heparin (porcine) injection 5,000 Units 5,000 Units SubCUTAneous Q8H    insulin lispro (HUMALOG) injection   SubCUTAneous AC&HS    glucose chewable tablet 16 g  4 Tablet Oral PRN    dextrose (D50W) injection syrg 12.5-25 g  12.5-25 g IntraVENous PRN    glucagon (GLUCAGEN) injection 1 mg  1 mg IntraMUSCular PRN    L.acidophilus-paracasei-S.thermophil-bifidobacter (RISAQUAD) 8 billion cell capsule  1 Capsule Oral DAILY    0.9% sodium chloride infusion  75 mL/hr IntraVENous CONTINUOUS       Care Plan discussed with: Patient/Family, Nurse and     Total time spent with patient: 40 minutes.     Toyin Bowden MD

## 2021-07-14 NOTE — CONSULTS
Consult was placed to ortho spine. Please refer to the note from 7/14/21 by Osmar Ruelas PA-C for ortho spine recs on this pt.

## 2021-07-14 NOTE — PROGRESS NOTES
Problem: Self Care Deficits Care Plan (Adult)  Goal: *Acute Goals and Plan of Care (Insert Text)  Description: Occupational Therapy Goals  Initiated: 7/13/2021   1. Patient will perform grooming with supervision/set-up sitting in chair within 7 day(s). 2.  Patient will perform bathing with mod A from chair within 7 day(s). 3.  Patient will perform upper body dressing and lower body dressing with mod A within 7 day(s). 4.  Patient will perform toilet transfers with mod A within 7 day(s). 5.  Patient will perform all aspects of toileting with min A within 7 day(s). 6.  Patient will maintain spine precautions with ADL activities independently within 7 days. FUNCTIONAL STATUS PRIOR TO ADMISSION: She had recent spine fusion in beginning of June. Discharged to [facility] and returned home with [facility] therapy. Pt was at mod I level in her home and was using a corset TLSO. HOME SUPPORT: Receiving [facility] services. Outcome: Progressing Towards Goal   OCCUPATIONAL THERAPY TREATMENT  Patient: Bruce Bell (13 y.o. female)  Date: 7/14/2021  Diagnosis: Right hip pain [M25.551] Right hip pain       Precautions: Fall, WBAT  Chart, occupational therapy assessment, plan of care, and goals were reviewed. ASSESSMENT  Patient continues with skilled OT services and is progressing towards goals. Patient received in bed and requesting bed pan. Patient assisted to use bedpan, then went on to move to sit on EOB, move sit to/from stand from EOB, transfer to [facility], return to sit on EOB to bathe and dress UEs, move to supine, complete toileting hygiene and clothing management. Participation impacted by impaired reach to LEs, report of pain right hip area with mobility, impaired standing balance and tolerance, report of and demonstration of anxiety, body habitus, body mechanics given recent lumbar laminectomy.      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 Continue treatment per established plan of care to address goals. Recommend with staff: progression to OOB in chair for meals and self care, toileting on BSC with RW and assist of 1    Recommend next OT session: POC    Recommendation for discharge: (in order for the patient to meet his/her long term goals)  Working on tolerating : Therapy 3 hours per day 5-7 days per week; if unable, SNF    This discharge recommendation:  Has been made in collaboration with the attending provider and/or case management    IF patient discharges home will need the following DME: bedside commode, hospital bed and walker: rolling       SUBJECTIVE:   Patient stated I do feel better.     OBJECTIVE DATA SUMMARY:   Cognitive/Behavioral Status:  Neurologic State: Alert  Orientation Level: Oriented X4  Cognition: Appropriate for age attention/concentration; Appropriate safety awareness; Follows commands  Perception: Appears intact  Perseveration: No perseveration noted  Safety/Judgement: Awareness of environment    Functional Mobility and Transfers for ADLs:  Bed Mobility:  Rolling: Minimum assistance;Assist x1;Additional time; Adaptive equipment  Supine to Sit: Minimum assistance;Assist x1;Additional time; Adaptive equipment;Bed Modified  Sit to Supine: Minimum assistance;Assist x2; Additional time; Adaptive equipment    Transfers:  Sit to Stand: Minimum assistance;Assist x1;Additional time; Adaptive equipment  Functional Transfers  Toilet Transfer : Minimum assistance;Assist x1;Additional time; Adaptive equipment  Cues: Physical assistance; Tactile cues provided;Verbal cues provided  Adaptive Equipment: Bedside commode;Walker (comment)       Balance:  Sitting: Impaired; Without support  Sitting - Static: Good (unsupported)  Sitting - Dynamic: Fair (occasional)  Standing: Impaired; With support  Standing - Static: Fair;Constant support  Standing - Dynamic : Fair;Constant support    ADL Intervention:       Grooming  Position Performed: Seated edge of bed  Brushing/Combing Hair: Modified independent    Upper Body Bathing  Bathing Assistance: Set-up; Supervision  Position Performed: Seated edge of bed    Lower Body Bathing  Bathing Assistance: Total assistance(dependent)  Perineal  : Total assistance (dependent)  Position Performed: Supine  Cues: Other(comment) (physical assist)  Adaptive Equipment: Other(comment) (bedrail)    Upper [address]: Set-up    Lower Body Dressing Assistance  Pants With Elastic Waist: Total assistance(dependent) (inferred from mobiliity)  Socks: Total assistance (dependent)  Position Performed: Seated edge of bed  Cues: Physical assistance;Verbal cues provided  Adaptive Equipment Used: Walker    Toileting  Toileting Assistance: Total assistance(dependent)  Bladder Hygiene: Total assistance (dependent)  Bowel Hygiene: Total assistance (dependent)  Clothing Management: Total assistance (dependent)  Cues: Other (comment); Verbal cues provided; Tactile cues provided (physical assist)    Cognitive Retraining  Organizing/Sequencing: Breaking task down  Attention to Task: Single task  Following Commands: Follows one step commands/directions  Safety/Judgement: Awareness of environment  Cues: Verbal cues provided; Tactile cues provided;Visual cues provided      Activity Tolerance:   Fair    After treatment patient left in no apparent distress:   Supine in bed, Patient positioned in right sidelying for pressure relief, Call bell within reach, Caregiver / family present and Side rails x 3    COMMUNICATION/COLLABORATION:   The patients plan of care was discussed with: Registered nurse.      MICHAEL Troncoso  Time Calculation: 50 mins

## 2021-07-14 NOTE — PROGRESS NOTES
Problem: Mobility Impaired (Adult and Pediatric)  Goal: *Acute Goals and Plan of Care (Insert Text)  Description: Note: FUNCTIONAL STATUS PRIOR TO ADMISSION: Patient was modified independent using a walker for functional mobility. HOME SUPPORT PRIOR TO ADMISSION: The patient lived with  who she is the primary caregiver for. Physical Therapy Goals  Initiated 7/10/2021  1. Patient will move from supine to sit and sit to supine  in bed with modified independence within 7 day(s). 2.  Patient will transfer from bed to chair and chair to bed with modified independence using the least restrictive device within 7 day(s). 3.  Patient will perform sit to stand with modified independence within 7 day(s). 4.  Patient will ambulate with modified independence for 300 feet with the least restrictive device within 7 day(s). Outcome: Progressing Towards Goal    PHYSICAL THERAPY TREATMENT  Patient: Betty Pedro (30 y.o. female)  Date: 7/14/2021  Diagnosis: Right hip pain [M25.551] Right hip pain       Precautions: Fall, WBAT  Chart, physical therapy assessment, plan of care and goals were reviewed. ASSESSMENT  Patient continues with skilled PT services and is progressing towards goals. Pt. Received in bed on R side, came to EOB with mod A for her Les. Pt. Required VC for breathing techniques due to the pain of getting up. Pt. Calmed down and collected herself before continuing. Pt. Performed sit-stand with CGA and VC to push from the bed. Pt. Ambulated 60ft, and required a small rest break due fatigue from laughing. Pt. Returned back to bed and required mod A for LEs to get back into bed. Pt. Preferred to be on R side and placed cold pack under hip to help with pain. Pt. Did much more then she thought she could, just needs reminders to take her time, and encouragement.      Current Level of Function Impacting Discharge (mobility/balance): CGA    Other factors to consider for discharge: pain management PLAN :  Patient continues to benefit from skilled intervention to address the above impairments. Continue treatment per established plan of care. to address goals. Recommendation for discharge: (in order for the patient to meet his/her long term goals)  Therapy up to 5 days/week in SNF setting    This discharge recommendation:  Has been made in collaboration with the attending provider and/or case management    IF patient discharges home will need the following DME: patient owns DME required for discharge       SUBJECTIVE:   Patient stated Artice Siemens will try my best.    OBJECTIVE DATA SUMMARY:   Critical Behavior:  Neurologic State: Alert  Orientation Level: Oriented X4  Cognition: Appropriate for age attention/concentration, Appropriate safety awareness, Follows commands  Safety/Judgement: Awareness of environment  Functional Mobility Training:  Bed Mobility:  Rolling:  (received in R sidelying)  Supine to Sit: Stand-by assistance; Additional time  Sit to Supine: Moderate assistance;Assist x1 (sit>sidelying)  Scooting: Stand-by assistance;Contact guard assistance; Additional time;Assist x1        Transfers:  Sit to Stand: Contact guard assistance;Assist x1  Stand to Sit: Contact guard assistance                             Balance:  Sitting: Impaired; With support  Sitting - Static: Good (unsupported)  Sitting - Dynamic: Fair (occasional)  Standing: Impaired; With support  Standing - Static: Constant support;Good  Standing - Dynamic : Constant support; Fair  Ambulation/Gait Training:     Assistive Device: Gait belt;Walker, rolling           Gait Abnormalities: Ataxic;Decreased step clearance        Base of Support: Widened     Speed/Catherine: Slow  Step Length: Left shortened;Right shortened                    Stairs:               Therapeutic Exercises:     Pain Ratin/10    Activity Tolerance:   Good and requires rest breaks    After treatment patient left in no apparent distress:   Patient positioned in R sidelying for pressure relief and Call bell within reach    COMMUNICATION/COLLABORATION:   The patients plan of care was discussed with: Registered nurse.      Columbus Hamman., SPTA   Time Calculation: 29 mins

## 2021-07-15 LAB
COVID-19 RAPID TEST, COVR: NOT DETECTED
GLUCOSE BLD STRIP.AUTO-MCNC: 130 MG/DL (ref 65–117)
GLUCOSE BLD STRIP.AUTO-MCNC: 131 MG/DL (ref 65–117)
GLUCOSE BLD STRIP.AUTO-MCNC: 161 MG/DL (ref 65–117)
GLUCOSE BLD STRIP.AUTO-MCNC: 168 MG/DL (ref 65–117)
SARS-COV-2, COV2: NORMAL
SERVICE CMNT-IMP: ABNORMAL
SOURCE, COVRS: NORMAL

## 2021-07-15 PROCEDURE — 65270000029 HC RM PRIVATE

## 2021-07-15 PROCEDURE — 74011250637 HC RX REV CODE- 250/637: Performed by: INTERNAL MEDICINE

## 2021-07-15 PROCEDURE — 2709999900 HC NON-CHARGEABLE SUPPLY

## 2021-07-15 PROCEDURE — 74011636637 HC RX REV CODE- 636/637: Performed by: INTERNAL MEDICINE

## 2021-07-15 PROCEDURE — 87635 SARS-COV-2 COVID-19 AMP PRB: CPT

## 2021-07-15 PROCEDURE — 87205 SMEAR GRAM STAIN: CPT

## 2021-07-15 PROCEDURE — 97116 GAIT TRAINING THERAPY: CPT

## 2021-07-15 PROCEDURE — 74011250636 HC RX REV CODE- 250/636: Performed by: INTERNAL MEDICINE

## 2021-07-15 PROCEDURE — 82962 GLUCOSE BLOOD TEST: CPT

## 2021-07-15 PROCEDURE — 77030038269 HC DRN EXT URIN PURWCK BARD -A

## 2021-07-15 PROCEDURE — 74011250637 HC RX REV CODE- 250/637: Performed by: FAMILY MEDICINE

## 2021-07-15 PROCEDURE — 97530 THERAPEUTIC ACTIVITIES: CPT

## 2021-07-15 RX ADMIN — PAROXETINE 40 MG: 20 TABLET, FILM COATED ORAL at 06:49

## 2021-07-15 RX ADMIN — Medication 17.2 MG: at 09:32

## 2021-07-15 RX ADMIN — INSULIN LISPRO 2 UNITS: 100 INJECTION, SOLUTION INTRAVENOUS; SUBCUTANEOUS at 12:07

## 2021-07-15 RX ADMIN — ASPIRIN 81 MG: 81 TABLET, COATED ORAL at 09:33

## 2021-07-15 RX ADMIN — Medication 1 CAPSULE: at 09:33

## 2021-07-15 RX ADMIN — AMITRIPTYLINE HYDROCHLORIDE 50 MG: 50 TABLET, FILM COATED ORAL at 21:22

## 2021-07-15 RX ADMIN — METOPROLOL TARTRATE 12.5 MG: 25 TABLET, FILM COATED ORAL at 21:22

## 2021-07-15 RX ADMIN — HEPARIN SODIUM 5000 UNITS: 5000 INJECTION INTRAVENOUS; SUBCUTANEOUS at 14:38

## 2021-07-15 RX ADMIN — ALPRAZOLAM 0.25 MG: 0.25 TABLET ORAL at 08:07

## 2021-07-15 RX ADMIN — DOCUSATE SODIUM 100 MG: 100 CAPSULE, LIQUID FILLED ORAL at 09:33

## 2021-07-15 RX ADMIN — HEPARIN SODIUM 5000 UNITS: 5000 INJECTION INTRAVENOUS; SUBCUTANEOUS at 06:38

## 2021-07-15 RX ADMIN — Medication 10 ML: at 06:00

## 2021-07-15 RX ADMIN — ATORVASTATIN CALCIUM 40 MG: 40 TABLET, FILM COATED ORAL at 21:22

## 2021-07-15 RX ADMIN — OXYCODONE HYDROCHLORIDE 5 MG: 5 TABLET ORAL at 03:01

## 2021-07-15 RX ADMIN — ALPRAZOLAM 0.25 MG: 0.25 TABLET ORAL at 21:23

## 2021-07-15 RX ADMIN — INSULIN GLARGINE 3 UNITS: 100 INJECTION, SOLUTION SUBCUTANEOUS at 09:00

## 2021-07-15 RX ADMIN — LEVOTHYROXINE SODIUM 112 MCG: 0.11 TABLET ORAL at 06:49

## 2021-07-15 RX ADMIN — HEPARIN SODIUM 5000 UNITS: 5000 INJECTION INTRAVENOUS; SUBCUTANEOUS at 21:23

## 2021-07-15 RX ADMIN — PANTOPRAZOLE SODIUM 40 MG: 40 TABLET, DELAYED RELEASE ORAL at 06:48

## 2021-07-15 RX ADMIN — MAGNESIUM HYDROXIDE 30 ML: 400 SUSPENSION ORAL at 09:32

## 2021-07-15 NOTE — PROGRESS NOTES
Problem: Mobility Impaired (Adult and Pediatric)  Goal: *Acute Goals and Plan of Care (Insert Text)  Description: Note: FUNCTIONAL STATUS PRIOR TO ADMISSION: Patient was modified independent using a walker for functional mobility. HOME SUPPORT PRIOR TO ADMISSION: The patient lived with  who she is the primary caregiver for. Physical Therapy Goals  Initiated 7/10/2021  1. Patient will move from supine to sit and sit to supine  in bed with modified independence within 7 day(s). 2.  Patient will transfer from bed to chair and chair to bed with modified independence using the least restrictive device within 7 day(s). 3.  Patient will perform sit to stand with modified independence within 7 day(s). 4.  Patient will ambulate with modified independence for 300 feet with the least restrictive device within 7 day(s). Outcome: Progressing Towards Goal   PHYSICAL THERAPY TREATMENT  Patient: Chiara Oro (43 y.o. female)  Date: 7/15/2021  Diagnosis: Right hip pain [M25.551] Right hip pain       Precautions: Fall, WBAT  Chart, physical therapy assessment, plan of care and goals were reviewed. ASSESSMENT  Patient continues with skilled PT services and is progressing towards goals. Pt. Received laying on her R side, and was able to come to EOB with supervision. Pt. Required min A with sit- stand, but had to sit back down after just under one minute due to discomfort in hip. Pt. Able to stand back up still with min A and begin ambulating. VC required for breathing techniques for pain management and to take her time when scooting and managing bed mobility. Pt. Ambulated [de-identified]' with CGA and RW, required small break on the turn around to collect her breath. Once back in the room pt. Was able to perform stand-sit with min A, but required mod A for LE to get back in bed and on her R side. All needs were in reach and ice pack placed under R hip.       Current Level of Function Impacting Discharge (mobility/balance): CGA    Other factors to consider for discharge: pain         PLAN :  Patient continues to benefit from skilled intervention to address the above impairments. Continue treatment per established plan of care. to address goals. Recommendation for discharge: (in order for the patient to meet his/her long term goals)  Therapy up to 5 days/week in SNF setting    This discharge recommendation:  Has been made in collaboration with the attending provider and/or case management    IF patient discharges home will need the following DME: patient owns DME required for discharge       SUBJECTIVE:   Patient stated i'm glad to see you two.     OBJECTIVE DATA SUMMARY:   Critical Behavior:  Neurologic State: Alert, Appropriate for age  Orientation Level: Oriented X4  Cognition: Appropriate decision making, Appropriate for age attention/concentration, Appropriate safety awareness, Follows commands  Safety/Judgement: Awareness of environment  Functional Mobility Training:  Bed Mobility:     Supine to Sit: Stand-by assistance; Additional time;Assist x1 (sidelying>sit)  Sit to Supine: Minimum assistance;Assist x1 (sit>sidelying)           Transfers:  Sit to Stand: Contact guard assistance;Assist x1  Stand to Sit: Contact guard assistance;Assist x1                             Balance:  Sitting: Intact  Sitting - Static: Good (unsupported)  Sitting - Dynamic: Fair (occasional)  Standing: Impaired; With support  Standing - Static: Constant support;Good  Standing - Dynamic : Constant support; Fair  Ambulation/Gait Training:  Distance (ft): 30 Feet (ft)  Assistive Device: Gait belt;Walker, rolling           Gait Abnormalities: Antalgic;Decreased step clearance        Base of Support: Widened     Speed/Catherine: Pace decreased (<100 feet/min); Slow  Step Length: Left shortened;Right shortened                    Stairs:            Pain Rating:  No complaint of pain, mild discomfort in beginning    Activity Tolerance:   Good and requires rest breaks    After treatment patient left in no apparent distress:   Patient positioned in R sidelying for pressure relief, Call bell within reach, and Side rails x 3    COMMUNICATION/COLLABORATION:   The patients plan of care was discussed with: Registered nurse.      Cristian Jones., SPTA   Time Calculation: 28 mins

## 2021-07-15 NOTE — WOUND CARE
WOCN Note:      Follow up consult for lumbar wounds     Chart shows:  Patient admitted on 7/8/21 with right hip pain post fall at home  Past Medical History:   Diagnosis Date    Adverse effect of anesthesia     O2 DROPS WITH ANESTHESIA    Arthritis     KNEES    Cancer ([facility] [address] 03/13/2015    SQUAMOUS CELL CARCINOMA; tonsils and lymph nodes. Removed 3-2015 with radiation    GERD (gastroesophageal reflux disease)     Hypertension     NO LONGER ON MEDICATION    Hypothyroid     HYPOTHYROIDISM    Migraine     Nausea & vomiting     Obesity     Other ill-defined conditions(799.89)     chronic back pain    Psychiatric disorder     anxiety    Psychiatric disorder     panic ATTACKS    SV[facility]ra[facility] 05/24/2014    Ulcerative colitis      7/14/21  WBC = 7.4  Hgb = 11.1  Albumin = 2    MRI to spine on 7/8/21  Posterior fusion L2-S1. No discitis or osteomyelitis. Nonspecific posterior soft  tissue swelling and fluid. Multilevel lumbar spine stenoses.     Assessment:   A&O x 4, Appropriately conversational   Reports no pain   Mobility: Independent with repositioning, patient able to move and lift feet off bed  Continence: has a Pure Wick   Last Neal Score: 17  Surface: foam mattress     Bilateral heel, buttocks, and sacral skin intact and without erythema     Wound History: S/p revision laminectomy 6/2/21, non-healing open surgical incision. Was started of NPWT during last hospital admission, [facility] and [facility]. Pateint was discharged home with home health. Per patient home health nurse was having difficulty maintaining a seal with the NPWT    1.  POA distal lumbar incisional wound   Full thickness   2.5 x 0.7 x 7 cm   Tunneling at 9 o'clock at 7.2 cm  Undermining at 11 to 1 o'clock at 3 cm  Unable to visualize wound base   Moderate amount serosang drainage with some purulent exudate  no odor   Well defined edges  Periwound intact & with some erythema   Treatment: Wound irrigated with saline, packed with Mesalt packing strip, covered with 4x4 foam border dressing    Depth has decreased and purulent drainage has decreased significantly since last assessment on 7/9/21. However, the legnth and width have increased some    2. POA mid lumbar incisional wound   Full thickness   3.5 x 0.5 x 0.1 cm, now 3 small wounds measured as a cluster  Base is 100% pink with epithelization to edges  small amount serous exudate  no odor   Well defined edges  Periwound intact & with slight erythema   Treatment: Wound cleansed with saline, puracol plus ag applied, covered with 4x4 foam border     3. POA proximal lumbar incisional wound   Full thickness   1 x 0.2 x 0.2 cm  Base is 100% pink   small amount serous exudate  no odor   Well defined edges  Periwound intact & without erythema  Treatment: Treatment: Wound cleansed with saline, puracol plus ag applied, covered with foam border dressing    When dressings removed, distal wound to back was not packed ( Opticel ag rope was in room). Per PA note from yesterday 7/14/21 stated \"aquacel dressing was bunched up and not covering incision\" Wound care nurse will do daily wound care except on weekends    Wound Recommendations:     Wound care Nurse to perform daily dressings changes except on weekends, primary RN to perform wound care on weekends. Cleanse 3 wounds to incision line to mid back with normal saline (irrigate distal wound gently with saline).  APPLY SKIN PREP/BARRIER WIPES TO PERIWOUND   1) Upper and middle wound:  Cut piece of Puracol Plus AG and to the size of the wound, place in wound, then moisten with saline, cover with 4x4 foam border dressing   2) Lower wound : PACK wound with Mesalt packing strip (tunnel at 9 o'clock 7.2cm, wound depth 7 cm), leave tail, cover with 4x4 foam border dressing     PI Prevention:  Turn/reposition approximately every 2 hours  Offload heels with pillows at all times in bed.  Keep HOB 30 degrees or less to decrease shearing and pressure unless medically contraindicated.  If HOB is to be over 30 degrees, raise knees first then [facility] to prevent sliding   Minimize layers of linen/pads under patient to optimize support surface to one sheet and one incontinence pad     Discussed with Dr. Chuy Turk and RN, Chetna Oconnell    Transition of Care: Plan to see daily (except on weekends) to perform wound care dressing changes while admitted to hospital.      Qasim Butler BSN, RN, [facility]  Certified Wound and Ostomy Nurse  office [phone]  Can be reached through [facility]  pager [phone] or call  to page

## 2021-07-15 NOTE — PROGRESS NOTES
Bedside and Verbal shift change report given to Dano Guzman (oncoming nurse) by Ruma Adair (offgoing nurse). Report included the following information SBAR, Kardex, Procedure Summary, Intake/Output and MAR.

## 2021-07-15 NOTE — PROGRESS NOTES
07/15/21 0643   Vital Signs   Pulse (Heart Rate) 72   BP 95/62   MAP (Calculated) 73     The RN talked with the hospitalist about the patient's low BP.    The hospitalist said that I hold the BP medication , metoprolol

## 2021-07-15 NOTE — PROGRESS NOTES
Hospitalist Progress Note           covering 7/15 and 7/16  Call physician on-call through the  7pm-7am    Daily Progress Note: 7/15/2021    Primary care provider:Steven Colmenares MD    Date of admission: 7/8/2021  1:08 PM    Admission summery and hospital course:  \"65year-old woman with a past medical history significant for degenerative disk disease, dyslipidemia, hypothyroidism, type 2 diabetes, anxiety disorder, was in her usual state of health until the day of her presentation at the emergency room when the patient developed right hip pain.  The patient stated that she fell at home prior to the onset of the right hip pain and sustained injury to the right hip.  The patient was brought to the emergency room for further evaluation on 06/02/2021.  The patient was admitted to this hospital and underwent lumbar laminectomy.  After 5 days of hospitalization, the patient was discharged by the orthopedic service to rehab facility.  The patient has since been discharged from rehab and she is back at home. [facility] the patient arrived at the emergency room, x-ray of the right hip was obtained, which did not show any acute pathology.  Because the patient recently had back surgery, MRI of the lumbar spine as well as the thoracic spine were obtained, which did not show any acute pathology and no infection. \"    Subjective:   Blood pressure on lower end. Pt denies any dizziness/cp/etc but mentions that her appetite is not good. Pt is currently on metoprolol and placed holding parameters for her bp meds. Assessment/Plan:   Intractable right hip pain in setting of fall POA  MRI on 07/11 showed edema of the right iliopsoas muscle centered at the myotendinous junction compatible with a strain. L2-L5 laminectomy 06/2021, seen by Dr Mario Agrawal on 07/14/21  -as per ortho service    7/15 wound culture results pending     Suspected bacterial pneumonia POA.   Based on the chest x-ray result as per admitting hospitalist.   Afebrile, and leukcytosis improving.   Complete 5 days of IV doxycycline and Rocephin. Repeat chest x-ray PA view on 2021 was negative for acute process. .     Thrombocytosis, thought to be reactive  Proving, continue to monitor. Patient is on aspirin and heparin.     Type 2 diabetes mellitus  Uncontrolled with hyperglycemia, present on admission   7/10 started low dose glargine while inpatient due to persistently elevated glucose readings, resume ssi  : ideally should be on Metformin  (Obese with normal renal function) rather than Insulin add metformin on discharge. A1c is 7.6. Continue on Metformin at the time of discharge pending renal function    Morbid obesity based on BMI (refer to electronic Epic calculation of BMI)  Dyslipidemia: Resume statin, no myalgias reported   Hypothyroidism: On levothyroxine/replacement tx  Hyponatremia POA: Resolved with IVF hydration. Abnormal liver function tests POA: Asymptomatic, trending towards normal. Will need outpatient followup   Anxiety disorder: currently on xanax, pt is awake and alert and not anxious and not agitated.   Constipation: no issues now, pt states that she had bm yesterdayt     See orders for other plans. VTE prophylaxis: Heparin  Code status: Full code  Discussed plan of care with Patient/Family and Nurse. Pre-admission lived at home. Discharge planning: Patient is medically stable to be discharged to SNF or rehab at this time.     7/15 ordered covid PCR as per SNF requirement---pending results for discharge to tentative plans for [facility] (pending Covid results)       Review of Systems:     Negative other than noted above      Objective:   Physical Exam:     Visit Vitals  BP 95/62   Pulse 72   Temp 97.9 °F (36.6 °C)   Resp 16   Ht 5' 4\" (1.626 m)   Wt 121.7 kg (268 lb 6.4 oz)   SpO2 94%   BMI 46.07 kg/m²    O2 Flow Rate (L/min): 2 l/min O2 Device: None (Room air)    Temp (24hrs), Av.7 °F (36.5 °C), Min:97.3 °F (36.3 °C), Max:97.9 °F (36.6 °C)    No intake/output data recorded. 07/13 1901 - 07/15 0700  In: -   Out: 3600 [Urine:3600]      General:   Nad  HEENT: atraumatic, neck supple, no discharge   Lungs:   Clear to auscultation bilaterally, no obvious wheezing or rales   Chest wall:  No tenderness    Heart:  Regular rate and rhythm, S1, S2 no rubs/gallops   Abdomen:   Obese, soft, non-tender. Bowel sounds normal.    Extremities: No cyanosis, pulses present, nonpitting edema       Neurologic: Moves all extremities, no slurring speeching  Psych: calm, not agitated, not anxious, does not voice si/hi        Data Review:       Recent Days:  Recent Labs     07/14/21  0339   WBC 7.4   HGB 11.1*   HCT 35.9   *     Recent Labs     07/14/21  0339      K 3.7      CO2 27   *   BUN 6   CREA 0.55   CA 8.9   ALB 2.0*   ALT 73       24 Hour Results:  Recent Results (from the past 24 hour(s))   GLUCOSE, POC    Collection Time: 07/14/21  4:32 PM   Result Value Ref Range    Glucose (POC) 195 (H) 65 - 117 mg/dL    Performed by Del Hu (ANTOINE)    GLUCOSE, POC    Collection Time: 07/14/21  9:14 PM   Result Value Ref Range    Glucose (POC) 101 65 - 117 mg/dL    Performed by [id number], POC    Collection Time: 07/15/21  6:18 AM   Result Value Ref Range    Glucose (POC) 130 (H) 65 - 117 mg/dL    Performed by [address] [place] [address]    Collection Time: 07/15/21  9:00 AM    Specimen: Back;  Wound   Result Value Ref Range    Special Requests: NO SPECIAL REQUESTS      GRAM STAIN 3+ WBCS SEEN      GRAM STAIN NO ORGANISMS SEEN      Culture result: PENDING    GLUCOSE, POC    Collection Time: 07/15/21 10:59 AM   Result Value Ref Range    Glucose (POC) 168 (H) 65 - 117 mg/dL    Performed by Silvia Palacios          Medications reviewed  Current Facility-Administered Medications   Medication Dose Route Frequency    oxyCODONE IR (ROXICODONE) tablet 5 mg  5 mg Oral Q6H PRN    ALPRAZolam (XANAX) tablet 0.25 mg  0.25 mg Oral BID    docusate sodium (COLACE) capsule 100 mg  100 mg Oral BID    senna (SENOKOT) tablet 17.2 mg  2 Tablet Oral DAILY    magnesium hydroxide (MILK OF MAGNESIA) 400 mg/5 mL oral suspension 30 mL  30 mL Oral DAILY    insulin glargine (LANTUS) injection 3 Units  3 Units SubCUTAneous DAILY    amitriptyline (ELAVIL) tablet 50 mg  50 mg Oral QHS    aspirin delayed-release tablet 81 mg  81 mg Oral DAILY    atorvastatin (LIPITOR) tablet 40 mg  40 mg Oral QHS    butalbital-acetaminophen-caffeine (FIORICET, ESGIC) -40 mg per tablet 1 Tablet  1 Tablet Oral DAILY PRN    levothyroxine (SYNTHROID) tablet 112 mcg  112 mcg Oral ACB    metoprolol tartrate (LOPRESSOR) tablet 25 mg  25 mg Oral 7am    metoprolol tartrate (LOPRESSOR) tablet 12.5 mg  12.5 mg Oral QHS    pantoprazole (PROTONIX) tablet 40 mg  40 mg Oral ACB    PARoxetine (PAXIL) tablet 40 mg  40 mg Oral 7am    sodium chloride (NS) flush 5-40 mL  5-40 mL IntraVENous Q8H    sodium chloride (NS) flush 5-40 mL  5-40 mL IntraVENous PRN    acetaminophen (TYLENOL) tablet 650 mg  650 mg Oral Q6H PRN    Or    acetaminophen (TYLENOL) suppository 650 mg  650 mg Rectal Q6H PRN    polyethylene glycol (MIRALAX) packet 17 g  17 g Oral DAILY PRN    ondansetron (ZOFRAN ODT) tablet 4 mg  4 mg Oral Q8H PRN    Or    ondansetron (ZOFRAN) injection 4 mg  4 mg IntraVENous Q6H PRN    heparin (porcine) injection 5,000 Units  5,000 Units SubCUTAneous Q8H    insulin lispro (HUMALOG) injection   SubCUTAneous AC&HS    glucose chewable tablet 16 g  4 Tablet Oral PRN    dextrose (D50W) injection syrg 12.5-25 g  12.5-25 g IntraVENous PRN    glucagon (GLUCAGEN) injection 1 mg  1 mg IntraMUSCular PRN    L.acidophilus-paracasei-S.thermophil-bifidobacter (RISAQUAD) 8 billion cell capsule  1 Capsule Oral DAILY       Care Plan discussed with: Patient        Adilia Arguelles MD

## 2021-07-15 NOTE — PROGRESS NOTES
ENEIDA: [facility] has approved and insurance authorization started today. PCR covid test ordered. Transport TBD. RUR: 18%    If [facility] does not work out the patient has chosen the following SNF's to send referrals.     1st choice: [facility]   2nd choice: [facility]  3rd choice: [address] and [facility] following for discharge needs.     Pasquale Najera RN/CRM

## 2021-07-15 NOTE — PROGRESS NOTES
Dr. Moser Marin informed that patient is requesting stronger PO pain medication since her only PRN orders are for tylenol or IV morphine. Order received for oxycodone 5 mg Q6H PRN.

## 2021-07-15 NOTE — PROGRESS NOTES
Bedside and Verbal shift change report given to Brisa uJarez RN (oncoming nurse) by Kaley Corona RN (offgoing nurse). Report included the following information SBAR, Kardex, MAR and Recent Results.

## 2021-07-16 LAB
GLUCOSE BLD STRIP.AUTO-MCNC: 155 MG/DL (ref 65–117)
GLUCOSE BLD STRIP.AUTO-MCNC: 162 MG/DL (ref 65–117)
GLUCOSE BLD STRIP.AUTO-MCNC: 166 MG/DL (ref 65–117)
GLUCOSE BLD STRIP.AUTO-MCNC: 196 MG/DL (ref 65–117)
SERVICE CMNT-IMP: ABNORMAL

## 2021-07-16 PROCEDURE — 74011250637 HC RX REV CODE- 250/637: Performed by: INTERNAL MEDICINE

## 2021-07-16 PROCEDURE — 65270000029 HC RM PRIVATE

## 2021-07-16 PROCEDURE — 74011250636 HC RX REV CODE- 250/636: Performed by: INTERNAL MEDICINE

## 2021-07-16 PROCEDURE — 97530 THERAPEUTIC ACTIVITIES: CPT

## 2021-07-16 PROCEDURE — 2709999900 HC NON-CHARGEABLE SUPPLY

## 2021-07-16 PROCEDURE — 74011250637 HC RX REV CODE- 250/637: Performed by: FAMILY MEDICINE

## 2021-07-16 PROCEDURE — 82962 GLUCOSE BLOOD TEST: CPT

## 2021-07-16 PROCEDURE — 74011636637 HC RX REV CODE- 636/637: Performed by: INTERNAL MEDICINE

## 2021-07-16 PROCEDURE — 77030038269 HC DRN EXT URIN PURWCK BARD -A

## 2021-07-16 PROCEDURE — 77030037877 HC DRSG MEPILEX >48IN BORD MOLN -A

## 2021-07-16 PROCEDURE — 97116 GAIT TRAINING THERAPY: CPT

## 2021-07-16 RX ORDER — LORATADINE 10 MG/1
10 TABLET ORAL DAILY
Status: COMPLETED | OUTPATIENT
Start: 2021-07-16 | End: 2021-07-18

## 2021-07-16 RX ORDER — PREDNISONE 10 MG/1
5 TABLET ORAL 2 TIMES DAILY WITH MEALS
Status: COMPLETED | OUTPATIENT
Start: 2021-07-16 | End: 2021-07-17

## 2021-07-16 RX ORDER — LORATADINE 10 MG/1
10 TABLET ORAL DAILY
Status: DISCONTINUED | OUTPATIENT
Start: 2021-07-17 | End: 2021-07-16

## 2021-07-16 RX ORDER — HYDROXYZINE HYDROCHLORIDE 10 MG/1
10 TABLET, FILM COATED ORAL
Status: DISCONTINUED | OUTPATIENT
Start: 2021-07-16 | End: 2021-07-20 | Stop reason: HOSPADM

## 2021-07-16 RX ORDER — MICONAZOLE NITRATE 2 %
POWDER (GRAM) TOPICAL 2 TIMES DAILY
Status: DISCONTINUED | OUTPATIENT
Start: 2021-07-16 | End: 2021-07-20 | Stop reason: HOSPADM

## 2021-07-16 RX ADMIN — ATORVASTATIN CALCIUM 40 MG: 40 TABLET, FILM COATED ORAL at 22:54

## 2021-07-16 RX ADMIN — HEPARIN SODIUM 5000 UNITS: 5000 INJECTION INTRAVENOUS; SUBCUTANEOUS at 14:00

## 2021-07-16 RX ADMIN — HEPARIN SODIUM 5000 UNITS: 5000 INJECTION INTRAVENOUS; SUBCUTANEOUS at 07:04

## 2021-07-16 RX ADMIN — ALPRAZOLAM 0.25 MG: 0.25 TABLET ORAL at 22:55

## 2021-07-16 RX ADMIN — Medication 10 ML: at 22:57

## 2021-07-16 RX ADMIN — Medication 1 CAPSULE: at 10:50

## 2021-07-16 RX ADMIN — METOPROLOL TARTRATE 25 MG: 25 TABLET, FILM COATED ORAL at 07:04

## 2021-07-16 RX ADMIN — INSULIN GLARGINE 3 UNITS: 100 INJECTION, SOLUTION SUBCUTANEOUS at 09:00

## 2021-07-16 RX ADMIN — PREDNISONE 5 MG: 10 TABLET ORAL at 17:46

## 2021-07-16 RX ADMIN — HEPARIN SODIUM 5000 UNITS: 5000 INJECTION INTRAVENOUS; SUBCUTANEOUS at 22:55

## 2021-07-16 RX ADMIN — METOPROLOL TARTRATE 12.5 MG: 25 TABLET, FILM COATED ORAL at 23:03

## 2021-07-16 RX ADMIN — DOCUSATE SODIUM 100 MG: 100 CAPSULE, LIQUID FILLED ORAL at 10:50

## 2021-07-16 RX ADMIN — LEVOTHYROXINE SODIUM 112 MCG: 0.11 TABLET ORAL at 07:04

## 2021-07-16 RX ADMIN — INSULIN LISPRO 2 UNITS: 100 INJECTION, SOLUTION INTRAVENOUS; SUBCUTANEOUS at 12:46

## 2021-07-16 RX ADMIN — LORATADINE 10 MG: 10 TABLET ORAL at 17:46

## 2021-07-16 RX ADMIN — INSULIN LISPRO 2 UNITS: 100 INJECTION, SOLUTION INTRAVENOUS; SUBCUTANEOUS at 16:30

## 2021-07-16 RX ADMIN — Medication 17.2 MG: at 10:50

## 2021-07-16 RX ADMIN — PANTOPRAZOLE SODIUM 40 MG: 40 TABLET, DELAYED RELEASE ORAL at 07:04

## 2021-07-16 RX ADMIN — ALPRAZOLAM 0.25 MG: 0.25 TABLET ORAL at 07:04

## 2021-07-16 RX ADMIN — ASPIRIN 81 MG: 81 TABLET, COATED ORAL at 10:50

## 2021-07-16 RX ADMIN — INSULIN LISPRO 2 UNITS: 100 INJECTION, SOLUTION INTRAVENOUS; SUBCUTANEOUS at 07:04

## 2021-07-16 RX ADMIN — PAROXETINE 40 MG: 20 TABLET, FILM COATED ORAL at 07:04

## 2021-07-16 RX ADMIN — AMITRIPTYLINE HYDROCHLORIDE 50 MG: 50 TABLET, FILM COATED ORAL at 22:55

## 2021-07-16 NOTE — PROGRESS NOTES
Problem: Self Care Deficits Care Plan (Adult)  Goal: *Acute Goals and Plan of Care (Insert Text)  Description: Occupational Therapy Goals  Initiated: 7/13/2021   1. Patient will perform grooming with supervision/set-up sitting in chair within 7 day(s). 2.  Patient will perform bathing with mod A from chair within 7 day(s). 3.  Patient will perform upper body dressing and lower body dressing with mod A within 7 day(s). 4.  Patient will perform toilet transfers with mod A within 7 day(s). 5.  Patient will perform all aspects of toileting with min A within 7 day(s). 6.  Patient will maintain spine precautions with ADL activities independently within 7 days. FUNCTIONAL STATUS PRIOR TO ADMISSION: She had recent spine fusion in beginning of June. Discharged to [facility] and returned home with [facility] therapy. Pt was at mod I level in her home and was using a corset TLSO. HOME SUPPORT: Receiving [facility] services. Outcome: Progressing Towards Goal   OCCUPATIONAL THERAPY TREATMENT  Patient: Ashia Lundberg (89 y.o. female)  Date: 7/16/2021  Diagnosis: Right hip pain [M25.551] Right hip pain      Precautions: Fall, WBAT  Chart, occupational therapy assessment, plan of care, and goals were reviewed. ASSESSMENT  Patient continues with skilled OT services and is progressing towards goals. Pt received R sidelying in bed and agreeable to moving to a chair to eat lunch. Pt was able to complete bed mobility with standby A and maintain sitting balance at EOB with CGA. Pt's pain increased to 10/10 and was unable to progress to chair. Pt's pain currently limiting her occupational performance and functional mobility. Anticipate that pt's performance will improve once pain is better managed. Will continue to follow to address ADLs, functional mobility, energy conservation techniques, and safety during functional tasks.       Current Level of Function Impacting Discharge (ADLs): setup-CGA    Other factors to consider for discharge: pain management         PLAN :  Patient continues to benefit from skilled intervention to address the above impairments. Continue treatment per established plan of care to address goals. Recommend with staff: Joyce Clovis for meals    Recommend next OT session: ADLs, safety during functional tasks    Recommendation for discharge: (in order for the patient to meet his/her long term goals)  Therapy 3 hours per day 5-7 days per week    This discharge recommendation:  Has been made in collaboration with the attending provider and/or case management    IF patient discharges home will need the following DME: patient owns DME required for discharge       SUBJECTIVE:   Patient stated i'm sorry I just don't think I can make it to the chair.     OBJECTIVE DATA SUMMARY:   Cognitive/Behavioral Status:  Neurologic State: Alert  Orientation Level: Oriented X4  Cognition: Appropriate decision making; Appropriate for age attention/concentration; Appropriate safety awareness; Follows commands  Perception: Appears intact  Perseveration: No perseveration noted  Safety/Judgement: Awareness of environment;Good awareness of safety precautions; Insight into deficits    Functional Mobility and Transfers for ADLs:  Bed Mobility:  Supine to Sit: Stand-by assistance  Sit to Supine: Stand-by assistance  Scooting: Stand-by assistance    Transfers:  Sit to Stand:  (unable to progress due to pain)          Balance:  Sitting: Intact  Sitting - Static: Good (unsupported)  Sitting - Dynamic: Fair (occasional)  Standing:  (unable to progress due to pain)  Standing - Static:  (unable to progress due to pain)  Standing - Dynamic :  (unable to progress due to pain)    ADL Intervention:       Cognitive Retraining  Safety/Judgement: Awareness of environment;Good awareness of safety precautions; Insight into deficits        Pain:  No pain at rest, increased to 10/10 with movement    Activity Tolerance:   Fair    After treatment patient left in no apparent distress: Call bell within reach, Side rails x 3, and R sidelying in bed    COMMUNICATION/COLLABORATION:   The patients plan of care was discussed with: Registered nurse. Mely Bliss OT  Time Calculation: 19 mins   Regarding student involvement in patient care:  A student participated in this treatment session. Per CMS Medicare statements and AOTA guidelines I certify that the following was true:  1. I was present and directly observed the entire session. 2. I made all skilled judgments and clinical decisions regarding care. 3. I am the practitioner responsible for assessment, treatment, and documentation.

## 2021-07-16 NOTE — PROGRESS NOTES
Hospitalist Progress Note           covering 7/15 and 7/16  Call physician on-call through the  7pm-7am    Daily Progress Note: 7/16/2021    Primary care provider:Ankur Colmenares MD    Date of admission: 7/8/2021  1:08 PM    Admission summery and hospital course:  \"65year-old woman with a past medical history significant for degenerative disk disease, dyslipidemia, hypothyroidism, type 2 diabetes, anxiety disorder, was in her usual state of health until the day of her presentation at the emergency room when the patient developed right hip pain.  The patient stated that she fell at home prior to the onset of the right hip pain and sustained injury to the right hip.  The patient was brought to the emergency room for further evaluation on 06/02/2021.  The patient was admitted to this hospital and underwent lumbar laminectomy.  After 5 days of hospitalization, the patient was discharged by the orthopedic service to rehab facility.  The patient has since been discharged from rehab and she is back at home. [facility] the patient arrived at the emergency room, x-ray of the right hip was obtained, which did not show any acute pathology.  Because the patient recently had back surgery, MRI of the lumbar spine as well as the thoracic spine were obtained, which did not show any acute pathology and no infection. \"    Subjective:   Just finished working with PT, pt without complaints  Assessment/Plan:   Intractable right hip pain in setting of fall POA  MRI on 07/11 showed edema of the right iliopsoas muscle centered at the myotendinous junction compatible with a strain. L2-L5 laminectomy 06/2021, seen by Dr Denilson Mccoy on 07/14/21  -as per ortho service    7/15 wound culture results pending     Suspected bacterial pneumonia POA. Based on the chest x-ray result as per admitting hospitalist.   Afebrile, and leukcytosis improving.   Complete 5 days of IV doxycycline and Rocephin.    Repeat chest x-ray PA view on 2021 was negative for acute process. .     Thrombocytosis, thought to be reactive  Continue to monitor. Patient is on aspirin and heparin.     Type 2 diabetes mellitus  Uncontrolled with hyperglycemia, present on admission   7/10 started low dose glargine while inpatient due to persistently elevated glucose readings, resume ssi  : ideally should be on Metformin  (Obese with normal renal function) rather than Insulin add metformin on discharge. A1c is 7.6. Continue on Metformin at the time of discharge pending renal function    Morbid obesity based on BMI (refer to electronic Epic calculation of BMI)  Dyslipidemia: Resume statin, no myalgias reported   Hypothyroidism: On levothyroxine/replacement tx  Hyponatremia POA: Resolved with IVF hydration. Abnormal liver function tests POA: Asymptomatic, trending towards normal. Will need outpatient followup   Anxiety disorder: currently on xanax, pt is awake and alert and not anxious and not agitated.   Constipation: no issues now, pt states that she had bm yesterdayt     See orders for other plans. VTE prophylaxis: Heparin  Code status: Full code  Discussed plan of care with Patient/Family and Nurse. Pre-admission lived at home. Discharge planning: Patient is medically stable to be discharged to SNF or rehab at this time. 7/15 ordered covid PCR as per SNF requirement---pending results for discharge to tentative plans for [facility] (pending Covid results)       Review of Systems:     Negative other than noted above      Objective:   Physical Exam:     Visit Vitals  /69   Pulse 82   Temp 97.9 °F (36.6 °C)   Resp 16   Ht 5' 4\" (1.626 m)   Wt 121.7 kg (268 lb 6.4 oz)   SpO2 95%   BMI 46.07 kg/m²    O2 Flow Rate (L/min): 2 l/min O2 Device: None (Room air)    Temp (24hrs), Av.8 °F (36.6 °C), Min:97.7 °F (36.5 °C), Max:97.9 °F (36.6 °C)    No intake/output data recorded.     1901 -  0700  In: -   Out:  [Urine:2050]      General:   Nad  HEENT: atraumatic, neck supple, no discharge   Lungs:   Clear to auscultation bilaterally, no obvious wheezing or rales   Chest wall:  No tenderness    Heart:  Regular rate and rhythm, S1, S2 no rubs/gallops   Abdomen:   Obese, soft, non-tender.  Bowel sounds normal.    Extremities: No cyanosis, pulses present, nonpitting edema       Neurologic: Moves all extremities, no slurring speeching  Psych: calm, not agitated, not anxious, does not voice si/hi        Data Review:       Recent Days:  Recent Labs     07/14/21  0339   WBC 7.4   HGB 11.1*   HCT 35.9   *     Recent Labs     07/14/21 0339      K 3.7      CO2 27   *   BUN 6   CREA 0.55   CA 8.9   ALB 2.0*   ALT 73       24 Hour Results:  Recent Results (from the past 24 hour(s))   SARS-COV-2    Collection Time: 07/15/21  2:44 PM   Result Value Ref Range    SARS-CoV-2 Please find results under separate order     COVID-19 RAPID TEST    Collection Time: 07/15/21  2:44 PM   Result Value Ref Range    Specimen source Nasopharyngeal      COVID-19 rapid test Not detected NOTD     GLUCOSE, POC    Collection Time: 07/15/21  4:35 PM   Result Value Ref Range    Glucose (POC) 131 (H) 65 - 117 mg/dL    Performed by Regis Johnson    GLUCOSE, POC    Collection Time: 07/15/21  9:11 PM   Result Value Ref Range    Glucose (POC) 161 (H) 65 - 117 mg/dL    Performed by Seth Redd    GLUCOSE, POC    Collection Time: 07/16/21  6:45 AM   Result Value Ref Range    Glucose (POC) 166 (H) 65 - 117 mg/dL    Performed by Aristidestie Camera          Medications reviewed  Current Facility-Administered Medications   Medication Dose Route Frequency    oxyCODONE IR (ROXICODONE) tablet 5 mg  5 mg Oral Q6H PRN    ALPRAZolam (XANAX) tablet 0.25 mg  0.25 mg Oral BID    docusate sodium (COLACE) capsule 100 mg  100 mg Oral BID    senna (SENOKOT) tablet 17.2 mg  2 Tablet Oral DAILY    magnesium hydroxide (MILK OF MAGNESIA) 400 mg/5 mL oral suspension 30 mL  30 mL Oral DAILY    insulin glargine (LANTUS) injection 3 Units  3 Units SubCUTAneous DAILY    amitriptyline (ELAVIL) tablet 50 mg  50 mg Oral QHS    aspirin delayed-release tablet 81 mg  81 mg Oral DAILY    atorvastatin (LIPITOR) tablet 40 mg  40 mg Oral QHS    butalbital-acetaminophen-caffeine (FIORICET, ESGIC) -40 mg per tablet 1 Tablet  1 Tablet Oral DAILY PRN    levothyroxine (SYNTHROID) tablet 112 mcg  112 mcg Oral ACB    metoprolol tartrate (LOPRESSOR) tablet 25 mg  25 mg Oral 7am    metoprolol tartrate (LOPRESSOR) tablet 12.5 mg  12.5 mg Oral QHS    pantoprazole (PROTONIX) tablet 40 mg  40 mg Oral ACB    PARoxetine (PAXIL) tablet 40 mg  40 mg Oral 7am    sodium chloride (NS) flush 5-40 mL  5-40 mL IntraVENous Q8H    sodium chloride (NS) flush 5-40 mL  5-40 mL IntraVENous PRN    acetaminophen (TYLENOL) tablet 650 mg  650 mg Oral Q6H PRN    Or    acetaminophen (TYLENOL) suppository 650 mg  650 mg Rectal Q6H PRN    polyethylene glycol (MIRALAX) packet 17 g  17 g Oral DAILY PRN    ondansetron (ZOFRAN ODT) tablet 4 mg  4 mg Oral Q8H PRN    Or    ondansetron (ZOFRAN) injection 4 mg  4 mg IntraVENous Q6H PRN    heparin (porcine) injection 5,000 Units  5,000 Units SubCUTAneous Q8H    insulin lispro (HUMALOG) injection   SubCUTAneous AC&HS    glucose chewable tablet 16 g  4 Tablet Oral PRN    dextrose (D50W) injection syrg 12.5-25 g  12.5-25 g IntraVENous PRN    glucagon (GLUCAGEN) injection 1 mg  1 mg IntraMUSCular PRN    L.acidophilus-paracasei-S.thermophil-bifidobacter (RISAQUAD) 8 billion cell capsule  1 Capsule Oral DAILY       Care Plan discussed with: Patient        Adilia Arguelles MD

## 2021-07-16 NOTE — PROGRESS NOTES
Bedside shift change report given to Leesa Chew RN (oncoming nurse) by Kvng Rogers (offgoing nurse). Report included the following information SBAR, Kardex, Procedure Summary, Intake/Output, MAR and Recent Results.

## 2021-07-16 NOTE — PROGRESS NOTES
Problem: Mobility Impaired (Adult and Pediatric)  Goal: *Acute Goals and Plan of Care (Insert Text)  Description: Note: FUNCTIONAL STATUS PRIOR TO ADMISSION: Patient was modified independent using a walker for functional mobility. HOME SUPPORT PRIOR TO ADMISSION: The patient lived with  who she is the primary caregiver for. Physical Therapy Goals  Initiated 7/10/2021  1. Patient will move from supine to sit and sit to supine  in bed with modified independence within 7 day(s). 2.  Patient will transfer from bed to chair and chair to bed with modified independence using the least restrictive device within 7 day(s). 3.  Patient will perform sit to stand with modified independence within 7 day(s). 4.  Patient will ambulate with modified independence for 300 feet with the least restrictive device within 7 day(s). Outcome: Progressing Towards Goal    PHYSICAL THERAPY TREATMENT  Patient: Delisa Ruiz (2 y.o. female)  Date: 7/16/2021  Diagnosis: Right hip pain [M25.551] Right hip pain       Precautions: Fall, WBAT  Chart, physical therapy assessment, plan of care and goals were reviewed. ASSESSMENT  Patient continues with skilled PT services and is progressing towards goals. Pt. Devaughn Melara on R side to begin therapy and was able to come to EOB with supervision. VC required to encourage breathing through possible pain as she scooted to EOB. Pt. Required min A when performing stand-sit. RW and CGA required during ambulation, pt. Was able to increase her distance and complete 120' of total distance. On the way back pt. Reports weakness in her legs which increased her anxiety and fear of falling which placed panic into her. VC of reassurance that I have her and to take deep breaths calmed her down enough to get back to the room where she had to take another rest on [facility] which was just past the bedroom door. Pt. Required min A to sit BSC and to get back up. Pt.  Was able to walk back to bed once calm and required supervision to get back in bed which is an improvement from last session. All needs were in reach and pt. [place] better after panic episode in hallway. Current Level of Function Impacting Discharge (mobility/balance): CGA    Other factors to consider for discharge: pain tolerance, fear of falling         PLAN :  Patient continues to benefit from skilled intervention to address the above impairments. Continue treatment per established plan of care. to address goals. Recommendation for discharge: (in order for the patient to meet his/her long term goals)  Therapy up to 5 days/week in SNF setting    This discharge recommendation:  Has been made in collaboration with the attending provider and/or case management    IF patient discharges home will need the following DME: patient owns DME required for discharge       SUBJECTIVE:   Patient stated the weakness in my legs scares me because I dont want to fall.     OBJECTIVE DATA SUMMARY:   Critical Behavior:  Neurologic State: Alert  Orientation Level: Oriented X4  Cognition: Appropriate decision making, Appropriate for age attention/concentration, Appropriate safety awareness, Follows commands  Safety/Judgement: Awareness of environment, Good awareness of safety precautions, Insight into deficits  Functional Mobility Training:  Bed Mobility:     Supine to Sit: Stand-by assistance  Sit to Supine: Stand-by assistance  Scooting: Stand-by assistance        Transfers:  Sit to Stand:  (unable to progress due to pain)  Stand to Sit:  (unable to progress due to pain)                             Balance:  Sitting: Intact  Sitting - Static: Good (unsupported)  Sitting - Dynamic: Fair (occasional)  Standing:  (unable to progress due to pain)  Standing - Static:  (unable to progress due to pain)  Standing - Dynamic :  (unable to progress due to pain)  Ambulation/Gait Training:  Distance (ft): 120 Feet (ft)  Assistive Device: Gait belt;Walker, rollator           Gait Abnormalities: Decreased step clearance        Base of Support: Widened     Speed/Catherine: Pace decreased (<100 feet/min); Slow  Step Length: Left shortened;Right shortened                    Stairs:                Pain Ratin/10    Activity Tolerance:   Good and requires rest breaks    After treatment patient left in no apparent distress:   Patient positioned in R sidelying for pressure relief, Call bell within reach, and Side rails x 3    COMMUNICATION/COLLABORATION:   The patients plan of care was discussed with: Registered nurse.      Denilson Jacob., SPTA   Time Calculation: 29 mins

## 2021-07-16 NOTE — WOUND CARE
Dressing change visit:    Patient A&O, denies pain. Old dressings removed to lumbar back, distal wound dressing with moderate to large amount serosang drainage, no purulent drainage noted at this time. Distal wound irrigated with normal saline, upper and middle wounds cleansed with normal saline and previously applied Puracol dressing removed. Cavilon  Advanced Protectant applied to periwound. Mesalt packing strip cut in half length wise and tunnel and wound loosely packed leaving tail. Covered with Mepilex sacral foam dressing. Piece of Puracol Plus AG collagen dressing cut to the size of the upper and middle incisional wounds and placed in wounds then moistened with saline, covered with 4x4 foam border dressings. Patient tolerated well. Wound care dressing orders updated, orders recieved from MD:    Cleanse upper and middle open incisional wounds to lumbar back with normal saline, ensuring previously apply Puracol collagen dressing is removed. Cut piece of Puracol Plus AG collagen dressing to the size of the wound, place in wound, then moisten with saline, cover with 4x4 foam border dressing every 3 days and prn if becomes soiled. Gently irrigate distal wound to incision line to mid back using a syringe with normal saline. APPLY SKIN PREP/BARRIER WIPES TO PERIWOUND   Cut Mesalt packing strip lengthwise in half, then PACK wound with Mesalt packing strip, please ensure full depth of wound and tunnel at base of wound around 9-10 o'clock is loosely packed ( tunnel around 7-8cm in depth), leave tail, cover with foam border dressing (may use Mepliex sacral foam dressing) 2x a day       Discharge wound care orders received from Dr. Matty Saravia:    [facility] to distal incisional wound. Irrigate wound gently with Normal Saline. Apply Cavilon  Advanced Skin Protectant to periwound.  Use barrier ring to distal periwound to help maintain seal. Apply strip of white foam to tunnel (located at base of wound around 9-10 o'clock location ) and place rest of wound with black foam, bridge to lateral torso, set vac at 150mmHG continuous. Change dressing 2x a week or more often if needed. Until wound vac can be applied at [facility]:  Cleanse distal open incisional wound to mid back by irrigating gently with normal saline. Apply Cavilon Advanced Skin Protectant to periwound every 3 days. Then cut Mesalt packing strip lengthwise in half, loosely pack wound with Mesalt packing strip ( ensure tunnel located at base of wound around 9-10 o'clock location is packed), cover with foam border dressing or any super absorbent dressing 2x a day. Cleanse upper and middle open incisional wounds to lumbar back with normal saline, ensuring previously apply Puracol collagen dressing is removed. Cut piece of Puracol Plus AG collagen dressing to the size of the wound, place in wound, then moisten with saline, cover with 4x4 foam border dressing every 3 days and prn if becomes soiled.     Reema WHITEN, RN, [facility]  Certified Wound and Ostomy Nurse  office [phone]  Can be reached through [facility]  pager [phone] or call  to page

## 2021-07-16 NOTE — PROGRESS NOTES
Contacted by wound care regarding information of rash/itching on area with known use of cleansing wipe containing ingredient pt has allergy to. No report of respiratory symptoms/fever/cough/wheezing. Ordered prednisone for 1 day and daily claritin for 3 days (as the area is larger than what would consider topical agent). Another hospitalist will be covering Team 10 and therefore this note is for documentation purposes for appropriate followup during hospital stay.

## 2021-07-16 NOTE — WOUND CARE
Patient noted with red raised rash to chest wall. Patient is allergic to CHG. CHG wipes in patients room. Relayed information to nursing manager. Call placed to Dr. Iliana De Jesus. Aware of red raised rash and allergy to CHG.      Francisco WHITEN, RN, [facility], [address] [place] [address]  Certified Wound and Ostomy Nurse  office [phone]  Can be reached through [facility]  pager [phone] or call  to page

## 2021-07-17 LAB
ANION GAP SERPL CALC-SCNC: 6 MMOL/L (ref 5–15)
BACTERIA SPEC CULT: NORMAL
BASOPHILS # BLD: 0 K/UL (ref 0–0.1)
BASOPHILS NFR BLD: 0 % (ref 0–1)
BUN SERPL-MCNC: 10 MG/DL (ref 6–20)
BUN/CREAT SERPL: 14 (ref 12–20)
CALCIUM SERPL-MCNC: 9.2 MG/DL (ref 8.5–10.1)
CHLORIDE SERPL-SCNC: 103 MMOL/L (ref 97–108)
CO2 SERPL-SCNC: 28 MMOL/L (ref 21–32)
CREAT SERPL-MCNC: 0.72 MG/DL (ref 0.55–1.02)
DIFFERENTIAL METHOD BLD: ABNORMAL
EOSINOPHIL # BLD: 0.4 K/UL (ref 0–0.4)
EOSINOPHIL NFR BLD: 4 % (ref 0–7)
ERYTHROCYTE [DISTWIDTH] IN BLOOD BY AUTOMATED COUNT: 14.1 % (ref 11.5–14.5)
GLUCOSE BLD STRIP.AUTO-MCNC: 157 MG/DL (ref 65–117)
GLUCOSE BLD STRIP.AUTO-MCNC: 161 MG/DL (ref 65–117)
GLUCOSE BLD STRIP.AUTO-MCNC: 169 MG/DL (ref 65–117)
GLUCOSE BLD STRIP.AUTO-MCNC: 183 MG/DL (ref 65–117)
GLUCOSE SERPL-MCNC: 213 MG/DL (ref 65–100)
GRAM STN SPEC: NORMAL
GRAM STN SPEC: NORMAL
HCT VFR BLD AUTO: 35.7 % (ref 35–47)
HGB BLD-MCNC: 11.3 G/DL (ref 11.5–16)
IMM GRANULOCYTES # BLD AUTO: 0.1 K/UL (ref 0–0.04)
IMM GRANULOCYTES NFR BLD AUTO: 1 % (ref 0–0.5)
LYMPHOCYTES # BLD: 1.8 K/UL (ref 0.8–3.5)
LYMPHOCYTES NFR BLD: 17 % (ref 12–49)
MCH RBC QN AUTO: 27.2 PG (ref 26–34)
MCHC RBC AUTO-ENTMCNC: 31.7 G/DL (ref 30–36.5)
MCV RBC AUTO: 85.8 FL (ref 80–99)
MONOCYTES # BLD: 1 K/UL (ref 0–1)
MONOCYTES NFR BLD: 9 % (ref 5–13)
NEUTS SEG # BLD: 7.1 K/UL (ref 1.8–8)
NEUTS SEG NFR BLD: 69 % (ref 32–75)
NRBC # BLD: 0 K/UL (ref 0–0.01)
NRBC BLD-RTO: 0 PER 100 WBC
PLATELET # BLD AUTO: 598 K/UL (ref 150–400)
PMV BLD AUTO: 9.5 FL (ref 8.9–12.9)
POTASSIUM SERPL-SCNC: 3.8 MMOL/L (ref 3.5–5.1)
RBC # BLD AUTO: 4.16 M/UL (ref 3.8–5.2)
SERVICE CMNT-IMP: ABNORMAL
SERVICE CMNT-IMP: NORMAL
SODIUM SERPL-SCNC: 137 MMOL/L (ref 136–145)
WBC # BLD AUTO: 10.3 K/UL (ref 3.6–11)

## 2021-07-17 PROCEDURE — 74011250636 HC RX REV CODE- 250/636: Performed by: INTERNAL MEDICINE

## 2021-07-17 PROCEDURE — 65270000029 HC RM PRIVATE

## 2021-07-17 PROCEDURE — 74011636637 HC RX REV CODE- 636/637: Performed by: INTERNAL MEDICINE

## 2021-07-17 PROCEDURE — 74011250637 HC RX REV CODE- 250/637: Performed by: INTERNAL MEDICINE

## 2021-07-17 PROCEDURE — 77030038269 HC DRN EXT URIN PURWCK BARD -A

## 2021-07-17 PROCEDURE — 36415 COLL VENOUS BLD VENIPUNCTURE: CPT

## 2021-07-17 PROCEDURE — 82962 GLUCOSE BLOOD TEST: CPT

## 2021-07-17 PROCEDURE — 80048 BASIC METABOLIC PNL TOTAL CA: CPT

## 2021-07-17 PROCEDURE — 74011250637 HC RX REV CODE- 250/637: Performed by: FAMILY MEDICINE

## 2021-07-17 PROCEDURE — 85025 COMPLETE CBC W/AUTO DIFF WBC: CPT

## 2021-07-17 RX ADMIN — INSULIN LISPRO 2 UNITS: 100 INJECTION, SOLUTION INTRAVENOUS; SUBCUTANEOUS at 06:40

## 2021-07-17 RX ADMIN — PAROXETINE 40 MG: 20 TABLET, FILM COATED ORAL at 06:42

## 2021-07-17 RX ADMIN — HYDROXYZINE HYDROCHLORIDE 10 MG: 10 TABLET ORAL at 01:39

## 2021-07-17 RX ADMIN — PREDNISONE 5 MG: 10 TABLET ORAL at 07:06

## 2021-07-17 RX ADMIN — LEVOTHYROXINE SODIUM 112 MCG: 0.11 TABLET ORAL at 06:42

## 2021-07-17 RX ADMIN — HEPARIN SODIUM 5000 UNITS: 5000 INJECTION INTRAVENOUS; SUBCUTANEOUS at 22:10

## 2021-07-17 RX ADMIN — LORATADINE 10 MG: 10 TABLET ORAL at 09:37

## 2021-07-17 RX ADMIN — INSULIN GLARGINE 3 UNITS: 100 INJECTION, SOLUTION SUBCUTANEOUS at 09:35

## 2021-07-17 RX ADMIN — Medication 10 ML: at 06:00

## 2021-07-17 RX ADMIN — PANTOPRAZOLE SODIUM 40 MG: 40 TABLET, DELAYED RELEASE ORAL at 06:42

## 2021-07-17 RX ADMIN — AMITRIPTYLINE HYDROCHLORIDE 50 MG: 50 TABLET, FILM COATED ORAL at 22:09

## 2021-07-17 RX ADMIN — ASPIRIN 81 MG: 81 TABLET, COATED ORAL at 09:36

## 2021-07-17 RX ADMIN — OXYCODONE HYDROCHLORIDE 5 MG: 5 TABLET ORAL at 00:03

## 2021-07-17 RX ADMIN — ALPRAZOLAM 0.25 MG: 0.25 TABLET ORAL at 07:02

## 2021-07-17 RX ADMIN — MICONAZOLE NITRATE 2 % TOPICAL POWDER: at 09:39

## 2021-07-17 RX ADMIN — OXYCODONE HYDROCHLORIDE 5 MG: 5 TABLET ORAL at 15:03

## 2021-07-17 RX ADMIN — ATORVASTATIN CALCIUM 40 MG: 40 TABLET, FILM COATED ORAL at 22:09

## 2021-07-17 RX ADMIN — Medication 10 ML: at 14:56

## 2021-07-17 RX ADMIN — HEPARIN SODIUM 5000 UNITS: 5000 INJECTION INTRAVENOUS; SUBCUTANEOUS at 14:55

## 2021-07-17 RX ADMIN — INSULIN LISPRO 2 UNITS: 100 INJECTION, SOLUTION INTRAVENOUS; SUBCUTANEOUS at 16:25

## 2021-07-17 RX ADMIN — HEPARIN SODIUM 5000 UNITS: 5000 INJECTION INTRAVENOUS; SUBCUTANEOUS at 06:39

## 2021-07-17 RX ADMIN — Medication 10 ML: at 22:00

## 2021-07-17 RX ADMIN — INSULIN LISPRO 2 UNITS: 100 INJECTION, SOLUTION INTRAVENOUS; SUBCUTANEOUS at 11:30

## 2021-07-17 RX ADMIN — METOPROLOL TARTRATE 12.5 MG: 25 TABLET, FILM COATED ORAL at 22:09

## 2021-07-17 RX ADMIN — ALPRAZOLAM 0.25 MG: 0.25 TABLET ORAL at 22:08

## 2021-07-17 RX ADMIN — MICONAZOLE NITRATE 2 % TOPICAL POWDER: at 19:03

## 2021-07-17 RX ADMIN — MAGNESIUM HYDROXIDE 30 ML: 400 SUSPENSION ORAL at 09:36

## 2021-07-17 RX ADMIN — Medication 1 CAPSULE: at 09:37

## 2021-07-17 NOTE — PROGRESS NOTES
Bedside and Verbal shift change report given to Estela Rocha RN (oncoming nurse) by JANIS Garcia (offgoing nurse). Report included the following information SBAR, Kardex, MAR and Recent Results.

## 2021-07-17 NOTE — PROGRESS NOTES
Hospitalist Progress Note          Dr.Munir Roark Klinefelter  Call physician on-call through the  7pm-7am    Daily Progress Note: 7/17/2021    Primary care provider:Roney Colmenares MD    Date of admission: 7/8/2021  1:08 PM    Admission summery and hospital course:  \"65year-old woman with a past medical history significant for degenerative disk disease, dyslipidemia, hypothyroidism, type 2 diabetes, anxiety disorder, was in her usual state of health until the day of her presentation at the emergency room when the patient developed right hip pain.  The patient stated that she fell at home prior to the onset of the right hip pain and sustained injury to the right hip.  The patient was brought to the emergency room for further evaluation on 06/02/2021.  The patient was admitted to this hospital and underwent lumbar laminectomy.  After 5 days of hospitalization, the patient was discharged by the orthopedic service to rehab facility.  The patient has since been discharged from rehab and she is back at home. [facility] the patient arrived at the emergency room, x-ray of the right hip was obtained, which did not show any acute pathology.  Because the patient recently had back surgery, MRI of the lumbar spine as well as the thoracic spine were obtained, which did not show any acute pathology and no infection. \"    Subjective:   Just finished working with PT, pt without complaints  Assessment/Plan:   Intractable right hip pain in setting of fall POA  MRI on 07/11 showed edema of the right iliopsoas muscle centered at the myotendinous junction compatible with a strain. L2-L5 laminectomy 06/2021, seen by Dr Zulema Heard on 07/14/21  -as per ortho service  - [facility]     Suspected bacterial pneumonia POA. Based on the chest x-ray result as per admitting hospitalist.   Afebrile, and leukcytosis improving.   Complete 5 days of IV doxycycline and Rocephin.    Repeat chest x-ray PA view on 2021 was negative for acute process. .     Thrombocytosis, thought to be reactive  Continue to monitor. Patient is on aspirin and heparin.     Type 2 diabetes mellitus  Uncontrolled with hyperglycemia, present on admission   7/10 started low dose glargine while inpatient due to persistently elevated glucose readings, resume ssi  : ideally should be on Metformin  (Obese with normal renal function) rather than Insulin add metformin on discharge. A1c is 7.6. Continue on Metformin at the time of discharge pending renal function    Morbid obesity based on BMI (refer to electronic Epic calculation of BMI)  Dyslipidemia: Resume statin, no myalgias reported   Hypothyroidism: On levothyroxine/replacement tx  Hyponatremia POA: Resolved with IVF hydration. Abnormal liver function tests POA: Asymptomatic, trending towards normal. Will need outpatient followup   Anxiety disorder: currently on xanax, pt is awake and alert and not anxious and not agitated.   Constipation: no issues now, pt states that she had bm yesterdayt     See orders for other plans. VTE prophylaxis: Heparin  Code status: Full code  Discussed plan of care with Patient/Family and Nurse. Pre-admission lived at home. Discharge planning: Patient is medically stable to be discharged to SNF or rehab at this time.     7/15 ordered covid PCR as per SNF requirement---pending results for discharge to tentative plans for [facility] (pending Covid results)       Review of Systems:     Negative other than noted above      Objective:   Physical Exam:     Visit Vitals  /66 (BP 1 Location: Left upper arm, BP Patient Position: At rest)   Pulse 75   Temp 97.7 °F (36.5 °C)   Resp 16   Ht 5' 4\" (1.626 m)   Wt 121.7 kg (268 lb 6.4 oz)   SpO2 94%   BMI 46.07 kg/m²    O2 Flow Rate (L/min): 2 l/min O2 Device: None (Room air)    Temp (24hrs), Av.2 °F (36.8 °C), Min:97.7 °F (36.5 °C), Max:98.9 °F (37.2 °C)    701 -  1900  In: -   Out: 900 [Urine:900]   07/15 1901 - 07/17 0700  In: -   Out: 1100 [Urine:1100]      General:   Nad  HEENT: atraumatic, neck supple, no discharge   Lungs:   Clear to auscultation bilaterally, no obvious wheezing or rales   Chest wall:  No tenderness    Heart:  Regular rate and rhythm, S1, S2 no rubs/gallops   Abdomen:   Obese, soft, non-tender. Bowel sounds normal.    Extremities: No cyanosis, pulses present, nonpitting edema       Neurologic: Moves all extremities, no slurring speeching  Psych: calm, not agitated, not anxious, does not voice si/hi        Data Review:       Recent Days:  Recent Labs     07/17/21  0351   WBC 10.3   HGB 11.3*   HCT 35.7   *     Recent Labs     07/17/21 0351      K 3.8      CO2 28   *   BUN 10   CREA 0.72   CA 9.2       24 Hour Results:  Recent Results (from the past 24 hour(s))   GLUCOSE, POC    Collection Time: 07/16/21  5:01 PM   Result Value Ref Range    Glucose (POC) 162 (H) 65 - 117 mg/dL    Performed by Purvi Russo, POC    Collection Time: 07/16/21  9:02 PM   Result Value Ref Range    Glucose (POC) 196 (H) 65 - 117 mg/dL    Performed by Yandel Bartlett (CON)    CBC WITH AUTOMATED DIFF    Collection Time: 07/17/21  3:51 AM   Result Value Ref Range    WBC 10.3 3.6 - 11.0 K/uL    RBC 4.16 3.80 - 5.20 M/uL    HGB 11.3 (L) 11.5 - 16.0 g/dL    HCT 35.7 35.0 - 47.0 %    MCV 85.8 80.0 - 99.0 FL    MCH 27.2 26.0 - 34.0 PG    MCHC 31.7 30.0 - 36.5 g/dL    RDW 14.1 11.5 - 14.5 %    PLATELET 817 (H) 519 - 400 K/uL    MPV 9.5 8.9 - 12.9 FL    NRBC 0.0 0  WBC    ABSOLUTE NRBC 0.00 0.00 - 0.01 K/uL    NEUTROPHILS 69 32 - 75 %    LYMPHOCYTES 17 12 - 49 %    MONOCYTES 9 5 - 13 %    EOSINOPHILS 4 0 - 7 %    BASOPHILS 0 0 - 1 %    IMMATURE GRANULOCYTES 1 (H) 0.0 - 0.5 %    ABS. NEUTROPHILS 7.1 1.8 - 8.0 K/UL    ABS. LYMPHOCYTES 1.8 0.8 - 3.5 K/UL    ABS. MONOCYTES 1.0 0.0 - 1.0 K/UL    ABS. EOSINOPHILS 0.4 0.0 - 0.4 K/UL    ABS.  BASOPHILS 0.0 0.0 - 0.1 K/UL    ABS. IMM.  GRANS. 0.1 (H) 0.00 - 0.04 K/UL    DF AUTOMATED     METABOLIC PANEL, BASIC    Collection Time: 07/17/21  3:51 AM   Result Value Ref Range    Sodium 137 136 - 145 mmol/L    Potassium 3.8 3.5 - 5.1 mmol/L    Chloride 103 97 - 108 mmol/L    CO2 28 21 - 32 mmol/L    Anion gap 6 5 - 15 mmol/L    Glucose 213 (H) 65 - 100 mg/dL    BUN 10 6 - 20 MG/DL    Creatinine 0.72 0.55 - 1.02 MG/DL    BUN/Creatinine ratio 14 12 - 20      GFR est AA >60 >60 ml/min/1.73m2    GFR est non-AA >60 >60 ml/min/1.73m2    Calcium 9.2 8.5 - 10.1 MG/DL   GLUCOSE, POC    Collection Time: 07/17/21  6:12 AM   Result Value Ref Range    Glucose (POC) 169 (H) 65 - 117 mg/dL    Performed by Trino Vick (CON)    GLUCOSE, POC    Collection Time: 07/17/21 11:14 AM   Result Value Ref Range    Glucose (POC) 183 (H) 65 - 117 mg/dL    Performed by Hina Centeno          Medications reviewed  Current Facility-Administered Medications   Medication Dose Route Frequency    miconazole (MICOTIN) 2 % powder   Topical BID    loratadine (CLARITIN) tablet 10 mg  10 mg Oral DAILY    hydrOXYzine HCL (ATARAX) tablet 10 mg  10 mg Oral Q6H PRN    oxyCODONE IR (ROXICODONE) tablet 5 mg  5 mg Oral Q6H PRN    ALPRAZolam (XANAX) tablet 0.25 mg  0.25 mg Oral BID    docusate sodium (COLACE) capsule 100 mg  100 mg Oral BID    senna (SENOKOT) tablet 17.2 mg  2 Tablet Oral DAILY    magnesium hydroxide (MILK OF MAGNESIA) 400 mg/5 mL oral suspension 30 mL  30 mL Oral DAILY    insulin glargine (LANTUS) injection 3 Units  3 Units SubCUTAneous DAILY    amitriptyline (ELAVIL) tablet 50 mg  50 mg Oral QHS    aspirin delayed-release tablet 81 mg  81 mg Oral DAILY    atorvastatin (LIPITOR) tablet 40 mg  40 mg Oral QHS    butalbital-acetaminophen-caffeine (FIORICET, ESGIC) -40 mg per tablet 1 Tablet  1 Tablet Oral DAILY PRN    levothyroxine (SYNTHROID) tablet 112 mcg  112 mcg Oral ACB    metoprolol tartrate (LOPRESSOR) tablet 25 mg  25 mg Oral 7am    metoprolol tartrate (LOPRESSOR) tablet 12.5 mg  12.5 mg Oral QHS    pantoprazole (PROTONIX) tablet 40 mg  40 mg Oral ACB    PARoxetine (PAXIL) tablet 40 mg  40 mg Oral 7am    sodium chloride (NS) flush 5-40 mL  5-40 mL IntraVENous Q8H    sodium chloride (NS) flush 5-40 mL  5-40 mL IntraVENous PRN    acetaminophen (TYLENOL) tablet 650 mg  650 mg Oral Q6H PRN    Or    acetaminophen (TYLENOL) suppository 650 mg  650 mg Rectal Q6H PRN    polyethylene glycol (MIRALAX) packet 17 g  17 g Oral DAILY PRN    ondansetron (ZOFRAN ODT) tablet 4 mg  4 mg Oral Q8H PRN    Or    ondansetron (ZOFRAN) injection 4 mg  4 mg IntraVENous Q6H PRN    heparin (porcine) injection 5,000 Units  5,000 Units SubCUTAneous Q8H    insulin lispro (HUMALOG) injection   SubCUTAneous AC&HS    glucose chewable tablet 16 g  4 Tablet Oral PRN    dextrose (D50W) injection syrg 12.5-25 g  12.5-25 g IntraVENous PRN    glucagon (GLUCAGEN) injection 1 mg  1 mg IntraMUSCular PRN    L.acidophilus-paracasei-S.thermophil-bifidobacter (RISAQUAD) 8 billion cell capsule  1 Capsule Oral DAILY       Care Plan discussed with: Patient        Conrado Green MD

## 2021-07-17 NOTE — PROGRESS NOTES
Maxi contacted Stormy Mott ([phone] )with [facility] and they do not have authorization at this time and will follow up on Monday.   Maxi informed staff nurse and MD

## 2021-07-18 LAB
GLUCOSE BLD STRIP.AUTO-MCNC: 110 MG/DL (ref 65–117)
GLUCOSE BLD STRIP.AUTO-MCNC: 123 MG/DL (ref 65–117)
GLUCOSE BLD STRIP.AUTO-MCNC: 123 MG/DL (ref 65–117)
GLUCOSE BLD STRIP.AUTO-MCNC: 251 MG/DL (ref 65–117)
SERVICE CMNT-IMP: ABNORMAL
SERVICE CMNT-IMP: NORMAL

## 2021-07-18 PROCEDURE — 74011250637 HC RX REV CODE- 250/637: Performed by: INTERNAL MEDICINE

## 2021-07-18 PROCEDURE — 82962 GLUCOSE BLOOD TEST: CPT

## 2021-07-18 PROCEDURE — 65270000029 HC RM PRIVATE

## 2021-07-18 PROCEDURE — 74011250636 HC RX REV CODE- 250/636: Performed by: INTERNAL MEDICINE

## 2021-07-18 PROCEDURE — 2709999900 HC NON-CHARGEABLE SUPPLY

## 2021-07-18 PROCEDURE — 74011636637 HC RX REV CODE- 636/637: Performed by: INTERNAL MEDICINE

## 2021-07-18 PROCEDURE — 74011250637 HC RX REV CODE- 250/637: Performed by: FAMILY MEDICINE

## 2021-07-18 RX ADMIN — Medication 1 CAPSULE: at 10:20

## 2021-07-18 RX ADMIN — LORATADINE 10 MG: 10 TABLET ORAL at 10:20

## 2021-07-18 RX ADMIN — Medication 10 ML: at 22:11

## 2021-07-18 RX ADMIN — Medication 10 ML: at 06:17

## 2021-07-18 RX ADMIN — OXYCODONE HYDROCHLORIDE 5 MG: 5 TABLET ORAL at 06:16

## 2021-07-18 RX ADMIN — PAROXETINE 40 MG: 20 TABLET, FILM COATED ORAL at 06:16

## 2021-07-18 RX ADMIN — INSULIN LISPRO 5 UNITS: 100 INJECTION, SOLUTION INTRAVENOUS; SUBCUTANEOUS at 12:24

## 2021-07-18 RX ADMIN — METOPROLOL TARTRATE 12.5 MG: 25 TABLET, FILM COATED ORAL at 22:07

## 2021-07-18 RX ADMIN — ALPRAZOLAM 0.25 MG: 0.25 TABLET ORAL at 06:19

## 2021-07-18 RX ADMIN — MICONAZOLE NITRATE 2 % TOPICAL POWDER: at 10:21

## 2021-07-18 RX ADMIN — INSULIN GLARGINE 3 UNITS: 100 INJECTION, SOLUTION SUBCUTANEOUS at 10:19

## 2021-07-18 RX ADMIN — PANTOPRAZOLE SODIUM 40 MG: 40 TABLET, DELAYED RELEASE ORAL at 06:18

## 2021-07-18 RX ADMIN — MICONAZOLE NITRATE 2 % TOPICAL POWDER: at 19:14

## 2021-07-18 RX ADMIN — HEPARIN SODIUM 5000 UNITS: 5000 INJECTION INTRAVENOUS; SUBCUTANEOUS at 15:53

## 2021-07-18 RX ADMIN — LEVOTHYROXINE SODIUM 112 MCG: 0.11 TABLET ORAL at 06:18

## 2021-07-18 RX ADMIN — ASPIRIN 81 MG: 81 TABLET, COATED ORAL at 10:20

## 2021-07-18 RX ADMIN — Medication 10 ML: at 15:54

## 2021-07-18 RX ADMIN — ATORVASTATIN CALCIUM 40 MG: 40 TABLET, FILM COATED ORAL at 22:07

## 2021-07-18 RX ADMIN — OXYCODONE HYDROCHLORIDE 5 MG: 5 TABLET ORAL at 12:24

## 2021-07-18 RX ADMIN — AMITRIPTYLINE HYDROCHLORIDE 50 MG: 50 TABLET, FILM COATED ORAL at 22:07

## 2021-07-18 RX ADMIN — HEPARIN SODIUM 5000 UNITS: 5000 INJECTION INTRAVENOUS; SUBCUTANEOUS at 06:17

## 2021-07-18 RX ADMIN — HEPARIN SODIUM 5000 UNITS: 5000 INJECTION INTRAVENOUS; SUBCUTANEOUS at 22:10

## 2021-07-18 RX ADMIN — ALPRAZOLAM 0.25 MG: 0.25 TABLET ORAL at 22:07

## 2021-07-18 RX ADMIN — METOPROLOL TARTRATE 25 MG: 25 TABLET, FILM COATED ORAL at 06:16

## 2021-07-18 NOTE — PROGRESS NOTES
Hospitalist Progress Note          Dr.Munir Adama Guidry  Call physician on-call through the  7pm-7am    Daily Progress Note: 7/18/2021    Primary care provider:Ankur Colmenares MD    Date of admission: 7/8/2021  1:08 PM    Admission summery and hospital course:  \"65year-old woman with a past medical history significant for degenerative disk disease, dyslipidemia, hypothyroidism, type 2 diabetes, anxiety disorder, was in her usual state of health until the day of her presentation at the emergency room when the patient developed right hip pain.  The patient stated that she fell at home prior to the onset of the right hip pain and sustained injury to the right hip.  The patient was brought to the emergency room for further evaluation on 06/02/2021.  The patient was admitted to this hospital and underwent lumbar laminectomy.  After 5 days of hospitalization, the patient was discharged by the orthopedic service to rehab facility.  The patient has since been discharged from rehab and she is back at home. [facility] the patient arrived at the emergency room, x-ray of the right hip was obtained, which did not show any acute pathology.  Because the patient recently had back surgery, MRI of the lumbar spine as well as the thoracic spine were obtained, which did not show any acute pathology and no infection. \"    Subjective:   No acute issues. Awaiting placement. No bed available till Monday at rehab  Assessment/Plan:   Intractable right hip pain in setting of fall POA  MRI on 07/11 showed edema of the right iliopsoas muscle centered at the myotendinous junction compatible with a strain. L2-L5 laminectomy 06/2021, seen by Dr Denilson Mccoy on 07/14/21  -as per ortho service  - Awaiting [facility] referal and insurance Auth     Suspected bacterial pneumonia POA.   Based on the chest x-ray result as per admitting hospitalist.   Afebrile, and leukcytosis improving.   Complete 5 days of IV doxycycline and Rocephin. Repeat chest x-ray PA view on 2021 was negative for acute process. .     Thrombocytosis, thought to be reactive  Continue to monitor. Patient is on aspirin and heparin.     Type 2 diabetes mellitus  Uncontrolled with hyperglycemia, present on admission   7/10 started low dose glargine while inpatient due to persistently elevated glucose readings, resume ssi  : ideally should be on Metformin  (Obese with normal renal function) rather than Insulin add metformin on discharge. A1c is 7.6. Continue on Metformin at the time of discharge pending renal function    Morbid obesity based on BMI (refer to electronic Epic calculation of BMI)  Dyslipidemia: Resume statin, no myalgias reported   Hypothyroidism: On levothyroxine/replacement tx  Hyponatremia POA: Resolved with IVF hydration. Abnormal liver function tests POA: Asymptomatic, trending towards normal. Will need outpatient followup   Anxiety disorder: currently on xanax, pt is awake and alert and not anxious and not agitated.   Constipation: no issues now, pt states that she had bm yesterdayt     See orders for other plans. VTE prophylaxis: Heparin  Code status: Full code  Discussed plan of care with Patient/Family and Nurse. Pre-admission lived at home. Discharge planning: Patient is medically stable to be discharged to SNF or rehab at this time. Review of Systems:     Negative other than noted above      Objective:   Physical Exam:     Visit Vitals  /60   Pulse 62   Temp 97.3 °F (36.3 °C)   Resp 20   Ht 5' 4\" (1.626 m)   Wt 121.7 kg (268 lb 6.4 oz)   SpO2 95%   BMI 46.07 kg/m²    O2 Flow Rate (L/min): 2 l/min O2 Device: None (Room air)    Temp (24hrs), Av.7 °F (36.5 °C), Min:97.3 °F (36.3 °C), Max:98 °F (36.7 °C)    No intake/output data recorded.     1901 -  0700  In: -   Out: 1600 [Urine:1600]      General:   Nad  HEENT: atraumatic, neck supple, no discharge   Lungs:   Clear to auscultation bilaterally, no obvious wheezing or rales   Chest wall:  No tenderness    Heart:  Regular rate and rhythm, S1, S2 no rubs/gallops   Abdomen:   Obese, soft, non-tender.  Bowel sounds normal.    Extremities: No cyanosis, pulses present, nonpitting edema       Neurologic: Moves all extremities, no slurring speeching  Psych: calm, not agitated, not anxious, does not voice si/hi        Data Review:       Recent Days:  Recent Labs     07/17/21  0351   WBC 10.3   HGB 11.3*   HCT 35.7   *     Recent Labs     07/17/21  0351      K 3.8      CO2 28   *   BUN 10   CREA 0.72   CA 9.2       24 Hour Results:  Recent Results (from the past 24 hour(s))   GLUCOSE, POC    Collection Time: 07/17/21 11:14 AM   Result Value Ref Range    Glucose (POC) 183 (H) 65 - 117 mg/dL    Performed by [id number], POC    Collection Time: 07/17/21  3:49 PM   Result Value Ref Range    Glucose (POC) 157 (H) 65 - 117 mg/dL    Performed by [id number], POC    Collection Time: 07/17/21  9:34 PM   Result Value Ref Range    Glucose (POC) 161 (H) 65 - 117 mg/dL    Performed by Mahsa Odell (CON)    GLUCOSE, POC    Collection Time: 07/18/21  6:20 AM   Result Value Ref Range    Glucose (POC) 123 (H) 65 - 117 mg/dL    Performed by Mahsa Odell (CON)          Medications reviewed  Current Facility-Administered Medications   Medication Dose Route Frequency    miconazole (MICOTIN) 2 % powder   Topical BID    loratadine (CLARITIN) tablet 10 mg  10 mg Oral DAILY    hydrOXYzine HCL (ATARAX) tablet 10 mg  10 mg Oral Q6H PRN    oxyCODONE IR (ROXICODONE) tablet 5 mg  5 mg Oral Q6H PRN    ALPRAZolam (XANAX) tablet 0.25 mg  0.25 mg Oral BID    docusate sodium (COLACE) capsule 100 mg  100 mg Oral BID    senna (SENOKOT) tablet 17.2 mg  2 Tablet Oral DAILY    magnesium hydroxide (MILK OF MAGNESIA) 400 mg/5 mL oral suspension 30 mL  30 mL Oral DAILY    insulin glargine (LANTUS) injection 3 Units  3 Units SubCUTAneous DAILY    amitriptyline (ELAVIL) tablet 50 mg  50 mg Oral QHS    aspirin delayed-release tablet 81 mg  81 mg Oral DAILY    atorvastatin (LIPITOR) tablet 40 mg  40 mg Oral QHS    butalbital-acetaminophen-caffeine (FIORICET, ESGIC) -40 mg per tablet 1 Tablet  1 Tablet Oral DAILY PRN    levothyroxine (SYNTHROID) tablet 112 mcg  112 mcg Oral ACB    metoprolol tartrate (LOPRESSOR) tablet 25 mg  25 mg Oral 7am    metoprolol tartrate (LOPRESSOR) tablet 12.5 mg  12.5 mg Oral QHS    pantoprazole (PROTONIX) tablet 40 mg  40 mg Oral ACB    PARoxetine (PAXIL) tablet 40 mg  40 mg Oral 7am    sodium chloride (NS) flush 5-40 mL  5-40 mL IntraVENous Q8H    sodium chloride (NS) flush 5-40 mL  5-40 mL IntraVENous PRN    acetaminophen (TYLENOL) tablet 650 mg  650 mg Oral Q6H PRN    Or    acetaminophen (TYLENOL) suppository 650 mg  650 mg Rectal Q6H PRN    polyethylene glycol (MIRALAX) packet 17 g  17 g Oral DAILY PRN    ondansetron (ZOFRAN ODT) tablet 4 mg  4 mg Oral Q8H PRN    Or    ondansetron (ZOFRAN) injection 4 mg  4 mg IntraVENous Q6H PRN    heparin (porcine) injection 5,000 Units  5,000 Units SubCUTAneous Q8H    insulin lispro (HUMALOG) injection   SubCUTAneous AC&HS    glucose chewable tablet 16 g  4 Tablet Oral PRN    dextrose (D50W) injection syrg 12.5-25 g  12.5-25 g IntraVENous PRN    glucagon (GLUCAGEN) injection 1 mg  1 mg IntraMUSCular PRN    L.acidophilus-paracasei-S.thermophil-bifidobacter (RISAQUAD) 8 billion cell capsule  1 Capsule Oral DAILY       Care Plan discussed with: Patient        Doris Whitten MD

## 2021-07-18 NOTE — PROGRESS NOTES
Bedside and Verbal shift change report given to Kyle Montero RN (oncoming nurse) by Dragan Watkins RN (offgoing nurse). Report included the following information SBAR, ED Summary, Procedure Summary, Intake/Output, MAR and Recent Results.

## 2021-07-19 LAB
GLUCOSE BLD STRIP.AUTO-MCNC: 134 MG/DL (ref 65–117)
GLUCOSE BLD STRIP.AUTO-MCNC: 153 MG/DL (ref 65–117)
GLUCOSE BLD STRIP.AUTO-MCNC: 193 MG/DL (ref 65–117)
GLUCOSE BLD STRIP.AUTO-MCNC: 99 MG/DL (ref 65–117)
SARS-COV-2, COV2: NORMAL
SERVICE CMNT-IMP: ABNORMAL
SERVICE CMNT-IMP: NORMAL

## 2021-07-19 PROCEDURE — U0005 INFEC AGEN DETEC AMPLI PROBE: HCPCS

## 2021-07-19 PROCEDURE — 74011636637 HC RX REV CODE- 636/637: Performed by: INTERNAL MEDICINE

## 2021-07-19 PROCEDURE — 74011250637 HC RX REV CODE- 250/637: Performed by: FAMILY MEDICINE

## 2021-07-19 PROCEDURE — 82962 GLUCOSE BLOOD TEST: CPT

## 2021-07-19 PROCEDURE — 97530 THERAPEUTIC ACTIVITIES: CPT

## 2021-07-19 PROCEDURE — 97535 SELF CARE MNGMENT TRAINING: CPT

## 2021-07-19 PROCEDURE — 74011250636 HC RX REV CODE- 250/636: Performed by: INTERNAL MEDICINE

## 2021-07-19 PROCEDURE — 65270000029 HC RM PRIVATE

## 2021-07-19 PROCEDURE — 2709999900 HC NON-CHARGEABLE SUPPLY

## 2021-07-19 PROCEDURE — 74011250637 HC RX REV CODE- 250/637: Performed by: INTERNAL MEDICINE

## 2021-07-19 RX ADMIN — ASPIRIN 81 MG: 81 TABLET, COATED ORAL at 09:05

## 2021-07-19 RX ADMIN — PANTOPRAZOLE SODIUM 40 MG: 40 TABLET, DELAYED RELEASE ORAL at 06:38

## 2021-07-19 RX ADMIN — METOPROLOL TARTRATE 12.5 MG: 25 TABLET, FILM COATED ORAL at 22:06

## 2021-07-19 RX ADMIN — ALPRAZOLAM 0.25 MG: 0.25 TABLET ORAL at 06:37

## 2021-07-19 RX ADMIN — PAROXETINE 40 MG: 20 TABLET, FILM COATED ORAL at 06:37

## 2021-07-19 RX ADMIN — Medication 10 ML: at 06:15

## 2021-07-19 RX ADMIN — HEPARIN SODIUM 5000 UNITS: 5000 INJECTION INTRAVENOUS; SUBCUTANEOUS at 14:56

## 2021-07-19 RX ADMIN — HEPARIN SODIUM 5000 UNITS: 5000 INJECTION INTRAVENOUS; SUBCUTANEOUS at 22:06

## 2021-07-19 RX ADMIN — OXYCODONE HYDROCHLORIDE 5 MG: 5 TABLET ORAL at 06:14

## 2021-07-19 RX ADMIN — Medication 1 CAPSULE: at 09:05

## 2021-07-19 RX ADMIN — MICONAZOLE NITRATE 2 % TOPICAL POWDER: at 09:00

## 2021-07-19 RX ADMIN — HEPARIN SODIUM 5000 UNITS: 5000 INJECTION INTRAVENOUS; SUBCUTANEOUS at 06:36

## 2021-07-19 RX ADMIN — ALPRAZOLAM 0.25 MG: 0.25 TABLET ORAL at 22:06

## 2021-07-19 RX ADMIN — ATORVASTATIN CALCIUM 40 MG: 40 TABLET, FILM COATED ORAL at 22:06

## 2021-07-19 RX ADMIN — MICONAZOLE NITRATE 2 % TOPICAL POWDER: at 18:00

## 2021-07-19 RX ADMIN — LEVOTHYROXINE SODIUM 112 MCG: 0.11 TABLET ORAL at 06:37

## 2021-07-19 RX ADMIN — INSULIN LISPRO 2 UNITS: 100 INJECTION, SOLUTION INTRAVENOUS; SUBCUTANEOUS at 11:47

## 2021-07-19 RX ADMIN — OXYCODONE HYDROCHLORIDE 5 MG: 5 TABLET ORAL at 19:46

## 2021-07-19 RX ADMIN — AMITRIPTYLINE HYDROCHLORIDE 50 MG: 50 TABLET, FILM COATED ORAL at 22:06

## 2021-07-19 RX ADMIN — Medication 10 ML: at 14:00

## 2021-07-19 RX ADMIN — INSULIN GLARGINE 3 UNITS: 100 INJECTION, SOLUTION SUBCUTANEOUS at 09:06

## 2021-07-19 NOTE — PROGRESS NOTES
ENEIDA: [facility] insurance authorization pending. PCR covid pending. Transport TBD. RUR: 19%    CM called and left messages with liaison's, [phone] and [phone] to follow up on status of auth. CM awaiting call back. CM following for discharge needs.     Minerva Wei RN/CRM

## 2021-07-19 NOTE — PROGRESS NOTES
Problem: Self Care Deficits Care Plan (Adult)  Goal: *Acute Goals and Plan of Care (Insert Text)  Description: Occupational Therapy Goals  Initiated: 7/13/2021   1. Patient will perform grooming with supervision/set-up sitting in chair within 7 day(s). 2.  Patient will perform bathing with mod A from chair within 7 day(s). 3.  Patient will perform upper body dressing and lower body dressing with mod A within 7 day(s). 4.  Patient will perform toilet transfers with mod A within 7 day(s). 5.  Patient will perform all aspects of toileting with min A within 7 day(s). 6.  Patient will maintain spine precautions with ADL activities independently within 7 days. FUNCTIONAL STATUS PRIOR TO ADMISSION: She had recent spine fusion in beginning of June. Discharged to [facility] and returned home with [facility] therapy. Pt was at mod I level in her home and was using a corset TLSO. HOME SUPPORT: Receiving [facility] services. Outcome: Progressing Towards Goal   OCCUPATIONAL THERAPY TREATMENT  Patient: Bruce Bell (53 y.o. female)  Date: 7/19/2021  Diagnosis: Right hip pain [M25.551] Right hip pain       Precautions: Fall, WBAT  Chart, occupational therapy assessment, plan of care, and goals were reviewed. ASSESSMENT  Patient continues with skilled OT services and is progressing towards goals. Today pt was able to progress from supine to sitting at EOB with standby A. Pt was able to maintain sitting balance at EOB with supervision-CGA. Pt with s/s orthostatic hypotension (dizziness, flushed skin) once sitting. BP noted 104/69, HR 96. Applied cool washcloth to nape of neck with s/s improving while sitting. Pt was able to wash her face with setup and brush her teeth independently while seated EOB. Pt returned supine with min A for RLE management. Once supine, BP noted 132/100, HR 81 with s/s resolved. Encouraged pt to sit up in bed as tolerated. Pt left supine in bed, all needs met.  Will continue to work with pt on ADLs, functional transfers, and safety during functional tasks. Current Level of Function Impacting Discharge (ADLs): min A-supervision    Other factors to consider for discharge: pain management         PLAN :  Patient continues to benefit from skilled intervention to address the above impairments. Continue treatment per established plan of care to address goals. Recommend with staff: Sit up for meals    Recommend next OT session: ADLs, functional transfers    Recommendation for discharge: (in order for the patient to meet his/her long term goals)  Pt awaiting auth for [facility], if not able then recommend SNF    This discharge recommendation:  Has been made in collaboration with the attending provider and/or case management    IF patient discharges home will need the following DME: patient owns DME required for discharge       SUBJECTIVE:   Patient stated \"I just feel really lightheaded.     OBJECTIVE DATA SUMMARY:   Cognitive/Behavioral Status:  Neurologic State: Alert  Orientation Level: Oriented X4  Cognition: Appropriate decision making; Appropriate for age attention/concentration; Appropriate safety awareness  Perception: Appears intact  Perseveration: No perseveration noted  Safety/Judgement: Awareness of environment; Insight into deficits    Functional Mobility and Transfers for ADLs:  Bed Mobility:  Supine to Sit: Stand-by assistance  Sit to Supine: Minimum assistance (for RLE management)    Transfers:  Sit to Stand:  (unable to progress due to s/s orthostatic hypotensino)          Balance:  Sitting: Intact; Without support  Sitting - Static: Good (unsupported)  Sitting - Dynamic: Fair (occasional)  Standing:  (unable to progress due to s/s orthostatic hypotensino)    ADL Intervention:       Grooming  Grooming Assistance: Set-up  Position Performed: Seated in chair  Washing Face: Set-up  Brushing Teeth: Independent                             Cognitive Retraining  Safety/Judgement: Awareness of environment; Insight into deficits        Pain:  Pain not rated    Activity Tolerance:   Fair    After treatment patient left in no apparent distress:   Supine in bed, Call bell within reach, and Side rails x 3    COMMUNICATION/COLLABORATION:   The patients plan of care was discussed with: Registered nurse and Case management. Angelica Anderson  Time Calculation: 31 mins   Regarding student involvement in patient care:  A student participated in this treatment session. Per CMS Medicare statements and AOTA guidelines I certify that the following was true:  1. I was present and directly observed the entire session. 2. I made all skilled judgments and clinical decisions regarding care. 3. I am the practitioner responsible for assessment, treatment, and documentation. Stable

## 2021-07-19 NOTE — PROGRESS NOTES
Hospitalist Progress Note          Dr.Munir Roark Klinefelter  Call physician on-call through the  7pm-7am    Daily Progress Note: 7/19/2021    Primary care provider:Roney Colmenares MD    Date of admission: 7/8/2021  1:08 PM    Admission summery and hospital course:  \"65year-old woman with a past medical history significant for degenerative disk disease, dyslipidemia, hypothyroidism, type 2 diabetes, anxiety disorder, was in her usual state of health until the day of her presentation at the emergency room when the patient developed right hip pain.  The patient stated that she fell at home prior to the onset of the right hip pain and sustained injury to the right hip.  The patient was brought to the emergency room for further evaluation on 06/02/2021.  The patient was admitted to this hospital and underwent lumbar laminectomy.  After 5 days of hospitalization, the patient was discharged by the orthopedic service to rehab facility.  The patient has since been discharged from rehab and she is back at home. [facility] the patient arrived at the emergency room, x-ray of the right hip was obtained, which did not show any acute pathology.  Because the patient recently had back surgery, MRI of the lumbar spine as well as the thoracic spine were obtained, which did not show any acute pathology and no infection. \"    Subjective:   No acute issues. Awaiting placement. Still waiting for insurance authorization  Assessment/Plan:   Intractable right hip pain in setting of fall POA  MRI on 07/11 showed edema of the right iliopsoas muscle centered at the myotendinous junction compatible with a strain. L2-L5 laminectomy 06/2021, seen by Dr Zulema Heard on 07/14/21  -as per ortho service  - Awaiting [facility] referal and insurance Auth     Suspected bacterial pneumonia POA.   Based on the chest x-ray result as per admitting hospitalist.   Afebrile, and leukcytosis improving.   Complete 5 days of IV doxycycline and Rocephin. Repeat chest x-ray PA view on 2021 was negative for acute process. .     Thrombocytosis, thought to be reactive  Continue to monitor. Patient is on aspirin and heparin.     Type 2 diabetes mellitus  Uncontrolled with hyperglycemia, present on admission   7/10 started low dose glargine while inpatient due to persistently elevated glucose readings, resume ssi  : ideally should be on Metformin  (Obese with normal renal function) rather than Insulin add metformin on discharge. A1c is 7.6. Continue on Metformin at the time of discharge pending renal function    Morbid obesity based on BMI (refer to electronic Epic calculation of BMI)  Dyslipidemia: Resume statin, no myalgias reported   Hypothyroidism: On levothyroxine/replacement tx  Hyponatremia POA: Resolved with IVF hydration. Abnormal liver function tests POA: Asymptomatic, trending towards normal. Will need outpatient followup   Anxiety disorder: currently on xanax, pt is awake and alert and not anxious and not agitated.   Constipation: no issues now, pt states that she had bm yesterdayt     See orders for other plans. VTE prophylaxis: Heparin  Code status: Full code  Discussed plan of care with Patient/Family and Nurse. Pre-admission lived at home. Discharge planning: Patient is medically stable to be discharged to SNF or rehab at this time. Awaiting on insurance authorization         Review of Systems:     Negative other than noted above      Objective:   Physical Exam:     Visit Vitals  BP 93/63 (BP 1 Location: Left upper arm)   Pulse 81   Temp 97.3 °F (36.3 °C)   Resp 18   Ht 5' 4\" (1.626 m)   Wt 121.7 kg (268 lb 6.4 oz)   SpO2 96%   BMI 46.07 kg/m²    O2 Flow Rate (L/min): 2 l/min O2 Device: None (Room air)    Temp (24hrs), Av.7 °F (36.5 °C), Min:97.3 °F (36.3 °C), Max:98 °F (36.7 °C)    No intake/output data recorded.     1901 -  0700  In: -   Out: 0318 [Urine:1650]      General:   Nad  HEENT: atraumatic, neck supple, no discharge   Lungs:   Clear to auscultation bilaterally, no obvious wheezing or rales   Chest wall:  No tenderness    Heart:  Regular rate and rhythm, S1, S2 no rubs/gallops   Abdomen:   Obese, soft, non-tender.  Bowel sounds normal.    Extremities: No cyanosis, pulses present, nonpitting edema       Neurologic: Moves all extremities, no slurring speeching  Psych: calm, not agitated, not anxious, does not voice si/hi        Data Review:       Recent Days:  Recent Labs     07/17/21  0351   WBC 10.3   HGB 11.3*   HCT 35.7   *     Recent Labs     07/17/21  0351      K 3.8      CO2 28   *   BUN 10   CREA 0.72   CA 9.2       24 Hour Results:  Recent Results (from the past 24 hour(s))   GLUCOSE, POC    Collection Time: 07/18/21  4:47 PM   Result Value Ref Range    Glucose (POC) 123 (H) 65 - 117 mg/dL    Performed by Dory george RN    GLUCOSE, POC    Collection Time: 07/18/21 10:06 PM   Result Value Ref Range    Glucose (POC) 110 65 - 117 mg/dL    Performed by Melda Tamika    GLUCOSE, POC    Collection Time: 07/19/21  6:17 AM   Result Value Ref Range    Glucose (POC) 134 (H) 65 - 117 mg/dL    Performed by Melda Tamika    GLUCOSE, POC    Collection Time: 07/19/21 11:35 AM   Result Value Ref Range    Glucose (POC) 193 (H) 65 - 117 mg/dL    Performed by Kimo Farah    SARS-COV-2    Collection Time: 07/19/21 11:45 AM   Result Value Ref Range    SARS-CoV-2 Nasopharyngeal           Medications reviewed  Current Facility-Administered Medications   Medication Dose Route Frequency    miconazole (MICOTIN) 2 % powder   Topical BID    hydrOXYzine HCL (ATARAX) tablet 10 mg  10 mg Oral Q6H PRN    oxyCODONE IR (ROXICODONE) tablet 5 mg  5 mg Oral Q6H PRN    ALPRAZolam (XANAX) tablet 0.25 mg  0.25 mg Oral BID    docusate sodium (COLACE) capsule 100 mg  100 mg Oral BID    senna (SENOKOT) tablet 17.2 mg  2 Tablet Oral DAILY    magnesium hydroxide (MILK OF MAGNESIA) 400 mg/5 mL oral suspension 30 mL  30 mL Oral DAILY    insulin glargine (LANTUS) injection 3 Units  3 Units SubCUTAneous DAILY    amitriptyline (ELAVIL) tablet 50 mg  50 mg Oral QHS    aspirin delayed-release tablet 81 mg  81 mg Oral DAILY    atorvastatin (LIPITOR) tablet 40 mg  40 mg Oral QHS    butalbital-acetaminophen-caffeine (FIORICET, ESGIC) -40 mg per tablet 1 Tablet  1 Tablet Oral DAILY PRN    levothyroxine (SYNTHROID) tablet 112 mcg  112 mcg Oral ACB    metoprolol tartrate (LOPRESSOR) tablet 25 mg  25 mg Oral 7am    metoprolol tartrate (LOPRESSOR) tablet 12.5 mg  12.5 mg Oral QHS    pantoprazole (PROTONIX) tablet 40 mg  40 mg Oral ACB    PARoxetine (PAXIL) tablet 40 mg  40 mg Oral 7am    sodium chloride (NS) flush 5-40 mL  5-40 mL IntraVENous Q8H    sodium chloride (NS) flush 5-40 mL  5-40 mL IntraVENous PRN    acetaminophen (TYLENOL) tablet 650 mg  650 mg Oral Q6H PRN    Or    acetaminophen (TYLENOL) suppository 650 mg  650 mg Rectal Q6H PRN    polyethylene glycol (MIRALAX) packet 17 g  17 g Oral DAILY PRN    ondansetron (ZOFRAN ODT) tablet 4 mg  4 mg Oral Q8H PRN    Or    ondansetron (ZOFRAN) injection 4 mg  4 mg IntraVENous Q6H PRN    heparin (porcine) injection 5,000 Units  5,000 Units SubCUTAneous Q8H    insulin lispro (HUMALOG) injection   SubCUTAneous AC&HS    glucose chewable tablet 16 g  4 Tablet Oral PRN    dextrose (D50W) injection syrg 12.5-25 g  12.5-25 g IntraVENous PRN    glucagon (GLUCAGEN) injection 1 mg  1 mg IntraMUSCular PRN    L.acidophilus-paracasei-S.thermophil-bifidobacter (RISAQUAD) 8 billion cell capsule  1 Capsule Oral DAILY       Care Plan discussed with: Patient        Ghulam Freeman MD

## 2021-07-19 NOTE — PROGRESS NOTES
Bedside and Verbal shift change report given to Hammad Yanes RN (oncoming nurse) by Morenita Nicole RN (offgoing nurse). Report included the following information SBAR, ED Summary, Procedure Summary, Intake/Output, MAR and Recent Results.

## 2021-07-19 NOTE — PROGRESS NOTES
Problem: Mobility Impaired (Adult and Pediatric)  Goal: *Acute Goals and Plan of Care (Insert Text)  Description: Note: FUNCTIONAL STATUS PRIOR TO ADMISSION: Patient was modified independent using a walker for functional mobility. HOME SUPPORT PRIOR TO ADMISSION: The patient lived with  who she is the primary caregiver for. Goals continued 21    Physical Therapy Goals  Initiated 7/10/2021  1. Patient will move from supine to sit and sit to supine  in bed with modified independence within 7 day(s). 2.  Patient will transfer from bed to chair and chair to bed with modified independence using the least restrictive device within 7 day(s). 3.  Patient will perform sit to stand with modified independence within 7 day(s). 4.  Patient will ambulate with modified independence for 300 feet with the least restrictive device within 7 day(s). Outcome: Progressing Towards Goal   PHYSICAL THERAPY TREATMENT: WEEKLY REASSESSMENT  Patient: Corky Carson (14 y.o. female)  Date: 2021  Primary Diagnosis: Right hip pain [M25.551]        Precautions:   Fall, WBAT      ASSESSMENT  Patient continues with skilled PT services and is progressing towards goals. She reports no pain in supine which increases to 7-8/10 in the hip with mobility. BP is assessed for orthostatic hypotension. Patient demonstrates the need for multiple attempts and Min A with rocking to transition sit<>stand. Once in standing she demonstrates good balance with stand pivot transfer and use of RW. Legs are elevated to help maintain BP.     Supine: 109/76  Sittin/55  Seated post stand pivot transfer 100/65    Patient's progression toward goals since last assessment: goals are continued    Current Level of Function Impacting Discharge (mobility/balance): CGA- Min A     Other factors to consider for discharge: medical stability, decreased independence          PLAN :  Goals have been updated based on progression since last assessment. Patient continues to benefit from skilled intervention to address the above impairments. Recommendations and Planned Interventions: bed mobility training, transfer training, gait training, therapeutic exercises, neuromuscular re-education, edema management/control, patient and family training/education, and therapeutic activities      Frequency/Duration: Patient will be followed by physical therapy:  5 times a week to address goals. Recommendation for discharge: (in order for the patient to meet his/her long term goals)  Therapy 3 hours per day 5-7 days per week    This discharge recommendation:  Has been made in collaboration with the attending provider and/or case management    IF patient discharges home will need the following DME: to be determined (TBD)         SUBJECTIVE:   Patient stated my blood pressure has been low and its really making me nervous.     OBJECTIVE DATA SUMMARY:   HISTORY:    Past Medical History:   Diagnosis Date    Adverse effect of anesthesia     O2 DROPS WITH ANESTHESIA    Arthritis     KNEES    Cancer ([facility] 03/13/2015    SQUAMOUS CELL CARCINOMA; tonsils and lymph nodes.   Removed 3-2015 with radiation    GERD (gastroesophageal reflux disease)     Hypertension     NO LONGER ON MEDICATION    Hypothyroid     HYPOTHYROIDISM    Migraine     Nausea & vomiting     Obesity     Other ill-defined conditions(799.89)     chronic back pain    Psychiatric disorder     anxiety    Psychiatric disorder     panic ATTACKS    SVT (supraventricular tachycardia) ([facility] [address] 05/24/2014    Ulcerative colitis      Past Surgical History:   Procedure Laterality Date    COLONOSCOPY,DIAGNOSTIC  11/19/2015         HX GI      colonscopy    HX HEENT  03/2015    tonsillectomy (CANCER) AND NECK LYMPH NODES    HX HEENT  2008    PARATHYROID REMOVAL    HX HEENT Left 2002    EYE LASER    HX HEMORRHOIDECTOMY  2001, 2016     X2    HX HYSTERECTOMY  1985    HX KNEE ARTHROSCOPY  1995    left knee surgery, TOTAL LT and Right KNEE 2013    HX KNEE REPLACEMENT Bilateral 2013, 2014    HX LAP CHOLECYSTECTOMY  2002    HX LUMBAR FUSION  2011    SPINAL FUSION with hardware L4-L5    HX ORTHOPAEDIC  1990    CYST REMOVED RIGHT WRIST    HX ORTHOPAEDIC  05/12/2021    L2-3 & L5-21 LUMBAR LAMINECTOMY WITH ANDREWS OSTEOTOMY    HX OTHER SURGICAL      cyst removal right wrist    HX UROLOGICAL  2010    bladder surgery(interstim device)    IR INJ FORAMIN EPID LUMB ANES/STER SNGL  8/19/2019    ME EGD TRANSORAL BIOPSY SINGLE/MULTIPLE  5/20/2013            Personal factors and/or comorbidities impacting plan of care: please see above    Home Situation  Home Environment: Private residence  # Steps to Enter: 0  Wheelchair Ramp: Yes  One/Two Story Residence: One story  Living Alone: No  Support Systems: Spouse/Significant Other/Partner  Patient Expects to be Discharged to[de-identified] Rehabilitation facility  Current DME Used/Available at Home: Transfer bench, Walker, rolling, Walker, rollator, Cane, straight, Commode, bedside  Tub or Shower Type: Tub/Shower combination    EXAMINATION/PRESENTATION/DECISION MAKING:   Critical Behavior:  Neurologic State: Alert  Orientation Level: Oriented X4  Cognition: Appropriate decision making, Appropriate for age attention/concentration, Appropriate safety awareness  Safety/Judgement: Awareness of environment, Insight into deficits  Hearing:   Auditory  Auditory Impairment: None  Skin:    Edema:   Range Of Motion:  AROM: Within functional limits           PROM: Within functional limits           Strength:    Strength: Generally decreased, functional                    Tone & Sensation:   Tone: Normal                              Coordination:  Coordination: Within functional limits  Vision:      Functional Mobility:  Bed Mobility:     Supine to Sit: Contact guard assistance;Bed Modified  Sit to Supine: Minimum assistance (for RLE management)  Scooting: Minimum assistance  Transfers:  Sit to Stand: Minimum assistance  Stand to Sit: Minimum assistance  Stand Pivot Transfers: Contact guard assistance     Bed to Chair: Contact guard assistance              Balance:   Sitting: Intact; Without support  Sitting - Static: Good (unsupported)  Sitting - Dynamic: Not tested  Standing: Impaired; With support  Standing - Static: Constant support;Good  Standing - Dynamic : Constant support; Fair  Ambulation/Gait Training:                                                         Stairs: Therapeutic Exercises:       Pain Rating:      Activity Tolerance:   Good    After treatment patient left in no apparent distress:   Sitting in chair, Heels elevated for pressure relief, and Call bell within reach    COMMUNICATION/EDUCATION:   The patients plan of care was discussed with: Registered nurse. Fall prevention education was provided and the patient/caregiver indicated understanding., Patient/family have participated as able in goal setting and plan of care. , and Patient/family agree to work toward stated goals and plan of care.     Thank you for this referral.  Vanessa Waterman, PT   Time Calculation: 45 mins

## 2021-07-19 NOTE — WOUND CARE
WOCN Note:     Follow-up visit for spinal dressings. Previously seen by BART Phelps    Chart shows:  Admitted for right hip pain & edema of right ileopsoas  History of DM, pneumonia, lumbar laminectomy June 2021    Assessment:   Appropriately conversational and able to turn herself onto right side. Tolerates wound care well with a little stinging reported with use of Mesalt. Wearing briefs. 1. POA spinal incison with three open areas:  Proximal = 0.5 x 0.2 x 0.2 cm   Central =  3 x 0.5 x 0.1 cm   Distal = 2.5 x 0.8 x 7 cm with only slight undermining  All visible bases are red  No purulence or odor or periwound erythema. Tx: packed proximal and central with saline-dampened Puracol AG; packed distal wound with Mesalt. Foam to cover all. Wound Recommendations:    Continue current wound care as ordered.      Transition of Care: Plan to follow as needed while admitted to hospital.      TRENA Mo, RN, [facility]  Certified Wound, Ostomy, Continence Nurse  office [phone]  Available via [address] [place] [address]

## 2021-07-20 VITALS
OXYGEN SATURATION: 95 % | HEIGHT: 64 IN | TEMPERATURE: 97.8 F | WEIGHT: 268.4 LBS | HEART RATE: 103 BPM | SYSTOLIC BLOOD PRESSURE: 137 MMHG | BODY MASS INDEX: 45.82 KG/M2 | DIASTOLIC BLOOD PRESSURE: 86 MMHG | RESPIRATION RATE: 20 BRPM

## 2021-07-20 LAB
GLUCOSE BLD STRIP.AUTO-MCNC: 133 MG/DL (ref 65–117)
GLUCOSE BLD STRIP.AUTO-MCNC: 148 MG/DL (ref 65–117)
SARS-COV-2, XPLCVT: NOT DETECTED
SERVICE CMNT-IMP: ABNORMAL
SERVICE CMNT-IMP: ABNORMAL
SOURCE, COVRS: NORMAL

## 2021-07-20 PROCEDURE — 74011636637 HC RX REV CODE- 636/637: Performed by: INTERNAL MEDICINE

## 2021-07-20 PROCEDURE — 2709999900 HC NON-CHARGEABLE SUPPLY

## 2021-07-20 PROCEDURE — 82962 GLUCOSE BLOOD TEST: CPT

## 2021-07-20 PROCEDURE — 74011250636 HC RX REV CODE- 250/636: Performed by: INTERNAL MEDICINE

## 2021-07-20 PROCEDURE — 74011250637 HC RX REV CODE- 250/637: Performed by: FAMILY MEDICINE

## 2021-07-20 PROCEDURE — 74011250637 HC RX REV CODE- 250/637: Performed by: INTERNAL MEDICINE

## 2021-07-20 PROCEDURE — 77030037877 HC DRSG MEPILEX >48IN BORD MOLN -A

## 2021-07-20 RX ORDER — HEPARIN SODIUM 5000 [USP'U]/ML
5000 INJECTION, SOLUTION INTRAVENOUS; SUBCUTANEOUS EVERY 8 HOURS
Qty: 90 SYRINGE | Refills: 0 | Status: SHIPPED
Start: 2021-07-20 | End: 2022-01-04

## 2021-07-20 RX ORDER — ADHESIVE BANDAGE
30 BANDAGE TOPICAL DAILY
Qty: 1 BOTTLE | Refills: 0 | Status: SHIPPED | OUTPATIENT
Start: 2021-07-21 | End: 2022-07-06 | Stop reason: ALTCHOICE

## 2021-07-20 RX ORDER — POLYETHYLENE GLYCOL 3350 17 G/17G
17 POWDER, FOR SOLUTION ORAL DAILY
Qty: 30 PACKET | Refills: 0 | Status: SHIPPED | OUTPATIENT
Start: 2021-07-20 | End: 2022-07-06 | Stop reason: ALTCHOICE

## 2021-07-20 RX ORDER — SENNOSIDES 8.6 MG/1
2 TABLET ORAL DAILY
Qty: 60 TABLET | Refills: 0 | Status: SHIPPED | OUTPATIENT
Start: 2021-07-21 | End: 2022-07-06 | Stop reason: ALTCHOICE

## 2021-07-20 RX ORDER — ACETAMINOPHEN 325 MG/1
650 TABLET ORAL
Qty: 30 TABLET | Refills: 0 | Status: SHIPPED | OUTPATIENT
Start: 2021-07-20

## 2021-07-20 RX ORDER — OXYCODONE HYDROCHLORIDE 5 MG/1
5 TABLET ORAL
Qty: 12 TABLET | Refills: 0 | Status: SHIPPED | OUTPATIENT
Start: 2021-07-20 | End: 2021-07-23

## 2021-07-20 RX ORDER — MICONAZOLE NITRATE 2 %
POWDER (GRAM) TOPICAL 2 TIMES DAILY
Qty: 1 BOTTLE | Refills: 0 | Status: SHIPPED | OUTPATIENT
Start: 2021-07-20

## 2021-07-20 RX ORDER — DOCUSATE SODIUM 100 MG/1
100 CAPSULE, LIQUID FILLED ORAL 2 TIMES DAILY
Qty: 60 CAPSULE | Refills: 0 | Status: SHIPPED | OUTPATIENT
Start: 2021-07-20 | End: 2021-10-18

## 2021-07-20 RX ADMIN — Medication 1 CAPSULE: at 08:49

## 2021-07-20 RX ADMIN — PAROXETINE 40 MG: 20 TABLET, FILM COATED ORAL at 06:59

## 2021-07-20 RX ADMIN — HEPARIN SODIUM 5000 UNITS: 5000 INJECTION INTRAVENOUS; SUBCUTANEOUS at 06:28

## 2021-07-20 RX ADMIN — DOCUSATE SODIUM 100 MG: 100 CAPSULE, LIQUID FILLED ORAL at 08:49

## 2021-07-20 RX ADMIN — LEVOTHYROXINE SODIUM 112 MCG: 0.11 TABLET ORAL at 06:59

## 2021-07-20 RX ADMIN — MAGNESIUM HYDROXIDE 30 ML: 400 SUSPENSION ORAL at 08:49

## 2021-07-20 RX ADMIN — ALPRAZOLAM 0.25 MG: 0.25 TABLET ORAL at 07:00

## 2021-07-20 RX ADMIN — Medication 17.2 MG: at 08:49

## 2021-07-20 RX ADMIN — PANTOPRAZOLE SODIUM 40 MG: 40 TABLET, DELAYED RELEASE ORAL at 06:59

## 2021-07-20 RX ADMIN — ASPIRIN 81 MG: 81 TABLET, COATED ORAL at 08:49

## 2021-07-20 RX ADMIN — INSULIN GLARGINE 3 UNITS: 100 INJECTION, SOLUTION SUBCUTANEOUS at 08:48

## 2021-07-20 RX ADMIN — MICONAZOLE NITRATE 2 % TOPICAL POWDER: at 08:50

## 2021-07-20 NOTE — PROGRESS NOTES
TRANSFER - OUT REPORT:    Verbal report given to Shannen(name) on Anayeli Torres  being transferred to [facility](unit) for routine progression of care       Report consisted of patients Situation, Background, Assessment and   Recommendations(SBAR). Information from the following report(s) Kardex, MAR and Recent Results was reviewed with the receiving nurse. Lines:   Peripheral IV 07/13/21 Right Antecubital (Active)   Site Assessment Clean, dry, & intact 07/19/21 0900   Phlebitis Assessment 0 07/20/21 0202   Infiltration Assessment 0 07/20/21 0202   Dressing Status Clean, dry, & intact 07/20/21 0202   Dressing Type Transparent;Tape 07/20/21 0202   Hub Color/Line Status Capped 07/20/21 0202   Action Taken Open ports on tubing capped 07/20/21 0202   Alcohol Cap Used Yes 07/20/21 0202        Opportunity for questions and clarification was provided.       Patient transported with:  Transport

## 2021-07-20 NOTE — WOUND CARE
WOCN Note:     Follow-up visit for spinal dressings.     Chart shows:  Admitted for right hip pain & edema of right ileopsoas  History of DM, pneumonia, lumbar laminectomy June 2021     Assessment:   Appropriately conversational and sitting on side of bed. Tolerates wound care well with a little stinging reported with use of Mesalt. Wearing briefs.      1. POA spinal incison with three open areas:  Proximal = 0.5 x 0.2 x 0.2 cm   Central =  3 x 0.5 x 0.1 cm   Distal = 2.5 x 0.8 x 7 cm with only slight undermining  All visible bases are red  No purulence or odor or periwound erythema. Tx: packed proximal and central with saline-dampened Puracol AG; packed distal wound with Mesalt.   Foam to cover all.     Wound Recommendations:    Continue current wound care as ordered.      Transition of Care: Plan to follow as needed while admitted to hospital.      CINDY AvilesN, RN, [facility]  Certified Wound, Ostomy, Continence Nurse  office [phone]  Available via [address] [place] [address]

## 2021-07-20 NOTE — PROGRESS NOTES
ENEIAD: The patient is going to [address] [place] [address] today at 1pm with [facility] to transport. RUR: 18%    CM spoke to Joanne rock ([phone]) and insurance authorization has been granted for admission. The patient is going to room 2156 and RN to call report to: [phone]. CM following for discharge needs.     Sabino Holstein RN/CRM

## 2021-07-20 NOTE — PROGRESS NOTES
Bedside and Verbal shift change report given to Fernandez Dwyer (oncoming nurse) by Angeline Reyes (offgoing nurse). Report included the following information SBAR, Kardex, Intake/Output and MAR.

## 2021-07-20 NOTE — DISCHARGE INSTRUCTIONS
HOSPITALIST DISCHARGE INSTRUCTIONS    NAME: Brady Knott   :  1950   MRN:  [id number]     Date/Time:  2021 10:20 AM    ADMIT DATE: 2021   DISCHARGE DATE: 2021     Attending Physician: Amber Farrell NP    DISCHARGE DIAGNOSIS:  Intractable right hip pain in setting of fall   Suspected bacterial pneumonia   Mild anemia  (low blood count)  Thrombocytosis (elevated blood platelet count)  Diabetes  Hypertension (elevated blood pressure)  Dyslipidemia (abnormal blood lipids/fats)  Hypothyroidism (abnormal function of the thyroid gland)  Anxiety  Gastroesophageal reflux disease (reflux of stomach contents into the esophagus)  Morbid obesity  Abnormal liver function tests (this will need to be monitored)  Hyponatremia (low blood sodium level which resolved prior to hospital discharge)  Constipation (resolved prior to hospital discharge)    Medications: Per above medication reconciliation. Pain Management: per above medications    Recommended diet: Cardiac Diet and Diabetic Diet    Recommended activity: PT/OT Eval and Treat    Wound care: Wound VAC to be placed once at rehab    Indwelling devices:  None    Supplemental Oxygen: None    Required Lab work: Per SNF routine    Glucose management:  Accucheck ACHS with sliding scale per SNF protocol    Code status: Full     Take all discharge paperwork with you to all of your follow up doctor appointments. Take your medications exactly as outlined in your discharge paperwork. Do not take any other medications unless specifically prescribed after your hospital stay. If your symptoms return/worsen seek medical attention immediately. Outside physician follow up: Follow-up Information    None              Skilled nursing facility/ SNF MD responsible for above on discharge. Information obtained by :  I understand that if any problems occur once I am at home I am to contact my physician.     I understand and acknowledge receipt of the instructions indicated above. Physician's or R.N.'s Signature                                                                  Date/Time                                                                                                                                              Patient or Representative           WOUND NOTE:  Decision to continue packing spinal wound discussed with MAIKEL Madrigal, rather than moving toward NPWT. The distal wound on her spine has a narrow 7cm track that would be hard to pack with VAC foam.  Recommend using 1/2\" packing strip moistened with saline and dry cover dressing changed daily. The 2 proximal sites have responded well to collagen products and would continue with collagen application with dry cover dressing changed every 3 days.

## 2021-07-20 NOTE — PROGRESS NOTES
Bedside shift change report given to Steve Leon RN (oncoming nurse) by Temitope Martinez (offgoing nurse). Report included the following information SBAR, Kardex, Procedure Summary, Intake/Output, MAR and Recent Results.

## 2021-07-20 NOTE — PROGRESS NOTES
Hospitalist Progress Note           \[facility]\""LASHAWN RAMOS  Call physician on-call through the  7pm-7am    Daily Progress Note: 7/20/2021    Primary care provider:Ethel Colmenares MD    Date of admission: 7/8/2021  1:08 PM    Admission summery and hospital course:  \"65year-old woman with a past medical history significant for degenerative disk disease, dyslipidemia, hypothyroidism, type 2 diabetes, anxiety disorder, was in her usual state of health until the day of her presentation at the emergency room when the patient developed right hip pain.  The patient stated that she fell at home prior to the onset of the right hip pain and sustained injury to the right hip.  The patient was brought to the emergency room for further evaluation on 06/02/2021.  The patient was admitted to this hospital and underwent lumbar laminectomy.  After 5 days of hospitalization, the patient was discharged by the orthopedic service to rehab facility.  The patient has since been discharged from rehab and she is back at home. [facility] the patient arrived at the emergency room, x-ray of the right hip was obtained, which did not show any acute pathology.  Because the patient recently had back surgery, MRI of the lumbar spine as well as the thoracic spine were obtained, which did not show any acute pathology and no infection. \"    Subjective:   No acute issues. Awaiting placement. Still waiting for insurance authorization  Assessment/Plan:   Intractable right hip pain in setting of fall POA  - MRI on 07/11 showed edema of the right iliopsoas muscle centered at the myotendinous junction compatible with a strain.  - Patient had L2-L5 laminectomy 06/2021 with some delayed wound healing, seen by Surgical team on  07/09/21 - recommended continuing current wound care.   - Awaiting [facility] referal and insurance Auth     Suspected bacterial pneumonia POA  - CXR on 07/08/21 revealed mild left basilar opacity, repeat CXR on 21 revealed no acute process. - Patient completed 5 day course of ceftriaxone and doxycycline. - Afebrile and non-toxic in appearance. - Leucocytosis resolved.        Mild anemia   Thrombocytosis  - Thrombocytosis likely reactive. - No sign of thrombosis. - No tx indicated. - Monitor. DM II  - Uncontrolled with hyperglycemia present on admission .  - Low dose basal (glargine) insulin was started on 07/10/21.  - Continue FSBS with SSI correction scale. - Would benefit from transition to metformin at discharge given obesity in setting of normal renal function, HgbA1C 7.6     HTN  Dyslipidemia   - Continue current antihypertensive meds. - Continue statin. Hypothyroidism  - Continue current replacement tx. Anxiety  - Patient not anxious currently. - Continue current tx. GERD  - Continue PPI. Morbid obesity  - Discussed effects of excess weight on overall wellness. Abnormal LFTs  - ALT now WNL and AST trending down. - Monitor periodically, patient on statin. Hyponatremia, resloved     Constipation, resolved  - Patient having BMs. Disp  - Plan to transfer to [address] [place] [address] today. Review of Systems:     Negative other than noted above      Objective:   Physical Exam:     Visit Vitals  /62   Pulse 71   Temp 98.3 °F (36.8 °C)   Resp 16   Ht 5' 4\" (1.626 m)   Wt 121.7 kg (268 lb 6.4 oz)   SpO2 94%   BMI 46.07 kg/m²    O2 Flow Rate (L/min): 2 l/min O2 Device: None (Room air)    Temp (24hrs), Av.9 °F (36.6 °C), Min:97.3 °F (36.3 °C), Max:98.3 °F (36.8 °C)    No intake/output data recorded. 1901 -  07  In: -   Out:  [Urine:]    General:  A/A/O X 3. NAD. HEENT:  Normocephalic. Sclera anicteric. Mucous membranes moist.    Chest:  Resps even/unlabored with symmetrical CWE. Air entry diminished at bases. Lungs CTA. No use of accessory muscles. CV:  RRR. Normal S1/S2. No M/C/R appreciated. No JVD.   No peripheral edema. GI:  Abdomen soft/NT/ND. No organomegaly. No hernia. ABT X 4.    :  Voiding. Neurologic:  Nonfocal.  CN II-XII grossly intact. Face symmetrical.  Speech normal.     Psych:  Cooperative. No anxiety or agitation. No suicidal/homicidal ideation. Skin:  Warm and color appropriate. No rashes or jaundice. Turgor elastic. Dressing in lumbosacral area C/D/I.       Data Review:       Recent Days:  No results for input(s): WBC, HGB, HCT, PLT, HGBEXT, HCTEXT, PLTEXT, HGBEXT, HCTEXT, PLTEXT in the last 72 hours. No results for input(s): NA, K, CL, CO2, GLU, BUN, CREA, CA, MG, PHOS, ALB, TBIL, ALT, INR, INREXT, INREXT in the last 72 hours.     No lab exists for component: SGOT    24 Hour Results:  Recent Results (from the past 24 hour(s))   GLUCOSE, POC    Collection Time: 07/19/21 11:35 AM   Result Value Ref Range    Glucose (POC) 193 (H) 65 - 117 mg/dL    Performed by Yonatan Melchor    SARS-COV-2    Collection Time: 07/19/21 11:45 AM   Result Value Ref Range    SARS-CoV-2 Nasopharyngeal     GLUCOSE, POC    Collection Time: 07/19/21  4:44 PM   Result Value Ref Range    Glucose (POC) 99 65 - 117 mg/dL    Performed by Yonatan Melchor    GLUCOSE, POC    Collection Time: 07/19/21  9:03 PM   Result Value Ref Range    Glucose (POC) 153 (H) 65 - 117 mg/dL    Performed by Ramsey Colon    GLUCOSE, POC    Collection Time: 07/20/21  6:16 AM   Result Value Ref Range    Glucose (POC) 133 (H) 65 - 117 mg/dL    Performed by Ramsey Colon          Medications reviewed  Current Facility-Administered Medications   Medication Dose Route Frequency    miconazole (MICOTIN) 2 % powder   Topical BID    hydrOXYzine HCL (ATARAX) tablet 10 mg  10 mg Oral Q6H PRN    oxyCODONE IR (ROXICODONE) tablet 5 mg  5 mg Oral Q6H PRN    ALPRAZolam (XANAX) tablet 0.25 mg  0.25 mg Oral BID    docusate sodium (COLACE) capsule 100 mg  100 mg Oral BID    senna (SENOKOT) tablet 17.2 mg  2 Tablet Oral DAILY    magnesium hydroxide (MILK OF MAGNESIA) 400 mg/5 mL oral suspension 30 mL  30 mL Oral DAILY    insulin glargine (LANTUS) injection 3 Units  3 Units SubCUTAneous DAILY    amitriptyline (ELAVIL) tablet 50 mg  50 mg Oral QHS    aspirin delayed-release tablet 81 mg  81 mg Oral DAILY    atorvastatin (LIPITOR) tablet 40 mg  40 mg Oral QHS    butalbital-acetaminophen-caffeine (FIORICET, ESGIC) -40 mg per tablet 1 Tablet  1 Tablet Oral DAILY PRN    levothyroxine (SYNTHROID) tablet 112 mcg  112 mcg Oral ACB    metoprolol tartrate (LOPRESSOR) tablet 25 mg  25 mg Oral 7am    metoprolol tartrate (LOPRESSOR) tablet 12.5 mg  12.5 mg Oral QHS    pantoprazole (PROTONIX) tablet 40 mg  40 mg Oral ACB    PARoxetine (PAXIL) tablet 40 mg  40 mg Oral 7am    sodium chloride (NS) flush 5-40 mL  5-40 mL IntraVENous Q8H    sodium chloride (NS) flush 5-40 mL  5-40 mL IntraVENous PRN    acetaminophen (TYLENOL) tablet 650 mg  650 mg Oral Q6H PRN    Or    acetaminophen (TYLENOL) suppository 650 mg  650 mg Rectal Q6H PRN    polyethylene glycol (MIRALAX) packet 17 g  17 g Oral DAILY PRN    ondansetron (ZOFRAN ODT) tablet 4 mg  4 mg Oral Q8H PRN    Or    ondansetron (ZOFRAN) injection 4 mg  4 mg IntraVENous Q6H PRN    heparin (porcine) injection 5,000 Units  5,000 Units SubCUTAneous Q8H    insulin lispro (HUMALOG) injection   SubCUTAneous AC&HS    glucose chewable tablet 16 g  4 Tablet Oral PRN    dextrose (D50W) injection syrg 12.5-25 g  12.5-25 g IntraVENous PRN    glucagon (GLUCAGEN) injection 1 mg  1 mg IntraMUSCular PRN    L.acidophilus-paracasei-S.thermophil-bifidobacter (RISAQUAD) 8 billion cell capsule  1 Capsule Oral DAILY       Care Plan discussed with: Patient        Lanette Stewart NP

## 2021-07-20 NOTE — DISCHARGE SUMMARY
Hospitalist Discharge Summary     Patient ID:  Jade Eastman  [id number]  2 y.o.  1950 7/8/2021    PCP on record: Maria Fernanda Montgomery MD    Admit date: 7/8/2021  Discharge date and time: 7/20/2021    DISCHARGE DIAGNOSIS:  Intractable right hip pain in setting of fall POA  Suspected bacterial pneumonia POA  Mild anemia   Thrombocytosis  DM II  HTN  Dyslipidemia   Hypothyroidism  Anxiety  GERD  Morbid obesity  Abnormal LFTs  Hyponatremia, resloved   Constipation, resolved    CONSULTATIONS:  IP CONSULT TO ORTHOPEDIC SURGERY  IP CONSULT TO ORTHOPEDIC SURGERY  IP CONSULT TO ORTHOPEDIC SURGERY    Excerpted HPI from H&P of Gerson Franco MD:  \"This is a 49-year-old woman with a past medical history significant for degenerative disk disease, dyslipidemia, hypothyroidism, type 2 diabetes, anxiety disorder, was in her usual state of health until the day of her presentation at the emergency room when the patient developed right hip pain. The pain is constant, sharp pain worse with movement involving the right hip, slight relief at rest, 8/10 in severity. The patient stated that she fell at home prior to the onset of the right hip pain and sustained injury to the right hip. The patient was brought to the emergency room for further evaluation on 06/02/2021. The patient was admitted to this hospital and underwent lumbar laminectomy. After 5 days of hospitalization, the patient was discharged by the orthopedic service to rehab facility. The patient has since been discharged from rehab and she is back at home. When the patient arrived at the emergency room, x-ray of the right hip was obtained, which did not show any acute pathology. Because the patient recently had back surgery, MRI of the lumbar spine as well as the thoracic spine were obtained, which did not show any acute pathology and no infection.   The patient was referred to the hospitalist service for evaluation for admission because of the intractable right hip pain. \"    ______________________________________________________________________  DISCHARGE SUMMARY/HOSPITAL COURSE:  for full details see H&P, daily progress notes, labs, consult notes. Intractable right hip pain in setting of fall POA  - MRI on 07/11 showed edema of the right iliopsoas muscle centered at the myotendinous junction compatible with a strain.  - Patient had L2-L5 laminectomy 06/2021 with some delayed wound healing, seen by Surgical team on  07/09/21 - recommended continuing current wound care while in acute care setting and transitioning to wound VAC once at rehab.     Suspected bacterial pneumonia POA  - CXR on 07/08/21 revealed mild left basilar opacity, repeat CXR on 07/12/21 revealed no acute process. - Patient completed 5 day course of ceftriaxone and doxycycline. - Afebrile and non-toxic in appearance. - Leucocytosis resolved.        Mild anemia   Thrombocytosis  - Thrombocytosis likely reactive. - No sign of thrombosis. - Continue VTE prophylaxis. - Monitor.       DM II  - Uncontrolled with hyperglycemia present on admission .  - Low dose basal (glargine) insulin was started on 07/10/21.  - Continue FSBS with SSI correction scale. - Resume metformin at time of d/c to rehab.      HTN  Dyslipidemia  - Adequate BP control.  - Continue current antihypertensive meds. - Continue statin.     Hypothyroidism  - Continue current replacement tx.       Anxiety  - Patient not anxious currently. - Continue current tx.       GERD  - Continue PPI.       Morbid obesity  - Discussed effects of excess weight on overall wellness.       Abnormal LFTs  - ALT now WNL and AST trending down. - Monitor periodically, patient on statin. - Will need OP follow-up with PCP once discharged from rehab.     Hyponatremia, resloved      Constipation, resolved  - Patient having BMs.    - Continue current tx.       At the time of this dictation the patient's vital signs were as follows:  T 98.1, /70, HR 81, RR 18,  SpO2 95% on room air.      _______________________________________________________________________  Patient seen and examined by me on discharge day. Pertinent Findings:  General:  A/A/O X 3. NAD. HEENT:  Normocephalic. Sclera anicteric. EOMI. Mucous membranes moist.    Chest:  Resps even/unlabored with symmetrical CWE. Air entry full. Lungs CTA. No use of accessory muscles. CV:  RRR. Normal S1/S2. No M/C/R appreciated. No JVD. No peripheral edema. GI:  Abdomen soft/NT/ND. No organomegaly. No hernia. ABT X 4.    :  Voiding. Neurologic:  Nonfocal.  CN II-XII grossly intact. Face symmetrical.  Speech normal.     Psych:  Cooperative. No anxiety or agitation. No suicidal/homicidal ideation. Skin:  Warm and color appropriate. No rashes or jaundice. Turgor elastic.   _______________________________________________________________________  DISCHARGE MEDICATIONS:   Current Discharge Medication List      START taking these medications    Details   acetaminophen (TYLENOL) 325 mg tablet Take 2 Tablets by mouth every six (6) hours as needed for Pain. Qty: 30 Tablet, Refills: 0  Start date: 7/20/2021      docusate sodium (COLACE) 100 mg capsule Take 1 Capsule by mouth two (2) times a day for 90 days. Qty: 60 Capsule, Refills: 0  Start date: 7/20/2021, End date: 10/18/2021      L.acid,para-B. bifidum-S.therm (RISAQUAD) 8 billion cell cap cap Take 1 Capsule by mouth daily. Qty: 30 Capsule, Refills: 0  Start date: 7/21/2021      magnesium hydroxide (MILK OF MAGNESIA) 400 mg/5 mL suspension Take 30 mL by mouth daily. Qty: 1 Bottle, Refills: 0  Start date: 7/21/2021      miconazole (MICOTIN) 2 % topical powder Apply  to affected area two (2) times a day. Qty: 1 Bottle, Refills: 0  Start date: 7/20/2021      oxyCODONE IR (ROXICODONE) 5 mg immediate release tablet Take 1 Tablet by mouth every six (6) hours as needed for Pain for up to 3 days.  Max Daily Amount: 20 mg.  Qty: 12 Tablet, Refills: 0  Start date: 7/20/2021, End date: 7/23/2021    Associated Diagnoses: Right hip pain      polyethylene glycol (MIRALAX) 17 gram packet Take 1 Packet by mouth daily. Qty: 30 Packet, Refills: 0  Start date: 7/20/2021      senna (SENOKOT) 8.6 mg tablet Take 2 Tablets by mouth daily. Qty: 60 Tablet, Refills: 0  Start date: 7/21/2021      heparin sodium,porcine (heparin, porcine,) 5,000 unit/mL injection 1 mL by SubCUTAneous route every eight (8) hours. Qty: 90 Syringe, Refills: 0  Start date: 7/20/2021         CONTINUE these medications which have NOT CHANGED    Details   naloxone (NARCAN) 4 mg/actuation nasal spray Use 1 spray intranasally, then discard. Repeat with new spray every 2 min as needed for opioid overdose symptoms, alternating nostrils. Qty: 1 Each, Refills: 0      amitriptyline (ELAVIL) 50 mg tablet Take 50 mg by mouth nightly. atorvastatin (LIPITOR) 40 mg tablet Take 40 mg by mouth nightly. metFORMIN (GLUCOPHAGE) 500 mg tablet Take 500 mg by mouth two (2) times daily (with meals). butalb/acetaminophen/caffeine (FIORICET PO) Take 1 Tab by mouth as needed. aspirin delayed-release 81 mg tablet Take 81 mg by mouth daily. ALPRAZolam (XANAX) 0.5 mg tablet Take 0.5 mg by mouth two (2) times a day. levothyroxine (SYNTHROID) 75 mcg tablet Take 112 mcg by mouth Daily (before breakfast). PARoxetine (PAXIL) 40 mg tablet Take 40 mg by mouth every morning. !! metoprolol tartrate (LOPRESSOR) 25 mg tablet Take 12.5 mg by mouth nightly. !! metoprolol (LOPRESSOR) 25 mg tablet Take 25 mg by mouth every morning. omeprazole (PRILOSEC) 40 mg capsule Take 40 mg by mouth every morning. !! - Potential duplicate medications found. Please discuss with provider. Patient Follow Up Instructions:    Activity: PT/OT Eval and Treat  Diet: Cardiac Diet and Diabetic Diet  Wound Care: Wound VAC to be placed once at rehab    Follow-up as noted below  Follow-up tests/labs per rehab provider    Follow-up Information     Follow up With Specialties Details Why Contact Seng Novak MD Family Medicine In 3 days Call to schedule follow-up appointment with your primary care provider once discharged from rehab [address] [place] [address] [phone]          ________________________________________________________________    Risk of deterioration: Low    Condition at Discharge:  Stable  __________________________________________________________________    Disposition  IP Rehab    ____________________________________________________________________    Code Status: Full Code  ___________________________________________________________________      Total time in minutes spent coordinating this discharge (includes going over instructions, follow-up, prescriptions, and preparing report for sign off to her PCP) :  >30 minutes    Signed:  Amina Knight NP

## 2021-11-06 ENCOUNTER — HOSPITAL ENCOUNTER (INPATIENT)
Age: 71
LOS: 33 days | Discharge: NURSING, REHAB OR LONG-TERM CARE | DRG: 853 | End: 2021-12-09
Attending: STUDENT IN AN ORGANIZED HEALTH CARE EDUCATION/TRAINING PROGRAM | Admitting: HOSPITALIST
Payer: MEDICARE

## 2021-11-06 ENCOUNTER — APPOINTMENT (OUTPATIENT)
Dept: CT IMAGING | Age: 71
DRG: 853 | End: 2021-11-06
Attending: STUDENT IN AN ORGANIZED HEALTH CARE EDUCATION/TRAINING PROGRAM
Payer: MEDICARE

## 2021-11-06 ENCOUNTER — APPOINTMENT (OUTPATIENT)
Dept: GENERAL RADIOLOGY | Age: 71
DRG: 853 | End: 2021-11-06
Attending: STUDENT IN AN ORGANIZED HEALTH CARE EDUCATION/TRAINING PROGRAM
Payer: MEDICARE

## 2021-11-06 DIAGNOSIS — Z98.890 HISTORY OF LAMINECTOMY: ICD-10-CM

## 2021-11-06 DIAGNOSIS — E66.01 MORBID OBESITY WITH BMI OF 45.0-49.9, ADULT (HCC): ICD-10-CM

## 2021-11-06 DIAGNOSIS — E87.20 LACTIC ACIDOSIS: ICD-10-CM

## 2021-11-06 DIAGNOSIS — E87.1 HYPONATREMIA: ICD-10-CM

## 2021-11-06 DIAGNOSIS — T81.89XD LUMBAR SURGICAL WOUND FLUID COLLECTION, SUBSEQUENT ENCOUNTER: ICD-10-CM

## 2021-11-06 DIAGNOSIS — A49.1 STREPTOCOCCAL INFECTION: ICD-10-CM

## 2021-11-06 DIAGNOSIS — A40.8: ICD-10-CM

## 2021-11-06 DIAGNOSIS — B95.7 COAGULASE-NEGATIVE STAPHYLOCOCCAL INFECTION: ICD-10-CM

## 2021-11-06 DIAGNOSIS — M43.26 FUSION OF LUMBAR SPINE: ICD-10-CM

## 2021-11-06 DIAGNOSIS — E66.01 MORBID OBESITY WITH BMI OF 40.0-44.9, ADULT (HCC): ICD-10-CM

## 2021-11-06 DIAGNOSIS — R65.20 SEVERE SEPSIS (HCC): ICD-10-CM

## 2021-11-06 DIAGNOSIS — N17.9 AKI (ACUTE KIDNEY INJURY) (HCC): ICD-10-CM

## 2021-11-06 DIAGNOSIS — N39.0 E. COLI UTI: ICD-10-CM

## 2021-11-06 DIAGNOSIS — Z98.890 HISTORY OF LUMBAR LAMINECTOMY: ICD-10-CM

## 2021-11-06 DIAGNOSIS — B96.20 E. COLI UTI: ICD-10-CM

## 2021-11-06 DIAGNOSIS — E11.10 DIABETIC KETOACIDOSIS WITHOUT COMA ASSOCIATED WITH TYPE 2 DIABETES MELLITUS (HCC): ICD-10-CM

## 2021-11-06 DIAGNOSIS — B96.20 E-COLI UTI: ICD-10-CM

## 2021-11-06 DIAGNOSIS — Z87.440 HISTORY OF RECURRENT UTIS: ICD-10-CM

## 2021-11-06 DIAGNOSIS — E11.00 HYPEROSMOLAR HYPERGLYCEMIC STATE (HHS) (HCC): Primary | ICD-10-CM

## 2021-11-06 DIAGNOSIS — D72.825 BANDEMIA: ICD-10-CM

## 2021-11-06 DIAGNOSIS — M46.20 INFECTION OF SPINE (HCC): ICD-10-CM

## 2021-11-06 DIAGNOSIS — B37.31 CANDIDAL VULVOVAGINITIS: ICD-10-CM

## 2021-11-06 DIAGNOSIS — N39.0 RECURRENT UTI: ICD-10-CM

## 2021-11-06 DIAGNOSIS — N39.0 E-COLI UTI: ICD-10-CM

## 2021-11-06 DIAGNOSIS — F41.9 ANXIETY: ICD-10-CM

## 2021-11-06 DIAGNOSIS — A49.8 ESCHERICHIA COLI INFECTION: ICD-10-CM

## 2021-11-06 DIAGNOSIS — A41.9 SEVERE SEPSIS (HCC): ICD-10-CM

## 2021-11-06 DIAGNOSIS — B37.31 CANDIDA VAGINITIS: ICD-10-CM

## 2021-11-06 DIAGNOSIS — E11.65 POORLY CONTROLLED DIABETES MELLITUS (HCC): ICD-10-CM

## 2021-11-06 DIAGNOSIS — K68.19 RETROPERITONEAL ABSCESS (HCC): ICD-10-CM

## 2021-11-06 PROBLEM — R73.9 HYPERGLYCEMIA: Status: ACTIVE | Noted: 2021-11-06

## 2021-11-06 PROBLEM — R65.10 SIRS (SYSTEMIC INFLAMMATORY RESPONSE SYNDROME) (HCC): Status: ACTIVE | Noted: 2021-11-06

## 2021-11-06 PROBLEM — D72.829 LEUKOCYTOSIS: Status: ACTIVE | Noted: 2021-11-06

## 2021-11-06 LAB
ADMINISTERED INITIALS, ADMINIT: NORMAL
ALBUMIN SERPL-MCNC: 2.2 G/DL (ref 3.5–5)
ALBUMIN/GLOB SERPL: 0.4 {RATIO} (ref 1.1–2.2)
ALP SERPL-CCNC: 267 U/L (ref 45–117)
ALT SERPL-CCNC: 21 U/L (ref 12–78)
ANION GAP SERPL CALC-SCNC: 11 MMOL/L (ref 5–15)
ANION GAP SERPL CALC-SCNC: 15 MMOL/L (ref 5–15)
APPEARANCE UR: CLEAR
AST SERPL-CCNC: 14 U/L (ref 15–37)
BACTERIA URNS QL MICRO: NEGATIVE /HPF
BASE DEFICIT BLD-SCNC: 2.1 MMOL/L
BASE EXCESS BLD CALC-SCNC: 2.3 MMOL/L
BASOPHILS # BLD: 0 K/UL (ref 0–0.1)
BASOPHILS # BLD: 0.1 K/UL (ref 0–0.1)
BASOPHILS NFR BLD: 0 % (ref 0–1)
BASOPHILS NFR BLD: 1 % (ref 0–1)
BILIRUB SERPL-MCNC: 0.6 MG/DL (ref 0.2–1)
BILIRUB UR QL: NEGATIVE
BUN SERPL-MCNC: 24 MG/DL (ref 6–20)
BUN SERPL-MCNC: 28 MG/DL (ref 6–20)
BUN/CREAT SERPL: 19 (ref 12–20)
BUN/CREAT SERPL: 29 (ref 12–20)
CA-I BLD-MCNC: 1.19 MMOL/L (ref 1.12–1.32)
CA-I BLD-MCNC: 1.28 MMOL/L (ref 1.12–1.32)
CALCIUM SERPL-MCNC: 9.3 MG/DL (ref 8.5–10.1)
CALCIUM SERPL-MCNC: 9.6 MG/DL (ref 8.5–10.1)
CHLORIDE BLD-SCNC: 86 MMOL/L (ref 100–108)
CHLORIDE BLD-SCNC: 94 MMOL/L (ref 100–108)
CHLORIDE SERPL-SCNC: 85 MMOL/L (ref 97–108)
CHLORIDE SERPL-SCNC: 98 MMOL/L (ref 97–108)
CK SERPL-CCNC: 33 U/L (ref 26–192)
CO2 BLD-SCNC: 25 MMOL/L (ref 19–24)
CO2 BLD-SCNC: 28 MMOL/L (ref 19–24)
CO2 SERPL-SCNC: 22 MMOL/L (ref 21–32)
CO2 SERPL-SCNC: 23 MMOL/L (ref 21–32)
COLOR UR: ABNORMAL
COMMENT, HOLDF: NORMAL
COVID-19 RAPID TEST, COVR: NOT DETECTED
CREAT SERPL-MCNC: 0.84 MG/DL (ref 0.55–1.02)
CREAT SERPL-MCNC: 1.51 MG/DL (ref 0.55–1.02)
CREAT UR-MCNC: 0.6 MG/DL (ref 0.6–1.3)
CREAT UR-MCNC: 0.8 MG/DL (ref 0.6–1.3)
D50 ADMINISTERED, D50ADM: 0 ML
D50 ORDER, D50ORD: 0 ML
DIFFERENTIAL METHOD BLD: ABNORMAL
DIFFERENTIAL METHOD BLD: ABNORMAL
EOSINOPHIL # BLD: 0 K/UL (ref 0–0.4)
EOSINOPHIL # BLD: 0.3 K/UL (ref 0–0.4)
EOSINOPHIL NFR BLD: 0 % (ref 0–7)
EOSINOPHIL NFR BLD: 1 % (ref 0–7)
EPITH CASTS URNS QL MICRO: ABNORMAL /LPF
ERYTHROCYTE [DISTWIDTH] IN BLOOD BY AUTOMATED COUNT: 16.3 % (ref 11.5–14.5)
ERYTHROCYTE [DISTWIDTH] IN BLOOD BY AUTOMATED COUNT: 16.7 % (ref 11.5–14.5)
EST. AVERAGE GLUCOSE BLD GHB EST-MCNC: 289 MG/DL
GLOBULIN SER CALC-MCNC: 5.5 G/DL (ref 2–4)
GLSCOM COMMENTS: NORMAL
GLUCOSE BLD STRIP.AUTO-MCNC: 190 MG/DL (ref 65–117)
GLUCOSE BLD STRIP.AUTO-MCNC: 195 MG/DL (ref 65–117)
GLUCOSE BLD STRIP.AUTO-MCNC: 244 MG/DL (ref 65–117)
GLUCOSE BLD STRIP.AUTO-MCNC: 291 MG/DL (ref 65–117)
GLUCOSE BLD STRIP.AUTO-MCNC: 440 MG/DL (ref 65–117)
GLUCOSE BLD STRIP.AUTO-MCNC: 457 MG/DL (ref 65–117)
GLUCOSE BLD STRIP.AUTO-MCNC: 479 MG/DL (ref 74–106)
GLUCOSE BLD STRIP.AUTO-MCNC: >600 MG/DL (ref 65–117)
GLUCOSE BLD STRIP.AUTO-MCNC: >600 MG/DL (ref 65–117)
GLUCOSE BLD STRIP.AUTO-MCNC: >700 MG/DL (ref 74–106)
GLUCOSE SERPL-MCNC: 192 MG/DL (ref 65–100)
GLUCOSE SERPL-MCNC: 973 MG/DL (ref 65–100)
GLUCOSE UR STRIP.AUTO-MCNC: >1000 MG/DL
GLUCOSE, GLC: 190 MG/DL
GLUCOSE, GLC: 195 MG/DL
GLUCOSE, GLC: 244 MG/DL
GLUCOSE, GLC: 291 MG/DL
GLUCOSE, GLC: 440 MG/DL
GLUCOSE, GLC: 457 MG/DL
GLUCOSE, GLC: 600 MG/DL
GLUCOSE, GLC: 600 MG/DL
HBA1C MFR BLD: 11.7 % (ref 4–5.6)
HCO3 BLDA-SCNC: 25 MMOL/L
HCO3 BLDA-SCNC: 28 MMOL/L
HCT VFR BLD AUTO: 37.1 % (ref 35–47)
HCT VFR BLD AUTO: 42.1 % (ref 35–47)
HGB BLD-MCNC: 12.1 G/DL (ref 11.5–16)
HGB BLD-MCNC: 13.2 G/DL (ref 11.5–16)
HGB UR QL STRIP: NEGATIVE
HIGH TARGET, HITG: 250 MG/DL
IMM GRANULOCYTES # BLD AUTO: 0 K/UL (ref 0–0.04)
IMM GRANULOCYTES # BLD AUTO: 0.4 K/UL (ref 0–0.04)
IMM GRANULOCYTES NFR BLD AUTO: 0 % (ref 0–0.5)
IMM GRANULOCYTES NFR BLD AUTO: 1 % (ref 0–0.5)
INSULIN ADMINSTERED, INSADM: 10.8 UNITS/HOUR
INSULIN ADMINSTERED, INSADM: 11.4 UNITS/HOUR
INSULIN ADMINSTERED, INSADM: 11.6 UNITS/HOUR
INSULIN ADMINSTERED, INSADM: 15.9 UNITS/HOUR
INSULIN ADMINSTERED, INSADM: 16.2 UNITS/HOUR
INSULIN ADMINSTERED, INSADM: 6.5 UNITS/HOUR
INSULIN ADMINSTERED, INSADM: 6.8 UNITS/HOUR
INSULIN ADMINSTERED, INSADM: 7.4 UNITS/HOUR
INSULIN ORDER, INSORD: 10.8 UNITS/HOUR
INSULIN ORDER, INSORD: 11.4 UNITS/HOUR
INSULIN ORDER, INSORD: 11.6 UNITS/HOUR
INSULIN ORDER, INSORD: 15.9 UNITS/HOUR
INSULIN ORDER, INSORD: 16.2 UNITS/HOUR
INSULIN ORDER, INSORD: 6.5 UNITS/HOUR
INSULIN ORDER, INSORD: 6.8 UNITS/HOUR
INSULIN ORDER, INSORD: 7.4 UNITS/HOUR
KETONES UR QL STRIP.AUTO: ABNORMAL MG/DL
LACTATE BLD-SCNC: 5.31 MMOL/L (ref 0.4–2)
LACTATE BLD-SCNC: 9.27 MMOL/L (ref 0.4–2)
LACTATE SERPL-SCNC: 2.6 MMOL/L (ref 0.4–2)
LEUKOCYTE ESTERASE UR QL STRIP.AUTO: NEGATIVE
LIPASE SERPL-CCNC: 322 U/L (ref 73–393)
LOW TARGET, LOT: 150 MG/DL
LYMPHOCYTES # BLD: 1 K/UL (ref 0.8–3.5)
LYMPHOCYTES # BLD: 1.1 K/UL (ref 0.8–3.5)
LYMPHOCYTES NFR BLD: 4 % (ref 12–49)
LYMPHOCYTES NFR BLD: 4 % (ref 12–49)
MAGNESIUM SERPL-MCNC: 1.6 MG/DL (ref 1.6–2.4)
MAGNESIUM SERPL-MCNC: 1.9 MG/DL (ref 1.6–2.4)
MCH RBC QN AUTO: 25.4 PG (ref 26–34)
MCH RBC QN AUTO: 25.7 PG (ref 26–34)
MCHC RBC AUTO-ENTMCNC: 31.4 G/DL (ref 30–36.5)
MCHC RBC AUTO-ENTMCNC: 32.6 G/DL (ref 30–36.5)
MCV RBC AUTO: 77.8 FL (ref 80–99)
MCV RBC AUTO: 81.9 FL (ref 80–99)
MINUTES UNTIL NEXT BG, NBG: 60 MIN
MONOCYTES # BLD: 1.3 K/UL (ref 0–1)
MONOCYTES # BLD: 1.8 K/UL (ref 0–1)
MONOCYTES NFR BLD: 5 % (ref 5–13)
MONOCYTES NFR BLD: 7 % (ref 5–13)
MULTIPLIER, MUL: 0.02
MULTIPLIER, MUL: 0.03
MULTIPLIER, MUL: 0.03
MULTIPLIER, MUL: 0.04
MULTIPLIER, MUL: 0.04
MULTIPLIER, MUL: 0.05
NEUTS BAND NFR BLD MANUAL: 11 %
NEUTS SEG # BLD: 22.8 K/UL (ref 1.8–8)
NEUTS SEG # BLD: 23.6 K/UL (ref 1.8–8)
NEUTS SEG NFR BLD: 79 % (ref 32–75)
NEUTS SEG NFR BLD: 87 % (ref 32–75)
NITRITE UR QL STRIP.AUTO: NEGATIVE
NRBC # BLD: 0 K/UL (ref 0–0.01)
NRBC # BLD: 0 K/UL (ref 0–0.01)
NRBC BLD-RTO: 0 PER 100 WBC
NRBC BLD-RTO: 0 PER 100 WBC
ORDER INITIALS, ORDINIT: NORMAL
PCO2 BLDV: 45.7 MMHG (ref 41–51)
PCO2 BLDV: 48.7 MMHG (ref 41–51)
PH BLDV: 7.32 [PH] (ref 7.32–7.42)
PH BLDV: 7.4 [PH] (ref 7.32–7.42)
PH UR STRIP: 5 [PH] (ref 5–8)
PLATELET # BLD AUTO: 546 K/UL (ref 150–400)
PLATELET # BLD AUTO: 567 K/UL (ref 150–400)
PLATELET COMMENTS,PCOM: ABNORMAL
PMV BLD AUTO: 10.3 FL (ref 8.9–12.9)
PMV BLD AUTO: 11 FL (ref 8.9–12.9)
PO2 BLDV: 28 MMHG (ref 25–40)
PO2 BLDV: 31 MMHG (ref 25–40)
POTASSIUM BLD-SCNC: 4 MMOL/L (ref 3.5–5.5)
POTASSIUM BLD-SCNC: 4.8 MMOL/L (ref 3.5–5.5)
POTASSIUM SERPL-SCNC: 3.9 MMOL/L (ref 3.5–5.1)
POTASSIUM SERPL-SCNC: 4.6 MMOL/L (ref 3.5–5.1)
PROT SERPL-MCNC: 7.7 G/DL (ref 6.4–8.2)
PROT UR STRIP-MCNC: NEGATIVE MG/DL
RBC # BLD AUTO: 4.77 M/UL (ref 3.8–5.2)
RBC # BLD AUTO: 5.14 M/UL (ref 3.8–5.2)
RBC #/AREA URNS HPF: ABNORMAL /HPF (ref 0–5)
RBC MORPH BLD: ABNORMAL
SAMPLES BEING HELD,HOLD: NORMAL
SERVICE CMNT-IMP: ABNORMAL
SODIUM BLD-SCNC: 125 MMOL/L (ref 136–145)
SODIUM BLD-SCNC: 135 MMOL/L (ref 136–145)
SODIUM SERPL-SCNC: 122 MMOL/L (ref 136–145)
SODIUM SERPL-SCNC: 132 MMOL/L (ref 136–145)
SOURCE, COVRS: NORMAL
SPECIMEN SITE: ABNORMAL
SPECIMEN SITE: ABNORMAL
TSH SERPL DL<=0.05 MIU/L-ACNC: 3.62 UIU/ML (ref 0.36–3.74)
UA: UC IF INDICATED,UAUC: ABNORMAL
UROBILINOGEN UR QL STRIP.AUTO: 0.2 EU/DL (ref 0.2–1)
WBC # BLD AUTO: 26 K/UL (ref 3.6–11)
WBC # BLD AUTO: 26.3 K/UL (ref 3.6–11)
WBC URNS QL MICRO: ABNORMAL /HPF (ref 0–4)

## 2021-11-06 PROCEDURE — 74011000258 HC RX REV CODE- 258: Performed by: STUDENT IN AN ORGANIZED HEALTH CARE EDUCATION/TRAINING PROGRAM

## 2021-11-06 PROCEDURE — 93005 ELECTROCARDIOGRAM TRACING: CPT

## 2021-11-06 PROCEDURE — 74011250636 HC RX REV CODE- 250/636: Performed by: HOSPITALIST

## 2021-11-06 PROCEDURE — 87040 BLOOD CULTURE FOR BACTERIA: CPT

## 2021-11-06 PROCEDURE — 74011636637 HC RX REV CODE- 636/637: Performed by: STUDENT IN AN ORGANIZED HEALTH CARE EDUCATION/TRAINING PROGRAM

## 2021-11-06 PROCEDURE — 96366 THER/PROPH/DIAG IV INF ADDON: CPT

## 2021-11-06 PROCEDURE — 80053 COMPREHEN METABOLIC PANEL: CPT

## 2021-11-06 PROCEDURE — 83735 ASSAY OF MAGNESIUM: CPT

## 2021-11-06 PROCEDURE — 84295 ASSAY OF SERUM SODIUM: CPT

## 2021-11-06 PROCEDURE — 87635 SARS-COV-2 COVID-19 AMP PRB: CPT

## 2021-11-06 PROCEDURE — 87077 CULTURE AEROBIC IDENTIFY: CPT

## 2021-11-06 PROCEDURE — 86140 C-REACTIVE PROTEIN: CPT

## 2021-11-06 PROCEDURE — 36415 COLL VENOUS BLD VENIPUNCTURE: CPT

## 2021-11-06 PROCEDURE — 85025 COMPLETE CBC W/AUTO DIFF WBC: CPT

## 2021-11-06 PROCEDURE — 74011000258 HC RX REV CODE- 258: Performed by: HOSPITALIST

## 2021-11-06 PROCEDURE — 74011250637 HC RX REV CODE- 250/637: Performed by: STUDENT IN AN ORGANIZED HEALTH CARE EDUCATION/TRAINING PROGRAM

## 2021-11-06 PROCEDURE — 74177 CT ABD & PELVIS W/CONTRAST: CPT

## 2021-11-06 PROCEDURE — 65660000000 HC RM CCU STEPDOWN

## 2021-11-06 PROCEDURE — 96365 THER/PROPH/DIAG IV INF INIT: CPT

## 2021-11-06 PROCEDURE — 99285 EMERGENCY DEPT VISIT HI MDM: CPT

## 2021-11-06 PROCEDURE — 81001 URINALYSIS AUTO W/SCOPE: CPT

## 2021-11-06 PROCEDURE — 83690 ASSAY OF LIPASE: CPT

## 2021-11-06 PROCEDURE — 84443 ASSAY THYROID STIM HORMONE: CPT

## 2021-11-06 PROCEDURE — 82962 GLUCOSE BLOOD TEST: CPT

## 2021-11-06 PROCEDURE — 87186 SC STD MICRODIL/AGAR DIL: CPT

## 2021-11-06 PROCEDURE — 74011250636 HC RX REV CODE- 250/636: Performed by: STUDENT IN AN ORGANIZED HEALTH CARE EDUCATION/TRAINING PROGRAM

## 2021-11-06 PROCEDURE — 74011250637 HC RX REV CODE- 250/637: Performed by: HOSPITALIST

## 2021-11-06 PROCEDURE — 82550 ASSAY OF CK (CPK): CPT

## 2021-11-06 PROCEDURE — 96375 TX/PRO/DX INJ NEW DRUG ADDON: CPT

## 2021-11-06 PROCEDURE — 83605 ASSAY OF LACTIC ACID: CPT

## 2021-11-06 PROCEDURE — 87086 URINE CULTURE/COLONY COUNT: CPT

## 2021-11-06 PROCEDURE — 83036 HEMOGLOBIN GLYCOSYLATED A1C: CPT

## 2021-11-06 PROCEDURE — 74011000636 HC RX REV CODE- 636: Performed by: STUDENT IN AN ORGANIZED HEALTH CARE EDUCATION/TRAINING PROGRAM

## 2021-11-06 PROCEDURE — 71045 X-RAY EXAM CHEST 1 VIEW: CPT

## 2021-11-06 PROCEDURE — 74011000250 HC RX REV CODE- 250: Performed by: STUDENT IN AN ORGANIZED HEALTH CARE EDUCATION/TRAINING PROGRAM

## 2021-11-06 RX ORDER — ASPIRIN 81 MG/1
81 TABLET ORAL DAILY
Status: DISCONTINUED | OUTPATIENT
Start: 2021-11-06 | End: 2021-12-10 | Stop reason: HOSPADM

## 2021-11-06 RX ORDER — LEVOTHYROXINE SODIUM 112 UG/1
112 TABLET ORAL
Status: DISCONTINUED | OUTPATIENT
Start: 2021-11-07 | End: 2021-12-10 | Stop reason: HOSPADM

## 2021-11-06 RX ORDER — ONDANSETRON 2 MG/ML
4 INJECTION INTRAMUSCULAR; INTRAVENOUS
Status: DISCONTINUED | OUTPATIENT
Start: 2021-11-06 | End: 2021-11-06

## 2021-11-06 RX ORDER — SODIUM CHLORIDE 0.9 % (FLUSH) 0.9 %
5-40 SYRINGE (ML) INJECTION AS NEEDED
Status: DISCONTINUED | OUTPATIENT
Start: 2021-11-06 | End: 2021-11-07

## 2021-11-06 RX ORDER — NYSTATIN 100000 U/G
CREAM TOPICAL 3 TIMES DAILY
Status: DISCONTINUED | OUTPATIENT
Start: 2021-11-06 | End: 2021-11-09 | Stop reason: SDUPTHER

## 2021-11-06 RX ORDER — METOPROLOL TARTRATE 25 MG/1
25 TABLET, FILM COATED ORAL
Status: DISCONTINUED | OUTPATIENT
Start: 2021-11-07 | End: 2021-12-07

## 2021-11-06 RX ORDER — FLUCONAZOLE 200 MG/1
200 TABLET ORAL
Status: COMPLETED | OUTPATIENT
Start: 2021-11-06 | End: 2021-11-06

## 2021-11-06 RX ORDER — INSULIN LISPRO 100 [IU]/ML
INJECTION, SOLUTION INTRAVENOUS; SUBCUTANEOUS
Status: DISCONTINUED | OUTPATIENT
Start: 2021-11-06 | End: 2021-11-06

## 2021-11-06 RX ORDER — ONDANSETRON 2 MG/ML
4 INJECTION INTRAMUSCULAR; INTRAVENOUS
Status: DISCONTINUED | OUTPATIENT
Start: 2021-11-06 | End: 2021-12-10 | Stop reason: HOSPADM

## 2021-11-06 RX ORDER — ACETAMINOPHEN 650 MG/1
650 SUPPOSITORY RECTAL
Status: DISCONTINUED | OUTPATIENT
Start: 2021-11-06 | End: 2021-11-08

## 2021-11-06 RX ORDER — ATORVASTATIN CALCIUM 40 MG/1
40 TABLET, FILM COATED ORAL
Status: DISCONTINUED | OUTPATIENT
Start: 2021-11-06 | End: 2021-12-07

## 2021-11-06 RX ORDER — PAROXETINE HYDROCHLORIDE 20 MG/1
40 TABLET, FILM COATED ORAL DAILY
Status: DISCONTINUED | OUTPATIENT
Start: 2021-11-07 | End: 2021-12-10 | Stop reason: HOSPADM

## 2021-11-06 RX ORDER — METRONIDAZOLE 500 MG/100ML
500 INJECTION, SOLUTION INTRAVENOUS EVERY 12 HOURS
Status: DISCONTINUED | OUTPATIENT
Start: 2021-11-06 | End: 2021-11-18

## 2021-11-06 RX ORDER — ALPRAZOLAM 0.5 MG/1
0.5 TABLET ORAL 2 TIMES DAILY
Status: DISCONTINUED | OUTPATIENT
Start: 2021-11-06 | End: 2021-11-06

## 2021-11-06 RX ORDER — ONDANSETRON 4 MG/1
4 TABLET, ORALLY DISINTEGRATING ORAL
Status: DISCONTINUED | OUTPATIENT
Start: 2021-11-06 | End: 2021-12-10 | Stop reason: HOSPADM

## 2021-11-06 RX ORDER — SODIUM CHLORIDE 0.9 % (FLUSH) 0.9 %
5-40 SYRINGE (ML) INJECTION EVERY 8 HOURS
Status: DISCONTINUED | OUTPATIENT
Start: 2021-11-06 | End: 2021-12-10 | Stop reason: HOSPADM

## 2021-11-06 RX ORDER — DEXTROSE 50 % IN WATER (D50W) INTRAVENOUS SYRINGE
25-50 AS NEEDED
Status: DISCONTINUED | OUTPATIENT
Start: 2021-11-06 | End: 2021-11-07

## 2021-11-06 RX ORDER — POLYETHYLENE GLYCOL 3350 17 G/17G
17 POWDER, FOR SOLUTION ORAL DAILY PRN
Status: DISCONTINUED | OUTPATIENT
Start: 2021-11-06 | End: 2021-11-08

## 2021-11-06 RX ORDER — METOPROLOL TARTRATE 25 MG/1
12.5 TABLET, FILM COATED ORAL
Status: DISCONTINUED | OUTPATIENT
Start: 2021-11-06 | End: 2021-12-07

## 2021-11-06 RX ORDER — FLUCONAZOLE 100 MG/1
100 TABLET ORAL DAILY
Status: COMPLETED | OUTPATIENT
Start: 2021-11-07 | End: 2021-11-12

## 2021-11-06 RX ORDER — ACETAMINOPHEN 325 MG/1
650 TABLET ORAL
Status: DISCONTINUED | OUTPATIENT
Start: 2021-11-06 | End: 2021-11-06

## 2021-11-06 RX ORDER — NALOXONE HYDROCHLORIDE 0.4 MG/ML
0.4 INJECTION, SOLUTION INTRAMUSCULAR; INTRAVENOUS; SUBCUTANEOUS AS NEEDED
Status: DISCONTINUED | OUTPATIENT
Start: 2021-11-06 | End: 2021-12-10 | Stop reason: HOSPADM

## 2021-11-06 RX ORDER — AMITRIPTYLINE HYDROCHLORIDE 50 MG/1
50 TABLET, FILM COATED ORAL
Status: DISCONTINUED | OUTPATIENT
Start: 2021-11-06 | End: 2021-12-10 | Stop reason: HOSPADM

## 2021-11-06 RX ORDER — ACETAMINOPHEN 325 MG/1
650 TABLET ORAL
Status: DISCONTINUED | OUTPATIENT
Start: 2021-11-06 | End: 2021-11-08

## 2021-11-06 RX ORDER — MAGNESIUM SULFATE 100 %
4 CRYSTALS MISCELLANEOUS AS NEEDED
Status: DISCONTINUED | OUTPATIENT
Start: 2021-11-06 | End: 2021-11-07

## 2021-11-06 RX ORDER — HEPARIN SODIUM 5000 [USP'U]/ML
7500 INJECTION, SOLUTION INTRAVENOUS; SUBCUTANEOUS EVERY 8 HOURS
Status: DISCONTINUED | OUTPATIENT
Start: 2021-11-06 | End: 2021-11-08

## 2021-11-06 RX ORDER — SODIUM CHLORIDE 0.9 % (FLUSH) 0.9 %
5-10 SYRINGE (ML) INJECTION AS NEEDED
Status: DISCONTINUED | OUTPATIENT
Start: 2021-11-06 | End: 2021-12-10 | Stop reason: HOSPADM

## 2021-11-06 RX ORDER — SODIUM CHLORIDE 9 MG/ML
75 INJECTION, SOLUTION INTRAVENOUS CONTINUOUS
Status: DISCONTINUED | OUTPATIENT
Start: 2021-11-06 | End: 2021-11-19

## 2021-11-06 RX ORDER — ALPRAZOLAM 0.25 MG/1
0.25 TABLET ORAL
Status: DISCONTINUED | OUTPATIENT
Start: 2021-11-06 | End: 2021-11-08

## 2021-11-06 RX ORDER — PANTOPRAZOLE SODIUM 40 MG/1
40 TABLET, DELAYED RELEASE ORAL
Status: DISCONTINUED | OUTPATIENT
Start: 2021-11-07 | End: 2021-12-10 | Stop reason: HOSPADM

## 2021-11-06 RX ORDER — OXYCODONE HYDROCHLORIDE 5 MG/1
5 TABLET ORAL
Status: DISCONTINUED | OUTPATIENT
Start: 2021-11-06 | End: 2021-12-10 | Stop reason: HOSPADM

## 2021-11-06 RX ADMIN — SODIUM CHLORIDE 1000 ML: 9 INJECTION, SOLUTION INTRAVENOUS at 13:07

## 2021-11-06 RX ADMIN — PROCHLORPERAZINE EDISYLATE 5 MG: 5 INJECTION INTRAMUSCULAR; INTRAVENOUS at 13:08

## 2021-11-06 RX ADMIN — VANCOMYCIN HYDROCHLORIDE 2500 MG: 10 INJECTION, POWDER, LYOPHILIZED, FOR SOLUTION INTRAVENOUS at 21:58

## 2021-11-06 RX ADMIN — PIPERACILLIN AND TAZOBACTAM 3.38 G: 3; .375 INJECTION, POWDER, LYOPHILIZED, FOR SOLUTION INTRAVENOUS at 13:08

## 2021-11-06 RX ADMIN — SODIUM CHLORIDE 10.8 UNITS/HR: 9 INJECTION, SOLUTION INTRAVENOUS at 13:45

## 2021-11-06 RX ADMIN — SODIUM CHLORIDE 7.4 UNITS/HR: 9 INJECTION, SOLUTION INTRAVENOUS at 19:46

## 2021-11-06 RX ADMIN — FLUCONAZOLE 200 MG: 200 TABLET ORAL at 18:37

## 2021-11-06 RX ADMIN — CEFEPIME HYDROCHLORIDE 2 G: 2 INJECTION, POWDER, FOR SOLUTION INTRAVENOUS at 18:04

## 2021-11-06 RX ADMIN — NYSTATIN: 100000 CREAM TOPICAL at 21:58

## 2021-11-06 RX ADMIN — Medication 10 ML: at 21:58

## 2021-11-06 RX ADMIN — SODIUM CHLORIDE 150 ML/HR: 9 INJECTION, SOLUTION INTRAVENOUS at 18:04

## 2021-11-06 RX ADMIN — AMITRIPTYLINE HYDROCHLORIDE 50 MG: 50 TABLET, FILM COATED ORAL at 21:59

## 2021-11-06 RX ADMIN — IOPAMIDOL 100 ML: 755 INJECTION, SOLUTION INTRAVENOUS at 14:24

## 2021-11-06 RX ADMIN — SODIUM CHLORIDE 16.2 UNITS/HR: 9 INJECTION, SOLUTION INTRAVENOUS at 15:32

## 2021-11-06 RX ADMIN — SODIUM CHLORIDE 1000 ML: 9 INJECTION, SOLUTION INTRAVENOUS at 13:08

## 2021-11-06 RX ADMIN — ASPIRIN 81 MG: 81 TABLET, COATED ORAL at 18:37

## 2021-11-06 RX ADMIN — METRONIDAZOLE 500 MG: 500 INJECTION, SOLUTION INTRAVENOUS at 18:36

## 2021-11-06 RX ADMIN — ALPRAZOLAM 0.25 MG: 0.25 TABLET ORAL at 22:05

## 2021-11-06 RX ADMIN — SODIUM CHLORIDE 150 ML/HR: 9 INJECTION, SOLUTION INTRAVENOUS at 22:22

## 2021-11-06 RX ADMIN — METOPROLOL TARTRATE 12.5 MG: 25 TABLET, FILM COATED ORAL at 21:59

## 2021-11-06 RX ADMIN — OXYCODONE 5 MG: 5 TABLET ORAL at 22:05

## 2021-11-06 RX ADMIN — ATORVASTATIN CALCIUM 40 MG: 40 TABLET, FILM COATED ORAL at 21:59

## 2021-11-06 NOTE — ED NOTES
Patient is being transferred to [address], Room # 51 [phone] [id number]. [facility] of care. Report consisted of the following information SBAR, Kardex and ED Summary. Patient transferred to receiving unit by: JANIS Cheatham (RN or tech name). Outstanding consults needed: No     Next labs due: Yes    The following personal items will be sent with the patient during transfer to the floor:     All valuables:    Cardiac monitoring ordered: Yes    The following CURRENT information was reported to the receiving RN:    Code status: Full Code at time of transfer    Last set of vital signs:  Vital Signs  Level of Consciousness: Alert (0) (11/06/21 1530)  Temp: 97.4 °F (36.3 °C) (11/06/21 1530)  Temp Source: Rectal (11/06/21 1530)  Pulse (Heart Rate): (!) 115 (11/06/21 1530)  Cardiac Rhythm: Sinus Tachy (11/06/21 1552)  Resp Rate: 21 (11/06/21 1530)  BP: 135/63 (11/06/21 1530)  MAP (Monitor): 80 (11/06/21 1530)  MAP (Calculated): 87 (11/06/21 1530)  BP 1 Location: Left arm (11/06/21 1027)  BP 1 Method: Automatic (11/06/21 1027)  MEWS Score: 4 (11/06/21 1530)         Oxygen Therapy  O2 Sat (%): 98 % (11/06/21 1530)  Pulse via Oximetry: 115 beats per minute (11/06/21 1530)  O2 Device: None (Room air) (11/06/21 1027)      Last pain assessment:  Pain 1  Pain Scale 1: Numeric (0 - 10)  Pain Intensity 1: 5  Patient Stated Pain Goal: 5  Pain Onset 1: 1  Pain Location 1: Other (comment)  Pain Description 1: Aching  Pain Intervention(s) 1: Rest      Wounds: Yes    Urinary catheter: mittal  Is there a mittal order: Yes    LDAs:       Peripheral IV 11/06/21 Anterior; Left; Proximal Forearm (Active)       Peripheral IV 11/06/21 Right Antecubital (Active)   Site Assessment Clean, dry, & intact 11/06/21 1558   Phlebitis Assessment 0 11/06/21 1558   Infiltration Assessment 4 11/06/21 1558   Dressing Status Clean, dry, & intact 11/06/21 1558         Opportunity for questions and clarification was provided.     Celia De Leon RN

## 2021-11-06 NOTE — H&P
Hospitalist Admission Note    NAME: Harriet Nobles   :  1950   MRN:  [id number]     Date/Time:  2021 3:36 PM    Patient PCP: Eduardo Hernandez MD  Ortho:  Dr. Martha Kemp    Please note that this dictation was completed with [facility], the computer voice recognition software. Quite often unanticipated grammatical, syntax, homophones, and other interpretive errors are inadvertently transcribed by the computer software. Please disregard these errors. Please excuse any errors that have escaped final proofreading  ______________________________________________________________________   Assessment & Plan:  Severe sepsis, POA as evidence by hypothermia, rectal temp 96, tachy , lactic 9.2 -->5.3 after 1L, WBC 26K, MERCEDES Cr 1.5 due to  Suspected epidural/right iliopsoas abscess with CT abd/pelvis showing fluid posterior to the surgical changes and on the right side of the spine at L4/L5 and posterior  to the right iliopsoas muscle just superior to the sacrum. Findings are  concerning for an infectious process. Right paraspinal pain at level L3-4 and right iliac crest pain (patient complains of right hip pain), POA   --given zosyn and vanc in ER. Continue abx with vanc, cefepime, flagyl. Follow up blood and urine culture. UA not suggestive of bacterial uti  --check ESR, CRP  --orthopedic consult; d/w Robert Mccartney. Patient had L2-3 AND L5-S1  LUMBAR LAMINECTOMY WITH ANDREWS OSTEOTOMY L2-3 AND L3-4 WITH PLF L2-S1 by Dr. Martha Kemp [id number]; complicated with nonhealing post surgical wound requiring wound vac 2021.    --in July she complained of right hip pain and MRI right hip showed edema of the right iliopsoas muscle centered at the myotendinous junction compatible with a strain. However, she continues to have uncontrolled pain in \"right hip\" (she's pointing to right iliac crest region) that continues to limit mobility since discharge from [address] [place] [address] in August.  --oxycodone prn pain. --probiotic while on abx  --to get total of 3L NS bolus and then 150ml/hr. Repeat lactic acid at 8pm and in AM.    Hyperglycemic hyperosmolar hyperglycemia/ early DKA in DM type 2 due to severe sepsis, POA  --, AG 15, trace ketone in urine. --A1c 11.7 up from 7.6 in July. --continue insulin drip. Goal -200. IVF  --will hold metformin for now. Prerenal MERCEDES Cr 1.5  --IVF hydration. Mittal placed in ER due to poor mobility from hip pain and polyuria, with severe candidal infection in perineum and labia and buttocks. Would remove mittal in next 24-48 hours if possible    Severe candida infection in perineum, labia, buttocks  --continue mystatin cream ordered in ER. Add diflucan x 7 days. --wound care consult    Generalized weakness due to medical problems above  Difficulty walking, not been out of bed for 4 days  --consult pt/ot    SVT  HTN  --continue metoprolol    Hyperlipidemia  --continue statin    Hypothyroid  --continue levothyroxine    Depression and anxiety  --continue paxil  --while on diflucan, will reduce xanax from 0.5 to 0.25mg bid and change to prn to avoid toxicity. Severe obesity  Body mass index is 44.63 kg/m². Code:  Discussed, full  DVT prophylaxis: lovenox  Surrogate decision maker:   (but has some dementia per patient) or sister [place]allie [place]          Subjective:   CHIEF COMPLAINT:  Generalized weakness, unable to walk, polyuria, polydipsia    HISTORY OF PRESENT ILLNESS:     Lavelle Calderon is a 65 y.o. female with DM type 2 referred for admission for Hyperosmolar nonketotic hyperglycemia , polyuria, polydipsia for 3 days. In ER hypothermic rectal temp 96.0, , 101.87. CT abd/pelvis pending. 6/2/21:  L2-3 AND L5-S1  LUMBAR LAMINECTOMY WITH ANDREWS OSTEOTOMY L2-3 AND L3-4 WITH PLF L2-S1 by Dr. Bolaños Spearjoao for sciatica, DDD.  Spondylolisthesis    Admitted to [facility]  7/8 to 7/20/21:  Intractable right hip pain in setting of fall POA  - MRI on 07/11 showed edema of the right iliopsoas muscle centered at the myotendinous junction compatible with a strain.  - Patient had L2-L5 laminectomy 06/2021 with some delayed wound healing, seen by Surgical team on  07/09/21 - recommended continuing current wound care while in acute care setting and transitioning to wound VAC once at rehab. Suspected bacterial pneumonia POA  - CXR on 07/08/21 revealed mild left basilar opacity, repeat CXR on 07/12/21 revealed no acute process.     - Patient completed 5 day course of ceftriaxone and doxycycline.    - Afebrile and non-toxic in appearance. - Leucocytosis resolved.       DM II  - Uncontrolled with hyperglycemia present on admission .  A1c 7.8  - Low dose basal (glargine) insulin was started on 07/10/21.  - Resume metformin at time of d/c to rehab. Patient reports discharged from [address] [place] [address] after [facility] admission in July. Continued to be plagued by right hip pain and has to walk holding on to furniture or with RW. Not able to f/u Dr. Camarillo Seen due to mobility issue; he has told her she needs to see a hip specialist for hip pain. Since last Thursday, been very weak, chills, nausea and vomiting for 4 days, with loss of appetite. Has been having polyuria and polydipsia. Her vaginal area is very raw and pain with walking. Has not been out of bed for past 4 days. Denies chest pain. Has cough only when vomiting, no edema, SOB. Denies back pain. Having a lot of right hip pain, worse with movement, only comfortable when laying on her right hip. We were asked to admit for work up and evaluation of the above problems. Past Medical History:   Diagnosis Date    Adverse effect of anesthesia     O2 DROPS WITH ANESTHESIA    Arthritis     KNEES    Cancer ([facility] [address] 03/13/2015    SQUAMOUS CELL CARCINOMA; tonsils and lymph nodes.   Removed 3-2015 with radiation    GERD (gastroesophageal reflux disease)     Hypertension     NO LONGER ON MEDICATION    Hypothyroid     HYPOTHYROIDISM    Migraine  Nausea & vomiting     Obesity     Other ill-defined conditions(799.89)     chronic back pain    Psychiatric disorder     anxiety    Psychiatric disorder     panic ATTACKS    SVT (supraventricular tachycardia) ([facility] [address] 05/24/2014    Ulcerative colitis       Past Surgical History:   Procedure Laterality Date    COLONOSCOPY,DIAGNOSTIC  11/19/2015         HX GI      colonscopy    HX HEENT  03/2015    tonsillectomy (CANCER) AND NECK LYMPH NODES    HX HEENT  2008    PARATHYROID REMOVAL    HX HEENT Left 2002    EYE LASER    HX HEMORRHOIDECTOMY  2001, 2016     X2    HX HYSTERECTOMY  1985    HX KNEE ARTHROSCOPY  1995    left knee surgery, TOTAL LT  and Right KNEE 2013    HX KNEE REPLACEMENT Bilateral 2013, 2014    HX LAP CHOLECYSTECTOMY  2002    HX LUMBAR FUSION  2011    SPINAL FUSION with hardware L4-L5    HX ORTHOPAEDIC  1990    CYST REMOVED RIGHT WRIST    HX ORTHOPAEDIC  05/12/2021    L2-3 & L5-21 LUMBAR LAMINECTOMY WITH ANDREWS OSTEOTOMY    HX OTHER SURGICAL      cyst removal right wrist    HX UROLOGICAL  2010    bladder surgery(interstim device)    IR INJ FORAMIN EPID LUMB ANES/STER SNGL  8/19/2019    MD EGD TRANSORAL BIOPSY SINGLE/MULTIPLE  5/20/2013          Social History     Tobacco Use    Smoking status: Never Smoker    Smokeless tobacco: Never Used   Substance Use Topics    Alcohol use: No      Family History   Problem Relation Age of Onset    Cancer Mother         ovarian ca?     Heart Disease Father         MI    Heart Attack Father     Cancer Father         MULTIPLE MYELOMA    Liver Disease Father         FROM MEDICATIONS FOR MULTIPLE MYELOMA    Arthritis-osteo Sister     Arthritis-osteo Brother     Cancer Paternal Grandmother 79        breast ca    Breast Cancer Paternal Grandmother 79    Breast Cancer Maternal Aunt 55    Breast Cancer Maternal Aunt 61    Breast Cancer Paternal Aunt 43    Breast Cancer Paternal Aunt         52's    Emphysema Paternal Grandfather     No Known Problems Sister     No Known Problems Brother     No Known Problems Brother     Anesth Problems Neg Hx      Allergies   Allergen Reactions    Adhesive Tape-Silicones Rash    Aloe Vera Rash    Chlorhexidine Rash    Tussin [Dextromethorphan Hbr] Palpitations    Readyprep Chg [Chlorhexidine Gluconate] Rash        Prior to Admission medications    Medication Sig Start Date End Date Taking? Authorizing Provider   acetaminophen (TYLENOL) 325 mg tablet Take 2 Tablets by mouth every six (6) hours as needed for Pain. 7/20/21   Rachel Frances NP   L.acid,para-B. bifidum-S.therm (RISAQUAD) 8 billion cell cap cap Take 1 Capsule by mouth daily. 7/21/21   Rachel Frances NP   magnesium hydroxide (MILK OF MAGNESIA) 400 mg/5 mL suspension Take 30 mL by mouth daily. 7/21/21   Rachel Frances NP   miconazole (MICOTIN) 2 % topical powder Apply  to affected area two (2) times a day. 7/20/21   Rachel Frances NP   polyethylene glycol (MIRALAX) 17 gram packet Take 1 Packet by mouth daily. 7/20/21   Rachel Frances NP   senna (SENOKOT) 8.6 mg tablet Take 2 Tablets by mouth daily. 7/21/21   Rachel Frances NP   heparin sodium,porcine (heparin, porcine,) 5,000 unit/mL injection 1 mL by SubCUTAneous route every eight (8) hours. 7/20/21   Rachel Frances NP   naloxone [facility]) 4 mg/actuation nasal spray Use 1 spray intranasally, then discard. Repeat with new spray every 2 min as needed for opioid overdose symptoms, alternating nostrils. 6/3/21   MAIKEL Rivera   amitriptyline (ELAVIL) 50 mg tablet Take 50 mg by mouth nightly. Provider, Historical   atorvastatin (LIPITOR) 40 mg tablet Take 40 mg by mouth nightly. Provider, Historical   metFORMIN (GLUCOPHAGE) 500 mg tablet Take 500 mg by mouth two (2) times daily (with meals). Provider, Historical   butalb/acetaminophen/caffeine (FIORICET PO) Take 1 Tab by mouth as needed.   Patient not taking: Reported on 6/2/2021    Provider, Historical   aspirin delayed-release 81 mg tablet Take 81 mg by mouth daily. Provider, Historical   ALPRAZolam (XANAX) 0.5 mg tablet Take 0.5 mg by mouth two (2) times a day. Provider, Historical   levothyroxine (SYNTHROID) 75 mcg tablet Take 112 mcg by mouth Daily (before breakfast). Provider, Historical   PARoxetine (PAXIL) 40 mg tablet Take 40 mg by mouth every morning. Provider, Historical   metoprolol tartrate (LOPRESSOR) 25 mg tablet Take 12.5 mg by mouth nightly. Provider, Historical   metoprolol (LOPRESSOR) 25 mg tablet Take 25 mg by mouth every morning. Provider, Historical   omeprazole (PRILOSEC) 40 mg capsule Take 40 mg by mouth every morning. Provider, Historical     REVIEW OF SYSTEMS:  POSITIVE= Bold. Negative = normal text  General:  fever, chills, sweats, generalized weakness, weight loss/gain, loss of appetite  Eyes:  blurred vision, eye pain, loss of vision, diplopia  Ear Nose and Throat:  rhinorrhea, pharyngitis  Respiratory:   cough, sputum production, SOB, wheezing, AVALOS, pleuritic pain  Cardiology:  chest pain, palpitations, orthopnea, PND, edema, syncope   Gastrointestinal:  abdominal pain, N/V, dysphagia, diarrhea, constipation, bleeding  Genitourinary:  frequency, urgency, dysuria, hematuria, incontinence polyuria  Muskuloskeletal :  arthralgia, myalgia  Hematology:  easy bruising, bleeding, lymphadenopathy  Dermatological:  Rash--vaginal and buttock, ulceration, pruritis  Endocrine:  hot flashes or polydipsia  Neurological:  headache, dizziness, confusion, focal weakness, paresthesia, memory loss, gait disturbance  Psychological: anxiety, depression, agitation      Objective:   VITALS:    Visit Vitals  /87 (BP 1 Location: Left arm)   Pulse (!) 123   Temp (!) 96 °F (35.6 °C)   Resp 22   Ht 5' 4\" (1.626 m)   Wt 117.9 kg (260 lb)   SpO2 98%   BMI 44.63 kg/m²     Temp (24hrs), Av °F (35.6 °C), Min:96 °F (35.6 °C), Max:96 °F (35.6 °C)    Body mass index is 44.63 kg/m².   PHYSICAL EXAM:    General:    Alert, obese, ill appearing female, oriented x 3, cooperative, no distress, appears stated age. HEENT: Atraumatic, anicteric sclerae, pink conjunctivae     No oral ulcers, mucosa dry, throat clear. Hearing intact. Neck:  Supple, symmetrical,  thyroid: non tender  Lungs:   Clear to auscultation bilaterally. No Wheezing or Rhonchi. No rales. Chest wall:  No tenderness  No Accessory muscle use. Heart:   Regular  rhythm,  Tachycardic, No  murmur   No gallop. No edema. Abdomen:   Soft, non-tender. Not distended. Bowel sounds normal. No masses  Extremities: No cyanosis. No clubbing  Skin:     Pale Not Jaundiced. Lumbar surgical incision well healed. Red rash in perineum with maceration, b/l groin, under abdominal skin fold and in buttocks R>>L. Musc:  Right paraspinal region at L4 tender to deep palpation as well as over right iliac crest.  No fluctuance or redness. Right hip with redness or swelling. Intact ROM but patient with pain in right iliac when shifting from her right side to left side for exam.  Psych:  Fair insight. Not depressed. Not anxious or agitated. Neurologic: EOMs intact. No facial asymmetry. No aphasia or slurred speech. Symmetrical strength able to lift legs against gravity, arms 5/5., Alert and oriented X 3.    Peripheral pulse: Bilateral, DP, 2+  Capillary refill:  normal    IMAGING RESULTS:   []       I have personally reviewed the actual   []     CXR  []     CT scan  CXR:  CT :  EKG:   ________________________________________________________________________  Care Plan discussed with:    Comments   Patient y    Family      RN y    Care Manager                    Consultant:  genevieve Messer NP Jesse Carter   ________________________________________________________________________  Prophylaxis:  GI PPI   DVT Heparin SQ   ________________________________________________________________________  Recommended Disposition:   Home with Family    HH/PT/OT/RN    SNF/LTC y ANANTH    ________________________________________________________________________  Code Status:  Full Code y   DNR/DNI    ________________________________________________________________________  TOTAL TIME:  60 minutes      Comments    y Reviewed previous records   >50% of visit spent in counseling and coordination of care  Discussion with patient and/or family and questions answered         ______________________________________________________________________  Lv Dallas MD      Procedures: see electronic medical records for all procedures/Xrays and details which were not copied into this note but were reviewed prior to creation of Plan. LAB DATA REVIEWED:    Recent Results (from the past 24 hour(s))   GLUCOSE, POC    Collection Time: 11/06/21 10:26 AM   Result Value Ref Range    Glucose (POC) >600 (HH) 65 - 117 mg/dL    Performed by Sarmad Perry    CBC WITH AUTOMATED DIFF    Collection Time: 11/06/21 11:27 AM   Result Value Ref Range    WBC 26.3 (H) 3.6 - 11.0 K/uL    RBC 5.14 3.80 - 5.20 M/uL    HGB 13.2 11.5 - 16.0 g/dL    HCT 42.1 35.0 - 47.0 %    MCV 81.9 80.0 - 99.0 FL    MCH 25.7 (L) 26.0 - 34.0 PG    MCHC 31.4 30.0 - 36.5 g/dL    RDW 16.7 (H) 11.5 - 14.5 %    PLATELET 391 (H) 994 - 400 K/uL    MPV 11.0 8.9 - 12.9 FL    NRBC 0.0 0  WBC    ABSOLUTE NRBC 0.00 0.00 - 0.01 K/uL    NEUTROPHILS 79 (H) 32 - 75 %    BAND NEUTROPHILS 11 %    LYMPHOCYTES 4 (L) 12 - 49 %    MONOCYTES 5 5 - 13 %    EOSINOPHILS 1 0 - 7 %    BASOPHILS 0 0 - 1 %    IMMATURE GRANULOCYTES 0 0.0 - 0.5 %    ABS. NEUTROPHILS 23.6 (H) 1.8 - 8.0 K/UL    ABS. LYMPHOCYTES 1.1 0.8 - 3.5 K/UL    ABS. MONOCYTES 1.3 (H) 0.0 - 1.0 K/UL    ABS. EOSINOPHILS 0.3 0.0 - 0.4 K/UL    ABS. BASOPHILS 0.0 0.0 - 0.1 K/UL    ABS. IMM.  GRANS. 0.0 0.00 - 0.04 K/UL    DF MANUAL      PLATELET COMMENTS Increased Platelets      RBC COMMENTS ANISOCYTOSIS  1+       METABOLIC PANEL, COMPREHENSIVE    Collection Time: 11/06/21 11:27 AM   Result Value Ref Range    Sodium 122 (L) 136 - 145 mmol/L    Potassium 4.6 3.5 - 5.1 mmol/L    Chloride 85 (L) 97 - 108 mmol/L    CO2 22 21 - 32 mmol/L    Anion gap 15 5 - 15 mmol/L    Glucose 973 (HH) 65 - 100 mg/dL    BUN 28 (H) 6 - 20 MG/DL    Creatinine 1.51 (H) 0.55 - 1.02 MG/DL    BUN/Creatinine ratio 19 12 - 20      GFR est AA 41 (L) >60 ml/min/1.73m2    GFR est non-AA 34 (L) >60 ml/min/1.73m2    Calcium 9.6 8.5 - 10.1 MG/DL    Bilirubin, total 0.6 0.2 - 1.0 MG/DL    ALT (SGPT) 21 12 - 78 U/L    AST (SGOT) 14 (L) 15 - 37 U/L    Alk. phosphatase 267 (H) 45 - 117 U/L    Protein, total 7.7 6.4 - 8.2 g/dL    Albumin 2.2 (L) 3.5 - 5.0 g/dL    Globulin 5.5 (H) 2.0 - 4.0 g/dL    A-G Ratio 0.4 (L) 1.1 - 2.2     CK    Collection Time: 11/06/21 11:27 AM   Result Value Ref Range    CK 33 26 - 192 U/L   TSH 3RD GENERATION    Collection Time: 11/06/21 11:27 AM   Result Value Ref Range    TSH 3.62 0.36 - 3.74 uIU/mL   SAMPLES BEING HELD    Collection Time: 11/06/21 11:27 AM   Result Value Ref Range    SAMPLES BEING HELD  RED, PST     COMMENT        Add-on orders for these samples will be processed based on acceptable specimen integrity and analyte stability, which may vary by analyte.    BLOOD GAS,CHEM8,LACTIC ACID POC    Collection Time: 11/06/21 11:57 AM   Result Value Ref Range    Calcium, ionized (POC) 1.19 1.12 - 1.32 mmol/L    BICARBONATE 25 mmol/L    Base deficit (POC) 2.1 mmol/L    Sample source VENOUS BLOOD      CO2, POC 25 (H) 19 - 24 MMOL/L    Sodium,  (L) 136 - 145 MMOL/L    Potassium, POC 4.8 3.5 - 5.5 MMOL/L    Chloride, POC 86 (L) 100 - 108 MMOL/L    Glucose, POC >700 (HH) 74 - 106 MG/DL    Creatinine, POC 0.8 0.6 - 1.3 MG/DL    Lactic Acid (POC) 9.27 (HH) 0.40 - 2.00 mmol/L    Critical value read back ADONIS     pH, venous (POC) 7.32 7.32 - 7.42      pCO2, venous (POC) 48.7 41 - 51 MMHG    pO2, venous (POC) 31 25 - 40 mmHg   URINALYSIS W/ REFLEX CULTURE    Collection Time: 11/06/21 12:45 PM    Specimen: Urine   Result Value Ref Range    Color YELLOW/STRAW      Appearance CLEAR CLEAR      pH (UA) 5.0 5.0 - 8.0      Protein Negative NEG mg/dL    Glucose >1,000 (A) NEG mg/dL    Ketone TRACE (A) NEG mg/dL    Bilirubin Negative NEG      Blood Negative NEG      Urobilinogen 0.2 0.2 - 1.0 EU/dL    Nitrites Negative NEG      Leukocyte Esterase Negative NEG      WBC 20-50 0 - 4 /hpf    RBC 0-5 0 - 5 /hpf    Epithelial cells FEW FEW /lpf    Bacteria Negative NEG /hpf    UA:UC IF INDICATED URINE CULTURE ORDERED (A) CNI     GLUCOSTABILIZER    Collection Time: 11/06/21  1:47 PM   Result Value Ref Range    Glucose 600 mg/dL    Insulin order 10.8 units/hour    Insulin adminstered 10.8 units/hour    Multiplier 0.020     Low target 150 mg/dL    High target 250 mg/dL    D50 order 0.0 ml    D50 administered 0.00 ml    Minutes until next BG 60 min    Order initials cz     Administered initials cz     GLSCOM Comments     GLUCOSE, POC    Collection Time: 11/06/21  3:30 PM   Result Value Ref Range    Glucose (POC) >600 (HH) 65 - 117 mg/dL    Performed by Jose Weeks    Collection Time: 11/06/21  3:32 PM   Result Value Ref Range    Glucose 600 mg/dL    Insulin order 16.2 units/hour    Insulin adminstered 16.2 units/hour    Multiplier 0.030     Low target 150 mg/dL    High target 250 mg/dL    D50 order 0.0 ml    D50 administered 0.00 ml    Minutes until next BG 60 min    Order initials CZ     Administered initials CZ     GLSCOM Comments

## 2021-11-06 NOTE — PROGRESS NOTES
Pharmacy Antimicrobial Kinetic Dosing    Indication for Antimicrobials: sepsis; SSTI     Current Regimen of Each Antimicrobial:  Vancomycin- dose by level (Start Date ; Day 1)  Cefepime 2 g IV q12h (Start Date ; Day 1)  Metronidazole 500 mg IV q12h (Start Date ; Day 1)  Fluconazole 200 mg PO x1, then 100 mg PO (Start Date ; Day 1)    Previous Antimicrobial Therapy:      Goal Level: VANCOMYCIN TROUGH GOAL RANGE    Vancomycin Trough: 10 - 15 mcg/mL    Date Dose & Interval Measured (mcg/mL) Predicted AUC/GRISELDA                       Date & time of next level:  @ 1700    Dosing calculator used: [employer] calculator    Significant Positive Cultures:    blood: pending   urine: pending    Conditions for Dosing Consideration: None    Labs:  Recent Labs     21  1127   CREA 1.51*   BUN 28*     Recent Labs     21  1127   WBC 26.3*   BANDS 11     Temp (24hrs), Av.7 °F (35.9 °C), Min:96 °F (35.6 °C), Max:97.4 °F (36.3 °C)        Creatinine Clearance (mL/min):   CrCl (Ideal Body Weight): 29.1   If actual weight < IBW: CrCl (Actual Body Weight) 62.7    Impression/Plan:   Fluconazole for severe candida infection on perineum  Adjusted cefepime to 2 g q12h for CrCl between 30-60 mL/min  Adjusted metronidazole to 500 mg q12h per protocol  Patient is in MERCEDES. SCR is 1.51 (baseline ~0.7 in 2021). Give vancomycin 2500 mg loading dose, then dose by level  Vancomycin 24 hour level  @ 1800  Antimicrobial stop date TBD     Pharmacy will follow daily and adjust medications as appropriate for renal function and/or serum levels.     Thank you,  Thiago Alexandre, PHARMD    Vancomycin Dosing Document    Documents located on pharmacy Teams site: Clinical Practice -> Antimicrobial Stewardship -> Antibiotics_Vancomycin     Aminoglycoside Dosing Document    Documents located on pharmacy Teams site: Clinical Practice -> Antimicrobial Stewardship -> Antibiotics_Aminoglycosides

## 2021-11-06 NOTE — ED PROVIDER NOTES
EMERGENCY DEPARTMENT HISTORY AND PHYSICAL EXAM      Date: 11/6/2021  Patient Name: Lizzy Lawson    History of Presenting Illness     Chief Complaint   Patient presents with    High Blood Sugar    Hypertension       History Provided By: Patient    HPI: Lizzy Lawson, 65 y.o. female with past medical history of hypertension, hypothyroidism, GERD, hyperlipidemia, type 2 diabetes, presents to the ED with cc of elevated blood glucose level. Patient reports that she has been feeling ill for several days, endorsing recurrent episodes of nausea, vomiting, decreased p.o. intake due to lack of appetite. She denies any abdominal pain. States that she is peeing a lot. States that she has been going to the bathroom so much that her  area is \"raw. \"  She denies any fevers or chills. States that she feels very thirsty right now. Patient tells me that she does not not have any history of diabetes, and she is not sure why her blood sugars are elevated. However, by checking her chart, it appears that she does have a history of T2DM. Patient seems slightly confused at this time. PCP: Alicia Posey MD    No current facility-administered medications on file prior to encounter. Current Outpatient Medications on File Prior to Encounter   Medication Sig Dispense Refill    acetaminophen (TYLENOL) 325 mg tablet Take 2 Tablets by mouth every six (6) hours as needed for Pain. 30 Tablet 0    L.acid,para-B. bifidum-S.therm (RISAQUAD) 8 billion cell cap cap Take 1 Capsule by mouth daily. 30 Capsule 0    magnesium hydroxide (MILK OF MAGNESIA) 400 mg/5 mL suspension Take 30 mL by mouth daily. 1 Bottle 0    miconazole (MICOTIN) 2 % topical powder Apply  to affected area two (2) times a day. 1 Bottle 0    polyethylene glycol (MIRALAX) 17 gram packet Take 1 Packet by mouth daily. 30 Packet 0    senna (SENOKOT) 8.6 mg tablet Take 2 Tablets by mouth daily.  60 Tablet 0    heparin sodium,porcine (heparin, porcine,) 5,000 unit/mL injection 1 mL by SubCUTAneous route every eight (8) hours. 90 Syringe 0    naloxone (NARCAN) 4 mg/actuation nasal spray Use 1 spray intranasally, then discard. Repeat with new spray every 2 min as needed for opioid overdose symptoms, alternating nostrils. 1 Each 0    amitriptyline (ELAVIL) 50 mg tablet Take 50 mg by mouth nightly.  atorvastatin (LIPITOR) 40 mg tablet Take 40 mg by mouth nightly.  metFORMIN (GLUCOPHAGE) 500 mg tablet Take 500 mg by mouth two (2) times daily (with meals).  butalb/acetaminophen/caffeine (FIORICET PO) Take 1 Tab by mouth as needed. (Patient not taking: Reported on 6/2/2021)      aspirin delayed-release 81 mg tablet Take 81 mg by mouth daily.  ALPRAZolam (XANAX) 0.5 mg tablet Take 0.5 mg by mouth two (2) times a day.  levothyroxine (SYNTHROID) 75 mcg tablet Take 112 mcg by mouth Daily (before breakfast).  PARoxetine (PAXIL) 40 mg tablet Take 40 mg by mouth every morning.  metoprolol tartrate (LOPRESSOR) 25 mg tablet Take 12.5 mg by mouth nightly.  metoprolol (LOPRESSOR) 25 mg tablet Take 25 mg by mouth every morning.  omeprazole (PRILOSEC) 40 mg capsule Take 40 mg by mouth every morning. Past History     Past Medical History:  Past Medical History:   Diagnosis Date    Adverse effect of anesthesia     O2 DROPS WITH ANESTHESIA    Arthritis     KNEES    [facility]2015    SQUAMOUS CELL CARCINOMA; tonsils and lymph nodes.   Removed 3-2015 with radiation    GERD (gastroesophageal reflux disease)     Hypertension     NO LONGER ON MEDICATION    Hypothyroid     HYPOTHYROIDISM    Migraine     Nausea & vomiting     Obesity     Other ill-defined conditions(799.89)     chronic back pain    Psychiatric disorder     anxiety    Psychiatric disorder     panic ATTACKS    SVT (supraventricular tachycardia) ([facility] 05/24/2014    Ulcerative colitis        Past Surgical History:  Past Surgical History:   Procedure Laterality Date    COLONOSCOPY,DIAGNOSTIC  11/19/2015         HX GI      colonscopy    HX HEENT  03/2015    tonsillectomy (CANCER) AND NECK LYMPH NODES    HX HEENT  2008    PARATHYROID REMOVAL    HX HEENT Left 2002    EYE LASER    HX HEMORRHOIDECTOMY  2001, 2016     X2    HX HYSTERECTOMY  1985    HX KNEE ARTHROSCOPY  1995    left knee surgery, TOTAL LT  and Right KNEE 2013    HX KNEE REPLACEMENT Bilateral 2013, 2014    HX LAP CHOLECYSTECTOMY  2002    HX LUMBAR FUSION  2011    SPINAL FUSION with hardware L4-L5    HX ORTHOPAEDIC  1990    CYST REMOVED RIGHT WRIST    HX ORTHOPAEDIC  05/12/2021    L2-3 & L5-21 LUMBAR LAMINECTOMY WITH ANDREWS OSTEOTOMY    HX OTHER SURGICAL      cyst removal right wrist    HX UROLOGICAL  2010    bladder surgery(interstim device)    IR INJ FORAMIN EPID LUMB ANES/STER SNGL  8/19/2019    HI EGD TRANSORAL BIOPSY SINGLE/MULTIPLE  5/20/2013            Family History:  Family History   Problem Relation Age of Onset    Cancer Mother         ovarian ca?  Heart Disease Father         MI    Heart Attack Father     Cancer Father         MULTIPLE MYELOMA    Liver Disease Father         FROM MEDICATIONS FOR MULTIPLE MYELOMA    Arthritis-osteo Sister     Arthritis-osteo Brother     Cancer Paternal Grandmother 79        breast ca    Breast Cancer Paternal Grandmother 79    Breast Cancer Maternal Aunt 55    Breast Cancer Maternal Aunt 61    Breast Cancer Paternal Aunt 43    Breast Cancer Paternal Aunt         52's    Emphysema Paternal Grandfather     No Known Problems Sister     No Known Problems Brother     No Known Problems Brother     Anesth Problems Neg Hx        Social History:  Social History     Tobacco Use    Smoking status: Never Smoker    Smokeless tobacco: Never Used   Vaping Use    Vaping Use: Never used   Substance Use Topics    Alcohol use: No    Drug use: No     Types: Prescription       Allergies:   Allergies   Allergen Reactions    Adhesive Tape-Silicones Rash    Aloe Vera Rash    Chlorhexidine Rash    Tussin [Dextromethorphan Hbr] Palpitations    Readyprep Chg [Chlorhexidine Gluconate] Rash         Review of Systems   Review of Systems   Constitutional: Positive for appetite change and fatigue. Negative for chills and fever. HENT: Negative for congestion and rhinorrhea. Eyes: Negative for visual disturbance. Respiratory: Negative for chest tightness and shortness of breath. Cardiovascular: Negative for chest pain and palpitations. Gastrointestinal: Positive for nausea and vomiting. Negative for abdominal pain, constipation and diarrhea. Endocrine: Positive for polyuria. Genitourinary: Negative for dysuria, flank pain and hematuria. Introitus pain   Musculoskeletal: Negative for back pain and neck pain. Skin: Negative for rash. Allergic/Immunologic: Negative for immunocompromised state. Neurological: Negative for dizziness, speech difficulty, weakness and headaches. Hematological: Negative for adenopathy. Psychiatric/Behavioral: Negative for dysphoric mood and suicidal ideas. Physical Exam   Physical Exam  Vitals and nursing note reviewed. Exam conducted with a chaperone present. Constitutional:       General: She is in acute distress. Appearance: She is ill-appearing. She is not toxic-appearing. HENT:      Head: Normocephalic and atraumatic. Nose: Nose normal.      Mouth/Throat:      Mouth: Mucous membranes are dry. Eyes:      Extraocular Movements: Extraocular movements intact. Pupils: Pupils are equal, round, and reactive to light. Cardiovascular:      Rate and Rhythm: Regular rhythm. Tachycardia present. Pulses: Normal pulses. Pulmonary:      Effort: Pulmonary effort is normal.      Breath sounds: No stridor. No wheezing or rhonchi. Abdominal:      General: Abdomen is flat. Bowel sounds are normal. There is no distension. Palpations: Abdomen is soft. Tenderness:  There is no abdominal tenderness. Genitourinary:     Labia:         Right: Lesion present. Left: Lesion present. Musculoskeletal:         General: Normal range of motion. Cervical back: Normal range of motion and neck supple. Right lower leg: No edema. Left lower leg: No edema. Skin:     General: Skin is warm and dry. Neurological:      General: No focal deficit present. Mental Status: She is alert and oriented to person, place, and time. Sensory: No sensory deficit. Motor: No weakness. Diagnostic Study Results     Labs -     Recent Results (from the past 24 hour(s))   GLUCOSE, POC    Collection Time: 11/06/21 10:26 AM   Result Value Ref Range    Glucose (POC) >600 (HH) 65 - 117 mg/dL    Performed by Landon Lowery    CBC WITH AUTOMATED DIFF    Collection Time: 11/06/21 11:27 AM   Result Value Ref Range    WBC 26.3 (H) 3.6 - 11.0 K/uL    RBC 5.14 3.80 - 5.20 M/uL    HGB 13.2 11.5 - 16.0 g/dL    HCT 42.1 35.0 - 47.0 %    MCV 81.9 80.0 - 99.0 FL    MCH 25.7 (L) 26.0 - 34.0 PG    MCHC 31.4 30.0 - 36.5 g/dL    RDW 16.7 (H) 11.5 - 14.5 %    PLATELET 835 (H) 294 - 400 K/uL    MPV 11.0 8.9 - 12.9 FL    NRBC 0.0 0  WBC    ABSOLUTE NRBC 0.00 0.00 - 0.01 K/uL    NEUTROPHILS 79 (H) 32 - 75 %    BAND NEUTROPHILS 11 %    LYMPHOCYTES 4 (L) 12 - 49 %    MONOCYTES 5 5 - 13 %    EOSINOPHILS 1 0 - 7 %    BASOPHILS 0 0 - 1 %    IMMATURE GRANULOCYTES 0 0.0 - 0.5 %    ABS. NEUTROPHILS 23.6 (H) 1.8 - 8.0 K/UL    ABS. LYMPHOCYTES 1.1 0.8 - 3.5 K/UL    ABS. MONOCYTES 1.3 (H) 0.0 - 1.0 K/UL    ABS. EOSINOPHILS 0.3 0.0 - 0.4 K/UL    ABS. BASOPHILS 0.0 0.0 - 0.1 K/UL    ABS. IMM.  GRANS. 0.0 0.00 - 0.04 K/UL    DF MANUAL      PLATELET COMMENTS Increased Platelets      RBC COMMENTS ANISOCYTOSIS  1+       METABOLIC PANEL, COMPREHENSIVE    Collection Time: 11/06/21 11:27 AM   Result Value Ref Range    Sodium 122 (L) 136 - 145 mmol/L    Potassium 4.6 3.5 - 5.1 mmol/L    Chloride 85 (L) 97 - 108 mmol/L CO2 22 21 - 32 mmol/L    Anion gap 15 5 - 15 mmol/L    Glucose 973 (HH) 65 - 100 mg/dL    BUN 28 (H) 6 - 20 MG/DL    Creatinine 1.51 (H) 0.55 - 1.02 MG/DL    BUN/Creatinine ratio 19 12 - 20      GFR est AA 41 (L) >60 ml/min/1.73m2    GFR est non-AA 34 (L) >60 ml/min/1.73m2    Calcium 9.6 8.5 - 10.1 MG/DL    Bilirubin, total 0.6 0.2 - 1.0 MG/DL    ALT (SGPT) 21 12 - 78 U/L    AST (SGOT) 14 (L) 15 - 37 U/L    Alk. phosphatase 267 (H) 45 - 117 U/L    Protein, total 7.7 6.4 - 8.2 g/dL    Albumin 2.2 (L) 3.5 - 5.0 g/dL    Globulin 5.5 (H) 2.0 - 4.0 g/dL    A-G Ratio 0.4 (L) 1.1 - 2.2     CK    Collection Time: 11/06/21 11:27 AM   Result Value Ref Range    CK 33 26 - 192 U/L   HEMOGLOBIN A1C WITH EAG    Collection Time: 11/06/21 11:27 AM   Result Value Ref Range    Hemoglobin A1c 11.7 (H) 4.0 - 5.6 %    Est. average glucose 289 mg/dL   TSH 3RD GENERATION    Collection Time: 11/06/21 11:27 AM   Result Value Ref Range    TSH 3.62 0.36 - 3.74 uIU/mL   SAMPLES BEING HELD    Collection Time: 11/06/21 11:27 AM   Result Value Ref Range    SAMPLES BEING HELD  RED, PST     COMMENT        Add-on orders for these samples will be processed based on acceptable specimen integrity and analyte stability, which may vary by analyte.    LIPASE    Collection Time: 11/06/21 11:27 AM   Result Value Ref Range    Lipase 322 73 - 393 U/L   EKG, 12 LEAD, INITIAL    Collection Time: 11/06/21 11:47 AM   Result Value Ref Range    Ventricular Rate 111 BPM    Atrial Rate 111 BPM    P-R Interval 168 ms    QRS Duration 86 ms    Q-T Interval 344 ms    QTC Calculation (Bezet) 467 ms    Calculated P Axis 68 degrees    Calculated R Axis 18 degrees    Calculated T Axis 34 degrees    Diagnosis       Sinus tachycardia  Septal infarct , age undetermined  When compared with ECG of 04-MAY-2021 12:06,  Septal infarct is now present     BLOOD GAS,CHEM8,LACTIC ACID POC    Collection Time: 11/06/21 11:57 AM   Result Value Ref Range    Calcium, ionized (POC) 1.19 1.12 - 1.32 mmol/L    BICARBONATE 25 mmol/L    Base deficit (POC) 2.1 mmol/L    Sample source VENOUS BLOOD      CO2, POC 25 (H) 19 - 24 MMOL/L    Sodium,  (L) 136 - 145 MMOL/L    Potassium, POC 4.8 3.5 - 5.5 MMOL/L    Chloride, POC 86 (L) 100 - 108 MMOL/L    Glucose, POC >700 (HH) 74 - 106 MG/DL    Creatinine, POC 0.8 0.6 - 1.3 MG/DL    Lactic Acid (POC) 9.27 (HH) 0.40 - 2.00 mmol/L    Critical value read back ADONIS     pH, venous (POC) 7.32 7.32 - 7.42      pCO2, venous (POC) 48.7 41 - 51 MMHG    pO2, venous (POC) 31 25 - 40 mmHg   URINALYSIS W/ REFLEX CULTURE    Collection Time: 11/06/21 12:45 PM    Specimen: Urine   Result Value Ref Range    Color YELLOW/STRAW      Appearance CLEAR CLEAR      pH (UA) 5.0 5.0 - 8.0      Protein Negative NEG mg/dL    Glucose >1,000 (A) NEG mg/dL    Ketone TRACE (A) NEG mg/dL    Bilirubin Negative NEG      Blood Negative NEG      Urobilinogen 0.2 0.2 - 1.0 EU/dL    Nitrites Negative NEG      Leukocyte Esterase Negative NEG      WBC 20-50 0 - 4 /hpf    RBC 0-5 0 - 5 /hpf    Epithelial cells FEW FEW /lpf    Bacteria Negative NEG /hpf    UA:UC IF INDICATED URINE CULTURE ORDERED (A) CNI     GLUCOSTABILIZER    Collection Time: 11/06/21  1:47 PM   Result Value Ref Range    Glucose 600 mg/dL    Insulin order 10.8 units/hour    Insulin adminstered 10.8 units/hour    Multiplier 0.020     Low target 150 mg/dL    High target 250 mg/dL    D50 order 0.0 ml    D50 administered 0.00 ml    Minutes until next BG 60 min    Order initials cz     Administered initials cz     GLSCOM Comments     GLUCOSE, POC    Collection Time: 11/06/21  3:30 PM   Result Value Ref Range    Glucose (POC) >600 (HH) 65 - 117 mg/dL    Performed by Juan R Eldridge    Collection Time: 11/06/21  3:32 PM   Result Value Ref Range    Glucose 600 mg/dL    Insulin order 16.2 units/hour    Insulin adminstered 16.2 units/hour    Multiplier 0.030     Low target 150 mg/dL    High target 250 mg/dL    D50 order 0.0 ml D50 administered 0.00 ml    Minutes until next BG 60 min    Order initials CZ     Administered initials CZ     GLSCOM Comments     COVID-19 RAPID TEST    Collection Time: 11/06/21  4:14 PM   Result Value Ref Range    Specimen source Nasopharyngeal      COVID-19 rapid test Not detected NOTD     BLOOD GAS,CHEM8,LACTIC ACID POC    Collection Time: 11/06/21  4:20 PM   Result Value Ref Range    Calcium, ionized (POC) 1.28 1.12 - 1.32 mmol/L    BICARBONATE 28 mmol/L    Base excess (POC) 2.3 mmol/L    Sample source VENOUS BLOOD      CO2, POC 28 (H) 19 - 24 MMOL/L    Sodium,  (L) 136 - 145 MMOL/L    Potassium, POC 4.0 3.5 - 5.5 MMOL/L    Chloride, POC 94 (L) 100 - 108 MMOL/L    Glucose,  (HH) 74 - 106 MG/DL    Creatinine, POC 0.6 0.6 - 1.3 MG/DL    Lactic Acid (POC) 5.31 (HH) 0.40 - 2.00 mmol/L    Critical value read back [facility]     pH, venous (POC) 7.40 7.32 - 7.42      pCO2, venous (POC) 45.7 41 - 51 MMHG    pO2, venous (POC) 28 25 - 40 mmHg   GLUCOSE, POC    Collection Time: 11/06/21  5:21 PM   Result Value Ref Range    Glucose (POC) 440 (H) 65 - 117 mg/dL    Performed by Arlys Earnest PCT    GLUCOSTABILIZER    Collection Time: 11/06/21  5:30 PM   Result Value Ref Range    Glucose 440 mg/dL    Insulin order 11.4 units/hour    Insulin adminstered 11.4 units/hour    Multiplier 0.030     Low target 150 mg/dL    High target 250 mg/dL    D50 order 0.0 ml    D50 administered 0.00 ml    Minutes until next BG 60 min    Order initials sd     Administered initials mm     GLSCOM Comments     GLUCOSE, POC    Collection Time: 11/06/21  6:26 PM   Result Value Ref Range    Glucose (POC) 457 (H) 65 - 117 mg/dL    Performed by Arlys Earnest PCT    GLUCOSTABILIZER    Collection Time: 11/06/21  6:34 PM   Result Value Ref Range    Glucose 457 mg/dL    Insulin order 15.9 units/hour    Insulin adminstered 15.9 units/hour    Multiplier 0.040     Low target 150 mg/dL    High target 250 mg/dL    D50 order 0.0 ml    D50 administered 0.00 ml    Minutes until next BG 60 min    Order initials tb     Administered initials tb     GLSCOM Comments         Radiologic Studies -   CT ABD PELV W CONT   Final Result   1. Postsurgical changes from L2-S1. There is a collection of fluid posterior to   the surgical changes and on the right side of the spine at L4/L5 and posterior   to the right iliopsoas muscle just superior to the sacrum. Findings are   concerning for an infectious process. 2. There is a Rea catheter present. There is gas within the bladder, possibly   iatrogenic; however, there is some concentric mural thickening in the bladder. Recommend clinical correlation for cystitis   3. Incidental findings as above. XR CHEST PORT   Final Result   1. Low lung volumes without definite acute cardiopulmonary disease. Consider PA   and lateral exam when clinically appropriate              CT Results  (Last 48 hours)               11/06/21 1425  CT ABD PELV W CONT Final result    Impression:  1. Postsurgical changes from L2-S1. There is a collection of fluid posterior to   the surgical changes and on the right side of the spine at L4/L5 and posterior   to the right iliopsoas muscle just superior to the sacrum. Findings are   concerning for an infectious process. 2. There is a Rea catheter present. There is gas within the bladder, possibly   iatrogenic; however, there is some concentric mural thickening in the bladder. Recommend clinical correlation for cystitis   3. Incidental findings as above. Narrative:  EXAM:  CT ABDOMEN PELVIS WITH CONTRAST   INDICATION:  N/V, lactic >9. Additional history:   COMPARISON: CT of the abdomen and pelvis, 7/9/2020. Allie Montiel TECHNIQUE:    Multislice helical CT was performed from the diaphragm to the symphysis pubis   with oral and intravenous contrast administration. Contiguous 5 mm axial images   were reconstructed and lung and soft tissue windows were generated.  Coronal and sagittal reformations were generated. CT dose reduction was achieved through use of a standardized protocol tailored   for this examination and automatic exposure control for dose modulation. Lorean Snare FINDINGS:   INCIDENTALLY IMAGED CHEST:   Heart/vessels: Within normal limits. Lungs/Pleura: Respiratory motion with no visible acute process. .   ABDOMEN:   Liver: Diffuse, diminished attenuation throughout the liver. Gallbladder/Biliary: Status post cholecystectomy. Spleen: Within normal limits. Pancreas: Atrophy. Adrenals: Within normal limits. Kidneys: Small, low-attenuation lesion in the medial aspect of the left kidney,   too small to actually characterize, likely representing a cyst.   Peritoneum/Mesenteries: Within normal limits. Extraperitoneum: Within normal limits. Gastrointestinal tract: Within normal limits. Vascular: Within normal limits. Lorean Snare PELVIS:   Extraperitoneum: Within normal limits. Ureters: Within normal limits. Bladder: There is a Rea catheter within the bladder. There is gas within the   bladder. Mural thickening in the bladder. Reproductive System: Status post hysterectomy. .   MSK:    Obesity. Muscle atrophy. Postsurgical changes of laminectomies with posterior   pedicle screw and stephane fixation of L2-S1. There is a fluid collection posterior   to the posterior elements from L2-L4 with a few locules of gas at its most   superior extent. There is a fluid collection to the right of midline, posterior   to the sacrum at S1. There is a fluid collection with a locule of gas to the   right of the posterior elements at L4/L5 There is a fluid collection with   locules of gas posterior to the right iliopsoas muscle just superior to the   sacrum   . CXR Results  (Last 48 hours)               11/06/21 1101  XR CHEST PORT Final result    Impression:  1. Low lung volumes without definite acute cardiopulmonary disease.  Consider PA   and lateral exam when clinically appropriate               Narrative:  INDICATION: . weakness   Additional history:   COMPARISON: Previous chest xray, 7/12/2021 and 7/8/2021. LIMITATIONS: Portable technique. Kerri Fabricio FINDINGS: Single frontal view of the chest.    .   Lines/tubes/surgical: Cardiac monitor leads overly the patient. Heart/mediastinum: Unremarkable. Lungs/pleura: Low lung volumes without definite acute process. No visualized   pleural effusion or pneumothorax. Additional Comments: None. .             Ulises Hamilton MD am the first provider for this patient, and I am the attending of record for this patient encounter. I reviewed the vital signs, available nursing notes, past medical history, past surgical history, family history and social history. Vital Signs-Reviewed the patient's vital signs. Patient Vitals for the past 24 hrs:   Temp Pulse Resp BP SpO2   11/06/21 1741 98.2 °F (36.8 °C) (!) 111 24 118/81 95 %   11/06/21 1530 97.4 °F (36.3 °C) (!) 115 21 135/63 98 %   11/06/21 1515  (!) 112 16 118/82 99 %   11/06/21 1500  (!) 112 21 120/68 96 %   11/06/21 1445  (!) 112 24 121/78 99 %   11/06/21 1430    (!) 147/119    11/06/21 1415  (!) 112 24  96 %   11/06/21 1400  (!) 110 24 125/89 99 %   11/06/21 1345  (!) 109 23 128/82 98 %   11/06/21 1230  (!) 111 22 (!) 135/39 91 %   11/06/21 1027 (!) 96 °F (35.6 °C) (!) 123 22 101/87 98 %       EKG interpretation: (Preliminary)  Sinus tachycardia, nonspecific ST changes. 111 bpm.  Normal QTC.   EKG interpretation by Jen Vidal MD    Records Reviewed: Nursing Notes and Old Medical Records    Provider Notes (Medical Decision Making):   Differential diagnosis considered: DKA versus HHS, severe sepsis, dehydration/MERCEDES, acute abdomen, UTI, pneumonia, metabolic encephalopathy, candidal infection, etc.    In the ED, patient is tachycardic, normotensive, with mild hypothermia with temp of 96 °F.she has dry mucous membranes, and appears to be in acute distress, dry heaving on my evaluation. No reproducible abdominal pain with largely benign abdominal exam.   exam performed under nursing chaperone, remarkable for diffuse erythema throughout introitus region going into bilateral buttocks with overlying candidal skin changes. No sacral or decubitus wound seen on back. Patient is warmed with ajay hugger. Nystatin cream ordered for skin findings seen on exam.    Initial point-of-care fingerstick glucose is greater than 600. Due to acutely ill condition, with high suspicion for severe sepsis causing DKA/HHS, labs and medications were ordered shortly after my evaluation. There was significant delay in obtaining labs as well as starting fluids and IV medications ordered due to ED staffing shortage and capacity constraints. Her work-up is remarkable for WBC of 26.3. Serum glucose of 973. Sodium of 122. Anion gap of 15. MERCEDES with creatinine 1.51, BUN of 28.  pH is largely normal despite severely elevated initial lactic acid of 9.27. Overall presentation is more in line with HHS, though certain aspects of her labs also could point towards a component of DKA. Patient was immediately ordered to be started on IV insulin drip. Patient meets criteria for SIRS with tachycardia, leukocytosis, lactic acidosis, MERCEDES. No significant source of infxn identified thus far (candidal vulvovaginitis alone I feel is unlikely to cause such significant laboratory abnormalities). Doubt septic shock despite lactate greater than 4 as she likely has a component of mild DKA as well as severe dehydration contributing to lactic acidosis. Patient is otherwise hemodynamically stable, and clinically not in shock. She has already been ordered 2 L of NS, which is >30cc/kg of IVF per IBW. She is empirically ordered IV Zosyn as the remainder of her work-up is in process (no source identified at this time).   Again, there was delay in starting medications-including ivf, abx, and insulin gtt, due to above reasons stated. Her UA revealed trace ketones, negative nitrites or leukocyte esterase. Slightly elevated WBC, 20-50. Few epithelial cells, no bacteria. Urine culture is automatically sent. This is a cathed urine sample. Her HbA1c is significantly elevated at 11.7. Chest x-ray is negative for acute process. CT abdomen pelvis reveal significant findings of postsurgical changes from L2-S1. There is a collection of fluid posterior to the surgical changes and on the right side of the spine at L4/L5 and posterior to the right ileopsoas muscle just superior to the sacrum. Findings are concerning for an infectious process. There is concentric mural thickening of the bladder, possibly indicating cystitis. Patient has already been given Zosyn. Will add on vancomycin in addition in light of new CT read. By reviewing her chart, it appears that patient underwent laminectomy with Ortho in June. Will consult [employer] to evaluate the patient and help with clinical management. Her repeat lactic acid after 1 L of IV fluids is now improved from 9.27-5. 31. On my repeat evaluation, patient appears more comfortable, better hydrated, with tachycardia improving. Her mental status also appears to be more sharp at this time. Patient's case is discussed with the hospitalist, who has accepted her for admission and further management. ED Course as of 11/06/21 1922   Sat Nov 06, 2021   0208 I spoke to ortho [facility], James Patton, regarding CT abd/pelvis findings- who will evaluate the patient. [JM]      ED Course User Index  [JM] Aleksey Jacobs MD     [id number] [address] [facility] ED SEPSIS NOTE:     3:00 PM The patient now meets criteria for: Severe Sepsis    1. Fluid resuscitation with: 30 mL/kg crystalloid bolus  2. Due to concern for rapidly advancing infection and deterioration of patient's condition, antibiotics are started STAT and cultures ordered.     I've performed a sepsis reassessment of the patient's clinical volume status and tissue perfusion at time 4:40 PM      CRITICAL CARE NOTE:    Authorized and Performed by: Arlene Hernandez MD    IMPENDING DETERIORATION -CNS, Metabolic and Renal  ASSOCIATED RISK FACTORS - Hypotension, Shock, Metabolic changes, Dehydration, Vascular Compromise and CNS Decompensation  MANAGEMENT- Bedside Assessment  INTERPRETATION -  Xrays, CT Scan, Blood Gases, ECG, Blood Pressure, Cardiac Output Measures, pulse ox, and all labs  INTERVENTIONS - hemodynamic mngmt and Metobolic interventions  CASE REVIEW - Hospitalist/Intensivist, Medical Sub-Specialist and Nursing  TREATMENT RESPONSE -Improved and Stable  PERFORMED BY - Self    I have spent 105 minutes of critical care time involved in obtaining a history, examining the patient, lab review, arranging urgent treatment with development of a management plan, consultations with other providers, family decision- making, bedside attention and documentation. During this entire length of time I was immediately available to the patient . Critical Care: The reason for providing this level of medical care for this critically ill patient was due to a critical illness that impaired one or more vital organ systems, such that there was a high probability of imminent or life threatening deterioration in the patient's condition. This care involved high complexity decision making to assess, manipulate, and support vital system functions, to treat this degree of vital organ system failure, and to prevent further life threatening deterioration of the patients condition. Critical care time was exclusive of separately billable procedures. ED Course:   Initial assessment performed. The patient's presenting problems have been discussed, and they are in agreement with the care plan formulated and outlined with them. I have encouraged them to ask questions as they arise throughout their visit.     Arlene Hernandez MD      Disposition:  Admit to stepdown unit    Diagnosis Clinical Impression:   1. Hyperosmolar hyperglycemic state (HHS) ([facility] [address]    2. Diabetic ketoacidosis without coma associated with type 2 diabetes mellitus ([address]    3. Severe sepsis ([address]    4. Lactic acidosis    5. MERCEDES (acute kidney injury) ([address]    6. Infection of spine ([address]    7. Candidal vulvovaginitis    8. Hyponatremia        Attestations:    Lesia Pabon MD    Please note that this dictation was completed with [employer], the [employer] voice recognition software. Quite often unanticipated grammatical, syntax, homophones, and other interpretive errors are inadvertently transcribed by the computer software. Please disregard these errors. Please excuse any errors that have escaped final proofreading. Thank you.

## 2021-11-06 NOTE — ED NOTES
This RN unable to obtain an oral or axillary temperature after multiple attempts, patient denies hx of DM. Reports extreme thirst, frequent urination to the point that pt states she is raw in between legs from the urination, pt states pain all over. BG reading HI, pt immediately taken to treatment room.

## 2021-11-07 LAB
ADMINISTERED INITIALS, ADMINIT: NORMAL
ALBUMIN SERPL-MCNC: 1.8 G/DL (ref 3.5–5)
ALBUMIN/GLOB SERPL: 0.4 {RATIO} (ref 1.1–2.2)
ALP SERPL-CCNC: 153 U/L (ref 45–117)
ALT SERPL-CCNC: 17 U/L (ref 12–78)
ANION GAP SERPL CALC-SCNC: 7 MMOL/L (ref 5–15)
ANION GAP SERPL CALC-SCNC: 9 MMOL/L (ref 5–15)
AST SERPL-CCNC: 18 U/L (ref 15–37)
ATRIAL RATE: 105 BPM
ATRIAL RATE: 111 BPM
BILIRUB SERPL-MCNC: 0.7 MG/DL (ref 0.2–1)
BUN SERPL-MCNC: 20 MG/DL (ref 6–20)
BUN SERPL-MCNC: 22 MG/DL (ref 6–20)
BUN/CREAT SERPL: 29 (ref 12–20)
BUN/CREAT SERPL: 31 (ref 12–20)
CALCIUM SERPL-MCNC: 9 MG/DL (ref 8.5–10.1)
CALCIUM SERPL-MCNC: 9 MG/DL (ref 8.5–10.1)
CALCULATED P AXIS, ECG09: 39 DEGREES
CALCULATED P AXIS, ECG09: 68 DEGREES
CALCULATED R AXIS, ECG10: 18 DEGREES
CALCULATED R AXIS, ECG10: 9 DEGREES
CALCULATED T AXIS, ECG11: 34 DEGREES
CALCULATED T AXIS, ECG11: 57 DEGREES
CHLORIDE SERPL-SCNC: 99 MMOL/L (ref 97–108)
CHLORIDE SERPL-SCNC: 99 MMOL/L (ref 97–108)
CO2 SERPL-SCNC: 24 MMOL/L (ref 21–32)
CO2 SERPL-SCNC: 24 MMOL/L (ref 21–32)
CREAT SERPL-MCNC: 0.68 MG/DL (ref 0.55–1.02)
CREAT SERPL-MCNC: 0.72 MG/DL (ref 0.55–1.02)
CRP SERPL-MCNC: 14 MG/DL (ref 0–0.6)
D50 ADMINISTERED, D50ADM: 0 ML
D50 ORDER, D50ORD: 0 ML
DIAGNOSIS, 93000: NORMAL
DIAGNOSIS, 93000: NORMAL
GLOBULIN SER CALC-MCNC: 4.5 G/DL (ref 2–4)
GLSCOM COMMENTS: NORMAL
GLUCOSE BLD STRIP.AUTO-MCNC: 124 MG/DL (ref 65–117)
GLUCOSE BLD STRIP.AUTO-MCNC: 140 MG/DL (ref 65–117)
GLUCOSE BLD STRIP.AUTO-MCNC: 147 MG/DL (ref 65–117)
GLUCOSE BLD STRIP.AUTO-MCNC: 156 MG/DL (ref 65–117)
GLUCOSE BLD STRIP.AUTO-MCNC: 160 MG/DL (ref 65–117)
GLUCOSE BLD STRIP.AUTO-MCNC: 161 MG/DL (ref 65–117)
GLUCOSE BLD STRIP.AUTO-MCNC: 168 MG/DL (ref 65–117)
GLUCOSE BLD STRIP.AUTO-MCNC: 169 MG/DL (ref 65–117)
GLUCOSE BLD STRIP.AUTO-MCNC: 176 MG/DL (ref 65–117)
GLUCOSE BLD STRIP.AUTO-MCNC: 186 MG/DL (ref 65–117)
GLUCOSE BLD STRIP.AUTO-MCNC: 194 MG/DL (ref 65–117)
GLUCOSE BLD STRIP.AUTO-MCNC: 200 MG/DL (ref 65–117)
GLUCOSE BLD STRIP.AUTO-MCNC: 203 MG/DL (ref 65–117)
GLUCOSE SERPL-MCNC: 125 MG/DL (ref 65–100)
GLUCOSE SERPL-MCNC: 168 MG/DL (ref 65–100)
GLUCOSE, GLC: 124 MG/DL
GLUCOSE, GLC: 140 MG/DL
GLUCOSE, GLC: 147 MG/DL
GLUCOSE, GLC: 156 MG/DL
GLUCOSE, GLC: 160 MG/DL
GLUCOSE, GLC: 161 MG/DL
GLUCOSE, GLC: 168 MG/DL
GLUCOSE, GLC: 176 MG/DL
GLUCOSE, GLC: 186 MG/DL
GLUCOSE, GLC: 194 MG/DL
GLUCOSE, GLC: 200 MG/DL
HIGH TARGET, HITG: 250 MG/DL
INSULIN ADMINSTERED, INSADM: 1.3 UNITS/HOUR
INSULIN ADMINSTERED, INSADM: 1.9 UNITS/HOUR
INSULIN ADMINSTERED, INSADM: 2 UNITS/HOUR
INSULIN ADMINSTERED, INSADM: 2 UNITS/HOUR
INSULIN ADMINSTERED, INSADM: 2.2 UNITS/HOUR
INSULIN ADMINSTERED, INSADM: 2.3 UNITS/HOUR
INSULIN ADMINSTERED, INSADM: 2.5 UNITS/HOUR
INSULIN ADMINSTERED, INSADM: 2.6 UNITS/HOUR
INSULIN ADMINSTERED, INSADM: 2.7 UNITS/HOUR
INSULIN ADMINSTERED, INSADM: 3.2 UNITS/HOUR
INSULIN ADMINSTERED, INSADM: 7 UNITS/HOUR
INSULIN ORDER, INSORD: 1.3 UNITS/HOUR
INSULIN ORDER, INSORD: 1.9 UNITS/HOUR
INSULIN ORDER, INSORD: 2 UNITS/HOUR
INSULIN ORDER, INSORD: 2 UNITS/HOUR
INSULIN ORDER, INSORD: 2.2 UNITS/HOUR
INSULIN ORDER, INSORD: 2.3 UNITS/HOUR
INSULIN ORDER, INSORD: 2.5 UNITS/HOUR
INSULIN ORDER, INSORD: 2.6 UNITS/HOUR
INSULIN ORDER, INSORD: 2.7 UNITS/HOUR
INSULIN ORDER, INSORD: 3.2 UNITS/HOUR
INSULIN ORDER, INSORD: 7 UNITS/HOUR
LACTATE SERPL-SCNC: 1.2 MMOL/L (ref 0.4–2)
LOW TARGET, LOT: 150 MG/DL
MAGNESIUM SERPL-MCNC: 1.7 MG/DL (ref 1.6–2.4)
MINUTES UNTIL NEXT BG, NBG: 120 MIN
MINUTES UNTIL NEXT BG, NBG: 120 MIN
MINUTES UNTIL NEXT BG, NBG: 60 MIN
MULTIPLIER, MUL: 0.02
MULTIPLIER, MUL: 0.03
MULTIPLIER, MUL: 0.04
MULTIPLIER, MUL: 0.05
ORDER INITIALS, ORDINIT: NORMAL
P-R INTERVAL, ECG05: 168 MS
P-R INTERVAL, ECG05: 174 MS
POTASSIUM SERPL-SCNC: 3.8 MMOL/L (ref 3.5–5.1)
POTASSIUM SERPL-SCNC: 3.9 MMOL/L (ref 3.5–5.1)
PROT SERPL-MCNC: 6.3 G/DL (ref 6.4–8.2)
Q-T INTERVAL, ECG07: 316 MS
Q-T INTERVAL, ECG07: 344 MS
QRS DURATION, ECG06: 78 MS
QRS DURATION, ECG06: 86 MS
QTC CALCULATION (BEZET), ECG08: 417 MS
QTC CALCULATION (BEZET), ECG08: 467 MS
SERVICE CMNT-IMP: ABNORMAL
SODIUM SERPL-SCNC: 130 MMOL/L (ref 136–145)
SODIUM SERPL-SCNC: 132 MMOL/L (ref 136–145)
VENTRICULAR RATE, ECG03: 105 BPM
VENTRICULAR RATE, ECG03: 111 BPM

## 2021-11-07 PROCEDURE — 83605 ASSAY OF LACTIC ACID: CPT

## 2021-11-07 PROCEDURE — 74011636637 HC RX REV CODE- 636/637: Performed by: INTERNAL MEDICINE

## 2021-11-07 PROCEDURE — 74011250636 HC RX REV CODE- 250/636: Performed by: INTERNAL MEDICINE

## 2021-11-07 PROCEDURE — 74011000258 HC RX REV CODE- 258: Performed by: INTERNAL MEDICINE

## 2021-11-07 PROCEDURE — 74011250636 HC RX REV CODE- 250/636: Performed by: HOSPITALIST

## 2021-11-07 PROCEDURE — 74011000258 HC RX REV CODE- 258: Performed by: HOSPITALIST

## 2021-11-07 PROCEDURE — 83735 ASSAY OF MAGNESIUM: CPT

## 2021-11-07 PROCEDURE — 65660000000 HC RM CCU STEPDOWN

## 2021-11-07 PROCEDURE — 74011250637 HC RX REV CODE- 250/637: Performed by: STUDENT IN AN ORGANIZED HEALTH CARE EDUCATION/TRAINING PROGRAM

## 2021-11-07 PROCEDURE — 36415 COLL VENOUS BLD VENIPUNCTURE: CPT

## 2021-11-07 PROCEDURE — 82962 GLUCOSE BLOOD TEST: CPT

## 2021-11-07 PROCEDURE — 80053 COMPREHEN METABOLIC PANEL: CPT

## 2021-11-07 PROCEDURE — 74011250636 HC RX REV CODE- 250/636: Performed by: NURSE PRACTITIONER

## 2021-11-07 PROCEDURE — 74011250637 HC RX REV CODE- 250/637: Performed by: HOSPITALIST

## 2021-11-07 RX ORDER — DEXTROSE 50 % IN WATER (D50W) INTRAVENOUS SYRINGE
12.5-25 AS NEEDED
Status: DISCONTINUED | OUTPATIENT
Start: 2021-11-07 | End: 2021-12-10 | Stop reason: HOSPADM

## 2021-11-07 RX ORDER — MAGNESIUM SULFATE 100 %
4 CRYSTALS MISCELLANEOUS AS NEEDED
Status: DISCONTINUED | OUTPATIENT
Start: 2021-11-07 | End: 2021-12-10 | Stop reason: HOSPADM

## 2021-11-07 RX ORDER — INSULIN LISPRO 100 [IU]/ML
INJECTION, SOLUTION INTRAVENOUS; SUBCUTANEOUS
Status: DISCONTINUED | OUTPATIENT
Start: 2021-11-07 | End: 2021-12-10 | Stop reason: HOSPADM

## 2021-11-07 RX ORDER — INSULIN GLARGINE 100 [IU]/ML
25 INJECTION, SOLUTION SUBCUTANEOUS DAILY
Status: DISCONTINUED | OUTPATIENT
Start: 2021-11-07 | End: 2021-11-09

## 2021-11-07 RX ORDER — MAGNESIUM SULFATE HEPTAHYDRATE 40 MG/ML
2 INJECTION, SOLUTION INTRAVENOUS ONCE
Status: COMPLETED | OUTPATIENT
Start: 2021-11-07 | End: 2021-11-07

## 2021-11-07 RX ADMIN — CEFEPIME HYDROCHLORIDE 2 G: 2 INJECTION, POWDER, FOR SOLUTION INTRAVENOUS at 08:50

## 2021-11-07 RX ADMIN — Medication 5 ML: at 14:00

## 2021-11-07 RX ADMIN — PAROXETINE HYDROCHLORIDE 40 MG: 20 TABLET, FILM COATED ORAL at 08:52

## 2021-11-07 RX ADMIN — CEFEPIME HYDROCHLORIDE 2 G: 2 INJECTION, POWDER, FOR SOLUTION INTRAVENOUS at 16:52

## 2021-11-07 RX ADMIN — LEVOTHYROXINE SODIUM 112 MCG: 0.11 TABLET ORAL at 08:52

## 2021-11-07 RX ADMIN — INSULIN LISPRO 2 UNITS: 100 INJECTION, SOLUTION INTRAVENOUS; SUBCUTANEOUS at 16:53

## 2021-11-07 RX ADMIN — INSULIN LISPRO 2 UNITS: 100 INJECTION, SOLUTION INTRAVENOUS; SUBCUTANEOUS at 11:40

## 2021-11-07 RX ADMIN — METRONIDAZOLE 500 MG: 500 INJECTION, SOLUTION INTRAVENOUS at 20:42

## 2021-11-07 RX ADMIN — METRONIDAZOLE 500 MG: 500 INJECTION, SOLUTION INTRAVENOUS at 09:52

## 2021-11-07 RX ADMIN — HEPARIN SODIUM 7500 UNITS: 5000 INJECTION INTRAVENOUS; SUBCUTANEOUS at 16:52

## 2021-11-07 RX ADMIN — METOPROLOL TARTRATE 25 MG: 25 TABLET, FILM COATED ORAL at 08:50

## 2021-11-07 RX ADMIN — INSULIN LISPRO 2 UNITS: 100 INJECTION, SOLUTION INTRAVENOUS; SUBCUTANEOUS at 22:38

## 2021-11-07 RX ADMIN — NYSTATIN: 100000 CREAM TOPICAL at 08:54

## 2021-11-07 RX ADMIN — ATORVASTATIN CALCIUM 40 MG: 40 TABLET, FILM COATED ORAL at 22:38

## 2021-11-07 RX ADMIN — FLUCONAZOLE 100 MG: 100 TABLET ORAL at 08:52

## 2021-11-07 RX ADMIN — Medication 1 CAPSULE: at 08:52

## 2021-11-07 RX ADMIN — METOPROLOL TARTRATE 12.5 MG: 25 TABLET, FILM COATED ORAL at 22:38

## 2021-11-07 RX ADMIN — Medication 10 ML: at 05:09

## 2021-11-07 RX ADMIN — ASPIRIN 81 MG: 81 TABLET, COATED ORAL at 08:52

## 2021-11-07 RX ADMIN — MAGNESIUM SULFATE HEPTAHYDRATE 2 G: 40 INJECTION, SOLUTION INTRAVENOUS at 01:51

## 2021-11-07 RX ADMIN — AMITRIPTYLINE HYDROCHLORIDE 50 MG: 50 TABLET, FILM COATED ORAL at 22:38

## 2021-11-07 RX ADMIN — NYSTATIN: 100000 CREAM TOPICAL at 16:00

## 2021-11-07 RX ADMIN — HEPARIN SODIUM 7500 UNITS: 5000 INJECTION INTRAVENOUS; SUBCUTANEOUS at 01:51

## 2021-11-07 RX ADMIN — ACETAMINOPHEN 650 MG: 325 TABLET ORAL at 20:43

## 2021-11-07 RX ADMIN — VANCOMYCIN HYDROCHLORIDE 750 MG: 750 INJECTION, POWDER, LYOPHILIZED, FOR SOLUTION INTRAVENOUS at 10:59

## 2021-11-07 RX ADMIN — CEFEPIME HYDROCHLORIDE 2 G: 2 INJECTION, POWDER, FOR SOLUTION INTRAVENOUS at 23:51

## 2021-11-07 RX ADMIN — HEPARIN SODIUM 7500 UNITS: 5000 INJECTION INTRAVENOUS; SUBCUTANEOUS at 09:51

## 2021-11-07 RX ADMIN — OXYCODONE 5 MG: 5 TABLET ORAL at 08:52

## 2021-11-07 RX ADMIN — OXYCODONE 5 MG: 5 TABLET ORAL at 18:33

## 2021-11-07 RX ADMIN — PANTOPRAZOLE SODIUM 40 MG: 40 TABLET, DELAYED RELEASE ORAL at 08:51

## 2021-11-07 RX ADMIN — VANCOMYCIN HYDROCHLORIDE 750 MG: 750 INJECTION, POWDER, LYOPHILIZED, FOR SOLUTION INTRAVENOUS at 22:38

## 2021-11-07 RX ADMIN — SODIUM CHLORIDE 100 ML/HR: 9 INJECTION, SOLUTION INTRAVENOUS at 20:43

## 2021-11-07 RX ADMIN — INSULIN GLARGINE 25 UNITS: 100 INJECTION, SOLUTION SUBCUTANEOUS at 10:59

## 2021-11-07 RX ADMIN — Medication 10 ML: at 22:38

## 2021-11-07 RX ADMIN — NYSTATIN: 100000 CREAM TOPICAL at 22:43

## 2021-11-07 NOTE — PROGRESS NOTES
Received message from patient's nurse stating:    Patient has been having bursts of SVT, up to the 170s. Patient originally admitted for DKA and sepsis. her new lactic is 2.6, and her K is 4. She is also complaining of abdominal pain, which I just gave her oxycodone for not too long ago, as well as metoprolol 12.5mg PO. What do you recommend? Discussion / orders:    · EKG  · Magnesium level  · Cardiology consult         Please note that this note was dictated using Dragon computer voice recognition software. Quite often unanticipated grammatical, syntax, homophones, and other interpretive errors are inadvertently transcribed by the computer software. Please disregard these errors. Please excuse any errors that have escaped final proofreading.

## 2021-11-07 NOTE — PROGRESS NOTES
Page called to [phone] for [employer] paging services, told Dr See Rodríguez is on call, and he would call back. Ricardo stated pt will be seen tomorrow morning.

## 2021-11-07 NOTE — PROGRESS NOTES
Received message from patient's nurse stating:    Magnesium is 1.6         Discussion / orders:    Mag sulfate 2 g IV x1         Please note that this note was dictated using Dragon computer voice recognition software. Quite often unanticipated grammatical, syntax, homophones, and other interpretive errors are inadvertently transcribed by the computer software. Please disregard these errors. Please excuse any errors that have escaped final proofreading.

## 2021-11-07 NOTE — PROGRESS NOTES
Hospitalist Progress Note    NAME: Arron De Leon   :  1950   MRN:  [id number]     Interim Hospital Summary: 65 y.o. female whom presented on 2021 with      Assessment / Plan:    Severe sepsis, POA as evidence by hypothermia, rectal temp 96, tachy , lactic 9.2 -->5.3 after 1L, WBC 26K, MERCEDES Cr 1.5 due to  Suspected epidural/right iliopsoas abscess with   Right paraspinal pain at level L3-4 and right iliac crest pain (patient complains of right hip pain), POA   -CT abd/pelvis showing fluid posterior to the surgical changes and on the right side of the spine at L4/L5 and posterior to the right iliopsoas muscle just superior to the sacrum. Findings are concerning for an infectious process. -S/P L2-3 AND L5-S1  LUMBAR LAMINECTOMY WITH ANDREWS OSTEOTOMY L2-3 AND L3-4 WITH PLF L2-S1 by Dr. Camacho Resendez [phone]; complicated with nonhealing post surgical wound requiring wound vac 2021.    -Continue empiric Vanco, cefepime and Flagyl  -Consult orthopedics - Dr Camacho Resendez to eval in AM     Type 2DM, uncontrolled with hyperglycemic hyperosmolar hyperglycemia and early DKA, due to severe sepsis, POA  -presented with , AG 15, trace ketone in urine  -A1c 11.7 up from 7.6 in July.  -continue holding metformin  -transition to Lantus + SSI prn. Stop insulin drip- Bg has come down <200     Prerenal MERCEDES Cr 1.5  -resolved with IVF hydration.    -Mittal placed in ER due to poor mobility from hip pain and polyuria, with severe candidal infection in perineum and labia and buttocks. Would remove mitatl in next 24-48 hours if possible - VT later this week     Severe candida infection in perineum, labia, buttocks  -continue mystatin cream ordered in ER. Add diflucan x 7 days.   -wound care consult     Generalized weakness and gait difficulty   -due to medical problems above, has not been out of bed for 4 days  -consult pt/ot     SVT   HTN / HLD  -continue metoprolol and statin  -having runs of SVT overnight. Cardiology consulted     Hypothyroid  -continue levothyroxine. TSh 3.6     Depression and anxiety  -continue paxil  -while on diflucan, will reduce xanax from 0.5 to 0.25mg bid and change to prn to avoid toxicity.     Severe obesity    Code:  Discussed, full  DVT prophylaxis: lovenox  Surrogate decision maker:   (but has some dementia per patient) or sister Drake Tobias    40 or above Morbid obesity / Body mass index is 44.06 kg/m². Subjective:     Chief Complaint / Reason for Physician Visit  Follow up of sepsis, hyperosmolar hyperglycemic state  Chart reviewed in detail. Discussed with RN events overnight. Review of Systems:  Symptom Y/N Comments  Symptom Y/N Comments   Fever/Chills    Chest Pain     Poor Appetite    Edema     Cough    Abdominal Pain     Sputum    Joint Pain     SOB/AVALOS    Pruritis/Rash     Nausea/vomit    Tolerating PT/OT     Diarrhea    Tolerating Diet     Constipation    Other       Could NOT obtain due to:      PO intake: No data found. Objective:     VITALS:   Last 24hrs VS reviewed since prior progress note.  Most recent are:  Patient Vitals for the past 24 hrs:   Temp Pulse Resp BP SpO2   11/07/21 0728 98.6 °F (37 °C) 100 21 104/65 93 %   11/06/21 2334 98.8 °F (37.1 °C) (!) 101 25 112/72 91 %   11/06/21 2201  (!) 108  (!) 153/71 95 %   11/06/21 1952 97.7 °F (36.5 °C) (!) 109 22 131/80 98 %   11/06/21 1741 98.2 °F (36.8 °C) (!) 111 24 118/81 95 %   11/06/21 1530 97.4 °F (36.3 °C) (!) 115 21 135/63 98 %   11/06/21 1515  (!) 112 16 118/82 99 %   11/06/21 1500  (!) 112 21 120/68 96 %   11/06/21 1445  (!) 112 24 121/78 99 %   11/06/21 1430    (!) 147/119    11/06/21 1415  (!) 112 24  96 %   11/06/21 1400  (!) 110 24 125/89 99 %   11/06/21 1345  (!) 109 23 128/82 98 %   11/06/21 1230  (!) 111 22 (!) 135/39 91 %   11/06/21 1027 (!) 96 °F (35.6 °C) (!) 123 22 101/87 98 %       Intake/Output Summary (Last 24 hours) at 11/7/2021 Alexandr filed at 11/6/2021 2334  Gross per 24 hour   Intake 3038.9 ml   Output 400 ml   Net 2638.9 ml        I had a face to face encounter, and independently examined this patient on 11/7/2021, as outlined below:  PHYSICAL EXAM:  General: WD, WN. Alert, cooperative, no acute distress    EENT:  EOMI. Anicteric sclerae. MMM  Resp:  CTA bilaterally, no wheezing or rales. No accessory muscle use  CV:  Regular  rhythm,  No edema  GI:  Soft, Non distended, + mild right flank tender. +Bowel sounds  Neurologic:  Alert and oriented X 3, normal speech,   Psych:   Good insight. Not anxious nor agitated  Skin:  No rashes. No jaundice    Reviewed most current lab test results and cultures  YES  Reviewed most current radiology test results   YES  Review and summation of old records today    NO  Reviewed patient's current orders and MAR    YES  PMH/ reviewed - no change compared to H&P  ________________________________________________________________________  Care Plan discussed with:    Comments   Patient x    Family      RN     Care Manager     Consultant                        Multidiciplinary team rounds were held today with , nursing, pharmacist and clinical coordinator. Patient's plan of care was discussed; medications were reviewed and discharge planning was addressed. ________________________________________________________________________  Total NON critical care TIME:  25   Minutes    Total CRITICAL CARE TIME Spent:   Minutes non procedure based      Comments   >50% of visit spent in counseling and coordination of care x     This includes time during multidisciplinary rounds if indicated above   ________________________________________________________________________  Teresa Ferreira MD     Procedures: see electronic medical records for all procedures/Xrays and details which were not copied into this note but were reviewed prior to creation of Plan.       LABS:  I reviewed today's most current labs and imaging studies. Pertinent labs include:  Recent Labs     11/06/21 2325 11/06/21  1127   WBC 26.0* 26.3*   HGB 12.1 13.2   HCT 37.1 42.1   * 567*     Recent Labs     11/07/21  0511 11/07/21  0045 11/06/21 2325 11/06/21  2103 11/06/21  1127 11/06/21  1127   * 130*  --  132*   < > 122*   K 3.8 3.9  --  3.9   < > 4.6   CL 99 99  --  98   < > 85*   CO2 24 24  --  23   < > 22   * 125*  --  192*   < > 973*   BUN 20 22*  --  24*   < > 28*   CREA 0.68 0.72  --  0.84   < > 1.51*   CA 9.0 9.0  --  9.3   < > 9.6   MG  --  1.7 1.6 1.9  --   --    ALB 1.8*  --   --   --   --  2.2*   TBILI 0.7  --   --   --   --  0.6   ALT 17  --   --   --   --  21    < > = values in this interval not displayed.

## 2021-11-07 NOTE — PROGRESS NOTES
1930: Bedside and Verbal shift change report given to Saint Grills, RN (oncoming nurse) by Edilson Alatorre RN (offgoing nurse). Report included the following information SBAR, Kardex, Intake/Output, MAR, Accordion, Recent Results, Med Rec Status and Cardiac Rhythm NSR.    2202: Patient complaining of abdominal pain and also having anxiety. PRN roxicodone and xanax given. Patient is also have significant bursts of SVT in the 170s, lasting from 30 seconds to one minute. Lactic is now 2.6     2243: ANDREW Adames notified of above. Waiting on orders at this time. 2253: ANDREW Adames placed orders for EKG, STAT Magneisum level, and cardiology consult for am.     0124 11/07/2021: ANDREW Adames entered orders for one time Mag infusion. 0435: End of Shift Note    Bedside shift change report given to Edilson Alatorre RN (oncoming nurse) by Saint Grills (offgoing nurse).   Report included the following information SBAR, Kardex, Intake/Output, MAR, Accordion, Recent Results, Med Rec Status and Cardiac Rhythm NSR    Shift worked:  2932-3433     Shift summary and any significant changes:     See note above      Concerns for physician to address:  Patient having bursts of SVT      Zone phone for oncoming shift:   [phone]       Activity:     Number times ambulated in hallways past shift: 0  Number of times OOB to chair past shift: 0    Cardiac:   Cardiac Monitoring: Yes      Cardiac Rhythm: Sinus Tachy    Access:   Current line(s): PIV     Genitourinary:   Urinary status: mittal    Respiratory:   O2 Device: None (Room air)  Chronic home O2 use?: NO  Incentive spirometer at bedside: NO     GI:  Last Bowel Movement Date: 11/05/21  Current diet:  DIET NPO Sips of Water with Meds, Ice Chips, Sips of Clear Liquids  Passing flatus: YES  Tolerating current diet: YES       Pain Management:   Patient states pain is manageable on current regimen: NO    Skin:  Neal Score: 16  Interventions: speciality bed, float heels, increase time out of bed, PT/OT consult, internal/external urinary devices and nutritional support     Patient Safety:  Fall Score:  Total Score: 3  Interventions: assistive device (walker, cane, etc), gripper socks, pt to call before getting OOB and stay with me (per policy)  High Fall Risk: Yes    Length of Stay:  Expected LOS: - - -  Actual [address]

## 2021-11-07 NOTE — PROGRESS NOTES
Pharmacy Antimicrobial Kinetic Dosing    Indication for Antimicrobials: sepsis; SSTI, abscess     Current Regimen of Each Antimicrobial:  Vancomycin 750mg IV q12h (Start Date ; Day 2)  Cefepime 2 g IV q12h (Start Date ; Day 2)  Metronidazole 500 mg IV q12h (Start Date ; Day 2)  Fluconazole 200 mg PO x1, then 100 mg PO daily (Start Date ; Day  x7 total days)    Previous Antimicrobial Therapy:    Vancomycin Goal Level:  - 600    Date Dose & Interval Measured (mcg/mL) Predicted AUC/GRISELDA    10:30 750mg IV q12h                   Dosing calculator used: [employer]Bus    Significant Positive Cultures:    blood: pending   urine: pending    Conditions for Dosing Consideration: None    Labs:  Recent Labs     21  0511 21  0045 21  2103   CREA 0.68 0.72 0.84   BUN 20 22* 24*     Recent Labs     21  2325 21  1127   WBC 26.0* 26.3*   BANDS  --  11     Temp (24hrs), Av.8 °F (36.6 °C), Min:96 °F (35.6 °C), Max:98.8 °F (37.1 °C)    Creatinine Clearance (mL/min):   CrCl (Ideal Body Weight): 62.7   If actual weight < IBW: CrCl (Actual Body Weight) 133.5    Impression/Plan:   Ortho consulted  SCr corrected, afebrile,   Fluconazole for severe candida infection on perineum x 7 days  Adjusted cefepime to 2 g q8h for improved eCrCl  Continue Metronidazole regimen    Antimicrobial stop date TBD     Pharmacy will follow daily and adjust medications as appropriate for renal function and/or serum levels.     Thank you,  Venessa Guerrero, [facility]

## 2021-11-07 NOTE — PROGRESS NOTES
Problem: Diabetes Self-Management  Goal: *Disease process and treatment process  Description: Define diabetes and identify own type of diabetes; list 3 options for treating diabetes. 11/7/2021 0116 by Ansley Esteban  Outcome: Progressing Towards Goal  11/7/2021 0116 by Ansley Esteban  Outcome: Progressing Towards Goal  Goal: *Incorporating nutritional management into lifestyle  Description: Describe effect of type, amount and timing of food on blood glucose; list 3 methods for planning meals. 11/7/2021 0116 by Ansley Esteban  Outcome: Progressing Towards Goal  11/7/2021 0116 by Ansley Esteban  Outcome: Progressing Towards Goal  Goal: *Incorporating physical activity into lifestyle  Description: State effect of exercise on blood glucose levels. 11/7/2021 0116 by Ansley Esteban  Outcome: Progressing Towards Goal  11/7/2021 0116 by Ansley Esteban  Outcome: Progressing Towards Goal  Goal: *Developing strategies to promote health/change behavior  Description: Define the ABC's of diabetes; identify appropriate screenings, schedule and personal plan for screenings. 11/7/2021 0116 by Ansley Esteban  Outcome: Progressing Towards Goal  11/7/2021 0116 by Ansley Esteban  Outcome: Progressing Towards Goal  Goal: *Using medications safely  Description: State effect of diabetes medications on diabetes; name diabetes medication taking, action and side effects. 11/7/2021 0116 by Ansley Esteban  Outcome: Progressing Towards Goal  11/7/2021 0116 by Ansley Esteban  Outcome: Progressing Towards Goal  Goal: *Monitoring blood glucose, interpreting and using results  Description: Identify recommended blood glucose targets  and personal targets.   11/7/2021 0116 by Ansley Esteban  Outcome: Progressing Towards Goal  11/7/2021 0116 by Ansley Esteban  Outcome: Progressing Towards Goal  Goal: *Prevention, detection, treatment of acute complications  Description: List symptoms of hyper- and hypoglycemia; describe how to treat low blood sugar and actions for lowering  high blood glucose level. 11/7/2021 0116 by Arnaldo Loving  Outcome: Progressing Towards Goal  11/7/2021 0116 by Arnaldo Loving  Outcome: Progressing Towards Goal  Goal: *Prevention, detection and treatment of chronic complications  Description: Define the natural course of diabetes and describe the relationship of blood glucose levels to long term complications of diabetes. 11/7/2021 0116 by Arnaldo Loving  Outcome: Progressing Towards Goal  11/7/2021 0116 by Arnaldo Loving  Outcome: Progressing Towards Goal  Goal: *Developing strategies to address psychosocial issues  Description: Describe feelings about living with diabetes; identify support needed and support network  11/7/2021 0116 by Arnaldo Loving  Outcome: Progressing Towards Goal  11/7/2021 0116 by Arnaldo Loving  Outcome: Progressing Towards Goal  Goal: *Insulin pump training  11/7/2021 0116 by Arnaldo Loving  Outcome: Progressing Towards Goal  11/7/2021 0116 by Arnaldo Loving  Outcome: Progressing Towards Goal  Goal: *Sick day guidelines  11/7/2021 0116 by Arnaldo Loving  Outcome: Progressing Towards Goal  11/7/2021 0116 by Arnaldo Loving  Outcome: Progressing Towards Goal  Goal: *Patient Specific Goal (EDIT GOAL, INSERT TEXT)  11/7/2021 0116 by Arnaldo Loving  Outcome: Progressing Towards Goal  11/7/2021 0116 by Arnaldo Loving  Outcome: Progressing Towards Goal     Problem: Patient Education: Go to Patient Education Activity  Goal: Patient/Family Education  11/7/2021 0116 by Arnaldo Loving  Outcome: Progressing Towards Goal  11/7/2021 0116 by Arnaldo Loving  Outcome: Progressing Towards Goal     Problem: Falls - Risk of  Goal: *Absence of Falls  Description: Document Rolf Fall Risk and appropriate interventions in the flowsheet.   11/7/2021 0116 by Arnaldo Loving  Outcome: Progressing Towards Goal  Note: Fall Risk Interventions:  Mobility Interventions: Assess mobility with egress test, Bed/chair exit alarm, Communicate number of staff needed for ambulation/transfer, Patient to call before getting OOB, PT Consult for mobility concerns, Strengthening exercises (ROM-active/passive)         Medication Interventions: Assess postural VS orthostatic hypotension, Bed/chair exit alarm, Evaluate medications/consider consulting pharmacy    Elimination Interventions: Bed/chair exit alarm, Call light in reach, Patient to call for help with toileting needs, Stay With Me (per policy), Toileting schedule/hourly rounds           11/7/2021 0116 by Aj Mendez  Outcome: Progressing Towards Goal  Note: Fall Risk Interventions:  Mobility Interventions: Assess mobility with egress test, Bed/chair exit alarm, Communicate number of staff needed for ambulation/transfer, Patient to call before getting OOB, PT Consult for mobility concerns, Strengthening exercises (ROM-active/passive)         Medication Interventions: Assess postural VS orthostatic hypotension, Bed/chair exit alarm, Evaluate medications/consider consulting pharmacy    Elimination Interventions: Bed/chair exit alarm, Call light in reach, Patient to call for help with toileting needs, Stay With Me (per policy), Toileting schedule/hourly rounds              Problem: Pressure Injury - Risk of  Goal: *Prevention of pressure injury  Description: Document Neal Scale and appropriate interventions in the flowsheet. 11/7/2021 0116 by Aj Mendez  Outcome: Progressing Towards Goal  Note: Pressure Injury Interventions:  Sensory Interventions: Assess changes in LOC, Assess need for specialty bed, Check visual cues for pain, Float heels, Discuss PT/OT consult with provider, Keep linens dry and wrinkle-free, Maintain/enhance activity level, Minimize linen layers, Monitor skin under medical devices, Pressure redistribution bed/mattress (bed type), Turn and reposition approx.  every two hours (pillows and wedges if needed)    Moisture Interventions: Absorbent underpads, Apply protective barrier, creams and emollients, Check for incontinence Q2 hours and as needed, Internal/External urinary devices, Maintain skin hydration (lotion/cream), Minimize layers, Offer toileting Q_hr    Activity Interventions: Assess need for specialty bed, Increase time out of bed, PT/OT evaluation    Mobility Interventions: Assess need for specialty bed, Float heels, HOB 30 degrees or less, PT/OT evaluation, Turn and reposition approx. every two hours(pillow and wedges)    Nutrition Interventions: Document food/fluid/supplement intake, Offer support with meals,snacks and hydration    Friction and Shear Interventions: Apply protective barrier, creams and emollients, Foam dressings/transparent film/skin sealants, HOB 30 degrees or less, Lift sheet             11/7/2021 0116 by Kristie Ac  Outcome: Progressing Towards Goal  Note: Pressure Injury Interventions:  Sensory Interventions: Assess changes in LOC, Assess need for specialty bed, Check visual cues for pain, Float heels, Discuss PT/OT consult with provider, Keep linens dry and wrinkle-free, Maintain/enhance activity level, Minimize linen layers, Monitor skin under medical devices, Pressure redistribution bed/mattress (bed type), Turn and reposition approx. every two hours (pillows and wedges if needed)    Moisture Interventions: Absorbent underpads, Apply protective barrier, creams and emollients, Check for incontinence Q2 hours and as needed, Internal/External urinary devices, Maintain skin hydration (lotion/cream), Minimize layers, Offer toileting Q_hr    Activity Interventions: Assess need for specialty bed, Increase time out of bed, PT/OT evaluation    Mobility Interventions: Assess need for specialty bed, Float heels, HOB 30 degrees or less, PT/OT evaluation, Turn and reposition approx.  every two hours(pillow and wedges)    Nutrition Interventions: Document food/fluid/supplement intake, Offer support with meals,snacks and hydration    Friction and Shear Interventions: Apply protective barrier, creams and emollients, Foam dressings/transparent film/skin sealants, HOB 30 degrees or less, Lift sheet                Problem: HHS: Day 1  Goal: Off Pathway (Use only if patient is Off Pathway)  Outcome: Progressing Towards Goal  Goal: Activity/Safety  Outcome: Progressing Towards Goal  Goal: Consults, if ordered  Outcome: Progressing Towards Goal  Goal: Diagnostic Test/Procedures  Outcome: Progressing Towards Goal  Goal: Nutrition/Diet  Outcome: Progressing Towards Goal  Goal: Discharge Planning  Outcome: Progressing Towards Goal  Goal: Medications  Outcome: Progressing Towards Goal  Goal: Respiratory  Outcome: Progressing Towards Goal  Goal: Treatments/Interventions/Procedures  Outcome: Progressing Towards Goal  Goal: Psychosocial  Outcome: Progressing Towards Goal  Goal: *Blood glucose decreasing  Outcome: Progressing Towards Goal  Goal: *Potassium normalizing  Outcome: Progressing Towards Goal  Goal: *Adequate urinary output (equal to or greater than 30 milliliters/hour)  Outcome: Progressing Towards Goal  Goal: *Stable cardiac rhythm  Outcome: Progressing Towards Goal

## 2021-11-07 NOTE — CONSULTS
Ortho: - CT scan reviewed by myself and Dr Verito Kim  - Dr Verito Kim has spoken with Dr Julisa Garcia. Further recommendations for patient from Dr Kenney Camejo per medical team currently  - No plans for OR currently  - We will continue to follow and give further recommendations     Dr Verito Kim is aware of this plan and in agreement.   MAIKEL Calzada

## 2021-11-07 NOTE — CONSULTS
ORTHO CONSULT    Subjective:     Date of Consultation:  November 7, 2021    Referring Physician:  hosp    HISTORY OF PRESENT ILLNESS:     Christian Hunter is a 65 y.o. female with DM type 2 referred for admission for Hyperosmolar nonketotic hyperglycemia , polyuria, polydipsia for 3 days.     In ER hypothermic rectal temp 96.0, , 101.87. CT abd/pelvis shows fluid collection posterior to her spine hardware.     6/2/21:  L2-3 AND L5-S1  LUMBAR LAMINECTOMY WITH ANDREWS OSTEOTOMY L2-3 AND L3-4 WITH PLF L2-S1 by Dr. Eber Burns for sciatica, DDD. Spondylolisthesis     Admitted to [facility]  7/8 to 7/20/21:  Intractable right hip pain in setting of fall POA  - MRI on 07/11 showed edema of the right iliopsoas muscle centered at the myotendinous junction compatible with a strain.  - Patient had L2-L5 laminectomy 06/2021 with some delayed wound healing, seen by Surgical team on  07/09/21 - recommended continuing current wound care while in acute care setting and transitioning to wound VAC once at rehab. Suspected bacterial pneumonia POA  - CXR on 07/08/21 revealed mild left basilar opacity, repeat CXR on 07/12/21 revealed no acute process.     - Patient completed 5 day course of ceftriaxone and doxycycline.    - Afebrile and non-toxic in appearance. - Leucocytosis resolved.        DM II  - Uncontrolled with hyperglycemia present on admission .  A1c 7.8  - Low dose basal (glargine) insulin was started on 07/10/21.  - Resumed metformin at time of d/c to rehab.      Patient reports discharged from [address] [place] [address] after [facility] admission in July. Continued to be plagued by right hip pain and has to walk holding on to furniture or with RW. Not able to f/u Dr. Eber Burns due to mobility issue; he has told her she needs to see a hip specialist for hip pain. Since last Thursday, been very weak, chills, nausea and vomiting for 4 days, with loss of appetite. Has been having polyuria and polydipsia.   Her vaginal area is very raw and pain with walking. Has not been out of bed for past 4 days. Denies chest pain. Has cough only when vomiting, no edema, SOB. Denies back pain. Having a lot of right sided back / hip pain, worse with movement, only comfortable when laying on her right hip. Patient Active Problem List    Diagnosis Date Noted    Leukocytosis 11/06/2021    Candida vaginitis 11/06/2021    Hyperglycemia 11/06/2021    SIRS (systemic inflammatory response syndrome) (HCC) 11/06/2021    MERCEDES (acute kidney injury) ([facility] [address] 11/06/2021    Severe sepsis ([address] 11/06/2021    Right hip pain 07/08/2021    Lumbar stenosis with neurogenic claudication 04/03/2017    Rectal polyp 04/06/2016    Morbid obesity ([facility] [address] 02/12/2014    Right knee DJD 02/11/2014    Left knee DJD 09/03/2013     Family History   Problem Relation Age of Onset    Cancer Mother         ovarian ca?  Heart Disease Father         MI    Heart Attack Father     Cancer Father         MULTIPLE MYELOMA    Liver Disease Father         FROM MEDICATIONS FOR MULTIPLE MYELOMA    Arthritis-osteo Sister     Arthritis-osteo Brother     Cancer Paternal Grandmother 79        breast ca    Breast Cancer Paternal Grandmother 79    Breast Cancer Maternal Aunt 55    Breast Cancer Maternal Aunt 61    Breast Cancer Paternal Aunt 43    Breast Cancer Paternal Aunt         52's    Emphysema Paternal Grandfather     No Known Problems Sister     No Known Problems Brother     No Known Problems Brother     Anesth Problems Neg Hx       Social History     Tobacco Use    Smoking status: Never Smoker    Smokeless tobacco: Never Used   Substance Use Topics    Alcohol use: No     Past Medical History:   Diagnosis Date    Adverse effect of anesthesia     O2 DROPS WITH ANESTHESIA    Arthritis     KNEES    Cancer ([facility] [address] 03/13/2015    SQUAMOUS CELL CARCINOMA; tonsils and lymph nodes.   Removed 3-2015 with radiation    GERD (gastroesophageal reflux disease)     Hypertension     NO LONGER ON MEDICATION    Hypothyroid     HYPOTHYROIDISM    Migraine     Nausea & vomiting     Obesity     Other ill-defined conditions(799.89)     chronic back pain    Psychiatric disorder     anxiety    Psychiatric disorder     panic ATTACKS    SVT (supraventricular tachycardia) ([facility] [address] 05/24/2014    Ulcerative colitis       Past Surgical History:   Procedure Laterality Date    COLONOSCOPY,DIAGNOSTIC  11/19/2015         HX GI      colonscopy    HX HEENT  03/2015    tonsillectomy (CANCER) AND NECK LYMPH NODES    HX HEENT  2008    PARATHYROID REMOVAL    HX HEENT Left 2002    EYE LASER    HX HEMORRHOIDECTOMY  2001, 2016     X2    HX HYSTERECTOMY  1985    HX KNEE ARTHROSCOPY  1995    left knee surgery, TOTAL LT  and Right KNEE 2013    HX KNEE REPLACEMENT Bilateral 2013, 2014    HX LAP CHOLECYSTECTOMY  2002    HX LUMBAR FUSION  2011    SPINAL FUSION with hardware L4-L5    HX ORTHOPAEDIC  1990    CYST REMOVED RIGHT WRIST    HX ORTHOPAEDIC  05/12/2021    L2-3 & L5-21 LUMBAR LAMINECTOMY WITH ANDREWS OSTEOTOMY    HX OTHER SURGICAL      cyst removal right wrist    HX UROLOGICAL  2010    bladder surgery(interstim device)    IR INJ FORAMIN EPID LUMB ANES/STER SNGL  8/19/2019    ID EGD TRANSORAL BIOPSY SINGLE/MULTIPLE  5/20/2013           Prior to Admission medications    Medication Sig Start Date End Date Taking? Authorizing Provider   L.acid,para-B. bifidum-S.therm (RISAQUAD) 8 billion cell cap cap Take 1 Capsule by mouth daily. 7/21/21  Yes Dhruv Damon NP   miconazole (MICOTIN) 2 % topical powder Apply  to affected area two (2) times a day. 7/20/21  Yes Dhruv Damon NP   amitriptyline (ELAVIL) 50 mg tablet Take 50 mg by mouth nightly. Yes Provider, Historical   atorvastatin (LIPITOR) 40 mg tablet Take 40 mg by mouth nightly. Yes Provider, Historical   metFORMIN (GLUCOPHAGE) 500 mg tablet Take 500 mg by mouth two (2) times daily (with meals).    Yes Provider, Historical butalb/acetaminophen/caffeine (FIORICET PO) Take 1 Tablet by mouth as needed. Yes Provider, Historical   aspirin delayed-release 81 mg tablet Take 81 mg by mouth daily. Yes Provider, Historical   ALPRAZolam (XANAX) 0.5 mg tablet Take 0.5 mg by mouth two (2) times a day. Yes Provider, Historical   levothyroxine (SYNTHROID) 75 mcg tablet Take 112 mcg by mouth Daily (before breakfast). Yes Provider, Historical   PARoxetine (PAXIL) 40 mg tablet Take 40 mg by mouth every morning. Yes Provider, Historical   metoprolol tartrate (LOPRESSOR) 25 mg tablet Take 12.5 mg by mouth nightly. Yes Provider, Historical   metoprolol (LOPRESSOR) 25 mg tablet Take 25 mg by mouth every morning. Yes Provider, Historical   omeprazole (PRILOSEC) 40 mg capsule Take 40 mg by mouth every morning. Yes Provider, Historical   acetaminophen (TYLENOL) 325 mg tablet Take 2 Tablets by mouth every six (6) hours as needed for Pain. 7/20/21   Cate Lynn NP   magnesium hydroxide (MILK OF MAGNESIA) 400 mg/5 mL suspension Take 30 mL by mouth daily. Patient not taking: Reported on 11/6/2021 7/21/21   Cate Lynn NP   polyethylene glycol [facility]) 17 gram packet Take 1 Packet by mouth daily. Patient not taking: Reported on 11/6/2021 7/20/21   Cate Lynn NP   senna (SENOKOT) 8.6 mg tablet Take 2 Tablets by mouth daily. Patient not taking: Reported on 11/6/2021 7/21/21   Cate Lynn NP   heparin sodium,porcine (heparin, porcine,) 5,000 unit/mL injection 1 mL by SubCUTAneous route every eight (8) hours. 7/20/21   Cate Lynn NP   naloxone [facility]) 4 mg/actuation nasal spray Use 1 spray intranasally, then discard. Repeat with new spray every 2 min as needed for opioid overdose symptoms, alternating nostrils.  6/3/21   MAIKEL Smith     Current Facility-Administered Medications   Medication Dose Route Frequency    insulin glargine (LANTUS) injection 25 Units  25 Units SubCUTAneous DAILY    insulin lispro (HUMALOG) injection   SubCUTAneous AC&HS    glucose chewable tablet 16 g  4 Tablet Oral PRN    dextrose (D50W) injection syrg 12.5-25 g  12.5-25 g IntraVENous PRN    glucagon (GLUCAGEN) injection 1 mg  1 mg IntraMUSCular PRN    vancomycin (VANCOCIN) 750 mg in 0.9% sodium chloride 250 mL (VIAL-MATE)  750 mg IntraVENous Q12H    [START ON 11/8/2021] Vancomycin Trough Draw Reminder Note  1 Each Other ONCE    cefepime (MAXIPIME) 2 g in 0.9% sodium chloride (MBP/ADV) 100 mL MBP  2 g IntraVENous Q8H    nystatin (MYCOSTATIN) 100,000 unit/gram cream   Topical TID    insulin regular (NOVOLIN R, HUMULIN R) 100 Units in 0.9% sodium chloride 100 mL infusion  0-50 Units/hr IntraVENous TITRATE    fluconazole (DIFLUCAN) tablet 100 mg  100 mg Oral DAILY    0.9% sodium chloride infusion  100 mL/hr IntraVENous CONTINUOUS    metroNIDAZOLE (FLAGYL) IVPB premix 500 mg  500 mg IntraVENous Q12H    L.acidophilus-paracasei-S.thermophil-bifidobacter (RISAQUAD) 8 billion cell capsule  1 Capsule Oral DAILY    amitriptyline (ELAVIL) tablet 50 mg  50 mg Oral QHS    aspirin delayed-release tablet 81 mg  81 mg Oral DAILY    atorvastatin (LIPITOR) tablet 40 mg  40 mg Oral QHS    levothyroxine (SYNTHROID) tablet 112 mcg  112 mcg Oral ACB    metoprolol tartrate (LOPRESSOR) tablet 25 mg  25 mg Oral 7am    metoprolol tartrate (LOPRESSOR) tablet 12.5 mg  12.5 mg Oral QHS    pantoprazole (PROTONIX) tablet 40 mg  40 mg Oral ACB    PARoxetine (PAXIL) tablet 40 mg  40 mg Oral DAILY    ALPRAZolam (XANAX) tablet 0.25 mg  0.25 mg Oral BID PRN    oxyCODONE IR (ROXICODONE) tablet 5 mg  5 mg Oral Q6H PRN    naloxone (NARCAN) injection 0.4 mg  0.4 mg IntraVENous PRN    sodium chloride (NS) flush 5-40 mL  5-40 mL IntraVENous Q8H    acetaminophen (TYLENOL) tablet 650 mg  650 mg Oral Q6H PRN    Or    acetaminophen (TYLENOL) suppository 650 mg  650 mg Rectal Q6H PRN    polyethylene glycol (MIRALAX) packet 17 g  17 g Oral DAILY PRN    ondansetron (ZOFRAN ODT) tablet 4 mg  4 mg Oral Q8H PRN    Or    ondansetron (ZOFRAN) injection 4 mg  4 mg IntraVENous Q6H PRN    heparin (porcine) injection 7,500 Units  7,500 Units SubCUTAneous Q8H    sodium chloride (NS) flush 5-10 mL  5-10 mL IntraVENous PRN     Allergies   Allergen Reactions    Adhesive Tape-Silicones Rash    Aloe Vera Rash    Chlorhexidine Rash    Tussin [Dextromethorphan Hbr] Palpitations    Readyprep Chg [Chlorhexidine Gluconate] Rash        Review of Systems:  Pertinent items are noted in HPI. Objective:     Patient Vitals for the past 8 hrs:   BP Temp Pulse Resp SpO2   21 1046 121/71 98.7 °F (37.1 °C) 88 20 95 %   21 0800   100     21 0728 104/65 98.6 °F (37 °C) 100 21 93 %     Temp (24hrs), Av.2 °F (36.8 °C), Min:97.4 °F (36.3 °C), Max:98.8 °F (37.1 °C)        Physical Exam:  General:     Alert and oriented. No acute distress. Chest:     Respirations unlabored. Abdomen:     Extremities:   Soft, non-tender. No evidence of cyanosis. Pulses palpable in both upper and lower extremities. Neema's sign negative in bilateral lower extremities. Moving all extremities. There is an obvious rash in the area of the right groin/hip region. No pain with logroll of the hips. Previous lumbar incision is well healed. Neurologic:  No motor deficits. Sensation stable. Neurovascular exam within normal limits.                  Data Review   Recent Results (from the past 24 hour(s))   GLUCOSTABILIZER    Collection Time: 21  1:47 PM   Result Value Ref Range    Glucose 600 mg/dL    Insulin order 10.8 units/hour    Insulin adminstered 10.8 units/hour    Multiplier 0.020     Low target 150 mg/dL    High target 250 mg/dL    D50 order 0.0 ml    D50 administered 0.00 ml    Minutes until next BG 60 min    Order initials cz     Administered initials cz     GLSCOM Comments     GLUCOSE, POC    Collection Time: 21  3:30 PM   Result Value Ref Range Glucose (POC) >600 (HH) 65 - 117 mg/dL    Performed by Giana Stephens    Collection Time: 11/06/21  3:32 PM   Result Value Ref Range    Glucose 600 mg/dL    Insulin order 16.2 units/hour    Insulin adminstered 16.2 units/hour    Multiplier 0.030     Low target 150 mg/dL    High target 250 mg/dL    D50 order 0.0 ml    D50 administered 0.00 ml    Minutes until next BG 60 min    Order initials CZ     Administered initials CZ     GLSCOM Comments     COVID-19 RAPID TEST    Collection Time: 11/06/21  4:14 PM   Result Value Ref Range    Specimen source Nasopharyngeal      COVID-19 rapid test Not detected NOTD     BLOOD GAS,CHEM8,LACTIC ACID POC    Collection Time: 11/06/21  4:20 PM   Result Value Ref Range    Calcium, ionized (POC) 1.28 1.12 - 1.32 mmol/L    BICARBONATE 28 mmol/L    Base excess (POC) 2.3 mmol/L    Sample source VENOUS BLOOD      CO2, POC 28 (H) 19 - 24 MMOL/L    Sodium,  (L) 136 - 145 MMOL/L    Potassium, POC 4.0 3.5 - 5.5 MMOL/L    Chloride, POC 94 (L) 100 - 108 MMOL/L    Glucose,  (HH) 74 - 106 MG/DL    Creatinine, POC 0.6 0.6 - 1.3 MG/DL    Lactic Acid (POC) 5.31 (HH) 0.40 - 2.00 mmol/L    Critical value read back NYGUEN     pH, venous (POC) 7.40 7.32 - 7.42      pCO2, venous (POC) 45.7 41 - 51 MMHG    pO2, venous (POC) 28 25 - 40 mmHg   GLUCOSE, POC    Collection Time: 11/06/21  5:21 PM   Result Value Ref Range    Glucose (POC) 440 (H) 65 - 117 mg/dL    Performed by Vlad Leonard PCT    GLUCOSTABILIZER    Collection Time: 11/06/21  5:30 PM   Result Value Ref Range    Glucose 440 mg/dL    Insulin order 11.4 units/hour    Insulin adminstered 11.4 units/hour    Multiplier 0.030     Low target 150 mg/dL    High target 250 mg/dL    D50 order 0.0 ml    D50 administered 0.00 ml    Minutes until next BG 60 min    Order initials sd     Administered initials mm     GLSCOM Comments     GLUCOSE, POC    Collection Time: 11/06/21  6:26 PM   Result Value Ref Range    Glucose (POC) 457 (H) 65 - 117 mg/dL    Performed by Marquise Caputo PCT    GLUCOSTABILIZER    Collection Time: 11/06/21  6:34 PM   Result Value Ref Range    Glucose 457 mg/dL    Insulin order 15.9 units/hour    Insulin adminstered 15.9 units/hour    Multiplier 0.040     Low target 150 mg/dL    High target 250 mg/dL    D50 order 0.0 ml    D50 administered 0.00 ml    Minutes until next BG 60 min    Order initials tb     Administered initials tb     GLSCOM Comments     GLUCOSE, POC    Collection Time: 11/06/21  7:45 PM   Result Value Ref Range    Glucose (POC) 244 (H) 65 - 117 mg/dL    Performed by Remea Brothers (TRV RN)    GLUCOSTABILIZER    Collection Time: 11/06/21  7:45 PM   Result Value Ref Range    Glucose 244 mg/dL    Insulin order 7.4 units/hour    Insulin adminstered 7.4 units/hour    Multiplier 0.040     Low target 150 mg/dL    High target 250 mg/dL    D50 order 0.0 ml    D50 administered 0.00 ml    Minutes until next BG 60 min    Order initials NS     Administered initials NS     GLSCOM Comments     C REACTIVE PROTEIN, QT    Collection Time: 11/06/21  8:03 PM   Result Value Ref Range    C-Reactive protein 14.00 (H) 0.00 - 0.60 mg/dL   GLUCOSE, POC    Collection Time: 11/06/21  8:48 PM   Result Value Ref Range    Glucose (POC) 291 (H) 65 - 117 mg/dL    Performed by Diego Chakraborty RN    GLUCOSTABILIZER    Collection Time: 11/06/21  8:50 PM   Result Value Ref Range    Glucose 291 mg/dL    Insulin order 11.6 units/hour    Insulin adminstered 11.6 units/hour    Multiplier 0.050     Low target 150 mg/dL    High target 250 mg/dL    D50 order 0.0 ml    D50 administered 0.00 ml    Minutes until next BG 60 min    Order initials NS     Administered initials NS     GLSCOM Comments     LACTIC ACID    Collection Time: 11/06/21  9:03 PM   Result Value Ref Range    Lactic acid 2.6 (HH) 0.4 - 2.0 MMOL/L   METABOLIC PANEL, BASIC    Collection Time: 11/06/21  9:03 PM   Result Value Ref Range    Sodium 132 (L) 136 - 145 mmol/L    Potassium 3.9 3.5 - 5.1 mmol/L    Chloride 98 97 - 108 mmol/L    CO2 23 21 - 32 mmol/L    Anion gap 11 5 - 15 mmol/L    Glucose 192 (H) 65 - 100 mg/dL    BUN 24 (H) 6 - 20 MG/DL    Creatinine 0.84 0.55 - 1.02 MG/DL    BUN/Creatinine ratio 29 (H) 12 - 20      GFR est AA >60 >60 ml/min/1.73m2    GFR est non-AA >60 >60 ml/min/1.73m2    Calcium 9.3 8.5 - 10.1 MG/DL   MAGNESIUM    Collection Time: 11/06/21  9:03 PM   Result Value Ref Range    Magnesium 1.9 1.6 - 2.4 mg/dL   GLUCOSE, POC    Collection Time: 11/06/21  9:55 PM   Result Value Ref Range    Glucose (POC) 190 (H) 65 - 117 mg/dL    Performed by Lo Puls RN    GLUCOSTABILIZER    Collection Time: 11/06/21  9:57 PM   Result Value Ref Range    Glucose 190 mg/dL    Insulin order 6.5 units/hour    Insulin adminstered 6.5 units/hour    Multiplier 0.050     Low target 150 mg/dL    High target 250 mg/dL    D50 order 0.0 ml    D50 administered 0.00 ml    Minutes until next BG 60 min    Order initials NS     Administered initials NS     GLSCOM Comments     GLUCOSE, POC    Collection Time: 11/06/21 11:12 PM   Result Value Ref Range    Glucose (POC) 195 (H) 65 - 117 mg/dL    Performed by Lo Puls RN    GLUCOSTABILIZER    Collection Time: 11/06/21 11:14 PM   Result Value Ref Range    Glucose 195 mg/dL    Insulin order 6.8 units/hour    Insulin adminstered 6.8 units/hour    Multiplier 0.050     Low target 150 mg/dL    High target 250 mg/dL    D50 order 0.0 ml    D50 administered 0.00 ml    Minutes until next BG 60 min    Order initials rf     Administered initials rf     GLSCOM Comments     MAGNESIUM    Collection Time: 11/06/21 11:25 PM   Result Value Ref Range    Magnesium 1.6 1.6 - 2.4 mg/dL   CBC WITH AUTOMATED DIFF    Collection Time: 11/06/21 11:25 PM   Result Value Ref Range    WBC 26.0 (H) 3.6 - 11.0 K/uL    RBC 4.77 3.80 - 5.20 M/uL    HGB 12.1 11.5 - 16.0 g/dL    HCT 37.1 35.0 - 47.0 %    MCV 77.8 (L) 80.0 - 99.0 FL    MCH 25.4 (L) 26.0 - 34.0 PG    MCHC 32.6 30.0 - 36.5 g/dL RDW 16.3 (H) 11.5 - 14.5 %    PLATELET 689 (H) 290 - 400 K/uL    MPV 10.3 8.9 - 12.9 FL    NRBC 0.0 0  WBC    ABSOLUTE NRBC 0.00 0.00 - 0.01 K/uL    NEUTROPHILS 87 (H) 32 - 75 %    LYMPHOCYTES 4 (L) 12 - 49 %    MONOCYTES 7 5 - 13 %    EOSINOPHILS 0 0 - 7 %    BASOPHILS 1 0 - 1 %    IMMATURE GRANULOCYTES 1 (H) 0.0 - 0.5 %    ABS. NEUTROPHILS 22.8 (H) 1.8 - 8.0 K/UL    ABS. LYMPHOCYTES 1.0 0.8 - 3.5 K/UL    ABS. MONOCYTES 1.8 (H) 0.0 - 1.0 K/UL    ABS. EOSINOPHILS 0.0 0.0 - 0.4 K/UL    ABS. BASOPHILS 0.1 0.0 - 0.1 K/UL    ABS. IMM.  GRANS. 0.4 (H) 0.00 - 0.04 K/UL    DF AUTOMATED     GLUCOSE, POC    Collection Time: 11/07/21 12:20 AM   Result Value Ref Range    Glucose (POC) 200 (H) 65 - 117 mg/dL    Performed by Latoya Sanchez RN    GLUCOSTABILIZER    Collection Time: 11/07/21 12:22 AM   Result Value Ref Range    Glucose 200 mg/dL    Insulin order 7.0 units/hour    Insulin adminstered 7.0 units/hour    Multiplier 0.050     Low target 150 mg/dL    High target 250 mg/dL    D50 order 0.0 ml    D50 administered 0.00 ml    Minutes until next BG 60 min    Order initials NS     Administered initials NS     GLSCOM Comments     METABOLIC PANEL, BASIC    Collection Time: 11/07/21 12:45 AM   Result Value Ref Range    Sodium 130 (L) 136 - 145 mmol/L    Potassium 3.9 3.5 - 5.1 mmol/L    Chloride 99 97 - 108 mmol/L    CO2 24 21 - 32 mmol/L    Anion gap 7 5 - 15 mmol/L    Glucose 125 (H) 65 - 100 mg/dL    BUN 22 (H) 6 - 20 MG/DL    Creatinine 0.72 0.55 - 1.02 MG/DL    BUN/Creatinine ratio 31 (H) 12 - 20      GFR est AA >60 >60 ml/min/1.73m2    GFR est non-AA >60 >60 ml/min/1.73m2    Calcium 9.0 8.5 - 10.1 MG/DL   MAGNESIUM    Collection Time: 11/07/21 12:45 AM   Result Value Ref Range    Magnesium 1.7 1.6 - 2.4 mg/dL   GLUCOSE, POC    Collection Time: 11/07/21 12:45 AM   Result Value Ref Range    Glucose (POC) 147 (H) 65 - 117 mg/dL    Performed by [address] [place] [address], POC    Collection Time: 11/07/21  1:41 AM   Result Value Ref Range    Glucose (POC) 140 (H) 65 - 117 mg/dL    Performed by Renaldo Kee RN    GLUCOSTABILIZER    Collection Time: 11/07/21  1:43 AM   Result Value Ref Range    Glucose 140 mg/dL    Insulin order 3.2 units/hour    Insulin adminstered 3.2 units/hour    Multiplier 0.040     Low target 150 mg/dL    High target 250 mg/dL    D50 order 0.0 ml    D50 administered 0.00 ml    Minutes until next BG 60 min    Order initials NS     Administered initials NS     GLTOM Comments     GLUCOSTABILIZER    Collection Time: 11/07/21  1:46 AM   Result Value Ref Range    Glucose 147 mg/dL    Insulin order 2.6 units/hour    Insulin adminstered 2.6 units/hour    Multiplier 0.030     Low target 150 mg/dL    High target 250 mg/dL    D50 order 0.0 ml    D50 administered 0.00 ml    Minutes until next BG 60 min    Order initials NS     Administered initials NS     GLTOM Comments     GLUCOSE, POC    Collection Time: 11/07/21  2:48 AM   Result Value Ref Range    Glucose (POC) 124 (H) 65 - 117 mg/dL    Performed by Renaldo Kee RN    GLUCOSTABILIZER    Collection Time: 11/07/21  2:53 AM   Result Value Ref Range    Glucose 124 mg/dL    Insulin order 1.3 units/hour    Insulin adminstered 1.3 units/hour    Multiplier 0.020     Low target 150 mg/dL    High target 250 mg/dL    D50 order 0.0 ml    D50 administered 0.00 ml    Minutes until next BG 60 min    Order initials NS     Administered initials NS     GLTOM Comments     GLUCOSE, POC    Collection Time: 11/07/21  3:57 AM   Result Value Ref Range    Glucose (POC) 161 (H) 65 - 117 mg/dL    Performed by Renaldo Kee RN    GLUCOSTABILIZER    Collection Time: 11/07/21  3:59 AM   Result Value Ref Range    Glucose 161 mg/dL    Insulin order 2.0 units/hour    Insulin adminstered 2.0 units/hour    Multiplier 0.020     Low target 150 mg/dL    High target 250 mg/dL    D50 order 0.0 ml    D50 administered 0.00 ml    Minutes until next BG 60 min    Order initials NS     Administered initials NS GLSCOM Comments     GLUCOSE, POC    Collection Time: 11/07/21  5:06 AM   Result Value Ref Range    Glucose (POC) 176 (H) 65 - 117 mg/dL    Performed by Latoya Sanchez RN    GLUCOSTABILIZER    Collection Time: 11/07/21  5:09 AM   Result Value Ref Range    Glucose 176 mg/dL    Insulin order 2.3 units/hour    Insulin adminstered 2.3 units/hour    Multiplier 0.020     Low target 150 mg/dL    High target 250 mg/dL    D50 order 0.0 ml    D50 administered 0.00 ml    Minutes until next BG 60 min    Order initials NS     Administered initials NS     GLSCOM Comments     LACTIC ACID    Collection Time: 11/07/21  5:11 AM   Result Value Ref Range    Lactic acid 1.2 0.4 - 2.0 MMOL/L   METABOLIC PANEL, COMPREHENSIVE    Collection Time: 11/07/21  5:11 AM   Result Value Ref Range    Sodium 132 (L) 136 - 145 mmol/L    Potassium 3.8 3.5 - 5.1 mmol/L    Chloride 99 97 - 108 mmol/L    CO2 24 21 - 32 mmol/L    Anion gap 9 5 - 15 mmol/L    Glucose 168 (H) 65 - 100 mg/dL    BUN 20 6 - 20 MG/DL    Creatinine 0.68 0.55 - 1.02 MG/DL    BUN/Creatinine ratio 29 (H) 12 - 20      GFR est AA >60 >60 ml/min/1.73m2    GFR est non-AA >60 >60 ml/min/1.73m2    Calcium 9.0 8.5 - 10.1 MG/DL    Bilirubin, total 0.7 0.2 - 1.0 MG/DL    ALT (SGPT) 17 12 - 78 U/L    AST (SGOT) 18 15 - 37 U/L    Alk.  phosphatase 153 (H) 45 - 117 U/L    Protein, total 6.3 (L) 6.4 - 8.2 g/dL    Albumin 1.8 (L) 3.5 - 5.0 g/dL    Globulin 4.5 (H) 2.0 - 4.0 g/dL    A-G Ratio 0.4 (L) 1.1 - 2.2     GLUCOSE, POC    Collection Time: 11/07/21  6:17 AM   Result Value Ref Range    Glucose (POC) 186 (H) 65 - 117 mg/dL    Performed by Latoya Sanchez RN    GLUCOSTABILIZER    Collection Time: 11/07/21  6:19 AM   Result Value Ref Range    Glucose 186 mg/dL    Insulin order 2.5 units/hour    Insulin adminstered 2.5 units/hour    Multiplier 0.020     Low target 150 mg/dL    High target 250 mg/dL    D50 order 0.0 ml    D50 administered 0.00 ml    Minutes until next BG 60 min    Order initials NS Administered initials NS     GLSCOM Comments     GLUCOSE, POC    Collection Time: 11/07/21  7:19 AM   Result Value Ref Range    Glucose (POC) 160 (H) 65 - 117 mg/dL    Performed by Ralf Jones (PCT)    GLUCOSTABILIZER    Collection Time: 11/07/21  7:21 AM   Result Value Ref Range    Glucose 160 mg/dL    Insulin order 2.0 units/hour    Insulin adminstered 2.0 units/hour    Multiplier 0.020     Low target 150 mg/dL    High target 250 mg/dL    D50 order 0.0 ml    D50 administered 0.00 ml    Minutes until next BG 60 min    Order initials NS     Administered initials NS     GLSCOM Comments     GLUCOSE, POC    Collection Time: 11/07/21  8:46 AM   Result Value Ref Range    Glucose (POC) 194 (H) 65 - 117 mg/dL    Performed by Craig Samano (TRESPERANZA RN)    GLUCOSTABILIZER    Collection Time: 11/07/21  8:48 AM   Result Value Ref Range    Glucose 194 mg/dL    Insulin order 2.7 units/hour    Insulin adminstered 2.7 units/hour    Multiplier 0.020     Low target 150 mg/dL    High target 250 mg/dL    D50 order 0.0 ml    D50 administered 0.00 ml    Minutes until next BG 60 min    Order initials mg     Administered initials mg     GLSCOM Comments     GLUCOSE, POC    Collection Time: 11/07/21  9:24 AM   Result Value Ref Range    Glucose (POC) 156 (H) 65 - 117 mg/dL    Performed by Ralf Jones (PCT)    GLUCOSTABILIZER    Collection Time: 11/07/21  9:58 AM   Result Value Ref Range    Glucose 156 mg/dL    Insulin order 1.9 units/hour    Insulin adminstered 1.9 units/hour    Multiplier 0.020     Low target 150 mg/dL    High target 250 mg/dL    D50 order 0.0 ml    D50 administered 0.00 ml    Minutes until next  min    Order initials lh     Administered initials mm     GLSCOM Comments     GLUCOSE, POC    Collection Time: 11/07/21 11:06 AM   Result Value Ref Range    Glucose (POC) 168 (H) 65 - 117 mg/dL    Performed by Ralf Jones (PCT)    GLUCOSTABILIZER    Collection Time: 11/07/21 11:29 AM   Result Value Ref Range Glucose 168 mg/dL    Insulin order 2.2 units/hour    Insulin adminstered 2.2 units/hour    Multiplier 0.020     Low target 150 mg/dL    High target 250 mg/dL    D50 order 0.0 ml    D50 administered 0.00 ml    Minutes until next  min    Order initials lh     Administered initials mm     GLSCOM Comments       CT ABD PELV W CONT    Result Date: 11/6/2021  1. Postsurgical changes from L2-S1. There is a collection of fluid posterior to the surgical changes and on the right side of the spine at L4/L5 and posterior to the right iliopsoas muscle just superior to the sacrum. Findings are concerning for an infectious process. 2. There is a Rea catheter present. There is gas within the bladder, possibly iatrogenic; however, there is some concentric mural thickening in the bladder. Recommend clinical correlation for cystitis 3. Incidental findings as above.         Assessment/Plan:     R sided lumbar pain and hip pain s/p lumbar spinal fusion    -will discuss with spine team further workup and treatment plans.    -can have a diet for now as we review imaging and continue to assess her on clinical exam.    -pain control  -broad spectrum abx  -we discussed blood glucose control      Deisy Bahena DO

## 2021-11-08 ENCOUNTER — APPOINTMENT (OUTPATIENT)
Dept: NON INVASIVE DIAGNOSTICS | Age: 71
DRG: 853 | End: 2021-11-08
Attending: INTERNAL MEDICINE
Payer: MEDICARE

## 2021-11-08 LAB
ANION GAP SERPL CALC-SCNC: 7 MMOL/L (ref 5–15)
BACTERIA SPEC CULT: ABNORMAL
BASOPHILS # BLD: 0 K/UL (ref 0–0.1)
BASOPHILS NFR BLD: 0 % (ref 0–1)
BUN SERPL-MCNC: 17 MG/DL (ref 6–20)
BUN/CREAT SERPL: 24 (ref 12–20)
CALCIUM SERPL-MCNC: 8.3 MG/DL (ref 8.5–10.1)
CC UR VC: ABNORMAL
CHLORIDE SERPL-SCNC: 100 MMOL/L (ref 97–108)
CO2 SERPL-SCNC: 25 MMOL/L (ref 21–32)
CREAT SERPL-MCNC: 0.72 MG/DL (ref 0.55–1.02)
DATE LAST DOSE: NORMAL
DIFFERENTIAL METHOD BLD: ABNORMAL
EOSINOPHIL # BLD: 0 K/UL (ref 0–0.4)
EOSINOPHIL NFR BLD: 0 % (ref 0–7)
ERYTHROCYTE [DISTWIDTH] IN BLOOD BY AUTOMATED COUNT: 16.7 % (ref 11.5–14.5)
GLUCOSE BLD STRIP.AUTO-MCNC: 195 MG/DL (ref 65–117)
GLUCOSE BLD STRIP.AUTO-MCNC: 227 MG/DL (ref 65–117)
GLUCOSE BLD STRIP.AUTO-MCNC: 230 MG/DL (ref 65–117)
GLUCOSE BLD STRIP.AUTO-MCNC: 249 MG/DL (ref 65–117)
GLUCOSE SERPL-MCNC: 210 MG/DL (ref 65–100)
HCT VFR BLD AUTO: 36.8 % (ref 35–47)
HGB BLD-MCNC: 11.2 G/DL (ref 11.5–16)
IMM GRANULOCYTES # BLD AUTO: 0 K/UL (ref 0–0.04)
IMM GRANULOCYTES NFR BLD AUTO: 0 % (ref 0–0.5)
LYMPHOCYTES # BLD: 1.3 K/UL (ref 0.8–3.5)
LYMPHOCYTES NFR BLD: 7 % (ref 12–49)
MAGNESIUM SERPL-MCNC: 2.1 MG/DL (ref 1.6–2.4)
MCH RBC QN AUTO: 25.2 PG (ref 26–34)
MCHC RBC AUTO-ENTMCNC: 30.4 G/DL (ref 30–36.5)
MCV RBC AUTO: 82.7 FL (ref 80–99)
MONOCYTES # BLD: 0.2 K/UL (ref 0–1)
MONOCYTES NFR BLD: 1 % (ref 5–13)
MYELOCYTES NFR BLD MANUAL: 1 %
NEUTS BAND NFR BLD MANUAL: 2 %
NEUTS SEG # BLD: 17.5 K/UL (ref 1.8–8)
NEUTS SEG NFR BLD: 89 % (ref 32–75)
NRBC # BLD: 0 K/UL (ref 0–0.01)
NRBC BLD-RTO: 0 PER 100 WBC
PLATELET # BLD AUTO: 460 K/UL (ref 150–400)
PMV BLD AUTO: 10.4 FL (ref 8.9–12.9)
POTASSIUM SERPL-SCNC: 3.7 MMOL/L (ref 3.5–5.1)
PROCALCITONIN SERPL-MCNC: 1.64 NG/ML
RBC # BLD AUTO: 4.45 M/UL (ref 3.8–5.2)
RBC MORPH BLD: ABNORMAL
RBC MORPH BLD: ABNORMAL
REPORTED DOSE,DOSE: NORMAL UNITS
REPORTED DOSE/TIME,TMG: NORMAL
SERVICE CMNT-IMP: ABNORMAL
SODIUM SERPL-SCNC: 132 MMOL/L (ref 136–145)
VANCOMYCIN TROUGH SERPL-MCNC: 6.8 UG/ML (ref 5–10)
WBC # BLD AUTO: 19.2 K/UL (ref 3.6–11)

## 2021-11-08 PROCEDURE — 74011250637 HC RX REV CODE- 250/637: Performed by: STUDENT IN AN ORGANIZED HEALTH CARE EDUCATION/TRAINING PROGRAM

## 2021-11-08 PROCEDURE — 80202 ASSAY OF VANCOMYCIN: CPT

## 2021-11-08 PROCEDURE — 74011000258 HC RX REV CODE- 258: Performed by: INTERNAL MEDICINE

## 2021-11-08 PROCEDURE — 84145 PROCALCITONIN (PCT): CPT

## 2021-11-08 PROCEDURE — 93306 TTE W/DOPPLER COMPLETE: CPT

## 2021-11-08 PROCEDURE — 80048 BASIC METABOLIC PNL TOTAL CA: CPT

## 2021-11-08 PROCEDURE — 65660000000 HC RM CCU STEPDOWN

## 2021-11-08 PROCEDURE — 74011250636 HC RX REV CODE- 250/636: Performed by: INTERNAL MEDICINE

## 2021-11-08 PROCEDURE — 82962 GLUCOSE BLOOD TEST: CPT

## 2021-11-08 PROCEDURE — 74011250636 HC RX REV CODE- 250/636: Performed by: HOSPITALIST

## 2021-11-08 PROCEDURE — 97167 OT EVAL HIGH COMPLEX 60 MIN: CPT

## 2021-11-08 PROCEDURE — 74011636637 HC RX REV CODE- 636/637: Performed by: INTERNAL MEDICINE

## 2021-11-08 PROCEDURE — 85025 COMPLETE CBC W/AUTO DIFF WBC: CPT

## 2021-11-08 PROCEDURE — 74011250637 HC RX REV CODE- 250/637: Performed by: HOSPITALIST

## 2021-11-08 PROCEDURE — 36415 COLL VENOUS BLD VENIPUNCTURE: CPT

## 2021-11-08 PROCEDURE — 74011250637 HC RX REV CODE- 250/637: Performed by: INTERNAL MEDICINE

## 2021-11-08 PROCEDURE — 97535 SELF CARE MNGMENT TRAINING: CPT

## 2021-11-08 PROCEDURE — 83735 ASSAY OF MAGNESIUM: CPT

## 2021-11-08 RX ORDER — POLYETHYLENE GLYCOL 3350 17 G/17G
17 POWDER, FOR SOLUTION ORAL DAILY
Status: DISCONTINUED | OUTPATIENT
Start: 2021-11-08 | End: 2021-12-10 | Stop reason: HOSPADM

## 2021-11-08 RX ORDER — ACETAMINOPHEN 650 MG/1
650 SUPPOSITORY RECTAL 4 TIMES DAILY
Status: DISCONTINUED | OUTPATIENT
Start: 2021-11-08 | End: 2021-12-03

## 2021-11-08 RX ORDER — ALPRAZOLAM 0.5 MG/1
0.25 TABLET ORAL
Status: DISCONTINUED | OUTPATIENT
Start: 2021-11-08 | End: 2021-12-10 | Stop reason: HOSPADM

## 2021-11-08 RX ORDER — ACETAMINOPHEN 500 MG
500 TABLET ORAL 4 TIMES DAILY
Status: DISCONTINUED | OUTPATIENT
Start: 2021-11-08 | End: 2021-12-03

## 2021-11-08 RX ORDER — POLYETHYLENE GLYCOL 3350 17 G/17G
17 POWDER, FOR SOLUTION ORAL DAILY
Status: DISCONTINUED | OUTPATIENT
Start: 2021-11-08 | End: 2021-11-08

## 2021-11-08 RX ORDER — VANCOMYCIN HYDROCHLORIDE
1250 EVERY 12 HOURS
Status: DISCONTINUED | OUTPATIENT
Start: 2021-11-08 | End: 2021-11-13

## 2021-11-08 RX ORDER — AMOXICILLIN 250 MG
1 CAPSULE ORAL 2 TIMES DAILY
Status: DISCONTINUED | OUTPATIENT
Start: 2021-11-08 | End: 2021-12-10 | Stop reason: HOSPADM

## 2021-11-08 RX ORDER — ENOXAPARIN SODIUM 100 MG/ML
40 INJECTION SUBCUTANEOUS EVERY 12 HOURS
Status: DISCONTINUED | OUTPATIENT
Start: 2021-11-08 | End: 2021-11-11

## 2021-11-08 RX ADMIN — METOPROLOL TARTRATE 25 MG: 25 TABLET, FILM COATED ORAL at 08:35

## 2021-11-08 RX ADMIN — PERFLUTREN 2 ML: 6.52 INJECTION, SUSPENSION INTRAVENOUS at 17:24

## 2021-11-08 RX ADMIN — PAROXETINE HYDROCHLORIDE 40 MG: 20 TABLET, FILM COATED ORAL at 08:33

## 2021-11-08 RX ADMIN — METRONIDAZOLE 500 MG: 500 INJECTION, SOLUTION INTRAVENOUS at 23:30

## 2021-11-08 RX ADMIN — ACETAMINOPHEN 500 MG: 500 TABLET ORAL at 08:35

## 2021-11-08 RX ADMIN — CEFEPIME HYDROCHLORIDE 2 G: 2 INJECTION, POWDER, FOR SOLUTION INTRAVENOUS at 18:05

## 2021-11-08 RX ADMIN — FLUCONAZOLE 100 MG: 100 TABLET ORAL at 08:34

## 2021-11-08 RX ADMIN — AMITRIPTYLINE HYDROCHLORIDE 50 MG: 50 TABLET, FILM COATED ORAL at 21:55

## 2021-11-08 RX ADMIN — INSULIN LISPRO 2 UNITS: 100 INJECTION, SOLUTION INTRAVENOUS; SUBCUTANEOUS at 21:56

## 2021-11-08 RX ADMIN — OXYCODONE 5 MG: 5 TABLET ORAL at 18:22

## 2021-11-08 RX ADMIN — PANTOPRAZOLE SODIUM 40 MG: 40 TABLET, DELAYED RELEASE ORAL at 08:35

## 2021-11-08 RX ADMIN — NYSTATIN: 100000 CREAM TOPICAL at 08:53

## 2021-11-08 RX ADMIN — ATORVASTATIN CALCIUM 40 MG: 40 TABLET, FILM COATED ORAL at 21:55

## 2021-11-08 RX ADMIN — ASPIRIN 81 MG: 81 TABLET, COATED ORAL at 08:35

## 2021-11-08 RX ADMIN — DOCUSATE SODIUM AND SENNOSIDES 1 TABLET: 8.6; 5 TABLET, FILM COATED ORAL at 18:05

## 2021-11-08 RX ADMIN — HEPARIN SODIUM 7500 UNITS: 5000 INJECTION INTRAVENOUS; SUBCUTANEOUS at 01:02

## 2021-11-08 RX ADMIN — CEFEPIME HYDROCHLORIDE 2 G: 2 INJECTION, POWDER, FOR SOLUTION INTRAVENOUS at 08:33

## 2021-11-08 RX ADMIN — ALPRAZOLAM 0.25 MG: 0.5 TABLET ORAL at 13:00

## 2021-11-08 RX ADMIN — ACETAMINOPHEN 500 MG: 500 TABLET ORAL at 18:04

## 2021-11-08 RX ADMIN — METOPROLOL TARTRATE 12.5 MG: 25 TABLET, FILM COATED ORAL at 22:13

## 2021-11-08 RX ADMIN — POLYETHYLENE GLYCOL 3350 17 G: 17 POWDER, FOR SOLUTION ORAL at 08:33

## 2021-11-08 RX ADMIN — VANCOMYCIN HYDROCHLORIDE 1250 MG: 10 INJECTION, POWDER, LYOPHILIZED, FOR SOLUTION INTRAVENOUS at 18:07

## 2021-11-08 RX ADMIN — DOCUSATE SODIUM AND SENNOSIDES 1 TABLET: 8.6; 5 TABLET, FILM COATED ORAL at 08:34

## 2021-11-08 RX ADMIN — Medication 10 ML: at 21:55

## 2021-11-08 RX ADMIN — NYSTATIN: 100000 CREAM TOPICAL at 18:23

## 2021-11-08 RX ADMIN — VANCOMYCIN HYDROCHLORIDE 750 MG: 750 INJECTION, POWDER, LYOPHILIZED, FOR SOLUTION INTRAVENOUS at 12:28

## 2021-11-08 RX ADMIN — ACETAMINOPHEN 500 MG: 500 TABLET ORAL at 12:46

## 2021-11-08 RX ADMIN — ENOXAPARIN SODIUM 40 MG: 100 INJECTION SUBCUTANEOUS at 21:56

## 2021-11-08 RX ADMIN — Medication 10 ML: at 12:31

## 2021-11-08 RX ADMIN — Medication 1 CAPSULE: at 08:35

## 2021-11-08 RX ADMIN — HEPARIN SODIUM 7500 UNITS: 5000 INJECTION INTRAVENOUS; SUBCUTANEOUS at 08:34

## 2021-11-08 RX ADMIN — INSULIN LISPRO 3 UNITS: 100 INJECTION, SOLUTION INTRAVENOUS; SUBCUTANEOUS at 07:30

## 2021-11-08 RX ADMIN — SODIUM CHLORIDE 75 ML/HR: 9 INJECTION, SOLUTION INTRAVENOUS at 08:33

## 2021-11-08 RX ADMIN — OXYCODONE 5 MG: 5 TABLET ORAL at 02:34

## 2021-11-08 RX ADMIN — INSULIN LISPRO 5 UNITS: 100 INJECTION, SOLUTION INTRAVENOUS; SUBCUTANEOUS at 11:30

## 2021-11-08 RX ADMIN — ACETAMINOPHEN 500 MG: 500 TABLET ORAL at 21:55

## 2021-11-08 RX ADMIN — INSULIN LISPRO 2 UNITS: 100 INJECTION, SOLUTION INTRAVENOUS; SUBCUTANEOUS at 18:06

## 2021-11-08 RX ADMIN — LEVOTHYROXINE SODIUM 112 MCG: 0.11 TABLET ORAL at 08:35

## 2021-11-08 RX ADMIN — METRONIDAZOLE 500 MG: 500 INJECTION, SOLUTION INTRAVENOUS at 08:33

## 2021-11-08 RX ADMIN — INSULIN GLARGINE 25 UNITS: 100 INJECTION, SOLUTION SUBCUTANEOUS at 08:35

## 2021-11-08 RX ADMIN — NYSTATIN: 100000 CREAM TOPICAL at 22:14

## 2021-11-08 NOTE — PROGRESS NOTES
Palliative Medicine  [facility]: [phone]  [facility]: [phone] ([phone])    Rip Lo is a 65 y.o. female with DM type 2 referred for admission for Hyperosmolar nonketotic hyperglycemia , polyuria, polydipsia for 3 days. Patient had L2-L5 laminectomy 06/2021 with some delayed wound healing, seen by Surgical team on  07/09/21 - recommended continuing current wound care while in acute care setting and transitioning to wound VAC once at rehab. Suspected bacterial pneumonia POA (From ED Notes). Palliative team asked to provide emotional support to patient, secondary to her anxiety and consult noted for \"Psychosocial Distress\". LCSW in to see patient, to introduce self, offer support. Patient is quite sleepy at this time. She wanted to rest, offered to return tomorrow to meet with her and see if she is open to support. Chart indicates that patient's  may have some dementia.  Will check with her if she wishes to discuss and complete AMD. A sister is also noted under contacts.

## 2021-11-08 NOTE — CONSULTS
Chart reviewed. Full consult to follow. Echo ordered. Continue telemetry and beta blocker. No additional recc.

## 2021-11-08 NOTE — PROGRESS NOTES
Pharmacy Antimicrobial Kinetic Dosing    Indication for Antimicrobials: sepsis; SSTI, abscess     Current Regimen of Each Antimicrobial:  Vancomycin 750mg IV q12h (Start Date ; Day 2)  Cefepime 2 g IV q12h (Start Date ; Day 2)  Metronidazole 500 mg IV q12h (Start Date ; Day 2)  Fluconazole 200 mg PO x1, then 100 mg PO daily (Start Date ; Day  x7 total days)    Previous Antimicrobial Therapy:    Vancomycin Goal Level:  - 600    Date Dose & Interval Measured (mcg/mL) Predicted AUC/GRISELDA    10:30 750mg IV q12h 6.8 5.9 AUC < 300                 Dosing calculator used: Sandbox calculator    Significant Positive Cultures:    blood: pending   urine: pending    Conditions for Dosing Consideration: None    Labs:  Recent Labs     21  02421  0511 21  0045   CREA 0.72 0.68 0.72   BUN 17 20 22*   PCT 1.64  --   --      Recent Labs     21  02421  2325 21  1127   WBC 19.2* 26.0* 26.3*   BANDS 2  --  11     Temp (24hrs), Av °F (36.7 °C), Min:96.9 °F (36.1 °C), Max:98.6 °F (37 °C)    Creatinine Clearance (mL/min):   CrCl (Ideal Body Weight): 61.0   If actual weight < IBW: CrCl (Actual Body Weight) 129.8    Impression/Plan:   Ortho consulted  Vancomycin level low, dose adjusted to 1250 mg Q12H with anticipated trough 10.3 mcg/ml, AUC ~ 400. SCr corrected, afebrile,   Fluconazole for severe candida infection on perineum x 7 days  Adjusted cefepime to 2 g q8h for improved eCrCl  Continue Metronidazole regimen    Antimicrobial stop date TBD     Pharmacy will follow daily and adjust medications as appropriate for renal function and/or serum levels.     Thank you,  Swathi Fu, [facility]

## 2021-11-08 NOTE — PROGRESS NOTES
Hospitalist Progress Note    NAME: Arron De Leon   :  1950   MRN:  [id number]     Interim Hospital Summary: 65 y.o. female whom presented on 2021 with      Assessment / Plan:    Severe sepsis, POA as evidence by hypothermia, rectal temp 96, tachy , lactic 9.2 -->5.3 after 1L, WBC 26K, MERCEDES Cr 1.5 due to  Suspected epidural/right iliopsoas abscess with   Right paraspinal pain at level L3-4 and R iliac crest pain (complains of R hip pain), POA   -CT abd/pelvis showing fluid posterior to the surgical changes and on the right side of the spine at L4/L5 and posterior to the right iliopsoas muscle just superior to the sacrum. Findings are concerning for an infectious process. -S/P L2-3 AND L5-S1  LUMBAR LAMINECTOMY WITH ANDREWS OSTEOTOMY L2-3 AND L3-4 WITH PLF L2-S1 by Dr. Camacho Resendez [phone]; complicated with nonhealing post surgical wound requiring wound vac 2021.    -Continue empiric Vanco, cefepime and Flagyl  -Consult orthopedics - Dr Camacho Resendez to eval   -schedule tylenol for pain. Oxycodone prn  -PT OT eval and treat. Transfers to [facility] at home.      Type 2DM, uncontrolled with hyperglycemic hyperosmolar hyperglycemia and early DKA, due to severe sepsis, POA  -presented with , AG 15, trace ketone in urine  -A1c 11.7 up from 7.6 in July.  -continue holding metformin  -continue Lantus + SSI prn. Off drip      Prerenal MERCEDES Cr 1.5  -resolved with IVF hydration.    -Mittal placed in ER due to poor mobility from hip pain and polyuria, with severe candidal infection in perineum and labia and buttocks. Would remove mittal in next 24-48 hours if possible - VT later this week     Constipation  -add pericolace and miralax    Severe candida infection in perineum, labia, buttocks  -continue mystatin cream ordered in ER. Add diflucan x 7 days.   -wound care consult     Generalized weakness and gait difficulty   -due to medical problems above, has not been out of bed for 4 days  -consult pt/ot     SVT   HTN / HLD  -continue metoprolol and statin  -having runs of SVT overnight. Cardiology consulted     Hypothyroid  -continue levothyroxine. TSh 3.6     Depression and anxiety  -continue paxil with xanax prn     Severe obesity    Code:  Discussed, full  DVT prophylaxis: lovenox  Surrogate decision maker:   (but has some dementia per patient) or sister Mal Iron    40 or above Morbid obesity / Body mass index is 44.06 kg/m². Subjective:     Chief Complaint / Reason for Physician Visit  Follow up of sepsis, hyperosmolar hyperglycemic state  Chart reviewed in detail. Discussed with RN events overnight. Seems very anxious this AM.  Complaining of pain when she tries to move - just not comfortable. Review of Systems:  Symptom Y/N Comments  Symptom Y/N Comments   Fever/Chills    Chest Pain     Poor Appetite    Edema     Cough    Abdominal Pain     Sputum    Joint Pain     SOB/AVALOS    Pruritis/Rash     Nausea/vomit    Tolerating PT/OT     Diarrhea    Tolerating Diet     Constipation    Other       Could NOT obtain due to:      PO intake: No data found. Objective:     VITALS:   Last 24hrs VS reviewed since prior progress note.  Most recent are:  Patient Vitals for the past 24 hrs:   Temp Pulse Resp BP SpO2   11/08/21 0435 98.2 °F (36.8 °C) 84 20 (!) 110/56 91 %   11/08/21 0004 98.1 °F (36.7 °C) 78 21 106/68 94 %   11/07/21 2238  84  121/68    11/07/21 2035 98.3 °F (36.8 °C) 96 20 124/73 91 %   11/07/21 1739 98.6 °F (37 °C) 94 20 113/76 94 %   11/07/21 1738  94   94 %   11/07/21 1600  94      11/07/21 1200  88      11/07/21 1046 98.7 °F (37.1 °C) 88 20 121/71 95 %   11/07/21 0800  100          Intake/Output Summary (Last 24 hours) at 11/8/2021 0738  Last data filed at 11/8/2021 0435  Gross per 24 hour   Intake    Output 1000 ml   Net -1000 ml        I had a face to face encounter, and independently examined this patient on 11/8/2021, as outlined below:  PHYSICAL EXAM:  General: WD, WN. Alert, cooperative, no acute distress    EENT:  EOMI. Anicteric sclerae. MMM  Resp:  CTA bilaterally, no wheezing or rales. No accessory muscle use  CV:  Regular  rhythm,  No edema  GI:  Soft, Non distended, + mild right flank tender. +Bowel sounds  Neurologic:  Alert and oriented X 3, normal speech,   Psych:   Good insight. Not anxious nor agitated  Skin:  No rashes. No jaundice    Reviewed most current lab test results and cultures  YES  Reviewed most current radiology test results   YES  Review and summation of old records today    NO  Reviewed patient's current orders and MAR    YES  PMH/SH reviewed - no change compared to H&P  ________________________________________________________________________  Care Plan discussed with:    Comments   Patient x    Family      RN     Care Manager     Consultant                        Multidiciplinary team rounds were held today with , nursing, pharmacist and clinical coordinator. Patient's plan of care was discussed; medications were reviewed and discharge planning was addressed. ________________________________________________________________________  Total NON critical care TIME:  25   Minutes    Total CRITICAL CARE TIME Spent:   Minutes non procedure based      Comments   >50% of visit spent in counseling and coordination of care x     This includes time during multidisciplinary rounds if indicated above   ________________________________________________________________________  Katt Kelsey MD     Procedures: see electronic medical records for all procedures/Xrays and details which were not copied into this note but were reviewed prior to creation of Plan. LABS:  I reviewed today's most current labs and imaging studies.   Pertinent labs include:  Recent Labs     11/08/21  0249 11/06/21  2325 11/06/21  1127   WBC 19.2* 26.0* 26.3*   HGB 11.2* 12.1 13.2   HCT 36.8 37.1 42.1   * 546* 567*     Recent Labs     11/08/21  0249 11/07/21  0511 11/07/21  0045 11/06/21  2325 11/06/21  2103 11/06/21  1127   * 132* 130*  --    < > 122*   K 3.7 3.8 3.9  --    < > 4.6    99 99  --    < > 85*   CO2 25 24 24  --    < > 22   * 168* 125*  --    < > 973*   BUN 17 20 22*  --    < > 28*   CREA 0.72 0.68 0.72  --    < > 1.51*   CA 8.3* 9.0 9.0  --    < > 9.6   MG 2.1  --  1.7 1.6   < >  --    ALB  --  1.8*  --   --   --  2.2*   TBILI  --  0.7  --   --   --  0.6   ALT  --  17  --   --   --  21    < > = values in this interval not displayed.

## 2021-11-08 NOTE — PROGRESS NOTES
Transition of Care Plan:    RUR: 15%    Disposition: Home with family. Follow up appointments: To be done prior to discharge. DME needed: To be determined. Transportation at Discharge: Family to transport    [address] or means to access home:  Family has keys        IM Medicare Letter: To be given prior to discharge. Is patient a BCPI-A Bundle:   No          If yes, was Bundle Letter given?:   N/A    Caregiver Contact: Son    Discharge Caregiver contacted prior to discharge? Caregiver to be contacted prior to discharge. Reason for Admission:   Patient came to ed for elevated blood sugar, vomiting, nausea and decreased po intake. PMHX significant for htn, gerd, hypothyroidism, and type 2 diabetic. RUR Score:   15%               PCP: First and Last name:   Johnny Mclean MD     Name of Practice: [facility]     Are you a current patient: Yes/No: Yes     Approximate date of last visit: July 2020     Can you participate in a virtual visit if needed: Yes    Do you (patient/family) have any concerns for transition/discharge? No                  Plan for utilizing home health:   Patient has been to [address] [place] [address]. She has also used [facility] [place] and is presently open to [facility]. Current Advanced Directive/Advance Care Plan:  Full Code  Advance Care Planning     General Advance Care Planning (ACP) Conversation      Date of Conversation: 11/8/21  Conducted with: Patient with Decision Making Capacity    Healthcare Decision Maker:   No healthcare decision makers have been documented.    Click here to complete [address] including selection of the Healthcare Decision Maker Relationship (ie \"Primary\")        Content/Action Overview:   DECLINED ACP conversation - will revisit periodically   Reviewed DNR/DNI and patient elects Full Code (Attempt Resuscitation)         Length of Voluntary ACP Conversation in minutes:  <16 minutes (Non-Billable)    Inocente Waggoner RN                 Healthcare Decision Maker:   Click here to complete [employer] including selection of the Healthcare Decision Maker Relationship (ie \"Primary\")              Patient verified demographics and pcp information. She states she lives in one story home with her . She is primary caregiver for her  who has dementia. She states their son is presently with him. She was independent prior to coming to the hospital with adl's and iadl's. She denies using home oxygen and cpap. She cooks and cleans. Patient states she had back surgery in June 2020. She has fallen at home recently. Therapy orders are written to see her. Amanda Lua RN BSN CRM        [phone]

## 2021-11-08 NOTE — CONSULTS
[facility]    Name:  Cris Montenegro  MR#:  [id number]  :  1950  ACCOUNT #:  [de-identified]  DATE OF SERVICE:  2021    REQUESTING PHYSICIAN:  Danya aRy NP    REASON FOR CONSULTATION:  Evaluate SVT. CHIEF COMPLAINT:  Back and abdominal pain. HISTORY OF PRESENT ILLNESS:  The patient is a 76-year-old female with a history of type 2 diabetes mellitus and a history of nonobstructive coronary artery disease with prior normal ejection fraction. She presented to the [facility] ER on  with markedly elevated blood pressures and evidence of diabetic hyperosmolar coma. She was found to be hypothermic and felt to be septic. Subsequent workup revealed evidence of a possible iliopsoas abscess. The patient is on broad-spectrum antibiotics. Post admission, the patient had at least one episode of regular supraventricular tachycardia which broke spontaneously. We were subsequently asked to see the patient for evaluation. No further episodes of SVT have been seen. No other arrhythmias have been seen. The patient has seen Dr. Abeba Root in the past and underwent catheterization in 2019 after an abnormal nuclear PET scan. There was no significant coronary artery disease. EF was normal via PET scan. No other history of arrhythmias. The patient has occasional palpitations. As stated above, she is on broad-spectrum antibiotics. She is on a low dose of beta-blocker and denies chest pain, shortness of breath or other cardiac complaints. She also denies recent episodes of syncope. PAST MEDICAL HISTORY:  As noted above. 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     SURGERIES:  Prior L2-5 laminectomy in 2021, prior L4-5 laminectomy in , status post tonsillectomy, status post bilateral knee replacement, status post laparoscopic cholecystectomy, status post bladder surgery. CURRENT MEDICATIONS:  Aspirin, Lipitor, metoprolol, IV vancomycin, IV Flagyl, Diflucan, IV cefepime, Humalog insulin sliding scale, Lantus insulin, amitriptyline, L-thyroxine, Paxil, Tylenol, Protonix, Lovenox 40 mg subcu q. 12h., Gwen-Colace, IV normal saline. SOCIAL HISTORY:  The patient lives locally and does not smoke or abuse alcohol. FAMILY HISTORY:  The patient's father had a heart attack. REVIEW OF SYSTEMS:  As noted above, otherwise noncontributory. PHYSICAL EXAMINATION:  GENERAL:  Reveals an elderly white female currently in no acute distress. VITAL SIGNS:  Blood pressure 117/63, pulse 78, respirations 18, temperature 96.9. HEENT:  Pupils are equal and reactive to light. Oropharynx, dry appearing oral mucosa. NECK:  Supple. No masses or thyromegaly. There is some evidence of scarring and radiation therapy and on the right side of the neck. No JVD. No obvious bruits. CHEST:  Clear. No wheezes or crackles. SKIN:  Warm and dry. CARDIAC:  Regular rate and rhythm. Distant heart sounds. No obvious murmurs, rubs or gallops. ABDOMEN:  Obese, soft, nontender, no masses or organomegaly. Bowel sounds positive. EXTREMITIES:  No cyanosis, clubbing or edema. Distal pulses 1+ in the feet bilaterally. NEUROLOGIC:  No obvious gross motor deficits. LABORATORY DATA:  White count 19.2, hemoglobin 11.2, BUN 17, creatinine 0.7. Procalcitonin 1.64. Chest x-ray shows low lung volumes bilaterally. No infiltrates or edema. EKG, normal sinus rhythm, normal axis, no Q-waves, no ST-T changes. IMPRESSION:  1. Intermittent supraventricular tachycardia. Possibly atrioventricular node reentry or atrial tachycardia associated with severe sepsis. 2.  History of nonobstructive coronary artery disease and normal ejection fraction by prior evaluation. 3.  Type 2 diabetes. 4.  Probable iliopsoas abscess with sepsis. 5.  Hypothyroidism.   6.  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 RECOMMENDATIONS:  The patient is having some episodic as SVT in the setting of severe sepsis which is not an unusual finding. No evidence of myocardial ischemia based on EKG, but we will continue with the beta-blocker. I will order an echocardiogram.  No additional recommendations at this time.       Zina Herrera MD      [id number]  D:  11/08/2021 15:04  T:  11/08/2021 16:20  JOB #:  [id number]  CC:  ANDREW Ruiz MD

## 2021-11-08 NOTE — PROGRESS NOTES
Problem: Self Care Deficits Care Plan (Adult)  Goal: *Acute Goals and Plan of Care (Insert Text)  Description: FUNCTIONAL STATUS PRIOR TO ADMISSION: Patient was modified independent for basic and instrumental ADLs; used w/c in hallway; used RW in rooms(?); cares for spouse with dementia; has son that lives close. HOME SUPPORT: The patient lived with spouse, but cares for him. Occupational Therapy Goals  Initiated 11/8/2021    1. Patient will perform grooming with supervision/set-up within 7 days. 2.  Patient will perform UB bathing with moderate assistance  using most appropriate DME within 7 days. 3.  Patient will lower body dressing at moderate assistance with A/E within 7 days. 4.  Patient will perform bed mobility at moderate assistance  within 7 days. 5.  Patient will verbalize/demonstrate 3/3 back precautions during ADL tasks without cues within 7 days. Outcome: Not Met   OCCUPATIONAL THERAPY EVALUATION  Patient: Belkys Pham (51 y.o. female)  Date: 11/8/2021  Primary Diagnosis: Hyperglycemia [R73.9]  MERCEDES (acute kidney injury) ([facility] [address] [N17.9]  Leukocytosis [D72.829]  SIRS (systemic inflammatory response syndrome) ([facility]) [R65.10]  Candida vaginitis [B37.3]        Precautions:   Back, Fall, WBAT    ASSESSMENT  Based on the objective data described below, the patient presents with significant decline. Pt received in R sidelying and initially declining participation. Pt reports not moving in 4 days, but reports too weak. Educated on importance of moving for function and goals of returning home and not living in [address] [place] [address]. Pt able to perform simple grooming w/ Manuel. Pt require rest breaks. Pt overwhelmed with her medical issues and caring for spouse with dementia. Pt would benefit from Palliative Care Consult for comorbidities and QoL to establish goals. Pt reports she does not want her or her spouse to go to LTC.     Current Level of Function Impacting Discharge (ADLs/self-care): max/total assist    Functional Outcome Measure: The patient scored Total: 5/100 on the Barthel Index outcome measure which is indicative of being severely impaired in basic self-care. Other factors to consider for discharge: limited support system. Pt has been to SNF/Rehab multiple times over past year     Patient will benefit from skilled therapy intervention to address the above noted impairments. PLAN :  Recommendations and Planned Interventions: self care training, functional mobility training, therapeutic exercise, balance training, visual/perceptual training, therapeutic activities, cognitive retraining, endurance activities, neuromuscular re-education, patient education, home safety training, and family training/education    Frequency/Duration: Patient will be followed by occupational therapy 4 times a week to address goals. Recommendation for discharge: (in order for the patient to meet his/her long term goals)  Therapy up to 5 days/week in SNF setting    This discharge recommendation:  Has not yet been discussed the attending provider and/or case management    IF patient discharges home will need the following DME: To Be Determined (TBD) at next level of care        SUBJECTIVE:   Patient stated I've been through so much in the past year.     OBJECTIVE DATA SUMMARY:   HISTORY:   Past Medical History:   Diagnosis Date    Adverse effect of anesthesia     O2 DROPS WITH ANESTHESIA    Arthritis     KNEES    Cancer ([facility] [address] 03/13/2015    SQUAMOUS CELL CARCINOMA; tonsils and lymph nodes.   Removed 3-2015 with radiation    GERD (gastroesophageal reflux disease)     Hypertension     NO LONGER ON MEDICATION    Hypothyroid     HYPOTHYROIDISM    Migraine     Nausea & vomiting     Obesity     Other ill-defined conditions(799.89)     chronic back pain    Psychiatric disorder     anxiety    Psychiatric disorder     panic ATTACKS    SVT (supraventricular tachycardia) ([facility]) 05/24/2014    Ulcerative colitis      Past Surgical History:   Procedure Laterality Date    COLONOSCOPY,DIAGNOSTIC  11/19/2015         HX GI      colonscopy    HX HEENT  03/2015    tonsillectomy (CANCER) AND NECK LYMPH NODES    HX HEENT  2008    PARATHYROID REMOVAL    HX HEENT Left 2002    EYE LASER    HX HEMORRHOIDECTOMY  2001, 2016     X2    HX HYSTERECTOMY  1985    HX KNEE ARTHROSCOPY  1995    left knee surgery, TOTAL LT  and Right KNEE 2013    HX KNEE REPLACEMENT Bilateral 2013, 2014    HX LAP CHOLECYSTECTOMY  2002    HX LUMBAR FUSION  2011    SPINAL FUSION with hardware L4-L5    HX ORTHOPAEDIC  1990    CYST REMOVED RIGHT WRIST    HX ORTHOPAEDIC  05/12/2021    L2-3 & L5-21 LUMBAR LAMINECTOMY WITH ANDREWS OSTEOTOMY    HX OTHER SURGICAL      cyst removal right wrist    HX UROLOGICAL  2010    bladder surgery(interstim device)    IR INJ FORAMIN EPID LUMB ANES/STER SNGL  8/19/2019    [place] EGD TRANSORAL BIOPSY SINGLE/MULTIPLE  5/20/2013            Expanded or extensive additional review of patient history:     Home Situation  Home Environment: Private residence  Wheelchair Ramp: Yes  One/Two Story Residence: One story  Living Alone: No  Support Systems: Spouse/Significant Other  Patient Expects to be Discharged to[de-identified] House  Current DME Used/Available at Home: Tub transfer bench, Walker, rolling, Cane, straight, Commode, bedside, Wheelchair  Tub or Shower Type: Tub/Shower combination    Hand dominance: Right    EXAMINATION OF PERFORMANCE DEFICITS:  Cognitive/Behavioral Status:  Neurologic State: Alert  Orientation Level: Oriented X4  Cognition: Follows commands  Perception: Appears intact     Safety/Judgement: Awareness of environment; Decreased awareness of need for assistance; Lack of insight into deficits    Skin: appears intact, but at risk d/t immobility    Edema: BLE    Hearing:   Auditory  Auditory Impairment: None    Vision/Perceptual:      Able to read clock     Range of Motion:  AROM: Generally decreased, functional  PROM: Generally decreased, functional    Strength:  Strength: Generally decreased, functional    Coordination:  Coordination: Within functional limits  Fine Motor Skills-Upper: Left Intact; Right Intact    Gross Motor Skills-Upper: Left Intact; Right Intact    Tone & Sensation:  Sensation: Intact    Balance:  Sitting:  (declined); tolerated HOB elevation to ~45 degrees    Functional Mobility and Transfers for ADLs:  Bed Mobility:  Rolling: Maximum assistance    Transfers:   declined    ADL Assessment:  Feeding: Setup    Oral Facial Hygiene/Grooming: Setup; Minimum assistance; Additional time    Bathing: Maximum assistance    Upper Body Dressing: Maximum assistance    Lower Body Dressing: Total assistance    Toileting: Total assistance    ADL Intervention and task modifications:  Feeding  Drink to Mouth: Set-up    Grooming  Washing Face: Set-up  Washing Hands: Set-up (wipes)  Brushing Teeth: Minimum assistance  Brushing/Combing Hair: Minimum assistance; Moderate assistance    Lower Body Dressing Assistance  Socks: Total assistance (dependent)    Toileting  Bladder Hygiene: Total assistance (dependent)  Bowel Hygiene: Total assistance (dependent)    Cognitive Retraining  Problem Solving: General alternative solution; Identifying the problem; Identifying the task  Organizing/Sequencing: Breaking task down; Prioritizing  Attention to Task: Single task  Following Commands: Awareness of environment; Follows one step commands/directions  Safety/Judgement: Awareness of environment; Decreased awareness of need for assistance; Lack of insight into deficits    Functional Measure:    Barthel Index:  Bathin  Bladder: 0  Bowels: 0  Groomin  Dressin  Feedin  Mobility: 0  Stairs: 0  Toilet Use: 0  Transfer (Bed to Chair and Back): 0  Total: 5/100      The Barthel ADL Index: Guidelines  1. The index should be used as a record of what a patient does, not as a record of what a patient could do.   2. The main aim is to establish degree of independence from any help, physical or verbal, however minor and for whatever reason. 3. The need for supervision renders the patient not independent. 4. A patient's performance should be established using the best available evidence. Asking the patient, friends/relatives and nurses are the usual sources, but direct observation and common sense are also important. However direct testing is not needed. 5. Usually the patient's performance over the preceding 24-48 hours is important, but occasionally longer periods will be relevant. 6. Middle categories imply that the patient supplies over 50 per cent of the effort. 7. Use of aids to be independent is allowed. Score Interpretation (from [facility] [address])    Independent   60-79 Minimally independent   40-59 Partially dependent   20-39 Very dependent   <20 Totally dependent     -Faith Reilly., Barthel, DKasandraW. (1965). Functional evaluation: the Barthel Index. [facility] ([address] (1997). The Barthel activities of daily living index: self-reporting versus actual performance in the old (> or = 75 years). Journal [address] 45(7), [facility], .KasandraKasandraF, Kevin Bourgeois., [facility]. (1999). Measuring the change in disability after inpatient rehabilitation; comparison of the responsiveness of the Barthel Index and Functional Weakley Measure. Journal of Neurology, Neurosurgery, and Psychiatry, 66(4), 944-574. Uzma Naik, N.J.A, KATIE Hunter, & Ramirez Herrmann M.A. (2004) Assessment of post-stroke quality of life in cost-effectiveness studies: The usefulness of the Barthel Index and the EuroQoL-5D.  Quality of Life Research, 15, 246-25     Occupational Therapy Evaluation Charge Determination   History Examination Decision-Making   HIGH Complexity : Extensive review of history including physical, cognitive and psychosocial history  HIGH Complexity : 5 or more performance deficits relating to physical, cognitive , or psychosocial skils that result in activity limitations and / or participation restrictions HIGH Complexity : Patient presents with comorbidities that affect occupational performance. Signifigant modification of tasks or assistance (eg, physical or verbal) with assessment (s) is necessary to enable patient to complete evaluation       Based on the above components, the patient evaluation is determined to be of the following complexity level: HIGH   Pain Ratin/10    Activity Tolerance:   requires frequent rest breaks    After treatment patient left in no apparent distress:    Patient positioned in R sidelying for pressure relief, Call bell within reach, and Side rails x 3    COMMUNICATION/EDUCATION:   The patients plan of care was discussed with: Registered nurse. Patient/family have participated as able in goal setting and plan of care. This patients plan of care is appropriate for delegation to \[facility] [place] [facility]\"".     Thank you for this referral.  Robert Roch  Time Calculation: 39 mins

## 2021-11-08 NOTE — PROGRESS NOTES
Spiritual Care Assessment/Progress Note  [facility]      NAME: Derrick Ty      MRN: [id number]  AGE: 65 y.o. SEX: female  [place]ahai Affiliation: Synagogue   Language: English     11/8/2021     Total Time (in minutes): 5     Spiritual Assessment begun in [facility] PROGRESSIVE CARE through conversation with:         []Patient        [] Family    [] Friend(s)        Reason for Consult: Palliative Care, Initial/Spiritual Assessment     Spiritual beliefs: (Please include comment if needed)     [] Identifies with a migue tradition:         [] Supported by a migue community:            [] Claims no spiritual orientation:           [] Seeking spiritual identity:                [] Adheres to an individual form of spirituality:           [x] Not able to assess:                           Identified resources for coping:      [] Prayer                               [] Music                  [] Guided Imagery     [] Family/friends                 [] Pet visits     [] Devotional reading                         [] Unknown     [] Other:                                              Interventions offered during this visit: (See comments for more details)    Patient Interventions: Initial visit           Plan of Care:     [] Support spiritual and/or cultural needs    [] Support AMD and/or advance care planning process      [] Support grieving process   [] Coordinate Rites and/or Rituals    [] Coordination with community clergy   [] No spiritual needs identified at this time   [] Detailed Plan of Care below (See Comments)  [] Make referral to Music Therapy  [] Make referral to Pet Therapy     [] Make referral to Addiction services  [] Make referral to [facility]  [] Make referral to Spiritual Care Partner  [] No future visits requested        [] Contact Spiritual Care for further referrals     Comments: Attempted initial spiritual assessment with Palliative pt.  Pt sitting up in bed appearing to be in serious conversation with a staff member.  was asked to come back later. Will attempt initial Spiritual Assessment at another time. Contact Spiritual Care for any further referrals.   Jaun Russo M.Div, [facility]   Paging Service [phone])

## 2021-11-08 NOTE — PROGRESS NOTES
Problem: Diabetes Self-Management  Goal: *Incorporating physical activity into lifestyle  Description: State effect of exercise on blood glucose levels. Outcome: Progressing Towards Goal  Goal: *Developing strategies to promote health/change behavior  Description: Define the ABC's of diabetes; identify appropriate screenings, schedule and personal plan for screenings. Outcome: Progressing Towards Goal  Goal: *Using medications safely  Description: State effect of diabetes medications on diabetes; name diabetes medication taking, action and side effects. Outcome: Progressing Towards Goal  Goal: *Monitoring blood glucose, interpreting and using results  Description: Identify recommended blood glucose targets  and personal targets.   Outcome: Progressing Towards Goal

## 2021-11-08 NOTE — PROGRESS NOTES
End of Shift Note    Bedside shift change report given to Ryland Gomez RN (oncoming nurse) by Kiana Sherwood (offgoing nurse). Report included the following information SBAR, Kardex, ED Summary, Intake/Output, MAR and Recent Results    Shift worked:  [id number]     Shift summary and any significant changes: Mittal care completed     Concerns for physician to address:       Zone phone for oncoming shift:          Activity:  Activity Level: Bed Rest  Number times ambulated in hallways past shift: 0  Number of times OOB to chair past shift: 0    Cardiac:   Cardiac Monitoring: Yes      Cardiac Rhythm: Sinus Rhythm    Access:   Current line(s): PIV     Genitourinary:   Urinary status: mittal    Respiratory:   O2 Device: None (Room air)  Chronic home O2 use?: NO  Incentive spirometer at bedside: YES     GI:  Last Bowel Movement Date: 11/05/21  Current diet:  ADULT DIET Regular; 4 carb choices (60 gm/meal)  Passing flatus: YES  Tolerating current diet: YES       Pain Management:   Patient states pain is manageable on current regimen: YES    Skin:  Neal Score: 16  Interventions: float heels, increase time out of bed, PT/OT consult and internal/external urinary devices    Patient Safety:  Fall Score:  Total Score: 4  Interventions: bed/chair alarm, gripper socks, pt to call before getting OOB and stay with me (per policy)  High Fall Risk: Yes    Length of Stay:  Expected LOS: - - -  Actual LOS: [facility]

## 2021-11-09 ENCOUNTER — APPOINTMENT (OUTPATIENT)
Dept: CT IMAGING | Age: 71
DRG: 853 | End: 2021-11-09
Attending: PHYSICIAN ASSISTANT
Payer: MEDICARE

## 2021-11-09 LAB
ANION GAP SERPL CALC-SCNC: 8 MMOL/L (ref 5–15)
BASOPHILS # BLD: 0 K/UL (ref 0–0.1)
BASOPHILS NFR BLD: 0 % (ref 0–1)
BUN SERPL-MCNC: 12 MG/DL (ref 6–20)
BUN/CREAT SERPL: 20 (ref 12–20)
CALCIUM SERPL-MCNC: 8 MG/DL (ref 8.5–10.1)
CHLORIDE SERPL-SCNC: 104 MMOL/L (ref 97–108)
CO2 SERPL-SCNC: 25 MMOL/L (ref 21–32)
CREAT SERPL-MCNC: 0.61 MG/DL (ref 0.55–1.02)
DIFFERENTIAL METHOD BLD: ABNORMAL
EOSINOPHIL # BLD: 0.2 K/UL (ref 0–0.4)
EOSINOPHIL NFR BLD: 1 % (ref 0–7)
ERYTHROCYTE [DISTWIDTH] IN BLOOD BY AUTOMATED COUNT: 17 % (ref 11.5–14.5)
GLUCOSE BLD STRIP.AUTO-MCNC: 150 MG/DL (ref 65–117)
GLUCOSE BLD STRIP.AUTO-MCNC: 176 MG/DL (ref 65–117)
GLUCOSE BLD STRIP.AUTO-MCNC: 193 MG/DL (ref 65–117)
GLUCOSE BLD STRIP.AUTO-MCNC: 215 MG/DL (ref 65–117)
GLUCOSE SERPL-MCNC: 169 MG/DL (ref 65–100)
HCT VFR BLD AUTO: 34.2 % (ref 35–47)
HGB BLD-MCNC: 10.3 G/DL (ref 11.5–16)
IMM GRANULOCYTES # BLD AUTO: 0 K/UL (ref 0–0.04)
IMM GRANULOCYTES NFR BLD AUTO: 0 % (ref 0–0.5)
LYMPHOCYTES # BLD: 0.2 K/UL (ref 0.8–3.5)
LYMPHOCYTES NFR BLD: 1 % (ref 12–49)
MAGNESIUM SERPL-MCNC: 2 MG/DL (ref 1.6–2.4)
MCH RBC QN AUTO: 24.6 PG (ref 26–34)
MCHC RBC AUTO-ENTMCNC: 30.1 G/DL (ref 30–36.5)
MCV RBC AUTO: 81.8 FL (ref 80–99)
METAMYELOCYTES NFR BLD MANUAL: 1 %
MONOCYTES # BLD: 0.8 K/UL (ref 0–1)
MONOCYTES NFR BLD: 4 % (ref 5–13)
NEUTS BAND NFR BLD MANUAL: 2 %
NEUTS SEG # BLD: 17.9 K/UL (ref 1.8–8)
NEUTS SEG NFR BLD: 91 % (ref 32–75)
NRBC # BLD: 0 K/UL (ref 0–0.01)
NRBC BLD-RTO: 0 PER 100 WBC
PLATELET # BLD AUTO: 452 K/UL (ref 150–400)
PMV BLD AUTO: 10.1 FL (ref 8.9–12.9)
POTASSIUM SERPL-SCNC: 3.4 MMOL/L (ref 3.5–5.1)
RBC # BLD AUTO: 4.18 M/UL (ref 3.8–5.2)
RBC MORPH BLD: ABNORMAL
SERVICE CMNT-IMP: ABNORMAL
SODIUM SERPL-SCNC: 137 MMOL/L (ref 136–145)
WBC # BLD AUTO: 19.2 K/UL (ref 3.6–11)

## 2021-11-09 PROCEDURE — 74011250637 HC RX REV CODE- 250/637: Performed by: STUDENT IN AN ORGANIZED HEALTH CARE EDUCATION/TRAINING PROGRAM

## 2021-11-09 PROCEDURE — 74011250637 HC RX REV CODE- 250/637: Performed by: NURSE PRACTITIONER

## 2021-11-09 PROCEDURE — 36415 COLL VENOUS BLD VENIPUNCTURE: CPT

## 2021-11-09 PROCEDURE — 74011636637 HC RX REV CODE- 636/637: Performed by: INTERNAL MEDICINE

## 2021-11-09 PROCEDURE — 74011250636 HC RX REV CODE- 250/636: Performed by: INTERNAL MEDICINE

## 2021-11-09 PROCEDURE — 82962 GLUCOSE BLOOD TEST: CPT

## 2021-11-09 PROCEDURE — 85025 COMPLETE CBC W/AUTO DIFF WBC: CPT

## 2021-11-09 PROCEDURE — 74011000636 HC RX REV CODE- 636: Performed by: INTERNAL MEDICINE

## 2021-11-09 PROCEDURE — 80048 BASIC METABOLIC PNL TOTAL CA: CPT

## 2021-11-09 PROCEDURE — 74011250637 HC RX REV CODE- 250/637: Performed by: HOSPITALIST

## 2021-11-09 PROCEDURE — 74011250637 HC RX REV CODE- 250/637: Performed by: INTERNAL MEDICINE

## 2021-11-09 PROCEDURE — 97535 SELF CARE MNGMENT TRAINING: CPT

## 2021-11-09 PROCEDURE — 72132 CT LUMBAR SPINE W/DYE: CPT

## 2021-11-09 PROCEDURE — 74011000258 HC RX REV CODE- 258: Performed by: INTERNAL MEDICINE

## 2021-11-09 PROCEDURE — 83735 ASSAY OF MAGNESIUM: CPT

## 2021-11-09 PROCEDURE — 74011250636 HC RX REV CODE- 250/636: Performed by: HOSPITALIST

## 2021-11-09 PROCEDURE — 65660000000 HC RM CCU STEPDOWN

## 2021-11-09 RX ORDER — INSULIN GLARGINE 100 [IU]/ML
30 INJECTION, SOLUTION SUBCUTANEOUS DAILY
Status: DISCONTINUED | OUTPATIENT
Start: 2021-11-09 | End: 2021-11-10

## 2021-11-09 RX ORDER — POTASSIUM CHLORIDE 750 MG/1
20 TABLET, FILM COATED, EXTENDED RELEASE ORAL
Status: COMPLETED | OUTPATIENT
Start: 2021-11-09 | End: 2021-11-09

## 2021-11-09 RX ORDER — NYSTATIN 100000 U/G
OINTMENT TOPICAL 3 TIMES DAILY
Status: DISCONTINUED | OUTPATIENT
Start: 2021-11-09 | End: 2021-12-10 | Stop reason: HOSPADM

## 2021-11-09 RX ADMIN — CEFEPIME HYDROCHLORIDE 2 G: 2 INJECTION, POWDER, FOR SOLUTION INTRAVENOUS at 09:43

## 2021-11-09 RX ADMIN — Medication 10 ML: at 14:00

## 2021-11-09 RX ADMIN — ENOXAPARIN SODIUM 40 MG: 100 INJECTION SUBCUTANEOUS at 21:27

## 2021-11-09 RX ADMIN — INSULIN LISPRO 2 UNITS: 100 INJECTION, SOLUTION INTRAVENOUS; SUBCUTANEOUS at 16:30

## 2021-11-09 RX ADMIN — ATORVASTATIN CALCIUM 40 MG: 40 TABLET, FILM COATED ORAL at 21:24

## 2021-11-09 RX ADMIN — CEFEPIME HYDROCHLORIDE 2 G: 2 INJECTION, POWDER, FOR SOLUTION INTRAVENOUS at 16:39

## 2021-11-09 RX ADMIN — DOCUSATE SODIUM AND SENNOSIDES 1 TABLET: 8.6; 5 TABLET, FILM COATED ORAL at 17:10

## 2021-11-09 RX ADMIN — Medication 10 ML: at 06:28

## 2021-11-09 RX ADMIN — NYSTATIN: 100000 CREAM TOPICAL at 16:00

## 2021-11-09 RX ADMIN — ENOXAPARIN SODIUM 40 MG: 100 INJECTION SUBCUTANEOUS at 09:44

## 2021-11-09 RX ADMIN — INSULIN LISPRO 2 UNITS: 100 INJECTION, SOLUTION INTRAVENOUS; SUBCUTANEOUS at 07:30

## 2021-11-09 RX ADMIN — PAROXETINE HYDROCHLORIDE 40 MG: 20 TABLET, FILM COATED ORAL at 09:44

## 2021-11-09 RX ADMIN — FLUCONAZOLE 100 MG: 100 TABLET ORAL at 09:44

## 2021-11-09 RX ADMIN — POLYETHYLENE GLYCOL 3350 17 G: 17 POWDER, FOR SOLUTION ORAL at 09:43

## 2021-11-09 RX ADMIN — NYSTATIN: 100000 CREAM TOPICAL at 09:00

## 2021-11-09 RX ADMIN — INSULIN LISPRO 3 UNITS: 100 INJECTION, SOLUTION INTRAVENOUS; SUBCUTANEOUS at 11:30

## 2021-11-09 RX ADMIN — PANTOPRAZOLE SODIUM 40 MG: 40 TABLET, DELAYED RELEASE ORAL at 09:44

## 2021-11-09 RX ADMIN — POTASSIUM CHLORIDE 20 MEQ: 750 TABLET, FILM COATED, EXTENDED RELEASE ORAL at 06:28

## 2021-11-09 RX ADMIN — ACETAMINOPHEN 500 MG: 500 TABLET ORAL at 13:12

## 2021-11-09 RX ADMIN — DOCUSATE SODIUM AND SENNOSIDES 1 TABLET: 8.6; 5 TABLET, FILM COATED ORAL at 09:44

## 2021-11-09 RX ADMIN — METOPROLOL TARTRATE 12.5 MG: 25 TABLET, FILM COATED ORAL at 21:24

## 2021-11-09 RX ADMIN — CEFEPIME HYDROCHLORIDE 2 G: 2 INJECTION, POWDER, FOR SOLUTION INTRAVENOUS at 00:43

## 2021-11-09 RX ADMIN — Medication 1 CAPSULE: at 09:44

## 2021-11-09 RX ADMIN — IOPAMIDOL 100 ML: 755 INJECTION, SOLUTION INTRAVENOUS at 12:08

## 2021-11-09 RX ADMIN — METOPROLOL TARTRATE 25 MG: 25 TABLET, FILM COATED ORAL at 09:44

## 2021-11-09 RX ADMIN — ACETAMINOPHEN 500 MG: 500 TABLET ORAL at 21:24

## 2021-11-09 RX ADMIN — INSULIN GLARGINE 30 UNITS: 100 INJECTION, SOLUTION SUBCUTANEOUS at 09:43

## 2021-11-09 RX ADMIN — ASPIRIN 81 MG: 81 TABLET, COATED ORAL at 09:44

## 2021-11-09 RX ADMIN — VANCOMYCIN HYDROCHLORIDE 1250 MG: 10 INJECTION, POWDER, LYOPHILIZED, FOR SOLUTION INTRAVENOUS at 18:00

## 2021-11-09 RX ADMIN — LEVOTHYROXINE SODIUM 112 MCG: 0.11 TABLET ORAL at 09:44

## 2021-11-09 RX ADMIN — OXYCODONE 5 MG: 5 TABLET ORAL at 06:27

## 2021-11-09 RX ADMIN — ACETAMINOPHEN 500 MG: 500 TABLET ORAL at 09:44

## 2021-11-09 RX ADMIN — VANCOMYCIN HYDROCHLORIDE 1250 MG: 10 INJECTION, POWDER, LYOPHILIZED, FOR SOLUTION INTRAVENOUS at 06:28

## 2021-11-09 RX ADMIN — Medication 10 ML: at 21:25

## 2021-11-09 RX ADMIN — SODIUM CHLORIDE 75 ML/HR: 9 INJECTION, SOLUTION INTRAVENOUS at 16:35

## 2021-11-09 RX ADMIN — METRONIDAZOLE 500 MG: 500 INJECTION, SOLUTION INTRAVENOUS at 09:43

## 2021-11-09 RX ADMIN — ACETAMINOPHEN 500 MG: 500 TABLET ORAL at 17:10

## 2021-11-09 RX ADMIN — AMITRIPTYLINE HYDROCHLORIDE 50 MG: 50 TABLET, FILM COATED ORAL at 21:24

## 2021-11-09 NOTE — PROGRESS NOTES
Problem: Self Care Deficits Care Plan (Adult)  Goal: *Acute Goals and Plan of Care (Insert Text)  Description: FUNCTIONAL STATUS PRIOR TO ADMISSION: Patient was modified independent for basic and instrumental ADLs; used w/c in hallway; used RW in rooms(?); cares for spouse with dementia; has son that lives close. HOME SUPPORT: The patient lived with spouse, but cares for him. Occupational Therapy Goals  Initiated 11/8/2021    1. Patient will perform grooming with supervision/set-up within 7 days. 2.  Patient will perform UB bathing with moderate assistance  using most appropriate DME within 7 days. 3.  Patient will lower body dressing at moderate assistance with A/E within 7 days. 4.  Patient will perform bed mobility at moderate assistance  within 7 days. 5.  Patient will verbalize/demonstrate 3/3 back precautions during ADL tasks without cues within 7 days. Outcome: Progressing Towards Goal     OCCUPATIONAL THERAPY TREATMENT  Patient: Ese Blanchard (62 y.o. female)  Date: 11/9/2021  Diagnosis: Hyperglycemia [R73.9]  MERCEDES (acute kidney injury) ([facility] [address] [N17.9]  Leukocytosis [D72.829]  SIRS (systemic inflammatory response syndrome) ([facility]) [R65.10]  Candida vaginitis [B37.3]   <principal problem not specified>       Precautions: Back, Fall, WBAT  Chart, occupational therapy assessment, plan of care, and goals were reviewed. ASSESSMENT  Patient continues with skilled OT services and is SLOWLY progressing towards goals. Pt reports she had a rough day with US and CT. Pt agreeable to attempting repositioning. Pt maxA without bed mods; bed placed in reverse Trendelenburg and pt able to slide self in R sidelying using bedrails. Pt setup with simple facial hygiene and self-feeding, but declined sitting upright or further ADLs. Spouse called and pt wanted to talk to spouse as he has dementia.      Current Level of Function Impacting Discharge (ADLs): strength, pain, lack of OOB    Other factors to consider for discharge: prolonged hospital and rehab over past year         PLAN :  Patient continues to benefit from skilled intervention to address the above impairments. Continue treatment per established plan of care to address goals. Recommend with staff: chair position for all meals    Recommend next OT session: co-tx for EOB    Recommendation for discharge: (in order for the patient to meet his/her long term goals)  Therapy up to 5 days/week in SNF setting    This discharge recommendation:  Has not yet been discussed the attending provider and/or case management    IF patient discharges home will need the following DME: To Be Determined (TBD) at next level of care        SUBJECTIVE:   Patient stated I'm sorry. I've had a rough day.     OBJECTIVE DATA SUMMARY:   Cognitive/Behavioral Status:  Neurologic State: Alert  Orientation Level: Oriented X4  Cognition: Follows commands  Perception: Appears intact     Safety/Judgement: Awareness of environment; Decreased insight into deficits (decreased insight into capabilities)    Functional Mobility and Transfers for ADLs:  Bed Mobility:  Scooting: Bed Modified; Contact guard assistance; Maximum assistance (reverse [place])    ADL Intervention:  Feeding  Drink to Mouth: Set-up    Grooming  Washing Face: Set-up    Cognitive Retraining  Safety/Judgement: Awareness of environment; Decreased insight into deficits (decreased insight into capabilities)    Therapeutic Exercises:   declined    Pain:  5/10    Activity Tolerance:   requires rest breaks    After treatment patient left in no apparent distress:   Patient positioned in R sidelying for pressure relief, Call bell within reach, and Side rails x 3    COMMUNICATION/COLLABORATION:   The patients plan of care was discussed with: Registered nurseKasandra Arias  Time Calculation: 18 mins

## 2021-11-09 NOTE — PROGRESS NOTES
Received notification from bedside RN about patient with regards to: K+ 3.4, was 3.7 prior  VS: /79, HR 79, RR 20, O2 sat 95% on RA    Intervention given: Kdur 20 meq PO x 1 dose, BMP for tomorrow AM ordered

## 2021-11-09 NOTE — PROGRESS NOTES
Hospitalist Progress Note    NAME: Arabella Evans   :  1950   MRN:  [id number]     Interim Hospital Summary: 65 y.o. female whom presented on 2021 with      Assessment / Plan:    Severe sepsis, POA as evidence by hypothermia, rectal temp 96, tachy , lactic 9.2 -->5.3 after 1L, WBC 26K, MERCEDES Cr 1.5 due to  Suspected epidural/right iliopsoas abscess with   Right paraspinal pain at level L3-4 and R iliac crest pain (complains of R hip pain), POA   -CT abd/pelvis showing fluid posterior to the surgical changes and on the right side of the spine at L4/L5 and posterior to the right iliopsoas muscle just superior to the sacrum. Findings are concerning for an infectious process. -S/P L2-3 AND L5-S1  LUMBAR LAMINECTOMY WITH ANDREWS OSTEOTOMY L2-3 AND L3-4 WITH PLF L2-S1 by Dr. Kevin Cain [id number]; complicated with nonhealing post surgical wound requiring wound vac 2021.    -Continue empiric Vanco, cefepime and Flagyl  -Consult orthopedics - waiting for Dr Santamaria Liner input  -scheduled tylenol for pain. Oxycodone prn  -PT OT eval and treat. Transfers by herself from bed to [facility] at home.  not able to help.     UTI  -Ucx E coli  -sensitive to cefepime   -Rea placed in ER due to poor mobility from hip pain and polyuria, with severe candidal infection in perineum and labia and buttocks. VT later this week when up with PT and less pain    Type 2DM, uncontrolled with hyperglycemic hyperosmolar hyperglycemia and early DKA, due to severe sepsis, POA  -presented with , AG 15, trace ketone in urine  -A1c 11.7 up from 7.6 in July.  -continue holding metformin  -increase Lantus 30 units. Continue SSI prn. Off drip      Prerenal MERCEDES Cr 1.5  -resolved with IVF hydration.       Constipation  -added pericolace and miralax    Severe candida infection in perineum, labia, buttocks  -continue mystatin cream ordered in ER. Add diflucan x 7 days.   -wound care consult     Generalized weakness and gait difficulty   -consult pt/ot     SVT   HTN / HLD  -continue metoprolol and statin  -having runs of SVT 11/07. Cardiology input appreciated     Hypothyroid  -continue levothyroxine. TSH 3.6     Depression and anxiety  -continue paxil with xanax prn     Severe obesity    Code:  Discussed, full  DVT prophylaxis: lovenox  Surrogate decision maker:   (but has some dementia per patient) or sister Bear Caballero    40 or above Morbid obesity / Body mass index is 44.06 kg/m². Subjective:     Chief Complaint / Reason for Physician Visit  Follow up of sepsis, hyperosmolar hyperglycemic state  Chart reviewed in detail. Discussed with RN events overnight. Pain better today. Looks less stressed. Review of Systems:  Symptom Y/N Comments  Symptom Y/N Comments   Fever/Chills    Chest Pain     Poor Appetite    Edema     Cough    Abdominal Pain     Sputum    Joint Pain     SOB/AVALOS    Pruritis/Rash     Nausea/vomit    Tolerating PT/OT     Diarrhea    Tolerating Diet     Constipation    Other       Could NOT obtain due to:      PO intake: No data found. Objective:     VITALS:   Last 24hrs VS reviewed since prior progress note. Most recent are:  Patient Vitals for the past 24 hrs:   Temp Pulse Resp BP SpO2   11/09/21 0255 98.3 °F (36.8 °C) 79 20 127/79 95 %   11/09/21 0032 98.1 °F (36.7 °C) 80 23 (!) 105/59 92 %   11/08/21 2213  83  119/73    11/08/21 1927 98.4 °F (36.9 °C) 91 21 93/69 94 %   11/08/21 1750 98.2 °F (36.8 °C) 82  109/72 95 %   11/08/21 1536 98.2 °F (36.8 °C) 78  92/62 93 %   11/08/21 1117 96.9 °F (36.1 °C) 78 18 117/63 93 %       Intake/Output Summary (Last 24 hours) at 11/9/2021 0854  Last data filed at 11/9/2021 0255  Gross per 24 hour   Intake 183.75 ml   Output 2000 ml   Net -1816.25 ml        I had a face to face encounter, and independently examined this patient on 11/9/2021, as outlined below:  PHYSICAL EXAM:  General: WD, WN.  Alert, cooperative, no acute distress    EENT:  EOMI. Anicteric sclerae. MMM  Resp:  CTA bilaterally, no wheezing or rales. No accessory muscle use  CV:  Regular  rhythm,  No edema  GI:  Soft, Non distended, + mild right flank tender. +Bowel sounds  Neurologic:  Alert and oriented X 3, normal speech,   Psych:   Good insight. Not anxious nor agitated  Skin:  No rashes. No jaundice    Reviewed most current lab test results and cultures  YES  Reviewed most current radiology test results   YES  Review and summation of old records today    NO  Reviewed patient's current orders and MAR    YES  PMH/SH reviewed - no change compared to H&P  ________________________________________________________________________  Care Plan discussed with:    Comments   Patient x    Family      RN     Care Manager     Consultant                        Multidiciplinary team rounds were held today with , nursing, pharmacist and clinical coordinator. Patient's plan of care was discussed; medications were reviewed and discharge planning was addressed. ________________________________________________________________________  Total NON critical care TIME:  25   Minutes    Total CRITICAL CARE TIME Spent:   Minutes non procedure based      Comments   >50% of visit spent in counseling and coordination of care x     This includes time during multidisciplinary rounds if indicated above   ________________________________________________________________________  Ankur Oden MD     Procedures: see electronic medical records for all procedures/Xrays and details which were not copied into this note but were reviewed prior to creation of Plan. LABS:  I reviewed today's most current labs and imaging studies.   Pertinent labs include:  Recent Labs     11/09/21  0252 11/08/21 0249 11/06/21  2325   WBC 19.2* 19.2* 26.0*   HGB 10.3* 11.2* 12.1   HCT 34.2* 36.8 37.1   * 460* 546*     Recent Labs     11/09/21 0252 11/08/21 0249 11/07/21  8763 11/07/21  0045 11/07/21  0045 11/06/21  2103 11/06/21  1127    132* 132*   < > 130*   < > 122*   K 3.4* 3.7 3.8   < > 3.9   < > 4.6    100 99   < > 99   < > 85*   CO2 25 25 24   < > 24   < > 22   * 210* 168*   < > 125*   < > 973*   BUN 12 17 20   < > 22*   < > 28*   CREA 0.61 0.72 0.68   < > 0.72   < > 1.51*   CA 8.0* 8.3* 9.0   < > 9.0   < > 9.6   MG 2.0 2.1  --   --  1.7   < >  --    ALB  --   --  1.8*  --   --   --  2.2*   TBILI  --   --  0.7  --   --   --  0.6   ALT  --   --  17  --   --   --  21    < > = values in this interval not displayed.

## 2021-11-09 NOTE — PROGRESS NOTES
Cardiology Progress Note      11/9/2021 9:07 AM    Admit Date: 11/6/2021      Subjective:   Stable overnight. No problems with SVT          Visit Vitals  /79 (BP 1 Location: Right upper arm, BP Patient Position: At rest)   Pulse 79   Temp 98.3 °F (36.8 °C)   Resp 20   Ht 5' 4\" (1.626 m)   Wt 116.4 kg (256 lb 11.2 oz)   SpO2 95%   Breastfeeding No   BMI 44.06 kg/m²     11/07 1901 - 11/09 0700  In: 458.8 [I.V.:458.8]  Out: 2750 [Urine:2750]        Objective:      Physical Exam:  VS as above  Chest CTA  Card RRR no changes     Data Review:   Labs:      WBC 19.2  Hgb 10.3  Creat 0.6     Telemetry: SR R 80       Assessment:     1. Intermittent supraventricular tachycardia. Possibly atrioventricular node reentry or atrial tachycardia associated with severe sepsis. 2.  History of nonobstructive coronary artery disease and normal ejection fraction by prior evaluation. 3.  Type 2 diabetes. 4.  Probable iliopsoas abscess with sepsis. 5.  Hypothyroidism. 6.  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 Plan: Cont metoprolol. Await echo.

## 2021-11-09 NOTE — WOUND CARE
Wound care Nurse Consult: consult placed by hospitalist stating \" vaginal area\". Patient is a 66 y/o CF admitted for leukocytosis and suspected epidural/right iliopsoas abscess. SEVERE yeast infection also. Past Medical History:   Diagnosis Date    Adverse effect of anesthesia     O2 DROPS WITH ANESTHESIA    Arthritis     KNEES    [facility]/2015    SQUAMOUS CELL CARCINOMA; tonsils and lymph nodes. Removed 3-2015 with radiation    GERD (gastroesophageal reflux disease)     Hypertension     NO LONGER ON MEDICATION    Hypothyroid     HYPOTHYROIDISM    Migraine     Nausea & vomiting     Obesity     Other ill-defined conditions(799.89)     chronic back pain    Psychiatric disorder     anxiety    Psychiatric disorder     panic ATTACKS    SVT [facility]supraventricular tachycardia) [facility] 05/24/2014    Ulcerative colitis      Patient was started on Diflucan 100 mg tabs daily for 6/7 days and Nystatin cream x3/daily for this severe yeast infection. Appreciate hospitalist starting this anti-fungal treatment on admit 11/6/21. No further recommendations at this time.     Boris Thomson RN, [employer]

## 2021-11-09 NOTE — PROGRESS NOTES
7975 Notified NP Andreso: Pt K this morning is 3.4.     NP placed order for PO Potassium    End of Shift Note    Bedside shift change report given to Chago Garvin RN (oncoming nurse) by Merissa Frazier (offgoing nurse). Report included the following information SBAR, Kardex, ED Summary, Intake/Output, MAR and Recent Results    Shift worked:  [id number]     Shift summary and any significant changes: Mittal care completed, No CHG d/t pt allergy     Concerns for physician to address:       Zone phone for oncoming shift:          Activity:  Activity Level: Up with Assistance  Number times ambulated in hallways past shift: 0  Number of times OOB to chair past shift: 0    Cardiac:   Cardiac Monitoring: Yes      Cardiac Rhythm: Sinus Rhythm    Access:   Current line(s): PIV     Genitourinary:   Urinary status: mittal    Respiratory:   O2 Device: None (Room air)  Chronic home O2 use?: NO  Incentive spirometer at bedside: NO     GI:  Last Bowel Movement Date: 11/05/21  Current diet:  ADULT DIET Full Liquid; 4 carb choices (60 gm/meal)  Passing flatus: YES  Tolerating current diet: YES       Pain Management:   Patient states pain is manageable on current regimen: YES    Skin:  Neal Score: 14  Interventions: float heels and PT/OT consult    Patient Safety:  Fall Score:  Total Score: 4  Interventions: bed/chair alarm, gripper socks, pt to call before getting OOB and stay with me (per policy)  High Fall Risk: Yes    Length of Stay:  Expected LOS: - - -  Actual LOS: [place] [address]

## 2021-11-09 NOTE — PROGRESS NOTES
1515 .Bedside and Verbal shift change report given to Edilia Soulier  (oncoming nurse) by Sita (offgoing nurse). Report included the following information SBAR, Kardex, Intake/Output and MAR.      1900 . Bedside and Verbal shift change report given to Evelin Willis (oncoming nurse) by Edilia Soulier (offgoing nurse). Report included the following information SBAR, Kardex, Intake/Output and MAR.

## 2021-11-10 LAB
ANION GAP SERPL CALC-SCNC: 8 MMOL/L (ref 5–15)
BASOPHILS # BLD: 0 K/UL (ref 0–0.1)
BASOPHILS NFR BLD: 0 % (ref 0–1)
BUN SERPL-MCNC: 7 MG/DL (ref 6–20)
BUN/CREAT SERPL: 12 (ref 12–20)
CALCIUM SERPL-MCNC: 8.2 MG/DL (ref 8.5–10.1)
CHLORIDE SERPL-SCNC: 104 MMOL/L (ref 97–108)
CO2 SERPL-SCNC: 25 MMOL/L (ref 21–32)
CREAT SERPL-MCNC: 0.6 MG/DL (ref 0.55–1.02)
DIFFERENTIAL METHOD BLD: ABNORMAL
ECHO LV E' SEPTAL VELOCITY: 7.64 CM/S
ECHO MV A VELOCITY: 77.98 CM/S
ECHO MV E DECELERATION TIME (DT): 212.88 MS
ECHO MV E VELOCITY: 50.79 CM/S
ECHO MV E/A RATIO: 0.65
ECHO MV E/E' SEPTAL: 6.65
EOSINOPHIL # BLD: 0 K/UL (ref 0–0.4)
EOSINOPHIL NFR BLD: 0 % (ref 0–7)
ERYTHROCYTE [DISTWIDTH] IN BLOOD BY AUTOMATED COUNT: 17.2 % (ref 11.5–14.5)
GLUCOSE BLD STRIP.AUTO-MCNC: 183 MG/DL (ref 65–117)
GLUCOSE BLD STRIP.AUTO-MCNC: 192 MG/DL (ref 65–117)
GLUCOSE BLD STRIP.AUTO-MCNC: 235 MG/DL (ref 65–117)
GLUCOSE BLD STRIP.AUTO-MCNC: 237 MG/DL (ref 65–117)
GLUCOSE SERPL-MCNC: 204 MG/DL (ref 65–100)
HCT VFR BLD AUTO: 35.7 % (ref 35–47)
HGB BLD-MCNC: 11.1 G/DL (ref 11.5–16)
IMM GRANULOCYTES # BLD AUTO: 0 K/UL (ref 0–0.04)
IMM GRANULOCYTES NFR BLD AUTO: 0 % (ref 0–0.5)
LYMPHOCYTES # BLD: 1 K/UL (ref 0.8–3.5)
LYMPHOCYTES NFR BLD: 6 % (ref 12–49)
MAGNESIUM SERPL-MCNC: 2 MG/DL (ref 1.6–2.4)
MCH RBC QN AUTO: 24.8 PG (ref 26–34)
MCHC RBC AUTO-ENTMCNC: 31.1 G/DL (ref 30–36.5)
MCV RBC AUTO: 79.9 FL (ref 80–99)
MONOCYTES # BLD: 0.9 K/UL (ref 0–1)
MONOCYTES NFR BLD: 5 % (ref 5–13)
NEUTS BAND NFR BLD MANUAL: 2 %
NEUTS SEG # BLD: 15.4 K/UL (ref 1.8–8)
NEUTS SEG NFR BLD: 87 % (ref 32–75)
NRBC # BLD: 0 K/UL (ref 0–0.01)
NRBC BLD-RTO: 0 PER 100 WBC
PLATELET # BLD AUTO: 494 K/UL (ref 150–400)
PMV BLD AUTO: 9.9 FL (ref 8.9–12.9)
POTASSIUM SERPL-SCNC: 3.3 MMOL/L (ref 3.5–5.1)
RBC # BLD AUTO: 4.47 M/UL (ref 3.8–5.2)
RBC MORPH BLD: ABNORMAL
SERVICE CMNT-IMP: ABNORMAL
SODIUM SERPL-SCNC: 137 MMOL/L (ref 136–145)
WBC # BLD AUTO: 17.3 K/UL (ref 3.6–11)

## 2021-11-10 PROCEDURE — 74011636637 HC RX REV CODE- 636/637: Performed by: INTERNAL MEDICINE

## 2021-11-10 PROCEDURE — 97161 PT EVAL LOW COMPLEX 20 MIN: CPT

## 2021-11-10 PROCEDURE — 82962 GLUCOSE BLOOD TEST: CPT

## 2021-11-10 PROCEDURE — 74011250637 HC RX REV CODE- 250/637: Performed by: INTERNAL MEDICINE

## 2021-11-10 PROCEDURE — 74011250636 HC RX REV CODE- 250/636: Performed by: INTERNAL MEDICINE

## 2021-11-10 PROCEDURE — 83735 ASSAY OF MAGNESIUM: CPT

## 2021-11-10 PROCEDURE — 74011250636 HC RX REV CODE- 250/636: Performed by: HOSPITALIST

## 2021-11-10 PROCEDURE — 74011000258 HC RX REV CODE- 258: Performed by: INTERNAL MEDICINE

## 2021-11-10 PROCEDURE — 80048 BASIC METABOLIC PNL TOTAL CA: CPT

## 2021-11-10 PROCEDURE — 97116 GAIT TRAINING THERAPY: CPT

## 2021-11-10 PROCEDURE — 65270000029 HC RM PRIVATE

## 2021-11-10 PROCEDURE — 85025 COMPLETE CBC W/AUTO DIFF WBC: CPT

## 2021-11-10 PROCEDURE — 36415 COLL VENOUS BLD VENIPUNCTURE: CPT

## 2021-11-10 PROCEDURE — 74011250637 HC RX REV CODE- 250/637: Performed by: HOSPITALIST

## 2021-11-10 PROCEDURE — 97535 SELF CARE MNGMENT TRAINING: CPT

## 2021-11-10 RX ORDER — POTASSIUM CHLORIDE 750 MG/1
20 TABLET, FILM COATED, EXTENDED RELEASE ORAL
Status: COMPLETED | OUTPATIENT
Start: 2021-11-10 | End: 2021-11-10

## 2021-11-10 RX ORDER — INSULIN GLARGINE 100 [IU]/ML
35 INJECTION, SOLUTION SUBCUTANEOUS DAILY
Status: DISCONTINUED | OUTPATIENT
Start: 2021-11-10 | End: 2021-11-25

## 2021-11-10 RX ADMIN — ACETAMINOPHEN 500 MG: 500 TABLET ORAL at 22:13

## 2021-11-10 RX ADMIN — CEFEPIME HYDROCHLORIDE 2 G: 2 INJECTION, POWDER, FOR SOLUTION INTRAVENOUS at 02:45

## 2021-11-10 RX ADMIN — CEFEPIME HYDROCHLORIDE 2 G: 2 INJECTION, POWDER, FOR SOLUTION INTRAVENOUS at 11:15

## 2021-11-10 RX ADMIN — INSULIN GLARGINE 35 UNITS: 100 INJECTION, SOLUTION SUBCUTANEOUS at 08:32

## 2021-11-10 RX ADMIN — METRONIDAZOLE 500 MG: 500 INJECTION, SOLUTION INTRAVENOUS at 23:39

## 2021-11-10 RX ADMIN — POLYETHYLENE GLYCOL 3350 17 G: 17 POWDER, FOR SOLUTION ORAL at 08:33

## 2021-11-10 RX ADMIN — NYSTATIN OINTMENT: 100000 OINTMENT TOPICAL at 10:00

## 2021-11-10 RX ADMIN — PAROXETINE HYDROCHLORIDE 40 MG: 20 TABLET, FILM COATED ORAL at 08:33

## 2021-11-10 RX ADMIN — Medication 10 ML: at 07:01

## 2021-11-10 RX ADMIN — INSULIN LISPRO 2 UNITS: 100 INJECTION, SOLUTION INTRAVENOUS; SUBCUTANEOUS at 17:10

## 2021-11-10 RX ADMIN — DOCUSATE SODIUM AND SENNOSIDES 1 TABLET: 8.6; 5 TABLET, FILM COATED ORAL at 17:11

## 2021-11-10 RX ADMIN — POTASSIUM CHLORIDE 20 MEQ: 750 TABLET, FILM COATED, EXTENDED RELEASE ORAL at 14:02

## 2021-11-10 RX ADMIN — VANCOMYCIN HYDROCHLORIDE 1250 MG: 10 INJECTION, POWDER, LYOPHILIZED, FOR SOLUTION INTRAVENOUS at 07:01

## 2021-11-10 RX ADMIN — AMITRIPTYLINE HYDROCHLORIDE 50 MG: 50 TABLET, FILM COATED ORAL at 22:12

## 2021-11-10 RX ADMIN — NYSTATIN OINTMENT: 100000 OINTMENT TOPICAL at 17:16

## 2021-11-10 RX ADMIN — METRONIDAZOLE 500 MG: 500 INJECTION, SOLUTION INTRAVENOUS at 12:19

## 2021-11-10 RX ADMIN — INSULIN LISPRO 3 UNITS: 100 INJECTION, SOLUTION INTRAVENOUS; SUBCUTANEOUS at 12:17

## 2021-11-10 RX ADMIN — LEVOTHYROXINE SODIUM 112 MCG: 0.11 TABLET ORAL at 08:35

## 2021-11-10 RX ADMIN — Medication 10 ML: at 22:16

## 2021-11-10 RX ADMIN — CEFEPIME HYDROCHLORIDE 2 G: 2 INJECTION, POWDER, FOR SOLUTION INTRAVENOUS at 22:14

## 2021-11-10 RX ADMIN — ATORVASTATIN CALCIUM 40 MG: 40 TABLET, FILM COATED ORAL at 22:13

## 2021-11-10 RX ADMIN — ACETAMINOPHEN 500 MG: 500 TABLET ORAL at 14:02

## 2021-11-10 RX ADMIN — METOPROLOL TARTRATE 12.5 MG: 25 TABLET, FILM COATED ORAL at 22:12

## 2021-11-10 RX ADMIN — METRONIDAZOLE 500 MG: 500 INJECTION, SOLUTION INTRAVENOUS at 00:21

## 2021-11-10 RX ADMIN — NYSTATIN OINTMENT: 100000 OINTMENT TOPICAL at 22:16

## 2021-11-10 RX ADMIN — ACETAMINOPHEN 500 MG: 500 TABLET ORAL at 17:11

## 2021-11-10 RX ADMIN — METOPROLOL TARTRATE 25 MG: 25 TABLET, FILM COATED ORAL at 08:34

## 2021-11-10 RX ADMIN — Medication 1 CAPSULE: at 08:33

## 2021-11-10 RX ADMIN — INSULIN LISPRO 2 UNITS: 100 INJECTION, SOLUTION INTRAVENOUS; SUBCUTANEOUS at 08:32

## 2021-11-10 RX ADMIN — ASPIRIN 81 MG: 81 TABLET, COATED ORAL at 08:35

## 2021-11-10 RX ADMIN — PANTOPRAZOLE SODIUM 40 MG: 40 TABLET, DELAYED RELEASE ORAL at 08:35

## 2021-11-10 RX ADMIN — ACETAMINOPHEN 500 MG: 500 TABLET ORAL at 09:00

## 2021-11-10 RX ADMIN — OXYCODONE 5 MG: 5 TABLET ORAL at 23:41

## 2021-11-10 RX ADMIN — Medication 10 ML: at 14:07

## 2021-11-10 RX ADMIN — ENOXAPARIN SODIUM 40 MG: 100 INJECTION SUBCUTANEOUS at 22:14

## 2021-11-10 RX ADMIN — ENOXAPARIN SODIUM 40 MG: 100 INJECTION SUBCUTANEOUS at 08:33

## 2021-11-10 RX ADMIN — FLUCONAZOLE 100 MG: 100 TABLET ORAL at 08:35

## 2021-11-10 RX ADMIN — OXYCODONE 5 MG: 5 TABLET ORAL at 00:36

## 2021-11-10 RX ADMIN — DOCUSATE SODIUM AND SENNOSIDES 1 TABLET: 8.6; 5 TABLET, FILM COATED ORAL at 08:34

## 2021-11-10 NOTE — PROGRESS NOTES
Problem: Mobility Impaired (Adult and Pediatric)  Goal: *Acute Goals and Plan of Care (Insert Text)  Description: FUNCTIONAL STATUS PRIOR TO ADMISSION: Ambulates household distances with RW support vs uses RW, stating she mostly has been relying on WC recently. History of MULTIPLE falls. Caregiver for  with dementia. HOME SUPPORT PRIOR TO ADMISSION: The patient lived with  however states he has dementia. Physical Therapy Goals  Initiated 11/10/2021  1. Patient will move from supine to sit and sit to supine , scoot up and down, and roll side to side in bed with supervision/set-up within 7 day(s). 2.  Patient will transfer from bed to chair and chair to bed with minimal assistance/contact guard assist using the least restrictive device within 7 day(s). 3.  Patient will perform sit to stand with minimal assistance/contact guard assist within 7 day(s). 4.  Patient will ambulate with minimal assistance/contact guard assist for 10 feet with the least restrictive device within 7 day(s). Outcome: Progressing Towards Goal   PHYSICAL THERAPY EVALUATION  Patient: Arabella Evans (48 y.o. female)  Date: 11/10/2021  Primary Diagnosis: Hyperglycemia [R73.9]  MERCEDES (acute kidney injury) ([facility] [N17.9]  Leukocytosis [D72.829]  SIRS (systemic inflammatory response syndrome) ([facility]) [R65.10]  Candida vaginitis [B37.3]        Precautions: Back, Fall, WBAT    ASSESSMENT  Based on the objective data described below, the patient presents with increased anxiety, self-limiting behaviors, increased pain levels, impaired activity tolerance, generalized weakness, and overall impaired functional mobility. Pt required increased encouragement/education for participation with therapy, noted to be extremely anxious and extremely self-limiting. Pt able to assume standing position w/ RW support and CGAx1 as well as sidestep along EOB w/ RW and CGAx1. Gait slow and shuffled however steady overall.  Pt with reports of increased pain and increased fatigue, declining attempts at mobilizing to bedside chair. VSS throughout. Pt tolerated therapy session well however remains well below her baseline mod I level and most appropriate for additional rehab at discharge. Current Level of Function Impacting Discharge (mobility/balance): minAx2 for bed mobility, CGAx1 for sit<>stand transfer, CGAx1 w/ RW during sidestepping    Functional Outcome Measure: The patient scored 20/100 on the Barthel Index outcome measure which is indicative of significant impairment in ADLs and functional mobility. Other factors to consider for discharge: caregiver for  w/ dementia, pain levels, falls risk, falls history     Patient will benefit from skilled therapy intervention to address the above noted impairments. PLAN :  Recommendations and Planned Interventions: bed mobility training, transfer training, gait training, therapeutic exercises, patient and family training/education, and therapeutic activities      Frequency/Duration: Patient will be followed by physical therapy:  4 times a week to address goals. Recommendation for discharge: (in order for the patient to meet his/her long term goals)  Therapy up to 5 days/week in SNF setting    This discharge recommendation:  Has been made in collaboration with the attending provider and/or case management    IF patient discharges home will need the following DME: to be determined (TBD)         SUBJECTIVE:   Patient stated I have a yeast infection, real bad.     OBJECTIVE DATA SUMMARY:   HISTORY:    Past Medical History:   Diagnosis Date    Adverse effect of anesthesia     O2 DROPS WITH ANESTHESIA    Arthritis     KNEES    Cancer ([facility] 03/13/2015    SQUAMOUS CELL CARCINOMA; tonsils and lymph nodes.   Removed 3-2015 with radiation    GERD (gastroesophageal reflux disease)     Hypertension     NO LONGER ON MEDICATION    Hypothyroid     HYPOTHYROIDISM    Migraine     Nausea & vomiting     Obesity Other ill-defined conditions(799.89)     chronic back pain    Psychiatric disorder     anxiety    Psychiatric disorder     panic ATTACKS    SVT [facility]supraventricular tachycardia) ([facility] 05/24/2014    Ulcerative colitis      Past Surgical History:   Procedure Laterality Date    COLONOSCOPY,DIAGNOSTIC  11/19/2015         HX GI      colonscopy    HX HEENT  03/2015    tonsillectomy (CANCER) AND NECK LYMPH NODES    HX HEENT  2008    PARATHYROID REMOVAL    HX HEENT Left 2002    EYE LASER    HX HEMORRHOIDECTOMY  2001, 2016     X2    HX HYSTERECTOMY  1985    HX KNEE ARTHROSCOPY  1995    left knee surgery, TOTAL LT  and Right KNEE 2013    HX KNEE REPLACEMENT Bilateral 2013, 2014    HX LAP CHOLECYSTECTOMY  2002    HX LUMBAR FUSION  2011    SPINAL FUSION with hardware L4-L5    HX ORTHOPAEDIC  1990    CYST REMOVED RIGHT WRIST    HX ORTHOPAEDIC  05/12/2021    L2-3 & L5-21 LUMBAR LAMINECTOMY WITH ANDREWS OSTEOTOMY    HX OTHER SURGICAL      cyst removal right wrist    HX UROLOGICAL  2010    bladder surgery(interstim device)    IR INJ FORAMIN EPID LUMB ANES/STER SNGL  8/19/2019    ME EGD TRANSORAL BIOPSY SINGLE/MULTIPLE  5/20/2013            Personal factors and/or comorbidities impacting plan of care:     Home Situation  Home Environment: Private residence  # Steps to Enter: 0  Wheelchair Ramp: Yes  One/Two Story Residence: One story  Living Alone: No  Support Systems: Spouse/Significant Other  Patient Expects to be Discharged to[de-identified] [employer]  Current DME Used/Available at Home: Wheelchair, Walker, rolling, Tub transfer bench, Commode, bedside, Cane, straight  Tub or Shower Type: Tub/Shower combination    EXAMINATION/PRESENTATION/DECISION MAKING:   Critical Behavior:  Neurologic State: Alert  Orientation Level: Oriented X4  Cognition: Follows commands  Safety/Judgement: Awareness of environment, Fall prevention  Hearing:   Auditory  Auditory Impairment: None  Skin:  increased  Edema: BLEs  Range Of Motion:  AROM: Generally decreased, functional                       Strength:    Strength: Generally decreased, functional                    Tone & Sensation:   Tone: Normal              Sensation: Intact               Coordination:  Coordination: Within functional limits  Vision:      Functional Mobility:  Bed Mobility:     Supine to Sit: Minimum assistance; Assist x2  Sit to Supine: Minimum assistance  Scooting: Minimum assistance; Assist x2  Transfers:  Sit to Stand: Contact guard assistance; Assist x1  Stand to Sit: Contact guard assistance; Assist x1                       Balance:   Sitting: Impaired  Sitting - Static: Good (unsupported)  Sitting - Dynamic: Good (unsupported); Fair (occasional)  Standing: Impaired; With support (RW)  Standing - Static: Good; Constant support  Standing - Dynamic : Fair; Constant support  Ambulation/Gait Training:  Distance (ft): 1 Feet (ft)  Assistive Device: Walker, rolling; Gait belt  Ambulation - Level of Assistance: Contact guard assistance        Gait Abnormalities: Decreased step clearance; Shuffling gait        Base of Support: Widened     Speed/Catherine: Shuffled; Slow  Step Length: Left shortened; Right shortened                      Functional Measure:  Barthel Index:    Bathin  Bladder: 0  Bowels: 5  Groomin  Dressin  Feedin  Mobility: 0  Stairs: 0  Toilet Use: 0  Transfer (Bed to Chair and Back): 5  Total: 20/100       The Barthel ADL Index: Guidelines  1. The index should be used as a record of what a patient does, not as a record of what a patient could do. 2. The main aim is to establish degree of independence from any help, physical or verbal, however minor and for whatever reason. 3. The need for supervision renders the patient not independent. 4. A patient's performance should be established using the best available evidence. Asking the patient, friends/relatives and nurses are the usual sources, but direct observation and common sense are also important.  However direct testing is not needed. 5. Usually the patient's performance over the preceding 24-48 hours is important, but occasionally longer periods will be relevant. 6. Middle categories imply that the patient supplies over 50 per cent of the effort. 7. Use of aids to be independent is allowed. Damari Diaz., Barthel, D.W. (1865). Functional evaluation: the Barthel Index. [address] [facility] (14)2. Zach Bryant didi MU Reid, Morgan Santoyo., Elly King., [place], [address] [place] [address] (1999). Measuring the change indisability after inpatient rehabilitation; comparison of the responsiveness of the Barthel Index and Functional Topping Measure. Journal of Neurology, Neurosurgery, and Psychiatry, 66(4), 575-371. JEFF Benitez, KATIE Hunter, & Michelle Connor M.A. (2004.) Assessment of post-stroke quality of life in cost-effectiveness studies: The usefulness of the Barthel Index and the EuroQoL-5D. Quality of Life Research, 15, 820-02           Physical Therapy Evaluation Charge Determination   History Examination Presentation Decision-Making   MEDIUM  Complexity : 1-2 comorbidities / personal factors will impact the outcome/ POC  MEDIUM Complexity : 3 Standardized tests and measures addressing body structure, function, activity limitation and / or participation in recreation  MEDIUM Complexity : Evolving with changing characteristics  MEDIUM Complexity : FOTO score of 26-74      Based on the above components, the patient evaluation is determined to be of the following complexity level: MEDIUM    Pain Rating:  Reported increased pain in R groin/hip    Activity Tolerance:   Fair    After treatment patient left in no apparent distress:   Supine in bed, Patient positioned in R sidelying for pressure relief, Call bell within reach, Bed / chair alarm activated, and Side rails x 3    COMMUNICATION/EDUCATION:   The patients plan of care was discussed with: Occupational therapist and Registered nurse.      Fall prevention education was provided and the patient/caregiver indicated understanding., Patient/family have participated as able in goal setting and plan of care. , and Patient/family agree to work toward stated goals and plan of care.     Thank you for this referral.  Anna Foy, PT, DPT   Time Calculation: 24 mins

## 2021-11-10 NOTE — PROGRESS NOTES
Cardiology Progress Note      11/10/2021 10:36 AM    Admit Date: 11/6/2021          Subjective:  No further SVT. Echo still pending. Remains uncomfortable. CT showed gas filled paraspinal area         Visit Vitals  /60 (BP 1 Location: Left lower arm, BP Patient Position: Lying)   Pulse 76   Temp 98.1 °F (36.7 °C)   Resp 18   Ht 5' 4\" (1.626 m)   Wt 122.7 kg (270 lb 6.4 oz)   SpO2 93%   Breastfeeding No   BMI 46.41 kg/m²     11/08 1901 - 11/10 0700  In: 1250 [P.O.:150; I.V.:1100]  Out: 4375 [Urine:4375]        Objective:      Physical Exam:  VS as above  Chest CTA ant  Card RRR dist ht sounds     Data Review:   Labs:    Hgb 11.1  WBC 17.2  BUN 7  Creat 0.6     Telemetry: SR R 74 No SVT       Assessment:       1.  Intermittent supraventricular tachycardia.  Possibly atrioventricular node reentry or atrial tachycardia associated with severe sepsis. 2.  History of nonobstructive coronary artery disease and normal ejection fraction by prior evaluation. 3.  Type 2 diabetes. 4.   retroperitoneal abscess with sepsis. Prior laminectomy 6/21 with poor wound healing   5.  Hypothyroidism. 6.  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 Plan: Cont current Rx.  Will review echo once done

## 2021-11-10 NOTE — PROGRESS NOTES
Problem: Self Care Deficits Care Plan (Adult)  Goal: *Acute Goals and Plan of Care (Insert Text)  Description: FUNCTIONAL STATUS PRIOR TO ADMISSION: Patient was modified independent for basic and instrumental ADLs; used w/c in hallway; used RW in rooms(?); cares for spouse with dementia; has son that lives close. HOME SUPPORT: The patient lived with spouse, but cares for him. Occupational Therapy Goals  Initiated 11/8/2021    1. Patient will perform grooming with supervision/set-up within 7 days. 2.  Patient will perform UB bathing with moderate assistance  using most appropriate DME within 7 days. 3.  Patient will lower body dressing at moderate assistance with A/E within 7 days. 4.  Patient will perform bed mobility at moderate assistance  within 7 days. 5.  Patient will verbalize/demonstrate 3/3 back precautions during ADL tasks without cues within 7 days. Outcome: Progressing Towards Goal   OCCUPATIONAL THERAPY TREATMENT  Patient: Heidi De Paz (4 y.o. female)  Date: 11/10/2021  Diagnosis: Hyperglycemia [R73.9]  MERCEDES (acute kidney injury) ([address] [N17.9]  Leukocytosis [D72.829]  SIRS (systemic inflammatory response syndrome) ([facility]) [R65.10]  Candida vaginitis [B37.3]   <principal problem not specified>       Precautions: Back, Fall, WBAT  Chart, occupational therapy assessment, plan of care, and goals were reviewed. ASSESSMENT  Patient continues with skilled OT services and is progressing towards goals. Patient received resting in bed, reporting increased pain this morning but agreeable to session with encouragement. Patient with improved bed mobility, transferring to EOB with min assist x2, increased time, and cues for best techniques. Once squared off, patient with fair - good sitting balance, most impacted by pain and attempts to relieve it.  Patient tolerated sitting EOB approx 15 minutes for upper body dressing and grooming ADLs, tolerating sit<>stand and sidestepping towards [facility] with contact guard assist x2. Patient returned to bed at end of sression, positioned for comfort. Continue to recommend reahb at discharge. Current Level of Function Impacting Discharge (ADLs): up to total A for lower body     Other factors to consider for discharge: caregiver for  with dementia         PLAN :  Patient continues to benefit from skilled intervention to address the above impairments. Continue treatment per established plan of care to address goals. Recommend with staff: OOB to chair and toileting at [facility] with assist x2 and RW    Recommend next OT session: BSC transfer    Recommendation for discharge: (in order for the patient to meet his/her long term goals)  SNF rehab    This discharge recommendation:  Has been made in collaboration with the attending provider and/or case management    IF patient discharges home will need the following DME: TBD       SUBJECTIVE:   Patient stated i'm sorry, I'm not sure what I'll be able to do.     OBJECTIVE DATA SUMMARY:   Cognitive/Behavioral Status:  Neurologic State: Alert  Orientation Level: Oriented X4  Cognition: Follows commands  Perception: Appears intact  Perseveration: Perseverates during conversation  Safety/Judgement: Awareness of environment; Fall prevention    Functional Mobility and Transfers for ADLs:  Bed Mobility:  Supine to Sit: Minimum assistance; Assist x2  Sit to Supine: Minimum assistance; Additional time  Scooting: Minimum assistance; Assist x2    Transfers:  Sit to Stand: Contact guard assistance; Assist x2   Stand to Sit: Contact guard assistance; Assist x2     Balance:  Sitting: Impaired  Sitting - Static: Good (unsupported)  Sitting - Dynamic: Good (unsupported);  Fair (occasional) (impacted by significant pain)  Standing: Impaired  Standing - Static: Good  Standing - Dynamic : Fair    ADL Intervention:  Feeding  Feeding Assistance: Set-up    Grooming  Grooming Assistance: Set-up  Position Performed: Seated edge of bed  Brushing/Combing Hair: Moderate assistance (2/2 significant knots at back of head)    Upper Body Dressing Assistance  Dressing Assistance: [address] [place]: Minimum  assistance    Lower Body Dressing Assistance  Dressing Assistance: Total assistance(dependent)  Socks: Total assistance (dependent)    Cognitive Retraining  Organizing/Sequencing: Breaking task down  Attention to Task: Single task  Following Commands: Awareness of environment; Follows one step commands/directions  Safety/Judgement: Awareness of environment; Fall prevention  Cues: Verbal cues provided    Pain:  Patient reporting significant pain in R lower abdomen and spine - improved with repositioning and rest.    Activity Tolerance:   Fair and requires frequent rest breaks    After treatment patient left in no apparent distress:   Supine in bed, Patient positioned in R sidelying for pressure relief, Call bell within reach, and Side rails x 3    COMMUNICATION/COLLABORATION:   The patients plan of care was discussed with: Physical therapist and Registered nurse.      Cornell Mcdaniel, OT  Time Calculation: 25 mins

## 2021-11-10 NOTE — PROGRESS NOTES
Hospitalist Progress Note    NAME: Benedicto Mir   :  1950   MRN:  [id number]     Interim Hospital Summary: 65 y.o. female whom presented on 2021 with      Assessment / Plan:    Severe sepsis, POA as evidence by hypothermia, rectal temp 96, tachy , lactic 9.2 -->5.3 after 1L, WBC 26K, MERCEDES Cr 1.5 due to  Suspected epidural/right iliopsoas abscess with   Right paraspinal pain at level L3-4 and R iliac crest pain (complains of R hip pain), POA   -CT abd/pelvis showing fluid posterior to the surgical changes and on the right side of the spine at L4/L5 and posterior to the right iliopsoas muscle just superior to the sacrum. Findings are concerning for an infectious process. -S/P L2-3 AND L5-S1  LUMBAR LAMINECTOMY WITH ANDREWS OSTEOTOMY L2-3 AND L3-4 WITH PLF L2-S1 by Dr. Reese Avers 6/3/9994; complicated with nonhealing post surgical wound requiring wound vac 2021.    -Continue empiric Vanco, cefepime and Flagyl  -Leukocytosis is trending down, patient continues to report pain in the right iliac crest region  -Consult orthopedics - waiting for Dr Kaylen Burnette input  -scheduled tylenol for pain. Oxycodone prn  -PT OT eval and treat. Transfers by herself from bed to [facility] at home.  not able to help.     UTI  -Ucx E coli  -sensitive to cefepime   -Rea placed in ER due to poor mobility from hip pain and polyuria, with severe candidal infection in perineum and labia and buttocks. VT later this week when up with PT and less pain    Type 2DM, uncontrolled with hyperglycemic hyperosmolar hyperglycemia and early DKA, due to severe sepsis, POA  -presented with , AG 15, trace ketone in urine  -A1c 11.7 up from 7.6 in July.  -continue holding metformin  -increase Lantus 35 units. Continue SSI prn.   Off drip   -Blood sugars are better controlled    Prerenal MERCEDES Cr 1.5  -resolved with IVF hydration.       Constipation  -added pericolace and miralax    Severe candida infection in perineum, labia, buttocks  -continue mystatin cream ordered in ER. Added diflucan x 7 days. -wound care consult     Generalized weakness and gait difficulty   -consult pt/ot     SVT   HTN / HLD  -continue metoprolol and statin  -having runs of SVT 11/07. Cardiology input appreciated     Hypothyroid  -continue levothyroxine. TSH 3.6     Depression and anxiety  -continue paxil with xanax prn     Severe obesity    Code:  Discussed, full  DVT prophylaxis: lovenox  Surrogate decision maker:   (but has some dementia per patient) or sister Rodney Hiss    40 or above Morbid obesity / Body mass index is 46.41 kg/m². Subjective:     Chief Complaint / Reason for Physician Visit  Follow up of sepsis, hyperosmolar hyperglycemic state  Chart reviewed in detail. Discussed with RN events overnight. Continues to report right leg rest pain. Blood sugars are better controlled  Orthopedic consult still pending    Review of Systems:  Symptom Y/N Comments  Symptom Y/N Comments   Fever/Chills n   Chest Pain n    Poor Appetite n   Edema n    Cough n   Abdominal Pain n    Sputum n   Joint Pain     SOB/AVALOS n   Pruritis/Rash     Nausea/vomit n   Tolerating PT/OT     Diarrhea n   Tolerating Diet     Constipation n   Other       Could NOT obtain due to:      PO intake: No data found. Objective:     VITALS:   Last 24hrs VS reviewed since prior progress note.  Most recent are:  Patient Vitals for the past 24 hrs:   Temp Pulse Resp BP SpO2   11/10/21 1107 98 °F (36.7 °C) 71  (!) 92/55 100 %   11/10/21 0719 98.1 °F (36.7 °C) 76 18 115/60 93 %   11/10/21 0401 98.5 °F (36.9 °C) 72 20 (!) 109/56 92 %   11/10/21 0025 98.6 °F (37 °C) 72 20 106/62 92 %   11/09/21 2307 98.5 °F (36.9 °C) 81 19 (!) 101/55 93 %   11/09/21 2111 98.6 °F (37 °C) 83 18 (!) 115/58 97 %   11/09/21 1630 98.6 °F (37 °C) 77 18 (!) 94/52        Intake/Output Summary (Last 24 hours) at 11/10/2021 1246  Last data filed at 11/10/2021 0900  Gross per 24 hour   Intake 686.25 ml   Output 2275 ml   Net -1588.75 ml        I had a face to face encounter, and independently examined this patient on 11/10/2021, as outlined below:  PHYSICAL EXAM:  General: WD, WN. Alert, cooperative, no acute distress    EENT:  EOMI. Anicteric sclerae. MMM  Resp:  CTA bilaterally, no wheezing or rales. No accessory muscle use  CV:  Regular  rhythm,  No edema  GI:  Soft, Non distended, + mild right flank tender. +Bowel sounds  Neurologic:  Alert and oriented X 3, normal speech,   Psych:   Good insight. Not anxious nor agitated  Skin:  No rashes. No jaundice    Reviewed most current lab test results and cultures  YES  Reviewed most current radiology test results   YES  Review and summation of old records today    NO  Reviewed patient's current orders and MAR    YES  PMH/ reviewed - no change compared to H&P  ________________________________________________________________________  Care Plan discussed with:    Comments   Patient x    Family      RN x    Care Manager     Consultant                       x Multidiciplinary team rounds were held today with , nursing, pharmacist and clinical coordinator. Patient's plan of care was discussed; medications were reviewed and discharge planning was addressed. ________________________________________________________________________  Total NON critical care TIME:  35   Minutes    Total CRITICAL CARE TIME Spent:   Minutes non procedure based      Comments   >50% of visit spent in counseling and coordination of care x     This includes time during multidisciplinary rounds if indicated above   ________________________________________________________________________  Wisam Benton MD     Procedures: see electronic medical records for all procedures/Xrays and details which were not copied into this note but were reviewed prior to creation of Plan. LABS:  I reviewed today's most current labs and imaging studies.   Pertinent labs include:  Recent Labs     11/10/21  0413 11/09/21  0252 11/08/21  0249   WBC 17.3* 19.2* 19.2*   HGB 11.1* 10.3* 11.2*   HCT 35.7 34.2* 36.8   * 452* 460*     Recent Labs     11/10/21  0413 11/09/21  0252 11/08/21  0249    137 132*   K 3.3* 3.4* 3.7    104 100   CO2 25 25 25   * 169* 210*   BUN 7 12 17   CREA 0.60 0.61 0.72   CA 8.2* 8.0* 8.3*   MG 2.0 2.0 2.1

## 2021-11-10 NOTE — PROGRESS NOTES
2588- report received from [place], [address]- spoke with Marii Ballard at [facility] to make sure that Dr. Serrano Height had received the consult    31 64 70- reviewed and agree with assessment completed by Alli Montoya, RN    4377- report given to oncoming RN

## 2021-11-10 NOTE — PROGRESS NOTES
Transition of Care Plan:     RUR: 15%     Disposition: Home with family.     Follow up appointments: To be done prior to discharge.     DME needed: To be determined.     Transportation at Discharge: Family to [address] or means to access home:  Family has keys        IM Medicare Letter: To be given prior to discharge.      Is patient a BCPI-A Bundle:   No                     If yes, was Bundle Letter given?:   N/A     Caregiver Contact: Son     Discharge Caregiver contacted prior to discharge? Caregiver to be contacted prior to discharge. Spoke with patient regarding rehab prior to going home. She is in agreement. Choice given and she would like to go to [address] [place] [address] for rehab. Encouraged patient to have a second and third choice and she states she will think about it. Referral sent to [address] [place] [address] and will await their response. Amanda Lua  RN BSN CRM        [phone]

## 2021-11-10 NOTE — PROGRESS NOTES
PIV infiltrated to R wrist while infusing vancomyocin. Primary nurse placed new PIV RFA. Pharmacy contacted and recommended placing a warm compress for  20 mins every 4 hours.

## 2021-11-11 LAB
ANION GAP SERPL CALC-SCNC: 6 MMOL/L (ref 5–15)
APTT PPP: 29.4 SEC (ref 22.1–31)
BACTERIA SPEC CULT: NORMAL
BUN SERPL-MCNC: 6 MG/DL (ref 6–20)
BUN/CREAT SERPL: 11 (ref 12–20)
CALCIUM SERPL-MCNC: 8.1 MG/DL (ref 8.5–10.1)
CHLORIDE SERPL-SCNC: 106 MMOL/L (ref 97–108)
CO2 SERPL-SCNC: 27 MMOL/L (ref 21–32)
CREAT SERPL-MCNC: 0.53 MG/DL (ref 0.55–1.02)
ERYTHROCYTE [DISTWIDTH] IN BLOOD BY AUTOMATED COUNT: 17.3 % (ref 11.5–14.5)
GLUCOSE BLD STRIP.AUTO-MCNC: 125 MG/DL (ref 65–117)
GLUCOSE BLD STRIP.AUTO-MCNC: 166 MG/DL (ref 65–117)
GLUCOSE BLD STRIP.AUTO-MCNC: 232 MG/DL (ref 65–117)
GLUCOSE BLD STRIP.AUTO-MCNC: 94 MG/DL (ref 65–117)
GLUCOSE SERPL-MCNC: 179 MG/DL (ref 65–100)
HCT VFR BLD AUTO: 34.6 % (ref 35–47)
HGB BLD-MCNC: 10.8 G/DL (ref 11.5–16)
MCH RBC QN AUTO: 24.8 PG (ref 26–34)
MCHC RBC AUTO-ENTMCNC: 31.2 G/DL (ref 30–36.5)
MCV RBC AUTO: 79.4 FL (ref 80–99)
NRBC # BLD: 0 K/UL (ref 0–0.01)
NRBC BLD-RTO: 0 PER 100 WBC
PLATELET # BLD AUTO: 531 K/UL (ref 150–400)
PMV BLD AUTO: 9.6 FL (ref 8.9–12.9)
POTASSIUM SERPL-SCNC: 3.1 MMOL/L (ref 3.5–5.1)
RBC # BLD AUTO: 4.36 M/UL (ref 3.8–5.2)
SERVICE CMNT-IMP: ABNORMAL
SERVICE CMNT-IMP: NORMAL
SERVICE CMNT-IMP: NORMAL
SODIUM SERPL-SCNC: 139 MMOL/L (ref 136–145)
THERAPEUTIC RANGE,PTTT: NORMAL SECS (ref 58–77)
WBC # BLD AUTO: 20.1 K/UL (ref 3.6–11)

## 2021-11-11 PROCEDURE — 80048 BASIC METABOLIC PNL TOTAL CA: CPT

## 2021-11-11 PROCEDURE — 77030018786 HC NDL GD F/USND BARD -B

## 2021-11-11 PROCEDURE — C1751 CATH, INF, PER/CENT/MIDLINE: HCPCS

## 2021-11-11 PROCEDURE — 36415 COLL VENOUS BLD VENIPUNCTURE: CPT

## 2021-11-11 PROCEDURE — 74011250636 HC RX REV CODE- 250/636: Performed by: INTERNAL MEDICINE

## 2021-11-11 PROCEDURE — 97116 GAIT TRAINING THERAPY: CPT

## 2021-11-11 PROCEDURE — 65660000000 HC RM CCU STEPDOWN

## 2021-11-11 PROCEDURE — 74011250636 HC RX REV CODE- 250/636: Performed by: HOSPITALIST

## 2021-11-11 PROCEDURE — 74011250637 HC RX REV CODE- 250/637: Performed by: INTERNAL MEDICINE

## 2021-11-11 PROCEDURE — 74011636637 HC RX REV CODE- 636/637: Performed by: INTERNAL MEDICINE

## 2021-11-11 PROCEDURE — 99221 1ST HOSP IP/OBS SF/LOW 40: CPT | Performed by: STUDENT IN AN ORGANIZED HEALTH CARE EDUCATION/TRAINING PROGRAM

## 2021-11-11 PROCEDURE — 76937 US GUIDE VASCULAR ACCESS: CPT

## 2021-11-11 PROCEDURE — 97535 SELF CARE MNGMENT TRAINING: CPT

## 2021-11-11 PROCEDURE — 82962 GLUCOSE BLOOD TEST: CPT

## 2021-11-11 PROCEDURE — 85730 THROMBOPLASTIN TIME PARTIAL: CPT

## 2021-11-11 PROCEDURE — 74011000258 HC RX REV CODE- 258: Performed by: INTERNAL MEDICINE

## 2021-11-11 PROCEDURE — 85027 COMPLETE CBC AUTOMATED: CPT

## 2021-11-11 PROCEDURE — 97530 THERAPEUTIC ACTIVITIES: CPT

## 2021-11-11 PROCEDURE — 94760 N-INVAS EAR/PLS OXIMETRY 1: CPT

## 2021-11-11 PROCEDURE — 74011250637 HC RX REV CODE- 250/637: Performed by: HOSPITALIST

## 2021-11-11 RX ORDER — HEPARIN SODIUM 1000 [USP'U]/ML
80 INJECTION, SOLUTION INTRAVENOUS; SUBCUTANEOUS ONCE
Status: COMPLETED | OUTPATIENT
Start: 2021-11-11 | End: 2021-11-11

## 2021-11-11 RX ORDER — HEPARIN SODIUM 10000 [USP'U]/100ML
12.2-36 INJECTION, SOLUTION INTRAVENOUS
Status: DISCONTINUED | OUTPATIENT
Start: 2021-11-11 | End: 2021-11-23

## 2021-11-11 RX ORDER — POTASSIUM CHLORIDE 750 MG/1
40 TABLET, FILM COATED, EXTENDED RELEASE ORAL
Status: COMPLETED | OUTPATIENT
Start: 2021-11-11 | End: 2021-11-11

## 2021-11-11 RX ORDER — HEPARIN SODIUM 1000 [USP'U]/ML
80 INJECTION, SOLUTION INTRAVENOUS; SUBCUTANEOUS AS NEEDED
Status: DISCONTINUED | OUTPATIENT
Start: 2021-11-11 | End: 2021-11-24

## 2021-11-11 RX ORDER — HEPARIN SODIUM 1000 [USP'U]/ML
40 INJECTION, SOLUTION INTRAVENOUS; SUBCUTANEOUS AS NEEDED
Status: DISCONTINUED | OUTPATIENT
Start: 2021-11-11 | End: 2021-11-24

## 2021-11-11 RX ADMIN — ALPRAZOLAM 0.25 MG: 0.5 TABLET ORAL at 23:51

## 2021-11-11 RX ADMIN — Medication 10 ML: at 21:19

## 2021-11-11 RX ADMIN — NYSTATIN OINTMENT: 100000 OINTMENT TOPICAL at 16:00

## 2021-11-11 RX ADMIN — INSULIN LISPRO 2 UNITS: 100 INJECTION, SOLUTION INTRAVENOUS; SUBCUTANEOUS at 09:16

## 2021-11-11 RX ADMIN — VANCOMYCIN HYDROCHLORIDE 1250 MG: 10 INJECTION, POWDER, LYOPHILIZED, FOR SOLUTION INTRAVENOUS at 11:37

## 2021-11-11 RX ADMIN — Medication 1 CAPSULE: at 09:10

## 2021-11-11 RX ADMIN — POTASSIUM CHLORIDE 40 MEQ: 750 TABLET, FILM COATED, EXTENDED RELEASE ORAL at 09:10

## 2021-11-11 RX ADMIN — ATORVASTATIN CALCIUM 40 MG: 40 TABLET, FILM COATED ORAL at 21:18

## 2021-11-11 RX ADMIN — DOCUSATE SODIUM AND SENNOSIDES 1 TABLET: 8.6; 5 TABLET, FILM COATED ORAL at 18:06

## 2021-11-11 RX ADMIN — LEVOTHYROXINE SODIUM 112 MCG: 0.11 TABLET ORAL at 09:10

## 2021-11-11 RX ADMIN — PAROXETINE HYDROCHLORIDE 40 MG: 20 TABLET, FILM COATED ORAL at 09:10

## 2021-11-11 RX ADMIN — Medication 10 ML: at 13:18

## 2021-11-11 RX ADMIN — METRONIDAZOLE 500 MG: 500 INJECTION, SOLUTION INTRAVENOUS at 23:49

## 2021-11-11 RX ADMIN — ACETAMINOPHEN 500 MG: 500 TABLET ORAL at 12:12

## 2021-11-11 RX ADMIN — AMITRIPTYLINE HYDROCHLORIDE 50 MG: 50 TABLET, FILM COATED ORAL at 21:17

## 2021-11-11 RX ADMIN — FLUCONAZOLE 100 MG: 100 TABLET ORAL at 09:17

## 2021-11-11 RX ADMIN — DOCUSATE SODIUM AND SENNOSIDES 1 TABLET: 8.6; 5 TABLET, FILM COATED ORAL at 09:10

## 2021-11-11 RX ADMIN — ENOXAPARIN SODIUM 40 MG: 100 INJECTION SUBCUTANEOUS at 07:50

## 2021-11-11 RX ADMIN — INSULIN GLARGINE 35 UNITS: 100 INJECTION, SOLUTION SUBCUTANEOUS at 09:16

## 2021-11-11 RX ADMIN — OXYCODONE 5 MG: 5 TABLET ORAL at 23:51

## 2021-11-11 RX ADMIN — METRONIDAZOLE 500 MG: 500 INJECTION, SOLUTION INTRAVENOUS at 11:31

## 2021-11-11 RX ADMIN — HEPARIN SODIUM 12.2 UNITS/KG/HR: 10000 INJECTION, SOLUTION INTRAVENOUS at 19:16

## 2021-11-11 RX ADMIN — CEFEPIME HYDROCHLORIDE 2 G: 2 INJECTION, POWDER, FOR SOLUTION INTRAVENOUS at 07:51

## 2021-11-11 RX ADMIN — VANCOMYCIN HYDROCHLORIDE 1250 MG: 10 INJECTION, POWDER, LYOPHILIZED, FOR SOLUTION INTRAVENOUS at 23:50

## 2021-11-11 RX ADMIN — CEFEPIME HYDROCHLORIDE 2 G: 2 INJECTION, POWDER, FOR SOLUTION INTRAVENOUS at 21:15

## 2021-11-11 RX ADMIN — METOPROLOL TARTRATE 12.5 MG: 25 TABLET, FILM COATED ORAL at 21:17

## 2021-11-11 RX ADMIN — POLYETHYLENE GLYCOL 3350 17 G: 17 POWDER, FOR SOLUTION ORAL at 09:10

## 2021-11-11 RX ADMIN — INSULIN LISPRO 3 UNITS: 100 INJECTION, SOLUTION INTRAVENOUS; SUBCUTANEOUS at 12:11

## 2021-11-11 RX ADMIN — VANCOMYCIN HYDROCHLORIDE 1250 MG: 10 INJECTION, POWDER, LYOPHILIZED, FOR SOLUTION INTRAVENOUS at 02:17

## 2021-11-11 RX ADMIN — ASPIRIN 81 MG: 81 TABLET, COATED ORAL at 09:10

## 2021-11-11 RX ADMIN — PANTOPRAZOLE SODIUM 40 MG: 40 TABLET, DELAYED RELEASE ORAL at 09:10

## 2021-11-11 RX ADMIN — NYSTATIN OINTMENT: 100000 OINTMENT TOPICAL at 09:00

## 2021-11-11 RX ADMIN — Medication 5 ML: at 07:56

## 2021-11-11 RX ADMIN — ACETAMINOPHEN 500 MG: 500 TABLET ORAL at 18:00

## 2021-11-11 RX ADMIN — CEFEPIME HYDROCHLORIDE 2 G: 2 INJECTION, POWDER, FOR SOLUTION INTRAVENOUS at 13:18

## 2021-11-11 RX ADMIN — ACETAMINOPHEN 500 MG: 500 TABLET ORAL at 09:16

## 2021-11-11 RX ADMIN — ALPRAZOLAM 0.25 MG: 0.5 TABLET ORAL at 18:39

## 2021-11-11 RX ADMIN — OXYCODONE 5 MG: 5 TABLET ORAL at 18:06

## 2021-11-11 RX ADMIN — HEPARIN SODIUM 9820 UNITS: 1000 INJECTION INTRAVENOUS; SUBCUTANEOUS at 19:19

## 2021-11-11 NOTE — CONSULTS
Infectious Disease Consult Note    Reason for Consult: Paraspinal abscess  Date of Consultation: November 11, 2021  Date of Admission: 11/6/2021  Referring Physician: Dr. Lux Phelps      HPI:  Tae Golden is a 65y.o. year old female with history of lumbar spinal fusion and laminectomy surgeries in the past, multiple diagnosis of UTI, who presented on 11/6/21 after a fall in the setting of worsening right hip pain. Patient has a hx of  lumbar spinal fusion and laminectomy surgeries in the past for stenosis. Most recently, on 6/2/21 underwent exploration of the fusion, revision laminectomy L2-3, L5-S1 with sedrick osteotomy at L2-3, and posterior revision fusion L2-S1 with segmental instrumentation. Patient reports that she recovered well from the surgery. However she was then admitted 7/8/2021 to 7/20/2021 after a fall on her right hip. MRI had not shown any signs of infectious process at that time. However there was a wound at the surgical site which was healing slowly a wound VAC was placed on this. Cultures from this had growth of mixed samira. Was also treated for suspected bacterial pneumonia. Patient was again discharged to [facility] where she was recovering well. From [facility] she was sent home however the wound VAC arrangements were delayed and hence the wound care was not optimal at this time. Patient reports that there was a lot of drainage from the wound. The wound eventually healed up after the wound VAC was resumed. In the past few weeks patient was improving from the standpoint of the right hip pain. However in the past week the pain again once worsened and on 11/6/2021 she again had a fall which brought her to the hospital.    On arrival patient was afebrile, however had leukocytosis of 26 with 11% bandemia, lactic acid 2.6. CT abdomen pelvis showed \"postsurgical changes from L2-S1.  There is a collection of fluid posterior to the surgical changes and on the right side of the spine at L4/L5 and posterior to the right iliopsoas muscle just superior to the sacrum. \"  U/A showed pyuria 20-50. Urine cultures grew E.coli. Blood cultures from 11/6/21 are negative so far. CT lumbar spine showed \"Gas filled abscess in the right retroperitoneum has increased in size since 3 days ago and measures 9.7 x 9.1 x 7.5 cm. Possible fluid tract to the right L3-4 to right L4-5 foramen. \" Started on vancomycin, cefepime and flagyl on 11/6/21. Awaiting Surgical plan by Dr. Avery Zelaya from Orthopedics. Past Medical History:  Past Medical History:   Diagnosis Date    Adverse effect of anesthesia     O2 DROPS WITH ANESTHESIA    Arthritis     KNEES    Cancer ([facility] [address] 03/13/2015    SQUAMOUS CELL CARCINOMA; tonsils and lymph nodes.   Removed 3-2015 with radiation    GERD (gastroesophageal reflux disease)     Hypertension     NO LONGER ON MEDICATION    Hypothyroid     HYPOTHYROIDISM    Migraine     Nausea & vomiting     Obesity     Other ill-defined conditions(799.89)     chronic back pain    Psychiatric disorder     anxiety    Psychiatric disorder     panic ATTACKS    SVT (supraventricular tachycardia) ([facility] [address] 05/24/2014    Ulcerative colitis          Surgical History:  Past Surgical History:   Procedure Laterality Date    COLONOSCOPY,DIAGNOSTIC  11/19/2015         HX GI      colonscopy    HX HEENT  03/2015    tonsillectomy (CANCER) AND NECK LYMPH NODES    HX HEENT  2008    PARATHYROID REMOVAL    HX HEENT Left 2002    EYE LASER    HX HEMORRHOIDECTOMY  2001, 2016     X2    HX HYSTERECTOMY  1985    HX KNEE ARTHROSCOPY  1995    left knee surgery, TOTAL LT  and Right KNEE 2013    HX KNEE REPLACEMENT Bilateral 2013, 2014    HX LAP CHOLECYSTECTOMY  2002    HX LUMBAR FUSION  2011    SPINAL FUSION with hardware L4-L5    HX ORTHOPAEDIC  1990    CYST REMOVED RIGHT WRIST    HX ORTHOPAEDIC  05/12/2021    L2-3 & L5-21 LUMBAR LAMINECTOMY WITH ANDREWS OSTEOTOMY    HX OTHER SURGICAL      cyst removal right wrist    HX UROLOGICAL  2010    bladder surgery(interstim device)    IR INJ FORAMIN EPID LUMB ANES/STER SNGL  8/19/2019    SC EGD TRANSORAL BIOPSY SINGLE/MULTIPLE  5/20/2013              Family History:   Family History   Problem Relation Age of Onset    Cancer Mother         ovarian ca?  Heart Disease Father         MI    Heart Attack Father     Cancer Father         MULTIPLE MYELOMA    Liver Disease Father         FROM MEDICATIONS FOR MULTIPLE MYELOMA    Arthritis-osteo Sister     Arthritis-osteo Brother     Cancer Paternal Grandmother 79        breast ca    Breast Cancer Paternal Grandmother 79    Breast Cancer Maternal Aunt 55    Breast Cancer Maternal Aunt 61    Breast Cancer Paternal Aunt 43    Breast Cancer Paternal Aunt         52's    Emphysema Paternal Grandfather     No Known Problems Sister     No Known Problems Brother     No Known Problems Brother     Anesth Problems Neg Hx          Social History:     No smoke or drink. Lives at home. Allergies: Allergies   Allergen Reactions    Adhesive Tape-Silicones Rash    Aloe Vera Rash    Chlorhexidine Rash    Tussin [Dextromethorphan Hbr] Palpitations    Readyprep Chg [Chlorhexidine Gluconate] Rash         Review of Systems:     Negative except as in HPI    Medications:  No current facility-administered medications on file prior to encounter. Current Outpatient Medications on File Prior to Encounter   Medication Sig Dispense Refill    L.acid,para-B. bifidum-S.therm (RISAQUAD) 8 billion cell cap cap Take 1 Capsule by mouth daily. 30 Capsule 0    miconazole (MICOTIN) 2 % topical powder Apply  to affected area two (2) times a day. 1 Bottle 0    amitriptyline (ELAVIL) 50 mg tablet Take 50 mg by mouth nightly.  atorvastatin (LIPITOR) 40 mg tablet Take 40 mg by mouth nightly.  metFORMIN (GLUCOPHAGE) 500 mg tablet Take 500 mg by mouth two (2) times daily (with meals).       butalb/acetaminophen/caffeine (FIORICET PO) Take 1 Tablet by mouth as needed.  aspirin delayed-release 81 mg tablet Take 81 mg by mouth daily.  ALPRAZolam (XANAX) 0.5 mg tablet Take 0.5 mg by mouth two (2) times a day.  levothyroxine (SYNTHROID) 75 mcg tablet Take 112 mcg by mouth Daily (before breakfast).  PARoxetine (PAXIL) 40 mg tablet Take 40 mg by mouth every morning.  metoprolol tartrate (LOPRESSOR) 25 mg tablet Take 12.5 mg by mouth nightly.  metoprolol (LOPRESSOR) 25 mg tablet Take 25 mg by mouth every morning.  omeprazole (PRILOSEC) 40 mg capsule Take 40 mg by mouth every morning.  acetaminophen (TYLENOL) 325 mg tablet Take 2 Tablets by mouth every six (6) hours as needed for Pain. 30 Tablet 0    magnesium hydroxide (MILK OF MAGNESIA) 400 mg/5 mL suspension Take 30 mL by mouth daily. (Patient not taking: Reported on 11/6/2021) 1 Bottle 0    polyethylene glycol (MIRALAX) 17 gram packet Take 1 Packet by mouth daily. (Patient not taking: Reported on 11/6/2021) 30 Packet 0    senna (SENOKOT) 8.6 mg tablet Take 2 Tablets by mouth daily. (Patient not taking: Reported on 11/6/2021) 60 Tablet 0    heparin sodium,porcine (heparin, porcine,) 5,000 unit/mL injection 1 mL by SubCUTAneous route every eight (8) hours. 90 Syringe 0    naloxone (NARCAN) 4 mg/actuation nasal spray Use 1 spray intranasally, then discard. Repeat with new spray every 2 min as needed for opioid overdose symptoms, alternating nostrils.  1 Each 0           Physical Exam:    Vitals:   Patient Vitals for the past 24 hrs:   Temp Pulse Resp BP SpO2   11/11/21 0756 97.9 °F (36.6 °C) 75 17 (!) 103/58 93 %   11/10/21 2332    (!) 121/57    11/10/21 2118 98.2 °F (36.8 °C) 74 16 111/63 97 %   11/10/21 1612 98.4 °F (36.9 °C) 76 18 (!) 103/56 96 %   11/10/21 1107 98 °F (36.7 °C) 71 18 (!) 92/55 100 %   ·   · GEN: NAD  · HEENT: Normocephalic, atraumatic, PERRL, no scleral icterus  · CV: S1, S2 heard regularly, no murmurs, thrills or rubs heard · Lungs: room air  · Abdomen: soft, non distended, non tender  · Genitourinary:  no mittal  · Extremities: no edema  · Neuro: Alert, oriented to time, place and situation, moves all extremities to commands, verbal   · Skin: lumbar wound is entirely healed.    · Psych: good affect, good eye contact, non tearful   · Lines: PIVs      Labs:   Recent Results (from the past 24 hour(s))   GLUCOSE, POC    Collection Time: 11/10/21 11:46 AM   Result Value Ref Range    Glucose (POC) 237 (H) 65 - 117 mg/dL    Performed by Fifi Almodovar PCT    GLUCOSE, POC    Collection Time: 11/10/21  4:34 PM   Result Value Ref Range    Glucose (POC) 235 (H) 65 - 117 mg/dL    Performed by Fifi Almodovar PCT    GLUCOSE, POC    Collection Time: 11/10/21  9:00 PM   Result Value Ref Range    Glucose (POC) 192 (H) 65 - 117 mg/dL    Performed by Arminda Villegas RN    CBC W/O DIFF    Collection Time: 11/11/21  5:33 AM   Result Value Ref Range    WBC 20.1 (H) 3.6 - 11.0 K/uL    RBC 4.36 3.80 - 5.20 M/uL    HGB 10.8 (L) 11.5 - 16.0 g/dL    HCT 34.6 (L) 35.0 - 47.0 %    MCV 79.4 (L) 80.0 - 99.0 FL    MCH 24.8 (L) 26.0 - 34.0 PG    MCHC 31.2 30.0 - 36.5 g/dL    RDW 17.3 (H) 11.5 - 14.5 %    PLATELET 860 (H) 341 - 400 K/uL    MPV 9.6 8.9 - 12.9 FL    NRBC 0.0 0  WBC    ABSOLUTE NRBC 0.00 0.00 - 7.92 K/uL   METABOLIC PANEL, BASIC    Collection Time: 11/11/21  5:33 AM   Result Value Ref Range    Sodium 139 136 - 145 mmol/L    Potassium 3.1 (L) 3.5 - 5.1 mmol/L    Chloride 106 97 - 108 mmol/L    CO2 27 21 - 32 mmol/L    Anion gap 6 5 - 15 mmol/L    Glucose 179 (H) 65 - 100 mg/dL    BUN 6 6 - 20 MG/DL    Creatinine 0.53 (L) 0.55 - 1.02 MG/DL    BUN/Creatinine ratio 11 (L) 12 - 20      GFR est AA >60 >60 ml/min/1.73m2    GFR est non-AA >60 >60 ml/min/1.73m2    Calcium 8.1 (L) 8.5 - 10.1 MG/DL   GLUCOSE, POC    Collection Time: 11/11/21  7:45 AM   Result Value Ref Range    Glucose (POC) 166 (H) 65 - 117 mg/dL    Performed by Chen Vital PCT Microbiology Data: In HPI        Assessment:   66 yo F with:    - Sepsis due to right retroperitoneal abscess 9.7 x 9.1 x 7.5 cm with likely involvement of the lumbar spine. This has likely developed in the setting of poorly healing lumbar wound in July 2021. Patient reports that after the wound VAC care was disrupted when she went home, there was lot of drainage from the lumbar wound. The lumbar wound eventually healed up after the wound VAC was resumed. However this must have created a nidus of infection at that time.  -History of lumbar fusion surgeries. - Hx of urinary incontinence and multiple diagnosis of UTIs in the past. E.coli in urine this admission.   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   - TTE 11/8 technically difficult. Recommendations:  -Continue empiric vancomycin cefepime and Flagyl  -Communicated with Dr. Homero Olivarez team today- planning to evaluate patient on 11/11/2021.  -We recommend drainage of the abscess. Please collect aerobic, anaerobic, fungal and AFB cultures. - Given proximity to the lumbar instrumentation, will be treated as deep space infection and plan for antibiotics for 4 to 6 weeks. -For the celiac artery thrombosis please have vascular surgery evaluate.  -Splenic infarct seen as well; unclear etiology. Patient is not bacteremic currently. Relayed to Dr. Roni Hernandez. Will follow. Thank for the opportunity to participate in the care of this patient. Please contact with questions or concerns.            Velma Way MD  Infectious Diseases

## 2021-11-11 NOTE — PROGRESS NOTES
Problem: Mobility Impaired (Adult and Pediatric)  Goal: *Acute Goals and Plan of Care (Insert Text)  Description: FUNCTIONAL STATUS PRIOR TO ADMISSION: Ambulates household distances with RW support vs uses RW, stating she mostly has been relying on WC recently. History of MULTIPLE falls. Caregiver for  with dementia. HOME SUPPORT PRIOR TO ADMISSION: The patient lived with  however states he has dementia. Physical Therapy Goals  Initiated 11/10/2021  1. Patient will move from supine to sit and sit to supine , scoot up and down, and roll side to side in bed with supervision/set-up within 7 day(s). 2.  Patient will transfer from bed to chair and chair to bed with minimal assistance/contact guard assist using the least restrictive device within 7 day(s). 3.  Patient will perform sit to stand with minimal assistance/contact guard assist within 7 day(s). 4.  Patient will ambulate with minimal assistance/contact guard assist for 10 feet with the least restrictive device within 7 day(s). Outcome: Progressing Towards Goal  PHYSICAL THERAPY TREATMENT  Patient: Belkys Pham (22 y.o. female)  Date: 11/11/2021  Diagnosis: Hyperglycemia [R73.9]  MERCEDES (acute kidney injury) ([facility] [address] [N17.9]  Leukocytosis [D72.829]  SIRS (systemic inflammatory response syndrome) ([facility]) [R65.10]  Candida vaginitis [B37.3]   <principal problem not specified>       Precautions: Back, Fall, WBAT  Chart, physical therapy assessment, plan of care and goals were reviewed. ASSESSMENT  Patient continues with skilled PT services and is progressing towards goals, demonstrating minor improvement in activity tolerance, strength, and overall mobility this date. Pt continues to require max encouragement and positive reinforcement throughout as she is extremely self-limiting and anxious. Pt able to progress ambulation trial to a distance of 10ft and 15ft, respectively, with seated rest break b/t.  Ambulation occurred with RW support and CGAx1 with pt exhibiting decreased gait speed with overall shuffled gait pattern. Pt remains well below her baseline mod I level and at increased risk for falls. Continue to recommend additional rehab at discharge. Current Level of Function Impacting Discharge (mobility/balance): SBA for sit<>stand, CGA w/ RW support during ambulation    Other factors to consider for discharge: hx of falls, anxiety, lives with  who has dementia         PLAN :  Patient continues to benefit from skilled intervention to address the above impairments. Continue treatment per established plan of care. to address goals. Recommendation for discharge: (in order for the patient to meet his/her long term goals)  Therapy up to 5 days/week in SNF setting    This discharge recommendation:  Has been made in collaboration with the attending provider and/or case management    IF patient discharges home will need the following DME: to be determined (TBD)       SUBJECTIVE:   Patient stated I can't lay on my left side.     OBJECTIVE DATA SUMMARY:   Critical Behavior:  Neurologic State: Alert  Orientation Level: Oriented X4  Cognition: Follows commands  Safety/Judgement: Awareness of environment, Fall prevention  Functional Mobility Training:  Bed Mobility:  Rolling: Contact guard assistance  Supine to Sit: Contact guard assistance  Sit to Supine: Contact guard assistance  Scooting: Stand-by assistance        Transfers:  Sit to Stand: Stand-by assistance  Stand to Sit: Stand-by assistance                             Balance:  Sitting: Intact  Standing: Impaired;  With support (RW)  Standing - Static: Good; Constant support  Standing - Dynamic : Fair; Constant support  Ambulation/Gait Training:  Distance (ft): 25 Feet (ft) (10ft, 15ft w/ seated rest break b/t)  Assistive Device: Walker, rolling; Gait belt  Ambulation - Level of Assistance: Contact guard assistance        Gait Abnormalities: Decreased step clearance; Shuffling gait Base of Support: Widened     Speed/Catherine: Slow; Shuffled  Step Length: Right shortened; Left shortened                               Pain Rating:  Did not report pain    Activity Tolerance:   HR elevated to 118 bpm, all other VSS on RA    After treatment patient left in no apparent distress:   Supine in bed, Patient positioned in R sidelying for pressure relief, Call bell within reach, and Bed / chair alarm activated    COMMUNICATION/COLLABORATION:   The patients plan of care was discussed with: Occupational therapist and Registered nurse.      Getachew Hernandez PT, DPT   Time Calculation: 27 mins

## 2021-11-11 NOTE — PROGRESS NOTES
Comprehensive Nutrition Assessment    Type and Reason for Visit: Initial, RD nutrition re-screen/LOS    Nutrition Recommendations/Plan:   · Continue CCD/60 g CHO/meal for BG management. · RD will add Ensure High Protein once daily with dinner meals given documented decreased po intake. 1-bottle of Ensure HP will provide daily approx. 160 kcals/16 g protein/19 g CHO. · Please document % meals and supplements consumed in flowsheet I/O's under intake. Nutrition Assessment:     11/11: Chart reviewed; med noted for elevated BG levels >600 mg/dl on admission, A1C 11.7%. BG levels have come down significantly since admission, now <200 mg/dl. Pt is receiving a Consistent Carb Diet/60 g per meal which is appropriate given ht/wt and calculated energy needs. Pt sleeping soundly at time of visit. Per limited documentation, po intake 25% of meals. Patient Vitals for the past 168 hrs:   % Diet Eaten   11/11/21 0934 1 - 25%   11/09/21 0943 1 - 25%     Last Weight Metric  Weight Loss Metrics 11/10/2021 7/10/2021 6/4/2021 6/2/2021 5/12/2021 5/4/2021 9/18/2020   Today's Wt 270 lb 6.4 oz 268 lb 6.4 oz 291 lb 0.1 oz - 267 lb 267 lb 3.2 oz 244 lb 14.9 oz   BMI 46.41 kg/m2 46.07 kg/m2 - 49.95 kg/m2 45.47 kg/m2 45.51 kg/m2 40.76 kg/m2     Estimated Daily Nutrient Needs:  Energy (kcal): 2066 (BMR 1722 x 1.2AF); Weight Used for Energy Requirements: Current  Protein (g):  (1.0 g/kg bw); Weight Used for Protein Requirements: Current  Fluid (ml/day): 1375-2640 ml/day; Method Used for Fluid Requirements:      Nutrition Related Findings:  BM: 11/10; Labs: , A1C 11.7%; Meds: Lipitor, Lovenox, Flagyl, IVF NaCl      Wounds:    None       Current Nutrition Therapies:  ADULT DIET Regular; 4 carb choices (60 gm/meal)  ADULT ORAL NUTRITION SUPPLEMENT Dinner;  Low Calorie/High Protein    Anthropometric Measures:  · Height:  5' 4\" (162.6 cm)  · Current Body Wt:  122.7 kg (270 lb 8.1 oz)   · Ideal Body Wt:  120 lbs:  225.4 %   · BMI Category:  Obese class 3 (BMI 40.0 or greater)       Nutrition Diagnosis:   · Altered nutrition-related lab values related to  (hx of uncontrolled DM) as evidenced by  (elevated BG >600 on admission (now 179 mg/dl), A1C 11.5%)    Nutrition Interventions:   Food and/or Nutrient Delivery: Continue current diet  Nutrition Education and Counseling: Education not appropriate  Coordination of Nutrition Care: Continue to monitor while inpatient    Goals:  Trend PO intake >50% of meals while maintaining BG <200 mg/dl next 3-5 days       Nutrition Monitoring and Evaluation:   Behavioral-Environmental Outcomes: None identified  Food/Nutrient Intake Outcomes: Food and nutrient intake  Physical Signs/Symptoms Outcomes: Biochemical data, Weight, Skin    Discharge Planning:    Continue current diet     Electronically signed by Elle Torres RD on 11/11/2021 at 3:36 PM

## 2021-11-11 NOTE — PROGRESS NOTES
Spiritual Care Assessment/Progress Note  [facility]      NAME: Antonina Fatima      MRN: [id number]  AGE: 65 y.o.  SEX: female  Orthodoxy Affiliation: Presybeterian   Language: English     11/11/2021     Total Time (in minutes): 16     Spiritual Assessment begun in [facility] 2 MED TELE through conversation with:         [x]Patient        [] Family    [] Friend(s)        Reason for Consult: Initial/Spiritual assessment, patient floor     Spiritual beliefs: (Please include comment if needed)     [] Identifies with a migue tradition:   Jose     [] Supported by a migue community:            [] Claims no spiritual orientation:           [] Seeking spiritual identity:                [] Adheres to an individual form of spirituality:           [] Not able to assess:                           Identified resources for coping:      [x] Prayer                               [] Music                  [] Guided Imagery     [x] Family/friends                 [] Pet visits     [] Devotional reading                         [] Unknown     [] Other:                                               Interventions offered during this visit: (See comments for more details)    Patient Interventions: Initial/Spiritual assessment, patient floor, Normalization of emotional/spiritual concerns, Affirmation of migue           Plan of Care:     [] Support spiritual and/or cultural needs    [] Support AMD and/or advance care planning process      [] Support grieving process   [] Coordinate Rites and/or Rituals    [] Coordination with community clergy   [] No spiritual needs identified at this time   [] Detailed Plan of Care below (See Comments)  [] Make referral to Music Therapy  [] Make referral to Pet Therapy     [] Make referral to Addiction services  [] Make referral to [facility]  [] Make referral to Spiritual Care Partner  [] No future visits requested        [x] Contact Spiritual Care for further referrals     Comments: 's visit was on 2 [facility] unit for initial spiritual assessment. Patient. Mrs. Escudero was in bed, appeared comfortable. She welcomed  warmly when he introduced himself. She indicated she was doing well today. She has a large supportve family; two sons and ten grandchildren. She indicated that migue is important also for coping. She stated she is hopeful she'll be ok and expressed no other need for support. Assurance of prayer was offered, patient thanked  for the visit. Spiritual care is available for support upon further referral.  Visited by: Merari George.    Paging Service: [phone])

## 2021-11-11 NOTE — PROGRESS NOTES
Spoke with  Pily Samano in pharmacy regarding heparin bolus dose and  Heparin           Drip rate. The order in the [facility] is correct.

## 2021-11-11 NOTE — PROGRESS NOTES
Patient transferred to [facility]. Report given to SONAM Llamas CM to follow up with dcp. Amanda Lua RN BSN CRM        [phone]

## 2021-11-11 NOTE — PROGRESS NOTES
Spoke with Karson Necessary in pharmacy. Bolus of 9820 units and starting drip rate of 15 mls/hr is correct.

## 2021-11-11 NOTE — PROGRESS NOTES
Multiple attempts at second IV access for heparin drip unsuccessful. IV team at bedside with ultrasound machine to attempt IV access.

## 2021-11-11 NOTE — PROGRESS NOTES
Hospitalist Progress Note    NAME: Lidia Florence   :  1950   MRN:  [id number]     Interim Hospital Summary: 65 y.o. female whom presented on 2021 with      Assessment / Plan:    Severe sepsis, POA as evidence by hypothermia, rectal temp 96, tachy , lactic 9.2 -->5.3 after 1L, WBC 26K, MERCEDES Cr 1.5 due to  Suspected epidural/right iliopsoas abscess with   Right paraspinal pain at level L3-4 and R iliac crest pain (complains of R hip pain), POA   -CT abd/pelvis showing fluid posterior to the surgical changes and on the right side of the spine at L4/L5 and posterior to the right iliopsoas muscle just superior to the sacrum. Findings are concerning for an infectious process. -S/P L2-3 AND L5-S1  LUMBAR LAMINECTOMY WITH ANDREWS OSTEOTOMY L2-3 AND L3-4 WITH PLF L2-S1 by Dr. Veronica Devi 5/3/3999; complicated with nonhealing post surgical wound requiring wound vac 2021.    -Continue empiric Vanco, cefepime and Flagyl  -Leukocytosis is up to 20,000 today, patient continues to report pain in the right iliac crest region  -Consult orthopedics - waiting for Dr Roxy Beasley input  -scheduled tylenol for pain. Oxycodone prn  -PT OT eval and treat. Transfers by herself from bed to [facility] at home.  not able to help.  -We will add infectious disease consult to help with treatment of paraspinal abscess    Celiac Artery Thrombosis with splenic Infarcts:  CT ABD/Pelvis  showed celiac artery thrombosis with splenic infarcts  Will add heparin gtt for now  Vascular consult  Avoid DOACS in anticipation of aspiration of paraspinal abscess      UTI  -Ucx E coli  -sensitive to cefepime   -Rea placed in ER due to poor mobility from hip pain and polyuria, with severe candidal infection in perineum and labia and buttocks.   VT later this week when up with PT and less pain    Type 2DM, uncontrolled with hyperglycemic hyperosmolar hyperglycemia and early DKA, due to severe sepsis, POA  -presented with , AG 15, trace ketone in urine  -A1c 11.7 up from 7.6 in July.  -continue holding metformin  -increase Lantus 35 units. Continue SSI prn. Off drip 11/07  -Blood sugars are better controlled    Prerenal MERCEDES Cr 1.5  -resolved with IVF hydration.       Constipation  -added pericolace and miralax    Severe candida infection in perineum, labia, buttocks  -continue mystatin cream ordered in ER. Added diflucan x 7 days. -wound care consult     Generalized weakness and gait difficulty   -consult pt/ot     SVT   HTN / HLD  -continue metoprolol and statin  -having runs of SVT 11/07. Cardiology input appreciated     Hypothyroid  -continue levothyroxine. TSH 3.6     Depression and anxiety  -continue paxil with xanax prn     Severe obesity    Code:  Discussed, full  DVT prophylaxis: lovenox  Surrogate decision maker:   (but has some dementia per patient) or sister [facility]    40 or above Morbid obesity / Body mass index is 46.41 kg/m². Subjective:     Chief Complaint / Reason for Physician Visit  Follow up of sepsis, hyperosmolar hyperglycemic state  Chart reviewed in detail. Discussed with RN events overnight. Continues to report right leg rest pain. Blood sugars are better controlled  Orthopedic consult still pending    Review of Systems:  Symptom Y/N Comments  Symptom Y/N Comments   Fever/Chills n   Chest Pain n    Poor Appetite n   Edema n    Cough n   Abdominal Pain n    Sputum n   Joint Pain     SOB/AVALOS n   Pruritis/Rash     Nausea/vomit n   Tolerating PT/OT     Diarrhea n   Tolerating Diet     Constipation n   Other       Could NOT obtain due to:      PO intake: No data found. Objective:     VITALS:   Last 24hrs VS reviewed since prior progress note.  Most recent are:  Patient Vitals for the past 24 hrs:   Temp Pulse Resp BP SpO2   11/11/21 0756 97.9 °F (36.6 °C) 75 17 (!) 103/58 93 %   11/10/21 2332    (!) 121/57    11/10/21 2118 98.2 °F (36.8 °C) 74 16 111/63 97 % 11/10/21 1612 98.4 °F (36.9 °C) 76 18 (!) 103/56 96 %   11/10/21 1107 98 °F (36.7 °C) 71 18 (!) 92/55 100 %       Intake/Output Summary (Last 24 hours) at 11/11/2021 5703  Last data filed at 11/11/2021 0756  Gross per 24 hour   Intake    Output 2900 ml   Net -2900 ml        I had a face to face encounter, and independently examined this patient on 11/11/2021, as outlined below:  PHYSICAL EXAM:  General: WD, WN. Alert, cooperative, no acute distress    EENT:  EOMI. Anicteric sclerae. MMM  Resp:  CTA bilaterally, no wheezing or rales. No accessory muscle use  CV:  Regular  rhythm,  No edema  GI:  Soft, Non distended, + mild right flank tender. +Bowel sounds  Neurologic:  Alert and oriented X 3, normal speech,   Psych:   Good insight. Not anxious nor agitated  Skin:  No rashes. No jaundice    Reviewed most current lab test results and cultures  YES  Reviewed most current radiology test results   YES  Review and summation of old records today    NO  Reviewed patient's current orders and MAR    YES  PMH/ reviewed - no change compared to H&P  ________________________________________________________________________  Care Plan discussed with:    Comments   Patient x    Family      RN x    Care Manager     Consultant                       x Multidiciplinary team rounds were held today with , nursing, pharmacist and clinical coordinator. Patient's plan of care was discussed; medications were reviewed and discharge planning was addressed.      ________________________________________________________________________  Total NON critical care TIME:  35   Minutes    Total CRITICAL CARE TIME Spent:   Minutes non procedure based      Comments   >50% of visit spent in counseling and coordination of care x     This includes time during multidisciplinary rounds if indicated above   ________________________________________________________________________  Carl Tie Siding, MD     Procedures: see electronic medical records for all procedures/Xrays and details which were not copied into this note but were reviewed prior to creation of Plan. LABS:  I reviewed today's most current labs and imaging studies.   Pertinent labs include:  Recent Labs     11/11/21  0533 11/10/21  0413 11/09/21  0252   WBC 20.1* 17.3* 19.2*   HGB 10.8* 11.1* 10.3*   HCT 34.6* 35.7 34.2*   * 494* 452*     Recent Labs     11/11/21  0533 11/10/21  0413 11/09/21  0252    137 137   K 3.1* 3.3* 3.4*    104 104   CO2 27 25 25   * 204* 169*   BUN 6 7 12   CREA 0.53* 0.60 0.61   CA 8.1* 8.2* 8.0*   MG  --  2.0 2.0

## 2021-11-11 NOTE — PROGRESS NOTES
Cardiology Progress Note      11/11/2021 1:50 PM    Admit Date: 11/6/2021          Subjective:  No new c/o. No further episodes of SVT          Visit Vitals  BP (!) 122/52   Pulse 80   Temp 98 °F (36.7 °C)   Resp 18   Ht 5' 4\" (1.626 m)   Wt 122.7 kg (270 lb 6.4 oz)   SpO2 97%   Breastfeeding No   BMI 46.41 kg/m²     11/09 1901 - 11/11 0700  In: 180 [P.O.:180]  Out: 3675 [Urine:3675]        Objective:      Physical Exam:  VS as above     Data Review:   Labs:    BUN 8  Creat 0.5  Hgb 10.8  WBC 20.1    Telemetry: SR R 80. No SVT       Assessment:     1.  Intermittent supraventricular tachycardia.  Possibly atrioventricular node reentry or atrial tachycardia associated with severe sepsis. 2.  History of nonobstructive coronary artery disease and normal ejection fraction by prior evaluation. 3.  Type 2 diabetes. 4.   retroperitoneal abscess with sepsis. Prior laminectomy 6/21 with poor wound healing   5.  Hypothyroidism. 6.  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     Plan:  Cont current Rx. Nothing to add today.

## 2021-11-11 NOTE — PROGRESS NOTES
Problem: Self Care Deficits Care Plan (Adult)  Goal: *Acute Goals and Plan of Care (Insert Text)  Description: FUNCTIONAL STATUS PRIOR TO ADMISSION: Patient was modified independent for basic and instrumental ADLs; used w/c in hallway; used RW in rooms(?); cares for spouse with dementia; has son that lives close. HOME SUPPORT: The patient lived with spouse, but cares for him. Occupational Therapy Goals  Initiated 11/8/2021    1. Patient will perform grooming with supervision/set-up within 7 days. 2.  Patient will perform UB bathing with moderate assistance  using most appropriate DME within 7 days. 3.  Patient will lower body dressing at moderate assistance with A/E within 7 days. 4.  Patient will perform bed mobility at moderate assistance  within 7 days. 5.  Patient will verbalize/demonstrate 3/3 back precautions during ADL tasks without cues within 7 days. Outcome: Progressing Towards Goal   OCCUPATIONAL THERAPY TREATMENT  Patient: Yousuf Palmer (89 y.o. female)  Date: 11/11/2021  Diagnosis: Hyperglycemia [R73.9]  MERCEDES (acute kidney injury) ([facility] [address] [N17.9]  Leukocytosis [D72.829]  SIRS (systemic inflammatory response syndrome) ([facility]) [R65.10]  Candida vaginitis [B37.3]   <principal problem not specified>       Precautions: Back, Fall, WBAT  Chart, occupational therapy assessment, plan of care, and goals were reviewed. ASSESSMENT  Patient continues with skilled OT services and is progressing towards goals. Patient received on room air, resting in bed and agreeable to session. Patient achieving transition to EOB with CGA. Patient agreeable to trial functional distance ambulation to [facility], completing with overall CGA and use of RW. Patient sitting at [facility] for grooming ADLs, benefiting from cues to attempt tasks first before requesting help as patient asking therapist to brush her hair but capable of brushing with set-up once encouraged. Patient returning to bed with VSS.  Patient tolerating L sidelying for only 5 minutes before beginning to become extremely anxious and hyperventilating - patient able to roll and reposition with min assist to achieve R sidelying. Educated patient on importance of frequent repositioning for skin integrity. Although patient verbalized understanding, also reporting severe anxiety with changing her hospital routine. Throughout session, patient requiring encouragement as well as increased time for anxiety and pain management with all transitional movements. Continue to recommend rehab at discharge. Current Level of Function Impacting Discharge (ADLs): set-up to total A    Other factors to consider for discharge: pain management         PLAN :  Patient continues to benefit from skilled intervention to address the above impairments. Continue treatment per established plan of care to address goals. Recommend with staff: OOB to chair and toileting at [facility] with RW and assist x1    Recommend next OT session: toileting    Recommendation for discharge: (in order for the patient to meet his/her long term goals)  Rehab    This discharge recommendation:  Has been made in collaboration with the attending provider and/or case management    IF patient discharges home will need the following DME: TBD       SUBJECTIVE:   Patient stated I can't stay like this. It makes me nervous.  Patient with poor tolerance for L sidelying (~5 minutes). OBJECTIVE DATA SUMMARY:   Cognitive/Behavioral Status:  Neurologic State: Alert; Appropriate for age  Orientation Level: Oriented X4  Cognition: Follows commands; Decreased attention/concentration  Perception: Appears intact  Perseveration: Perseverates during conversation; Verbal cues provided; Visual cues provided; Tactile cues provided  Safety/Judgement: Fall prevention;  Awareness of environment (decreased safety)    Functional Mobility and Transfers for ADLs:  Bed Mobility:  Rolling: Contact guard assistance  Supine to Sit: Contact guard assistance  Sit to Supine: Contact guard assistance  Scooting: Stand-by assistance    Transfers:  Sit to Stand: Stand-by assistance  Functional Transfers  Toilet Transfer : Contact guard assistance; Additional time  Cues: Verbal cues provided; Visual cues provided  Adaptive Equipment: Bedside commode     Balance:  Sitting: Intact  Standing: Impaired; With support (RW)  Standing - Static: Good; Constant support  Standing - Dynamic : Fair; Constant support    ADL Intervention:  Feeding  Feeding Assistance: Set-up  Drink to Mouth: Set-up    Grooming  Grooming Assistance: Set-up; Stand-by assistance  Position Performed: Seated in chair  Brushing/Combing Hair: Stand-by assistance  Cues: Verbal cues provided    Upper Body Dressing Assistance  Dressing Assistance: Minimum assistance  Front Opened Shirt: Minimum assistance    Lower Body Dressing Assistance  Dressing Assistance: Total assistance(dependent)  Socks: Total assistance (dependent)    Toileting  Clothing Management: Maximum assistance (simulated at [facility])  Cues: Verbal cues provided; Visual cues provided (physical assist for gown management)    Cognitive Retraining  Attention to Task: Single task (significant anxiety)  Following Commands: Follows one step commands/directions  Safety/Judgement: Fall prevention; Awareness of environment (decreased safety)  Cues: Verbal cues provided    Pain:  Patient reporting chronic, generalized pain. Improved in R sidelying. Activity Tolerance:   Fair and requires rest breaks    After treatment patient left in no apparent distress:   Supine in bed, Patient positioned in R sidelying for pressure relief, Call bell within reach, and Side rails x 3    COMMUNICATION/COLLABORATION:   The patients plan of care was discussed with: Physical therapist and Registered nurse.      Juan Thornton OT  Time Calculation: 38 mins

## 2021-11-12 ENCOUNTER — APPOINTMENT (OUTPATIENT)
Dept: CT IMAGING | Age: 71
DRG: 853 | End: 2021-11-12
Attending: INTERNAL MEDICINE
Payer: MEDICARE

## 2021-11-12 ENCOUNTER — APPOINTMENT (OUTPATIENT)
Dept: CT IMAGING | Age: 71
DRG: 853 | End: 2021-11-12
Attending: NURSE PRACTITIONER
Payer: MEDICARE

## 2021-11-12 LAB
ANION GAP SERPL CALC-SCNC: 7 MMOL/L (ref 5–15)
APTT PPP: 28.6 SEC (ref 22.1–31)
BASOPHILS # BLD: 0.2 K/UL (ref 0–0.1)
BASOPHILS NFR BLD: 1 % (ref 0–1)
BUN SERPL-MCNC: 5 MG/DL (ref 6–20)
BUN/CREAT SERPL: 10 (ref 12–20)
CALCIUM SERPL-MCNC: 8.3 MG/DL (ref 8.5–10.1)
CHLORIDE SERPL-SCNC: 106 MMOL/L (ref 97–108)
CO2 SERPL-SCNC: 24 MMOL/L (ref 21–32)
CREAT SERPL-MCNC: 0.52 MG/DL (ref 0.55–1.02)
DIFFERENTIAL METHOD BLD: ABNORMAL
EOSINOPHIL # BLD: 0.5 K/UL (ref 0–0.4)
EOSINOPHIL NFR BLD: 2 % (ref 0–7)
ERYTHROCYTE [DISTWIDTH] IN BLOOD BY AUTOMATED COUNT: 17.7 % (ref 11.5–14.5)
GLUCOSE BLD STRIP.AUTO-MCNC: 114 MG/DL (ref 65–117)
GLUCOSE BLD STRIP.AUTO-MCNC: 121 MG/DL (ref 65–117)
GLUCOSE BLD STRIP.AUTO-MCNC: 148 MG/DL (ref 65–117)
GLUCOSE BLD STRIP.AUTO-MCNC: 152 MG/DL (ref 65–117)
GLUCOSE SERPL-MCNC: 113 MG/DL (ref 65–100)
HCT VFR BLD AUTO: 31.2 % (ref 35–47)
HGB BLD-MCNC: 10.1 G/DL (ref 11.5–16)
IMM GRANULOCYTES # BLD AUTO: 0 K/UL (ref 0–0.04)
IMM GRANULOCYTES NFR BLD AUTO: 0 % (ref 0–0.5)
LYMPHOCYTES # BLD: 1.8 K/UL (ref 0.8–3.5)
LYMPHOCYTES NFR BLD: 8 % (ref 12–49)
MCH RBC QN AUTO: 24.9 PG (ref 26–34)
MCHC RBC AUTO-ENTMCNC: 32.4 G/DL (ref 30–36.5)
MCV RBC AUTO: 77 FL (ref 80–99)
MONOCYTES # BLD: 0.9 K/UL (ref 0–1)
MONOCYTES NFR BLD: 4 % (ref 5–13)
NEUTS BAND NFR BLD MANUAL: 6 %
NEUTS SEG # BLD: 19.3 K/UL (ref 1.8–8)
NEUTS SEG NFR BLD: 79 % (ref 32–75)
NRBC # BLD: 0 K/UL (ref 0–0.01)
NRBC BLD-RTO: 0 PER 100 WBC
PLATELET # BLD AUTO: 514 K/UL (ref 150–400)
PMV BLD AUTO: 9.3 FL (ref 8.9–12.9)
POTASSIUM SERPL-SCNC: 3.2 MMOL/L (ref 3.5–5.1)
RBC # BLD AUTO: 4.05 M/UL (ref 3.8–5.2)
RBC MORPH BLD: ABNORMAL
RBC MORPH BLD: ABNORMAL
SERVICE CMNT-IMP: ABNORMAL
SERVICE CMNT-IMP: NORMAL
SODIUM SERPL-SCNC: 137 MMOL/L (ref 136–145)
THERAPEUTIC RANGE,PTTT: NORMAL SECS (ref 58–77)
WBC # BLD AUTO: 22.7 K/UL (ref 3.6–11)
WBC MORPH BLD: ABNORMAL

## 2021-11-12 PROCEDURE — 82962 GLUCOSE BLOOD TEST: CPT

## 2021-11-12 PROCEDURE — 74011250636 HC RX REV CODE- 250/636: Performed by: INTERNAL MEDICINE

## 2021-11-12 PROCEDURE — 85730 THROMBOPLASTIN TIME PARTIAL: CPT

## 2021-11-12 PROCEDURE — 2709999900 HC NON-CHARGEABLE SUPPLY

## 2021-11-12 PROCEDURE — 77030027138 HC INCENT SPIROMETER -A

## 2021-11-12 PROCEDURE — 87075 CULTR BACTERIA EXCEPT BLOOD: CPT

## 2021-11-12 PROCEDURE — 71275 CT ANGIOGRAPHY CHEST: CPT

## 2021-11-12 PROCEDURE — 49405 IMAGE CATH FLUID COLXN VISC: CPT

## 2021-11-12 PROCEDURE — 99223 1ST HOSP IP/OBS HIGH 75: CPT | Performed by: ORTHOPAEDIC SURGERY

## 2021-11-12 PROCEDURE — C1729 CATH, DRAINAGE: HCPCS

## 2021-11-12 PROCEDURE — 74011000258 HC RX REV CODE- 258: Performed by: INTERNAL MEDICINE

## 2021-11-12 PROCEDURE — 74011250637 HC RX REV CODE- 250/637: Performed by: INTERNAL MEDICINE

## 2021-11-12 PROCEDURE — 80202 ASSAY OF VANCOMYCIN: CPT

## 2021-11-12 PROCEDURE — 85025 COMPLETE CBC W/AUTO DIFF WBC: CPT

## 2021-11-12 PROCEDURE — 74011250637 HC RX REV CODE- 250/637: Performed by: HOSPITALIST

## 2021-11-12 PROCEDURE — 74011000636 HC RX REV CODE- 636: Performed by: INTERNAL MEDICINE

## 2021-11-12 PROCEDURE — 74011636637 HC RX REV CODE- 636/637: Performed by: INTERNAL MEDICINE

## 2021-11-12 PROCEDURE — 87070 CULTURE OTHR SPECIMN AEROBIC: CPT

## 2021-11-12 PROCEDURE — 99233 SBSQ HOSP IP/OBS HIGH 50: CPT | Performed by: STUDENT IN AN ORGANIZED HEALTH CARE EDUCATION/TRAINING PROGRAM

## 2021-11-12 PROCEDURE — 77030018781

## 2021-11-12 PROCEDURE — 74011250636 HC RX REV CODE- 250/636: Performed by: RADIOLOGY

## 2021-11-12 PROCEDURE — 87186 SC STD MICRODIL/AGAR DIL: CPT

## 2021-11-12 PROCEDURE — 87206 SMEAR FLUORESCENT/ACID STAI: CPT

## 2021-11-12 PROCEDURE — 74011250636 HC RX REV CODE- 250/636: Performed by: HOSPITALIST

## 2021-11-12 PROCEDURE — 74174 CTA ABD&PLVS W/CONTRAST: CPT

## 2021-11-12 PROCEDURE — 36415 COLL VENOUS BLD VENIPUNCTURE: CPT

## 2021-11-12 PROCEDURE — 94760 N-INVAS EAR/PLS OXIMETRY 1: CPT

## 2021-11-12 PROCEDURE — 87102 FUNGUS ISOLATION CULTURE: CPT

## 2021-11-12 PROCEDURE — 87077 CULTURE AEROBIC IDENTIFY: CPT

## 2021-11-12 PROCEDURE — 77030010546 HC BG URIN DRNG URES -B

## 2021-11-12 PROCEDURE — 80048 BASIC METABOLIC PNL TOTAL CA: CPT

## 2021-11-12 PROCEDURE — 65660000000 HC RM CCU STEPDOWN

## 2021-11-12 RX ORDER — MIDAZOLAM HYDROCHLORIDE 1 MG/ML
1-5 INJECTION, SOLUTION INTRAMUSCULAR; INTRAVENOUS
Status: DISCONTINUED | OUTPATIENT
Start: 2021-11-12 | End: 2021-11-12

## 2021-11-12 RX ORDER — SODIUM CHLORIDE 9 MG/ML
25 INJECTION, SOLUTION INTRAVENOUS CONTINUOUS
Status: DISCONTINUED | OUTPATIENT
Start: 2021-11-12 | End: 2021-11-12

## 2021-11-12 RX ORDER — POTASSIUM CHLORIDE 750 MG/1
40 TABLET, FILM COATED, EXTENDED RELEASE ORAL
Status: COMPLETED | OUTPATIENT
Start: 2021-11-12 | End: 2021-11-12

## 2021-11-12 RX ORDER — FENTANYL CITRATE 50 UG/ML
100 INJECTION, SOLUTION INTRAMUSCULAR; INTRAVENOUS
Status: DISCONTINUED | OUTPATIENT
Start: 2021-11-12 | End: 2021-11-12

## 2021-11-12 RX ADMIN — FLUCONAZOLE 100 MG: 100 TABLET ORAL at 09:00

## 2021-11-12 RX ADMIN — FENTANYL CITRATE 50 MCG: 50 INJECTION, SOLUTION INTRAMUSCULAR; INTRAVENOUS at 15:15

## 2021-11-12 RX ADMIN — POLYETHYLENE GLYCOL 3350 17 G: 17 POWDER, FOR SOLUTION ORAL at 09:00

## 2021-11-12 RX ADMIN — Medication 1 CAPSULE: at 09:00

## 2021-11-12 RX ADMIN — ACETAMINOPHEN 500 MG: 500 TABLET ORAL at 21:23

## 2021-11-12 RX ADMIN — METRONIDAZOLE 500 MG: 500 INJECTION, SOLUTION INTRAVENOUS at 23:27

## 2021-11-12 RX ADMIN — METOPROLOL TARTRATE 25 MG: 25 TABLET, FILM COATED ORAL at 07:49

## 2021-11-12 RX ADMIN — PANTOPRAZOLE SODIUM 40 MG: 40 TABLET, DELAYED RELEASE ORAL at 07:30

## 2021-11-12 RX ADMIN — FENTANYL CITRATE 50 MCG: 50 INJECTION, SOLUTION INTRAMUSCULAR; INTRAVENOUS at 15:10

## 2021-11-12 RX ADMIN — LEVOTHYROXINE SODIUM 112 MCG: 0.11 TABLET ORAL at 07:49

## 2021-11-12 RX ADMIN — NYSTATIN OINTMENT: 100000 OINTMENT TOPICAL at 09:00

## 2021-11-12 RX ADMIN — ACETAMINOPHEN 500 MG: 500 TABLET ORAL at 17:24

## 2021-11-12 RX ADMIN — Medication 10 ML: at 13:04

## 2021-11-12 RX ADMIN — METRONIDAZOLE 500 MG: 500 INJECTION, SOLUTION INTRAVENOUS at 11:17

## 2021-11-12 RX ADMIN — PAROXETINE HYDROCHLORIDE 40 MG: 20 TABLET, FILM COATED ORAL at 09:00

## 2021-11-12 RX ADMIN — NYSTATIN OINTMENT: 100000 OINTMENT TOPICAL at 16:00

## 2021-11-12 RX ADMIN — DOCUSATE SODIUM AND SENNOSIDES 1 TABLET: 8.6; 5 TABLET, FILM COATED ORAL at 18:00

## 2021-11-12 RX ADMIN — ACETAMINOPHEN 500 MG: 500 TABLET ORAL at 09:00

## 2021-11-12 RX ADMIN — VANCOMYCIN HYDROCHLORIDE 1250 MG: 10 INJECTION, POWDER, LYOPHILIZED, FOR SOLUTION INTRAVENOUS at 11:19

## 2021-11-12 RX ADMIN — MIDAZOLAM 2 MG: 1 INJECTION INTRAMUSCULAR; INTRAVENOUS at 15:15

## 2021-11-12 RX ADMIN — MIDAZOLAM 1 MG: 1 INJECTION INTRAMUSCULAR; INTRAVENOUS at 15:18

## 2021-11-12 RX ADMIN — SODIUM CHLORIDE 25 ML/HR: 9 INJECTION, SOLUTION INTRAVENOUS at 14:05

## 2021-11-12 RX ADMIN — METOPROLOL TARTRATE 12.5 MG: 25 TABLET, FILM COATED ORAL at 21:23

## 2021-11-12 RX ADMIN — HEPARIN SODIUM 16.2 UNITS/KG/HR: 10000 INJECTION, SOLUTION INTRAVENOUS at 05:08

## 2021-11-12 RX ADMIN — ASPIRIN 81 MG: 81 TABLET, COATED ORAL at 09:00

## 2021-11-12 RX ADMIN — CEFEPIME HYDROCHLORIDE 2 G: 2 INJECTION, POWDER, FOR SOLUTION INTRAVENOUS at 05:45

## 2021-11-12 RX ADMIN — AMITRIPTYLINE HYDROCHLORIDE 50 MG: 50 TABLET, FILM COATED ORAL at 21:23

## 2021-11-12 RX ADMIN — MIDAZOLAM 2 MG: 1 INJECTION INTRAMUSCULAR; INTRAVENOUS at 15:10

## 2021-11-12 RX ADMIN — ATORVASTATIN CALCIUM 40 MG: 40 TABLET, FILM COATED ORAL at 21:24

## 2021-11-12 RX ADMIN — POTASSIUM CHLORIDE 40 MEQ: 750 TABLET, FILM COATED, EXTENDED RELEASE ORAL at 17:24

## 2021-11-12 RX ADMIN — NYSTATIN OINTMENT: 100000 OINTMENT TOPICAL at 22:00

## 2021-11-12 RX ADMIN — CEFEPIME HYDROCHLORIDE 2 G: 2 INJECTION, POWDER, FOR SOLUTION INTRAVENOUS at 13:04

## 2021-11-12 RX ADMIN — HEPARIN SODIUM 9820 UNITS: 1000 INJECTION INTRAVENOUS; SUBCUTANEOUS at 05:04

## 2021-11-12 RX ADMIN — INSULIN GLARGINE 35 UNITS: 100 INJECTION, SOLUTION SUBCUTANEOUS at 08:42

## 2021-11-12 RX ADMIN — CEFEPIME HYDROCHLORIDE 2 G: 2 INJECTION, POWDER, FOR SOLUTION INTRAVENOUS at 21:24

## 2021-11-12 RX ADMIN — IOPAMIDOL 100 ML: 755 INJECTION, SOLUTION INTRAVENOUS at 09:55

## 2021-11-12 RX ADMIN — Medication 10 ML: at 21:25

## 2021-11-12 RX ADMIN — HEPARIN SODIUM 16.2 UNITS/KG/HR: 10000 INJECTION, SOLUTION INTRAVENOUS at 17:14

## 2021-11-12 RX ADMIN — DOCUSATE SODIUM AND SENNOSIDES 1 TABLET: 8.6; 5 TABLET, FILM COATED ORAL at 09:00

## 2021-11-12 RX ADMIN — Medication 10 ML: at 05:45

## 2021-11-12 NOTE — PROGRESS NOTES
Hospitalist Progress Note    NAME: Arron De Leon YOB: 1950   MRN:  [id number]     Interim Hospital Summary: 65 y.o. female whom presented on 2021 with      Assessment / Plan:    Severe sepsis, POA as evidence by hypothermia, rectal temp 96, tachy , lactic 9.2 -->5.3 after 1L, WBC 26K, MERCEDES Cr 1.5 due to  Suspected epidural/right iliopsoas abscess with   Right paraspinal pain at level L3-4 and R iliac crest pain (complains of R hip pain), POA   -CT abd/pelvis showing fluid posterior to the surgical changes and on the right side of the spine at L4/L5 and posterior to the right iliopsoas muscle just superior to the sacrum. Findings are concerning for an infectious process. -S/P L2-3 AND L5-S1  LUMBAR LAMINECTOMY WITH ANDREWS OSTEOTOMY L2-3 AND L3-4 WITH PLF L2-S1 by Dr. Camacho Resendez [id number]; complicated with nonhealing post surgical wound requiring wound vac 2021.    -Continue empiric Vanco, cefepime and Flagyl  -Leukocytosis remains elevated  -Consult orthopedics appreciated  -scheduled tylenol for pain. Oxycodone prn  -PT OT eval and treat. Transfers by herself from bed to [facility] at home.  not able to help. -Appreciated infectious disease consult, recommended to treat for 4 to 6 weeks for deep tissue infection    Celiac Artery Thrombosis with splenic Infarcts:  CT ABD/Pelvis  showed celiac artery thrombosis with splenic infarcts  CT abdomen pelvis today confirmed the celiac artery occlusion with splenic infarcts  Vascular consult ongoing  Was started on heparin drip, continue for now(currently on hold for abscess aspiration by IR)  Avoid DOACS in anticipation of aspiration of paraspinal abscess      UTI  -Ucx E coli  -sensitive to cefepime   -Rea placed in ER due to poor mobility from hip pain and polyuria, with severe candidal infection in perineum and labia and buttocks.   VT later this week when up with PT and less pain    Type 2DM, uncontrolled with hyperglycemic hyperosmolar hyperglycemia and early DKA, due to severe sepsis, POA  -presented with , AG 15, trace ketone in urine  -A1c 11.7 up from 7.6 in July.  -continue holding metformin  -Continue Lantus 35 units. Continue SSI prn. Off drip 11/07  -Blood sugars are better controlled    Prerenal MERCEDES Cr 1.5  -resolved with IVF hydration.       Constipation  -added pericolace and miralax    Severe candida infection in perineum, labia, buttocks  -continue mystatin cream ordered in ER. Status post Diflucan  -wound care consult appreciated     Generalized weakness and gait difficulty   -consult pt/ot     SVT   HTN / HLD  -continue metoprolol and statin  -having runs of SVT 11/07. Cardiology input appreciated     Hypothyroid  -continue levothyroxine. TSH 3.6     Depression and anxiety  -continue paxil with xanax prn     Severe obesity    Code:  Discussed, full  DVT prophylaxis: lovenox  Surrogate decision maker:   (but has some dementia per patient) or sister Karron Press    40 or above Morbid obesity / Body mass index is 46.41 kg/m². Subjective:     Chief Complaint / Reason for Physician Visit  Follow up of sepsis, hyperosmolar hyperglycemic state  Chart reviewed in detail. Discussed with RN events overnight. Continues to report right leg rest pain as well as right lower abdominal pain. Blood sugars are better controlled  Orthopedic consult appreciated  Review of Systems:  Symptom Y/N Comments  Symptom Y/N Comments   Fever/Chills n   Chest Pain n    Poor Appetite n   Edema n    Cough n   Abdominal Pain n    Sputum n   Joint Pain     SOB/AVALOS n   Pruritis/Rash     Nausea/vomit n   Tolerating PT/OT     Diarrhea n   Tolerating Diet     Constipation n   Other       Could NOT obtain due to:      PO intake: No data found. Objective:     VITALS:   Last 24hrs VS reviewed since prior progress note.  Most recent are:  Patient Vitals for the past 24 hrs:   Temp Pulse Resp BP SpO2   11/12/21 1143 97.7 °F (36.5 °C) 67 21 133/74 95 %   11/12/21 0807 98 °F (36.7 °C) 69 16 136/82 94 %   11/12/21 0422 98.1 °F (36.7 °C) 73 17 (!) 102/50 95 %   11/12/21 0022 97.9 °F (36.6 °C) 75 18 (!) 95/49 95 %   11/11/21 2025 97.7 °F (36.5 °C) 69 17 107/69 96 %   11/11/21 1549 97.8 °F (36.6 °C) 74 16 129/67 95 %       Intake/Output Summary (Last 24 hours) at 11/12/2021 1344  Last data filed at 11/12/2021 1202  Gross per 24 hour   Intake 450 ml   Output 2950 ml   Net -2500 ml        I had a face to face encounter, and independently examined this patient on 11/12/2021, as outlined below:  PHYSICAL EXAM:  General: WD, WN. Alert, cooperative, no acute distress    EENT:  EOMI. Anicteric sclerae. MMM  Resp:  CTA bilaterally, no wheezing or rales. No accessory muscle use  CV:  Regular  rhythm,  No edema  GI:  Soft, Non distended, + mild right flank tender. +Bowel sounds  Neurologic:  Alert and oriented X 3, normal speech,   Psych:   Good insight. Not anxious nor agitated  Skin:  No rashes. No jaundice    Reviewed most current lab test results and cultures  YES  Reviewed most current radiology test results   YES  Review and summation of old records today    NO  Reviewed patient's current orders and MAR    YES  PMH/SH reviewed - no change compared to H&P  ________________________________________________________________________  Care Plan discussed with:    Comments   Patient x    Family      RN x    Care Manager     Consultant                       x Multidiciplinary team rounds were held today with , nursing, pharmacist and clinical coordinator. Patient's plan of care was discussed; medications were reviewed and discharge planning was addressed.      ________________________________________________________________________  Total NON critical care TIME:  35   Minutes    Total CRITICAL CARE TIME Spent:   Minutes non procedure based      Comments   >50% of visit spent in counseling and coordination of care x     This includes time during multidisciplinary rounds if indicated above   ________________________________________________________________________  Nishant Castro MD     Procedures: see electronic medical records for all procedures/Xrays and details which were not copied into this note but were reviewed prior to creation of Plan. LABS:  I reviewed today's most current labs and imaging studies.   Pertinent labs include:  Recent Labs     11/12/21 0056 11/11/21 0533 11/10/21  0413   WBC 22.7* 20.1* 17.3*   HGB 10.1* 10.8* 11.1*   HCT 31.2* 34.6* 35.7   * 531* 494*     Recent Labs     11/12/21 0056 11/11/21 0533 11/10/21  0413    139 137   K 3.2* 3.1* 3.3*    106 104   CO2 24 27 25   * 179* 204*   BUN 5* 6 7   CREA 0.52* 0.53* 0.60   CA 8.3* 8.1* 8.2*   MG  --   --  2.0

## 2021-11-12 NOTE — H&P
Interventional and Vascular Radiology History and Physical    Patient: Josh Presley 65 y.o. female       Chief Complaint: High Blood Sugar (arrives via ems from home, EMS states pt found on floor after GLF due to gen weakness, was laying on floor cold to the touch with tympanic reading of 96. Blood sugar reading HI. pt states 1 week of extreme thirst. )      History of Present Illness: pelvic abscess     History:    Past Medical History:   Diagnosis Date    Adverse effect of anesthesia     O2 DROPS WITH ANESTHESIA    Arthritis     KNEES    Cancer ([facility] [address] 03/13/2015    SQUAMOUS CELL CARCINOMA; tonsils and lymph nodes. Removed 3-2015 with radiation    GERD (gastroesophageal reflux disease)     Hypertension     NO LONGER ON MEDICATION    Hypothyroid     HYPOTHYROIDISM    Migraine     Nausea & vomiting     Obesity     Other ill-defined conditions(799.89)     chronic back pain    Psychiatric disorder     anxiety    Psychiatric disorder     panic ATTACKS    SVT (supraventricular tachycardia) ([facility] [address] 05/24/2014    Ulcerative colitis      Family History   Problem Relation Age of Onset    Cancer Mother         ovarian ca?     Heart Disease Father         MI    Heart Attack Father     Cancer Father         MULTIPLE MYELOMA    Liver Disease Father         FROM MEDICATIONS FOR MULTIPLE MYELOMA    Arthritis-osteo Sister     Arthritis-osteo Brother     Cancer Paternal Grandmother 79        breast ca    Breast Cancer Paternal Grandmother 79    Breast Cancer Maternal Aunt 55    Breast Cancer Maternal Aunt 61    Breast Cancer Paternal Aunt 43    Breast Cancer Paternal Aunt         52's    Emphysema Paternal Grandfather     No Known Problems Sister     No Known Problems Brother     No Known Problems Brother     Anesth Problems Neg Hx      Social History     Socioeconomic History    Marital status:      Spouse name: Not on file    Number of children: Not on file    Years of education: Not on file  Highest education level: Not on file   Occupational History    Not on file   Tobacco Use    Smoking status: Never Smoker    Smokeless tobacco: Never Used   Vaping Use    Vaping Use: Never used   Substance and Sexual Activity    Alcohol use: No    Drug use: No     Types: Prescription    Sexual activity: Not Currently   Other Topics Concern     Service Not Asked    Blood Transfusions Not Asked    Caffeine Concern Not Asked    Occupational Exposure Not Asked    Hobby Hazards Not Asked    Sleep Concern Not Asked    Stress Concern Not Asked    Weight Concern Not Asked    Special Diet Not Asked    Back Care Not Asked    Exercise Not Asked    Bike Helmet Not Asked    Seat Belt Not Asked    Self-Exams Not Asked   Social History Narrative    Not on file     Social Determinants of Health     Financial Resource Strain:     Difficulty of Paying Living Expenses: Not on file   Food Insecurity:     Worried About Running Out of Food in the Last Year: Not on file    Eliezer of Food in the Last Year: Not on file   Transportation Needs:     Lack of Transportation (Medical): Not on file    Lack of Transportation (Non-Medical):  Not on file   Physical Activity:     Days of Exercise per Week: Not on file    Minutes of Exercise per Session: Not on file   Stress:     Feeling of Stress : Not on file   Social Connections:     Frequency of Communication with Friends and Family: Not on file    Frequency of Social Gatherings with Friends and Family: Not on file    Attends Spiritism Services: Not on file    Active Member of Clubs or Organizations: Not on file    Attends Club or Organization Meetings: Not on file    Marital Status: Not on file   Intimate Partner Violence:     Fear of Current or Ex-Partner: Not on file    Emotionally Abused: Not on file    Physically Abused: Not on file    Sexually Abused: Not on file   Housing Stability:     Unable to Pay for Housing in the Last Year: Not on file    Number of Places Lived in the Last Year: Not on file    Unstable Housing in the Last Year: Not on file       Allergies:    Allergies   Allergen Reactions    Adhesive Tape-Silicones Rash    Aloe Vera Rash    Chlorhexidine Rash    Tussin [Dextromethorphan Hbr] Palpitations    Readyprep Chg [Chlorhexidine Gluconate] Rash       Current Medications:  Current Facility-Administered Medications   Medication Dose Route Frequency    potassium chloride SR (KLOR-CON 10) tablet 40 mEq  40 mEq Oral NOW    midazolam (VERSED) injection 1-5 mg  1-5 mg IntraVENous Multiple    fentaNYL citrate (PF) injection 100 mcg  100 mcg IntraVENous Multiple    0.9% sodium chloride infusion  25 mL/hr IntraVENous CONTINUOUS    heparin 25,000 units in D5W 250 ml infusion  12.2-36 Units/kg/hr IntraVENous TITRATE    heparin (porcine) 1,000 unit/mL injection 4,910 Units  40 Units/kg IntraVENous PRN    Or    heparin (porcine) 1,000 unit/mL injection 9,820 Units  80 Units/kg IntraVENous PRN    insulin glargine (LANTUS) injection 35 Units  35 Units SubCUTAneous DAILY    nystatin (MYCOSTATIN) 100,000 unit/gram ointment   Topical TID    senna-docusate (PERICOLACE) 8.6-50 mg per tablet 1 Tablet  1 Tablet Oral BID    polyethylene glycol (MIRALAX) packet 17 g  17 g Oral DAILY    acetaminophen (TYLENOL) tablet 500 mg  500 mg Oral QID    Or    acetaminophen (TYLENOL) suppository 650 mg  650 mg Rectal QID    ALPRAZolam (XANAX) tablet 0.25 mg  0.25 mg Oral QID PRN    vancomycin (VANCOCIN) 1250 mg in  ml infusion  1,250 mg IntraVENous Q12H    insulin lispro (HUMALOG) injection   SubCUTAneous AC&HS    glucose chewable tablet 16 g  4 Tablet Oral PRN    dextrose (D50W) injection syrg 12.5-25 g  12.5-25 g IntraVENous PRN    glucagon (GLUCAGEN) injection 1 mg  1 mg IntraMUSCular PRN    cefepime (MAXIPIME) 2 g in 0.9% sodium chloride (MBP/ADV) 100 mL MBP  2 g IntraVENous Q8H    0.9% sodium chloride infusion  75 mL/hr IntraVENous CONTINUOUS    metroNIDAZOLE (FLAGYL) IVPB premix 500 mg  500 mg IntraVENous Q12H    L.acidophilus-paracasei-S.thermophil-bifidobacter (RISAQUAD) 8 billion cell capsule  1 Capsule Oral DAILY    amitriptyline (ELAVIL) tablet 50 mg  50 mg Oral QHS    aspirin delayed-release tablet 81 mg  81 mg Oral DAILY    atorvastatin (LIPITOR) tablet 40 mg  40 mg Oral QHS    levothyroxine (SYNTHROID) tablet 112 mcg  112 mcg Oral ACB    metoprolol tartrate (LOPRESSOR) tablet 25 mg  25 mg Oral 7am    metoprolol tartrate (LOPRESSOR) tablet 12.5 mg  12.5 mg Oral QHS    pantoprazole (PROTONIX) tablet 40 mg  40 mg Oral ACB    PARoxetine (PAXIL) tablet 40 mg  40 mg Oral DAILY    oxyCODONE IR (ROXICODONE) tablet 5 mg  5 mg Oral Q6H PRN    naloxone (NARCAN) injection 0.4 mg  0.4 mg IntraVENous PRN    sodium chloride (NS) flush 5-40 mL  5-40 mL IntraVENous Q8H    ondansetron (ZOFRAN ODT) tablet 4 mg  4 mg Oral Q8H PRN    Or    ondansetron (ZOFRAN) injection 4 mg  4 mg IntraVENous Q6H PRN    sodium chloride (NS) flush 5-10 mL  5-10 mL IntraVENous PRN        Physical Exam:  Blood pressure 131/76, pulse 72, temperature 98.1 °F (36.7 °C), resp. rate 22, height 5' 4\" (1.626 m), weight 122.7 kg (270 lb 6.4 oz), SpO2 94 %, not currently breastfeeding.   LUNG: clear to auscultation bilaterally, HEART: regular rate and rhythm, S1, S2 normal, no murmur, click, rub or gallop      Alerts:    Hospital Problems  Date Reviewed: 7/20/2021          Codes Class Noted POA    Leukocytosis ICD-10-CM: D72.829  ICD-9-CM: 288.60  11/6/2021 Unknown        Candida vaginitis ICD-10-CM: B37.3  ICD-9-CM: 112.1  11/6/2021 Unknown        Hyperglycemia ICD-10-CM: R73.9  ICD-9-CM: 790.29  11/6/2021 Unknown        SIRS (systemic inflammatory response syndrome) ([facility] ICD-10-CM: R65.10  ICD-9-CM: 995.90  11/6/2021 Unknown        MERCEDES (acute kidney injury) ([facility] ICD-10-CM: N17.9  ICD-9-CM: 584.9  11/6/2021 Unknown        Severe sepsis ([facility] ICD-10-CM: A41.9, R65.20  ICD-9-CM: 038.9, 995.92  11/6/2021 Unknown              Laboratory:      Recent Labs     11/12/21  0056   HGB 10.1*   HCT 31.2*   WBC 22.7*   *   BUN 5*   CREA 0.52*   K 3.2*         Plan of Care/Planned Procedure:  Risks, benefits, and alternatives reviewed with patient and she agrees to proceed with the procedure. Conscious sedation will be performed with IV fentanyl and versed.  Plan is for CT guided drainage       Darryn Carlton MD

## 2021-11-12 NOTE — ROUTINE PROCESS
Received care of pt from room 2174 to radiology recovery area for drainage catheter placement. Pt being prepped for procedure at this time.

## 2021-11-12 NOTE — PROGRESS NOTES
Cardiology Progress Note      11/12/2021 9:10 AM    Admit Date: 11/6/2021          Subjective:   No new c/o this am. No SVT          Visit Vitals  /82 (BP 1 Location: Right upper arm)   Pulse 69   Temp 98 °F (36.7 °C)   Resp 16   Ht 5' 4\" (1.626 m)   Wt 122.7 kg (270 lb 6.4 oz)   SpO2 94%   Breastfeeding No   BMI 46.41 kg/m²     11/10 1901 - 11/12 0700  In: 600 [P.O.:150; I.V.:450]  Out: 4100 [Urine:4100]        Objective:      Physical Exam:  VS as above  Resp CTA  Card RRR no changes   Neuro alert and awake     Data Review:   Labs:    BUN 5  Creat 0.5  Hgb 10.1    Telemetry: SR R 80 no SVT       Assessment:       1.  Intermittent supraventricular tachycardia.  Possibly atrioventricular node reentry or atrial tachycardia associated with severe sepsis. 2.  History of nonobstructive coronary artery disease and normal ejection fraction by prior evaluation. 3.  Type 2 diabetes. 4.   retroperitoneal abscess with sepsis. Prior laminectomy 6/21 with poor wound healing   5.  Hypothyroidism. 6.  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 Plan: Cont current Rx. Stable cardiac wise.  Will check back Monday and our group will see prn over the weekend

## 2021-11-12 NOTE — PROGRESS NOTES
Midline insertion procedure note:  Pt has very limited vascular access. Procedure explained to patient along with risks and benefits. Procedure teaching completed. Pre procedure assessment done. Patient denies questions or concerns at this time. Midline sign over the [facility]. Maximum sterile barrier precautions observed throughout procedure. Lidocaine 1% 3ml sc injected to site prior to access the vein. Cannulated Cephalic vein using ultrasound guidance. Inserted 4.5 Fr. single lumen midline to LEFT arm. Blood return verified and flushed with 20ml normal saline. Sterile dressing applied with Biopatch, StatLock and occlusive dressing as per protocol. Curos caps applied to port. Patient tolerated procedure well with minimal blood loss. Reason for access : Reliable IV access (Heparin drip/frequent labs/multiple failed PIVs)  Complications related to insertion : None   See nursing message on BackerKit for midline reminders. Midline is CT and MRI compatible. Inserted by : Elly Abrams RN [facility] / Vascular Access Nurse  Assisted by : Gabo Xavier RN, BSN, [facility] / Vascular Access Nurse  Catheter Length : 15 cm   External Length : 15 cm   LEFT arm circumference : 35 cm  Catheter occupies 29% of vein. Type of Midline: ARROW  Ref#: [id number]  Lot#: [id number]  Expiration Date: 09/30/2022  Informed primary nurse Suly Cruz RN midline is ready for use and to hang new infusion tubing prior to use.     Elly Abrams RN, BSN,[facility]  Vascular Access Nurse

## 2021-11-12 NOTE — PROGRESS NOTES
Infectious Disease Progress Note         Interval:  NAEO    Subjective:   Patient at CT, and unable to be seen during rounds today. Objective:    Vitals:   Reviewed in chart. Physical Exam:  Patient at CT        Labs:  Recent Results (from the past 24 hour(s))   GLUCOSE, POC    Collection Time: 11/11/21 11:46 AM   Result Value Ref Range    Glucose (POC) 232 (H) 65 - 117 mg/dL    Performed by Jose Antonio Huynh PCT    GLUCOSE, POC    Collection Time: 11/11/21  3:34 PM   Result Value Ref Range    Glucose (POC) 125 (H) 65 - 117 mg/dL    Performed by Mi De La O (YOANDY RN)    PTT    Collection Time: 11/11/21  7:13 PM   Result Value Ref Range    aPTT 29.4 22.1 - 31.0 sec    aPTT, therapeutic range     58.0 - 77.0 SECS   GLUCOSE, POC    Collection Time: 11/11/21  8:34 PM   Result Value Ref Range    Glucose (POC) 94 65 - 117 mg/dL    Performed by Roman Ardon    CBC WITH AUTOMATED DIFF    Collection Time: 11/12/21 12:56 AM   Result Value Ref Range    WBC 22.7 (H) 3.6 - 11.0 K/uL    RBC 4.05 3.80 - 5.20 M/uL    HGB 10.1 (L) 11.5 - 16.0 g/dL    HCT 31.2 (L) 35.0 - 47.0 %    MCV 77.0 (L) 80.0 - 99.0 FL    MCH 24.9 (L) 26.0 - 34.0 PG    MCHC 32.4 30.0 - 36.5 g/dL    RDW 17.7 (H) 11.5 - 14.5 %    PLATELET 759 (H) 350 - 400 K/uL    MPV 9.3 8.9 - 12.9 FL    NRBC 0.0 0  WBC    ABSOLUTE NRBC 0.00 0.00 - 0.01 K/uL    NEUTROPHILS 79 (H) 32 - 75 %    BAND NEUTROPHILS 6 %    LYMPHOCYTES 8 (L) 12 - 49 %    MONOCYTES 4 (L) 5 - 13 %    EOSINOPHILS 2 0 - 7 %    BASOPHILS 1 0 - 1 %    IMMATURE GRANULOCYTES 0 0.0 - 0.5 %    ABS. NEUTROPHILS 19.3 (H) 1.8 - 8.0 K/UL    ABS. LYMPHOCYTES 1.8 0.8 - 3.5 K/UL    ABS. MONOCYTES 0.9 0.0 - 1.0 K/UL    ABS. EOSINOPHILS 0.5 (H) 0.0 - 0.4 K/UL    ABS. BASOPHILS 0.2 (H) 0.0 - 0.1 K/UL    ABS. IMM.  GRANS. 0.0 0.00 - 0.04 K/UL    DF MANUAL      RBC COMMENTS ANISOCYTOSIS  1+        RBC COMMENTS MICROCYTOSIS  1+        WBC COMMENTS SMUDGE CELLS SEEN     METABOLIC PANEL, BASIC    Collection Time: 11/12/21 12:56 AM   Result Value Ref Range    Sodium 137 136 - 145 mmol/L    Potassium 3.2 (L) 3.5 - 5.1 mmol/L    Chloride 106 97 - 108 mmol/L    CO2 24 21 - 32 mmol/L    Anion gap 7 5 - 15 mmol/L    Glucose 113 (H) 65 - 100 mg/dL    BUN 5 (L) 6 - 20 MG/DL    Creatinine 0.52 (L) 0.55 - 1.02 MG/DL    BUN/Creatinine ratio 10 (L) 12 - 20      GFR est AA >60 >60 ml/min/1.73m2    GFR est non-AA >60 >60 ml/min/1.73m2    Calcium 8.3 (L) 8.5 - 10.1 MG/DL   PTT    Collection Time: 11/12/21 12:56 AM   Result Value Ref Range    aPTT 28.6 22.1 - 31.0 sec    aPTT, therapeutic range     58.0 - 77.0 SECS   GLUCOSE, POC    Collection Time: 11/12/21  8:22 AM   Result Value Ref Range    Glucose (POC) 121 (H) 65 - 117 mg/dL    Performed by Elizabeth Mcclain PCT              Assessment:  66 yo F with:     - Sepsis due to right retroperitoneal abscess 9.7 x 9.1 x 7.5 cm with likely involvement of the lumbar spine. This has likely developed in the setting of poorly healing lumbar wound in July 2021. Patient reports that after the wound VAC care was disrupted when she went home, there was lot of drainage from the lumbar wound. The lumbar wound eventually healed up after the wound VAC was resumed. However this must have created a nidus of infection at that time.  -History of lumbar fusion surgeries. - Hx of urinary incontinence and multiple diagnosis of UTIs in the past. E.coli in urine this admission.   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   - TTE 11/8 technically difficult. Recommendations:  -Continue empiric vancomycin cefepime and Flagyl.   - Dr. Aaron Valverde noted. When goes for either surgery or drainage of the abscess, please send aerobic, anaerobic, fungal and AFB cultures.   - Given proximity to the lumbar instrumentation, will be treated as deep space infection and plan for antibiotics for 4 to 6 weeks. -For the celiac artery thrombosis - vascular surgery evaluating.   -Splenic infarct 2/2 to likely the celiac artery thrombosis. Patient is not bacteremic currently. Please call the on-call ID provider with questions over the weekend. Thank you for the opportunity to participate in the care of this patient. Please contact with questions or concerns.       Radha Graves MD  Infectious Diseases

## 2021-11-12 NOTE — ROUTINE PROCESS
Procedure:  Placement of an abscess drainage catheter 10 [place] draining purulent drainage    Sedation total time: 10 minutes    Medications given during procedure: Versed 5 mg and Fentanyl 100 mcg    Patient Status post procedure: Pt awake and has no complaints at this time. TO Radiology recovery for post procedure monitoring and then back to room 2174. Procedure site: 10 [place] catheter in place to RLQ of abdomen.

## 2021-11-12 NOTE — PROGRESS NOTES
Patient off the floor for procedure. PT will continue to follow patient on Monday.     Katt Ricks, PT

## 2021-11-12 NOTE — CONSULTS
Vascular Surgery Consult Note  11/12/2021    Subjective:     Estefani Torrez is a 65 y.o.  female with a pmhx significant for severe obesity, DM, thyroid disease, HTN, HLD< GERD, and anxiety. She underwent spine surgery 6/3/2021 following a GLF (Exploration of fusion, revision laminectomy L2-3, L5-S1 with a Lydia osteotomy at L2-3. Posterior revision fusion L2 to S1 with segmental instrumentation) that was complicated by wound dehiscence. In July she suffered a second GLF and complained of right hip. She was re-admitted to the hospital 7/8-7/20/2021 with suspected bacterial pneumonia. MRI of the lumbar spine at that time was negative for infectious etiology. In July she continues to have an open surgical incision. A wound vac was ordered which was maintained in rehab at [facility]; however, once she returned home application was delayed and she reports copious drainage during this period. The wound vac was replaced and the wound healed. She is now admitted to the hospital with severe sepsis secondary to epidural/right iliopsoas abscess s/p a third GLF. Plan is for CT guided aspiration today. 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 We have been asked to evaluate. She was initially placed on a heparin drip which is now held for her procedure today. Past medical history  Degenerative disc disease of the lumbar spine  Recurrent UTI  Diabetes mellitus type 2  Severe obesity  Hypothyroidism  Hypertension  Hyperlipidemia   SVT  GERD  Ulcerative colitis  Anxiety  -With chronic benzodiazepine use  Squamous cell carcinoma of the tonsils   -Tonsils and lymph nodes status post resection 2015 followed by radiation    Past procedural history in addition to above  Exploration of fusion, revision laminectomy L2-3, L5-S1 with a Lydia osteotomy at L2-3.   Posterior revision fusion L2 to S1 with segmental instrumentation 6/3/2021   Lumbar spine fusion 2011  Removal parathyroid 2008  Left laser eye surgery 2002  Hemorrhoidectomy 2001 & 2016  Hysterectomy 1985  Left total knee replacement 1995  Right total knee replacement 2013  Laparoscopic cholecystectomy 2002  Removal of right wrist cyst 1990  Neurosurgery 2010    Family history  Ovarian cancer: Mother  Coronary artery disease: Father  Multiple myeloma: Father  Liver disease: Father  Breast cancer: Paternal grandmother, multiple paternal and maternal aunts  Emphysema: Paternal grandfather    Social history  She is a non-smoker. She denies alcohol use. Home medications  L.acid,para-B. bifidum-S.therm (RISAQUAD) 8 billion cell cap cap Take 1 Capsule by mouth daily. miconazole (MICOTIN) 2 % topical powder Apply  to affected area two (2) times a day. amitriptyline (ELAVIL) 50 mg tablet Take 50 mg by mouth nightly. atorvastatin (LIPITOR) 40 mg tablet Take 40 mg by mouth nightly. metFORMIN (GLUCOPHAGE) 500 mg tablet Take 500 mg by mouth two (2) times daily (with meals). butalb/acetaminophen/caffeine (FIORICET PO) Take 1 Tablet by mouth as needed. aspirin delayed-release 81 mg tablet Take 81 mg by mouth daily. ALPRAZolam (XANAX) 0.5 mg tablet Take 0.5 mg by mouth two (2) times a day. levothyroxine (SYNTHROID) 75 mcg tablet Take 112 mcg by mouth Daily (before breakfast). PARoxetine (PAXIL) 40 mg tablet Take 40 mg by mouth every morning. metoprolol tartrate (LOPRESSOR) 25 mg tablet Take 12.5 mg by mouth nightly. metoprolol (LOPRESSOR) 25 mg tablet Take 25 mg by mouth every morning. omeprazole (PRILOSEC) 40 mg capsule Take 40 mg by mouth every morning. acetaminophen (TYLENOL) 325 mg tablet Take 2 Tablets by mouth every six (6) hours as needed for Pain.   magnesium hydroxide (MILK OF MAGNESIA) 400 mg/5 mL suspension Take 30 mL by mouth daily. Patient not taking: Reported on 11/6/2021   polyethylene glycol (MIRALAX) 17 gram packet Take 1 Packet by mouth daily.   Patient not taking: Reported on 11/6/2021   senna (SENOKOT) 8.6 mg tablet Take 2 Tablets by mouth daily. Patient not taking: Reported on 11/6/2021   heparin sodium,porcine (heparin, porcine,) 5,000 unit/mL injection 1 mL by SubCUTAneous route every eight (8) hours. naloxone (NARCAN) 4 mg/actuation nasal spray Use 1 spray intranasally, then discard. Repeat with new spray every 2 min as needed for opioid overdose symptoms, alternating nostrils. Allergies  Adhesive tape: Rash  Aloe vera: Rash  Chlorhexidine: Rash  Tussin: Palpitations    Review of Systems   Constitutional: Positive for activity change, chills, fatigue and fever. HENT: Negative for congestion. Eyes: Negative for visual disturbance. Respiratory: Negative for cough and shortness of breath. Cardiovascular: Positive for leg swelling. Negative for chest pain. Gastrointestinal: Negative for nausea and vomiting. Endocrine: Negative for polydipsia and polyuria. Genitourinary: Negative. Musculoskeletal: Positive for arthralgias, back pain, gait problem and myalgias. Skin: Negative for color change and wound. Allergic/Immunologic: Negative for immunocompromised state. Neurological: Positive for weakness. Hematological: Negative. Psychiatric/Behavioral: Negative. Objective:     Patient Vitals for the past 24 hrs:   BP Temp Pulse Resp SpO2 Height   11/12/21 0807 136/82 98 °F (36.7 °C) 69 16 94 %    11/12/21 0422 (!) 102/50 98.1 °F (36.7 °C) 73 17 95 %    11/12/21 0022 (!) 95/49 97.9 °F (36.6 °C) 75 18 95 %    11/11/21 2025 107/69 97.7 °F (36.5 °C) 69 17 96 %    11/11/21 1549 129/67 97.8 °F (36.6 °C) 74 16 95 %    11/11/21 1432      5' 4\" (1.626 m)   11/11/21 1142 (!) 122/52 98 °F (36.7 °C) 80 18 97 %    11/11/21 1103 (!) 110/90 98 °F (36.7 °C) 77 17 93 %         Physical Exam  Constitutional:       Appearance: She is obese. HENT:      Head: Normocephalic.       Nose: Nose normal.      Mouth/Throat:      Mouth: Mucous membranes are moist. Eyes:      Pupils: Pupils are equal, round, and reactive to light. Cardiovascular:      Rate and Rhythm: Normal rate and regular rhythm. Pulses: Normal pulses. Popliteal pulses are 2+ on the right side and 2+ on the left side. Dorsalis pedis pulses are 2+ on the right side and 2+ on the left side. Pulmonary:      Effort: Pulmonary effort is normal. No respiratory distress. Abdominal:      General: Abdomen is flat. There is no distension. Musculoskeletal:         General: Normal range of motion. Cervical back: Normal range of motion. Skin:     General: Skin is warm. Neurological:      General: No focal deficit present. Mental Status: She is alert. Mental status is at baseline.    Psychiatric:         Mood and Affect: Mood normal.         Behavior: Behavior normal.       Pertinent Test Results:   Recent Results (from the past 24 hour(s))   GLUCOSE, POC    Collection Time: 11/11/21 11:46 AM   Result Value Ref Range    Glucose (POC) 232 (H) 65 - 117 mg/dL    Performed by Khadijah Garcia PCT    GLUCOSE, POC    Collection Time: 11/11/21  3:34 PM   Result Value Ref Range    Glucose (POC) 125 (H) 65 - 117 mg/dL    Performed by Carolyn Marie (TRV RN)    PTT    Collection Time: 11/11/21  7:13 PM   Result Value Ref Range    aPTT 29.4 22.1 - 31.0 sec    aPTT, therapeutic range     58.0 - 77.0 SECS   GLUCOSE, POC    Collection Time: 11/11/21  8:34 PM   Result Value Ref Range    Glucose (POC) 94 65 - 117 mg/dL    Performed by Jennifer Hinton    CBC WITH AUTOMATED DIFF    Collection Time: 11/12/21 12:56 AM   Result Value Ref Range    WBC 22.7 (H) 3.6 - 11.0 K/uL    RBC 4.05 3.80 - 5.20 M/uL    HGB 10.1 (L) 11.5 - 16.0 g/dL    HCT 31.2 (L) 35.0 - 47.0 %    MCV 77.0 (L) 80.0 - 99.0 FL    MCH 24.9 (L) 26.0 - 34.0 PG    MCHC 32.4 30.0 - 36.5 g/dL    RDW 17.7 (H) 11.5 - 14.5 %    PLATELET 411 (H) 970 - 400 K/uL    MPV 9.3 8.9 - 12.9 FL    NRBC 0.0 0  WBC    ABSOLUTE NRBC 0.00 0.00 - 0.01 K/uL    NEUTROPHILS 79 (H) 32 - 75 %    BAND NEUTROPHILS 6 %    LYMPHOCYTES 8 (L) 12 - 49 %    MONOCYTES 4 (L) 5 - 13 %    EOSINOPHILS 2 0 - 7 %    BASOPHILS 1 0 - 1 %    IMMATURE GRANULOCYTES 0 0.0 - 0.5 %    ABS. NEUTROPHILS 19.3 (H) 1.8 - 8.0 K/UL    ABS. LYMPHOCYTES 1.8 0.8 - 3.5 K/UL    ABS. MONOCYTES 0.9 0.0 - 1.0 K/UL    ABS. EOSINOPHILS 0.5 (H) 0.0 - 0.4 K/UL    ABS. BASOPHILS 0.2 (H) 0.0 - 0.1 K/UL    ABS. IMM. GRANS. 0.0 0.00 - 0.04 K/UL    DF MANUAL      RBC COMMENTS ANISOCYTOSIS  1+        RBC COMMENTS MICROCYTOSIS  1+        WBC COMMENTS SMUDGE CELLS SEEN     METABOLIC PANEL, BASIC    Collection Time: 11/12/21 12:56 AM   Result Value Ref Range    Sodium 137 136 - 145 mmol/L    Potassium 3.2 (L) 3.5 - 5.1 mmol/L    Chloride 106 97 - 108 mmol/L    CO2 24 21 - 32 mmol/L    Anion gap 7 5 - 15 mmol/L    Glucose 113 (H) 65 - 100 mg/dL    BUN 5 (L) 6 - 20 MG/DL    Creatinine 0.52 (L) 0.55 - 1.02 MG/DL    BUN/Creatinine ratio 10 (L) 12 - 20      GFR est AA >60 >60 ml/min/1.73m2    GFR est non-AA >60 >60 ml/min/1.73m2    Calcium 8.3 (L) 8.5 - 10.1 MG/DL   PTT    Collection Time: 11/12/21 12:56 AM   Result Value Ref Range    aPTT 28.6 22.1 - 31.0 sec    aPTT, therapeutic range     58.0 - 77.0 SECS   GLUCOSE, POC    Collection Time: 11/12/21  8:22 AM   Result Value Ref Range    Glucose (POC) 121 (H) 65 - 117 mg/dL    Performed by Fei Cheng PCT        Assessmen/Plan:     Splenic infarctions  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 Dr. Oliva Li to evaluate once resulted. Please resume heparin drip following aspiration at the discretion of interventional radiology pending results of CTA.     Severe sepsis  -Resolved with IV antibiotics    Intermittent SVT  Hypertension  -labile   Hyperlipidemia hyperlipidemia  Nonobstructive coronary artery disease  -ASA, statin, and beta-blocker  -Cardiology consulted     Right psoas fluid collection  -Infection versus seroma  Degenerative disc disease of the lumbar spine  History of lumbar spine surgery complicated by wound dehiscence   -6/2021]  Multiple ground-level falls/Generalized weakness and debility  -History of inpatient rehabilitation [facility] June and July 2021  · Ultrasound guided aspiration planned for later today. · Management per ortho-spine  · Antibiotics and probiotics per infectious disease    E. coli UTI  History of recurrent urinary tract infections  Urinary incontinence  History of bladder surgery  -Antibiotics per infectious disease    MERCEDES  -resolved     Uncontrolled diabetes mellitus type 2  -HA1c 11.7  HONK  -Patient initiated on basal insulin this admission.   Severe obesity  Hypothyroidism  -Synthroid    Severe cutaneous candidiasis  -Perineum, labia, buttocks, groins    GERD  Hx of Ulcerative colitis  Constipation  -Bowel regimen    Anxiety  -With chronic benzodiazepine use    VTE Prophylaxis:  Heparin drip-currently held for procedure    Disposition:  TBD     Signed By: Sherren Earls, NP     November 12, 2021

## 2021-11-12 NOTE — PROGRESS NOTES
Pharmacy  Heparin Monitoring     Labs:        Recent Labs     11/12/21  0056 11/11/21  1913 11/11/21  0533   APTT 28.6 29.4  --    HGB 10.1*  --    < >   *  --    < >    < > = values in this interval not displayed.         Current rate:  Held for procedure     Impression/Plan:   · Patient has been in IR and procedures for most of the day. Spoke with William Sloan, she does want heparin drip resumed as soon as patient is back from drainage catheter placement. · New rate:Previous rate was 16.2 unit/kg/hr. We will resume at the previous rate before the drip was stopped  · PRN bolus dose (Yes/No): No  · Infusion rate, PRN bolus dose and aPTT results were discussed with the nurse: (Yes/No): Yes discussed with Sp Castro RN      Thanks,  Courtney Odonnell, PHARMD     [email or web address]/[email or web address]. pdf

## 2021-11-12 NOTE — PROGRESS NOTES
Transition of Care Plan:     RUR: 17% - Medium   Disposition: IPR - [facility] is following - believes pt would be a good fit, but need to determine whether or not pt is going to need further surgery after the drainage of the abscess. Follow up appointments: PCP & specialist as indicated  DME needed: To be determined. Transportation at [facility] or means to access home:  Family has keys   Medicare Letter: To be given prior to discharge.   Is patient a BCPI-A Bundle:   No                 If yes, was Bundle Letter given?:   N/A  Caregiver Contact: Son  Discharge Caregiver contacted prior to discharge?  Caregiver to be contacted prior to discharge.     Initial Note: Chart reviewed. CM phoned [facility] rep Lizzette Abbasi [phone] to follow up on referral. Per Lizzette Abbasi, pt would be a good candidate, but a determination can not be made until it decided whether the pt will need further surgery after drainage of the abscess. Unit CM will continue to follow for dcp.      KRISTOFER Khan  Care Manager, [facility]

## 2021-11-13 LAB
ANION GAP SERPL CALC-SCNC: 5 MMOL/L (ref 5–15)
APTT PPP: 47 SEC (ref 22.1–31)
APTT PPP: 73.6 SEC (ref 22.1–31)
APTT PPP: >130 SEC (ref 22.1–31)
BUN SERPL-MCNC: 6 MG/DL (ref 6–20)
BUN/CREAT SERPL: 13 (ref 12–20)
CALCIUM SERPL-MCNC: 8.1 MG/DL (ref 8.5–10.1)
CHLORIDE SERPL-SCNC: 105 MMOL/L (ref 97–108)
CO2 SERPL-SCNC: 27 MMOL/L (ref 21–32)
CREAT SERPL-MCNC: 0.48 MG/DL (ref 0.55–1.02)
DATE LAST DOSE: ABNORMAL
ERYTHROCYTE [DISTWIDTH] IN BLOOD BY AUTOMATED COUNT: 18 % (ref 11.5–14.5)
GLUCOSE BLD STRIP.AUTO-MCNC: 110 MG/DL (ref 65–117)
GLUCOSE BLD STRIP.AUTO-MCNC: 152 MG/DL (ref 65–117)
GLUCOSE BLD STRIP.AUTO-MCNC: 175 MG/DL (ref 65–117)
GLUCOSE BLD STRIP.AUTO-MCNC: 177 MG/DL (ref 65–117)
GLUCOSE SERPL-MCNC: 135 MG/DL (ref 65–100)
HCT VFR BLD AUTO: 32.1 % (ref 35–47)
HGB BLD-MCNC: 9.8 G/DL (ref 11.5–16)
MCH RBC QN AUTO: 24.9 PG (ref 26–34)
MCHC RBC AUTO-ENTMCNC: 30.5 G/DL (ref 30–36.5)
MCV RBC AUTO: 81.7 FL (ref 80–99)
NRBC # BLD: 0 K/UL (ref 0–0.01)
NRBC BLD-RTO: 0 PER 100 WBC
PLATELET # BLD AUTO: 561 K/UL (ref 150–400)
PMV BLD AUTO: 9.6 FL (ref 8.9–12.9)
POTASSIUM SERPL-SCNC: 3.4 MMOL/L (ref 3.5–5.1)
RBC # BLD AUTO: 3.93 M/UL (ref 3.8–5.2)
REPORTED DOSE,DOSE: ABNORMAL UNITS
REPORTED DOSE/TIME,TMG: ABNORMAL
SERVICE CMNT-IMP: ABNORMAL
SERVICE CMNT-IMP: NORMAL
SODIUM SERPL-SCNC: 137 MMOL/L (ref 136–145)
THERAPEUTIC RANGE,PTTT: ABNORMAL SECS (ref 58–77)
VANCOMYCIN TROUGH SERPL-MCNC: 12.3 UG/ML (ref 5–10)
WBC # BLD AUTO: 17.2 K/UL (ref 3.6–11)

## 2021-11-13 PROCEDURE — 94760 N-INVAS EAR/PLS OXIMETRY 1: CPT

## 2021-11-13 PROCEDURE — 82962 GLUCOSE BLOOD TEST: CPT

## 2021-11-13 PROCEDURE — 74011250637 HC RX REV CODE- 250/637: Performed by: HOSPITALIST

## 2021-11-13 PROCEDURE — 74011250637 HC RX REV CODE- 250/637: Performed by: INTERNAL MEDICINE

## 2021-11-13 PROCEDURE — 85027 COMPLETE CBC AUTOMATED: CPT

## 2021-11-13 PROCEDURE — 74011250636 HC RX REV CODE- 250/636: Performed by: HOSPITALIST

## 2021-11-13 PROCEDURE — 74011250636 HC RX REV CODE- 250/636: Performed by: INTERNAL MEDICINE

## 2021-11-13 PROCEDURE — 77010033678 HC OXYGEN DAILY

## 2021-11-13 PROCEDURE — 85730 THROMBOPLASTIN TIME PARTIAL: CPT

## 2021-11-13 PROCEDURE — 74011636637 HC RX REV CODE- 636/637: Performed by: INTERNAL MEDICINE

## 2021-11-13 PROCEDURE — 80048 BASIC METABOLIC PNL TOTAL CA: CPT

## 2021-11-13 PROCEDURE — 36415 COLL VENOUS BLD VENIPUNCTURE: CPT

## 2021-11-13 PROCEDURE — 74011000258 HC RX REV CODE- 258: Performed by: INTERNAL MEDICINE

## 2021-11-13 PROCEDURE — 65660000000 HC RM CCU STEPDOWN

## 2021-11-13 RX ORDER — VANCOMYCIN HYDROCHLORIDE
1250 EVERY 8 HOURS
Status: DISCONTINUED | OUTPATIENT
Start: 2021-11-13 | End: 2021-11-15

## 2021-11-13 RX ADMIN — INSULIN LISPRO 2 UNITS: 100 INJECTION, SOLUTION INTRAVENOUS; SUBCUTANEOUS at 11:57

## 2021-11-13 RX ADMIN — OXYCODONE 5 MG: 5 TABLET ORAL at 17:49

## 2021-11-13 RX ADMIN — ASPIRIN 81 MG: 81 TABLET, COATED ORAL at 09:49

## 2021-11-13 RX ADMIN — ACETAMINOPHEN 500 MG: 500 TABLET ORAL at 13:00

## 2021-11-13 RX ADMIN — Medication 1 CAPSULE: at 09:48

## 2021-11-13 RX ADMIN — ACETAMINOPHEN 500 MG: 500 TABLET ORAL at 09:48

## 2021-11-13 RX ADMIN — CEFEPIME HYDROCHLORIDE 2 G: 2 INJECTION, POWDER, FOR SOLUTION INTRAVENOUS at 22:46

## 2021-11-13 RX ADMIN — VANCOMYCIN HYDROCHLORIDE 1250 MG: 10 INJECTION, POWDER, LYOPHILIZED, FOR SOLUTION INTRAVENOUS at 00:58

## 2021-11-13 RX ADMIN — METRONIDAZOLE 500 MG: 500 INJECTION, SOLUTION INTRAVENOUS at 11:53

## 2021-11-13 RX ADMIN — OXYCODONE 5 MG: 5 TABLET ORAL at 10:04

## 2021-11-13 RX ADMIN — PANTOPRAZOLE SODIUM 40 MG: 40 TABLET, DELAYED RELEASE ORAL at 09:49

## 2021-11-13 RX ADMIN — AMITRIPTYLINE HYDROCHLORIDE 50 MG: 50 TABLET, FILM COATED ORAL at 22:47

## 2021-11-13 RX ADMIN — CEFEPIME HYDROCHLORIDE 2 G: 2 INJECTION, POWDER, FOR SOLUTION INTRAVENOUS at 13:07

## 2021-11-13 RX ADMIN — INSULIN GLARGINE 35 UNITS: 100 INJECTION, SOLUTION SUBCUTANEOUS at 09:49

## 2021-11-13 RX ADMIN — METOPROLOL TARTRATE 25 MG: 25 TABLET, FILM COATED ORAL at 10:44

## 2021-11-13 RX ADMIN — ACETAMINOPHEN 500 MG: 500 TABLET ORAL at 17:48

## 2021-11-13 RX ADMIN — ALPRAZOLAM 0.25 MG: 0.5 TABLET ORAL at 10:43

## 2021-11-13 RX ADMIN — DOCUSATE SODIUM AND SENNOSIDES 1 TABLET: 8.6; 5 TABLET, FILM COATED ORAL at 09:49

## 2021-11-13 RX ADMIN — HEPARIN SODIUM 13.2 UNITS/KG/HR: 10000 INJECTION, SOLUTION INTRAVENOUS at 15:53

## 2021-11-13 RX ADMIN — VANCOMYCIN HYDROCHLORIDE 1250 MG: 10 INJECTION, POWDER, LYOPHILIZED, FOR SOLUTION INTRAVENOUS at 20:07

## 2021-11-13 RX ADMIN — Medication 10 ML: at 05:36

## 2021-11-13 RX ADMIN — ONDANSETRON 4 MG: 4 TABLET, ORALLY DISINTEGRATING ORAL at 10:04

## 2021-11-13 RX ADMIN — ACETAMINOPHEN 500 MG: 500 TABLET ORAL at 22:46

## 2021-11-13 RX ADMIN — ATORVASTATIN CALCIUM 40 MG: 40 TABLET, FILM COATED ORAL at 22:46

## 2021-11-13 RX ADMIN — Medication 10 ML: at 13:07

## 2021-11-13 RX ADMIN — NYSTATIN OINTMENT: 100000 OINTMENT TOPICAL at 09:00

## 2021-11-13 RX ADMIN — Medication 10 ML: at 22:52

## 2021-11-13 RX ADMIN — NYSTATIN OINTMENT: 100000 OINTMENT TOPICAL at 16:00

## 2021-11-13 RX ADMIN — VANCOMYCIN HYDROCHLORIDE 1250 MG: 10 INJECTION, POWDER, LYOPHILIZED, FOR SOLUTION INTRAVENOUS at 10:44

## 2021-11-13 RX ADMIN — CEFEPIME HYDROCHLORIDE 2 G: 2 INJECTION, POWDER, FOR SOLUTION INTRAVENOUS at 05:55

## 2021-11-13 RX ADMIN — LEVOTHYROXINE SODIUM 112 MCG: 0.11 TABLET ORAL at 09:48

## 2021-11-13 RX ADMIN — PAROXETINE HYDROCHLORIDE 40 MG: 20 TABLET, FILM COATED ORAL at 09:49

## 2021-11-13 RX ADMIN — NYSTATIN OINTMENT: 100000 OINTMENT TOPICAL at 22:00

## 2021-11-13 NOTE — PROGRESS NOTES
Pharmacy Antimicrobial Kinetic Dosing    Indication for Antimicrobials: sepsis; SSTI, abscess     Current Regimen of Each Antimicrobial:  Vancomycin 750mg IV q12h (Start Date ; Day 8  Cefepime 2 g IV q12h (Start Date ; Day 8  Metronidazole 500 mg IV q12h (Start Date ; Day 8    Previous Antimicrobial Therapy:  Fluconazole 200 mg PO x1, then 100 mg PO daily (Start Date ; Day  x7 total days)    Vancomycin Goal Level:  - 600    Date Dose & Interval Measured (mcg/mL) Predicted AUC/GRISELDA    10:30 750mg IV q12h 6.8 5.9 AUC < 300                 Dosing calculator used: [employer] calculator    Significant Positive Cultures:    blood: pending   urine: E. Coli - Final    Conditions for Dosing Consideration: None    Labs:  Recent Labs     21  0533   CREA 0.48* 0.52* 0.53*   BUN 6 5* 6     Recent Labs     21  0533   WBC 17.2* 22.7* 20.1*   BANDS  --  6  --      Temp (24hrs), Av °F (36.7 °C), Min:97.7 °F (36.5 °C), Max:98.6 °F (37 °C)    Creatinine Clearance (mL/min):   CrCl (Ideal Body Weight): 62.7   If actual weight < IBW: CrCl (Actual Body Weight) 140.7    Impression/Plan:   Vancomycin dose will be adjusted to 1250 mg q 8 hours for an estimated AUC/trough of 482/18  Drain was placed. Cefepime 2 g q8h for improved CrCl  Continue Metronidazole regimen  WBC trending down  Afebriel  Antimicrobial stop date TBD     Pharmacy will follow daily and adjust medications as appropriate for renal function and/or serum levels.     Thank you,  Funmi Freeman, PHARMD

## 2021-11-13 NOTE — PROGRESS NOTES
Hospitalist Progress Note    NAME: Bernard Ghosh   :  1950   MRN:  [id number]     Interim Hospital Summary: 65 y.o. female whom presented on 2021 with      Assessment / Plan:    Severe sepsis, POA as evidence by hypothermia, rectal temp 96, tachy , lactic 9.2 -->5.3 after 1L, WBC 26K, MERCEDES Cr 1.5 due to  Suspected epidural/right iliopsoas abscess with   Right paraspinal pain at level L3-4 and R iliac crest pain (complains of R hip pain), POA   -CT abd/pelvis showing fluid posterior to the surgical changes and on the right side of the spine at L4/L5 and posterior to the right iliopsoas muscle just superior to the sacrum. Findings are concerning for an infectious process. -S/P L2-3 AND L5-S1  LUMBAR LAMINECTOMY WITH ANDREWS OSTEOTOMY L2-3 AND L3-4 WITH PLF L2-S1 by Dr. Shell Bey [phone]; complicated with nonhealing post surgical wound requiring wound vac 2021.    -Continue empiric Vanco, cefepime and Flagyl  -Leukocytosis remains elevated  -Consult orthopedics appreciated  -scheduled tylenol for pain. Oxycodone prn  -PT OT eval and treat. Transfers by herself from bed to [facility] at home.  not able to help. -Appreciated infectious disease consult, recommended to treat for 4 to 6 weeks for deep tissue infection  -Patient status post drain placed in iliopsoas muscle on the right side, 125 mL of purulent discharge noticed in the drain  -Leukocytosis slightly improved    Celiac Artery Thrombosis with splenic Infarcts:  CT ABD/Pelvis  showed celiac artery thrombosis with splenic infarcts  CT abdomen pelvis today confirmed the celiac artery occlusion with splenic infarcts  Vascular consult ongoing  Continue heparin drip      UTI  -Ucx E coli  -sensitive to cefepime   -Rea placed in ER due to poor mobility from hip pain and polyuria, with severe candidal infection in perineum and labia and buttocks.       Type 2DM, uncontrolled with hyperglycemic hyperosmolar hyperglycemia and early DKA, due to severe sepsis, POA  -presented with , AG 15, trace ketone in urine  -A1c 11.7 up from 7.6 in July.  -continue holding metformin  -Continue Lantus 35 units. Continue SSI prn. Off drip 11/07  -Blood sugars are better controlled    Prerenal MERCEDES Cr 1.5  -resolved with IVF hydration.       Constipation  -added pericolace and miralax    Severe candida infection in perineum, labia, buttocks  -continue mystatin cream ordered in ER. Status post Diflucan  -wound care consult appreciated     Generalized weakness and gait difficulty   -consult pt/ot     SVT   HTN / HLD  -continue metoprolol and statin  -having runs of SVT 11/07. Cardiology input appreciated     Hypothyroid  -continue levothyroxine. TSH 3.6     Depression and anxiety  -continue paxil with xanax prn     Severe obesity    Code:  Discussed, full  DVT prophylaxis: lovenox  Surrogate decision maker:   (but has some dementia per patient) or sister Sherie Pierce    40 or above Morbid obesity / Body mass index is 46.41 kg/m². Subjective:     Chief Complaint / Reason for Physician Visit  Patient reports pain at the site of the drain insertion, denies any fever chills, denies any nausea vomiting. Drain is containing 125 mL of purulent discharge  Review of Systems:  Symptom Y/N Comments  Symptom Y/N Comments   Fever/Chills n   Chest Pain n    Poor Appetite n   Edema n    Cough n   Abdominal Pain n    Sputum n   Joint Pain     SOB/AVALOS n   Pruritis/Rash     Nausea/vomit n   Tolerating PT/OT     Diarrhea n   Tolerating Diet     Constipation n   Other       Could NOT obtain due to:      PO intake: No data found. Objective:     VITALS:   Last 24hrs VS reviewed since prior progress note.  Most recent are:  Patient Vitals for the past 24 hrs:   Temp Pulse Resp BP SpO2   11/13/21 0720 97.7 °F (36.5 °C) 69 16 111/74 94 %   11/13/21 0300 98.6 °F (37 °C) 70 16 120/86 96 %   11/12/21 2122 98 °F (36.7 °C) 72 18 118/81 94 %   11/12/21 2040 98 °F (36.7 °C) 78 18 120/76 96 %   11/12/21 1618  73 18 101/71 92 %   11/12/21 1545  77 18 105/63 99 %   11/12/21 1540  77 18 109/88 98 %   11/12/21 1535  77 18 98/62 98 %   11/12/21 1530  77 18 (!) 100/59 98 %   11/12/21 1525  77 18 96/60 98 %   11/12/21 1520  77 18 113/61 96 %   11/12/21 1515  75 16 106/67 96 %   11/12/21 1510  75 18 113/65 96 %   11/12/21 1356 98.1 °F (36.7 °C) 72 22 131/76 94 %   11/12/21 1143 97.7 °F (36.5 °C) 67 21 133/74 95 %       Intake/Output Summary (Last 24 hours) at 11/13/2021 1103  Last data filed at 11/13/2021 0720  Gross per 24 hour   Intake    Output 2250 ml   Net -2250 ml        I had a face to face encounter, and independently examined this patient on 11/13/2021, as outlined below:  PHYSICAL EXAM:  General: WD, WN. Alert, cooperative, no acute distress    EENT:  EOMI. Anicteric sclerae. MMM  Resp:  CTA bilaterally, no wheezing or rales. No accessory muscle use  CV:  Regular  rhythm,  No edema  GI:  Soft, Non distended, + mild right flank tender. +Bowel sounds, drain is containing 125 mill of purulent discharge  Neurologic:  Alert and oriented X 3, normal speech,   Psych:   Good insight. Not anxious nor agitated  Skin:  No rashes. No jaundice    Reviewed most current lab test results and cultures  YES  Reviewed most current radiology test results   YES  Review and summation of old records today    NO  Reviewed patient's current orders and MAR    YES  PMH/SH reviewed - no change compared to H&P  ________________________________________________________________________  Care Plan discussed with:    Comments   Patient x    Family      RN x    Care Manager     Consultant                       x Multidiciplinary team rounds were held today with , nursing, pharmacist and clinical coordinator. Patient's plan of care was discussed; medications were reviewed and discharge planning was addressed. ________________________________________________________________________  Total NON critical care TIME:  35   Minutes    Total CRITICAL CARE TIME Spent:   Minutes non procedure based      Comments   >50% of visit spent in counseling and coordination of care x     This includes time during multidisciplinary rounds if indicated above   ________________________________________________________________________  Hansa Smith MD     Procedures: see electronic medical records for all procedures/Xrays and details which were not copied into this note but were reviewed prior to creation of Plan. LABS:  I reviewed today's most current labs and imaging studies.   Pertinent labs include:  Recent Labs     11/13/21 0400 11/12/21 0056 11/11/21  0533   WBC 17.2* 22.7* 20.1*   HGB 9.8* 10.1* 10.8*   HCT 32.1* 31.2* 34.6*   * 514* 531*     Recent Labs     11/13/21 0400 11/12/21 0056 11/11/21  0533    137 139   K 3.4* 3.2* 3.1*    106 106   CO2 27 24 27   * 113* 179*   BUN 6 5* 6   CREA 0.48* 0.52* 0.53*   CA 8.1* 8.3* 8.1* Chronic illness

## 2021-11-13 NOTE — PROGRESS NOTES
Bedside and Verbal shift change report given to Debra Diaz (oncoming nurse) by Rubén Veras RN (offgoing nurse). Report included the following information SBAR, Kardex, Intake/Output, MAR, Accordion, Recent Results and Med Rec Status.

## 2021-11-13 NOTE — PROGRESS NOTES
Vascular    Please see my attestation to ANDREW Puentes's note yesterday for final vascular opinion.

## 2021-11-14 LAB
ANION GAP SERPL CALC-SCNC: 6 MMOL/L (ref 5–15)
APTT PPP: 44.8 SEC (ref 22.1–31)
APTT PPP: 47.8 SEC (ref 22.1–31)
APTT PPP: 99.8 SEC (ref 22.1–31)
BACTERIA SPEC CULT: NORMAL
BUN SERPL-MCNC: 9 MG/DL (ref 6–20)
BUN/CREAT SERPL: 14 (ref 12–20)
CALCIUM SERPL-MCNC: 8.2 MG/DL (ref 8.5–10.1)
CHLORIDE SERPL-SCNC: 104 MMOL/L (ref 97–108)
CO2 SERPL-SCNC: 26 MMOL/L (ref 21–32)
CREAT SERPL-MCNC: 0.63 MG/DL (ref 0.55–1.02)
ERYTHROCYTE [DISTWIDTH] IN BLOOD BY AUTOMATED COUNT: 18.2 % (ref 11.5–14.5)
GLUCOSE BLD STRIP.AUTO-MCNC: 168 MG/DL (ref 65–117)
GLUCOSE BLD STRIP.AUTO-MCNC: 215 MG/DL (ref 65–117)
GLUCOSE BLD STRIP.AUTO-MCNC: 218 MG/DL (ref 65–117)
GLUCOSE BLD STRIP.AUTO-MCNC: 219 MG/DL (ref 65–117)
GLUCOSE SERPL-MCNC: 217 MG/DL (ref 65–100)
HCT VFR BLD AUTO: 31.4 % (ref 35–47)
HGB BLD-MCNC: 9.6 G/DL (ref 11.5–16)
MCH RBC QN AUTO: 25.3 PG (ref 26–34)
MCHC RBC AUTO-ENTMCNC: 30.6 G/DL (ref 30–36.5)
MCV RBC AUTO: 82.6 FL (ref 80–99)
NRBC # BLD: 0 K/UL (ref 0–0.01)
NRBC BLD-RTO: 0 PER 100 WBC
PLATELET # BLD AUTO: 601 K/UL (ref 150–400)
PMV BLD AUTO: 9.6 FL (ref 8.9–12.9)
POTASSIUM SERPL-SCNC: 3.6 MMOL/L (ref 3.5–5.1)
RBC # BLD AUTO: 3.8 M/UL (ref 3.8–5.2)
SERVICE CMNT-IMP: ABNORMAL
SERVICE CMNT-IMP: NORMAL
SODIUM SERPL-SCNC: 136 MMOL/L (ref 136–145)
THERAPEUTIC RANGE,PTTT: ABNORMAL SECS (ref 58–77)
WBC # BLD AUTO: 14.4 K/UL (ref 3.6–11)

## 2021-11-14 PROCEDURE — 74011250636 HC RX REV CODE- 250/636: Performed by: INTERNAL MEDICINE

## 2021-11-14 PROCEDURE — 99232 SBSQ HOSP IP/OBS MODERATE 35: CPT | Performed by: ORTHOPAEDIC SURGERY

## 2021-11-14 PROCEDURE — 36415 COLL VENOUS BLD VENIPUNCTURE: CPT

## 2021-11-14 PROCEDURE — 65660000000 HC RM CCU STEPDOWN

## 2021-11-14 PROCEDURE — 74011636637 HC RX REV CODE- 636/637: Performed by: INTERNAL MEDICINE

## 2021-11-14 PROCEDURE — 74011000258 HC RX REV CODE- 258: Performed by: INTERNAL MEDICINE

## 2021-11-14 PROCEDURE — 85730 THROMBOPLASTIN TIME PARTIAL: CPT

## 2021-11-14 PROCEDURE — 80048 BASIC METABOLIC PNL TOTAL CA: CPT

## 2021-11-14 PROCEDURE — 74011250637 HC RX REV CODE- 250/637: Performed by: HOSPITALIST

## 2021-11-14 PROCEDURE — 85027 COMPLETE CBC AUTOMATED: CPT

## 2021-11-14 PROCEDURE — 82962 GLUCOSE BLOOD TEST: CPT

## 2021-11-14 PROCEDURE — 74011250637 HC RX REV CODE- 250/637: Performed by: INTERNAL MEDICINE

## 2021-11-14 PROCEDURE — 74011250636 HC RX REV CODE- 250/636: Performed by: HOSPITALIST

## 2021-11-14 RX ADMIN — NYSTATIN OINTMENT: 100000 OINTMENT TOPICAL at 16:00

## 2021-11-14 RX ADMIN — CEFEPIME HYDROCHLORIDE 2 G: 2 INJECTION, POWDER, FOR SOLUTION INTRAVENOUS at 13:42

## 2021-11-14 RX ADMIN — ACETAMINOPHEN 500 MG: 500 TABLET ORAL at 23:36

## 2021-11-14 RX ADMIN — ACETAMINOPHEN 500 MG: 500 TABLET ORAL at 12:33

## 2021-11-14 RX ADMIN — NYSTATIN OINTMENT: 100000 OINTMENT TOPICAL at 09:00

## 2021-11-14 RX ADMIN — LEVOTHYROXINE SODIUM 112 MCG: 0.11 TABLET ORAL at 07:30

## 2021-11-14 RX ADMIN — Medication 1 CAPSULE: at 08:21

## 2021-11-14 RX ADMIN — INSULIN LISPRO 2 UNITS: 100 INJECTION, SOLUTION INTRAVENOUS; SUBCUTANEOUS at 07:30

## 2021-11-14 RX ADMIN — INSULIN GLARGINE 35 UNITS: 100 INJECTION, SOLUTION SUBCUTANEOUS at 08:21

## 2021-11-14 RX ADMIN — ACETAMINOPHEN 500 MG: 500 TABLET ORAL at 08:21

## 2021-11-14 RX ADMIN — CEFEPIME HYDROCHLORIDE 2 G: 2 INJECTION, POWDER, FOR SOLUTION INTRAVENOUS at 23:36

## 2021-11-14 RX ADMIN — NYSTATIN OINTMENT: 100000 OINTMENT TOPICAL at 22:00

## 2021-11-14 RX ADMIN — METRONIDAZOLE 500 MG: 500 INJECTION, SOLUTION INTRAVENOUS at 13:42

## 2021-11-14 RX ADMIN — ASPIRIN 81 MG: 81 TABLET, COATED ORAL at 08:21

## 2021-11-14 RX ADMIN — METRONIDAZOLE 500 MG: 500 INJECTION, SOLUTION INTRAVENOUS at 00:57

## 2021-11-14 RX ADMIN — METOPROLOL TARTRATE 25 MG: 25 TABLET, FILM COATED ORAL at 08:21

## 2021-11-14 RX ADMIN — INSULIN LISPRO 2 UNITS: 100 INJECTION, SOLUTION INTRAVENOUS; SUBCUTANEOUS at 23:51

## 2021-11-14 RX ADMIN — DOCUSATE SODIUM AND SENNOSIDES 1 TABLET: 8.6; 5 TABLET, FILM COATED ORAL at 18:35

## 2021-11-14 RX ADMIN — VANCOMYCIN HYDROCHLORIDE 1250 MG: 10 INJECTION, POWDER, LYOPHILIZED, FOR SOLUTION INTRAVENOUS at 11:00

## 2021-11-14 RX ADMIN — DOCUSATE SODIUM AND SENNOSIDES 1 TABLET: 8.6; 5 TABLET, FILM COATED ORAL at 08:21

## 2021-11-14 RX ADMIN — INSULIN LISPRO 3 UNITS: 100 INJECTION, SOLUTION INTRAVENOUS; SUBCUTANEOUS at 18:35

## 2021-11-14 RX ADMIN — SODIUM CHLORIDE 75 ML/HR: 9 INJECTION, SOLUTION INTRAVENOUS at 00:58

## 2021-11-14 RX ADMIN — POLYETHYLENE GLYCOL 3350 17 G: 17 POWDER, FOR SOLUTION ORAL at 08:22

## 2021-11-14 RX ADMIN — PANTOPRAZOLE SODIUM 40 MG: 40 TABLET, DELAYED RELEASE ORAL at 08:21

## 2021-11-14 RX ADMIN — Medication 10 ML: at 06:57

## 2021-11-14 RX ADMIN — ACETAMINOPHEN 500 MG: 500 TABLET ORAL at 18:35

## 2021-11-14 RX ADMIN — HEPARIN SODIUM 4910 UNITS: 1000 INJECTION INTRAVENOUS; SUBCUTANEOUS at 18:42

## 2021-11-14 RX ADMIN — AMITRIPTYLINE HYDROCHLORIDE 50 MG: 50 TABLET, FILM COATED ORAL at 23:36

## 2021-11-14 RX ADMIN — PAROXETINE HYDROCHLORIDE 40 MG: 20 TABLET, FILM COATED ORAL at 08:21

## 2021-11-14 RX ADMIN — VANCOMYCIN HYDROCHLORIDE 1250 MG: 10 INJECTION, POWDER, LYOPHILIZED, FOR SOLUTION INTRAVENOUS at 04:39

## 2021-11-14 RX ADMIN — CEFEPIME HYDROCHLORIDE 2 G: 2 INJECTION, POWDER, FOR SOLUTION INTRAVENOUS at 06:57

## 2021-11-14 RX ADMIN — Medication 10 ML: at 13:42

## 2021-11-14 RX ADMIN — INSULIN LISPRO 3 UNITS: 100 INJECTION, SOLUTION INTRAVENOUS; SUBCUTANEOUS at 12:33

## 2021-11-14 RX ADMIN — ATORVASTATIN CALCIUM 40 MG: 40 TABLET, FILM COATED ORAL at 23:36

## 2021-11-14 RX ADMIN — VANCOMYCIN HYDROCHLORIDE 1250 MG: 10 INJECTION, POWDER, LYOPHILIZED, FOR SOLUTION INTRAVENOUS at 23:35

## 2021-11-14 NOTE — PROGRESS NOTES
Problem: Diabetes Self-Management  Goal: *Incorporating nutritional management into lifestyle  Description: Describe effect of type, amount and timing of food on blood glucose; list 3 methods for planning meals. Outcome: Progressing Towards Goal  Goal: *Incorporating physical activity into lifestyle  Description: State effect of exercise on blood glucose levels. Outcome: Progressing Towards Goal  Goal: *Developing strategies to promote health/change behavior  Description: Define the ABC's of diabetes; identify appropriate screenings, schedule and personal plan for screenings. Outcome: Progressing Towards Goal  Goal: *Using medications safely  Description: State effect of diabetes medications on diabetes; name diabetes medication taking, action and side effects. Outcome: Progressing Towards Goal  Goal: *Monitoring blood glucose, interpreting and using results  Description: Identify recommended blood glucose targets  and personal targets. Outcome: Progressing Towards Goal  Goal: *Prevention, detection, treatment of acute complications  Description: List symptoms of hyper- and hypoglycemia; describe how to treat low blood sugar and actions for lowering  high blood glucose level. Outcome: Progressing Towards Goal  Goal: *Prevention, detection and treatment of chronic complications  Description: Define the natural course of diabetes and describe the relationship of blood glucose levels to long term complications of diabetes.   Outcome: Progressing Towards Goal  Goal: *Developing strategies to address psychosocial issues  Description: Describe feelings about living with diabetes; identify support needed and support network  Outcome: Progressing Towards Goal  Goal: *Insulin pump training  Outcome: Progressing Towards Goal  Goal: *Sick day guidelines  Outcome: Progressing Towards Goal  Goal: *Patient Specific Goal (EDIT GOAL, INSERT TEXT)  Outcome: Progressing Towards Goal     Problem: Patient Education: Go to Patient Education Activity  Goal: Patient/Family Education  Outcome: Progressing Towards Goal     Problem: Falls - Risk of  Goal: *Absence of Falls  Description: Document Thora Bare Fall Risk and appropriate interventions in the flowsheet. Outcome: Progressing Towards Goal  Note: Fall Risk Interventions:  Mobility Interventions: Bed/chair exit alarm, Communicate number of staff needed for ambulation/transfer, Patient to call before getting OOB         Medication Interventions: Evaluate medications/consider consulting pharmacy    Elimination Interventions: Bed/chair exit alarm, Call light in reach, Patient to call for help with toileting needs, Toileting schedule/hourly rounds    History of Falls Interventions: Bed/chair exit alarm         Problem: Patient Education: Go to Patient Education Activity  Goal: Patient/Family Education  Outcome: Progressing Towards Goal     Problem: Pressure Injury - Risk of  Goal: *Prevention of pressure injury  Description: Document Neal Scale and appropriate interventions in the flowsheet.   Outcome: Progressing Towards Goal  Note: Pressure Injury Interventions:  Sensory Interventions: Assess changes in LOC    Moisture Interventions: Internal/External urinary devices    Activity Interventions: Increase time out of bed    Mobility Interventions: Float heels, HOB 30 degrees or less, Pressure redistribution bed/mattress (bed type)    Nutrition Interventions: Document food/fluid/supplement intake    Friction and Shear Interventions: HOB 30 degrees or less                Problem: Patient Education: Go to Patient Education Activity  Goal: Patient/Family Education  Outcome: Progressing Towards Goal     Problem: Patient Education: Go to Patient Education Activity  Goal: Patient/Family Education  Outcome: Progressing Towards Goal     Problem: HHS: Day 1  Goal: Off Pathway (Use only if patient is Off Pathway)  Outcome: Progressing Towards Goal  Goal: Activity/Safety  Outcome: Progressing Towards Goal  Goal: Consults, if ordered  Outcome: Progressing Towards Goal  Goal: Diagnostic Test/Procedures  Outcome: Progressing Towards Goal  Goal: Nutrition/Diet  Outcome: Progressing Towards Goal  Goal: Discharge Planning  Outcome: Progressing Towards Goal  Goal: Medications  Outcome: Progressing Towards Goal  Goal: Respiratory  Outcome: Progressing Towards Goal  Goal: Treatments/Interventions/Procedures  Outcome: Progressing Towards Goal  Goal: Psychosocial  Outcome: Progressing Towards Goal  Goal: *Blood glucose decreasing  Outcome: Progressing Towards Goal  Goal: *Potassium normalizing  Outcome: Progressing Towards Goal  Goal: *Adequate urinary output (equal to or greater than 30 milliliters/hour)  Outcome: Progressing Towards Goal  Goal: *Stable cardiac rhythm  Outcome: Progressing Towards Goal     Problem: HHS: Day 2  Goal: Off Pathway (Use only if patient is Off Pathway)  Outcome: Progressing Towards Goal     Problem: HHS: Day 2  Goal: Off Pathway (Use only if patient is Off Pathway)  Outcome: Progressing Towards Goal  Goal: Activity/Safety  Outcome: Progressing Towards Goal  Goal: Diagnostic Test/Procedures  Outcome: Progressing Towards Goal  Goal: Nutrition/Diet  Outcome: Progressing Towards Goal  Goal: Discharge Planning  Outcome: Progressing Towards Goal  Goal: Medications  Outcome: Progressing Towards Goal  Goal: Respiratory  Outcome: Progressing Towards Goal  Goal: Treatments/Interventions/Procedures  Outcome: Progressing Towards Goal  Goal: Psychosocial  Outcome: Progressing Towards Goal  Goal: *Blood glucose 80 to 180 mg/dl  Outcome: Progressing Towards Goal  Goal: *Electrolytes within normal limits (dehydration resolved)  Outcome: Progressing Towards Goal  Goal: *Demonstrates progressive activity  Outcome: Progressing Towards Goal  Goal: *Tolerating diet  Outcome: Progressing Towards Goal     Problem: HHS: Day 3  Goal: Off Pathway (Use only if patient is Off Pathway)  Outcome: Progressing Towards Goal  Goal: Activity/Safety  Outcome: Progressing Towards Goal  Goal: Diagnostic Test/Procedures  Outcome: Progressing Towards Goal  Goal: Nutrition/Diet  Outcome: Progressing Towards Goal  Goal: Discharge Planning  Outcome: Progressing Towards Goal  Goal: Medications  Outcome: Progressing Towards Goal  Goal: Treatments/Interventions/Procedures  Outcome: Progressing Towards Goal  Goal: Psychosocial  Outcome: Progressing Towards Goal     Problem: Patient Education: Go to Patient Education Activity  Goal: Patient/Family Education  Outcome: Progressing Towards Goal     Problem: Patient Education: Go to Patient Education Activity  Goal: Patient/Family Education  Outcome: Progressing Towards Goal

## 2021-11-14 NOTE — PROGRESS NOTES
Bedside and Verbal shift change report given to [address] (oncoming nurse) by Waunita Rubinstein RN (offgoing nurse). Report included the following information SBAR, Kardex, Intake/Output, MAR, Accordion, Recent Results and Med Rec Status.

## 2021-11-14 NOTE — ROUTINE PROCESS
Bedside, Verbal and Written shift change report given to Carolina Yang RN  (oncoming nurse) by Joseline Carvajal RN  (offgoing nurse). Report included the following information SBAR, Kardex, MAR and Recent Results.

## 2021-11-14 NOTE — PROGRESS NOTES
Hospitalist Progress Note    NAME: Jennifer Cormier   :  1950   MRN:  [id number]     Interim Hospital Summary: 65 y.o. female whom presented on 2021 with      Assessment / Plan:    Severe sepsis, POA as evidence by hypothermia, rectal temp 96, tachy , lactic 9.2 -->5.3   Suspected epidural/right iliopsoas abscess with   Right paraspinal pain at level L3-4 and R iliac crest pain (complains of R hip pain), POA   -Patient was started on vancomycin, cefepime and Flagyl  -Continue antibiotics, recommended 4 to 6 weeks of antibiotics per ID  -Appreciated orthopedic consult, recommended IR placed drain  -Drain was placed  draining purulent discharge  -Iliopsoas drain continues to put out purulent discharge  -Culture from drain fluid is growing alpha Streptococcus  -Surgical management on hold for now  -Leukocytosis improving  -Appreciated orthopedic consult    Celiac Artery Thrombosis with splenic Infarcts:  CT ABD/Pelvis  showed celiac artery thrombosis with splenic infarcts  CT abdomen pelvis today confirmed the celiac artery occlusion with splenic infarcts  Vascular consult appreciated  Continue heparin drip until definitive decision about surgical debridement      UTI  -Ucx E coli  -sensitive to cefepime   -Rea placed in ER due to poor mobility from hip pain and polyuria, with severe candidal infection in perineum and labia and buttocks. Type 2DM, uncontrolled with hyperglycemic hyperosmolar hyperglycemia and early DKA, due to severe sepsis, POA  -presented with , AG 15, trace ketone in urine  -A1c 11.7 up from 7.6 in July.  -continue holding metformin  -Continue Lantus 35 units. Continue SSI prn.   Off drip   -Blood sugars are better controlled    Prerenal MERCEDES Cr 1.5  -resolved with IVF hydration.       Constipation  -added pericolace and miralax    Severe candida infection in perineum, labia, buttocks  -continue mystatin cream ordered in ER. Status post Diflucan  -wound care consult appreciated     Generalized weakness and gait difficulty   -consult pt/ot     SVT   HTN / HLD  -continue metoprolol and statin  -having runs of SVT 11/07. Cardiology input appreciated     Hypothyroid  -continue levothyroxine. TSH 3.6     Depression and anxiety  -continue paxil with xanax prn     Severe obesity    Code:  Discussed, full  DVT prophylaxis: lovenox  Surrogate decision maker:   (but has some dementia per patient) or sister Drake Tobias    40 or above Morbid obesity / Body mass index is 46.41 kg/m². Subjective:     Chief Complaint / Reason for Physician Visit  Patient reports pain at the site of the drain insertion which is improving, denies any fever chills, denies any nausea vomiting. Right iliopsoas drain continues to put out purulent discharge     review of Systems:  Symptom Y/N Comments  Symptom Y/N Comments   Fever/Chills n   Chest Pain n    Poor Appetite n   Edema n    Cough n   Abdominal Pain n    Sputum n   Joint Pain     SOB/AVALOS n   Pruritis/Rash     Nausea/vomit n   Tolerating PT/OT     Diarrhea n   Tolerating Diet     Constipation n   Other       Could NOT obtain due to:      PO intake: No data found. Objective:     VITALS:   Last 24hrs VS reviewed since prior progress note.  Most recent are:  Patient Vitals for the past 24 hrs:   Temp Pulse Resp BP SpO2   11/14/21 1107 97.5 °F (36.4 °C) 65 18 (!) 103/59 92 %   11/14/21 0750 97.7 °F (36.5 °C) 66 17 94/60 94 %   11/14/21 0439    100/64    11/14/21 0322 97.7 °F (36.5 °C) 67 16 (!) 87/61    11/13/21 2321 97.4 °F (36.3 °C) 66 17 100/63 95 %   11/13/21 2000 98.1 °F (36.7 °C) 75 16 91/60 92 %   11/13/21 1521 97.2 °F (36.2 °C) 69 18 96/66 94 %       Intake/Output Summary (Last 24 hours) at 11/14/2021 1151  Last data filed at 11/14/2021 0657  Gross per 24 hour   Intake    Output 1750 ml   Net -1750 ml        I had a face to face encounter, and independently examined this patient on 11/14/2021, as outlined below:  PHYSICAL EXAM:  General: WD, WN. Alert, cooperative, no acute distress    EENT:  EOMI. Anicteric sclerae. MMM  Resp:  CTA bilaterally, no wheezing or rales. No accessory muscle use  CV:  Regular  rhythm,  No edema  GI:  Soft, Non distended, + mild right flank tender. +Bowel sounds, drain is containing  purulent discharge  Neurologic:  Alert and oriented X 3, normal speech,   Psych:   Good insight. Not anxious nor agitated  Skin:  No rashes. No jaundice    Reviewed most current lab test results and cultures  YES  Reviewed most current radiology test results   YES  Review and summation of old records today    NO  Reviewed patient's current orders and MAR    YES  PMH/SH reviewed - no change compared to H&P  ________________________________________________________________________  Care Plan discussed with:    Comments   Patient x    Family      RN x    Care Manager     Consultant                       x Multidiciplinary team rounds were held today with , nursing, pharmacist and clinical coordinator. Patient's plan of care was discussed; medications were reviewed and discharge planning was addressed. ________________________________________________________________________  Total NON critical care TIME:  35   Minutes    Total CRITICAL CARE TIME Spent:   Minutes non procedure based      Comments   >50% of visit spent in counseling and coordination of care x     This includes time during multidisciplinary rounds if indicated above   ________________________________________________________________________  Vivian Zacarias MD     Procedures: see electronic medical records for all procedures/Xrays and details which were not copied into this note but were reviewed prior to creation of Plan. LABS:  I reviewed today's most current labs and imaging studies.   Pertinent labs include:  Recent Labs     11/14/21  0211 11/13/21  0400 11/12/21  0056   WBC 14.4* 17.2* 22.7* HGB 9.6* 9.8* 10.1*   HCT 31.4* 32.1* 31.2*   * 561* 514*     Recent Labs     11/14/21  0211 11/13/21  0400 11/12/21  0056    137 137   K 3.6 3.4* 3.2*    105 106   CO2 26 27 24   * 135* 113*   BUN 9 6 5*   CREA 0.63 0.48* 0.52*   CA 8.2* 8.1* 8.3*

## 2021-11-14 NOTE — PROGRESS NOTES
Orthopedic Spine Progress Note  Post Op day: * No surgery found *    2021 2:03 PM   Admit Date: 2021  Procedure: * No surgery found *    Subjective:     Bernard Ghosh has no complaints. Some right lower quadrant pain. Minimal back pain or leg pain. Tolerating diet. No N/V. Pain Control:   Pain Assessment  Pain Scale 1: Numeric (0 - 10)  Pain Intensity 1: 0  Pain Onset 1: chronic  Pain Location 1: Abdomen, Hip  Pain Orientation 1: Right, Lower  Pain Description 1: Aching, Constant, Sore  Pain Intervention(s) 1: Rest, Repositioned    Objective:          Physical Exam:  General:  Alert and oriented. No acute distress. Heart:  Respirations unlabored. Abdomen:   Extremities: Soft, non-tender. No evidence of cyanosis. Pulses palpable in both upper and lower extremities. Neurologic:  Musculoskeletal:  No new motor deficits. Neurovascular exam within normal limits. Sensation stable. Motor: unchanged C5-T1 and L2-S1. Neema's sign negative in bilateral lower extremities. Calves soft, nontender upon palpation and with passive twitch. Moves both upper and lower extremities. Incision: clean, dry, and intact. No significant erythema or swelling. No active drainage noted. Vital Signs:    Blood pressure (!) 103/59, pulse 65, temperature 97.5 °F (36.4 °C), resp. rate 18, height 5' 4\" (1.626 m), weight 270 lb 6.4 oz (122.7 kg), SpO2 92 %, not currently breastfeeding.   Temp (24hrs), Av.6 °F (36.4 °C), Min:97.2 °F (36.2 °C), Max:98.1 °F (36.7 °C)      LAB:    Recent Labs     21   HCT 31.4*   HGB 9.6*   *     Lab Results   Component Value Date/Time    Sodium 136 2021 02:11 AM    Potassium 3.6 2021 02:11 AM    Chloride 104 2021 02:11 AM    CO2 26 2021 02:11 AM    Glucose 217 (H) 2021 02:11 AM    BUN 9 2021 02:11 AM    Creatinine 0.63 2021 02:11 AM    Calcium 8.2 (L) 2021 02:11 AM       Intake/Output:No intake/output data recorded. 11/12 1901 - 11/14 0700  In: -   Out: 5303 [Urine:2900; Drains:750]    PT/OT:   Gait:  Gait  Base of Support: Widened  Speed/Catherine: Slow, Shuffled  Step Length: Right shortened, Left shortened  Gait Abnormalities: Decreased step clearance, Shuffling gait  Ambulation - Level of Assistance: Contact guard assistance  Distance (ft): 25 Feet (ft) (10ft, 15ft w/ seated rest break b/t)  Assistive Device: Walker, rolling, Gait belt                 Assessment:   Patient is s/p drainage of right psoas abscess    Plan:     1. Continue to follow culture results. White count continues to improve. Would like to aggressively treat with drainage and antibiotics to try to resolve psoas collection before considering any posterior debridement given her medical issues. Eventually may need aspiration of posterior fluid collection to rule out seroma vs infection.        Signed By: Jama Medina MD

## 2021-11-15 LAB
ANION GAP SERPL CALC-SCNC: 5 MMOL/L (ref 5–15)
APTT PPP: 113.1 SEC (ref 22.1–31)
APTT PPP: 38.7 SEC (ref 22.1–31)
APTT PPP: 42.1 SEC (ref 22.1–31)
APTT PPP: >130 SEC (ref 22.1–31)
BUN SERPL-MCNC: 7 MG/DL (ref 6–20)
BUN/CREAT SERPL: 13 (ref 12–20)
CALCIUM SERPL-MCNC: 8.1 MG/DL (ref 8.5–10.1)
CHLORIDE SERPL-SCNC: 104 MMOL/L (ref 97–108)
CO2 SERPL-SCNC: 29 MMOL/L (ref 21–32)
CREAT SERPL-MCNC: 0.54 MG/DL (ref 0.55–1.02)
DATE LAST DOSE: ABNORMAL
ERYTHROCYTE [DISTWIDTH] IN BLOOD BY AUTOMATED COUNT: 18.8 % (ref 11.5–14.5)
GLUCOSE BLD STRIP.AUTO-MCNC: 100 MG/DL (ref 65–117)
GLUCOSE BLD STRIP.AUTO-MCNC: 154 MG/DL (ref 65–117)
GLUCOSE BLD STRIP.AUTO-MCNC: 210 MG/DL (ref 65–117)
GLUCOSE BLD STRIP.AUTO-MCNC: 83 MG/DL (ref 65–117)
GLUCOSE SERPL-MCNC: 143 MG/DL (ref 65–100)
HCT VFR BLD AUTO: 32.5 % (ref 35–47)
HGB BLD-MCNC: 9.9 G/DL (ref 11.5–16)
MCH RBC QN AUTO: 25.1 PG (ref 26–34)
MCHC RBC AUTO-ENTMCNC: 30.5 G/DL (ref 30–36.5)
MCV RBC AUTO: 82.3 FL (ref 80–99)
NRBC # BLD: 0 K/UL (ref 0–0.01)
NRBC BLD-RTO: 0 PER 100 WBC
PLATELET # BLD AUTO: 622 K/UL (ref 150–400)
PMV BLD AUTO: 9.4 FL (ref 8.9–12.9)
POTASSIUM SERPL-SCNC: 3.6 MMOL/L (ref 3.5–5.1)
RBC # BLD AUTO: 3.95 M/UL (ref 3.8–5.2)
REPORTED DOSE,DOSE: ABNORMAL UNITS
REPORTED DOSE/TIME,TMG: ABNORMAL
SERVICE CMNT-IMP: ABNORMAL
SERVICE CMNT-IMP: ABNORMAL
SERVICE CMNT-IMP: NORMAL
SERVICE CMNT-IMP: NORMAL
SODIUM SERPL-SCNC: 138 MMOL/L (ref 136–145)
THERAPEUTIC RANGE,PTTT: ABNORMAL SECS (ref 58–77)
VANCOMYCIN TROUGH SERPL-MCNC: 23 UG/ML (ref 5–10)
WBC # BLD AUTO: 14.5 K/UL (ref 3.6–11)

## 2021-11-15 PROCEDURE — 74011000258 HC RX REV CODE- 258: Performed by: INTERNAL MEDICINE

## 2021-11-15 PROCEDURE — 82962 GLUCOSE BLOOD TEST: CPT

## 2021-11-15 PROCEDURE — 80048 BASIC METABOLIC PNL TOTAL CA: CPT

## 2021-11-15 PROCEDURE — 74011250637 HC RX REV CODE- 250/637: Performed by: HOSPITALIST

## 2021-11-15 PROCEDURE — 97535 SELF CARE MNGMENT TRAINING: CPT

## 2021-11-15 PROCEDURE — 85027 COMPLETE CBC AUTOMATED: CPT

## 2021-11-15 PROCEDURE — 74011250636 HC RX REV CODE- 250/636: Performed by: HOSPITALIST

## 2021-11-15 PROCEDURE — 74011250637 HC RX REV CODE- 250/637: Performed by: INTERNAL MEDICINE

## 2021-11-15 PROCEDURE — 85730 THROMBOPLASTIN TIME PARTIAL: CPT

## 2021-11-15 PROCEDURE — 74011636637 HC RX REV CODE- 636/637: Performed by: INTERNAL MEDICINE

## 2021-11-15 PROCEDURE — 97530 THERAPEUTIC ACTIVITIES: CPT

## 2021-11-15 PROCEDURE — 74011250636 HC RX REV CODE- 250/636: Performed by: INTERNAL MEDICINE

## 2021-11-15 PROCEDURE — 80202 ASSAY OF VANCOMYCIN: CPT

## 2021-11-15 PROCEDURE — 36415 COLL VENOUS BLD VENIPUNCTURE: CPT

## 2021-11-15 PROCEDURE — 65660000000 HC RM CCU STEPDOWN

## 2021-11-15 PROCEDURE — 94760 N-INVAS EAR/PLS OXIMETRY 1: CPT

## 2021-11-15 RX ORDER — HEPARIN SODIUM 5000 [USP'U]/ML
9816 INJECTION, SOLUTION INTRAVENOUS; SUBCUTANEOUS ONCE
Status: COMPLETED | OUTPATIENT
Start: 2021-11-15 | End: 2021-11-15

## 2021-11-15 RX ORDER — VANCOMYCIN/0.9 % SOD CHLORIDE 1.5G/250ML
1500 PLASTIC BAG, INJECTION (ML) INTRAVENOUS EVERY 12 HOURS
Status: DISCONTINUED | OUTPATIENT
Start: 2021-11-16 | End: 2021-11-16

## 2021-11-15 RX ADMIN — DOCUSATE SODIUM AND SENNOSIDES 1 TABLET: 8.6; 5 TABLET, FILM COATED ORAL at 08:44

## 2021-11-15 RX ADMIN — CEFEPIME HYDROCHLORIDE 2 G: 2 INJECTION, POWDER, FOR SOLUTION INTRAVENOUS at 23:00

## 2021-11-15 RX ADMIN — Medication 10 ML: at 22:56

## 2021-11-15 RX ADMIN — HEPARIN SODIUM 13.2 UNITS/KG/HR: 10000 INJECTION, SOLUTION INTRAVENOUS at 13:15

## 2021-11-15 RX ADMIN — CEFEPIME HYDROCHLORIDE 2 G: 2 INJECTION, POWDER, FOR SOLUTION INTRAVENOUS at 13:27

## 2021-11-15 RX ADMIN — INSULIN LISPRO 2 UNITS: 100 INJECTION, SOLUTION INTRAVENOUS; SUBCUTANEOUS at 17:48

## 2021-11-15 RX ADMIN — ATORVASTATIN CALCIUM 40 MG: 40 TABLET, FILM COATED ORAL at 23:00

## 2021-11-15 RX ADMIN — PANTOPRAZOLE SODIUM 40 MG: 40 TABLET, DELAYED RELEASE ORAL at 08:44

## 2021-11-15 RX ADMIN — PAROXETINE HYDROCHLORIDE 40 MG: 20 TABLET, FILM COATED ORAL at 08:44

## 2021-11-15 RX ADMIN — OXYCODONE 5 MG: 5 TABLET ORAL at 00:19

## 2021-11-15 RX ADMIN — HEPARIN SODIUM 9816 UNITS: 5000 INJECTION, SOLUTION INTRAVENOUS; SUBCUTANEOUS at 13:00

## 2021-11-15 RX ADMIN — METRONIDAZOLE 500 MG: 500 INJECTION, SOLUTION INTRAVENOUS at 13:56

## 2021-11-15 RX ADMIN — INSULIN LISPRO 2 UNITS: 100 INJECTION, SOLUTION INTRAVENOUS; SUBCUTANEOUS at 22:56

## 2021-11-15 RX ADMIN — VANCOMYCIN HYDROCHLORIDE 1250 MG: 10 INJECTION, POWDER, LYOPHILIZED, FOR SOLUTION INTRAVENOUS at 12:30

## 2021-11-15 RX ADMIN — ACETAMINOPHEN 500 MG: 500 TABLET ORAL at 12:38

## 2021-11-15 RX ADMIN — ASPIRIN 81 MG: 81 TABLET, COATED ORAL at 08:44

## 2021-11-15 RX ADMIN — Medication 1 CAPSULE: at 08:44

## 2021-11-15 RX ADMIN — NYSTATIN OINTMENT: 100000 OINTMENT TOPICAL at 16:00

## 2021-11-15 RX ADMIN — POLYETHYLENE GLYCOL 3350 17 G: 17 POWDER, FOR SOLUTION ORAL at 08:43

## 2021-11-15 RX ADMIN — AMITRIPTYLINE HYDROCHLORIDE 50 MG: 50 TABLET, FILM COATED ORAL at 23:00

## 2021-11-15 RX ADMIN — OXYCODONE 5 MG: 5 TABLET ORAL at 12:38

## 2021-11-15 RX ADMIN — VANCOMYCIN HYDROCHLORIDE 1250 MG: 10 INJECTION, POWDER, LYOPHILIZED, FOR SOLUTION INTRAVENOUS at 05:07

## 2021-11-15 RX ADMIN — METRONIDAZOLE 500 MG: 500 INJECTION, SOLUTION INTRAVENOUS at 00:24

## 2021-11-15 RX ADMIN — Medication 10 ML: at 00:01

## 2021-11-15 RX ADMIN — ACETAMINOPHEN 500 MG: 500 TABLET ORAL at 08:44

## 2021-11-15 RX ADMIN — METOPROLOL TARTRATE 25 MG: 25 TABLET, FILM COATED ORAL at 08:44

## 2021-11-15 RX ADMIN — ACETAMINOPHEN 500 MG: 500 TABLET ORAL at 22:59

## 2021-11-15 RX ADMIN — ALPRAZOLAM 0.25 MG: 0.5 TABLET ORAL at 22:58

## 2021-11-15 RX ADMIN — ACETAMINOPHEN 500 MG: 500 TABLET ORAL at 17:47

## 2021-11-15 RX ADMIN — CEFEPIME HYDROCHLORIDE 2 G: 2 INJECTION, POWDER, FOR SOLUTION INTRAVENOUS at 08:35

## 2021-11-15 RX ADMIN — LEVOTHYROXINE SODIUM 112 MCG: 0.11 TABLET ORAL at 08:44

## 2021-11-15 RX ADMIN — Medication 10 ML: at 13:23

## 2021-11-15 RX ADMIN — NYSTATIN OINTMENT: 100000 OINTMENT TOPICAL at 09:00

## 2021-11-15 RX ADMIN — Medication 10 ML: at 05:14

## 2021-11-15 NOTE — PROGRESS NOTES
Pharmacy Antimicrobial Kinetic Dosing    Indication for Antimicrobials: sepsis; SSTI, abscess     Current Regimen of Each Antimicrobial:  Vancomycin 750mg IV q12h (Start Date ; Day 10  Cefepime 2 g IV q12h (Start Date ; Day 10  Metronidazole 500 mg IV q12h (Start Date ; Day 10    Previous Antimicrobial Therapy:  Fluconazole 200 mg PO x1, then 100 mg PO daily (Start Date ; Day  x7 total days)    Vancomycin Goal Level:  - 600    Date Dose & Interval Measured (mcg/mL) Predicted AUC/GRISELDA    10:30 750mg IV q12h 6.8 5.9 AUC < 300     12.3    11/15 1250 IV Q8H 23      Dosing calculator used: [employer] calculator    Significant Positive Cultures:    blood: pending   urine: E. Coli - Final   fluid - S. Anginosis  -Prelim     Conditions for Dosing Consideration: None    Labs:  Recent Labs     11/15/21  0139 11/14/21  0211 11/13/21  0400   CREA 0.54* 0.63 0.48*   BUN 7 9 6     Recent Labs     11/15/21  0139 11/14/21  0211 21  0400   WBC 14.5* 14.4* 17.2*     Temp (24hrs), Av.7 °F (36.5 °C), Min:97.6 °F (36.4 °C), Max:98 °F (36.7 °C)    Creatinine Clearance (mL/min):   CrCl (Ideal Body Weight): 62.7   If actual weight < IBW: CrCl (Actual Body Weight) 140.7    Impression/Plan:   Unexpectedly elevated vancomycin level of 23. Will hold dose x 1, and redraw level with AM labs. Drain was placed. Cefepime 2 g q8h   Continue Metronidazole regimen  Antimicrobial stop date TBD     Pharmacy will follow daily and adjust medications as appropriate for renal function and/or serum levels.     Thank you,  Katerine Hayes, PHARMD

## 2021-11-15 NOTE — PROGRESS NOTES
Problem: Mobility Impaired (Adult and Pediatric)  Goal: *Acute Goals and Plan of Care (Insert Text)  Description: FUNCTIONAL STATUS PRIOR TO ADMISSION: Ambulates household distances with RW support vs uses RW, stating she mostly has been relying on WC recently. History of MULTIPLE falls. Caregiver for  with dementia. HOME SUPPORT PRIOR TO ADMISSION: The patient lived with  however states he has dementia. Physical Therapy Goals  Initiated 11/10/2021  1. Patient will move from supine to sit and sit to supine , scoot up and down, and roll side to side in bed with supervision/set-up within 7 day(s). 2.  Patient will transfer from bed to chair and chair to bed with minimal assistance/contact guard assist using the least restrictive device within 7 day(s). 3.  Patient will perform sit to stand with minimal assistance/contact guard assist within 7 day(s). 4.  Patient will ambulate with minimal assistance/contact guard assist for 10 feet with the least restrictive device within 7 day(s). Outcome: Progressing Towards Goal   PHYSICAL THERAPY TREATMENT  Patient: Harriet Nobles (36 y.o. female)  Date: 11/15/2021  Diagnosis: Hyperglycemia [R73.9]  MERCEDES (acute kidney injury) ([facility] [address] [N17.9]  Leukocytosis [D72.829]  SIRS (systemic inflammatory response syndrome) ([facility]) [R65.10]  Candida vaginitis [B37.3]   <principal problem not specified>       Precautions: Back, Fall, WBAT  Chart, physical therapy assessment, plan of care and goals were reviewed. ASSESSMENT  Patient continues with skilled PT services and is slowly progressing towards goals. Patient received in bed and agreeable to participate, had procedure on Friday for drain placement and reports she did not get out of bed over the weekend. Patient was able to come to sit EOB with min assist and extra time, then can sit unsupported and maintain balance.   Patient reports severe pain in area of right flank, educated on use of deep breathing to control pain and anxiety. Patient was able to stand to RW and CGA then took a few slow steps to chair with wide MALINDA, was very fatigued and needed to quickly sit. Patient educated on seated exercises and left with call bell in reach, remains well below baseline with increased risk of falls and will benefit from rehab in SNF    Current Level of Function Impacting Discharge (mobility/balance): CGA to chair    Other factors to consider for discharge: history of falls, caregiver for , well below baseline, pain management         PLAN :  Patient continues to benefit from skilled intervention to address the above impairments. Continue treatment per established plan of care. to address goals. Recommendation for discharge: (in order for the patient to meet his/her long term goals)  Therapy up to 5 days/week in SNF setting    This discharge recommendation:  Has been made in collaboration with the attending provider and/or case management    IF patient discharges home will need the following DME: patient owns DME required for discharge       SUBJECTIVE:   Patient stated I'm not sure how much I can do.     OBJECTIVE DATA SUMMARY:   Critical Behavior:  Neurologic State: Alert, Appropriate for age  Orientation Level: Oriented X4  Cognition: Follows commands  Safety/Judgement: Fall prevention, Awareness of environment (decreased safety)  Functional Mobility Training:  Bed Mobility:  Rolling: Contact guard assistance  Supine to Sit: Minimum assistance     Scooting: Contact guard assistance        Transfers:  Sit to Stand: Contact guard assistance  Stand to Sit: Contact guard assistance        Bed to Chair: Contact guard assistance                    Balance:  Sitting: Intact  Standing: Impaired; With support  Standing - Static: Constant support; Good  Standing - Dynamic : Constant support;  Fair  Ambulation/Gait Training:  Distance (ft): 2 Feet (ft)  Assistive Device: Gait belt; Walker, rolling  Ambulation - Level of Assistance: Contact guard assistance        Gait Abnormalities: Decreased step clearance        Base of Support: Widened     Speed/Catherine: Shuffled; Slow  Step Length: Left shortened; Right shortened                    Stairs: Therapeutic Exercises: Ankle pumps, LAQ, head turns, shoulder circles  Pain Rating:      Activity Tolerance:   Fair and requires rest breaks    After treatment patient left in no apparent distress:   Sitting in chair and Call bell within reach    COMMUNICATION/COLLABORATION:   The patients plan of care was discussed with: Occupational therapist and Registered nurse.      Parag Collado, PT

## 2021-11-15 NOTE — PROGRESS NOTES
Problem: Self Care Deficits Care Plan (Adult)  Goal: *Acute Goals and Plan of Care (Insert Text)  Description: FUNCTIONAL STATUS PRIOR TO ADMISSION: June 2, 2021 with back surgery: prior to surgery very poor sitting and standing tolerance due to P! Went to [address] [place] [address], discharged mod I June 23, 2021, with DME and AE. Fell July 8, 2021 (bumped over [facility] over toilet with RW use.) Returned to [address] [place] [address], with increased fear of falling. Discharge home Mod I at w/c, RW level. Patient was modified independent for basic and instrumental ADLs at w/c and/or RW level except spouse assisted with BM hygiene due to difficulty level. (cares for spouse with dementia who is mod I with self care at w/c and walker level)     Patient readmitted to acute care for fall trying to get out of bed without device 11-06-21. Found to have post op wound abscess. Drain placed R flank 11-12 with 6-10/10 P! During weekly re-eval.    Anxiety/panic attack significantly limiting with new distraction/focus on fear of falling. HOME SUPPORT: The patient lived with spouse, but cares for him due to dementia. Has supportive son that lives nearby    Occupational Therapy Goals  Initiated 11/8/2021 Goals reviewed and updated 11-15-21    1. Patient will perform grooming with supervision/set-up within 7 days. Met upgrade to mod I in 7 days  2. Patient will perform UB bathing with moderate assistance  using most appropriate DME within 7 days. Cont for consistency 7 days  3. Patient will lower body dressing at moderate assistance with A/E within 7 days. Cont for consistency for 7 days  4. Patient will perform bed mobility at moderate assistance  within 7 days. Met upgrade to S in 7 days  5. Patient will verbalize/demonstrate 3/3 back precautions during ADL tasks without cues within 7 days. Met  6. Patient will verbalize at least 2 strategies for stress management/panic attack management during every session in 7 days  7. Patient will complete toilet transfer with CGA-S in 7 days    Outcome: Progressing Towards Goal   OCCUPATIONAL THERAPY TREATMENT/WEEKLY RE-EVALUATION  Patient: Pooja Faulkner (32 y.o. female)  Date: 11/15/2021  Diagnosis: Hyperglycemia [R73.9]  MERCEDES (acute kidney injury) ([facility] [address] [N17.9]  Leukocytosis [D72.829]  SIRS (systemic inflammatory response syndrome) ([facility]) [R65.10]  Candida vaginitis [B37.3]   <principal problem not specified>       Precautions: Fall, Back, WBAT  Chart, occupational therapy assessment, plan of care, and goals were reviewed. ASSESSMENT  Based on the objective data described below, patient making good progress towards STGs as noted above. Primary limitations include 6/10 R flank pain at rest, 10/10 R flank pain with transitional movements, 6/10 pain once seated at rest.  Patient also limited by abject fear of falling causing near \"panic attack\" due to this obviously real fear; Redirectable and willing to work towards adaptive techniques for panic/fear management. Habitus is also limiting factor. Sitting and standing balance is good, supported with RW despite high anxiety and fear of falling. Fatigue/general debility is limiting factor; she should improve, if out of bed for all meals, after being in bed all weekend. Did tolerate up 2 hours today without worse pain or anxiety. Current Level of Function Impacting Discharge (ADLs): S-min A UE ADLs; Mod-max A LE ADLs, (used AE PTA) and min A-CGA functional mobility with DME with gentle redirection for anxiety levels    Other factors to consider for discharge: does well/receptive  when reminded that her spouse has good care while she is hospitalized and her focus needs to be on healing so she can get back to him         PLAN :  Goals have been updated based on progression since last assessment. Patient continues to benefit from skilled intervention to address the above impairments.  Continue to follow patient 5 times a week to address goals. Recommend with staff: up to chair for all meals, dont leave in chair all day    Recommend next OT session: LE AE with back precautions/pain management;; Use of [employer] [employer] demo    Recommendation for discharge: (in order for the patient to meet his/her long term goals)  Therapy 3 hours per day 5-7 days per week    This discharge recommendation:  A follow-up discussion with the attending provider and/or case management is planned    Equipment recommendations for successful discharge (if) home: AE: long handled bathing, AE: long handled dressing, transfer bench, walker: rolling, and wheelchair       SUBJECTIVE:   Patient stated Oh they know me well at [address] [place] [address]; I met all my goals the first time; I want to go there if I cannot go home.     OBJECTIVE DATA SUMMARY:   Cognitive/Behavioral Status:  Neurologic State: Alert  Orientation Level: Oriented to time  Cognition: Follows commands; Impaired decision making (limited AT TIMEs by anxiety/current medical, fear of falling)  Perception: Appears intact  Perseveration: No perseveration noted  Safety/Judgement: Fall prevention; Awareness of environment; Insight into deficits; Decreased insight into deficits; Good awareness of safety precautions (variable depending on current level of anxiety)    Functional Mobility and Transfers for ADLs:  Bed Mobility:  Rolling: Contact guard assistance  Supine to Sit: Minimum assistance  Scooting: Contact guard assistance    Transfers:  Sit to Stand: Contact guard assistance  Functional Transfers  Toilet Transfer : Contact guard assistance; Minimum assistance; Additional time; Adaptive equipment (RW; recommend [address] [employer] due to habitus; only has standard commod)  Bed to Chair: Contact guard assistance    Balance:  Sitting: Intact  Sitting - Static: Good (unsupported)  Sitting - Dynamic: Good (unsupported)  Standing: Impaired; With support  Standing - Static: Constant support; Good  Standing - Dynamic : Constant support;  Fair    ADL Intervention:  Feeding  Feeding Assistance: Modified independent    Grooming  Grooming Assistance: Set-up    Upper Body Bathing  Bathing Assistance: Minimum assistance    Lower Body Bathing  Bathing Assistance: Moderate assistance; Maximum assistance (limited by 6-8/10 R flank pain post abcess drain 11-12-21)    Upper Body Dressing Assistance  Dressing Assistance: Minimum assistance; Supervision  Shirt simulation with hospital gown: Minimum  assistance; Supervision    Lower Body Dressing Assistance  Dressing Assistance: Maximum assistance (uses LE AE PTA since back surgery June 2, 2021)    Toileting  Toileting Assistance: Moderate assistance (Hygiene difficult due to habitus;  assisted PTA; has )  Adaptive Equipment: Toilet tongs (has them PTA but is not proficient and  assists her)    Cognitive Retraining  Problem Solving: Awareness of environment; Identifying the problem (good; not so good generating slternative solutions)  Attention to Task: Single task (skill varies depending on anxiety level; very complex medica)  Maintains Attention For (Time): Greater than 10 minutes  Following Commands: Follows one step commands/directions; Follows two step commands/directions  Safety/Judgement: Fall prevention; Awareness of environment; Insight into deficits;  Decreased insight into deficits; Good awareness of safety precautions (variable depending on current level of anxiety)    Pain:  6/10 at supine/sidelying rest; 10/10 with movement/transition, 6/10 at seated rest; pain in site of drain placed 11-12; nsg aware    Activity Tolerance:   Good, Fair, SpO2 stable on RA, requires frequent rest breaks, and limited by fear of falling, \"panic attack\"    After treatment patient left in no apparent distress:   Patient positioned in R sidelying for pressure relief, Call bell within reach, Side rails x 3, and encouraged to use mind exercise for control of worry/panic attack and PLB for relaxation/stress management    COMMUNICATION/COLLABORATION:   The patients plan of care was discussed with: Physical Therapist and Registered Nurse    Bhavya Murillo OTR/L  Time Calculation: 42 mins

## 2021-11-15 NOTE — PROGRESS NOTES
Cardiology Progress Note      11/15/2021 3:33 PM    Admit Date: 11/6/2021          Subjective:  No SVT over weekend. Some soreness on rt side at drain site         Visit Vitals  /73   Pulse 69   Temp 97.8 °F (36.6 °C)   Resp 16   Ht 5' 4\" (1.626 m)   Wt 122.7 kg (270 lb 6.4 oz)   SpO2 96%   Breastfeeding No   BMI 46.41 kg/m²     11/13 1901 - 11/15 0700  In: -   Out: 3750 [Urine:3400; Drains:350]        Objective:      Physical Exam:  VS as above     Data Review:   Labs:    Hgb 9.9  Creat 0.5    Telemetry: SR, no SVT       Assessment:          1.  Intermittent supraventricular tachycardia.  Possibly atrioventricular node reentry or atrial tachycardia associated with severe sepsis. 2.  History of nonobstructive coronary artery disease and normal ejection fraction by prior evaluation. 3.  Type 2 diabetes. 4.   retroperitoneal abscess with sepsis. Prior laminectomy 6/21 with poor wound healing   5.  Hypothyroidism. 6.  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 7. Celiac artery thrombosis       Plan: Cont metoprolol. Will check back later in week.

## 2021-11-15 NOTE — PROGRESS NOTES
Hospitalist Progress Note    NAME: Derrick Ty   :  1950   MRN:  [id number]     Interim Hospital Summary: 65 y.o. female whom presented on 2021 with      Assessment / Plan:  Severe sepsis, POA as evidence by hypothermia, rectal temp 96, tachy , lactic 9.2 -->5.3   Suspected epidural/right iliopsoas abscess with   Right paraspinal pain at level L3-4 and R iliac crest pain (complains of R hip pain), POA   -Patient was started on vancomycin, cefepime and Flagyl  -Continue antibiotics, recommended 4 to 6 weeks of antibiotics per ID (start date )  -Appreciated orthopedic consult, recommended IR placed drain  -Drain was placed  draining purulent discharge  -Iliopsoas drain continues to put out purulent discharge  -Posterior abscess/seroma drain/debridement plan per orthopedic  -Culture from drain fluid is growing alpha Streptococcus  -Surgical management on hold for now  -Leukocytosis improving  -Appreciated orthopedic consult    Celiac Artery Thrombosis with splenic Infarcts:  CT ABD/Pelvis  showed celiac artery thrombosis with splenic infarcts  CT abdomen pelvis today confirmed the celiac artery occlusion with splenic infarcts  Vascular consult appreciated  Continue heparin drip until definitive decision about surgical debridement    UTI  -Ucx E coli  -sensitive to cefepime   -Rea placed in ER due to poor mobility from hip pain and polyuria, with severe candidal infection in perineum and labia and buttocks. Type 2DM, uncontrolled with hyperglycemic hyperosmolar hyperglycemia and early DKA, due to severe sepsis, POA  -presented with , AG 15, trace ketone in urine  -A1c 11.7 up from 7.6 in July.  -continue holding metformin  -Continue Lantus 35 units. Continue SSI prn.   Off drip   -Blood sugars are better controlled    Prerenal MERCEDES Cr 1.5  -resolved with IVF hydration.       Constipation  -added pericolace and miralax    Severe candida infection in perineum, labia, buttocks  -continue mystatin cream ordered in ER. Status post Diflucan  -wound care consult appreciated     Generalized weakness and gait difficulty   -consult pt/ot     SVT   HTN / HLD  -continue metoprolol and statin  -having runs of SVT 11/07. Cardiology input appreciated     Hypothyroid  -continue levothyroxine. TSH 3.6     Depression and anxiety  -continue paxil with xanax prn     Severe obesity    Code:  Discussed, full  DVT prophylaxis: lovenox  Surrogate decision maker:   (but has some dementia per patient) or sister Ethelene [place]    40 or above Morbid obesity / Body mass index is 46.41 kg/m². Subjective:     Chief Complaint / Reason for Physician Visit  Patient reports pain at the site of the drain insertion which is improving, denies any fever chills, denies any nausea vomiting. Right iliopsoas drain continues to put out purulent discharge   Patient reports improvement in right lower quadrant pain at the site of drain insertion    review of Systems:  Symptom Y/N Comments  Symptom Y/N Comments   Fever/Chills n   Chest Pain n    Poor Appetite n   Edema n    Cough n   Abdominal Pain n    Sputum n   Joint Pain     SOB/AVALOS n   Pruritis/Rash     Nausea/vomit n   Tolerating PT/OT     Diarrhea n   Tolerating Diet     Constipation n   Other       Could NOT obtain due to:      PO intake: No data found. Objective:     VITALS:   Last 24hrs VS reviewed since prior progress note.  Most recent are:  Patient Vitals for the past 24 hrs:   Temp Pulse Resp BP SpO2   11/15/21 1134 97.8 °F (36.6 °C) 69 16 106/73 96 %   11/15/21 0728 97.6 °F (36.4 °C) 66 16 109/63 93 %   11/15/21 0239 97.7 °F (36.5 °C) 71 16 (!) 104/56 96 %   11/14/21 2359 98 °F (36.7 °C) 69 18 111/65 96 %   11/14/21 1946 97.7 °F (36.5 °C) 65 17 99/67 95 %   11/14/21 1528 97.6 °F (36.4 °C) 66  118/60 96 %       Intake/Output Summary (Last 24 hours) at 11/15/2021 1231  Last data filed at 11/14/2021 2000  Gross per 24 hour   Intake    Output 2500 ml   Net -2500 ml        I had a face to face encounter, and independently examined this patient on 11/15/2021, as outlined below:  PHYSICAL EXAM:  General: WD, WN. Alert, cooperative, no acute distress    EENT:  EOMI. Anicteric sclerae. MMM  Resp:  CTA bilaterally, no wheezing or rales. No accessory muscle use  CV:  Regular  rhythm,  No edema  GI:  Soft, Non distended, + mild right flank tender. +Bowel sounds, drain is containing  purulent discharge  Neurologic:  Alert and oriented X 3, normal speech,   Psych:   Good insight. Not anxious nor agitated  Skin:  No rashes. No jaundice    Reviewed most current lab test results and cultures  YES  Reviewed most current radiology test results   YES  Review and summation of old records today    NO  Reviewed patient's current orders and MAR    YES  PMH/ reviewed - no change compared to H&P  ________________________________________________________________________  Care Plan discussed with:    Comments   Patient x    Family      RN x    Care Manager     Consultant                       x Multidiciplinary team rounds were held today with , nursing, pharmacist and clinical coordinator. Patient's plan of care was discussed; medications were reviewed and discharge planning was addressed. ________________________________________________________________________  Total NON critical care TIME:  35   Minutes    Total CRITICAL CARE TIME Spent:   Minutes non procedure based      Comments   >50% of visit spent in counseling and coordination of care x     This includes time during multidisciplinary rounds if indicated above   ________________________________________________________________________  Rachid Hernandez MD     Procedures: see electronic medical records for all procedures/Xrays and details which were not copied into this note but were reviewed prior to creation of Plan.       LABS:  I reviewed today's most current labs and imaging studies.   Pertinent labs include:  Recent Labs     11/15/21  0139 11/14/21  0211 11/13/21  0400   WBC 14.5* 14.4* 17.2*   HGB 9.9* 9.6* 9.8*   HCT 32.5* 31.4* 32.1*   * 601* 561*     Recent Labs     11/15/21  0139 11/14/21  0211 11/13/21  0400    136 137   K 3.6 3.6 3.4*    104 105   CO2 29 26 27   * 217* 135*   BUN 7 9 6   CREA 0.54* 0.63 0.48*   CA 8.1* 8.2* 8.1*

## 2021-11-16 LAB
ANION GAP SERPL CALC-SCNC: 4 MMOL/L (ref 5–15)
APTT PPP: 28.5 SEC (ref 22.1–31)
APTT PPP: 29.5 SEC (ref 22.1–31)
APTT PPP: 32.6 SEC (ref 22.1–31)
BACTERIA SPEC CULT: ABNORMAL
BACTERIA SPEC CULT: ABNORMAL
BUN SERPL-MCNC: 8 MG/DL (ref 6–20)
BUN/CREAT SERPL: 15 (ref 12–20)
CALCIUM SERPL-MCNC: 8.3 MG/DL (ref 8.5–10.1)
CHLORIDE SERPL-SCNC: 104 MMOL/L (ref 97–108)
CO2 SERPL-SCNC: 30 MMOL/L (ref 21–32)
CREAT SERPL-MCNC: 0.53 MG/DL (ref 0.55–1.02)
ERYTHROCYTE [DISTWIDTH] IN BLOOD BY AUTOMATED COUNT: 18.7 % (ref 11.5–14.5)
ERYTHROCYTE [DISTWIDTH] IN BLOOD BY AUTOMATED COUNT: 18.7 % (ref 11.5–14.5)
GLUCOSE BLD STRIP.AUTO-MCNC: 131 MG/DL (ref 65–117)
GLUCOSE BLD STRIP.AUTO-MCNC: 136 MG/DL (ref 65–117)
GLUCOSE BLD STRIP.AUTO-MCNC: 151 MG/DL (ref 65–117)
GLUCOSE BLD STRIP.AUTO-MCNC: 223 MG/DL (ref 65–117)
GLUCOSE SERPL-MCNC: 187 MG/DL (ref 65–100)
GRAM STN SPEC: ABNORMAL
GRAM STN SPEC: ABNORMAL
HCT VFR BLD AUTO: 30.5 % (ref 35–47)
HCT VFR BLD AUTO: 33.8 % (ref 35–47)
HGB BLD-MCNC: 10.2 G/DL (ref 11.5–16)
HGB BLD-MCNC: 9.6 G/DL (ref 11.5–16)
MCH RBC QN AUTO: 25.1 PG (ref 26–34)
MCH RBC QN AUTO: 25.2 PG (ref 26–34)
MCHC RBC AUTO-ENTMCNC: 30.2 G/DL (ref 30–36.5)
MCHC RBC AUTO-ENTMCNC: 31.5 G/DL (ref 30–36.5)
MCV RBC AUTO: 79.6 FL (ref 80–99)
MCV RBC AUTO: 83.5 FL (ref 80–99)
NRBC # BLD: 0 K/UL (ref 0–0.01)
NRBC # BLD: 0 K/UL (ref 0–0.01)
NRBC BLD-RTO: 0 PER 100 WBC
NRBC BLD-RTO: 0 PER 100 WBC
PLATELET # BLD AUTO: 663 K/UL (ref 150–400)
PLATELET # BLD AUTO: 747 K/UL (ref 150–400)
PMV BLD AUTO: 9.5 FL (ref 8.9–12.9)
PMV BLD AUTO: 9.5 FL (ref 8.9–12.9)
POTASSIUM SERPL-SCNC: 3.8 MMOL/L (ref 3.5–5.1)
RBC # BLD AUTO: 3.83 M/UL (ref 3.8–5.2)
RBC # BLD AUTO: 4.05 M/UL (ref 3.8–5.2)
SERVICE CMNT-IMP: ABNORMAL
SODIUM SERPL-SCNC: 138 MMOL/L (ref 136–145)
THERAPEUTIC RANGE,PTTT: ABNORMAL SECS (ref 58–77)
THERAPEUTIC RANGE,PTTT: NORMAL SECS (ref 58–77)
THERAPEUTIC RANGE,PTTT: NORMAL SECS (ref 58–77)
VANCOMYCIN SERPL-MCNC: 13.1 UG/ML
WBC # BLD AUTO: 12 K/UL (ref 3.6–11)
WBC # BLD AUTO: 12.5 K/UL (ref 3.6–11)

## 2021-11-16 PROCEDURE — 82962 GLUCOSE BLOOD TEST: CPT

## 2021-11-16 PROCEDURE — 85027 COMPLETE CBC AUTOMATED: CPT

## 2021-11-16 PROCEDURE — 97116 GAIT TRAINING THERAPY: CPT

## 2021-11-16 PROCEDURE — 94760 N-INVAS EAR/PLS OXIMETRY 1: CPT

## 2021-11-16 PROCEDURE — 74011636637 HC RX REV CODE- 636/637: Performed by: INTERNAL MEDICINE

## 2021-11-16 PROCEDURE — 85730 THROMBOPLASTIN TIME PARTIAL: CPT

## 2021-11-16 PROCEDURE — 80048 BASIC METABOLIC PNL TOTAL CA: CPT

## 2021-11-16 PROCEDURE — 36415 COLL VENOUS BLD VENIPUNCTURE: CPT

## 2021-11-16 PROCEDURE — 80202 ASSAY OF VANCOMYCIN: CPT

## 2021-11-16 PROCEDURE — 74011250637 HC RX REV CODE- 250/637: Performed by: INTERNAL MEDICINE

## 2021-11-16 PROCEDURE — 74011000258 HC RX REV CODE- 258: Performed by: INTERNAL MEDICINE

## 2021-11-16 PROCEDURE — 74011250636 HC RX REV CODE- 250/636: Performed by: INTERNAL MEDICINE

## 2021-11-16 PROCEDURE — 74011250637 HC RX REV CODE- 250/637: Performed by: HOSPITALIST

## 2021-11-16 PROCEDURE — 97530 THERAPEUTIC ACTIVITIES: CPT

## 2021-11-16 PROCEDURE — 99233 SBSQ HOSP IP/OBS HIGH 50: CPT | Performed by: INTERNAL MEDICINE

## 2021-11-16 PROCEDURE — 65660000000 HC RM CCU STEPDOWN

## 2021-11-16 PROCEDURE — 2709999900 HC NON-CHARGEABLE SUPPLY

## 2021-11-16 PROCEDURE — 74011250636 HC RX REV CODE- 250/636: Performed by: HOSPITALIST

## 2021-11-16 RX ADMIN — CEFEPIME HYDROCHLORIDE 2 G: 2 INJECTION, POWDER, FOR SOLUTION INTRAVENOUS at 06:35

## 2021-11-16 RX ADMIN — PANTOPRAZOLE SODIUM 40 MG: 40 TABLET, DELAYED RELEASE ORAL at 11:05

## 2021-11-16 RX ADMIN — ASPIRIN 81 MG: 81 TABLET, COATED ORAL at 11:06

## 2021-11-16 RX ADMIN — NYSTATIN OINTMENT: 100000 OINTMENT TOPICAL at 23:01

## 2021-11-16 RX ADMIN — CEFEPIME HYDROCHLORIDE 2 G: 2 INJECTION, POWDER, FOR SOLUTION INTRAVENOUS at 14:53

## 2021-11-16 RX ADMIN — ACETAMINOPHEN 500 MG: 500 TABLET ORAL at 11:06

## 2021-11-16 RX ADMIN — ALPRAZOLAM 0.25 MG: 0.5 TABLET ORAL at 22:09

## 2021-11-16 RX ADMIN — Medication 10 ML: at 06:37

## 2021-11-16 RX ADMIN — ACETAMINOPHEN 500 MG: 500 TABLET ORAL at 17:29

## 2021-11-16 RX ADMIN — INSULIN LISPRO 3 UNITS: 100 INJECTION, SOLUTION INTRAVENOUS; SUBCUTANEOUS at 22:11

## 2021-11-16 RX ADMIN — NYSTATIN OINTMENT: 100000 OINTMENT TOPICAL at 17:30

## 2021-11-16 RX ADMIN — AMITRIPTYLINE HYDROCHLORIDE 50 MG: 50 TABLET, FILM COATED ORAL at 22:09

## 2021-11-16 RX ADMIN — HEPARIN SODIUM 10.2 UNITS/KG/HR: 10000 INJECTION, SOLUTION INTRAVENOUS at 03:53

## 2021-11-16 RX ADMIN — CEFEPIME HYDROCHLORIDE 2 G: 2 INJECTION, POWDER, FOR SOLUTION INTRAVENOUS at 22:10

## 2021-11-16 RX ADMIN — VANCOMYCIN HYDROCHLORIDE 1500 MG: 10 INJECTION, POWDER, LYOPHILIZED, FOR SOLUTION INTRAVENOUS at 11:07

## 2021-11-16 RX ADMIN — ATORVASTATIN CALCIUM 40 MG: 40 TABLET, FILM COATED ORAL at 22:09

## 2021-11-16 RX ADMIN — HEPARIN SODIUM 9820 UNITS: 1000 INJECTION INTRAVENOUS; SUBCUTANEOUS at 19:34

## 2021-11-16 RX ADMIN — METRONIDAZOLE 500 MG: 500 INJECTION, SOLUTION INTRAVENOUS at 13:00

## 2021-11-16 RX ADMIN — ACETAMINOPHEN 500 MG: 500 TABLET ORAL at 22:10

## 2021-11-16 RX ADMIN — NYSTATIN OINTMENT: 100000 OINTMENT TOPICAL at 09:00

## 2021-11-16 RX ADMIN — METOPROLOL TARTRATE 25 MG: 25 TABLET, FILM COATED ORAL at 11:06

## 2021-11-16 RX ADMIN — HEPARIN SODIUM 10.2 UNITS/KG/HR: 10000 INJECTION, SOLUTION INTRAVENOUS at 19:32

## 2021-11-16 RX ADMIN — Medication 10 ML: at 22:12

## 2021-11-16 RX ADMIN — Medication 10 ML: at 14:00

## 2021-11-16 RX ADMIN — OXYCODONE 5 MG: 5 TABLET ORAL at 22:59

## 2021-11-16 RX ADMIN — PAROXETINE HYDROCHLORIDE 40 MG: 20 TABLET, FILM COATED ORAL at 11:06

## 2021-11-16 RX ADMIN — INSULIN GLARGINE 35 UNITS: 100 INJECTION, SOLUTION SUBCUTANEOUS at 11:07

## 2021-11-16 RX ADMIN — Medication 1 CAPSULE: at 11:06

## 2021-11-16 RX ADMIN — LEVOTHYROXINE SODIUM 112 MCG: 0.11 TABLET ORAL at 11:06

## 2021-11-16 RX ADMIN — ACETAMINOPHEN 500 MG: 500 TABLET ORAL at 14:53

## 2021-11-16 RX ADMIN — INSULIN LISPRO 2 UNITS: 100 INJECTION, SOLUTION INTRAVENOUS; SUBCUTANEOUS at 16:30

## 2021-11-16 RX ADMIN — DOCUSATE SODIUM AND SENNOSIDES 1 TABLET: 8.6; 5 TABLET, FILM COATED ORAL at 11:06

## 2021-11-16 RX ADMIN — METRONIDAZOLE 500 MG: 500 INJECTION, SOLUTION INTRAVENOUS at 00:33

## 2021-11-16 NOTE — PROGRESS NOTES
Pharmacy Antimicrobial Kinetic Dosing    Indication for Antimicrobials: sepsis; SSTI, abscess     Current Regimen of Each Antimicrobial:  Vancomycin 1250 q 8h (Start Date ; Day 11  Cefepime 2 g IV q8h (Start Date ; Day 11  Metronidazole 500 mg IV q12h (Start Date ; Day 11    Previous Antimicrobial Therapy:  Fluconazole 200 mg PO x1, then 100 mg PO daily (Start Date ; Day  x7 total days)    Vancomycin Goal Level:  - 600    Date Dose & Interval Measured (mcg/mL) Predicted AUC/GRISELDA    10:30 750mg IV q12h 6.8 5.9 AUC < 300    2338  12.3    11/15 1117 1250 IV Q8H 23     0706 1250 IV Q8H 13.1      Dosing calculator used: [employer] calculator    Significant Positive Cultures:    blood: NG   urine: E. Coli - Final   fluid - S. Anginosis  -Prelim     Conditions for Dosing Consideration: None    Labs:  Recent Labs     11/15/21  01321  0400   CREA 0.54* 0.63 0.48*   BUN 7 9 6     Recent Labs     11/15/21  01321  0211 21  0400   WBC 14.5* 14.4* 17.2*     Temp (24hrs), Av.7 °F (36.5 °C), Min:97.6 °F (36.4 °C), Max:98 °F (36.7 °C)    Creatinine Clearance (mL/min):   CrCl (Ideal Body Weight): 62.7   If actual weight < IBW: CrCl (Actual Body Weight) 140.7    Impression/Plan:   Adjust vancomycin dose to 1500 mg IV Q12H for predicted AUC of 501  Cefepime 2 g q8h   Continue Metronidazole regimen  Antimicrobial stop date TBD     Pharmacy will follow daily and adjust medications as appropriate for renal function and/or serum levels.     Thank you,  Billy Teresa, PHARMD

## 2021-11-16 NOTE — PROGRESS NOTES
Comprehensive Nutrition Assessment    Type and Reason for Visit: Reassess    Nutrition Recommendations/Plan:  Continue CCD  Increase ensure high protein to TID    Nutrition Assessment:     Chart reviewed; patient reports a poor appetite. Not eating much and doesn't always like her food. Also complains of not being able to swallow meats very well. She likes the ensure high protein supplement and agreeable to increasing it to TID. States she drinks boost at home. Also provided patient with menu to help order meals per her preferences and hopefully promote better PO intake. Encouraged intake of meals. Estimated Daily Nutrient Needs:  Energy (kcal): 2058 kcal (BMR 1715 x 1. 2AF); Weight Used for Energy Requirements: Current  Protein (g): 98g (0.8 g/kg bw); Weight Used for Protein Requirements: Current  Fluid (ml/day): 2050 mL; Method Used for Fluid Requirements: 1 ml/kcal    Nutrition Related Findings:    [phone]  BM 11/14  Atorvastatin, Lantus, Humalog, Risaquad, Synthroid, Lopressor, Protonix, Paxil, Miralax, Pericolace      Wounds:    None       Current Nutrition Therapies:  ADULT ORAL NUTRITION SUPPLEMENT Dinner;  Low Calorie/High Protein  DIET ONE TIME MESSAGE  DIET ONE TIME MESSAGE  DIET ONE TIME MESSAGE  ADULT DIET Regular; 4 carb choices (60 gm/meal)    Anthropometric Measures:  · Height:  5' 4\" (162.6 cm)  · Current Body Wt:  121.9 kg (268 lb 11.9 oz)    · BMI Category:  Obese class 3 (BMI 40.0 or greater)       Nutrition Diagnosis:   · Inadequate protein-energy intake related to  (decreased appetite) as evidenced by intake 26-50%    Nutrition Interventions:   Food and/or Nutrient Delivery: Continue current diet, Modify oral nutrition supplement  Nutrition Education and Counseling: No recommendations at this time  Coordination of Nutrition Care: Continue to monitor while inpatient    Goals:  PO intake >50% of meals and 70% of ONS next 4-6 days       Nutrition Monitoring and Evaluation: Behavioral-Environmental Outcomes: None identified  Food/Nutrient Intake Outcomes: Food and nutrient intake, Supplement intake  Physical Signs/Symptoms Outcomes: Biochemical data, Weight    Discharge Planning:    Continue current diet, Continue oral nutrition supplement     Electronically signed by Eduardo Harmon RD on 11/16/2021 at 4:34 PM    Contact: ext [phone]

## 2021-11-16 NOTE — PROGRESS NOTES
Hospitalist Progress Note    NAME: Jennifer Cormier   :  1950   MRN:  [id number]     Interim Hospital Summary: 65 y.o. female whom presented on 2021 with      Assessment / Plan:  Severe sepsis, POA as evidence by hypothermia, rectal temp 96, tachy , lactic 9.2 -->5.3   Suspected epidural/right iliopsoas abscess with   Right paraspinal pain at level L3-4 and R iliac crest pain (complains of R hip pain), POA   -Patient was started on vancomycin, cefepime and Flagyl  -Stop vancomycin  as recommended by ID  -Continue antibiotics, recommended 4 to 6 weeks of antibiotics per ID (start date )  -Appreciated orthopedic consult, recommended IR placed drain  -Drain was placed  draining purulent discharge  -Iliopsoas drain continues to put out purulent discharge  -Posterior abscess/seroma drain/debridement plan per orthopedic  -Culture from drain fluid is growing Streptococcus anginosus  -Surgical management on hold for now  -Leukocytosis improving  -Appreciated orthopedic consult  -Still awaiting plan about the posterior collection from orthopedic    Celiac Artery Thrombosis with splenic Infarcts:  CT ABD/Pelvis  showed celiac artery thrombosis with splenic infarcts  CT abdomen pelvis today confirmed the celiac artery occlusion with splenic infarcts  Vascular consult appreciated  Continue heparin drip until definitive decision about surgical debridement    UTI  -Ucx E coli  -sensitive to cefepime   -Rea placed in ER due to poor mobility from hip pain and polyuria, with severe candidal infection in perineum and labia and buttocks. Type 2DM, uncontrolled with hyperglycemic hyperosmolar hyperglycemia and early DKA, due to severe sepsis, POA  -presented with , AG 15, trace ketone in urine  -A1c 11.7 up from 7.6 in July.  -continue holding metformin  -Continue Lantus 35 units. Continue SSI prn.   Off drip   -Blood sugars are better controlled    Prerenal MERCEDES Cr 1.5  -resolved with IVF hydration.       Constipation  -added pericolace and miralax    Severe candida infection in perineum, labia, buttocks  -continue mystatin cream ordered in ER. Status post Diflucan  -wound care consult appreciated     Generalized weakness and gait difficulty   -consult pt/ot     SVT   HTN / HLD  -continue metoprolol and statin  -having runs of SVT 11/07. Cardiology input appreciated     Hypothyroid  -continue levothyroxine. TSH 3.6     Depression and anxiety  -continue paxil with xanax prn     Severe obesity    Code:  Discussed, full  DVT prophylaxis: lovenox  Surrogate decision maker:   (but has some dementia per patient) or sister Sabina Hatchet    40 or above Morbid obesity / Body mass index is 46.14 kg/m². Subjective:     Chief Complaint / Reason for Physician Visit  Patient reports pain at the site of the drain insertion which is improving, denies any fever chills, denies any nausea vomiting. Right iliopsoas drain continues to put out purulent discharge   Patient reports improvement in right lower quadrant pain at the site of drain insertion    review of Systems:  Symptom Y/N Comments  Symptom Y/N Comments   Fever/Chills n   Chest Pain n    Poor Appetite n   Edema n    Cough n   Abdominal Pain n    Sputum n   Joint Pain     SOB/AVALOS n   Pruritis/Rash     Nausea/vomit n   Tolerating PT/OT     Diarrhea n   Tolerating Diet     Constipation n   Other       Could NOT obtain due to:      PO intake: No data found. Objective:     VITALS:   Last 24hrs VS reviewed since prior progress note.  Most recent are:  Patient Vitals for the past 24 hrs:   Temp Pulse Resp BP SpO2   11/16/21 1154 97.6 °F (36.4 °C) 75 18 (!) 147/88 97 %   11/16/21 0758 97.7 °F (36.5 °C) 75 16 112/70 97 %   11/16/21 0424 97.4 °F (36.3 °C) 65 16 107/65 97 %   11/15/21 1952 97.6 °F (36.4 °C) 67 16 102/60 97 %   11/15/21 1642 97.4 °F (36.3 °C) 75 14 109/63 96 %       Intake/Output Summary (Last 24 hours) at 11/16/2021 1335  Last data filed at 11/16/2021 8173  Gross per 24 hour   Intake 575 ml   Output 3800 ml   Net -3225 ml        I had a face to face encounter, and independently examined this patient on 11/16/2021, as outlined below:  PHYSICAL EXAM:  General: WD, WN. Alert, cooperative, no acute distress    EENT:  EOMI. Anicteric sclerae. MMM  Resp:  CTA bilaterally, no wheezing or rales. No accessory muscle use  CV:  Regular  rhythm,  No edema  GI:  Soft, Non distended, + mild right flank tender. +Bowel sounds, drain is containing  purulent discharge  Neurologic:  Alert and oriented X 3, normal speech,   Psych:   Good insight. Not anxious nor agitated  Skin:  No rashes. No jaundice    Reviewed most current lab test results and cultures  YES  Reviewed most current radiology test results   YES  Review and summation of old records today    NO  Reviewed patient's current orders and MAR    YES  PMH/SH reviewed - no change compared to H&P  ________________________________________________________________________  Care Plan discussed with:    Comments   Patient x    Family      RN x    Care Manager     Consultant  x  infectious disease                    x Multidiciplinary team rounds were held today with , nursing, pharmacist and clinical coordinator. Patient's plan of care was discussed; medications were reviewed and discharge planning was addressed.      ________________________________________________________________________  Total NON critical care TIME:  35   Minutes    Total CRITICAL CARE TIME Spent:   Minutes non procedure based      Comments   >50% of visit spent in counseling and coordination of care x     This includes time during multidisciplinary rounds if indicated above   ________________________________________________________________________  Hansa Smith MD     Procedures: see electronic medical records for all procedures/Xrays and details which were not copied into this note but were reviewed prior to creation of Plan. LABS:  I reviewed today's most current labs and imaging studies.   Pertinent labs include:  Recent Labs     11/16/21  1109 11/16/21 0202 11/15/21  0139   WBC 12.0* 12.5* 14.5*   HGB 10.2* 9.6* 9.9*   HCT 33.8* 30.5* 32.5*   * 663* 622*     Recent Labs     11/16/21  0202 11/15/21  0139 11/14/21  0211    138 136   K 3.8 3.6 3.6    104 104   CO2 30 29 26   * 143* 217*   BUN 8 7 9   CREA 0.53* 0.54* 0.63   CA 8.3* 8.1* 8.2*

## 2021-11-16 NOTE — PROGRESS NOTES
End of Shift Note    Bedside shift change report given to RN (oncoming nurse) by Arnol Venegas (offgoing nurse). Report included the following information SBAR, Kardex, ED Summary and OR Summary    Shift worked:  DAY     Shift summary and any significant changes:     Continue heparin drip until surgeon decides on performing posterior collection. Midline gives blood return: get patient to raise arm, bend at elbow, and RN applies small amount of pressure on upper arm. PT/ OT worked w/ patient. Continue abx   Concerns for physician to address:       Zone phone for oncoming shift:          Activity:  Activity Level: Up with Assistance  Number times ambulated in hallways past shift: 0  Number of times OOB to chair past shift: 0    Cardiac:   Cardiac Monitoring: Yes      Cardiac Rhythm: Sinus Rhythm    Access:   Current line(s): PIV and midline     Genitourinary:   Urinary status: mittal    Respiratory:   O2 Device: None (Room air)  Chronic home O2 use?: NO  Incentive spirometer at bedside: YES     GI:  Last Bowel Movement Date: 11/14/21  Current diet:  ADULT DIET Regular; 4 carb choices (60 gm/meal)  ADULT ORAL NUTRITION SUPPLEMENT Dinner, Breakfast, Lunch; Low Calorie/High Protein  Passing flatus: YES  Tolerating current diet: YES       Pain Management:   Patient states pain is manageable on current regimen: N/A    Skin:  Neal Score: 17  Interventions: turn team, speciality bed, float heels and PT/OT consult    Patient Safety:  Fall Score:  Total Score: 4  Interventions: bed/chair alarm, assistive device (walker, cane, etc) and gripper socks  High Fall Risk: Yes    Length of Stay:  Expected LOS: 4d 19h  Actual LOS: [address]

## 2021-11-16 NOTE — PROGRESS NOTES
Infectious Disease Progress Note    IMPRESSION:       -R/ retroperitoneal abscess 9.7 x 9.1 x 7.5 cm & possible fluid tract to the right L3-4 or right L4-5 foramen. No evidence of OM/discitis. Posterior decompression and fusion L2-S1 on CT lumbar spine  - S/p drainage of abscess and placement of drain   Fluid culture  -light strep anginosus. -S/p Exploration of fusion, revision laminectomy L2-3, L5-S1 on 2021  Patient reports delayed wound healing, wound VAC placement, frequent disruption of wound VAC system    -E. coli UTI  Urine culture--.coli 50,000 colonies  ( R to Amp, quinolones, aminoglycosides, cefoxitin I    - H/o recurrent UTI, urinary incontinence    -Poorly controlled diabetes A1c 11.7    -MERCEDES creatinine 1.5    -Morbid obesity BMI 46.14       PLAN:        -Continue Cefepime IV, DC vancomycin. Pt would probably require antibiotics 8-12 weeks. Recommend repeat imaging prior to DC of antimicrobials  -Continue to monitor wound drainage.  -Recommend MRI lumbar spine evaluate for OM/discitis. This would also provide information on current dimensions of  -Plan of care discussed with patient  -Dr. Sylvia Turk to resume care tomorrow. Subjective:     Patient seen Chrissy Cadena in bed. Denies complaints. Purulent brownish drainage noted in bag    Review of Systems:  A comprehensive review of systems was negative except for that written in the History of Present Illness. 14 point ROS obtained . All other systems negative . Objective:     Blood pressure (!) 147/88, pulse 75, temperature 97.6 °F (36.4 °C), resp. rate 18, height 5' 4\" (1.626 m), weight 268 lb 12.8 oz (121.9 kg), SpO2 97 %, not currently breastfeeding.   Temp (24hrs), Av.5 °F (36.4 °C), Min:97.4 °F (36.3 °C), Max:97.7 °F (36.5 °C)      Patient Vitals for the past 24 hrs:   Temp Pulse Resp BP SpO2   21 1154 97.6 °F (36.4 °C) 75 18 (!) 147/88 97 %   21 0758 97.7 °F (36.5 °C) 75 16 112/70 97 %   11/16/ 0424 97.4 °F (36.3 °C) 65 16 107/65 97 %   11/15/21 1952 97.6 °F (36.4 °C) 67 16 102/60 97 %   11/15/21 1642 97.4 °F (36.3 °C) 75 14 109/63 96 %         Lines:  Peripheral IV:       Physical Exam:   General:  Awake, cooperative,    Eyes:  Sclera anicteric. Pupils equally round and reactive to light. Mouth/Throat: Mucous membranes normal, oral pharynx clear   Neck: Supple   Lungs:   Clear to auscultation bilaterally, good effort   CV:  Regular rate and rhythm,no murmur, click, rub or gallop   Abdomen:   Soft, non-tender.  bowel sounds normal. non-distended   Extremities: No cyanosis or edema   Skin: Skin color, texture, turgor normal. no acute rash or lesions   Lymph nodes: Cervical and supraclavicular normal   Musculoskeletal: No swelling or deformity   Lines/Devices:  Intact, no erythema, drainage or tenderness   Psych: Awake and oriented, normal mood affect       Data Review:ed   CBC:   Recent Labs     11/16/21  1109 11/16/21  0202 11/15/21  0139   WBC 12.0* 12.5* 14.5*   RBC 4.05 3.83 3.95   HGB 10.2* 9.6* 9.9*   HCT 33.8* 30.5* 32.5*   * 663* 622*     CMP:   Recent Labs     11/16/21  0202 11/15/21  0139 11/14/21  0211   * 143* 217*    138 136   K 3.8 3.6 3.6    104 104   CO2 30 29 26   BUN 8 7 9   CREA 0.53* 0.54* 0.63   CA 8.3* 8.1* 8.2*   AGAP 4* 5 6   BUCR 15 13 14       Studies reviewed:      Lab Results   Component Value Date/Time    Culture result: NO ANAEROBES ISOLATED 11/12/2021 03:30 PM    Culture result: NO FUNGUS ISOLATED 3 DAYS 11/12/2021 03:30 PM    Culture result: LIGHT STREPTOCOCCUS ANGINOSUS (A) 11/12/2021 03:30 PM    Culture result: Culture performed on Fluid swab specimen 11/12/2021 03:30 PM    Culture result: NO GROWTH 5 DAYS 11/06/2021 12:48 PM        XR Results (most recent):  Results from Hospital Encounter encounter on 11/06/21    XR CHEST PORT    Narrative  INDICATION: . weakness  Additional history:  COMPARISON: Previous chest xray, 7/12/2021 and 7/8/2021. LIMITATIONS: Portable technique. Santiago Lee FINDINGS: Single frontal view of the chest.  .  Lines/tubes/surgical: Cardiac monitor leads overly the patient. Heart/mediastinum: Unremarkable. Lungs/pleura: Low lung volumes without definite acute process. No visualized  pleural effusion or pneumothorax. Additional Comments: None. .    Impression  1. Low lung volumes without definite acute cardiopulmonary disease. Consider PA  and lateral exam when clinically appropriate      Patient Active Problem List   Diagnosis Code    Left knee DJD M17.12    Right knee DJD M17.11    Morbid obesity ([facility] E66.01    Rectal polyp K62.1    Lumbar stenosis with neurogenic claudication M48.062    Right hip pain M25.551    Leukocytosis D72.829    Candida vaginitis B37.3    Hyperglycemia R73.9    SIRS (systemic inflammatory response syndrome) ([facility]) R65.10    MERCEDES (acute kidney injury) ([facility] N17.9    Severe sepsis ([facility]) A41.9, R65.20         ICD-10-CM ICD-9-CM    1. Hyperosmolar hyperglycemic state (HHS) ([facility]  E11.00 250.22     E11.65     2. Diabetic ketoacidosis without coma associated with type 2 diabetes mellitus ([facility]  E11.10 250.12    3. Severe sepsis ([facility])  A41.9 038.9     R65.20 995.92    4. Lactic acidosis  E87.2 276.2    5. MERCEDES (acute kidney injury) ([facility]  N17.9 584.9    6. Infection of spine ([facility])  M46.20 730.28    7. Candidal vulvovaginitis  B37.3 112.1    8. Hyponatremia  E87.1 276.1        I have discussed the diagnosis with the patient and the intended plan as seen in the above orders. I have discussed medication side effects and warnings with the patient as well.     Reviewed test results at length with patient    Anti-infectives:     Vancomycin, Cefepime IV     Ambreen Russo MD [place]

## 2021-11-16 NOTE — PROGRESS NOTES
Transition of Care Plan:     RUR: 17% - Medium   Disposition: IPR - [facility] is following - believes pt would be a good fit, but need to determine whether or not pt is going to need further surgery after the drainage of the abscess. Follow up appointments: PCP & specialist as indicated  DME needed: To be determined. Transportation at [facility] to [place] [place] or means to access home:  Family has keys   Medicare Letter: To be given prior to discharge.   Is patient a BCPI-A Bundle:   No                 If yes, was Bundle Letter given?:   N/A  Caregiver Contact: Son  Discharge Caregiver contacted prior to discharge?  Caregiver to be contacted prior to discharge.     CM reviewed pt's chart. Pt is not ready for d/c at this time. CM will continue to follow for discharge planning while patient is admitted on current unit. Please contact this CM with questions or issues related to discharge.      KRISTOFER Abel  Care Manager [id number] [facility]  [phone]

## 2021-11-16 NOTE — PROGRESS NOTES
Pt refused PT due to:    []  Nausea/vomiting  []  Eating  []  Pain  [x]  Pt lethargic  []  Off Unit  Will f/u later, as pt's schedule allows. Thank you.   Marija Fraction, PTA

## 2021-11-16 NOTE — PROGRESS NOTES
Problem: Mobility Impaired (Adult and Pediatric)  Goal: *Acute Goals and Plan of Care (Insert Text)  Description: FUNCTIONAL STATUS PRIOR TO ADMISSION: Ambulates household distances with RW support vs uses RW, stating she mostly has been relying on WC recently. History of MULTIPLE falls. Caregiver for  with dementia. HOME SUPPORT PRIOR TO ADMISSION: The patient lived with  however states he has dementia. Physical Therapy Goals  Initiated 11/10/2021  1. Patient will move from supine to sit and sit to supine , scoot up and down, and roll side to side in bed with supervision/set-up within 7 day(s). 2.  Patient will transfer from bed to chair and chair to bed with minimal assistance/contact guard assist using the least restrictive device within 7 day(s). 3.  Patient will perform sit to stand with minimal assistance/contact guard assist within 7 day(s). 4.  Patient will ambulate with minimal assistance/contact guard assist for 10 feet with the least restrictive device within 7 day(s). Outcome: Progressing Towards Goal   PHYSICAL THERAPY TREATMENT  Patient: Bernard Ghosh (5 y.o. female)  Date: 11/16/2021  Diagnosis: Hyperglycemia [R73.9]  MERCEDES (acute kidney injury) ([facility] [address] [N17.9]  Leukocytosis [D72.829]  SIRS (systemic inflammatory response syndrome) ([facility]) [R65.10]  Candida vaginitis [B37.3]   <principal problem not specified>       Precautions: Fall, Back, WBAT  Chart, physical therapy assessment, plan of care and goals were reviewed. ASSESSMENT  Patient continues with skilled PT services and is progressing towards goals. Cleared Pt for PT session. Pt is able to transition to EOB with min assist and VCs for sequence of movements. 6 min rest at EOB, before standing trial. Shortened step to pattern with use of RW and assist for line management. Slight leakage from abscess catheter. Pt will benefit  from placment to increase function and independence.  Pt report caring for  PTA and not having other support in home. Current Level of Function Impacting Discharge (mobility/balance): minimal/moderate assist with bed mobility and decreased ambulation tolerance      Other factors to consider for discharge: increase edema          PLAN :  Patient continues to benefit from skilled intervention to address the above impairments. Continue treatment per established plan of care. to address goals. Recommendation for discharge: (in order for the patient to meet his/her long term goals)  Therapy up to 5 days/week in SNF setting    This discharge recommendation:  Has been made in collaboration with the attending provider and/or case management    IF patient discharges home will need the following DME: rolling walker       SUBJECTIVE:   Patient stated I'm sorry. I'm just so worn out. I can't do much.     OBJECTIVE DATA SUMMARY:   Critical Behavior:  Neurologic State: Alert  Orientation Level: Oriented X4  Cognition: Follows commands  Safety/Judgement: Fall prevention, Awareness of environment, Insight into deficits, Decreased insight into deficits, Good awareness of safety precautions (variable depending on current level of anxiety)  Functional Mobility Training:  Bed Mobility:  Rolling: Contact guard assistance  Supine to Sit: Minimum assistance  Sit to Supine: Minimum assistance  Scooting: Minimum assistance  Transfers:  Sit to Stand: Contact guard assistance  Stand to Sit: Contact guard assistance  Bed to Chair: Contact guard assistance   Balance:  Sitting: Intact  Sitting - Static: Good (unsupported)  Sitting - Dynamic: Fair (occasional)  Standing: Impaired;  With support  Standing - Static: Fair  Standing - Dynamic : Fair  Ambulation/Gait Training:  Distance (ft): 24 Feet (ft) (6' F/B x 2)  Assistive Device: Gait belt; Walker, rolling  Ambulation - Level of Assistance: Contact guard assistance  Gait Abnormalities: Decreased step clearance  Base of Support: Widened  Speed/Catherine: Shuffled; Slow  Step Length: Left shortened; Right shortened  Therapeutic Exercises:       EXERCISE   Sets   Reps   Active Active Assist   Passive Self ROM   Comments   Ankle Pumps 1 20  [x] [] [] []    Quad Sets/Glut Sets 1 10 [x] [] [] []    Hamstring Sets   [] [] [] []    Short Arc Quads   [] [] [] []    Heel Slides 1 5 [x] [] [] []    Straight Leg Raises   [] [] [] []    Hip Abd/Add 1 10 [x] [] [] []    Long Arc Quads 1 10 [x] [] [] []    Seated Marching   [] [] [] []    Standing Marching   [] [] [] []       [] [] [] []        Pain Rating:  Pain in: 9  Pain out: 7  Activity Tolerance:   Fair and requires rest breaks    After treatment patient left in no apparent distress:   Supine in bed, Call bell within reach    COMMUNICATION/COLLABORATION:   The patients plan of care was discussed with: Registered nurse and Rehabilitation technician.      Asif Mccarthy PTA   Time Calculation: 40 mins

## 2021-11-17 LAB
ANION GAP SERPL CALC-SCNC: 5 MMOL/L (ref 5–15)
APTT PPP: 32.8 SEC (ref 22.1–31)
APTT PPP: 37.8 SEC (ref 22.1–31)
APTT PPP: >130 SEC (ref 22.1–31)
BUN SERPL-MCNC: 6 MG/DL (ref 6–20)
BUN/CREAT SERPL: 11 (ref 12–20)
CALCIUM SERPL-MCNC: 8.3 MG/DL (ref 8.5–10.1)
CHLORIDE SERPL-SCNC: 104 MMOL/L (ref 97–108)
CO2 SERPL-SCNC: 29 MMOL/L (ref 21–32)
CREAT SERPL-MCNC: 0.54 MG/DL (ref 0.55–1.02)
ERYTHROCYTE [DISTWIDTH] IN BLOOD BY AUTOMATED COUNT: 19.2 % (ref 11.5–14.5)
ERYTHROCYTE [SEDIMENTATION RATE] IN BLOOD: 68 MM/HR (ref 0–30)
GLUCOSE BLD STRIP.AUTO-MCNC: 152 MG/DL (ref 65–117)
GLUCOSE BLD STRIP.AUTO-MCNC: 164 MG/DL (ref 65–117)
GLUCOSE BLD STRIP.AUTO-MCNC: 192 MG/DL (ref 65–117)
GLUCOSE BLD STRIP.AUTO-MCNC: 202 MG/DL (ref 65–117)
GLUCOSE SERPL-MCNC: 164 MG/DL (ref 65–100)
HCT VFR BLD AUTO: 30.6 % (ref 35–47)
HGB BLD-MCNC: 9.6 G/DL (ref 11.5–16)
MCH RBC QN AUTO: 25.2 PG (ref 26–34)
MCHC RBC AUTO-ENTMCNC: 31.4 G/DL (ref 30–36.5)
MCV RBC AUTO: 80.3 FL (ref 80–99)
NRBC # BLD: 0 K/UL (ref 0–0.01)
NRBC BLD-RTO: 0 PER 100 WBC
PLATELET # BLD AUTO: 678 K/UL (ref 150–400)
PMV BLD AUTO: 9.7 FL (ref 8.9–12.9)
POTASSIUM SERPL-SCNC: 3.5 MMOL/L (ref 3.5–5.1)
RBC # BLD AUTO: 3.81 M/UL (ref 3.8–5.2)
SERVICE CMNT-IMP: ABNORMAL
SODIUM SERPL-SCNC: 138 MMOL/L (ref 136–145)
THERAPEUTIC RANGE,PTTT: ABNORMAL SECS (ref 58–77)
WBC # BLD AUTO: 12.1 K/UL (ref 3.6–11)

## 2021-11-17 PROCEDURE — 82962 GLUCOSE BLOOD TEST: CPT

## 2021-11-17 PROCEDURE — 74011250637 HC RX REV CODE- 250/637: Performed by: HOSPITALIST

## 2021-11-17 PROCEDURE — 74011636637 HC RX REV CODE- 636/637: Performed by: INTERNAL MEDICINE

## 2021-11-17 PROCEDURE — 97530 THERAPEUTIC ACTIVITIES: CPT

## 2021-11-17 PROCEDURE — 74011250636 HC RX REV CODE- 250/636: Performed by: INTERNAL MEDICINE

## 2021-11-17 PROCEDURE — 74011250636 HC RX REV CODE- 250/636: Performed by: HOSPITALIST

## 2021-11-17 PROCEDURE — 65660000000 HC RM CCU STEPDOWN

## 2021-11-17 PROCEDURE — 94760 N-INVAS EAR/PLS OXIMETRY 1: CPT

## 2021-11-17 PROCEDURE — 85652 RBC SED RATE AUTOMATED: CPT

## 2021-11-17 PROCEDURE — 97116 GAIT TRAINING THERAPY: CPT

## 2021-11-17 PROCEDURE — 80048 BASIC METABOLIC PNL TOTAL CA: CPT

## 2021-11-17 PROCEDURE — 36415 COLL VENOUS BLD VENIPUNCTURE: CPT

## 2021-11-17 PROCEDURE — 85730 THROMBOPLASTIN TIME PARTIAL: CPT

## 2021-11-17 PROCEDURE — 74011000258 HC RX REV CODE- 258: Performed by: INTERNAL MEDICINE

## 2021-11-17 PROCEDURE — 99233 SBSQ HOSP IP/OBS HIGH 50: CPT | Performed by: STUDENT IN AN ORGANIZED HEALTH CARE EDUCATION/TRAINING PROGRAM

## 2021-11-17 PROCEDURE — 85027 COMPLETE CBC AUTOMATED: CPT

## 2021-11-17 PROCEDURE — 97535 SELF CARE MNGMENT TRAINING: CPT

## 2021-11-17 PROCEDURE — 74011250637 HC RX REV CODE- 250/637: Performed by: INTERNAL MEDICINE

## 2021-11-17 RX ADMIN — METOPROLOL TARTRATE 25 MG: 25 TABLET, FILM COATED ORAL at 09:56

## 2021-11-17 RX ADMIN — ACETAMINOPHEN 500 MG: 500 TABLET ORAL at 22:35

## 2021-11-17 RX ADMIN — Medication 10 ML: at 06:09

## 2021-11-17 RX ADMIN — CEFEPIME HYDROCHLORIDE 2 G: 2 INJECTION, POWDER, FOR SOLUTION INTRAVENOUS at 13:15

## 2021-11-17 RX ADMIN — SODIUM CHLORIDE 75 ML/HR: 9 INJECTION, SOLUTION INTRAVENOUS at 22:34

## 2021-11-17 RX ADMIN — Medication 10 ML: at 22:38

## 2021-11-17 RX ADMIN — INSULIN GLARGINE 35 UNITS: 100 INJECTION, SOLUTION SUBCUTANEOUS at 09:57

## 2021-11-17 RX ADMIN — HEPARIN SODIUM 15 UNITS/KG/HR: 10000 INJECTION, SOLUTION INTRAVENOUS at 17:34

## 2021-11-17 RX ADMIN — Medication 1 CAPSULE: at 09:57

## 2021-11-17 RX ADMIN — SODIUM CHLORIDE 75 ML/HR: 9 INJECTION, SOLUTION INTRAVENOUS at 19:58

## 2021-11-17 RX ADMIN — ACETAMINOPHEN 500 MG: 500 TABLET ORAL at 13:01

## 2021-11-17 RX ADMIN — DOCUSATE SODIUM AND SENNOSIDES 1 TABLET: 8.6; 5 TABLET, FILM COATED ORAL at 09:57

## 2021-11-17 RX ADMIN — AMITRIPTYLINE HYDROCHLORIDE 50 MG: 50 TABLET, FILM COATED ORAL at 22:35

## 2021-11-17 RX ADMIN — PAROXETINE HYDROCHLORIDE 40 MG: 20 TABLET, FILM COATED ORAL at 09:57

## 2021-11-17 RX ADMIN — LEVOTHYROXINE SODIUM 112 MCG: 0.11 TABLET ORAL at 09:57

## 2021-11-17 RX ADMIN — INSULIN LISPRO 2 UNITS: 100 INJECTION, SOLUTION INTRAVENOUS; SUBCUTANEOUS at 17:16

## 2021-11-17 RX ADMIN — ACETAMINOPHEN 500 MG: 500 TABLET ORAL at 17:17

## 2021-11-17 RX ADMIN — ASPIRIN 81 MG: 81 TABLET, COATED ORAL at 09:57

## 2021-11-17 RX ADMIN — CEFEPIME HYDROCHLORIDE 2 G: 2 INJECTION, POWDER, FOR SOLUTION INTRAVENOUS at 22:36

## 2021-11-17 RX ADMIN — OXYCODONE 5 MG: 5 TABLET ORAL at 09:57

## 2021-11-17 RX ADMIN — Medication 10 ML: at 13:03

## 2021-11-17 RX ADMIN — INSULIN LISPRO 2 UNITS: 100 INJECTION, SOLUTION INTRAVENOUS; SUBCUTANEOUS at 22:38

## 2021-11-17 RX ADMIN — CEFEPIME HYDROCHLORIDE 2 G: 2 INJECTION, POWDER, FOR SOLUTION INTRAVENOUS at 06:03

## 2021-11-17 RX ADMIN — OXYCODONE 5 MG: 5 TABLET ORAL at 17:17

## 2021-11-17 RX ADMIN — ATORVASTATIN CALCIUM 40 MG: 40 TABLET, FILM COATED ORAL at 22:36

## 2021-11-17 RX ADMIN — PANTOPRAZOLE SODIUM 40 MG: 40 TABLET, DELAYED RELEASE ORAL at 09:56

## 2021-11-17 RX ADMIN — INSULIN LISPRO 3 UNITS: 100 INJECTION, SOLUTION INTRAVENOUS; SUBCUTANEOUS at 13:02

## 2021-11-17 RX ADMIN — DOCUSATE SODIUM AND SENNOSIDES 1 TABLET: 8.6; 5 TABLET, FILM COATED ORAL at 17:17

## 2021-11-17 RX ADMIN — HEPARIN SODIUM 9820 UNITS: 1000 INJECTION INTRAVENOUS; SUBCUTANEOUS at 17:34

## 2021-11-17 RX ADMIN — NYSTATIN OINTMENT: 100000 OINTMENT TOPICAL at 16:00

## 2021-11-17 RX ADMIN — ACETAMINOPHEN 500 MG: 500 TABLET ORAL at 09:57

## 2021-11-17 RX ADMIN — INSULIN LISPRO 2 UNITS: 100 INJECTION, SOLUTION INTRAVENOUS; SUBCUTANEOUS at 07:30

## 2021-11-17 RX ADMIN — NYSTATIN OINTMENT: 100000 OINTMENT TOPICAL at 22:37

## 2021-11-17 RX ADMIN — METRONIDAZOLE 500 MG: 500 INJECTION, SOLUTION INTRAVENOUS at 00:39

## 2021-11-17 RX ADMIN — NYSTATIN OINTMENT: 100000 OINTMENT TOPICAL at 09:59

## 2021-11-17 NOTE — PROGRESS NOTES
Pharmacy - Heparin Monitoring    Labs:  Recent Labs     11/17/21  1254 11/17/21  0322 11/17/21  0046 11/16/21  1109   APTT 32.8* 37.8* >130.0*  --    HGB  --  9.6*  --    < >   PLT  --  678*  --    < >    < > = values in this interval not displayed. Current rate:  11.2 unit/kg/hr    Impression/Plan:   New rate: 15.2 units/kg/hr  PRN bolus dose (Yes/No): 9820 unit bolus    Infusion rate, PRN bolus dose and aPTT results were discussed with the nurse: (Yes/No):  Yes Clementine Agrawal, RN     Thanks,  Billy Teresa, PHARMD Spoke with pt.  Which labs did you want pt to do? From previous task.  Also pt states she wants to see you only for an appointment, declines resident appt.  Pt also wants to do her screening mammogram.  Please advise

## 2021-11-17 NOTE — PROGRESS NOTES
Problem: Mobility Impaired (Adult and Pediatric)  Goal: *Acute Goals and Plan of Care (Insert Text)  Description: FUNCTIONAL STATUS PRIOR TO ADMISSION: Ambulates household distances with RW support vs uses RW, stating she mostly has been relying on WC recently. History of MULTIPLE falls. Caregiver for  with dementia. HOME SUPPORT PRIOR TO ADMISSION: The patient lived with  however states he has dementia. Physical Therapy Goals  Initiated 11/10/2021  1. Patient will move from supine to sit and sit to supine , scoot up and down, and roll side to side in bed with supervision/set-up within 7 day(s). 2.  Patient will transfer from bed to chair and chair to bed with minimal assistance/contact guard assist using the least restrictive device within 7 day(s). 3.  Patient will perform sit to stand with minimal assistance/contact guard assist within 7 day(s). 4.  Patient will ambulate with minimal assistance/contact guard assist for 10 feet with the least restrictive device within 7 day(s). Re-Assessment 11/17/21 Re-Assessment, goals achieved and upgraded    1. Patient will move from supine to sit and sit to supine , scoot up and down, and roll side to side in bed with mod indep within 7 day(s). 2.  Patient will transfer from bed to chair and chair to bed with supervision   using the least restrictive device within 7 day(s). 3.  Patient will perform sit to stand with supervision within 7 day(s). 4.  Patient will ambulate with supervision  for 50 feet with the least restrictive device within 7 day(s).      11/17/2021 1550 by Denys Campos PT  Outcome: Progressing Towards Goal  PHYSICAL THERAPY TREATMENT: WEEKLY REASSESSMENT  Patient: Saba Florez (33 y.o. female)  Date: 11/17/2021  Primary Diagnosis: Hyperglycemia [R73.9]  MERCEDES (acute kidney injury) ([facility] [address] [N17.9]  Leukocytosis [D72.829]  SIRS (systemic inflammatory response syndrome) (HCC) [R65.10]  Candida vaginitis [B37.3] Precautions:  Fall, Back, WBAT      ASSESSMENT  Patient continues with skilled PT services and is progressing towards goals. Patient received in bed and agreeable to participate, reports she sat up in chair for about 90 minutes at  lunch. Patient able to come to sit EOB with min assist and extra time, then able to hold midline and work on balance activities. Patient ambulated with RW x 15 feet with slow wide based gait, appears very fatigued and SOB but no overt LOB. Patient able to stand for a few minutes to adjust bed pads then returned to supine. Educated on importance of ankle pumps to manage edema in feet and patient able to demonstrate. Patient is making good progress towards goals and is motivated to participate, but remains well below baseline and presents with decreased tolerance of activity, general weakness and impaired mobility. Recommend rehab in SNF before returning home. Patient's progression toward goals since last assessment: Patient has achieved original goals, upgraded today. Current Level of Function Impacting Discharge (mobility/balance): min assist for bed mobility, CGA with RW to ambulate    Functional Outcome Measure: The patient scored 40/100 on the Barthel outcome measure which is indicative of moderate functional impairment    Other factors to consider for discharge: increased falls risk, below baseline, patient is caregiver for          PLAN :  Goals have been updated based on progression since last assessment. Patient continues to benefit from skilled intervention to address the above impairments. Recommendations and Planned Interventions: bed mobility training, transfer training, gait training, therapeutic exercises, edema management/control, patient and family training/education and therapeutic activities      Frequency/Duration: Patient will be followed by physical therapy:  4 times a week to address goals.     Recommendation for discharge: (in order for the patient to meet his/her long term goals)  Therapy up to 5 days/week in SNF setting    This discharge recommendation:  Has been made in collaboration with the attending provider and/or case management    IF patient discharges home will need the following DME: patient owns DME required for discharge         SUBJECTIVE:   Patient stated I can get up again.     OBJECTIVE DATA SUMMARY:   HISTORY:    Past Medical History:   Diagnosis Date    Adverse effect of anesthesia     O2 DROPS WITH ANESTHESIA    Arthritis     KNEES    Cancer ([facility] [address] 03/13/2015    SQUAMOUS CELL CARCINOMA; tonsils and lymph nodes.   Removed 3-2015 with radiation    GERD (gastroesophageal reflux disease)     Hypertension     NO LONGER ON MEDICATION    Hypothyroid     HYPOTHYROIDISM    Migraine     Nausea & vomiting     Obesity     Other ill-defined conditions(799.89)     chronic back pain    Psychiatric disorder     anxiety    Psychiatric disorder     panic ATTACKS    [facility]    Ulcerative colitis      Past Surgical History:   Procedure Laterality Date    COLONOSCOPY,DIAGNOSTIC  11/19/2015         HX GI      colonscopy    HX HEENT  03/2015    tonsillectomy (CANCER) AND NECK LYMPH NODES    HX HEENT  2008    PARATHYROID REMOVAL    HX HEENT Left 2002    EYE LASER    HX HEMORRHOIDECTOMY  2001, 2016     X2    HX HYSTERECTOMY  1985    HX KNEE ARTHROSCOPY  1995    left knee surgery, TOTAL LT  and Right KNEE 2013    HX KNEE REPLACEMENT Bilateral 2013, 2014    HX LAP CHOLECYSTECTOMY  2002    HX LUMBAR FUSION  2011    SPINAL FUSION with hardware L4-L5    HX ORTHOPAEDIC  1990    CYST REMOVED RIGHT WRIST    HX ORTHOPAEDIC  05/12/2021    L2-3 & L5-21 LUMBAR LAMINECTOMY WITH ANDREWS OSTEOTOMY    HX OTHER SURGICAL      cyst removal right wrist    HX UROLOGICAL  2010    bladder surgery(interstim device)    IR INJ FORAMIN EPID LUMB ANES/STER SNGL  8/19/2019    [place] EGD TRANSORAL BIOPSY SINGLE/MULTIPLE 5/20/2013            Personal factors and/or comorbidities impacting plan of care: multiple co-morbidities    Home Situation  Home Environment: Private residence  # Steps to Enter: 0  Wheelchair Ramp: Yes  One/Two Story Residence: One story  Living Alone: No  Support Systems: Spouse/Significant Other (with dementia whom she is caregiver to)  Patient Expects to be Discharged to[de-identified] House (vs rehab)  Current DME Used/Available at Home: Wheelchair, Walker, rolling, Tub transfer bench, Commode, bedside, Cane, straight  Tub or Shower Type: Tub/Shower combination    EXAMINATION/PRESENTATION/DECISION MAKING:   Critical Behavior:  Neurologic State: Alert, Appropriate for age  Orientation Level: Oriented X4  Cognition: Appropriate decision making, Appropriate for age attention/concentration, Appropriate safety awareness  Safety/Judgement: Awareness of environment, Fall prevention  Hearing: Auditory  Auditory Impairment: None  Vision:      Functional Mobility:  Bed Mobility:  Rolling: Contact guard assistance  Supine to Sit: Minimum assistance  Sit to Supine: Minimum assistance  Scooting: Stand-by assistance  Transfers:  Sit to Stand: Contact guard assistance  Stand to Sit: Contact guard assistance        Bed to Chair: Contact guard assistance; Minimum assistance              Balance:   Sitting: Intact  Sitting - Static: Good (unsupported)  Sitting - Dynamic: Fair (occasional)  Standing: Impaired  Standing - Static: Constant support; Good  Standing - Dynamic : Constant support; Fair  Ambulation/Gait Training:  Distance (ft): 15 Feet (ft)  Assistive Device: Gait belt; Walker, rolling  Ambulation - Level of Assistance: Contact guard assistance        Gait Abnormalities: Decreased step clearance        Base of Support: Widened     Speed/Catherine: Pace decreased (<100 feet/min)  Step Length: Left shortened; Right shortened                     Stairs: Therapeutic Exercises:    Ankle pumps resisted x 10    Functional Measure:  Barthel Index:    Bathin  Bladder: 0  Bowels: 10  Groomin  Dressin  Feeding: 10  Mobility: 0  Stairs: 0  Toilet Use: 0  Transfer (Bed to Chair and Back): 10  Total: 40/100       The Barthel ADL Index: Guidelines  1. The index should be used as a record of what a patient does, not as a record of what a patient could do. 2. The main aim is to establish degree of independence from any help, physical or verbal, however minor and for whatever reason. 3. The need for supervision renders the patient not independent. 4. A patient's performance should be established using the best available evidence. Asking the patient, friends/relatives and nurses are the usual sources, but direct observation and common sense are also important. However direct testing is not needed. 5. Usually the patient's performance over the preceding 24-48 hours is important, but occasionally longer periods will be relevant. 6. Middle categories imply that the patient supplies over 50 per cent of the effort. 7. Use of aids to be independent is allowed. Fortino Velasco., Barthel, DKasandraW. (8671). Functional evaluation: the Barthel Index. [facility] (14)2. Nicole Ballesteros didi MU Reid, Grayson Desai., Nhan Wilkinson., [place], [address] [place] [address] (). Measuring the change indisability after inpatient rehabilitation; comparison of the responsiveness of the Barthel Index and Functional [place] Measure. Journal of Neurology, Neurosurgery, and Psychiatry, 66(4), 996-166. Wil Jernigan, N.[place].A, KATIE Hunter, & Zuri Hernandez MKasandraA. (2004.) Assessment of post-stroke quality of life in cost-effectiveness studies: The usefulness of the Barthel Index and the EuroQoL-5D.  Quality of Life Research, 15, 368-36           Activity Tolerance:   Fair    After treatment patient left in no apparent distress:   Supine in bed, Call bell within reach and Caregiver / family present    COMMUNICATION/EDUCATION:   The patients plan of care was discussed with: Occupational therapist and Registered nurse. Fall prevention education was provided and the patient/caregiver indicated understanding. and Patient/family agree to work toward stated goals and plan of care.     Thank you for this referral.  Stephie Waters, PT   Time Calculation: 27 mins

## 2021-11-17 NOTE — PROGRESS NOTES
Problem: Self Care Deficits Care Plan (Adult)  Goal: *Acute Goals and Plan of Care (Insert Text)  Description: FUNCTIONAL STATUS PRIOR TO ADMISSION: June 2, 2021 with back surgery: prior to surgery very poor sitting and standing tolerance due to P! Went to [facility], discharged mod I June 23, 2021, with DME and AE. Fell July 8, 2021 (bumped over [facility] over toilet with RW use.) Returned to [facility], with increased fear of falling. Discharge home Mod I at w/c, RW level. Patient was modified independent for basic and instrumental ADLs at w/c and/or RW level except spouse assisted with BM hygiene due to difficulty level. (cares for spouse with dementia who is mod I with self care at w/c and walker level)     Admitted to acute care post fall trying to get out of bed without device 11-06-21. Found to have Post op wound Abscess   Drain placed R flank 11-12;    6-10/10 P! During this re-eval.    Anxiety/panic attack significantly limiting with new distraction/focus on fear of falling. HOME SUPPORT: The patient lived with spouse, but cares for him due to dementia. Has supportive son that lives nearby    Occupational Therapy Goals  Initiated 11/8/2021 Goals reviewed and updated 11-15-21    1. Patient will perform grooming with supervision/set-up within 7 days. Met upgrade to mod I in 7 days  2. Patient will perform UB bathing with moderate assistance  using most appropriate DME within 7 days. Cont for consistency 7 days  3. Patient will lower body dressing at moderate assistance with A/E within 7 days. Cont for consistency for 7 days  4. Patient will perform bed mobility at moderate assistance  within 7 days. Met upgrade to S in 7 days  5. Patient will verbalize/demonstrate 3/3 back precautions during ADL tasks without cues within 7 days. Met  6. Patient will verbalize at least 2 strategies for stress management/panic attack management during every session in 7 days  7.  Patient will complete toilet transfer with CGA-S in 7 days    Outcome: Progressing Towards Goal     OCCUPATIONAL THERAPY TREATMENT  Patient: Jennifer Cormier (23 y.o. female)  Date: 11/17/2021  Diagnosis: Hyperglycemia [R73.9]  MERCEDES (acute kidney injury) ([facility] [address] [N17.9]  Leukocytosis [D72.829]  SIRS (systemic inflammatory response syndrome) ([facility]) [R65.10]  Candida vaginitis [B37.3]   <principal problem not specified>       Precautions: Fall, Back, WBAT  Chart, occupational therapy assessment, plan of care, and goals were reviewed. ASSESSMENT  Patient continues with skilled OT services and is progressing towards goals. Pt has made significant progress from last week. Pt able to sit EOB with Manuel. Pt able to transfer to recliner w/ CGA/Manuel and assist for managing wires. Pt able to perform grooming w/ frequent rest breaks. Pt performed self-feeding. Pt had urgent toileting need with increased anxiety. Pt assisted to [facility] and able to complete. Pt assisted w/ hygiene in standing. Pt reports still painful in periarea d/t yeast infxn. Pt agreeable to sitting up in chair for 1 more hour to maximize functional independence and prepare for IPR. Current Level of Function Impacting Discharge (ADLs): decreased LE ADLs, toileting and standing ADLs    Other factors to consider for discharge: spouse with dementia         PLAN :  Patient continues to benefit from skilled intervention to address the above impairments. Continue treatment per established plan of care to address goals.     Recommend with staff: up for all meals; assist to bathroom    Recommend next OT session: px with toilet tongs to increase confidence in use    Recommendation for discharge: (in order for the patient to meet his/her long term goals)  Therapy 3 hours per day 5-7 days per week    This discharge recommendation:  Has not yet been discussed the attending provider and/or case management    IF patient discharges home will need the following DME: To Be Determined (TBD) at next level of care        SUBJECTIVE:   Patient stated I need to use the bathroom! Wang Marsh    OBJECTIVE DATA SUMMARY:   Cognitive/Behavioral Status:  Neurologic State: Alert; Appropriate for age  Orientation Level: Oriented X4  Cognition: Appropriate decision making; Appropriate for age attention/concentration; Appropriate safety awareness  Perception: Appears intact  Perseveration: No perseveration noted  Safety/Judgement: Awareness of environment; Fall prevention    Functional Mobility and Transfers for ADLs:  Bed Mobility:  Supine to Sit: Minimum assistance    Transfers:  Sit to Stand: Contact guard assistance  Bed to Chair: Contact guard assistance; Minimum assistance    Balance:  Sitting: Intact  Standing: Impaired; With support    ADL Intervention:  Feeding  Feeding Assistance: Modified independent    Grooming  Position Performed: Seated in chair  Washing Face: Set-up  Washing Hands: Set-up  Brushing/Combing Hair: Stand-by assistance    Toileting  Bladder Hygiene: Contact guard assistance; Minimum assistance  Bowel Hygiene: Maximum assistance; Total assistance (dependent)    Cognitive Retraining  Problem Solving: Identifying the task; Identifying the problem; General alternative solution  Organizing/Sequencing: Breaking task down; Prioritizing  Attention to Task: Single task  Following Commands: Awareness of environment; Follows one step commands/directions  Safety/Judgement: Awareness of environment; Fall prevention  Cues: Tactile cues provided; Verbal cues provided; Other(comment)    Pain:  4/10    Activity Tolerance:   requires rest breaks    After treatment patient left in no apparent distress:   Sitting in chair, Call bell within reach, and RN in room    COMMUNICATION/COLLABORATION:   The patients plan of care was discussed with: Physical therapist and Registered nurse.      Kathrine Arias  Time Calculation: 56 mins

## 2021-11-17 NOTE — PROGRESS NOTES
Infectious Disease Progress Note         Interval:  NAEO    Subjective:   Patient seen this morning. Patient reports discomfort in the back which is persistent. Overall just reports anxiousness about further course as to whether she will be receiving surgery or not. Appetite is poor. Denies any diarrhea. Objective:    Vitals:   Reviewed in chart. Physical Exam:  ?   ? GEN: NAD, patient appears nontoxic.  ? HEENT: Normocephalic, atraumatic, PERRL, no scleral icterus  ? CV: HDS  ? Lungs: room air  ? Abdomen: soft, non distended, non tender  ? Genitourinary:  no mittal  ? Extremities: no edema  ? Neuro: Alert, oriented to time, place and situation, moves all extremities to commands, verbal   ? Skin: lumbar wound is entirely healed. ? Psych: good affect, good eye contact, non tearful   ? Lines: PIVs, right psoas abscess drain with purulent drainage.           Labs:  Recent Results (from the past 24 hour(s))   GLUCOSE, POC    Collection Time: 11/16/21 11:08 AM   Result Value Ref Range    Glucose (POC) 136 (H) 65 - 117 mg/dL    Performed by Rosie Lee (PCT)    CBC W/O DIFF    Collection Time: 11/16/21 11:09 AM   Result Value Ref Range    WBC 12.0 (H) 3.6 - 11.0 K/uL    RBC 4.05 3.80 - 5.20 M/uL    HGB 10.2 (L) 11.5 - 16.0 g/dL    HCT 33.8 (L) 35.0 - 47.0 %    MCV 83.5 80.0 - 99.0 FL    MCH 25.2 (L) 26.0 - 34.0 PG    MCHC 30.2 30.0 - 36.5 g/dL    RDW 18.7 (H) 11.5 - 14.5 %    PLATELET 277 (H) 705 - 400 K/uL    MPV 9.5 8.9 - 12.9 FL    NRBC 0.0 0  WBC    ABSOLUTE NRBC 0.00 0.00 - 0.01 K/uL   GLUCOSE, POC    Collection Time: 11/16/21  4:19 PM   Result Value Ref Range    Glucose (POC) 151 (H) 65 - 117 mg/dL    Performed by Laurie Cortes    PTT    Collection Time: 11/16/21  5:46 PM   Result Value Ref Range    aPTT 32.6 (H) 22.1 - 31.0 sec    aPTT, therapeutic range     58.0 - 77.0 SECS   GLUCOSE, POC    Collection Time: 11/16/21  8:49 PM   Result Value Ref Range    Glucose (POC) 223 (H) 65 - 117 mg/dL    Performed by Marianne Recinos    PTT    Collection Time: 11/17/21 12:46 AM   Result Value Ref Range    aPTT >130.0 (HH) 22.1 - 31.0 sec    aPTT, therapeutic range     58.0 - 77.0 SECS   CBC W/O DIFF    Collection Time: 11/17/21  3:22 AM   Result Value Ref Range    WBC 12.1 (H) 3.6 - 11.0 K/uL    RBC 3.81 3.80 - 5.20 M/uL    HGB 9.6 (L) 11.5 - 16.0 g/dL    HCT 30.6 (L) 35.0 - 47.0 %    MCV 80.3 80.0 - 99.0 FL    MCH 25.2 (L) 26.0 - 34.0 PG    MCHC 31.4 30.0 - 36.5 g/dL    RDW 19.2 (H) 11.5 - 14.5 %    PLATELET 260 (H) 627 - 400 K/uL    MPV 9.7 8.9 - 12.9 FL    NRBC 0.0 0  WBC    ABSOLUTE NRBC 0.00 0.00 - 0.50 K/uL   METABOLIC PANEL, BASIC    Collection Time: 11/17/21  3:22 AM   Result Value Ref Range    Sodium 138 136 - 145 mmol/L    Potassium 3.5 3.5 - 5.1 mmol/L    Chloride 104 97 - 108 mmol/L    CO2 29 21 - 32 mmol/L    Anion gap 5 5 - 15 mmol/L    Glucose 164 (H) 65 - 100 mg/dL    BUN 6 6 - 20 MG/DL    Creatinine 0.54 (L) 0.55 - 1.02 MG/DL    BUN/Creatinine ratio 11 (L) 12 - 20      GFR est AA >60 >60 ml/min/1.73m2    GFR est non-AA >60 >60 ml/min/1.73m2    Calcium 8.3 (L) 8.5 - 10.1 MG/DL   PTT    Collection Time: 11/17/21  3:22 AM   Result Value Ref Range    aPTT 37.8 (H) 22.1 - 31.0 sec    aPTT, therapeutic range     58.0 - 77.0 SECS   SED RATE (ESR)    Collection Time: 11/17/21  3:22 AM   Result Value Ref Range    Sed rate, automated 68 (H) 0 - 30 mm/hr   GLUCOSE, POC    Collection Time: 11/17/21  7:39 AM   Result Value Ref Range    Glucose (POC) 152 (H) 65 - 117 mg/dL    Performed by Brooke Krishnan  PCT              Assessment:  66 yo F with:     - Sepsis due to right retroperitoneal abscess 9.7 x 9.1 x 7.5 cm with likely involvement of the lumbar spine. This has likely developed in the setting of poorly healing lumbar wound in July 2021. Patient reports that after the wound VAC care was disrupted when she went home, there was lot of drainage from the lumbar wound.   The lumbar wound eventually healed up after the wound VAC was resumed. However this must have created a nidus of infection at that time. Patient is status post drainage and drain placement of the psoas abscess by IR on 11/12/2021. Drainage cultures grew Streptococcus anginosus.  -History of lumbar fusion surgeries. - Hx of urinary incontinence and multiple diagnosis of UTIs in the past. E.coli in urine this admission.   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   - TTE 11/8 technically difficult. Recommendations:  -Streptococcus anginosus are strong tendencies to cause multiple abscesses at the same time. Will recommend to obtain the MRI lumbar spine to see if any other abscesses are brewing and to delineate the involved area of the infection in a better way. If MRI is not able to be obtained, will recommend to obtain repeat CT lumbar spine with contrast this coming weekend or early next week to assess change in the size of the psoas abscess. - I will be reaching out to Dr. Beatriz Greenberg from orthopedics regarding further plan. -Given the proximity of the lumbar hardware to the right psoas abscess and tracking seen to the hardware on CT scanning, we will be treating this as presumed infection of the hardware of the lumbar spine. Duration of antibiotics will be 8 to 12 weeks. - Continue cefepime 2 g every 8 hours and metronidazole for now. 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 Jessie Khan last week. Vascular surgery evaluation is appreciated; likely septic emboli however cannot rule out thromboembolic disease and hence on anticoagulation. Will follow up with VS in 6 months. Of note, patient has not had any documented bacteremia this admission.   TTE on 11/8/2021 was a technically difficult study however did not comment on presence of any vegetations. Will follow. Thank you for the opportunity to participate in the care of this patient. Please contact with questions or concerns.       Tyler Duncan MD  Infectious Diseases

## 2021-11-18 LAB
ANION GAP SERPL CALC-SCNC: 5 MMOL/L (ref 5–15)
APTT PPP: 48.2 SEC (ref 22.1–31)
APTT PPP: 50.2 SEC (ref 22.1–31)
APTT PPP: 64.4 SEC (ref 22.1–31)
APTT PPP: >130 SEC (ref 22.1–31)
BUN SERPL-MCNC: 8 MG/DL (ref 6–20)
BUN/CREAT SERPL: 15 (ref 12–20)
CALCIUM SERPL-MCNC: 8.6 MG/DL (ref 8.5–10.1)
CHLORIDE SERPL-SCNC: 104 MMOL/L (ref 97–108)
CO2 SERPL-SCNC: 29 MMOL/L (ref 21–32)
CREAT SERPL-MCNC: 0.55 MG/DL (ref 0.55–1.02)
ERYTHROCYTE [DISTWIDTH] IN BLOOD BY AUTOMATED COUNT: 18.9 % (ref 11.5–14.5)
GLUCOSE BLD STRIP.AUTO-MCNC: 112 MG/DL (ref 65–117)
GLUCOSE BLD STRIP.AUTO-MCNC: 131 MG/DL (ref 65–117)
GLUCOSE BLD STRIP.AUTO-MCNC: 181 MG/DL (ref 65–117)
GLUCOSE BLD STRIP.AUTO-MCNC: 209 MG/DL (ref 65–117)
GLUCOSE SERPL-MCNC: 155 MG/DL (ref 65–100)
HCT VFR BLD AUTO: 32.7 % (ref 35–47)
HGB BLD-MCNC: 10 G/DL (ref 11.5–16)
MCH RBC QN AUTO: 25.1 PG (ref 26–34)
MCHC RBC AUTO-ENTMCNC: 30.6 G/DL (ref 30–36.5)
MCV RBC AUTO: 82.2 FL (ref 80–99)
NRBC # BLD: 0 K/UL (ref 0–0.01)
NRBC BLD-RTO: 0 PER 100 WBC
PLATELET # BLD AUTO: 757 K/UL (ref 150–400)
PMV BLD AUTO: 9.3 FL (ref 8.9–12.9)
POTASSIUM SERPL-SCNC: 3.8 MMOL/L (ref 3.5–5.1)
RBC # BLD AUTO: 3.98 M/UL (ref 3.8–5.2)
SERVICE CMNT-IMP: ABNORMAL
SERVICE CMNT-IMP: NORMAL
SODIUM SERPL-SCNC: 138 MMOL/L (ref 136–145)
THERAPEUTIC RANGE,PTTT: ABNORMAL SECS (ref 58–77)
WBC # BLD AUTO: 12.9 K/UL (ref 3.6–11)

## 2021-11-18 PROCEDURE — 36415 COLL VENOUS BLD VENIPUNCTURE: CPT

## 2021-11-18 PROCEDURE — 97530 THERAPEUTIC ACTIVITIES: CPT

## 2021-11-18 PROCEDURE — 74011250637 HC RX REV CODE- 250/637: Performed by: HOSPITALIST

## 2021-11-18 PROCEDURE — 74011250636 HC RX REV CODE- 250/636: Performed by: INTERNAL MEDICINE

## 2021-11-18 PROCEDURE — 85730 THROMBOPLASTIN TIME PARTIAL: CPT

## 2021-11-18 PROCEDURE — 74011250637 HC RX REV CODE- 250/637: Performed by: INTERNAL MEDICINE

## 2021-11-18 PROCEDURE — 80048 BASIC METABOLIC PNL TOTAL CA: CPT

## 2021-11-18 PROCEDURE — 97116 GAIT TRAINING THERAPY: CPT

## 2021-11-18 PROCEDURE — 85027 COMPLETE CBC AUTOMATED: CPT

## 2021-11-18 PROCEDURE — 74011636637 HC RX REV CODE- 636/637: Performed by: INTERNAL MEDICINE

## 2021-11-18 PROCEDURE — 74011250636 HC RX REV CODE- 250/636: Performed by: HOSPITALIST

## 2021-11-18 PROCEDURE — 99233 SBSQ HOSP IP/OBS HIGH 50: CPT | Performed by: STUDENT IN AN ORGANIZED HEALTH CARE EDUCATION/TRAINING PROGRAM

## 2021-11-18 PROCEDURE — 82962 GLUCOSE BLOOD TEST: CPT

## 2021-11-18 PROCEDURE — 65660000000 HC RM CCU STEPDOWN

## 2021-11-18 PROCEDURE — 94760 N-INVAS EAR/PLS OXIMETRY 1: CPT

## 2021-11-18 PROCEDURE — 74011000258 HC RX REV CODE- 258: Performed by: INTERNAL MEDICINE

## 2021-11-18 RX ORDER — METRONIDAZOLE 250 MG/1
500 TABLET ORAL EVERY 12 HOURS
Status: DISCONTINUED | OUTPATIENT
Start: 2021-11-18 | End: 2021-11-30

## 2021-11-18 RX ADMIN — PAROXETINE HYDROCHLORIDE 40 MG: 20 TABLET, FILM COATED ORAL at 09:14

## 2021-11-18 RX ADMIN — SODIUM CHLORIDE 75 ML/HR: 9 INJECTION, SOLUTION INTRAVENOUS at 17:12

## 2021-11-18 RX ADMIN — METOPROLOL TARTRATE 25 MG: 25 TABLET, FILM COATED ORAL at 09:15

## 2021-11-18 RX ADMIN — LEVOTHYROXINE SODIUM 112 MCG: 0.11 TABLET ORAL at 09:14

## 2021-11-18 RX ADMIN — METRONIDAZOLE 500 MG: 500 INJECTION, SOLUTION INTRAVENOUS at 00:49

## 2021-11-18 RX ADMIN — DOCUSATE SODIUM AND SENNOSIDES 1 TABLET: 8.6; 5 TABLET, FILM COATED ORAL at 09:14

## 2021-11-18 RX ADMIN — METRONIDAZOLE 500 MG: 250 TABLET ORAL at 12:02

## 2021-11-18 RX ADMIN — DOCUSATE SODIUM AND SENNOSIDES 1 TABLET: 8.6; 5 TABLET, FILM COATED ORAL at 17:12

## 2021-11-18 RX ADMIN — Medication 10 ML: at 22:30

## 2021-11-18 RX ADMIN — HEPARIN SODIUM 13 UNITS/KG/HR: 10000 INJECTION, SOLUTION INTRAVENOUS at 14:48

## 2021-11-18 RX ADMIN — Medication 10 ML: at 06:20

## 2021-11-18 RX ADMIN — INSULIN GLARGINE 35 UNITS: 100 INJECTION, SOLUTION SUBCUTANEOUS at 09:13

## 2021-11-18 RX ADMIN — INSULIN LISPRO 2 UNITS: 100 INJECTION, SOLUTION INTRAVENOUS; SUBCUTANEOUS at 22:25

## 2021-11-18 RX ADMIN — Medication 1 CAPSULE: at 09:14

## 2021-11-18 RX ADMIN — INSULIN LISPRO 2 UNITS: 100 INJECTION, SOLUTION INTRAVENOUS; SUBCUTANEOUS at 12:01

## 2021-11-18 RX ADMIN — CEFEPIME HYDROCHLORIDE 2 G: 2 INJECTION, POWDER, FOR SOLUTION INTRAVENOUS at 22:14

## 2021-11-18 RX ADMIN — CEFEPIME HYDROCHLORIDE 2 G: 2 INJECTION, POWDER, FOR SOLUTION INTRAVENOUS at 15:03

## 2021-11-18 RX ADMIN — ASPIRIN 81 MG: 81 TABLET, COATED ORAL at 09:14

## 2021-11-18 RX ADMIN — ACETAMINOPHEN 500 MG: 500 TABLET ORAL at 22:14

## 2021-11-18 RX ADMIN — ACETAMINOPHEN 500 MG: 500 TABLET ORAL at 17:12

## 2021-11-18 RX ADMIN — NYSTATIN OINTMENT: 100000 OINTMENT TOPICAL at 09:15

## 2021-11-18 RX ADMIN — CEFEPIME HYDROCHLORIDE 2 G: 2 INJECTION, POWDER, FOR SOLUTION INTRAVENOUS at 06:17

## 2021-11-18 RX ADMIN — NYSTATIN OINTMENT: 100000 OINTMENT TOPICAL at 17:12

## 2021-11-18 RX ADMIN — METOPROLOL TARTRATE 12.5 MG: 25 TABLET, FILM COATED ORAL at 22:14

## 2021-11-18 RX ADMIN — AMITRIPTYLINE HYDROCHLORIDE 50 MG: 50 TABLET, FILM COATED ORAL at 22:14

## 2021-11-18 RX ADMIN — ACETAMINOPHEN 500 MG: 500 TABLET ORAL at 12:02

## 2021-11-18 RX ADMIN — PANTOPRAZOLE SODIUM 40 MG: 40 TABLET, DELAYED RELEASE ORAL at 09:14

## 2021-11-18 RX ADMIN — ACETAMINOPHEN 500 MG: 500 TABLET ORAL at 09:15

## 2021-11-18 RX ADMIN — METRONIDAZOLE 500 MG: 250 TABLET ORAL at 22:14

## 2021-11-18 RX ADMIN — NYSTATIN OINTMENT: 100000 OINTMENT TOPICAL at 22:26

## 2021-11-18 RX ADMIN — Medication 10 ML: at 15:03

## 2021-11-18 RX ADMIN — POLYETHYLENE GLYCOL 3350 17 G: 17 POWDER, FOR SOLUTION ORAL at 09:13

## 2021-11-18 RX ADMIN — ATORVASTATIN CALCIUM 40 MG: 40 TABLET, FILM COATED ORAL at 22:14

## 2021-11-18 NOTE — PROGRESS NOTES
Ortho Spine    Per discussion with Dr. Nicole Ballesteros, he would like for her to continue with current plan per infectious disease. She will need continued follow-up by orthospine on an outpatient basis and may require further surgical intervention after her infection is cleared. Will sign off currently and plan to have patient follow up outpatient. Please consult for any further needs.     Sharon Dominguez PA-C

## 2021-11-18 NOTE — PROGRESS NOTES
Infectious Disease Progress Note         Interval:  NAEO    Subjective:   Patient seen this morning. Appears more comfortable today. Patient reports that she worked with physical therapy yesterday and was able to ambulate across the bed. She feels more comfortable today. Reports that the pain in the right back is still quite severe however eased up a little bit. No numbness tingling or weakness in limbs. Objective:    Vitals:   Reviewed in chart. Physical Exam:  ?   ? GEN: NAD, patient appears nontoxic.  ? HEENT: Normocephalic, atraumatic, PERRL, no scleral icterus  ? CV: HDS  ? Lungs: room air  ? Abdomen: soft, non distended, non tender, drain in right psoas abscess  ? Genitourinary:  no mittal  ? Extremities: no edema  ? Neuro: Alert, oriented to time, place and situation, moves all extremities to commands, verbal   ? Skin: lumbar wound is entirely healed. ? Psych: good affect, good eye contact, non tearful   ? Lines: PIVs, right psoas abscess drain with purulent drainage.           Labs:  Recent Results (from the past 24 hour(s))   PTT    Collection Time: 11/17/21 12:54 PM   Result Value Ref Range    aPTT 32.8 (H) 22.1 - 31.0 sec    aPTT, therapeutic range     58.0 - 77.0 SECS   GLUCOSE, POC    Collection Time: 11/17/21  4:38 PM   Result Value Ref Range    Glucose (POC) 164 (H) 65 - 117 mg/dL    Performed by Rachel Pulido (TRV PCT)    GLUCOSE, POC    Collection Time: 11/17/21  8:13 PM   Result Value Ref Range    Glucose (POC) 192 (H) 65 - 117 mg/dL    Performed by Barb Orr (TRV PCT)    PTT    Collection Time: 11/17/21 11:10 PM   Result Value Ref Range    aPTT >130.0 (HH) 22.1 - 31.0 sec    aPTT, therapeutic range     58.0 - 77.0 SECS   CBC W/O DIFF    Collection Time: 11/18/21  2:16 AM   Result Value Ref Range    WBC 12.9 (H) 3.6 - 11.0 K/uL    RBC 3.98 3.80 - 5.20 M/uL    HGB 10.0 (L) 11.5 - 16.0 g/dL    HCT 32.7 (L) 35.0 - 47.0 %    MCV 82.2 80.0 - 99.0 FL    MCH 25.1 (L) 26.0 - 34.0 PG MCHC 30.6 30.0 - 36.5 g/dL    RDW 18.9 (H) 11.5 - 14.5 %    PLATELET 883 (H) 578 - 400 K/uL    MPV 9.3 8.9 - 12.9 FL    NRBC 0.0 0  WBC    ABSOLUTE NRBC 0.00 0.00 - 9.61 K/uL   METABOLIC PANEL, BASIC    Collection Time: 11/18/21  2:16 AM   Result Value Ref Range    Sodium 138 136 - 145 mmol/L    Potassium 3.8 3.5 - 5.1 mmol/L    Chloride 104 97 - 108 mmol/L    CO2 29 21 - 32 mmol/L    Anion gap 5 5 - 15 mmol/L    Glucose 155 (H) 65 - 100 mg/dL    BUN 8 6 - 20 MG/DL    Creatinine 0.55 0.55 - 1.02 MG/DL    BUN/Creatinine ratio 15 12 - 20      GFR est AA >60 >60 ml/min/1.73m2    GFR est non-AA >60 >60 ml/min/1.73m2    Calcium 8.6 8.5 - 10.1 MG/DL   PTT    Collection Time: 11/18/21  2:16 AM   Result Value Ref Range    aPTT 48.2 (H) 22.1 - 31.0 sec    aPTT, therapeutic range     58.0 - 77.0 SECS   GLUCOSE, POC    Collection Time: 11/18/21  8:02 AM   Result Value Ref Range    Glucose (POC) 131 (H) 65 - 117 mg/dL    Performed by Elva Clore PCT    PTT    Collection Time: 11/18/21  9:11 AM   Result Value Ref Range    aPTT 50.2 (H) 22.1 - 31.0 sec    aPTT, therapeutic range     58.0 - 77.0 SECS   GLUCOSE, POC    Collection Time: 11/18/21 11:26 AM   Result Value Ref Range    Glucose (POC) 181 (H) 65 - 117 mg/dL    Performed by Elva Clore PCT              Assessment:  66 yo F with:     - Sepsis due to right retroperitoneal abscess 9.7 x 9.1 x 7.5 cm with likely involvement of the lumbar spine. This has likely developed in the setting of poorly healing lumbar wound in July 2021. Patient reports that after the wound VAC care was disrupted when she went home, there was lot of drainage from the lumbar wound. The lumbar wound eventually healed up after the wound VAC was resumed. However this must have created a nidus of infection at that time. Patient is status post drainage and drain placement of the psoas abscess by IR on 11/12/2021.   Drainage cultures grew Streptococcus anginosus.  -History of lumbar fusion surgeries. - Hx of urinary incontinence and multiple diagnosis of UTIs in the past. E.coli in urine this admission.   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   - TTE 11/8 technically difficult. Recommendations:  -Streptococcus anginosus are strong tendencies to cause multiple abscesses at the same time. Will recommend to obtain the MRI lumbar spine to see if any other abscesses are brewing and to delineate the involved area of the infection in a better way. If MRI is not able to be obtained, will recommend to obtain repeat CT lumbar spine with contrast this coming weekend or early next week to assess change in the size of the psoas abscess. -Given the proximity of the lumbar hardware to the right psoas abscess and tracking seen to the hardware on CT scanning, we will be treating this as presumed infection of the hardware of the lumbar spine. Duration of antibiotics will be 8 to 12 weeks. - Continue cefepime 2 g every 8 hours and metronidazole for now. 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 Earline Culver last week. Vascular surgery evaluation is appreciated; likely septic emboli however cannot rule out thromboembolic disease and hence on anticoagulation. Will follow up with VS in 6 months. Of note, patient has not had any documented bacteremia this admission. TTE on 11/8/2021 was a technically difficult study however did not comment on presence of any vegetations. Will follow. Thank you for the opportunity to participate in the care of this patient. Please contact with questions or concerns.       Charanjit Wheeler MD  Infectious Diseases

## 2021-11-18 NOTE — PROGRESS NOTES
Occupational Therapy Treatment Attempt    Chart reviewed. Attempted Occupational Therapy Treatment, however, patient unable to be seen due to:  []  Nausea/vomiting  []  Eating  []  Pain  []  Patient too lethargic  []  Off Unit for testing/procedure  []  Dialysis treatment in progress  []  Telemetry Results  [x]  Other: Pt reports sat up in chair and now back to bed and very fatigued. Pt reports [facility] declined her and she will discharge home. Discussed OT concerns about pt discharging home as pt requires extensive assist for ADLs and was assisting in caring for spouse with dementia. Encouraged pt to be up for all meals and to walk to bathroom for toileting w/ staff over weekend to increase endurance and ADLs to be safe for home discharge. Will f/u later as patient's schedule allows.    Thank you for this referral.  Kathrine Amador, OTR/L, CSRS, CDCS, CFPS

## 2021-11-18 NOTE — PROGRESS NOTES
Hospitalist Progress Note    NAME: Saba Florez   :  1950   MRN:  [id number]     Interim Hospital Summary: 65 y.o. female whom presented on 2021 with      Assessment / Plan:  Severe sepsis, POA as evidence by hypothermia, rectal temp 96, tachy , lactic 9.2 -->5.3   Suspected epidural/right iliopsoas abscess with   Right paraspinal pain at level L3-4 and R iliac crest pain (complains of R hip pain), POA   -Patient was started on vancomycin, cefepime and Flagyl  -Stop vancomycin  as recommended by ID  -Continue antibiotics, recommended 4 to 6 weeks of antibiotics per ID (start date )  -Appreciated orthopedic consult, recommended IR placed drain  -Drain was placed  draining purulent discharge  -Iliopsoas drain continues to put out purulent discharge  -Posterior abscess/seroma drain/debridement plan per orthopedic  -Culture from drain fluid is growing Streptococcus anginosus  -Surgical management on hold for now  -Leukocytosis improving  -Appreciated orthopedic consult  -Still awaiting plan about the posterior collection from orthopedic    Celiac Artery Thrombosis with splenic Infarcts:  CT ABD/Pelvis  showed celiac artery thrombosis with splenic infarcts  CT abdomen pelvis today confirmed the celiac artery occlusion with splenic infarcts  Vascular consult appreciated  Continue heparin drip until definitive decision about surgical debridement    UTI  -Ucx E coli  -sensitive to cefepime   -Rea placed in ER due to poor mobility from hip pain and polyuria, with severe candidal infection in perineum and labia and buttocks. Type 2DM, uncontrolled with hyperglycemic hyperosmolar hyperglycemia and early DKA, due to severe sepsis, POA  -presented with , AG 15, trace ketone in urine  -A1c 11.7 up from 7.6 in July.  -continue holding metformin  -Continue Lantus 35 units. Continue SSI prn.   Off drip   -Blood sugars are better controlled    Prerenal MERCEDES Cr 1.5  -resolved with IVF hydration.       Constipation  -added pericolace and miralax    Severe candida infection in perineum, labia, buttocks  -continue mystatin cream ordered in ER. Status post Diflucan  -wound care consult appreciated     Generalized weakness and gait difficulty   -consult pt/ot     SVT   HTN / HLD  -continue metoprolol and statin  -having runs of SVT 11/07. Cardiology input appreciated     Hypothyroid  -continue levothyroxine. TSH 3.6     Depression and anxiety  -continue paxil with xanax prn     Severe obesity    Code:  Discussed, full  DVT prophylaxis: lovenox  Surrogate decision maker:   (but has some dementia per patient) or sister Ethelene Monroe    40 or above Morbid obesity / Body mass index is 46.14 kg/m². Subjective:     Chief Complaint / Reason for Physician Visit  Patient reports pain at the site of the drain insertion which is improving, denies any fever chills, denies any nausea vomiting. Right iliopsoas drain continues to put out purulent discharge   Patient reports improvement in right lower quadrant pain at the site of drain insertion    review of Systems:  Symptom Y/N Comments  Symptom Y/N Comments   Fever/Chills n   Chest Pain n    Poor Appetite n   Edema n    Cough n   Abdominal Pain n    Sputum n   Joint Pain     SOB/AVALOS n   Pruritis/Rash     Nausea/vomit n   Tolerating PT/OT     Diarrhea n   Tolerating Diet     Constipation n   Other       Could NOT obtain due to:      PO intake: No data found. Objective:     VITALS:   Last 24hrs VS reviewed since prior progress note.  Most recent are:  Patient Vitals for the past 24 hrs:   Temp Pulse Resp BP SpO2   11/17/21 2231 97.3 °F (36.3 °C) 72 16 111/67 95 %   11/17/21 1551 98.2 °F (36.8 °C) 72 17 107/71 94 %   11/17/21 1243    115/78    11/17/21 1144 98.2 °F (36.8 °C) 82 18 119/62 93 %   11/17/21 0938 97.8 °F (36.6 °C) 81 18 (!) 104/58 93 %   11/17/21 0345 97.7 °F (36.5 °C) 70 17 (!) 103/59 93 %   11/16/21 2332 97.6 °F (36.4 °C) 76 18 107/65 95 %       Intake/Output Summary (Last 24 hours) at 11/17/2021 2326  Last data filed at 11/17/2021 1859  Gross per 24 hour   Intake 100 ml   Output 2085 ml   Net -1985 ml        I had a face to face encounter, and independently examined this patient on 11/17/2021, as outlined below:  PHYSICAL EXAM:  General: WD, WN. Alert, cooperative, no acute distress    EENT:  EOMI. Anicteric sclerae. MMM  Resp:  CTA bilaterally, no wheezing or rales. No accessory muscle use  CV:  Regular  rhythm,  No edema  GI:  Soft, Non distended, + mild right flank tender. +Bowel sounds, drain is containing  purulent discharge  Neurologic:  Alert and oriented X 3, normal speech,   Psych:   Good insight. Not anxious nor agitated  Skin:  No rashes. No jaundice    Reviewed most current lab test results and cultures  YES  Reviewed most current radiology test results   YES  Review and summation of old records today    NO  Reviewed patient's current orders and MAR    YES  PMH/ reviewed - no change compared to H&P  ________________________________________________________________________  Care Plan discussed with:    Comments   Patient x    Family      RN x    Care Manager     Consultant  x  infectious disease                    x Multidiciplinary team rounds were held today with , nursing, pharmacist and clinical coordinator. Patient's plan of care was discussed; medications were reviewed and discharge planning was addressed.      ________________________________________________________________________  Total NON critical care TIME:  35   Minutes    Total CRITICAL CARE TIME Spent:   Minutes non procedure based      Comments   >50% of visit spent in counseling and coordination of care x     This includes time during multidisciplinary rounds if indicated above   ________________________________________________________________________  Julissa Olden, MD     Procedures: see electronic medical records for all procedures/Xrays and details which were not copied into this note but were reviewed prior to creation of Plan. LABS:  I reviewed today's most current labs and imaging studies.   Pertinent labs include:  Recent Labs     11/17/21  0322 11/16/21  1109 11/16/21  0202   WBC 12.1* 12.0* 12.5*   HGB 9.6* 10.2* 9.6*   HCT 30.6* 33.8* 30.5*   * 747* 663*     Recent Labs     11/17/21  0322 11/16/21  0202 11/15/21  0139    138 138   K 3.5 3.8 3.6    104 104   CO2 29 30 29   * 187* 143*   BUN 6 8 7   CREA 0.54* 0.53* 0.54*   CA 8.3* 8.3* 8.1*

## 2021-11-18 NOTE — PROGRESS NOTES
Problem: Mobility Impaired (Adult and Pediatric)  Goal: *Acute Goals and Plan of Care (Insert Text)  Description: FUNCTIONAL STATUS PRIOR TO ADMISSION: Ambulates household distances with RW support vs uses RW, stating she mostly has been relying on WC recently. History of MULTIPLE falls. Caregiver for  with dementia. HOME SUPPORT PRIOR TO ADMISSION: The patient lived with  however states he has dementia. Physical Therapy Goals  Initiated 11/10/2021  1. Patient will move from supine to sit and sit to supine , scoot up and down, and roll side to side in bed with supervision/set-up within 7 day(s). 2.  Patient will transfer from bed to chair and chair to bed with minimal assistance/contact guard assist using the least restrictive device within 7 day(s). 3.  Patient will perform sit to stand with minimal assistance/contact guard assist within 7 day(s). 4.  Patient will ambulate with minimal assistance/contact guard assist for 10 feet with the least restrictive device within 7 day(s). Re-Assessment 11/17/21 Re-Assessment, goals achieved and upgraded    1. Patient will move from supine to sit and sit to supine , scoot up and down, and roll side to side in bed with mod indep within 7 day(s). 2.  Patient will transfer from bed to chair and chair to bed with supervision   using the least restrictive device within 7 day(s). 3.  Patient will perform sit to stand with supervision within 7 day(s). 4.  Patient will ambulate with supervision  for 50 feet with the least restrictive device within 7 day(s).      Outcome: Progressing Towards Goal   PHYSICAL THERAPY TREATMENT  Patient: Bernard Ghosh (31 y.o. female)  Date: 11/18/2021  Diagnosis: Hyperglycemia [R73.9]  MERCEDES (acute kidney injury) ([facility] [address] [N17.9]  Leukocytosis [D72.829]  SIRS (systemic inflammatory response syndrome) ([facility]) [R65.10]  Candida vaginitis [B37.3]   <principal problem not specified>       Precautions: Fall, Back, WBAT  Chart, physical therapy assessment, plan of care and goals were reviewed. ASSESSMENT  Patient continues with skilled PT services and is progressing towards goals. Patient received in bed and agreeable to participate, noted that fluid in drain appears serosanguinous today which is a change from yesterday so notified RN. Patient able to come to sit edge of bed with min assist, then ambulated to chair with CGA and RW. Patient then requested using the bathroom for BM, but it was too urgent to make it so utilized [facility] in room. Patient able to stand x 2-3 minutes for clean up then ambulated back to chair. Encouraged her to sit up for lunch and reviewed sitting exercises. Patient remains motivated during session and will be a good candidate or rehab in SNF as she is still well below baseline. Current Level of Function Impacting Discharge (mobility/balance): min assist for bed mobility, CGA with RW    Other factors to consider for discharge: patient is caregiver for her , increased falls risk, below functional baseline         PLAN :  Patient continues to benefit from skilled intervention to address the above impairments. Continue treatment per established plan of care. to address goals. Recommendation for discharge: (in order for the patient to meet his/her long term goals)  Therapy up to 5 days/week in SNF setting    This discharge recommendation:  Has been made in collaboration with the attending provider and/or case management    IF patient discharges home will need the following DME: patient owns DME required for discharge       SUBJECTIVE:   Patient stated I'm sorry, I don't think I can make it to the bathroom.     OBJECTIVE DATA SUMMARY:   Critical Behavior:  Neurologic State: Alert  Orientation Level: Oriented X4  Cognition: Follows commands, Appropriate decision making  Safety/Judgement: Awareness of environment, Fall prevention  Functional Mobility Training:  Bed Mobility:  Rolling: Contact guard assistance  Supine to Sit: Minimum assistance  Sit to Supine: Minimum assistance  Scooting: Stand-by assistance        Transfers:  Sit to Stand: Contact guard assistance  Stand to Sit: Contact guard assistance                             Balance:  Sitting: Intact  Standing: Impaired  Standing - Static: Constant support; Good  Standing - Dynamic : Constant support; Fair  Ambulation/Gait Training:  Distance (ft): 5 Feet (ft)  Assistive Device: Gait belt; Walker, rolling  Ambulation - Level of Assistance: Contact guard assistance        Gait Abnormalities: Decreased step clearance        Base of Support: Widened     Speed/Catherine: Pace decreased (<100 feet/min)  Step Length: Left shortened; Right shortened                    Stairs: Activity Tolerance:   Fair    After treatment patient left in no apparent distress:   Sitting in chair and Call bell within reach    COMMUNICATION/COLLABORATION:   The patients plan of care was discussed with: Registered nurse.      Armando Ceron PT   Time Calculation: 36 mins

## 2021-11-18 NOTE — PROGRESS NOTES
Cardiology Progress Note      11/18/2021 4:23 PM    Admit Date: 11/6/2021          Subjective: Course noted. No SVT on telemetry          Visit Vitals  BP (!) 137/97 (BP Patient Position: At rest)   Pulse 69   Temp 97.5 °F (36.4 °C)   Resp 18   Ht 5' 4\" (1.626 m)   Wt 121.9 kg (268 lb 12.8 oz)   SpO2 92%   Breastfeeding No   BMI 46.14 kg/m²     11/16 1901 - 11/18 0700  In: 350 [I.V.:300]  Out: 5035 [Urine:5000; Drains:35]        Objective:      Physical Exam:  VS as above     Data Review:   Labs:    PTT 50    BUN 8  Creat 0.55  Hgb 10.0     Telemetry: SR, no afib, SVT seen       Assessment:       1.  Intermittent supraventricular tachycardia.  Possibly atrioventricular node reentry or atrial tachycardia associated with severe sepsis. 2.  History of nonobstructive coronary artery disease and normal ejection fraction by prior evaluation. 3.  Type 2 diabetes. 4.   retroperitoneal abscess with sepsis. Prior laminectomy 6/21 with poor wound healing   5.  Hypothyroidism. 6.  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 7. Celiac artery thrombosis       Plan: No active cardiac issues.  Our group will see back on request

## 2021-11-18 NOTE — PROGRESS NOTES
This nurse went to hang patient's antibiotic about [phone]  and notice that patient's Heparin was running through patient's midline that is being used to collect her pTT. This nurse disconnected the heparin line and started running it through patient's peripheral IV in her right arm.

## 2021-11-18 NOTE — ADT AUTH CERT NOTES
Cellulitis - Care Day 8 (11/17/2021) by Amber Sanchez       Review Status Review Entered   Completed 11/17/2021 16:12      Criteria Review      Care Day: 8 Care Date: 11/17/2021 Level of Care: Inpatient Floor    Guideline Day 2    Level Of Care    (X) Floor    Clinical Status    (X) * Dehydration absent    11/17/2021 16:12:36 EST by Davi Lou      not while on IVF    (X) * Mental status at baseline    11/17/2021 16:12:36 EST by Davi oLu      baseline. Endorses anxiety about unknown course of treatment regarding surgery need    (X) * Hemodynamic stability    11/17/2021 16:12:36 EST by Davi Lou      82, 103/59, 18, RA sat 93%    (X) * Afebrile or fever improved    11/17/2021 16:12:36 EST by Davi Lou      Temp 98.2    Routes    ( ) * Oral hydration    11/17/2021 16:12:36 EST by Davi Lou      NS at 75cc/hr and po diet    ( ) * Oral medications    11/17/2021 16:12:36 EST by Davi Lou      Heparin drip cont, roxicodone 5mg op q6H PRN x1 through 1600, protonox 40mg po qd, paxil 40mg po qd, pericolace 1tab po bid, lopressor 25mg po qd and 12.5mg po qhs, synthorid 112mcg po qd, probiotic po qd, tylenol 500mg po qid, asa 81mg po qd    (X) Diet as tolerated    11/17/2021 16:12:36 EST by Davi Lou      Regular, 4 carb choices/meal. Glucose control: Lantus 35un sq qd. Humalog ssi ac/hs: (2un)- (Antonio Fell). ADD MEDS: lipitor 40mg po qhs, elavil 50mg po qhs    Interventions    (X) WBC    11/17/2021 16:12:36 EST by Davi Lou      WBC 12.1, hgb 9.6, hct 30.6, plt 678. aPTT >130.0/37.8/32.8. Gluc 164, creat 0.54, nomi 8.3. Medications    (X) IV antibiotics    11/17/2021 16:12:36 EST by Davi Lou      Flagyl 500mg IV Q12H, maxipime 2gram IV Q8H    * Milestone   Additional Notes   Assessment: (INFECTIOUS DISEASE)       - Sepsis due to right retroperitoneal abscess 9.7 x 9.1 x 7.5 cm with likely involvement of the lumbar spine.  This has likely developed in the setting of poorly healing lumbar wound in July 2021. Trinity Almendarez reports that after the wound VAC care was disrupted when she went home, there was lot of drainage from the lumbar wound.  The lumbar wound eventually healed up after the wound VAC was resumed. [facility] OLINDA this must have created a nidus of infection at that time.  Patient is status post drainage and drain placement of the psoas abscess by IR on 11/12/2021.  Drainage cultures grew Streptococcus anginosus.   -History of lumbar fusion surgeries. - Hx of urinary incontinence and multiple diagnosis of UTIs in the past. E.coli in urine this admission.    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    - TTE 11/8 technically difficult.         Recommendations:   -Streptococcus anginosus are strong tendencies to cause multiple abscesses at the same time. Will recommend to obtain the MRI lumbar spine to see if any other abscesses are brewing and to delineate the involved area of the infection in a better way. If MRI is not able to be obtained, will recommend to obtain repeat CT lumbar spine with contrast this coming weekend or early next week to assess change in the size of the psoas abscess.       - I will be reaching out to Dr. Dayo Melton from orthopedics regarding further plan.       -Given the proximity of the lumbar hardware to the right psoas abscess and tracking seen to the hardware on CT scanning, we will be treating this as presumed infection of the hardware of the lumbar spine.  Duration of antibiotics will be 8 to 12 weeks.       - Continue cefepime 2 g every 8 hours and metronidazole for now.        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 Yair Alatorre last week.  Vascular surgery evaluation is appreciated; likely septic emboli however cannot rule out thromboembolic disease and hence on anticoagulation.  Will follow up with VS in 6 months. Of note, patient has not had any documented bacteremia this admission.  TTE on 11/8/2021 was a technically difficult study however did not comment on presence of any vegetations        Cellulitis - Care Day 7 (11/16/2021) by Kathrin Cadena       Review Status Review Entered   Completed 11/16/2021 14:46      Criteria Review      Care Day: 7 Care Date: 11/16/2021 Level of Care: Inpatient Floor    Guideline Day 2    Level Of Care    (X) Floor    Clinical Status    (X) * Dehydration absent    11/16/2021 14:46:24 EST by [address] on IVF    (X) * Mental status at baseline    11/16/2021 14:46:24 EST by [facility]      baseline    (X) * Hemodynamic stability    11/16/2021 14:46:24 EST by [facility]      147/88, 75, 97.6, 18, RA sat 97%. 121.9kg    (X) * Afebrile or fever improved    11/16/2021 14:46:24 EST by [facility]      Tmax 97.7    Routes    ( ) * Oral hydration    11/16/2021 14:46:24 EST by [facility]      NS at 75cc/hr    ( ) * Oral medications    11/16/2021 14:46:24 EST by [facility]      Heparin drip cont, pericolace 1tab po bid, paxil 40mg po qd, protonix 40mg po qd, lopressor 25mg po qd and 12.5mg po qhs, synthroid 112mcg po qd, probiotic po qd, lipitor 40mg po qhs, asa 81mg po qd, elavil 50mg po qhs, tylenol 500mg po qid    (X) Diet as tolerated    11/16/2021 14:46:24 EST by [facility]      Reg diet wtih 4 carb choices/meal. GLUCOSE CONTROL: Lantus 35un sq qd. Humalog ssi ac/hs not req    Interventions    (X) WBC    11/16/2021 14:46:24 EST by [facility]      WBC 12.5 at 0202/12.0 at 1109, hgb 9.6 at 0202/10.2 at 1109, hct 79.6 at 0202/33.8 at 1109, plt 663 at 0202/747 at 1109. aPTT 29.5/28. 5.  Ag 4, gluc 187, creat 0.53, nomi 8.3    Medications    (X) IV antibiotics    11/16/2021 14:46:24 EST by Priscilla Man      Vancomycin 1500mg IV Q12H, flagyl 500mg IV Q12H, maxipime 2g IV Q8H    * Milestone   Additional Notes Assessment / Plan:   Severe sepsis, POA as evidence by hypothermia, rectal temp 96, tachy , lactic 9.2 -->5.3    Suspected epidural/right iliopsoas abscess with    Right paraspinal pain at level L3-4 and R iliac crest pain (complains of R hip pain), POA    -Patient was started on vancomycin, cefepime and Flagyl   -Stop vancomycin 11/16 as recommended by ID   -Continue antibiotics, recommended 4 to 6 weeks of antibiotics per ID (start date 11/6)   -Appreciated orthopedic consult, recommended IR placed drain   -Drain was placed 11/12 draining purulent discharge   -Iliopsoas drain continues to put out purulent discharge   -Posterior abscess/seroma drain/debridement plan per orthopedic   -Culture from drain fluid is growing Streptococcus anginosus   -Surgical management on hold for now   -Leukocytosis improving   -Appreciated orthopedic consult   -Still awaiting plan about the posterior collection from orthopedic       Celiac Artery Thrombosis with splenic Infarcts:   CT ABD/Pelvis 11/9 showed celiac artery thrombosis with splenic infarcts   CT abdomen pelvis today confirmed the celiac artery occlusion with splenic infarcts   Vascular consult appreciated   Continue heparin drip until definitive decision about surgical debridement       UTI   -Ucx E coli   -sensitive to cefepime    -Rea placed in ER due to poor mobility from hip pain and polyuria, with severe candidal infection in perineum and labia and buttocks.         Type 2DM, uncontrolled with hyperglycemic hyperosmolar hyperglycemia and early DKA, due to severe sepsis, POA   -presented with , AG 15, trace ketone in urine   -A1c 11.7 up from 7.6 in July.   -continue holding metformin   -Continue Lantus 35 units.  Continue SSI prn.  Off drip 11/07   -Blood sugars are better controlled       Prerenal MERCEDES Cr 1.5   -resolved with IVF hydration.         Constipation   -added pericolace and miralax       Severe candida infection in perineum, labia, buttocks   -continue mystatin cream ordered in ER.  Status post Diflucan   -wound care consult appreciated       Generalized weakness and gait difficulty    -consult pt/ot       SVT    HTN / HLD   -continue metoprolol and statin   -having runs of SVT 11/07.  Cardiology input appreciated       Hypothyroid   -continue levothyroxine.  TSH 3.6       Depression and anxiety   -continue paxil with xanax prn       Severe obesity       Code:  Discussed, full              Cellulitis - Care Day 6 (11/15/2021) by Flores Merino       Review Status Review Entered   Completed 11/15/2021 16:16      Criteria Review      Care Day: 6 Care Date: 11/15/2021 Level of Care: Inpatient Floor    Guideline Day 2    Level Of Care    (X) Floor    11/15/2021 16:16:32 EST by Flores Merino      Ortho floor    Clinical Status    (X) * Dehydration absent    11/15/2021 16:16:32 EST by Flores Merino      On IVFs    (X) * Mental status at baseline    11/15/2021 16:16:32 EST by Flores Merino      Alert and oriented X 3    (X) * Hemodynamic stability    11/15/2021 16:16:32 EST by Hetal Ramos      HR   66    104/56    16    93% Ra    ( ) * Afebrile or fever improved    11/15/2021 16:16:32 EST by Hetal Ramos      T 97.8    Pain Score: 5    Activity    (X) Activity as tolerated    11/15/2021 16:16:32 EST by Flores Merino      PT: Patient was able to stand to RW and CGA then took a few slow steps to chair with wide MALINDA, was very fatigued and needed to quickly sit.     Routes    ( ) * Oral hydration    11/15/2021 16:16:32 EST by Flores Merino      NS 75mL/hr    ( ) * Oral medications    11/15/2021 16:16:32 EST by Flores Merino      Heparin Cont IV gtt  acetaminophen 500mg PO qid, asprin 81mg PO daily, lipitor 40mg PO qhs, Insulin SC ac&hs, L acidophilus 1 cap PO daily, synthroid 112mcg PO daily   oxycodone 5mg PO prn x2    (X) Diet as tolerated    11/15/2021 16:16:32 EST by Flores Merino      ADULT DIET Regular; 4 carb choices    Interventions    (X) WBC 11/15/2021 16:16:32 EST by Dianna Mae      WBC 14.5, hgb 9.9, Hct 32.5, Plat 622  PTT >130.0  Glucose 143    Medications    (X) IV antibiotics    11/15/2021 16:16:32 EST by Hetal Pettit      Vancomycin 1,500mg IV q12h  Cefepime 2 g IV q8h   Metronidazole 500 mg IV q12h    * Milestone   Additional Notes   11/15/21 - Inpt      Progress Note:    Right iliopsoas drain continues to put out purulent discharge    Patient reports improvement in right lower quadrant pain at the site of drain insertion       Assessment / Plan:   Severe sepsis, POA as evidence by hypothermia, rectal temp 96, tachy , lactic 9.2 -->5.3    Suspected epidural/right iliopsoas abscess with    Right paraspinal pain at level L3-4 and R iliac crest pain (complains of R hip pain), POA    -Patient was started on vancomycin, cefepime and Flagyl   -Continue antibiotics, recommended 4 to 6 weeks of antibiotics per ID (start date 11/6)   -Appreciated orthopedic consult, recommended IR placed drain   -Drain was placed 11/12 draining purulent discharge   -Iliopsoas drain continues to put out purulent discharge   -Posterior abscess/seroma drain/debridement plan per orthopedic   -Culture from drain fluid is growing alpha Streptococcus   -Surgical management on hold for now   -Leukocytosis improving   -Appreciated orthopedic consult       Celiac Artery Thrombosis with splenic Infarcts:   CT ABD/Pelvis 11/9 showed celiac artery thrombosis with splenic infarcts   CT abdomen pelvis today confirmed the celiac artery occlusion with splenic infarcts   Vascular consult appreciated   Continue heparin drip until definitive decision about surgical debridement       UTI   -Ucx E coli   -sensitive to cefepime    -Rea placed in ER due to poor mobility from hip pain and polyuria, with severe candidal infection in perineum and labia and buttocks.         Type 2DM, uncontrolled with hyperglycemic hyperosmolar hyperglycemia and early DKA, due to severe sepsis, POA -presented with , AG 15, trace ketone in urine   -A1c 11.7 up from 7.6 in July.   -continue holding metformin   -Continue Lantus 35 units.  Continue SSI prn.  Off drip 11/07   -Blood sugars are better controlled       Prerenal MERCEDES Cr 1.5   -resolved with IVF hydration.         Constipation   -added pericolace and miralax       Severe candida infection in perineum, labia, buttocks   -continue mystatin cream ordered in ER.  Status post Diflucan   -wound care consult appreciated       Generalized weakness and gait difficulty    -consult pt/ot       SVT    HTN / HLD   -continue metoprolol and statin   -having runs of SVT 11/07.  Cardiology input appreciated       Hypothyroid   -continue levothyroxine.  TSH 3.6       Depression and anxiety   -continue paxil with xanax prn              ----Cardiology Note:   Assessment:   1.  Intermittent supraventricular tachycardia.  Possibly atrioventricular node reentry or atrial tachycardia associated with severe sepsis. 2.  History of nonobstructive coronary artery disease and normal ejection fraction by prior evaluation. 3.  Type 2 diabetes. 4.   retroperitoneal abscess with sepsis. Prior laminectomy 6/21 with poor wound healing    5.  Hypothyroidism. 6.  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    7. Celiac artery thrombosis       Plan: Cont metoprolol.           Cellulitis - Care Day 5 (11/14/2021) by Ryan Troy       Review Status Review Entered   Completed 11/15/2021 14:53      Criteria Review      Care Day: 5 Care Date: 11/14/2021 Level of Care: Inpatient Floor    Guideline Day 2    Level Of Care    (X) Floor    11/15/2021 14:53:04 EST by Ryan Troy      Ortho floor    Clinical Status    (X) * Dehydration absent    11/15/2021 14:53:04 EST by Ryan Troy      IVFs    (X) * Mental status at baseline    11/15/2021 14:53:04 EST by Ryan Troy      Alert and oriented X 3    ( ) * Hemodynamic stability    11/15/2021 14:53:04 EST by [employer]      65     18    BP: 87/61,  94/60    92% RA    ( ) * Afebrile or fever improved    11/15/2021 14:53:04 EST by Madeleine MPV      T  27   iliopsoas drain continues to put out purulent discharge    Activity    (X) Activity as tolerated    Routes    ( ) * Oral hydration    11/15/2021 14:53:04 EST by [employer]      NS 75mL/hr    ( ) * Oral medications    11/15/2021 14:53:04 EST by [employer]      Heparin Cont IV gtt  acetaminophen 500mg PO qid, asprin 81mg PO daily, lipitor 40mg PO qhs, Insulin SC ac&hs, L acidophilus 1 cap PO daily, synthroid 112mcg PO daily    (X) Diet as tolerated    11/15/2021 14:53:04 EST by [employer]      Regular diet - Carb Control    Interventions    (X) WBC    11/15/2021 14:53:04 EST by [employer]      Glucose 218  WBC 14.4, Hgb 9.6, Hct 31.4, Plat 601  PTT 47.8  K 3.6, Ca 8.2    Medications    (X) IV antibiotics    11/15/2021 14:53:04 EST by Hetal Parikh      Vancomycin ,1250mg IV q8h  Cefepime 2 g IV q8h   Metronidazole 500 mg IV q12h    * Milestone   Additional Notes   11/14/21 - Inpt      Progress Note:   Patient reports pain at the site of the drain insertion which is improving, denies any fever chills, denies any nausea vomiting.  Right iliopsoas drain continues to put out purulent discharge        Assessment / Plan:       Severe sepsis, POA as evidence by hypothermia, rectal temp 96, tachy , lactic 9.2 -->5.3    Suspected epidural/right iliopsoas abscess with    Right paraspinal pain at level L3-4 and R iliac crest pain (complains of R hip pain), POA    -Patient was started on vancomycin, cefepime and Flagyl   -Continue antibiotics, recommended 4 to 6 weeks of antibiotics per ID   -Appreciated orthopedic consult, recommended IR placed drain   -Drain was placed 11/12 draining purulent discharge   -Iliopsoas drain continues to put out purulent discharge   -Culture from drain fluid is growing alpha Streptococcus   -Surgical management on hold for now   -Leukocytosis improving   -Appreciated orthopedic consult       Celiac Artery Thrombosis with splenic Infarcts:   CT ABD/Pelvis 11/9 showed celiac artery thrombosis with splenic infarcts   CT abdomen pelvis today confirmed the celiac artery occlusion with splenic infarcts   Vascular consult appreciated   Continue heparin drip until definitive decision about surgical debridement           UTI   -Ucx E coli   -sensitive to cefepime    -Rea placed in ER due to poor mobility from hip pain and polyuria, with severe candidal infection in perineum and labia and buttocks.         Type 2DM, uncontrolled with hyperglycemic hyperosmolar hyperglycemia and early DKA, due to severe sepsis, POA   -presented with , AG 15, trace ketone in urine   -A1c 11.7 up from 7.6 in July.   -continue holding metformin   -Continue Lantus 35 units.  Continue SSI prn.  Off drip 11/07   -Blood sugars are better controlled       Prerenal MERCEDES Cr 1.5   -resolved with IVF hydration.         Constipation   -added pericolace and miralax       Severe candida infection in perineum, labia, buttocks   -continue mystatin cream ordered in ER.  Status post Diflucan   -wound care consult appreciated       Generalized weakness and gait difficulty    -consult pt/ot       SVT    HTN / HLD   -continue metoprolol and statin   -having runs of SVT 11/07.  Cardiology input appreciated       Hypothyroid   -continue levothyroxine.  TSH 3.6       Depression and anxiety   -continue paxil with xanax prn              ---Ortho Note:    Assessment:   Patient is s/p drainage of right psoas abscess       Plan:   1.  Continue to follow culture results. White count continues to improve.   Would like to aggressively treat with drainage and antibiotics to try to resolve psoas collection before considering any posterior debridement given her medical issues.  Eventually may need aspiration of posterior fluid collection to rule out seroma vs infection.      Cellulitis - Care Day 4 (11/13/2021) by Dianna Mae       Review Status Review Entered   Completed 11/15/2021 09:48      Criteria Review      Care Day: 4 Care Date: 11/13/2021 Level of Care: Inpatient Floor    Guideline Day 2    Level Of Care    (X) Floor    11/15/2021 09:46:06 EST by Dianna Mae      Ortho floor    Clinical Status    (X) * Dehydration absent    11/15/2021 09:46:06 EST by Dianna Mae      resolved with IVF hydration    (X) * Mental status at baseline    11/15/2021 09:46:06 EST by Dianna Mae      Alert and oriented X 3  pain at the site of the drain insertion    ( ) * Hemodynamic stability    11/15/2021 09:46:06 EST by Hetal Pettit      HR   75    HYPOTENSIVE: BP 91/60, 86/62       RR 18    92% RA    ( ) * Afebrile or fever improved    11/15/2021 09:46:06 EST by Dianna Mae      T 98.6    drain placed in iliopsoas muscle on the right side, 125 mL of purulent discharge noticed in the drain    Activity    (X) Activity as tolerated    11/15/2021 09:46:06 EST by Hetal Pettit      ACTIVITY AS TOLERATED W/ASSIST   SCDs  Fall precautions  I&Os    Routes    (X) * Oral hydration    11/15/2021 09:46:06 EST by Dianna aMe      po intake    ( ) * Oral medications    11/15/2021 09:48:16 EST by Dianna Mae      Heparin Cont IV gtt    11/15/2021 09:46:06 EST by Hetal Pettit      acetaminophen 500mg PO qid, asprin 81mg PO daily, lipitor 40mg PO qhs, Insulin SC ac&hs, L acidophilus 1 cap PO daily, synthroid 112mcg PO dialy, metoprolol 25mg PO daily, Zofran 4mg PO prn x1, oxycodone 5mg PO prn x2    (X) Diet as tolerated    11/15/2021 09:46:06 EST by Dianna Mae      ADULT DIET Regular; 4 carb choices    Interventions    (X) WBC    11/15/2021 09:46:06 EST by Hetal Pettit      WBC 17.2 (H), Glucose 177 (H), HGB 9.8 (L), Hct 32/1 (L), Plat 561 (H), K 3.4 (L)    ( ) Elevation of affected area, if possible    11/15/2021 09:46:06 EST by Dianna Mae      Drsg change q3d  Flush catheter q shift  Tube care Medications    (X) IV antibiotics    11/15/2021 09:46:06 EST by Ann Johnson      Vancomycin 750mg IV q12h   Cefepime 2 g IV q12h   Metronidazole 500 mg IV q12h    * Milestone   Additional Notes   11/13/21 - Inpt      Progress Note:   Patient reports pain at the site of the drain insertion, denies any fever chills, denies any nausea vomiting. Drain is containing 125 mL of purulent discharge   Assessment / Plan:       Severe sepsis, POA as evidence by hypothermia, rectal temp 96, tachy , lactic 9.2 -->5.3 after 1L, WBC 26K, MERCEDES Cr 1.5 due to   Suspected epidural/right iliopsoas abscess with    Right paraspinal pain at level L3-4 and R iliac crest pain (complains of R hip pain), POA    -S/P L2-3 AND L5-S1  LUMBAR LAMINECTOMY WITH ANDREWS OSTEOTOMY L2-3 AND L3-4 WITH PLF L2-S1 by Dr. Bo Obregon 8/4/5390; complicated with nonhealing post surgical wound requiring wound vac 7/2021.     -Continue empiric Vanco, cefepime and Flagyl   -Leukocytosis remains elevated   -Consult orthopedics appreciated   -scheduled tylenol for pain.  Oxycodone prn   -PT OT eval and treat.  Transfers by herself from bed to [facility] at home.  not able to help.    -Appreciated infectious disease consult, recommended to treat for 4 to 6 weeks for deep tissue infection   -Patient status post drain placed in iliopsoas muscle on the right side, 125 mL of purulent discharge noticed in the drain   -Leukocytosis slightly improved       Celiac Artery Thrombosis with splenic Infarcts:   Vascular consult ongoing   Continue heparin drip       UTI   -Ucx E coli   -sensitive to cefepime    -Rea placed in ER due to poor mobility from hip pain and polyuria, with severe candidal infection in perineum and labia and buttocks.         Type 2DM, uncontrolled with hyperglycemic hyperosmolar hyperglycemia and early DKA, due to severe sepsis, POA   -presented with , AG 15, trace ketone in urine   -A1c 11.7 up from 7.6 in July.   -continue holding metformin -Continue Lantus 35 units.  Continue SSI prn.  Off drip 11/07   -Blood sugars are better controlled       Prerenal MERCEDES Cr 1.5   -resolved with IVF hydration.         Constipation   -added pericolace and miralax       Severe candida infection in perineum, labia, buttocks   -continue mystatin cream ordered in ER.  Status post Diflucan   -wound care consult appreciated       Generalized weakness and gait difficulty    -consult pt/ot       SVT    HTN / HLD   -continue metoprolol and statin   -having runs of SVT 11/07.  Cardiology input appreciated       Hypothyroid   -continue levothyroxine.  TSH 3.6       Depression and anxiety   -continue paxil with xanax prn       Severe obesity   DVT prophylaxis: lovenox   40 or above Morbid obesity / Body mass index is 46.41 kg/m².         ---------------------------   Impression/Plan:    Vancomycin dose will be adjusted to 1250 mg q 8 hours for an estimated AUC/trough of 482/18   Drain was placed.    Cefepime 2 g q8h for improved CrCl   Continue Metronidazole regimen   WBC trending down   Antimicrobial stop date TBD

## 2021-11-18 NOTE — PROGRESS NOTES
Notified primary RN, [facility] of PTT result and stopped heparin gtt.

## 2021-11-19 LAB
ANION GAP SERPL CALC-SCNC: 4 MMOL/L (ref 5–15)
APTT PPP: 63.2 SEC (ref 22.1–31)
BUN SERPL-MCNC: 9 MG/DL (ref 6–20)
BUN/CREAT SERPL: 17 (ref 12–20)
CALCIUM SERPL-MCNC: 8.9 MG/DL (ref 8.5–10.1)
CHLORIDE SERPL-SCNC: 106 MMOL/L (ref 97–108)
CO2 SERPL-SCNC: 30 MMOL/L (ref 21–32)
CREAT SERPL-MCNC: 0.53 MG/DL (ref 0.55–1.02)
ERYTHROCYTE [DISTWIDTH] IN BLOOD BY AUTOMATED COUNT: 19.3 % (ref 11.5–14.5)
GLUCOSE BLD STRIP.AUTO-MCNC: 114 MG/DL (ref 65–117)
GLUCOSE BLD STRIP.AUTO-MCNC: 115 MG/DL (ref 65–117)
GLUCOSE BLD STRIP.AUTO-MCNC: 126 MG/DL (ref 65–117)
GLUCOSE BLD STRIP.AUTO-MCNC: 213 MG/DL (ref 65–117)
GLUCOSE SERPL-MCNC: 122 MG/DL (ref 65–100)
HCT VFR BLD AUTO: 30.3 % (ref 35–47)
HGB BLD-MCNC: 9.5 G/DL (ref 11.5–16)
MCH RBC QN AUTO: 25.1 PG (ref 26–34)
MCHC RBC AUTO-ENTMCNC: 31.4 G/DL (ref 30–36.5)
MCV RBC AUTO: 80.2 FL (ref 80–99)
NRBC # BLD: 0 K/UL (ref 0–0.01)
NRBC BLD-RTO: 0 PER 100 WBC
PLATELET # BLD AUTO: 763 K/UL (ref 150–400)
PMV BLD AUTO: 9.5 FL (ref 8.9–12.9)
POTASSIUM SERPL-SCNC: 3.7 MMOL/L (ref 3.5–5.1)
RBC # BLD AUTO: 3.78 M/UL (ref 3.8–5.2)
SERVICE CMNT-IMP: ABNORMAL
SERVICE CMNT-IMP: ABNORMAL
SERVICE CMNT-IMP: NORMAL
SERVICE CMNT-IMP: NORMAL
SODIUM SERPL-SCNC: 140 MMOL/L (ref 136–145)
THERAPEUTIC RANGE,PTTT: ABNORMAL SECS (ref 58–77)
WBC # BLD AUTO: 11.3 K/UL (ref 3.6–11)

## 2021-11-19 PROCEDURE — 36415 COLL VENOUS BLD VENIPUNCTURE: CPT

## 2021-11-19 PROCEDURE — 74011250636 HC RX REV CODE- 250/636: Performed by: INTERNAL MEDICINE

## 2021-11-19 PROCEDURE — 82962 GLUCOSE BLOOD TEST: CPT

## 2021-11-19 PROCEDURE — 94760 N-INVAS EAR/PLS OXIMETRY 1: CPT

## 2021-11-19 PROCEDURE — 99233 SBSQ HOSP IP/OBS HIGH 50: CPT | Performed by: STUDENT IN AN ORGANIZED HEALTH CARE EDUCATION/TRAINING PROGRAM

## 2021-11-19 PROCEDURE — 74011636637 HC RX REV CODE- 636/637: Performed by: INTERNAL MEDICINE

## 2021-11-19 PROCEDURE — 74011250637 HC RX REV CODE- 250/637: Performed by: INTERNAL MEDICINE

## 2021-11-19 PROCEDURE — 65660000000 HC RM CCU STEPDOWN

## 2021-11-19 PROCEDURE — 74011250637 HC RX REV CODE- 250/637: Performed by: HOSPITALIST

## 2021-11-19 PROCEDURE — 80048 BASIC METABOLIC PNL TOTAL CA: CPT

## 2021-11-19 PROCEDURE — 97116 GAIT TRAINING THERAPY: CPT

## 2021-11-19 PROCEDURE — 97530 THERAPEUTIC ACTIVITIES: CPT

## 2021-11-19 PROCEDURE — 85027 COMPLETE CBC AUTOMATED: CPT

## 2021-11-19 PROCEDURE — 74011000258 HC RX REV CODE- 258: Performed by: INTERNAL MEDICINE

## 2021-11-19 PROCEDURE — 85730 THROMBOPLASTIN TIME PARTIAL: CPT

## 2021-11-19 RX ADMIN — ACETAMINOPHEN 500 MG: 500 TABLET ORAL at 10:39

## 2021-11-19 RX ADMIN — ATORVASTATIN CALCIUM 40 MG: 40 TABLET, FILM COATED ORAL at 22:00

## 2021-11-19 RX ADMIN — HEPARIN SODIUM 13 UNITS/KG/HR: 10000 INJECTION, SOLUTION INTRAVENOUS at 23:37

## 2021-11-19 RX ADMIN — ASPIRIN 81 MG: 81 TABLET, COATED ORAL at 10:39

## 2021-11-19 RX ADMIN — INSULIN LISPRO 2 UNITS: 100 INJECTION, SOLUTION INTRAVENOUS; SUBCUTANEOUS at 23:51

## 2021-11-19 RX ADMIN — NYSTATIN OINTMENT: 100000 OINTMENT TOPICAL at 16:00

## 2021-11-19 RX ADMIN — PANTOPRAZOLE SODIUM 40 MG: 40 TABLET, DELAYED RELEASE ORAL at 10:39

## 2021-11-19 RX ADMIN — ALPRAZOLAM 0.25 MG: 0.5 TABLET ORAL at 01:55

## 2021-11-19 RX ADMIN — METOPROLOL TARTRATE 12.5 MG: 25 TABLET, FILM COATED ORAL at 23:49

## 2021-11-19 RX ADMIN — AMITRIPTYLINE HYDROCHLORIDE 50 MG: 50 TABLET, FILM COATED ORAL at 23:50

## 2021-11-19 RX ADMIN — PAROXETINE HYDROCHLORIDE 40 MG: 20 TABLET, FILM COATED ORAL at 10:39

## 2021-11-19 RX ADMIN — Medication 1 CAPSULE: at 10:39

## 2021-11-19 RX ADMIN — ACETAMINOPHEN 500 MG: 500 TABLET ORAL at 23:50

## 2021-11-19 RX ADMIN — CEFEPIME HYDROCHLORIDE 2 G: 2 INJECTION, POWDER, FOR SOLUTION INTRAVENOUS at 23:52

## 2021-11-19 RX ADMIN — LEVOTHYROXINE SODIUM 112 MCG: 0.11 TABLET ORAL at 10:39

## 2021-11-19 RX ADMIN — ACETAMINOPHEN 500 MG: 500 TABLET ORAL at 17:31

## 2021-11-19 RX ADMIN — SODIUM CHLORIDE 75 ML/HR: 9 INJECTION, SOLUTION INTRAVENOUS at 06:24

## 2021-11-19 RX ADMIN — CEFEPIME HYDROCHLORIDE 2 G: 2 INJECTION, POWDER, FOR SOLUTION INTRAVENOUS at 16:29

## 2021-11-19 RX ADMIN — CEFEPIME HYDROCHLORIDE 2 G: 2 INJECTION, POWDER, FOR SOLUTION INTRAVENOUS at 06:25

## 2021-11-19 RX ADMIN — METRONIDAZOLE 500 MG: 250 TABLET ORAL at 23:50

## 2021-11-19 RX ADMIN — NYSTATIN OINTMENT: 100000 OINTMENT TOPICAL at 09:00

## 2021-11-19 RX ADMIN — METRONIDAZOLE 500 MG: 250 TABLET ORAL at 10:39

## 2021-11-19 RX ADMIN — ACETAMINOPHEN 500 MG: 500 TABLET ORAL at 12:30

## 2021-11-19 RX ADMIN — Medication 10 ML: at 01:57

## 2021-11-19 RX ADMIN — INSULIN GLARGINE 35 UNITS: 100 INJECTION, SOLUTION SUBCUTANEOUS at 10:39

## 2021-11-19 RX ADMIN — HEPARIN SODIUM 13 UNITS/KG/HR: 10000 INJECTION, SOLUTION INTRAVENOUS at 08:01

## 2021-11-19 RX ADMIN — NYSTATIN OINTMENT: 100000 OINTMENT TOPICAL at 22:00

## 2021-11-19 RX ADMIN — Medication 10 ML: at 14:00

## 2021-11-19 RX ADMIN — OXYCODONE 5 MG: 5 TABLET ORAL at 17:40

## 2021-11-19 RX ADMIN — METOPROLOL TARTRATE 25 MG: 25 TABLET, FILM COATED ORAL at 10:39

## 2021-11-19 RX ADMIN — Medication 10 ML: at 23:53

## 2021-11-19 NOTE — PROGRESS NOTES
Bedside and Verbal shift change report given to Asa Darling (oncoming nurse) by SONAM Machuca RN (offgoing nurse). Report given with SBAR, Kardex, Intake/Output, MAR and Recent Results.

## 2021-11-19 NOTE — PROGRESS NOTES
Bedside and Verbal shift change report given to [id number] (oncoming nurse) by Tabatha Coats (offgoing nurse). Report included the following information SBAR, Kardex, MAR, Recent Results and Cardiac Rhythm NSR.

## 2021-11-19 NOTE — PROGRESS NOTES
1900-End of Shift Note    Bedside shift change report given to Marylene Spice (oncoming nurse) by Demetrio Caceres (offgoing nurse). Report included the following information SBAR, Kardex, Intake/Output, MAR and Recent Results    Shift worked:  7-7p     Shift summary and any significant changes:     ab drainage sanguineous today MD notified.  Continue heparin drip     Concerns for physician to address:       Zone phone for oncoming shift:   [phone]

## 2021-11-19 NOTE — PROGRESS NOTES
Transition of Care Plan:     RUR: 17% - Medium   Disposition: IPR vs SNF   Follow up appointments: PCP & specialist as indicated  DME needed: To be determined. Transportation at [facility] to [facility] or means to access home:  Family has keys  IM Medicare Letter: To be given prior to discharge.   Is patient a BCPI-A Bundle:   No                 If yes, was Bundle Letter given?:   N/A  Caregiver Contact: Son  Discharge Caregiver contacted prior to discharge?  Caregiver to be contacted prior to discharge.     CM reviewed pt's chart. PT/OT are recommending SNF at d/c. CM met with pt at the bedside and provided her with a SNF list to review. She wanted CM to send referrals to facilities close to the hospital. Pt reports she still has not made up her mind about SNF vs HH. CM will continue to follow for discharge planning while patient is admitted on current unit. Please contact this CM with questions or issues related to discharge.      KRISTOFER Estrada  Care Manager [facility]  [phone]

## 2021-11-19 NOTE — PROGRESS NOTES
Problem: Mobility Impaired (Adult and Pediatric)  Goal: *Acute Goals and Plan of Care (Insert Text)  Description: FUNCTIONAL STATUS PRIOR TO ADMISSION: Ambulates household distances with RW support vs uses RW, stating she mostly has been relying on WC recently. History of MULTIPLE falls. Caregiver for  with dementia. HOME SUPPORT PRIOR TO ADMISSION: The patient lived with  however states he has dementia. Physical Therapy Goals  Initiated 11/10/2021  1. Patient will move from supine to sit and sit to supine , scoot up and down, and roll side to side in bed with supervision/set-up within 7 day(s). 2.  Patient will transfer from bed to chair and chair to bed with minimal assistance/contact guard assist using the least restrictive device within 7 day(s). 3.  Patient will perform sit to stand with minimal assistance/contact guard assist within 7 day(s). 4.  Patient will ambulate with minimal assistance/contact guard assist for 10 feet with the least restrictive device within 7 day(s). Re-Assessment 11/17/21 Re-Assessment, goals achieved and upgraded    1. Patient will move from supine to sit and sit to supine , scoot up and down, and roll side to side in bed with mod indep within 7 day(s). 2.  Patient will transfer from bed to chair and chair to bed with supervision   using the least restrictive device within 7 day(s). 3.  Patient will perform sit to stand with supervision within 7 day(s). 4.  Patient will ambulate with supervision  for 50 feet with the least restrictive device within 7 day(s).      Outcome: Progressing Towards Goal   PHYSICAL THERAPY TREATMENT  Patient: Christian Hunter (63 y.o. female)  Date: 11/19/2021  Diagnosis: Hyperglycemia [R73.9]  MERCEDES (acute kidney injury) ([facility] [address] [N17.9]  Leukocytosis [D72.829]  SIRS (systemic inflammatory response syndrome) ([facility]) [R65.10]  Candida vaginitis [B37.3]   <principal problem not specified>       Precautions: Fall, Back, WBAT  Chart, physical therapy assessment, plan of care and goals were reviewed. ASSESSMENT  Patient continues with skilled PT services and is progressing towards goals. Patient received in bed and agreeable to participate, was able to come to sit edge of bed with extra time and verbal cues but no physical assist needed today. Patient ambulated with RW for a total of 20 feet, with one seated rest break. Patient still fatigues quickly and requires cues to slow breathing and use nasal inhale for recovery. Patient left up in chair with call bell in reach, continue to recommend rehab as patient is well below baseline and has  with dementia to take care of. Current Level of Function Impacting Discharge (mobility/balance): SBA for bed mobility, CGA with RW for short distance    Other factors to consider for discharge: below baseline, fatigues quickly, caregiver for          PLAN :  Patient continues to benefit from skilled intervention to address the above impairments. Continue treatment per established plan of care. to address goals. Recommendation for discharge: (in order for the patient to meet his/her long term goals)  Therapy 3 hours per day 5-7 days per week    This discharge recommendation:  Has been made in collaboration with the attending provider and/or case management    IF patient discharges home will need the following DME: patient owns DME required for discharge       SUBJECTIVE:   Patient stated I feel a little better today but I am worried about what the doctors will do next.     OBJECTIVE DATA SUMMARY:   Critical Behavior:  Neurologic State: Alert  Orientation Level: Oriented X4  Cognition: Follows commands  Safety/Judgement: Awareness of environment, Fall prevention  Functional Mobility Training:  Bed Mobility:  Rolling: Stand-by assistance  Supine to Sit: Stand-by assistance  Sit to Supine: Stand-by assistance  Scooting: Stand-by assistance        Transfers:  Sit to Stand: Contact guard assistance  Stand to Sit: Contact guard assistance                             Balance:  Sitting: Intact  Standing: Impaired  Standing - Static: Constant support; Good  Standing - Dynamic : Constant support; Good  Ambulation/Gait Training:  Distance (ft): 20 Feet (ft)  Assistive Device: Gait belt; Walker, rolling  Ambulation - Level of Assistance: Contact guard assistance        Gait Abnormalities: Decreased step clearance        Base of Support: Widened     Speed/Catherine: Pace decreased (<100 feet/min)  Step Length: Left shortened; Right shortened                    Stairs: Activity Tolerance:   Fair    After treatment patient left in no apparent distress:   Sitting in chair and Call bell within reach    COMMUNICATION/COLLABORATION:   The patients plan of care was discussed with: Registered nurse.      Hanna Salcedo, PT   Time Calculation: 26 mins

## 2021-11-19 NOTE — PROGRESS NOTES
End of Shift Note    Bedside shift change report given to JANIS Hopkins (oncoming nurse) by Kapil Albert (offgoing nurse). Report included the following information SBAR, Kardex and ED Summary    Shift worked:  DAY     Shift summary and any significant changes:     Continue heparin drip  PTT lab draw at 11pm     Concerns for physician to address:  MRI? Zone phone for oncoming shift:          Activity:  Activity Level: Up with Assistance  Number times ambulated in hallways past shift: 0  Number of times OOB to chair past shift: 3    Cardiac:   Cardiac Monitoring: Yes      Cardiac Rhythm: Sinus Rhythm    Access:   Current line(s): PIV and midline    Genitourinary:   Urinary status: mittal    Respiratory:   O2 Device: None (Room air)  Chronic home O2 use?: NO  Incentive spirometer at bedside: YES     GI:  Last Bowel Movement Date: 11/17/21  Current diet:  ADULT DIET Regular; 4 carb choices (60 gm/meal)  ADULT ORAL NUTRITION SUPPLEMENT Dinner, Breakfast, Lunch; Low Calorie/High Protein  Passing flatus: YES  Tolerating current diet: YES       Pain Management:   Patient states pain is manageable on current regimen: YES    Skin:  Neal Score: 17  Interventions: turn team, speciality bed and foam dressing    Patient Safety:  Fall Score:  Total Score: 3  Interventions: bed/chair alarm, assistive device (walker, cane, etc), gripper socks and pt to call before getting OOB  High Fall Risk: Yes    Length of Stay:  Expected LOS: 4d 19h  Actual LOS: [facility]

## 2021-11-19 NOTE — PROGRESS NOTES
Hospitalist Progress Note    NAME: Rip Lo   :  1950   MRN:  [id number]       Assessment / Plan:  Septic shock POA rectal temp 96, , lactic 9.2 -->5. 3   Streptococcus anginosus epidural/right iliopsoas abscess POA  Right paraspinal pain at level L3-4 and R iliac crest pain (complains of Right hip pain), POA   -Patient was started on vancomycin, cefepime and Flagyl  - Followed by Dr Serrano Height and Infectious diseases  -Stop vancomycin  as recommended by ID  -Continue antibiotics, recommended 4 to 6 weeks of antibiotics per ID (start date )  -Appreciated orthopedic consult, recommended IR placed drain  -Drain was placed  draining purulent discharge  -Iliopsoas drain continues to put out purulent discharge  -Posterior abscess/seroma drain/debridement plan per orthopedic  -Culture from drain fluid is growing Streptococcus anginosus  -Surgical management on hold for now  -Leukocytosis improving  - Orthopedics plans no surgery for now  - Check MRI per Dr Tigist Singh, will need conscious sedation       Otherwise obtain CT scan  - already has midline in place  - ? Home vs SNF, [facility]        I recommended SNF,she is still weak        She is leaning towards going home,if so will need home IV antibiotics    Celiac Artery Thrombosis with splenic Infarcts:  CT ABD/Pelvis  showed celiac artery thrombosis with splenic infarcts  CT abdomen pelvis today confirmed the celiac artery occlusion with splenic infarcts  Vascular consult appreciated  Continue heparin drip until definitive decision about surgical debridement      Change to NOAC if MRI with no clear abscesses needing drainage    UTI  -Ucx E coli  -sensitive to cefepime   -Rea placed in ER due to poor mobility from hip pain and polyuria, with severe candidal infection in perineum and labia and buttocks.       Type 2 DM, uncontrolled with hyperosmolar hyperglycemia and early DKA POA  - presented with , AG 15, trace ketone in urine  - Required insulin gtt  - A1c 11.7 up from 7.6 in July. - continue holding metformin  - Continue Lantus 35 units. Continue SSI prn  - Blood sugars are better controlled  - 112, 209, 114, 126, 115    Prerenal MERCEDES Cr 1.5  -resolved with IVF hydration.       Constipation  -added pericolace and miralax    Severe candida infection in perineum, labia, buttocks  -continue mystatin cream ordered in ER. Status post Diflucan  -wound care consult appreciated     Generalized weakness and gait difficulty   -consult pt/ot     SVT   HTN / HLD  -continue metoprolol and statin  -having runs of SVT 11/07. Cardiology input appreciated     Hypothyroid  -continue levothyroxine. TSH 3.6     Depression and anxiety  -continue paxil with xanax prn     Severe obesity    Code:  Discussed, full  DVT prophylaxis: lovenox  Surrogate decision maker:   (but has some dementia per patient) or sister Jyothi Centeno    40 or above Morbid obesity / Body mass index is 49.35 kg/m². Subjective:     Chief Complaint / Reason for Physician Visit  \"I am not sure I want to go to rehab\"  No complaints, scant blood in ileopsoas drain  Pain tolerable  Awaiting MRI    review of Systems:  Symptom Y/N Comments  Symptom Y/N Comments   Fever/Chills n   Chest Pain n    Poor Appetite    Edema n    Cough n   Abdominal Pain n    Sputum    Joint Pain     SOB/AVALOS n   Pruritis/Rash     Nausea/vomit n   Tolerating PT/OT     Diarrhea n   Tolerating Diet y    Constipation    Other       Could NOT obtain due to:      PO intake: No data found. Objective:     VITALS:   Last 24hrs VS reviewed since prior progress note.  Most recent are:  Patient Vitals for the past 24 hrs:   Temp Pulse Resp BP SpO2   11/19/21 1514 97.8 °F (36.6 °C) 69 18 123/71 96 %   11/19/21 1138 98.2 °F (36.8 °C) 83 17 99/68 96 %   11/19/21 0846 97.7 °F (36.5 °C) 70 16 127/69 94 %   11/19/21 0312 97.6 °F (36.4 °C) 82 14 123/81 97 % 11/18/21 2336 97.9 °F (36.6 °C) 76 16 114/79 100 %   11/18/21 2214  82  132/78    11/18/21 1906 97.7 °F (36.5 °C) 80 18 131/62 97 %       Intake/Output Summary (Last 24 hours) at 11/19/2021 1802  Last data filed at 11/19/2021 1743  Gross per 24 hour   Intake 8743.29 ml   Output 5765 ml   Net 2978.29 ml        I had a face to face encounter, and independently examined this patient on 11/19/2021, as outlined below:  PHYSICAL EXAM:  General: WD, WN. Alert, cooperative, no acute distress    EENT:  EOMI. Anicteric sclerae. MMM  Resp:  CTA bilaterally, no wheezing or rales. No accessory muscle use  CV:  Regular  rhythm,  No edema  GI:  Soft, Non distended, + mild right flank tender. +Bowel sounds, drain is containing  purulent discharge  Neurologic:  Alert and oriented X 3, normal speech,   Psych:   Good insight. Not anxious nor agitated  Skin:  No rashes. No jaundice    Reviewed most current lab test results and cultures  YES  Reviewed most current radiology test results   YES  Review and summation of old records today    NO  Reviewed patient's current orders and MAR    YES  PMH/ reviewed - no change compared to H&P  ________________________________________________________________________  Care Plan discussed with:    Comments   Patient x    Family      RN x    Care Manager     Consultant  x                     x Multidiciplinary team rounds were held today with , nursing, pharmacist and clinical coordinator. Patient's plan of care was discussed; medications were reviewed and discharge planning was addressed.      ________________________________________________________________________      Comments   >50% of visit spent in counseling and coordination of care x     This includes time during multidisciplinary rounds if indicated above   ________________________________________________________________________  Robinson Code, MD     Procedures: see electronic medical records for all procedures/Xrays and details which were not copied into this note but were reviewed prior to creation of Plan. LABS:  I reviewed today's most current labs and imaging studies.   Pertinent labs include:  Recent Labs     11/19/21 0144 11/18/21 0216 11/17/21 0322   WBC 11.3* 12.9* 12.1*   HGB 9.5* 10.0* 9.6*   HCT 30.3* 32.7* 30.6*   * 757* 678*     Recent Labs     11/19/21 0144 11/18/21 0216 11/17/21 0322    138 138   K 3.7 3.8 3.5    104 104   CO2 30 29 29   * 155* 164*   BUN 9 8 6   CREA 0.53* 0.55 0.54*   CA 8.9 8.6 8.3*

## 2021-11-19 NOTE — PROGRESS NOTES
Problem: Diabetes Self-Management  Goal: *Incorporating nutritional management into lifestyle  Description: Describe effect of type, amount and timing of food on blood glucose; list 3 methods for planning meals. Outcome: Progressing Towards Goal     Problem: Falls - Risk of  Goal: *Absence of Falls  Description: Document Dawood Jones Fall Risk and appropriate interventions in the flowsheet. Outcome: Progressing Towards Goal  Note: Fall Risk Interventions:  Mobility Interventions: Bed/chair exit alarm    Mentation Interventions: Bed/chair exit alarm    Medication Interventions: Evaluate medications/consider consulting pharmacy    Elimination Interventions: Bed/chair exit alarm    History of Falls Interventions: Door open when patient unattended         Problem: Patient Education: Go to Patient Education Activity  Goal: Patient/Family Education  Outcome: Progressing Towards Goal     Problem: Pressure Injury - Risk of  Goal: *Prevention of pressure injury  Description: Document Neal Scale and appropriate interventions in the flowsheet.   Outcome: Progressing Towards Goal  Note: Pressure Injury Interventions:  Sensory Interventions: Assess changes in LOC    Moisture Interventions: Apply protective barrier, creams and emollients    Activity Interventions: Chair cushion    Mobility Interventions: Assess need for specialty bed    Nutrition Interventions: Document food/fluid/supplement intake    Friction and Shear Interventions: Lift sheet

## 2021-11-19 NOTE — PROGRESS NOTES
Hospitalist Progress Note    NAME: Jennifer Cormier   :  1950   MRN:  [id number]     Interim Hospital Summary: 65 y.o. female whom presented on 2021 with      Assessment / Plan:  Severe sepsis, POA as evidence by hypothermia, rectal temp 96, tachy , lactic 9.2 -->5.3   Suspected epidural/right iliopsoas abscess with   Right paraspinal pain at level L3-4 and R iliac crest pain (complains of R hip pain), POA   -Patient was started on vancomycin, cefepime and Flagyl  -Stop vancomycin  as recommended by ID  -Continue antibiotics, recommended 4 to 6 weeks of antibiotics per ID (start date )  -Appreciated orthopedic consult, recommended IR placed drain  -Drain was placed  draining purulent discharge  -Iliopsoas drain continues to put out purulent discharge  -Posterior abscess/seroma drain/debridement plan per orthopedic  -Culture from drain fluid is growing Streptococcus anginosus  -Surgical management on hold for now  -Leukocytosis improving  -Appreciated orthopedic consult  -Still awaiting plan about the posterior collection from orthopedic    Celiac Artery Thrombosis with splenic Infarcts:  CT ABD/Pelvis  showed celiac artery thrombosis with splenic infarcts  CT abdomen pelvis today confirmed the celiac artery occlusion with splenic infarcts  Vascular consult appreciated  Continue heparin drip until definitive decision about surgical debridement    UTI  -Ucx E coli  -sensitive to cefepime   -Rea placed in ER due to poor mobility from hip pain and polyuria, with severe candidal infection in perineum and labia and buttocks. Type 2DM, uncontrolled with hyperglycemic hyperosmolar hyperglycemia and early DKA, due to severe sepsis, POA  -presented with , AG 15, trace ketone in urine  -A1c 11.7 up from 7.6 in July.  -continue holding metformin  -Continue Lantus 35 units. Continue SSI prn.   Off drip   -Blood sugars are better controlled    Prerenal MERCEDES Cr 1.5  -resolved with IVF hydration.       Constipation  -added pericolace and miralax    Severe candida infection in perineum, labia, buttocks  -continue mystatin cream ordered in ER. Status post Diflucan  -wound care consult appreciated     Generalized weakness and gait difficulty   -consult pt/ot     SVT   HTN / HLD  -continue metoprolol and statin  -having runs of SVT 11/07. Cardiology input appreciated     Hypothyroid  -continue levothyroxine. TSH 3.6     Depression and anxiety  -continue paxil with xanax prn     Severe obesity    Code:  Discussed, full  DVT prophylaxis: lovenox  Surrogate decision maker:   (but has some dementia per patient) or sister Adair Villelaells    40 or above Morbid obesity / Body mass index is 46.14 kg/m². Subjective:     Chief Complaint / Reason for Physician Visit  Patient reports pain at the site of the drain insertion which is improving, denies any fever chills, denies any nausea vomiting. Right iliopsoas drain continues to put out purulent discharge   Patient reports improvement in right lower quadrant pain at the site of drain insertion    review of Systems:  Symptom Y/N Comments  Symptom Y/N Comments   Fever/Chills n   Chest Pain n    Poor Appetite n   Edema n    Cough n   Abdominal Pain n    Sputum n   Joint Pain     SOB/AVALOS n   Pruritis/Rash     Nausea/vomit n   Tolerating PT/OT     Diarrhea n   Tolerating Diet     Constipation n   Other       Could NOT obtain due to:      PO intake: No data found. Objective:     VITALS:   Last 24hrs VS reviewed since prior progress note.  Most recent are:  Patient Vitals for the past 24 hrs:   Temp Pulse Resp BP SpO2   11/18/21 1906 97.7 °F (36.5 °C) 80 18 131/62 97 %   11/18/21 1546 97.5 °F (36.4 °C) 69 18 (!) 137/97 92 %   11/18/21 1120 97.4 °F (36.3 °C) 78 18 108/81 94 %   11/18/21 0805 97.7 °F (36.5 °C) 72 16 134/83 92 %   11/18/21 0133 97.9 °F (36.6 °C) 76 16 105/72 95 %   11/17/21 2231 97.3 °F (36.3 °C) 72 16 111/67 95 %       Intake/Output Summary (Last 24 hours) at 11/18/2021 2147  Last data filed at 11/18/2021 1500  Gross per 24 hour   Intake 110 ml   Output 4575 ml   Net -4465 ml        I had a face to face encounter, and independently examined this patient on 11/18/2021, as outlined below:  PHYSICAL EXAM:  General: WD, WN. Alert, cooperative, no acute distress    EENT:  EOMI. Anicteric sclerae. MMM  Resp:  CTA bilaterally, no wheezing or rales. No accessory muscle use  CV:  Regular  rhythm,  No edema  GI:  Soft, Non distended, + mild right flank tender. +Bowel sounds, drain is containing  purulent discharge  Neurologic:  Alert and oriented X 3, normal speech,   Psych:   Good insight. Not anxious nor agitated  Skin:  No rashes. No jaundice    Reviewed most current lab test results and cultures  YES  Reviewed most current radiology test results   YES  Review and summation of old records today    NO  Reviewed patient's current orders and MAR    YES  PMH/SH reviewed - no change compared to H&P  ________________________________________________________________________  Care Plan discussed with:    Comments   Patient x    Family      RN x    Care Manager     Consultant  x  infectious disease                    x Multidiciplinary team rounds were held today with , nursing, pharmacist and clinical coordinator. Patient's plan of care was discussed; medications were reviewed and discharge planning was addressed.      ________________________________________________________________________  Total NON critical care TIME:  35   Minutes    Total CRITICAL CARE TIME Spent:   Minutes non procedure based      Comments   >50% of visit spent in counseling and coordination of care x     This includes time during multidisciplinary rounds if indicated above   ________________________________________________________________________  Lala Freeman MD     Procedures: see electronic medical records for all procedures/Xrays and details which were not copied into this note but were reviewed prior to creation of Plan. LABS:  I reviewed today's most current labs and imaging studies.   Pertinent labs include:  Recent Labs     11/18/21 0216 11/17/21 0322 11/16/21  1109   WBC 12.9* 12.1* 12.0*   HGB 10.0* 9.6* 10.2*   HCT 32.7* 30.6* 33.8*   * 678* 747*     Recent Labs     11/18/21 0216 11/17/21 0322 11/16/21  0202    138 138   K 3.8 3.5 3.8    104 104   CO2 29 29 30   * 164* 187*   BUN 8 6 8   CREA 0.55 0.54* 0.53*   CA 8.6 8.3* 8.3*

## 2021-11-19 NOTE — PROGRESS NOTES
Infectious Disease Progress Note         Interval:  NAEO    Subjective:   Patient seen this morning. Patient reports that she is continuing to slowly feel better each day. Patient sitting in chair and working with physical therapy. She reports she is more comfortable with respect to her pain and seems to be moving more comfortably now. Appears less anxious      Objective:    Vitals:   Reviewed in chart. Physical Exam:  ?   ? GEN: NAD, patient appears nontoxic.  ? HEENT: Normocephalic, atraumatic, PERRL, no scleral icterus  ? CV: HDS  ? Lungs: room air  ? Abdomen: soft, non distended, non tender, drain in right psoas abscess  ? Genitourinary:  no mittal  ? Extremities: no edema  ? Neuro: Alert, oriented to time, place and situation, moves all extremities to commands, verbal   ? Skin: lumbar wound is entirely healed. ? Psych: good affect, good eye contact, non tearful   ? Lines: PIVs, right psoas abscess drain with purulent drainage.           Labs:  Recent Results (from the past 24 hour(s))   GLUCOSE, POC    Collection Time: 11/18/21  4:43 PM   Result Value Ref Range    Glucose (POC) 112 65 - 117 mg/dL    Performed by Isidra Vital    PTT    Collection Time: 11/18/21  5:24 PM   Result Value Ref Range    aPTT 64.4 (H) 22.1 - 31.0 sec    aPTT, therapeutic range     58.0 - 77.0 SECS   GLUCOSE, POC    Collection Time: 11/18/21  9:10 PM   Result Value Ref Range    Glucose (POC) 209 (H) 65 - 117 mg/dL    Performed by Stone Vail (TRV LPN)    PTT    Collection Time: 11/18/21 11:36 PM   Result Value Ref Range    aPTT 63.2 (H) 22.1 - 31.0 sec    aPTT, therapeutic range     58.0 - 77.0 SECS   CBC W/O DIFF    Collection Time: 11/19/21  1:44 AM   Result Value Ref Range    WBC 11.3 (H) 3.6 - 11.0 K/uL    RBC 3.78 (L) 3.80 - 5.20 M/uL    HGB 9.5 (L) 11.5 - 16.0 g/dL    HCT 30.3 (L) 35.0 - 47.0 %    MCV 80.2 80.0 - 99.0 FL    MCH 25.1 (L) 26.0 - 34.0 PG    MCHC 31.4 30.0 - 36.5 g/dL    RDW 19.3 (H) 11.5 - 14.5 % PLATELET 661 (H) 534 - 400 K/uL    MPV 9.5 8.9 - 12.9 FL    NRBC 0.0 0  WBC    ABSOLUTE NRBC 0.00 0.00 - 3.59 K/uL   METABOLIC PANEL, BASIC    Collection Time: 11/19/21  1:44 AM   Result Value Ref Range    Sodium 140 136 - 145 mmol/L    Potassium 3.7 3.5 - 5.1 mmol/L    Chloride 106 97 - 108 mmol/L    CO2 30 21 - 32 mmol/L    Anion gap 4 (L) 5 - 15 mmol/L    Glucose 122 (H) 65 - 100 mg/dL    BUN 9 6 - 20 MG/DL    Creatinine 0.53 (L) 0.55 - 1.02 MG/DL    BUN/Creatinine ratio 17 12 - 20      GFR est AA >60 >60 ml/min/1.73m2    GFR est non-AA >60 >60 ml/min/1.73m2    Calcium 8.9 8.5 - 10.1 MG/DL   GLUCOSE, POC    Collection Time: 11/19/21  8:30 AM   Result Value Ref Range    Glucose (POC) 114 65 - 117 mg/dL    Performed by Toshia Kendall PCT    GLUCOSE, POC    Collection Time: 11/19/21 11:00 AM   Result Value Ref Range    Glucose (POC) 126 (H) 65 - 117 mg/dL    Performed by Toshia Kendall PCT              Assessment:  66 yo F with:     - Sepsis due to right retroperitoneal abscess 9.7 x 9.1 x 7.5 cm with likely involvement of the lumbar spine. This has likely developed in the setting of poorly healing lumbar wound in July 2021. Patient reports that after the wound VAC care was disrupted when she went home, there was lot of drainage from the lumbar wound. The lumbar wound eventually healed up after the wound VAC was resumed. However this must have created a nidus of infection at that time. Patient is status post drainage and drain placement of the psoas abscess by IR on 11/12/2021. Drainage cultures grew Streptococcus anginosus.  -History of lumbar fusion surgeries. - Hx of urinary incontinence and multiple diagnosis of UTIs in the past. E.coli in urine this admission.   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   - TTE 11/8 technically difficult.        Recommendations:  -Patient is continuing to have slow but steady clinical improvement. -MRI lumbar spine anticipated to be done today or this weekend. If MRI is not able to be obtained, will recommend to obtain repeat CT lumbar spine with contrast this coming weekend or early next week to assess change in the size of the psoas abscess. -Given the proximity of the lumbar hardware to the right psoas abscess and tracking seen to the hardware on CT scanning, we will be treating this as presumed infection of the hardware of the lumbar spine. Duration of antibiotics will be 8 to 12 weeks. -Reviewed orthopedics note from 11/18/2021; no surgery planned for now as long as patient is improving clinically. Will be followed as outpatient unless clinical decline is seen. - Continue cefepime 2 g every 8 hours and metronidazole for now. 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 Jose Luis Krueger last week. Vascular surgery evaluation is appreciated; likely septic emboli however cannot rule out thromboembolic disease and hence on anticoagulation. Will follow up with VS in 6 months. Of note, patient has not had any documented bacteremia this admission. TTE on 11/8/2021 was a technically difficult study however did not comment on presence of any vegetations. Will follow. Thank you for the opportunity to participate in the care of this patient. Please contact with questions or concerns.       Mei Rea MD  Infectious Diseases

## 2021-11-20 LAB
ANION GAP SERPL CALC-SCNC: 6 MMOL/L (ref 5–15)
APTT PPP: 64.6 SEC (ref 22.1–31)
BUN SERPL-MCNC: 10 MG/DL (ref 6–20)
BUN/CREAT SERPL: 16 (ref 12–20)
CALCIUM SERPL-MCNC: 8.3 MG/DL (ref 8.5–10.1)
CHLORIDE SERPL-SCNC: 103 MMOL/L (ref 97–108)
CO2 SERPL-SCNC: 28 MMOL/L (ref 21–32)
CREAT SERPL-MCNC: 0.63 MG/DL (ref 0.55–1.02)
ERYTHROCYTE [DISTWIDTH] IN BLOOD BY AUTOMATED COUNT: 19.3 % (ref 11.5–14.5)
GLUCOSE BLD STRIP.AUTO-MCNC: 144 MG/DL (ref 65–117)
GLUCOSE BLD STRIP.AUTO-MCNC: 253 MG/DL (ref 65–117)
GLUCOSE BLD STRIP.AUTO-MCNC: 98 MG/DL (ref 65–117)
GLUCOSE BLD STRIP.AUTO-MCNC: 98 MG/DL (ref 65–117)
GLUCOSE SERPL-MCNC: 166 MG/DL (ref 65–100)
HCT VFR BLD AUTO: 31.4 % (ref 35–47)
HGB BLD-MCNC: 9.6 G/DL (ref 11.5–16)
MCH RBC QN AUTO: 25.3 PG (ref 26–34)
MCHC RBC AUTO-ENTMCNC: 30.6 G/DL (ref 30–36.5)
MCV RBC AUTO: 82.6 FL (ref 80–99)
NRBC # BLD: 0 K/UL (ref 0–0.01)
NRBC BLD-RTO: 0 PER 100 WBC
PLATELET # BLD AUTO: 794 K/UL (ref 150–400)
PMV BLD AUTO: 9.4 FL (ref 8.9–12.9)
POTASSIUM SERPL-SCNC: 3.6 MMOL/L (ref 3.5–5.1)
RBC # BLD AUTO: 3.8 M/UL (ref 3.8–5.2)
SERVICE CMNT-IMP: ABNORMAL
SERVICE CMNT-IMP: ABNORMAL
SERVICE CMNT-IMP: NORMAL
SERVICE CMNT-IMP: NORMAL
SODIUM SERPL-SCNC: 137 MMOL/L (ref 136–145)
THERAPEUTIC RANGE,PTTT: ABNORMAL SECS (ref 58–77)
WBC # BLD AUTO: 11 K/UL (ref 3.6–11)

## 2021-11-20 PROCEDURE — 74011250636 HC RX REV CODE- 250/636: Performed by: INTERNAL MEDICINE

## 2021-11-20 PROCEDURE — 74011636637 HC RX REV CODE- 636/637: Performed by: INTERNAL MEDICINE

## 2021-11-20 PROCEDURE — 85027 COMPLETE CBC AUTOMATED: CPT

## 2021-11-20 PROCEDURE — 65660000000 HC RM CCU STEPDOWN

## 2021-11-20 PROCEDURE — 80048 BASIC METABOLIC PNL TOTAL CA: CPT

## 2021-11-20 PROCEDURE — 74011250637 HC RX REV CODE- 250/637: Performed by: GENERAL ACUTE CARE HOSPITAL

## 2021-11-20 PROCEDURE — 74011000258 HC RX REV CODE- 258: Performed by: INTERNAL MEDICINE

## 2021-11-20 PROCEDURE — 74011250637 HC RX REV CODE- 250/637: Performed by: HOSPITALIST

## 2021-11-20 PROCEDURE — 36415 COLL VENOUS BLD VENIPUNCTURE: CPT

## 2021-11-20 PROCEDURE — 74011250637 HC RX REV CODE- 250/637: Performed by: INTERNAL MEDICINE

## 2021-11-20 PROCEDURE — 82962 GLUCOSE BLOOD TEST: CPT

## 2021-11-20 RX ADMIN — PAROXETINE HYDROCHLORIDE 40 MG: 20 TABLET, FILM COATED ORAL at 11:23

## 2021-11-20 RX ADMIN — NYSTATIN OINTMENT: 100000 OINTMENT TOPICAL at 22:00

## 2021-11-20 RX ADMIN — ACETAMINOPHEN 500 MG: 500 TABLET ORAL at 19:25

## 2021-11-20 RX ADMIN — METRONIDAZOLE 500 MG: 250 TABLET ORAL at 23:11

## 2021-11-20 RX ADMIN — PANTOPRAZOLE SODIUM 40 MG: 40 TABLET, DELAYED RELEASE ORAL at 05:29

## 2021-11-20 RX ADMIN — Medication 10 ML: at 05:30

## 2021-11-20 RX ADMIN — OXYCODONE 5 MG: 5 TABLET ORAL at 11:22

## 2021-11-20 RX ADMIN — DOCUSATE SODIUM AND SENNOSIDES 1 TABLET: 8.6; 5 TABLET, FILM COATED ORAL at 19:25

## 2021-11-20 RX ADMIN — INSULIN LISPRO 2 UNITS: 100 INJECTION, SOLUTION INTRAVENOUS; SUBCUTANEOUS at 11:23

## 2021-11-20 RX ADMIN — ACETAMINOPHEN 500 MG: 500 TABLET ORAL at 13:58

## 2021-11-20 RX ADMIN — ATORVASTATIN CALCIUM 40 MG: 40 TABLET, FILM COATED ORAL at 23:11

## 2021-11-20 RX ADMIN — CEFEPIME HYDROCHLORIDE 2 G: 2 INJECTION, POWDER, FOR SOLUTION INTRAVENOUS at 05:31

## 2021-11-20 RX ADMIN — CEFEPIME HYDROCHLORIDE 2 G: 2 INJECTION, POWDER, FOR SOLUTION INTRAVENOUS at 13:56

## 2021-11-20 RX ADMIN — METRONIDAZOLE 500 MG: 250 TABLET ORAL at 11:22

## 2021-11-20 RX ADMIN — METOPROLOL TARTRATE 25 MG: 25 TABLET, FILM COATED ORAL at 05:29

## 2021-11-20 RX ADMIN — ASPIRIN 81 MG: 81 TABLET, COATED ORAL at 11:23

## 2021-11-20 RX ADMIN — DOCUSATE SODIUM AND SENNOSIDES 1 TABLET: 8.6; 5 TABLET, FILM COATED ORAL at 11:22

## 2021-11-20 RX ADMIN — OXYCODONE 5 MG: 5 TABLET ORAL at 19:25

## 2021-11-20 RX ADMIN — Medication 1 CAPSULE: at 11:23

## 2021-11-20 RX ADMIN — ACETAMINOPHEN 500 MG: 500 TABLET ORAL at 11:22

## 2021-11-20 RX ADMIN — HEPARIN SODIUM 13 UNITS/KG/HR: 10000 INJECTION, SOLUTION INTRAVENOUS at 19:30

## 2021-11-20 RX ADMIN — AMITRIPTYLINE HYDROCHLORIDE 50 MG: 50 TABLET, FILM COATED ORAL at 23:10

## 2021-11-20 RX ADMIN — ACETAMINOPHEN 500 MG: 500 TABLET ORAL at 23:11

## 2021-11-20 RX ADMIN — ALPRAZOLAM 0.25 MG: 0.5 TABLET ORAL at 13:56

## 2021-11-20 RX ADMIN — METOPROLOL TARTRATE 12.5 MG: 25 TABLET, FILM COATED ORAL at 23:10

## 2021-11-20 RX ADMIN — LEVOTHYROXINE SODIUM 112 MCG: 0.11 TABLET ORAL at 05:29

## 2021-11-20 RX ADMIN — CEFEPIME HYDROCHLORIDE 2 G: 2 INJECTION, POWDER, FOR SOLUTION INTRAVENOUS at 23:11

## 2021-11-20 RX ADMIN — INSULIN GLARGINE 35 UNITS: 100 INJECTION, SOLUTION SUBCUTANEOUS at 11:23

## 2021-11-20 RX ADMIN — INSULIN LISPRO 3 UNITS: 100 INJECTION, SOLUTION INTRAVENOUS; SUBCUTANEOUS at 23:15

## 2021-11-20 NOTE — PROGRESS NOTES
Hospitalist Progress Note    NAME: Belkys Pham   :  1950   MRN:  [id number]       Assessment / Plan:  Septic shock POA rectal temp 96, , lactic 9.2 -->5. 3   Streptococcus anginosus epidural/right iliopsoas abscess POA  Right paraspinal pain at level L3-4 and R iliac crest pain (complains of Right hip pain), POA   -Patient was started on vancomycin, cefepime and Flagyl  - Followed by Dr Yissel Nicole and Infectious diseases  -Stop vancomycin  as recommended by ID  -Continue antibiotics, recommended 4 to 6 weeks of antibiotics per ID (start date )  -Appreciated orthopedic consult, recommended IR placed drain  -Drain was placed  draining purulent discharge  -Iliopsoas drain continues to put out purulent discharge  -Posterior abscess/seroma drain/debridement plan per orthopedic  -Culture from drain fluid is growing Streptococcus anginosus  -Surgical management on hold for now  -Leukocytosis improving  - Orthopedics plans no surgery for now  - Check MRI per Dr Allie Stewart, will need conscious sedation       Otherwise obtain CT scan  - already has midline in place  - ?  Home vs SNF, [facility]        I recommended SNF,she is still weak        She is leaning towards going home,if so will need home IV antibiotics      Awaiting MRI  RN Noted to have pus coming from lower back old wound, on my eval only minimal blood drainage  Cont Heparin drip    Celiac Artery Thrombosis with splenic Infarcts:  CT ABD/Pelvis  showed celiac artery thrombosis with splenic infarcts  CT abdomen pelvis today confirmed the celiac artery occlusion with splenic infarcts  Vascular consult appreciated  Continue heparin drip until definitive decision about surgical debridement      Change to NOAC if MRI with no clear abscesses needing drainage    UTI  -Ucx E coli  -sensitive to cefepime   -Rea placed in ER due to poor mobility from hip pain and polyuria, with severe candidal infection in perineum and labia and buttocks. Type 2 DM, uncontrolled with hyperosmolar hyperglycemia and early DKA POA  - presented with , AG 15, trace ketone in urine  - Required insulin gtt  - A1c 11.7 up from 7.6 in July. - continue holding metformin  - Continue Lantus 35 units. Continue SSI prn  - Blood sugars are better controlled  - 112, 209, 114, 126, 115    Prerenal MERCEDES Cr 1.5  -resolved with IVF hydration.       Constipation  -added pericolace and miralax    Severe candida infection in perineum, labia, buttocks  -continue mystatin cream ordered in ER. Status post Diflucan  -wound care consult appreciated     Generalized weakness and gait difficulty   -consult pt/ot     SVT   HTN / HLD  -continue metoprolol and statin  -having runs of SVT 11/07. Cardiology input appreciated     Hypothyroid  -continue levothyroxine. TSH 3.6     Depression and anxiety  -continue paxil with xanax prn     Severe obesity    Code:  Discussed, full  DVT prophylaxis: lovenox  Surrogate decision maker:   (but has some dementia per patient) or sister Kelley Chimera    40 or above Morbid obesity / Body mass index is 49.35 kg/m². Subjective:     Chief Complaint / Reason for Physician Visit  \"I am not sure I want to go to rehab\"  No complaints, scant blood in ileopsoas drain  Pain tolerable  Awaiting MRI  RN Noted to have pus coming from lower back old wound, on my eval only minimal blood drainage    review of Systems:  Symptom Y/N Comments  Symptom Y/N Comments   Fever/Chills n   Chest Pain n    Poor Appetite    Edema n    Cough n   Abdominal Pain n    Sputum    Joint Pain     SOB/AVALOS n   Pruritis/Rash     Nausea/vomit n   Tolerating PT/OT     Diarrhea n   Tolerating Diet y    Constipation    Other       Could NOT obtain due to:      PO intake: No data found. Objective:     VITALS:   Last 24hrs VS reviewed since prior progress note.  Most recent are:  Patient Vitals for the past 24 hrs:   Temp Pulse Resp BP SpO2   11/20/21 0830 97.9 °F (36.6 °C) 70 18 108/67 95 %   11/20/21 0217 97.6 °F (36.4 °C) 60 18 121/69 93 %   11/19/21 2255 98.1 °F (36.7 °C) 73 18 (!) 147/84 93 %   11/19/21 2027 97.4 °F (36.3 °C) 76 18 122/68 91 %   11/19/21 1514 97.8 °F (36.6 °C) 69 18 123/71 96 %   11/19/21 1138 98.2 °F (36.8 °C) 83 17 99/68 96 %       Intake/Output Summary (Last 24 hours) at 11/20/2021 0936  Last data filed at 11/20/2021 0531  Gross per 24 hour   Intake 300 ml   Output 2515 ml   Net -2215 ml        I had a face to face encounter, and independently examined this patient on 11/20/2021, as outlined below:  PHYSICAL EXAM:  General: WD, WN. Alert, cooperative, no acute distress    EENT:  EOMI. Anicteric sclerae. MMM  Resp:  CTA bilaterally, no wheezing or rales. No accessory muscle use  CV:  Regular  rhythm,  No edema  GI:  Soft, Non distended, + mild right flank tender. +Bowel sounds, drain is containing  purulent discharge  Neurologic:  Alert and oriented X 3, normal speech,   Psych:   Good insight. Not anxious nor agitated  Skin:  No rashes. No jaundice. Lower back wound, no pus     Reviewed most current lab test results and cultures  YES  Reviewed most current radiology test results   YES  Review and summation of old records today    NO  Reviewed patient's current orders and MAR    YES  PMH/SH reviewed - no change compared to H&P  ________________________________________________________________________  Care Plan discussed with:    Comments   Patient x    Family      RN x    Care Manager     Consultant                        Multidiciplinary team rounds were held today with , nursing, pharmacist and clinical coordinator. Patient's plan of care was discussed; medications were reviewed and discharge planning was addressed.      ________________________________________________________________________      Comments   >50% of visit spent in counseling and coordination of care x     This includes time during multidisciplinary rounds if indicated above   ________________________________________________________________________  Husam Zepeda MD     Procedures: see electronic medical records for all procedures/Xrays and details which were not copied into this note but were reviewed prior to creation of Plan. LABS:  I reviewed today's most current labs and imaging studies.   Pertinent labs include:  Recent Labs     11/20/21  0001 11/19/21  0144 11/18/21 0216   WBC 11.0 11.3* 12.9*   HGB 9.6* 9.5* 10.0*   HCT 31.4* 30.3* 32.7*   * 763* 757*     Recent Labs     11/20/21  0001 11/19/21  0144 11/18/21  0216    140 138   K 3.6 3.7 3.8    106 104   CO2 28 30 29   * 122* 155*   BUN 10 9 8   CREA 0.63 0.53* 0.55   CA 8.3* 8.9 8.6

## 2021-11-20 NOTE — PROGRESS NOTES
Problem: Diabetes Self-Management  Goal: *Incorporating nutritional management into lifestyle  Description: Describe effect of type, amount and timing of food on blood glucose; list 3 methods for planning meals. Outcome: Progressing Towards Goal  Goal: *Incorporating physical activity into lifestyle  Description: State effect of exercise on blood glucose levels. Outcome: Progressing Towards Goal  Goal: *Developing strategies to promote health/change behavior  Description: Define the ABC's of diabetes; identify appropriate screenings, schedule and personal plan for screenings. Outcome: Progressing Towards Goal  Goal: *Using medications safely  Description: State effect of diabetes medications on diabetes; name diabetes medication taking, action and side effects. Outcome: Progressing Towards Goal  Goal: *Monitoring blood glucose, interpreting and using results  Description: Identify recommended blood glucose targets  and personal targets. Outcome: Progressing Towards Goal  Goal: *Prevention, detection, treatment of acute complications  Description: List symptoms of hyper- and hypoglycemia; describe how to treat low blood sugar and actions for lowering  high blood glucose level. Outcome: Progressing Towards Goal  Goal: *Prevention, detection and treatment of chronic complications  Description: Define the natural course of diabetes and describe the relationship of blood glucose levels to long term complications of diabetes.   Outcome: Progressing Towards Goal  Goal: *Developing strategies to address psychosocial issues  Description: Describe feelings about living with diabetes; identify support needed and support network  Outcome: Progressing Towards Goal  Goal: *Insulin pump training  Outcome: Progressing Towards Goal  Goal: *Sick day guidelines  Outcome: Progressing Towards Goal  Goal: *Patient Specific Goal (EDIT GOAL, INSERT TEXT)  Outcome: Progressing Towards Goal

## 2021-11-20 NOTE — PROGRESS NOTES
1900--bedside report received from selena johnson pt resting n bed oriented x 4, heparin drip verified, call bell in reach assessment as noted    0000--ptt 64.6 NO CHANGE to heparin drip    0600--drain ouptut 15ml    0700--bedside report given to selena peres who is assuming care of pt

## 2021-11-21 LAB
ANION GAP SERPL CALC-SCNC: 3 MMOL/L (ref 5–15)
APTT PPP: 43.2 SEC (ref 22.1–31)
APTT PPP: 82.5 SEC (ref 22.1–31)
BUN SERPL-MCNC: 10 MG/DL (ref 6–20)
BUN/CREAT SERPL: 20 (ref 12–20)
CALCIUM SERPL-MCNC: 8.9 MG/DL (ref 8.5–10.1)
CHLORIDE SERPL-SCNC: 106 MMOL/L (ref 97–108)
CO2 SERPL-SCNC: 29 MMOL/L (ref 21–32)
CREAT SERPL-MCNC: 0.5 MG/DL (ref 0.55–1.02)
ERYTHROCYTE [DISTWIDTH] IN BLOOD BY AUTOMATED COUNT: 19.9 % (ref 11.5–14.5)
GLUCOSE BLD STRIP.AUTO-MCNC: 119 MG/DL (ref 65–117)
GLUCOSE BLD STRIP.AUTO-MCNC: 148 MG/DL (ref 65–117)
GLUCOSE BLD STRIP.AUTO-MCNC: 168 MG/DL (ref 65–117)
GLUCOSE BLD STRIP.AUTO-MCNC: 202 MG/DL (ref 65–117)
GLUCOSE SERPL-MCNC: 124 MG/DL (ref 65–100)
HCT VFR BLD AUTO: 33 % (ref 35–47)
HGB BLD-MCNC: 10.2 G/DL (ref 11.5–16)
MCH RBC QN AUTO: 24.9 PG (ref 26–34)
MCHC RBC AUTO-ENTMCNC: 30.9 G/DL (ref 30–36.5)
MCV RBC AUTO: 80.7 FL (ref 80–99)
NRBC # BLD: 0 K/UL (ref 0–0.01)
NRBC BLD-RTO: 0 PER 100 WBC
PLATELET # BLD AUTO: 660 K/UL (ref 150–400)
PMV BLD AUTO: 9.2 FL (ref 8.9–12.9)
POTASSIUM SERPL-SCNC: 3.7 MMOL/L (ref 3.5–5.1)
RBC # BLD AUTO: 4.09 M/UL (ref 3.8–5.2)
SERVICE CMNT-IMP: ABNORMAL
SODIUM SERPL-SCNC: 138 MMOL/L (ref 136–145)
THERAPEUTIC RANGE,PTTT: ABNORMAL SECS (ref 58–77)
THERAPEUTIC RANGE,PTTT: ABNORMAL SECS (ref 58–77)
WBC # BLD AUTO: 7.3 K/UL (ref 3.6–11)

## 2021-11-21 PROCEDURE — 65660000000 HC RM CCU STEPDOWN

## 2021-11-21 PROCEDURE — 74011250636 HC RX REV CODE- 250/636: Performed by: INTERNAL MEDICINE

## 2021-11-21 PROCEDURE — 36415 COLL VENOUS BLD VENIPUNCTURE: CPT

## 2021-11-21 PROCEDURE — 74011250637 HC RX REV CODE- 250/637: Performed by: INTERNAL MEDICINE

## 2021-11-21 PROCEDURE — 82962 GLUCOSE BLOOD TEST: CPT

## 2021-11-21 PROCEDURE — 94760 N-INVAS EAR/PLS OXIMETRY 1: CPT

## 2021-11-21 PROCEDURE — 85730 THROMBOPLASTIN TIME PARTIAL: CPT

## 2021-11-21 PROCEDURE — 80048 BASIC METABOLIC PNL TOTAL CA: CPT

## 2021-11-21 PROCEDURE — 74011636637 HC RX REV CODE- 636/637: Performed by: INTERNAL MEDICINE

## 2021-11-21 PROCEDURE — 85027 COMPLETE CBC AUTOMATED: CPT

## 2021-11-21 PROCEDURE — 74011250637 HC RX REV CODE- 250/637: Performed by: GENERAL ACUTE CARE HOSPITAL

## 2021-11-21 PROCEDURE — 74011250637 HC RX REV CODE- 250/637: Performed by: HOSPITALIST

## 2021-11-21 PROCEDURE — 74011000258 HC RX REV CODE- 258: Performed by: INTERNAL MEDICINE

## 2021-11-21 RX ORDER — LORAZEPAM 2 MG/ML
1 INJECTION INTRAMUSCULAR
Status: DISCONTINUED | OUTPATIENT
Start: 2021-11-21 | End: 2021-11-22

## 2021-11-21 RX ADMIN — ASPIRIN 81 MG: 81 TABLET, COATED ORAL at 09:59

## 2021-11-21 RX ADMIN — METOPROLOL TARTRATE 12.5 MG: 25 TABLET, FILM COATED ORAL at 21:15

## 2021-11-21 RX ADMIN — Medication 10 ML: at 21:14

## 2021-11-21 RX ADMIN — INSULIN LISPRO 2 UNITS: 100 INJECTION, SOLUTION INTRAVENOUS; SUBCUTANEOUS at 10:00

## 2021-11-21 RX ADMIN — METOPROLOL TARTRATE 25 MG: 25 TABLET, FILM COATED ORAL at 08:37

## 2021-11-21 RX ADMIN — INSULIN LISPRO 2 UNITS: 100 INJECTION, SOLUTION INTRAVENOUS; SUBCUTANEOUS at 21:15

## 2021-11-21 RX ADMIN — METRONIDAZOLE 500 MG: 250 TABLET ORAL at 09:59

## 2021-11-21 RX ADMIN — METRONIDAZOLE 500 MG: 250 TABLET ORAL at 21:15

## 2021-11-21 RX ADMIN — ATORVASTATIN CALCIUM 40 MG: 40 TABLET, FILM COATED ORAL at 21:15

## 2021-11-21 RX ADMIN — CEFEPIME HYDROCHLORIDE 2 G: 2 INJECTION, POWDER, FOR SOLUTION INTRAVENOUS at 08:37

## 2021-11-21 RX ADMIN — PAROXETINE HYDROCHLORIDE 40 MG: 20 TABLET, FILM COATED ORAL at 09:59

## 2021-11-21 RX ADMIN — DOCUSATE SODIUM AND SENNOSIDES 1 TABLET: 8.6; 5 TABLET, FILM COATED ORAL at 17:37

## 2021-11-21 RX ADMIN — INSULIN GLARGINE 35 UNITS: 100 INJECTION, SOLUTION SUBCUTANEOUS at 09:59

## 2021-11-21 RX ADMIN — Medication 1 CAPSULE: at 10:00

## 2021-11-21 RX ADMIN — Medication 10 ML: at 17:46

## 2021-11-21 RX ADMIN — PANTOPRAZOLE SODIUM 40 MG: 40 TABLET, DELAYED RELEASE ORAL at 08:37

## 2021-11-21 RX ADMIN — Medication 10 ML: at 03:37

## 2021-11-21 RX ADMIN — HEPARIN SODIUM 4910 UNITS: 1000 INJECTION INTRAVENOUS; SUBCUTANEOUS at 19:49

## 2021-11-21 RX ADMIN — DOCUSATE SODIUM AND SENNOSIDES 1 TABLET: 8.6; 5 TABLET, FILM COATED ORAL at 09:59

## 2021-11-21 RX ADMIN — CEFEPIME HYDROCHLORIDE 2 G: 2 INJECTION, POWDER, FOR SOLUTION INTRAVENOUS at 21:15

## 2021-11-21 RX ADMIN — ACETAMINOPHEN 500 MG: 500 TABLET ORAL at 13:20

## 2021-11-21 RX ADMIN — HEPARIN SODIUM 11 UNITS/KG/HR: 10000 INJECTION, SOLUTION INTRAVENOUS at 13:22

## 2021-11-21 RX ADMIN — INSULIN LISPRO 2 UNITS: 100 INJECTION, SOLUTION INTRAVENOUS; SUBCUTANEOUS at 13:21

## 2021-11-21 RX ADMIN — NYSTATIN OINTMENT: 100000 OINTMENT TOPICAL at 10:00

## 2021-11-21 RX ADMIN — AMITRIPTYLINE HYDROCHLORIDE 50 MG: 50 TABLET, FILM COATED ORAL at 21:15

## 2021-11-21 RX ADMIN — NYSTATIN OINTMENT: 100000 OINTMENT TOPICAL at 21:22

## 2021-11-21 RX ADMIN — ACETAMINOPHEN 500 MG: 500 TABLET ORAL at 17:37

## 2021-11-21 RX ADMIN — ACETAMINOPHEN 500 MG: 500 TABLET ORAL at 09:59

## 2021-11-21 RX ADMIN — LEVOTHYROXINE SODIUM 112 MCG: 0.11 TABLET ORAL at 08:37

## 2021-11-21 RX ADMIN — ACETAMINOPHEN 500 MG: 500 TABLET ORAL at 21:15

## 2021-11-21 RX ADMIN — NYSTATIN OINTMENT: 100000 OINTMENT TOPICAL at 17:38

## 2021-11-21 RX ADMIN — CEFEPIME HYDROCHLORIDE 2 G: 2 INJECTION, POWDER, FOR SOLUTION INTRAVENOUS at 13:20

## 2021-11-21 NOTE — PROGRESS NOTES
0730: Bedside shift change report given to Tyler Sanchez (oncoming nurse) by Lisa Calvo RN (offgoing nurse). Report included the following information SBAR and Kardex. 1842: Ptt d/w lab and PharmD. Increasing per protocol with bolus.

## 2021-11-21 NOTE — PROGRESS NOTES
Problem: Pressure Injury - Risk of  Goal: *Prevention of pressure injury  Description: Document Neal Scale and appropriate interventions in the flowsheet. Outcome: Progressing Towards Goal  Note: Pressure Injury Interventions:  Sensory Interventions: Keep linens dry and wrinkle-free, Minimize linen layers    Moisture Interventions: Absorbent underpads, Internal/External urinary devices    Activity Interventions: PT/OT evaluation    Mobility Interventions: HOB 30 degrees or less, Pressure redistribution bed/mattress (bed type), PT/OT evaluation, Turn and reposition approx.  every two hours(pillow and wedges)    Nutrition Interventions: Document food/fluid/supplement intake, Offer support with meals,snacks and hydration    Friction and Shear Interventions: HOB 30 degrees or less                Problem: Patient Education: Go to Patient Education Activity  Goal: Patient/Family Education  Outcome: Progressing Towards Goal

## 2021-11-21 NOTE — PROGRESS NOTES
Verbal shift change report given to Haley Baltazar (oncoming nurse) by Cleo Castillo RN (offgoing nurse). Report included the following information SBAR, Kardex, Intake/Output, MAR, Recent Results and Cardiac Rhythm . Pt has heparin gtt running at a dose of 13. Currently therapeutic, aptt recheck in the AM.  A new open abscess was noted on assessment of her midline back. It started draining bloody purulent drainage with turns but has recently changed to bloody discharge. Sent perfect-serve to Dr. Fredis Casper and he came to bedside. Pt still has the accordian drain to her RLQ - minimal output. Rea in place - draining appropriately.

## 2021-11-21 NOTE — PROGRESS NOTES
Hospitalist Progress Note    NAME: Coco De Santiago   :  1950   MRN:  [id number]       Assessment / Plan:  Septic shock POA rectal temp 96, , lactic 9.2 -->5. 3   Streptococcus anginosus epidural/right iliopsoas abscess POA  Right paraspinal pain at level L3-4 and R iliac crest pain (complains of Right hip pain), POA   -Patient was started on vancomycin, cefepime and Flagyl  - Followed by Dr Reji Urena and Infectious diseases  -Stop vancomycin  as recommended by ID  -Appreciated orthopedic consult, recommended IR placed drain  -Drain was placed  draining purulent discharge  -Iliopsoas drain continues to put out purulent discharge  -Posterior abscess/seroma drain/debridement plan per orthopedic  -Culture from drain fluid is growing Streptococcus anginosus  -Surgical management on hold for now  -Leukocytosis improving  - Orthopedics plans no surgery for now  - Check MRI per Dr Kelsey Farmer, will need conscious sedation       Otherwise obtain CT scan  -Continue antibiotics, recommended 8 to 12 weeks of antibiotics per ID (start date )  - already has midline in place  - ? Home vs SNF, [facility]        I recommended SNF,she is still weak        She is leaning towards going home,if so will need home IV antibiotics      Awaiting MRI  RN Noted to have pus coming from lower back old wound, on my eval only minimal blood drainage  Cont Heparin drip      MRI pending, RN setting it up.  Pt agreed on MRI with sedation  Cont ABx  F/u w CM re DC to home with IV Abx if possible     Celiac Artery Thrombosis with splenic Infarcts:  CT ABD/Pelvis  showed celiac artery thrombosis with splenic infarcts  CT abdomen pelvis today confirmed the celiac artery occlusion with splenic infarcts  Vascular consult appreciated  Continue heparin drip until definitive decision about surgical debridement      Change to NOAC if MRI with no clear abscesses needing drainage    UTI  -Ucx E coli  -sensitive to cefepime   -Rea placed in ER due to poor mobility from hip pain and polyuria, with severe candidal infection in perineum and labia and buttocks. Type 2 DM, uncontrolled with hyperosmolar hyperglycemia and early DKA POA  - presented with , AG 15, trace ketone in urine  - Required insulin gtt  - A1c 11.7 up from 7.6 in July. - continue holding metformin  - Continue Lantus 35 units. Continue SSI prn  - Blood sugars are better controlled  - 112, 209, 114, 126, 115    Prerenal MERCEDES Cr 1.5  -resolved with IVF hydration.       Constipation  -added pericolace and miralax    Severe candida infection in perineum, labia, buttocks  -continue mystatin cream ordered in ER. Status post Diflucan  -wound care consult appreciated     Generalized weakness and gait difficulty   -consult pt/ot     SVT   HTN / HLD  -continue metoprolol and statin  -having runs of SVT 11/07. Cardiology input appreciated     Hypothyroid  -continue levothyroxine. TSH 3.6     Depression and anxiety  -continue paxil with xanax prn     Severe obesity    Code:  Discussed, full  DVT prophylaxis: lovenox  Surrogate decision maker:   (but has some dementia per patient) or sister Alyson Leonel    40 or above Morbid obesity / Body mass index is 44.53 kg/m². Subjective:     Chief Complaint / Reason for Physician Visit  Afebrile   No SOB  Pain tolerable  Awaiting MRI    review of Systems:  Symptom Y/N Comments  Symptom Y/N Comments   Fever/Chills n   Chest Pain n    Poor Appetite    Edema n    Cough n   Abdominal Pain n    Sputum    Joint Pain     SOB/AVALOS n   Pruritis/Rash     Nausea/vomit n   Tolerating PT/OT     Diarrhea n   Tolerating Diet y    Constipation    Other       Could NOT obtain due to:      PO intake: No data found. Objective:     VITALS:   Last 24hrs VS reviewed since prior progress note.  Most recent are:  Patient Vitals for the past 24 hrs:   Temp Pulse Resp BP SpO2   11/21/21 0805 97.7 °F (36.5 °C) 67 18 110/60 95 %   11/21/21 0233 97.8 °F (36.6 °C) 64 18 114/60 92 %   11/20/21 2256 98.1 °F (36.7 °C) 69 18 (!) 134/90 95 %   11/20/21 1635  71 18 (!) 136/59 93 %   11/20/21 1227 99.1 °F (37.3 °C) 78 18 136/82 96 %       Intake/Output Summary (Last 24 hours) at 11/21/2021 0931  Last data filed at 11/21/2021 0775  Gross per 24 hour   Intake 1173.69 ml   Output 1000 ml   Net 173.69 ml        I had a face to face encounter, and independently examined this patient on 11/21/2021, as outlined below:  PHYSICAL EXAM:  General: WD, WN. Alert, cooperative, no acute distress    EENT:  EOMI. Anicteric sclerae. MMM  Resp:  CTA bilaterally, no wheezing or rales. No accessory muscle use  CV:  Regular  rhythm,  No edema  GI:  Soft, Non distended, + mild right flank tender. +Bowel sounds, drain is containing  purulent discharge  Neurologic:  Alert and oriented X 3, normal speech,   Psych:   Good insight. Not anxious nor agitated  Skin:  No rashes. No jaundice. Small Lower back wound, no pus, oozing little blood     Reviewed most current lab test results and cultures  YES  Reviewed most current radiology test results   YES  Review and summation of old records today    NO  Reviewed patient's current orders and MAR    YES  PMH/ reviewed - no change compared to H&P  ________________________________________________________________________  Care Plan discussed with:    Comments   Patient x    Family      RN x    Care Manager     Consultant                        Multidiciplinary team rounds were held today with , nursing, pharmacist and clinical coordinator. Patient's plan of care was discussed; medications were reviewed and discharge planning was addressed.      ________________________________________________________________________      Comments   >50% of visit spent in counseling and coordination of care x     This includes time during multidisciplinary rounds if indicated above ________________________________________________________________________  Jennifer Tamayo MD     Procedures: see electronic medical records for all procedures/Xrays and details which were not copied into this note but were reviewed prior to creation of Plan. LABS:  I reviewed today's most current labs and imaging studies.   Pertinent labs include:  Recent Labs     11/21/21  0554 11/20/21  0001 11/19/21  0144   WBC 7.3 11.0 11.3*   HGB 10.2* 9.6* 9.5*   HCT 33.0* 31.4* 30.3*   * 794* 763*     Recent Labs     11/21/21  0554 11/20/21  0001 11/19/21  0144    137 140   K 3.7 3.6 3.7    103 106   CO2 29 28 30   * 166* 122*   BUN 10 10 9   CREA 0.50* 0.63 0.53*   CA 8.9 8.3* 8.9

## 2021-11-22 ENCOUNTER — APPOINTMENT (OUTPATIENT)
Dept: MRI IMAGING | Age: 71
DRG: 853 | End: 2021-11-22
Attending: INTERNAL MEDICINE
Payer: MEDICARE

## 2021-11-22 LAB
ANION GAP SERPL CALC-SCNC: 6 MMOL/L (ref 5–15)
APTT PPP: 67 SEC (ref 22.1–31)
APTT PPP: 73.1 SEC (ref 22.1–31)
BUN SERPL-MCNC: 9 MG/DL (ref 6–20)
BUN/CREAT SERPL: 18 (ref 12–20)
CALCIUM SERPL-MCNC: 8.9 MG/DL (ref 8.5–10.1)
CHLORIDE SERPL-SCNC: 107 MMOL/L (ref 97–108)
CO2 SERPL-SCNC: 27 MMOL/L (ref 21–32)
CREAT SERPL-MCNC: 0.51 MG/DL (ref 0.55–1.02)
ERYTHROCYTE [DISTWIDTH] IN BLOOD BY AUTOMATED COUNT: 19.7 % (ref 11.5–14.5)
GLUCOSE BLD STRIP.AUTO-MCNC: 107 MG/DL (ref 65–117)
GLUCOSE BLD STRIP.AUTO-MCNC: 108 MG/DL (ref 65–117)
GLUCOSE BLD STRIP.AUTO-MCNC: 123 MG/DL (ref 65–117)
GLUCOSE BLD STRIP.AUTO-MCNC: 89 MG/DL (ref 65–117)
GLUCOSE SERPL-MCNC: 134 MG/DL (ref 65–100)
HCT VFR BLD AUTO: 31.3 % (ref 35–47)
HGB BLD-MCNC: 9.6 G/DL (ref 11.5–16)
MCH RBC QN AUTO: 24.8 PG (ref 26–34)
MCHC RBC AUTO-ENTMCNC: 30.7 G/DL (ref 30–36.5)
MCV RBC AUTO: 80.9 FL (ref 80–99)
NRBC # BLD: 0 K/UL (ref 0–0.01)
NRBC BLD-RTO: 0 PER 100 WBC
PLATELET # BLD AUTO: 718 K/UL (ref 150–400)
PMV BLD AUTO: 9.5 FL (ref 8.9–12.9)
POTASSIUM SERPL-SCNC: 3.7 MMOL/L (ref 3.5–5.1)
RBC # BLD AUTO: 3.87 M/UL (ref 3.8–5.2)
SERVICE CMNT-IMP: ABNORMAL
SERVICE CMNT-IMP: NORMAL
SODIUM SERPL-SCNC: 140 MMOL/L (ref 136–145)
THERAPEUTIC RANGE,PTTT: ABNORMAL SECS (ref 58–77)
THERAPEUTIC RANGE,PTTT: ABNORMAL SECS (ref 58–77)
WBC # BLD AUTO: 9.9 K/UL (ref 3.6–11)

## 2021-11-22 PROCEDURE — 85730 THROMBOPLASTIN TIME PARTIAL: CPT

## 2021-11-22 PROCEDURE — 74011250636 HC RX REV CODE- 250/636: Performed by: INTERNAL MEDICINE

## 2021-11-22 PROCEDURE — 85027 COMPLETE CBC AUTOMATED: CPT

## 2021-11-22 PROCEDURE — 97116 GAIT TRAINING THERAPY: CPT

## 2021-11-22 PROCEDURE — 99233 SBSQ HOSP IP/OBS HIGH 50: CPT | Performed by: STUDENT IN AN ORGANIZED HEALTH CARE EDUCATION/TRAINING PROGRAM

## 2021-11-22 PROCEDURE — 97530 THERAPEUTIC ACTIVITIES: CPT

## 2021-11-22 PROCEDURE — 36415 COLL VENOUS BLD VENIPUNCTURE: CPT

## 2021-11-22 PROCEDURE — 80048 BASIC METABOLIC PNL TOTAL CA: CPT

## 2021-11-22 PROCEDURE — A9576 INJ PROHANCE MULTIPACK: HCPCS | Performed by: GENERAL ACUTE CARE HOSPITAL

## 2021-11-22 PROCEDURE — 74011636637 HC RX REV CODE- 636/637: Performed by: INTERNAL MEDICINE

## 2021-11-22 PROCEDURE — 74011250637 HC RX REV CODE- 250/637: Performed by: NURSE PRACTITIONER

## 2021-11-22 PROCEDURE — 74011250637 HC RX REV CODE- 250/637: Performed by: GENERAL ACUTE CARE HOSPITAL

## 2021-11-22 PROCEDURE — 97535 SELF CARE MNGMENT TRAINING: CPT

## 2021-11-22 PROCEDURE — 82962 GLUCOSE BLOOD TEST: CPT

## 2021-11-22 PROCEDURE — 74011250636 HC RX REV CODE- 250/636: Performed by: GENERAL ACUTE CARE HOSPITAL

## 2021-11-22 PROCEDURE — 72158 MRI LUMBAR SPINE W/O & W/DYE: CPT

## 2021-11-22 PROCEDURE — 2709999900 HC NON-CHARGEABLE SUPPLY

## 2021-11-22 PROCEDURE — 74011250637 HC RX REV CODE- 250/637: Performed by: HOSPITALIST

## 2021-11-22 PROCEDURE — 94760 N-INVAS EAR/PLS OXIMETRY 1: CPT

## 2021-11-22 PROCEDURE — 74011000258 HC RX REV CODE- 258: Performed by: INTERNAL MEDICINE

## 2021-11-22 PROCEDURE — 74011250637 HC RX REV CODE- 250/637: Performed by: INTERNAL MEDICINE

## 2021-11-22 PROCEDURE — 65660000000 HC RM CCU STEPDOWN

## 2021-11-22 RX ORDER — LORAZEPAM 2 MG/ML
1 INJECTION INTRAMUSCULAR
Status: COMPLETED | OUTPATIENT
Start: 2021-11-22 | End: 2021-11-22

## 2021-11-22 RX ORDER — LORAZEPAM 2 MG/ML
1 INJECTION INTRAMUSCULAR
Status: ACTIVE | OUTPATIENT
Start: 2021-11-22 | End: 2021-11-23

## 2021-11-22 RX ORDER — LANOLIN ALCOHOL/MO/W.PET/CERES
6 CREAM (GRAM) TOPICAL
Status: DISCONTINUED | OUTPATIENT
Start: 2021-11-22 | End: 2021-12-10 | Stop reason: HOSPADM

## 2021-11-22 RX ADMIN — ACETAMINOPHEN 500 MG: 500 TABLET ORAL at 17:37

## 2021-11-22 RX ADMIN — CEFEPIME HYDROCHLORIDE 2 G: 2 INJECTION, POWDER, FOR SOLUTION INTRAVENOUS at 15:39

## 2021-11-22 RX ADMIN — ATORVASTATIN CALCIUM 40 MG: 40 TABLET, FILM COATED ORAL at 21:16

## 2021-11-22 RX ADMIN — ASPIRIN 81 MG: 81 TABLET, COATED ORAL at 09:11

## 2021-11-22 RX ADMIN — CEFEPIME HYDROCHLORIDE 2 G: 2 INJECTION, POWDER, FOR SOLUTION INTRAVENOUS at 21:16

## 2021-11-22 RX ADMIN — MELATONIN 6 MG: at 21:16

## 2021-11-22 RX ADMIN — Medication 10 ML: at 21:20

## 2021-11-22 RX ADMIN — GADOTERIDOL 20 ML: 279.3 INJECTION, SOLUTION INTRAVENOUS at 20:03

## 2021-11-22 RX ADMIN — NYSTATIN OINTMENT: 100000 OINTMENT TOPICAL at 15:20

## 2021-11-22 RX ADMIN — PANTOPRAZOLE SODIUM 40 MG: 40 TABLET, DELAYED RELEASE ORAL at 09:10

## 2021-11-22 RX ADMIN — LORAZEPAM 1 MG: 2 INJECTION INTRAMUSCULAR; INTRAVENOUS at 18:53

## 2021-11-22 RX ADMIN — DOCUSATE SODIUM AND SENNOSIDES 1 TABLET: 8.6; 5 TABLET, FILM COATED ORAL at 09:09

## 2021-11-22 RX ADMIN — INSULIN GLARGINE 35 UNITS: 100 INJECTION, SOLUTION SUBCUTANEOUS at 09:00

## 2021-11-22 RX ADMIN — METOPROLOL TARTRATE 12.5 MG: 25 TABLET, FILM COATED ORAL at 21:17

## 2021-11-22 RX ADMIN — METOPROLOL TARTRATE 25 MG: 25 TABLET, FILM COATED ORAL at 09:11

## 2021-11-22 RX ADMIN — Medication 10 ML: at 15:27

## 2021-11-22 RX ADMIN — Medication 10 ML: at 06:15

## 2021-11-22 RX ADMIN — ACETAMINOPHEN 500 MG: 500 TABLET ORAL at 09:09

## 2021-11-22 RX ADMIN — ACETAMINOPHEN 500 MG: 500 TABLET ORAL at 21:17

## 2021-11-22 RX ADMIN — PAROXETINE HYDROCHLORIDE 40 MG: 20 TABLET, FILM COATED ORAL at 09:10

## 2021-11-22 RX ADMIN — MELATONIN 6 MG: at 03:48

## 2021-11-22 RX ADMIN — LEVOTHYROXINE SODIUM 112 MCG: 0.11 TABLET ORAL at 09:11

## 2021-11-22 RX ADMIN — OXYCODONE 5 MG: 5 TABLET ORAL at 19:03

## 2021-11-22 RX ADMIN — ACETAMINOPHEN 500 MG: 500 TABLET ORAL at 13:00

## 2021-11-22 RX ADMIN — Medication 1 CAPSULE: at 09:09

## 2021-11-22 RX ADMIN — CEFEPIME HYDROCHLORIDE 2 G: 2 INJECTION, POWDER, FOR SOLUTION INTRAVENOUS at 06:15

## 2021-11-22 RX ADMIN — NYSTATIN OINTMENT: 100000 OINTMENT TOPICAL at 21:20

## 2021-11-22 RX ADMIN — AMITRIPTYLINE HYDROCHLORIDE 50 MG: 50 TABLET, FILM COATED ORAL at 21:17

## 2021-11-22 RX ADMIN — METRONIDAZOLE 500 MG: 250 TABLET ORAL at 21:16

## 2021-11-22 RX ADMIN — METRONIDAZOLE 500 MG: 250 TABLET ORAL at 09:10

## 2021-11-22 RX ADMIN — HEPARIN SODIUM 13 UNITS/KG/HR: 10000 INJECTION, SOLUTION INTRAVENOUS at 07:06

## 2021-11-22 RX ADMIN — NYSTATIN OINTMENT: 100000 OINTMENT TOPICAL at 15:42

## 2021-11-22 NOTE — PROGRESS NOTES
Problem: Mobility Impaired (Adult and Pediatric)  Goal: *Acute Goals and Plan of Care (Insert Text)  Description: FUNCTIONAL STATUS PRIOR TO ADMISSION: Ambulates household distances with RW support vs uses RW, stating she mostly has been relying on WC recently. History of MULTIPLE falls. Caregiver for  with dementia. HOME SUPPORT PRIOR TO ADMISSION: The patient lived with  however states he has dementia. Physical Therapy Goals  Initiated 11/10/2021  1. Patient will move from supine to sit and sit to supine , scoot up and down, and roll side to side in bed with supervision/set-up within 7 day(s). 2.  Patient will transfer from bed to chair and chair to bed with minimal assistance/contact guard assist using the least restrictive device within 7 day(s). 3.  Patient will perform sit to stand with minimal assistance/contact guard assist within 7 day(s). 4.  Patient will ambulate with minimal assistance/contact guard assist for 10 feet with the least restrictive device within 7 day(s). Re-Assessment 11/17/21 Re-Assessment, goals achieved and upgraded    1. Patient will move from supine to sit and sit to supine , scoot up and down, and roll side to side in bed with mod indep within 7 day(s). 2.  Patient will transfer from bed to chair and chair to bed with supervision   using the least restrictive device within 7 day(s). 3.  Patient will perform sit to stand with supervision within 7 day(s). 4.  Patient will ambulate with supervision  for 50 feet with the least restrictive device within 7 day(s).      Outcome: Progressing Towards Goal   PHYSICAL THERAPY TREATMENT  Patient: Johny Pelaez (1 y.o. female)  Date: 11/22/2021  Diagnosis: Hyperglycemia [R73.9]  MERCEDES (acute kidney injury) ([facility] [address] [N17.9]  Leukocytosis [D72.829]  SIRS (systemic inflammatory response syndrome) ([facility]) [R65.10]  Candida vaginitis [B37.3]   <principal problem not specified>       Precautions: Fall, Back, WBAT  Chart, physical therapy assessment, plan of care and goals were reviewed. ASSESSMENT  Patient continues with skilled PT services and is progressing towards goals. She demonstrates the ability to transition supine to sit with HOB elevated. Patient demonstrates good sitting balance. She is able to ambulate to and from the bathroom with RW and assistance for line management. Patient demonstrates good standing and sitting balance. She continues to report pain and discomfort with mobility and lacks the endurance for distance greater than 15-20 ft without a rest break. Current Level of Function Impacting Discharge (mobility/balance): CGA- Min A     Other factors to consider for discharge: medical stability, decreased endurance, decreased strength, increased need for assistance, caregiver for her spouse         PLAN :  Patient continues to benefit from skilled intervention to address the above impairments. Continue treatment per established plan of care. to address goals. Recommendation for discharge: (in order for the patient to meet his/her long term goals)  Therapy up to 5 days/week in SNF setting    This discharge recommendation:  Has been made in collaboration with the attending provider and/or case management    IF patient discharges home will need the following DME: to be determined (TBD)       SUBJECTIVE:   Patient stated \"I had a rough weekend and I'm still waiting for the MRI.     OBJECTIVE DATA SUMMARY:   Critical Behavior:  Neurologic State: Alert  Orientation Level: Oriented X4  Cognition: Follows commands  Safety/Judgement: Awareness of environment, Fall prevention  Functional Mobility Training:  Bed Mobility:     Supine to Sit: Stand-by assistance  Sit to Supine: Stand-by assistance           Transfers:  Sit to Stand: Stand-by assistance  Stand to Sit: Stand-by assistance                             Balance:  Sitting: Intact  Sitting - Static: Good (unsupported)  Sitting - Dynamic: Good (unsupported)  Standing - Static: Constant support; Good  Standing - Dynamic : Constant support; Good  Ambulation/Gait Training:  Distance (ft): 20 Feet (ft)  Assistive Device: Gait belt; Walker, rolling  Ambulation - Level of Assistance: Contact guard assistance        Gait Abnormalities: Decreased step clearance        Base of Support: Widened     Speed/Catherine: Pace decreased (<100 feet/min)  Step Length: Left shortened; Right shortened                    Stairs: Therapeutic Exercises:     Pain Rating:      Activity Tolerance:   Fair    After treatment patient left in no apparent distress:   Supine in bed, Call bell within reach, and Side rails x 3    COMMUNICATION/COLLABORATION:   The patients plan of care was discussed with: Occupational therapist and Registered nurse.      Tory Lawler, PT   Time Calculation: 23 mins

## 2021-11-22 NOTE — PROGRESS NOTES
Dr. Meme Oritz asked if we could get MRI to do the patients MRI today because that is what was holding up discharge. Patient said that she was willing to try the Ativan IV prior to the MRI for her panic attacks. Messaged Dr. Meme Ortiz if I could extend the order time so that we could guarantee that the medication was available prior to the MRI. 12- Dr. Meme Ortiz said that I was allowed to renew the order and that the patient could get another 1 mg IV ativan if the first was unsuccessful for MRI. I added in the PRN comments that the second dose may only be given if the first is insufficient. For MRI use only.

## 2021-11-22 NOTE — PROGRESS NOTES
Hospitalist Progress Note    NAME: Lavelle Calderon   :  1950   MRN:  [id number]       Assessment / Plan:  Septic shock POA rectal temp 96, , lactic 9.2 -->5. 3   Streptococcus anginosus epidural/right iliopsoas abscess POA  Right paraspinal pain at level L3-4 and R iliac crest pain (complains of Right hip pain), POA   -Patient was started on vancomycin, cefepime and Flagyl  - Followed by Dr Fernanda Street and Infectious diseases, needs close follow-up as outpatient  -Stop vancomycin  as recommended by ID  -Appreciated orthopedic consult, recommended IR placed drain. Orthopedics plans no surgery for now. Surgical management on hold for now  -Drain was placed  draining purulent discharge  -Iliopsoas drain continues to put out purulent discharge  -Posterior abscess/seroma drain/debridement plan per orthopedic  -Culture from drain fluid is growing Streptococcus anginosus  -Continue antibiotics, recommended 8 to 12 weeks of antibiotics per ID (start date ). Already has midline in place  - ? Home vs Inpt Rehab  - Check MRI per Dr Clive Allen, otherwise obtain CT scan    Celiac Artery Thrombosis with splenic Infarcts:  CT ABD/Pelvis  showed celiac artery thrombosis with splenic infarcts  CT abdomen pelvis today confirmed the celiac artery occlusion with splenic infarcts  Vascular consult appreciated  Continue heparin drip until definitive decision about surgical debridement      Change to NOAC if MRI with no clear abscesses needing drainage    UTI  -Ucx E coli  -sensitive to cefepime   -Rea placed in ER due to poor mobility from hip pain and polyuria, with severe candidal infection in perineum and labia and buttocks.     We will consider removing Rea catheter prior to discharge    Type 2 DM, uncontrolled with hyperosmolar hyperglycemia and early DKA POA  - presented with , AG 15, trace ketone in urine  - Required insulin gtt  - A1c 11.7 up from 7.6 in July. - continue holding metformin  - Continue Lantus 35 units. Continue SSI prn  - Blood sugars are better controlled    Prerenal MERCEDES Cr 1.5  -resolved with IVF hydration.       Constipation  -added pericolace and miralax    Severe candida infection in perineum, labia, buttocks  -continue mystatin cream ordered in ER. Status post Diflucan  -wound care consult appreciated     Generalized weakness and gait difficulty   -consult pt/ot     SVT   HTN / HLD  -continue metoprolol and statin  -having runs of SVT 11/07. Cardiology input appreciated     Hypothyroid  -continue levothyroxine. TSH 3.6     Depression and anxiety  -continue paxil with xanax prn     Severe obesity    Code:  Discussed, full  DVT prophylaxis: lovenox  Surrogate decision maker:   (but has some dementia per patient) or sister Mal Iron    40 or above Morbid obesity / Body mass index is 44.53 kg/m². Subjective:     Chief Complaint / Reason for Physician Visit  Afebrile   No SOB  Pain tolerable  Awaiting MRI    review of Systems:  Symptom Y/N Comments  Symptom Y/N Comments   Fever/Chills n   Chest Pain n    Poor Appetite    Edema n    Cough n   Abdominal Pain n    Sputum    Joint Pain     SOB/AVALOS n   Pruritis/Rash     Nausea/vomit n   Tolerating PT/OT     Diarrhea n   Tolerating Diet y    Constipation    Other       Could NOT obtain due to:      PO intake: No data found. Objective:     VITALS:   Last 24hrs VS reviewed since prior progress note.  Most recent are:  Patient Vitals for the past 24 hrs:   Temp Pulse Resp BP SpO2   11/22/21 1134 98.2 °F (36.8 °C) 67 18 134/75 96 %   11/22/21 0721 97.7 °F (36.5 °C) 66 18 133/72 96 %   11/21/21 2109 98.2 °F (36.8 °C) 72 18 (!) 147/88        Intake/Output Summary (Last 24 hours) at 11/22/2021 1517  Last data filed at 11/22/2021 4650  Gross per 24 hour   Intake    Output 3750 ml   Net -3750 ml        I had a face to face encounter, and independently examined this patient on 11/22/2021, as outlined below:  PHYSICAL EXAM:  General: WD, WN. Alert, cooperative, no acute distress    EENT:  EOMI. Anicteric sclerae. MMM  Resp:  CTA bilaterally, no wheezing or rales. No accessory muscle use  CV:  Regular  rhythm,  No edema  GI:  Soft, Non distended, + mild right flank tender. +Bowel sounds, drain is containing  purulent discharge  Neurologic:  Alert and oriented X 3, normal speech,   Psych:   Good insight. Not anxious nor agitated  Skin:  No rashes. No jaundice. Small Lower back wound, no pus, oozing little blood     Reviewed most current lab test results and cultures  YES  Reviewed most current radiology test results   YES  Review and summation of old records today    NO  Reviewed patient's current orders and MAR    YES  PMH/SH reviewed - no change compared to H&P  ________________________________________________________________________  Care Plan discussed with:    Comments   Patient x    Family      RN x    Care Manager x    Consultant                       x Multidiciplinary team rounds were held today with , nursing, pharmacist and clinical coordinator. Patient's plan of care was discussed; medications were reviewed and discharge planning was addressed. ________________________________________________________________________    Total NON critical care TIME:  30   Minutes     Total CRITICAL CARE TIME Spent:   Minutes non procedure based      Comments   >50% of visit spent in counseling and coordination of care x     This includes time during multidisciplinary rounds if indicated above   ________________________________________________________________________  Elicia Denis MD     Procedures: see electronic medical records for all procedures/Xrays and details which were not copied into this note but were reviewed prior to creation of Plan. LABS:  I reviewed today's most current labs and imaging studies.   Pertinent labs include:  Recent Labs     11/22/21  9622 11/21/21  0554 11/20/21  0001   WBC 9.9 7.3 11.0   HGB 9.6* 10.2* 9.6*   HCT 31.3* 33.0* 31.4*   * 660* 794*     Recent Labs     11/22/21  0614 11/21/21  0554 11/20/21  0001    138 137   K 3.7 3.7 3.6    106 103   CO2 27 29 28   * 124* 166*   BUN 9 10 10   CREA 0.51* 0.50* 0.63   CA 8.9 8.9 8.3*

## 2021-11-22 NOTE — PROGRESS NOTES
Problem: Self Care Deficits Care Plan (Adult)  Goal: *Acute Goals and Plan of Care (Insert Text)  Description: FUNCTIONAL STATUS PRIOR TO ADMISSION: June 2, 2021 with back surgery: prior to surgery very poor sitting and standing tolerance due to P! Went to [facility], discharged mod I June 23, 2021, with DME and AE. Fell July 8, 2021 (bumped over [facility] over toilet with RW use.) Returned to [facility], with increased fear of falling. Discharge home Mod I at w/c, RW level. Patient was modified independent for basic and instrumental ADLs at w/c and/or RW level except spouse assisted with BM hygiene due to difficulty level. (cares for spouse with dementia who is mod I with self care at w/c and walker level)     Admitted to acute care post fall trying to get out of bed without device 11-06-21. Found to have Post op wound Abscess   Drain placed R flank 11-12;    6-10/10 P! During this re-eval.    Anxiety/panic attack significantly limiting with new distraction/focus on fear of falling. HOME SUPPORT: The patient lived with spouse, but cares for him due to dementia. Has supportive son that lives nearby    Occupational Therapy Goals  Weekly Reassessment 11/22/2021 - Continue all. 1.  Patient will perform grooming with mod I within 7 days. 2.  Patient will perform UB bathing with moderate assistance using most appropriate DME within 7 days. 3.  Patient will lower body dressing at moderate assistance with A/E within 7 days. 4.  Patient will perform bed mobility at supervision/set-up  within 7 days. 5.  Patient will verbalize/demonstrate 3/3 back precautions during ADL tasks without cues within 7 days. - Continues to demonstrate. 6.  Patient will verbalize at least 2 strategies for stress management/panic attack management during every session in 7 days  7. Patient will complete toilet transfer with CGA-S in 7 days. Initiated 11/8/2021 Goals reviewed and updated 11-15-21  1.   Patient will perform grooming with supervision/set-up within 7 days. Met upgrade to mod I in 7 days  2. Patient will perform UB bathing with moderate assistance  using most appropriate DME within 7 days. Cont for consistency 7 days  3. Patient will lower body dressing at moderate assistance with A/E within 7 days. Cont for consistency for 7 days  4. Patient will perform bed mobility at moderate assistance  within 7 days. Met upgrade to S in 7 days  5. Patient will verbalize/demonstrate 3/3 back precautions during ADL tasks without cues within 7 days. Met  6. Patient will verbalize at least 2 strategies for stress management/panic attack management during every session in 7 days  7. Patient will complete toilet transfer with CGA-S in 7 days    Outcome: Progressing Towards Goal   OCCUPATIONAL THERAPY TREATMENT/WEEKLY RE-ASSESSMENT  Patient: Yoselin Shaw (72 y.o. female)  Date: 11/22/2021  Diagnosis: Hyperglycemia [R73.9]  MERCEDES (acute kidney injury) ([facility] [address] [N17.9]  Leukocytosis [D72.829]  SIRS (systemic inflammatory response syndrome) ([facility]) [R65.10]  Candida vaginitis [B37.3]   <principal problem not specified>       Precautions: Fall, Back, WBAT  Chart, occupational therapy assessment, plan of care, and goals were reviewed. ASSESSMENT  Patient continues with skilled OT services and is progressing towards goals. Patient received resting in bed, agreeable to session with encouragement and education on benefits of increased activity / mobility. Patient reports only toileting at [facility] thus far, has not yet ambulated to bathroom. This session, patient performing bed mobility with SBA within reasonable timeframe (significantly reduced since last session with this author), and standing from EOB with SBA. Although patient fatigues quickly and is limited by back pain, activity tolerance has improved to the point that she can safely ambulate to / from the bathroom for all toileting with RW and staff assistance.  Educated patient on need to progress to toileting in bathroom to facilitate optimal recovery with patient verbalizing understanding. Note patient with significant anxiety, benefiting from reassurance and redirection to task. Anticipate patient will benefit from encouragement for bathroom toileting. At this time, patient continues to progress towards all goals. Recommend rehab at discharge as patient still requiring increased assistance for personal self-care and needs to act as 's caregiver at baseline. Current Level of Function Impacting Discharge (ADLs): SBA for functional mobility    Other factors to consider for discharge:          PLAN :  Goals have been updated based on progression since last assessment. Patient continues to benefit from skilled intervention to address the above impairments. Continue to follow patient 4 times a week to address goals. Recommend with staff: All toileting in bathroom with RW and staff assist; encourage activity! Recommend next OT session: bathroom ADLs    Recommendation for discharge: (in order for the patient to meet his/her long term goals)  Therapy up to 5 days/week in SNF setting    This discharge recommendation:  Has been made in collaboration with the attending provider and/or case management    IF patient discharges home will need the following DME: TBD       SUBJECTIVE:   Patient stated I haven't been in there yet.     OBJECTIVE DATA SUMMARY:   Cognitive/Behavioral Status:  Neurologic State: Alert  Orientation Level: Oriented X4  Cognition: Follows commands  Perception: Appears intact  Perseveration: Perseverates during conversation  Safety/Judgement: Awareness of environment;  Fall prevention    Functional Mobility and Transfers for ADLs:  Bed Mobility:  Supine to Sit: Stand-by assistance  Sit to Supine: Stand-by assistance    Transfers:  Sit to Stand: Stand-by assistance  Functional Transfers  Bathroom Mobility: Contact guard assistance  Cues: Verbal cues provided Balance:  Sitting: Intact  Sitting - Static: Good (unsupported)  Sitting - Dynamic: Good (unsupported)  Standing - Static: Constant support; Good  Standing - Dynamic : Constant support; Good    ADL Intervention:  Lower Body Bathing  Lower Body : Set-up; Stand-by assistance (anterior thighs in seated EOB)    Lower Body Dressing Assistance  Socks: Total assistance (dependent) (uses sock aid at recent baseline)    Cognitive Retraining  Safety/Judgement: Awareness of environment; Fall prevention    Pain:  Patient tolerating session well. Activity Tolerance:   Fair    After treatment patient left in no apparent distress:   Supine in bed, Call bell within reach, Side rails x 3, and awaiting transport for MRI    COMMUNICATION/COLLABORATION:   The patients plan of care was discussed with: Physical therapist and Registered nurse.      Jeannette Koyanagi, OT  Time Calculation: 23 mins

## 2021-11-22 NOTE — PROGRESS NOTES
Comprehensive Nutrition Assessment    Type and Reason for Visit: Reassess    Nutrition Recommendations/Plan:  Continue current diet  Continue ensure high protein TID    Nutrition Assessment:     Chart reviewed; patient continues on a CCD with ensure high protein TID for supplementation. Therapy entering patient's room at time of attempted visit. Good appetite documented per flowsheets but last PO documentation is 1-25%. Continue ONS TID. NPO tomorrow for MRI with conscious sedation. Patient Vitals for the past 168 hrs:   % Diet Eaten   11/20/21 0830 1 - 25%     Estimated Daily Nutrient Needs:  Energy (kcal): 2010 kcal (BMR 1675 x 1. 2AF); Weight Used for Energy Requirements: Current  Protein (g): 94g (0.8 g/kg bw); Weight Used for Protein Requirements: Current  Fluid (ml/day): 2000 mL; Method Used for Fluid Requirements: 1 ml/kcal    Nutrition Related Findings:   BG -[phone]  2+ edema BLE  BM 11/21  Atorvastatin, Lantus, Humalog, Risaquad, Synthroid, Lopressor, Protonix, Paxil, Miralax, pericolace        Wounds:    Surgical incision       Current Nutrition Therapies:  ADULT DIET Regular; 4 carb choices (60 gm/meal)  ADULT ORAL NUTRITION SUPPLEMENT Dinner, Breakfast, Lunch;  Low Calorie/High Protein  DIET NPO    Anthropometric Measures:  · Height:  5' 4\" (162.6 cm)  · Current Body Wt:  117.7 kg (259 lb 7.7 oz)   · BMI Category:  Obese class 3 (BMI 40.0 or greater)       Nutrition Diagnosis:   · Inadequate protein-energy intake related to  (decreased appetite) as evidenced by intake 26-50%    Nutrition Interventions:   Food and/or Nutrient Delivery: Continue current diet, Continue oral nutrition supplement  Nutrition Education and Counseling: No recommendations at this time  Coordination of Nutrition Care: Continue to monitor while inpatient    Goals:  PO intake >50% of meals and 70% of ONS next 4-6 days       Nutrition Monitoring and Evaluation:   Behavioral-Environmental Outcomes: None identified  Food/Nutrient Intake Outcomes: Food and nutrient intake, Supplement intake  Physical Signs/Symptoms Outcomes: Biochemical data, Weight    Discharge Planning:    Continue oral nutrition supplement, Continue current diet     Electronically signed by Shirley Ngo RD on 11/22/2021 at 2:24 PM    Contact: ext [phone]

## 2021-11-22 NOTE — PROGRESS NOTES
[phone] Hospital or Facility: [facility] From: Jese Nava RE: Anu Krueger 1950 RM: [id number] pt c/o not being able to get to sleep. Could you place an order for something to help with this. No complaints of pain.  Thank you, Mathieu Andrew RN Need Callback: NO CALLBACK [phone]

## 2021-11-22 NOTE — PROGRESS NOTES
Transition of Care Plan:     RUR: 17% - Medium   Disposition: IPR vs SNF   Follow up appointments: PCP & specialist as indicated  DME needed: To be determined. [facility] or means to access home:  Family has keys  IM Medicare Letter: To be given prior to discharge.   Is patient a BCPI-A Bundle:   No                 If yes, was Bundle Letter given?:   N/A  Caregiver Contact: Son  Discharge Caregiver contacted prior to discharge?  Caregiver to be contacted prior to discharge.     CM reviewed pt's chart. Pt has been accepted to [address] [place] [address] for SNF placement. CM to discuss with pt if she is in agreement with d/c there. CM will continue to follow for discharge planning while patient is admitted on current unit. Please contact this CM with questions or issues related to discharge.      KRISTOFER Holguin  Care Manager [facility]  [phone]

## 2021-11-22 NOTE — PROGRESS NOTES
Infectious Disease Progress Note         Interval:  NAEO    Subjective:   Patient seen this morning. Feels OK. Anxious about when the MRI will get done. Objective:    Vitals:   Reviewed in chart. Physical Exam:  ?   ? GEN: NAD, patient appears nontoxic.  ? HEENT: Normocephalic, atraumatic, PERRL, no scleral icterus  ? CV: HDS  ? Lungs: room air  ? Abdomen: soft, non distended, non tender, drain in right psoas abscess  ? Genitourinary:  no mittal  ? Extremities: no edema  ? Neuro: Alert, oriented to time, place and situation, moves all extremities to commands, verbal   ? Skin: lumbar wound has a very small, apparently superficial opening. No fluctuance or drainage from it. ? Psych: good affect, good eye contact, non tearful   ? Lines: PIVs, right psoas abscess drain with purulent drainage.           Labs:  Recent Results (from the past 24 hour(s))   GLUCOSE, POC    Collection Time: 11/21/21  4:04 PM   Result Value Ref Range    Glucose (POC) 119 (H) 65 - 117 mg/dL    Performed by Crow CASON    PTT    Collection Time: 11/21/21  5:34 PM   Result Value Ref Range    aPTT 43.2 (H) 22.1 - 31.0 sec    aPTT, therapeutic range     58.0 - 77.0 SECS   GLUCOSE, POC    Collection Time: 11/21/21  8:50 PM   Result Value Ref Range    Glucose (POC) 202 (H) 65 - 117 mg/dL    Performed by Andrew Monique (TRV LPN)    PTT    Collection Time: 11/22/21 12:02 AM   Result Value Ref Range    aPTT 67.0 (H) 22.1 - 31.0 sec    aPTT, therapeutic range     58.0 - 77.0 SECS   CBC W/O DIFF    Collection Time: 11/22/21  6:14 AM   Result Value Ref Range    WBC 9.9 3.6 - 11.0 K/uL    RBC 3.87 3.80 - 5.20 M/uL    HGB 9.6 (L) 11.5 - 16.0 g/dL    HCT 31.3 (L) 35.0 - 47.0 %    MCV 80.9 80.0 - 99.0 FL    MCH 24.8 (L) 26.0 - 34.0 PG    MCHC 30.7 30.0 - 36.5 g/dL    RDW 19.7 (H) 11.5 - 14.5 %    PLATELET 339 (H) 779 - 400 K/uL    MPV 9.5 8.9 - 12.9 FL    NRBC 0.0 0  WBC    ABSOLUTE NRBC 0.00 0.00 - 0.82 K/uL   METABOLIC PANEL, BASIC    Collection Time: 11/22/21  6:14 AM   Result Value Ref Range    Sodium 140 136 - 145 mmol/L    Potassium 3.7 3.5 - 5.1 mmol/L    Chloride 107 97 - 108 mmol/L    CO2 27 21 - 32 mmol/L    Anion gap 6 5 - 15 mmol/L    Glucose 134 (H) 65 - 100 mg/dL    BUN 9 6 - 20 MG/DL    Creatinine 0.51 (L) 0.55 - 1.02 MG/DL    BUN/Creatinine ratio 18 12 - 20      GFR est AA >60 >60 ml/min/1.73m2    GFR est non-AA >60 >60 ml/min/1.73m2    Calcium 8.9 8.5 - 10.1 MG/DL   PTT    Collection Time: 11/22/21  6:14 AM   Result Value Ref Range    aPTT 73.1 (H) 22.1 - 31.0 sec    aPTT, therapeutic range     58.0 - 77.0 SECS   GLUCOSE, POC    Collection Time: 11/22/21  7:49 AM   Result Value Ref Range    Glucose (POC) 123 (H) 65 - 117 mg/dL    Performed by Gerda Keating (PCT)    GLUCOSE, POC    Collection Time: 11/22/21 10:59 AM   Result Value Ref Range    Glucose (POC) 89 65 - 117 mg/dL    Performed by Ary Mathis PCT              Assessment:  64 yo F with:     - Sepsis due to right retroperitoneal abscess 9.7 x 9.1 x 7.5 cm with likely involvement of the lumbar spine. This has likely developed in the setting of poorly healing lumbar wound in July 2021. Patient reports that after the wound VAC care was disrupted when she went home, there was lot of drainage from the lumbar wound. The lumbar wound eventually healed up after the wound VAC was resumed. However this must have created a nidus of infection at that time. Patient is status post drainage and drain placement of the psoas abscess by IR on 11/12/2021. Drainage cultures grew Streptococcus anginosus.  -History of lumbar fusion surgeries. - Hx of urinary incontinence and multiple diagnosis of UTIs in the past. E.coli in urine this admission.   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   - TTE 11/8 technically difficult. Recommendations:  -Awaiting MRI L-spine. Over the weekend, nursing observed some ?purulent drainage from the lumbar wound. On my exam today, I see a small opening, but not sure if deep, and no expressible drainage see right now. However, this is all the more reason we need to pursue the MRI for delineating the extent of involved area of the infection and inflammation.     -Reviewed orthopedics note from 11/18/2021; no surgery planned for now as long as patient is improving clinically. Will be followed as outpatient unless clinical decline is seen. - Continue cefepime 2 g every 8 hours and metronidazole for now. 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 Aisha Shelley last week. Vascular surgery evaluation is appreciated; likely septic emboli however cannot rule out thromboembolic disease and hence on anticoagulation. Will follow up with VS in 6 months. Of note, patient has not had any documented bacteremia this admission. TTE on 11/8/2021 was a technically difficult study however did not comment on presence of any vegetations. Will follow. Thank you for the opportunity to participate in the care of this patient. Please contact with questions or concerns.       Linwood Babin MD  Infectious Diseases

## 2021-11-23 ENCOUNTER — APPOINTMENT (OUTPATIENT)
Dept: CT IMAGING | Age: 71
DRG: 853 | End: 2021-11-23
Attending: GENERAL ACUTE CARE HOSPITAL
Payer: MEDICARE

## 2021-11-23 LAB
APTT PPP: 74.8 SEC (ref 22.1–31)
GLUCOSE BLD STRIP.AUTO-MCNC: 103 MG/DL (ref 65–117)
GLUCOSE BLD STRIP.AUTO-MCNC: 105 MG/DL (ref 65–117)
GLUCOSE BLD STRIP.AUTO-MCNC: 90 MG/DL (ref 65–117)
SERVICE CMNT-IMP: NORMAL
THERAPEUTIC RANGE,PTTT: ABNORMAL SECS (ref 58–77)

## 2021-11-23 PROCEDURE — 74011250637 HC RX REV CODE- 250/637: Performed by: INTERNAL MEDICINE

## 2021-11-23 PROCEDURE — 74011250636 HC RX REV CODE- 250/636: Performed by: INTERNAL MEDICINE

## 2021-11-23 PROCEDURE — 65660000000 HC RM CCU STEPDOWN

## 2021-11-23 PROCEDURE — 74011250637 HC RX REV CODE- 250/637: Performed by: GENERAL ACUTE CARE HOSPITAL

## 2021-11-23 PROCEDURE — 74011000250 HC RX REV CODE- 250: Performed by: GENERAL ACUTE CARE HOSPITAL

## 2021-11-23 PROCEDURE — 74011250637 HC RX REV CODE- 250/637: Performed by: HOSPITALIST

## 2021-11-23 PROCEDURE — 85730 THROMBOPLASTIN TIME PARTIAL: CPT

## 2021-11-23 PROCEDURE — 77030038269 HC DRN EXT URIN PURWCK BARD -A

## 2021-11-23 PROCEDURE — 94760 N-INVAS EAR/PLS OXIMETRY 1: CPT

## 2021-11-23 PROCEDURE — 74011000258 HC RX REV CODE- 258: Performed by: INTERNAL MEDICINE

## 2021-11-23 PROCEDURE — 36415 COLL VENOUS BLD VENIPUNCTURE: CPT

## 2021-11-23 PROCEDURE — 74011250637 HC RX REV CODE- 250/637: Performed by: NURSE PRACTITIONER

## 2021-11-23 PROCEDURE — 74011636637 HC RX REV CODE- 636/637: Performed by: INTERNAL MEDICINE

## 2021-11-23 PROCEDURE — 82962 GLUCOSE BLOOD TEST: CPT

## 2021-11-23 PROCEDURE — 74177 CT ABD & PELVIS W/CONTRAST: CPT

## 2021-11-23 RX ORDER — BARIUM SULFATE 20 MG/ML
900 SUSPENSION ORAL
Status: COMPLETED | OUTPATIENT
Start: 2021-11-23 | End: 2021-11-23

## 2021-11-23 RX ORDER — ENOXAPARIN SODIUM 100 MG/ML
1 INJECTION SUBCUTANEOUS EVERY 12 HOURS
Status: DISCONTINUED | OUTPATIENT
Start: 2021-11-23 | End: 2021-12-03

## 2021-11-23 RX ADMIN — NYSTATIN OINTMENT: 100000 OINTMENT TOPICAL at 12:54

## 2021-11-23 RX ADMIN — INSULIN GLARGINE 35 UNITS: 100 INJECTION, SOLUTION SUBCUTANEOUS at 12:47

## 2021-11-23 RX ADMIN — ALPRAZOLAM 0.25 MG: 0.5 TABLET ORAL at 17:10

## 2021-11-23 RX ADMIN — PAROXETINE HYDROCHLORIDE 40 MG: 20 TABLET, FILM COATED ORAL at 12:48

## 2021-11-23 RX ADMIN — ATORVASTATIN CALCIUM 40 MG: 40 TABLET, FILM COATED ORAL at 21:27

## 2021-11-23 RX ADMIN — BARIUM SULFATE 900 ML: 20 SUSPENSION ORAL at 16:50

## 2021-11-23 RX ADMIN — AMITRIPTYLINE HYDROCHLORIDE 50 MG: 50 TABLET, FILM COATED ORAL at 21:28

## 2021-11-23 RX ADMIN — DOCUSATE SODIUM AND SENNOSIDES 1 TABLET: 8.6; 5 TABLET, FILM COATED ORAL at 12:47

## 2021-11-23 RX ADMIN — HEPARIN SODIUM 13 UNITS/KG/HR: 10000 INJECTION, SOLUTION INTRAVENOUS at 03:30

## 2021-11-23 RX ADMIN — NYSTATIN OINTMENT: 100000 OINTMENT TOPICAL at 21:30

## 2021-11-23 RX ADMIN — Medication 10 ML: at 13:00

## 2021-11-23 RX ADMIN — MELATONIN 6 MG: at 21:49

## 2021-11-23 RX ADMIN — Medication 10 ML: at 22:52

## 2021-11-23 RX ADMIN — Medication 10 ML: at 05:11

## 2021-11-23 RX ADMIN — METRONIDAZOLE 500 MG: 250 TABLET ORAL at 12:48

## 2021-11-23 RX ADMIN — CEFEPIME HYDROCHLORIDE 2 G: 2 INJECTION, POWDER, FOR SOLUTION INTRAVENOUS at 21:26

## 2021-11-23 RX ADMIN — Medication 1 CAPSULE: at 12:47

## 2021-11-23 RX ADMIN — CEFEPIME HYDROCHLORIDE 2 G: 2 INJECTION, POWDER, FOR SOLUTION INTRAVENOUS at 05:11

## 2021-11-23 RX ADMIN — METOPROLOL TARTRATE 12.5 MG: 25 TABLET, FILM COATED ORAL at 21:27

## 2021-11-23 RX ADMIN — ACETAMINOPHEN 500 MG: 500 TABLET ORAL at 17:11

## 2021-11-23 RX ADMIN — ASPIRIN 81 MG: 81 TABLET, COATED ORAL at 12:46

## 2021-11-23 RX ADMIN — METRONIDAZOLE 500 MG: 250 TABLET ORAL at 21:27

## 2021-11-23 RX ADMIN — ACETAMINOPHEN 500 MG: 500 TABLET ORAL at 12:48

## 2021-11-23 RX ADMIN — ACETAMINOPHEN 500 MG: 500 TABLET ORAL at 21:27

## 2021-11-23 RX ADMIN — CEFEPIME HYDROCHLORIDE 2 G: 2 INJECTION, POWDER, FOR SOLUTION INTRAVENOUS at 14:56

## 2021-11-23 NOTE — PROGRESS NOTES
Transition of Care Plan:     RUR: 17% - [address] pending auth  Follow up appointments: PCP & specialist as indicated  DME needed: To be determined. Transportation at [facility] to [facility] or means to access home:  Family has keys  IM Medicare Letter: To be given prior to discharge.   Is patient a BCPI-A Bundle:   No                 If yes, was Bundle Letter given?:   N/A  Caregiver Contact: Son  Discharge Caregiver contacted prior to discharge?  Caregiver to be contacted prior to discharge.     CM contacted pt via room phone to provide her with an update. [facility] accept pt for SNF. CM contacted [facility] and they will start insurance auth. CM will continue to follow for discharge planning while patient is admitted on current unit. Please contact this CM with questions or issues related to discharge.      Marcia Lopez MSW  Care Manager [facility]  [phone]

## 2021-11-23 NOTE — PROGRESS NOTES
Hospitalist Progress Note    NAME: Saba Florez   :  1950   MRN:  [id number]       Assessment / Plan:  Septic shock POA rectal temp 96, , lactic 9.2 -->5. 3   Streptococcus anginosus epidural/right iliopsoas abscess POA  Right paraspinal pain at level L3-4 and R iliac crest pain (complains of Right hip pain), POA   -Patient was started on vancomycin, cefepime and Flagyl  - Followed by Dr Dayo Melton and Infectious diseases, needs close follow-up as outpatient  -Stop vancomycin  as recommended by ID  -Appreciated orthopedic consult, recommended IR placed drain. Orthopedics plans no surgery for now. Surgical management on hold for now  -Drain was placed  draining purulent discharge  -Iliopsoas drain continues to put out purulent discharge  -Posterior abscess/seroma drain/debridement plan per orthopedic  -Culture from drain fluid is growing Streptococcus anginosus  -Continue antibiotics, recommended 8 to 12 weeks of antibiotics per ID (start date ).   Already has midline in place  - Pt most likely going to SNF  - MRI done on , Rim-enhancing paraspinal soft tissue collections may reflect abscesses as  well, and partially visualized abscess inferior to the right kidney again seen, but  incompletely evaluated on this exam.  - ID contacted Dr. Dayo Melton for further evaluation based on the MRI results.  -Obtain CT abdomen pelvis for follow-up    Celiac Artery Thrombosis with splenic Infarcts:  CT ABD/Pelvis  showed celiac artery thrombosis with splenic infarcts  CT abdomen pelvis today confirmed the celiac artery occlusion with splenic infarcts  Vascular consult appreciated  Renal function stable, switch heparin drip to Lovenox injection 1 mg/kg every 12 hours, continue until definitive decision about surgical debridement  Change to NOAC if no clear abscesses needing drainage    UTI  -Ucx E coli  -sensitive to cefepime   -Rea placed in ER due to poor mobility from hip pain and polyuria, with severe candidal infection in perineum and labia and buttocks. Removing Rea catheter and start voiding trial.  Patient advised to schedule empty bladder every 2 hours. Use pure wick catheter    Type 2 DM, uncontrolled with hyperosmolar hyperglycemia and early DKA POA  - presented with , AG 15, trace ketone in urine  - Required insulin gtt  - A1c 11.7 up from 7.6 in July. - continue holding metformin  - Continue Lantus 35 units. Continue SSI prn  - Blood sugars are better controlled    Prerenal MERCEDES Cr 1.5  -resolved with IVF hydration.       Constipation  -added pericolace and miralax    Severe candida infection in perineum, labia, buttocks  -continue mystatin cream ordered in ER. Status post Diflucan  -wound care consult appreciated     Generalized weakness and gait difficulty   -consult pt/ot     SVT   HTN / HLD  -continue metoprolol and statin  -having runs of SVT 11/07. Cardiology input appreciated     Hypothyroid  -continue levothyroxine. TSH 3.6     Depression and anxiety  -continue paxil with xanax prn     Severe obesity  40 or above Morbid obesity / Body mass index is 44.42 kg/m². Code:  Discussed, full  DVT prophylaxis: lovenox  Surrogate decision maker:   (but has some dementia per patient) or sister Facundo Campbell  HANY: More than 48 hours, to SNF.   Pending final plan from ID and spine surgeon Dr. Leslee Vaughan:     Chief Complaint / Reason for Physician Visit  Afebrile   No SOB  Pain tolerable  MRI results discussed with the patient at length  Leaning more towards going to SNF for IV antibiotics    review of Systems:  Symptom Y/N Comments  Symptom Y/N Comments   Fever/Chills n   Chest Pain n    Poor Appetite    Edema n    Cough n   Abdominal Pain y    Sputum    Joint Pain     SOB/AVALOS n   Pruritis/Rash     Nausea/vomit n   Tolerating PT/OT     Diarrhea n   Tolerating Diet y    Constipation    Other       Could NOT obtain due to:      PO intake: No data found. Objective:     VITALS:   Last 24hrs VS reviewed since prior progress note. Most recent are:  Patient Vitals for the past 24 hrs:   Temp Pulse Resp BP SpO2   11/23/21 1203 98.3 °F (36.8 °C) 76 18 96/66 96 %   11/23/21 0835 97.8 °F (36.6 °C) 72 18 111/63 94 %   11/22/21 2323 97.7 °F (36.5 °C) 67 18 (!) 92/55 95 %   11/22/21 2115 98 °F (36.7 °C) 70 18 130/76 95 %       Intake/Output Summary (Last 24 hours) at 11/23/2021 1549  Last data filed at 11/23/2021 1008  Gross per 24 hour   Intake 10 ml   Output 1560 ml   Net -1550 ml        I had a face to face encounter, and independently examined this patient on 11/23/2021, as outlined below:  PHYSICAL EXAM:  General: WD, WN. Alert, cooperative, no acute distress    EENT:  EOMI. Anicteric sclerae. MMM  Resp:  CTA bilaterally, no wheezing or rales. No accessory muscle use  CV:  Regular  rhythm,  No edema  GI:  Soft, Non distended, + mild right flank tender. +Bowel sounds, drain is containing  purulent discharge  Neurologic:  Alert and oriented X 3, normal speech,   Psych:   Good insight. Not anxious nor agitated  Skin:  No rashes. No jaundice. Small Lower back wound, no pus, oozing little blood     Reviewed most current lab test results and cultures  YES  Reviewed most current radiology test results   YES  Review and summation of old records today    NO  Reviewed patient's current orders and MAR    YES  PMH/SH reviewed - no change compared to H&P  ________________________________________________________________________  Care Plan discussed with:    Comments   Patient x    Family      RN x    Care Manager x    Consultant                       x Multidiciplinary team rounds were held today with , nursing, pharmacist and clinical coordinator. Patient's plan of care was discussed; medications were reviewed and discharge planning was addressed.      ________________________________________________________________________    Total NON critical care TIME:  30   Minutes     Total CRITICAL CARE TIME Spent:   Minutes non procedure based      Comments   >50% of visit spent in counseling and coordination of care x     This includes time during multidisciplinary rounds if indicated above   ________________________________________________________________________  Diego Guillory MD     Procedures: see electronic medical records for all procedures/Xrays and details which were not copied into this note but were reviewed prior to creation of Plan. LABS:  I reviewed today's most current labs and imaging studies.   Pertinent labs include:  Recent Labs     11/22/21 0614 11/21/21  0554   WBC 9.9 7.3   HGB 9.6* 10.2*   HCT 31.3* 33.0*   * 660*     Recent Labs     11/22/21 0614 11/21/21  0554    138   K 3.7 3.7    106   CO2 27 29   * 124*   BUN 9 10   CREA 0.51* 0.50*   CA 8.9 8.9

## 2021-11-23 NOTE — PROGRESS NOTES
Interdisciplinary Rounds were completed on 11/23/21 for this patient. Rounds included nursing, clinical care leader, pharmacy, and case management. Plan of care discussed. See clinical pathway and/or care plan for interventions and desired outcomes.

## 2021-11-23 NOTE — PROGRESS NOTES
MRI report reviewed. I discussed with the reading radiologist Dr. Marlo Llamas. Rim-enhancing paraspinal and subcutaneous collections - cannot rule out abscess and in the current clinical setting, suspicion for infectious process is high. With respect to the right lower abdominal abscess in which the drain was placed on 11/12/21, the field of view is not entirely visible in the MRI. Might need to get a CT to evaluate it's decrease in size. Msg relayed to Dr. Pedro Lange from [facility] for evaluation. Continue current antimicrobials. Will follow.        Bryan Cary MD  Infectious Diseases

## 2021-11-23 NOTE — PROGRESS NOTES
Occupational Therapy  Chart reviewed; cleared for tx; patient declined therapy due to special guest visiting; will retry later as able.  Gaby Griffin OTR/L

## 2021-11-24 LAB
GLUCOSE BLD STRIP.AUTO-MCNC: 113 MG/DL (ref 65–117)
GLUCOSE BLD STRIP.AUTO-MCNC: 120 MG/DL (ref 65–117)
GLUCOSE BLD STRIP.AUTO-MCNC: 230 MG/DL (ref 65–117)
GLUCOSE BLD STRIP.AUTO-MCNC: 93 MG/DL (ref 65–117)
SERVICE CMNT-IMP: ABNORMAL
SERVICE CMNT-IMP: ABNORMAL
SERVICE CMNT-IMP: NORMAL
SERVICE CMNT-IMP: NORMAL

## 2021-11-24 PROCEDURE — 74011250637 HC RX REV CODE- 250/637: Performed by: GENERAL ACUTE CARE HOSPITAL

## 2021-11-24 PROCEDURE — 82962 GLUCOSE BLOOD TEST: CPT

## 2021-11-24 PROCEDURE — 74011250636 HC RX REV CODE- 250/636: Performed by: INTERNAL MEDICINE

## 2021-11-24 PROCEDURE — 74011636637 HC RX REV CODE- 636/637: Performed by: INTERNAL MEDICINE

## 2021-11-24 PROCEDURE — 74011250636 HC RX REV CODE- 250/636: Performed by: GENERAL ACUTE CARE HOSPITAL

## 2021-11-24 PROCEDURE — 74011250637 HC RX REV CODE- 250/637: Performed by: HOSPITALIST

## 2021-11-24 PROCEDURE — 99233 SBSQ HOSP IP/OBS HIGH 50: CPT | Performed by: STUDENT IN AN ORGANIZED HEALTH CARE EDUCATION/TRAINING PROGRAM

## 2021-11-24 PROCEDURE — 94760 N-INVAS EAR/PLS OXIMETRY 1: CPT

## 2021-11-24 PROCEDURE — 74011000258 HC RX REV CODE- 258: Performed by: INTERNAL MEDICINE

## 2021-11-24 PROCEDURE — 65660000000 HC RM CCU STEPDOWN

## 2021-11-24 PROCEDURE — 2709999900 HC NON-CHARGEABLE SUPPLY

## 2021-11-24 PROCEDURE — 74011250637 HC RX REV CODE- 250/637: Performed by: INTERNAL MEDICINE

## 2021-11-24 PROCEDURE — 77030038269 HC DRN EXT URIN PURWCK BARD -A

## 2021-11-24 RX ADMIN — CEFEPIME HYDROCHLORIDE 2 G: 2 INJECTION, POWDER, FOR SOLUTION INTRAVENOUS at 05:12

## 2021-11-24 RX ADMIN — DOCUSATE SODIUM AND SENNOSIDES 1 TABLET: 8.6; 5 TABLET, FILM COATED ORAL at 08:28

## 2021-11-24 RX ADMIN — ENOXAPARIN SODIUM 120 MG: 100 INJECTION SUBCUTANEOUS at 16:54

## 2021-11-24 RX ADMIN — INSULIN LISPRO 2 UNITS: 100 INJECTION, SOLUTION INTRAVENOUS; SUBCUTANEOUS at 22:39

## 2021-11-24 RX ADMIN — LEVOTHYROXINE SODIUM 112 MCG: 0.11 TABLET ORAL at 08:29

## 2021-11-24 RX ADMIN — Medication 10 ML: at 22:45

## 2021-11-24 RX ADMIN — ACETAMINOPHEN 500 MG: 500 TABLET ORAL at 08:29

## 2021-11-24 RX ADMIN — METRONIDAZOLE 500 MG: 250 TABLET ORAL at 08:29

## 2021-11-24 RX ADMIN — Medication 10 ML: at 05:33

## 2021-11-24 RX ADMIN — ENOXAPARIN SODIUM 120 MG: 100 INJECTION SUBCUTANEOUS at 05:12

## 2021-11-24 RX ADMIN — Medication 1 CAPSULE: at 08:29

## 2021-11-24 RX ADMIN — ACETAMINOPHEN 500 MG: 500 TABLET ORAL at 17:38

## 2021-11-24 RX ADMIN — ACETAMINOPHEN 500 MG: 500 TABLET ORAL at 12:58

## 2021-11-24 RX ADMIN — METRONIDAZOLE 500 MG: 250 TABLET ORAL at 22:39

## 2021-11-24 RX ADMIN — Medication 10 ML: at 14:00

## 2021-11-24 RX ADMIN — INSULIN GLARGINE 35 UNITS: 100 INJECTION, SOLUTION SUBCUTANEOUS at 08:30

## 2021-11-24 RX ADMIN — CEFEPIME HYDROCHLORIDE 2 G: 2 INJECTION, POWDER, FOR SOLUTION INTRAVENOUS at 22:38

## 2021-11-24 RX ADMIN — METOPROLOL TARTRATE 12.5 MG: 25 TABLET, FILM COATED ORAL at 22:39

## 2021-11-24 RX ADMIN — AMITRIPTYLINE HYDROCHLORIDE 50 MG: 50 TABLET, FILM COATED ORAL at 22:39

## 2021-11-24 RX ADMIN — CEFEPIME HYDROCHLORIDE 2 G: 2 INJECTION, POWDER, FOR SOLUTION INTRAVENOUS at 15:10

## 2021-11-24 RX ADMIN — ATORVASTATIN CALCIUM 40 MG: 40 TABLET, FILM COATED ORAL at 22:39

## 2021-11-24 RX ADMIN — PAROXETINE HYDROCHLORIDE 40 MG: 20 TABLET, FILM COATED ORAL at 08:29

## 2021-11-24 RX ADMIN — DOCUSATE SODIUM AND SENNOSIDES 1 TABLET: 8.6; 5 TABLET, FILM COATED ORAL at 17:38

## 2021-11-24 RX ADMIN — PANTOPRAZOLE SODIUM 40 MG: 40 TABLET, DELAYED RELEASE ORAL at 08:29

## 2021-11-24 RX ADMIN — NYSTATIN OINTMENT: 100000 OINTMENT TOPICAL at 22:43

## 2021-11-24 RX ADMIN — ACETAMINOPHEN 500 MG: 500 TABLET ORAL at 22:39

## 2021-11-24 RX ADMIN — ASPIRIN 81 MG: 81 TABLET, COATED ORAL at 08:29

## 2021-11-24 RX ADMIN — METOPROLOL TARTRATE 25 MG: 25 TABLET, FILM COATED ORAL at 08:29

## 2021-11-24 NOTE — PROGRESS NOTES
Ortho Spine     Per discussion with Dr. Marbella Jones after reviewing new MRI, he recommends for her to have CT guided aspiration and culture of posterior collections prior to planning any surgical intervention. I called the patient this afternoon and discussed this plan with her. She verbalizes understanding.      Tom Bowers PAC

## 2021-11-24 NOTE — PROGRESS NOTES
Patient put on NPO diet for potential IR drainage procedure today. Verbal with read back from Dr. Moe Cisneros.

## 2021-11-24 NOTE — PROGRESS NOTES
Hospitalist Progress Note    NAME: Benedicto Mir   :  1950   MRN:  [id number]       Assessment / Plan:  Septic shock POA rectal temp 96, , lactic 9.2 -->5. 3   Streptococcus anginosus epidural/right iliopsoas abscess POA  Right paraspinal pain at level L3-4 and R iliac crest pain (complains of Right hip pain), POA   -Patient was started on vancomycin, cefepime and Flagyl  - Followed by Dr Andie Roblero and Infectious diseases, needs close follow-up as outpatient  -Stop vancomycin  as recommended by ID  -Appreciated orthopedic consult, recommended IR placed drain. Orthopedics plans no surgery for now. Surgical management on hold for now  -Drain was placed  draining purulent discharge  -Iliopsoas drain continues to put out purulent discharge  -Posterior abscess/seroma drain/debridement plan per orthopedic  -Culture from drain fluid is growing Streptococcus anginosus  -Continue antibiotics, recommended 8 to 12 weeks of antibiotics per ID (start date ). Already has midline in place  - Pt most likely going to SNF  - MRI done on , Rim-enhancing paraspinal soft tissue collections may reflect abscesses as  well, and partially visualized abscess inferior to the right kidney again seen, but  incompletely evaluated on this exam.  - ID contacted Dr. Andie Roblero for further evaluation based on the MRI results.  -Obtain CT abdomen pelvis for follow-up       : Discussed with ID, reviewed CT scan, ID has discussed with IR, has undrained portion of abscess which need to be drained, IR plans to do drainage either today or Friday. Continue IV antibiotics  -Discussed with IR this afternoon, as patient had Lovenox this a.m., unable to do drain today. We will plan for Friday.   Hold Lovenox after Thursday night    Celiac Artery Thrombosis with splenic Infarcts:  CT ABD/Pelvis  showed celiac artery thrombosis with splenic infarcts  CT abdomen pelvis today confirmed the celiac artery occlusion with splenic infarcts  Vascular consult appreciated  Renal function stable, switch heparin drip to Lovenox injection 1 mg/kg every 12 hours, continue until definitive decision about surgical debridement  Change to NOAC if no clear abscesses needing drainage      UTI  -Ucx E coli  -sensitive to cefepime   -Rea placed in ER due to poor mobility from hip pain and polyuria, with severe candidal infection in perineum and labia and buttocks. Removing Rea catheter and start voiding trial.  Patient advised to schedule empty bladder every 2 hours. Use pure wick catheter    Type 2 DM, uncontrolled with hyperosmolar hyperglycemia and early DKA POA  - presented with , AG 15, trace ketone in urine  - Required insulin gtt  - A1c 11.7 up from 7.6 in July. - continue holding metformin  - Continue Lantus 35 units. Continue SSI prn  - Blood sugars are better controlled    Prerenal MERCEDES Cr 1.5  -resolved with IVF hydration.       Constipation  -added pericolace and miralax    Severe candida infection in perineum, labia, buttocks  -continue mystatin cream ordered in ER. Status post Diflucan  -wound care consult appreciated     Generalized weakness and gait difficulty   -consult pt/ot     SVT   HTN / HLD  -continue metoprolol and statin  -having runs of SVT 11/07. Cardiology input appreciated     Hypothyroid  -continue levothyroxine. TSH 3.6     Depression and anxiety  -continue paxil with xanax prn     Severe obesity  40 or above Morbid obesity / Body mass index is 44.42 kg/m². Code:  Discussed, full  DVT prophylaxis: lovenox  Surrogate decision maker:   (but has some dementia per patient) or sister Magali Vásquez  HANY: More than 48 hours, to SNF. Subjective:     Chief Complaint / Reason for Physician Visit  Afebrile   Patient appears anxious  No fever overnight.   No nausea vomiting    review of Systems:  Symptom Y/N Comments  Symptom Y/N Comments   Fever/Chills n   Chest Pain n Poor Appetite    Edema n    Cough n   Abdominal Pain y    Sputum    Joint Pain     SOB/AVALOS n   Pruritis/Rash     Nausea/vomit n   Tolerating PT/OT     Diarrhea n   Tolerating Diet y    Constipation    Other       Could NOT obtain due to:      PO intake: No data found. Objective:     VITALS:   Last 24hrs VS reviewed since prior progress note. Most recent are:  Patient Vitals for the past 24 hrs:   Temp Pulse Resp BP SpO2   11/24/21 1138 98 °F (36.7 °C) 77 19 122/78 100 %   11/24/21 0727 97.6 °F (36.4 °C) 78  118/62 100 %   11/24/21 0438 97.9 °F (36.6 °C) 80  (!) 143/86 97 %   11/24/21 0001 98.2 °F (36.8 °C) 86  106/69 99 %   11/23/21 2050 98.2 °F (36.8 °C) 84  113/73 97 %       Intake/Output Summary (Last 24 hours) at 11/24/2021 1323  Last data filed at 11/24/2021 5059  Gross per 24 hour   Intake    Output 1000 ml   Net -1000 ml        I had a face to face encounter, and independently examined this patient on 11/24/2021, as outlined below:  PHYSICAL EXAM:  General: WD, WN. Alert, cooperative, no acute distress    EENT:  EOMI. Anicteric sclerae. MMM  Resp:  CTA bilaterally, no wheezing or rales. No accessory muscle use  CV:  Regular  rhythm,  No edema  GI:               Soft nontender  Neurologic:  Alert and oriented X 3, normal speech,   Psych:   Good insight. Not anxious nor agitated  Skin:  No rashes. No jaundice. Reviewed most current lab test results and cultures  YES  Reviewed most current radiology test results   YES  Review and summation of old records today    NO  Reviewed patient's current orders and MAR    YES  PMH/SH reviewed - no change compared to H&P  ________________________________________________________________________  Care Plan discussed with:    Comments   Patient x    Family      RN x    Care Manager x    Consultant                       x Multidiciplinary team rounds were held today with , nursing, pharmacist and clinical coordinator.   Patient's plan of care was discussed; medications were reviewed and discharge planning was addressed. ________________________________________________________________________    Total NON critical care TIME:  30   Minutes     Total CRITICAL CARE TIME Spent:   Minutes non procedure based      Comments   >50% of visit spent in counseling and coordination of care x     This includes time during multidisciplinary rounds if indicated above   ________________________________________________________________________  Jamie Ramsey MD     Procedures: see electronic medical records for all procedures/Xrays and details which were not copied into this note but were reviewed prior to creation of Plan. LABS:  I reviewed today's most current labs and imaging studies.   Pertinent labs include:  Recent Labs     11/22/21 0614   WBC 9.9   HGB 9.6*   HCT 31.3*   *     Recent Labs     11/22/21 0614      K 3.7      CO2 27   *   BUN 9   CREA 0.51*   CA 8.9

## 2021-11-24 NOTE — PROGRESS NOTES
Infectious Disease Progress Note         Interval:  NAEO    Subjective:   Patient seen this morning. Feels OK. Anxious about when the MRI will get done. Objective:    Vitals:   Reviewed in chart. Physical Exam:  ?   ? GEN: NAD, patient appears nontoxic.  ? HEENT: Normocephalic, atraumatic, PERRL, no scleral icterus  ? CV: HDS  ? Lungs: room air  ? Abdomen: soft, non distended, non tender, drain in right psoas abscess  ? Genitourinary:  no mittal  ? Extremities: no edema  ? Neuro: Alert, oriented to time, place and situation, moves all extremities to commands, verbal   ? Skin: lumbar wound has a very small, apparently superficial opening. No fluctuance or drainage from it. ? Psych: good affect, good eye contact, non tearful   ? Lines: PIVs, right psoas abscess drain with purulent drainage. Labs:  Recent Results (from the past 24 hour(s))   GLUCOSE, POC    Collection Time: 11/23/21 11:13 AM   Result Value Ref Range    Glucose (POC) 90 65 - 117 mg/dL    Performed by Floydne Cassette, POC    Collection Time: 11/23/21  9:57 PM   Result Value Ref Range    Glucose (POC) 105 65 - 117 mg/dL    Performed by Padmini Urbina PCT    GLUCOSE, POC    Collection Time: 11/24/21  7:24 AM   Result Value Ref Range    Glucose (POC) 120 (H) 65 - 117 mg/dL    Performed by Carl Roy PCT              Assessment:  66 yo F with:     - Sepsis due to right retroperitoneal abscess 9.7 x 9.1 x 7.5 cm with likely involvement of the lumbar spine. This has likely developed in the setting of poorly healing lumbar wound in July 2021. Patient reports that after the wound VAC care was disrupted when she went home, there was lot of drainage from the lumbar wound. The lumbar wound eventually healed up after the wound VAC was resumed. However this must have created a nidus of infection at that time. Patient is status post drainage and drain placement of the psoas abscess by IR on 11/12/2021.   Drainage cultures grew Streptococcus anginosus.  -History of lumbar fusion surgeries. - Hx of urinary incontinence and multiple diagnosis of UTIs in the past. E.coli in urine this admission.   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   - TTE 11/8 technically difficult. Recommendations:  - Strep anginosus notorious for invasive multiple abscesses. -MRI L spine reviewed. - CT abd pelvis 11/23 reviewed. - Communicated with Ortho, and they are recommending CT guided aspiration and culture of posterior collections prior to planning any surgical intervention.   - I discussed the CT 11/23 with IR. The right sided abscess has an undrained portion where a new drain needs to be put in. Other drains in the posterior collections per IR - defer to Ortho and IR. Please send for cultures. Discussed above plan with Dr. Maile Mahoney. - Continue cefepime 2 g every 8 hours and metronidazole for now. 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 Angie Lopez last week. Vascular surgery evaluation is appreciated; likely septic emboli however cannot rule out thromboembolic disease and hence on anticoagulation. Will follow up with VS in 6 months. Of note, patient has not had any documented bacteremia this admission. TTE on 11/8/2021 was a technically difficult study however did not comment on presence of any vegetations. Will follow. Thank you for the opportunity to participate in the care of this patient. Please contact with questions or concerns.       Opal Holman MD  Infectious Diseases

## 2021-11-24 NOTE — PROGRESS NOTES
Physical Therapy    Attempted PT treatment. Pt received in bed, requested defer session until able to be bathed by nursing. Encouraged pt to complete bathing at EOB or in restroom with therapist, however con't to decline at this time. Will con't to follow.     Ramakrishna Fong, PT, MPT

## 2021-11-24 NOTE — PROGRESS NOTES
Transition of Care Plan:     RUR: 17% - [address]   Follow up appointments: PCP & specialist as indicated  DME needed: To be determined. Transportation at [id number]  [facility] to [facility] or means to access home:  Family has keys  IM Medicare Letter: To be given prior to discharge.   Is patient a BCPI-A Bundle:   No                 If yes, was Bundle Letter given?:   N/A  Caregiver Contact: Son  Discharge Caregiver contacted prior to discharge?  Caregiver to be contacted prior to discharge.     CM received an update from [address] [place] [address]. They have obtained insurance auth for SNF.  CM will updated KRISTOFER Aragon  Care Manager [facility]  [phone]

## 2021-11-25 LAB
GLUCOSE BLD STRIP.AUTO-MCNC: 113 MG/DL (ref 65–117)
GLUCOSE BLD STRIP.AUTO-MCNC: 114 MG/DL (ref 65–117)
GLUCOSE BLD STRIP.AUTO-MCNC: 147 MG/DL (ref 65–117)
GLUCOSE BLD STRIP.AUTO-MCNC: 162 MG/DL (ref 65–117)
SERVICE CMNT-IMP: ABNORMAL
SERVICE CMNT-IMP: ABNORMAL
SERVICE CMNT-IMP: NORMAL
SERVICE CMNT-IMP: NORMAL

## 2021-11-25 PROCEDURE — 65660000000 HC RM CCU STEPDOWN

## 2021-11-25 PROCEDURE — 74011250636 HC RX REV CODE- 250/636: Performed by: GENERAL ACUTE CARE HOSPITAL

## 2021-11-25 PROCEDURE — 74011250637 HC RX REV CODE- 250/637: Performed by: HOSPITALIST

## 2021-11-25 PROCEDURE — 74011250637 HC RX REV CODE- 250/637: Performed by: NURSE PRACTITIONER

## 2021-11-25 PROCEDURE — 74011250637 HC RX REV CODE- 250/637: Performed by: INTERNAL MEDICINE

## 2021-11-25 PROCEDURE — 74011636637 HC RX REV CODE- 636/637: Performed by: INTERNAL MEDICINE

## 2021-11-25 PROCEDURE — 74011250636 HC RX REV CODE- 250/636: Performed by: INTERNAL MEDICINE

## 2021-11-25 PROCEDURE — 74011000258 HC RX REV CODE- 258: Performed by: INTERNAL MEDICINE

## 2021-11-25 PROCEDURE — 94760 N-INVAS EAR/PLS OXIMETRY 1: CPT

## 2021-11-25 PROCEDURE — 74011250637 HC RX REV CODE- 250/637: Performed by: GENERAL ACUTE CARE HOSPITAL

## 2021-11-25 PROCEDURE — 82962 GLUCOSE BLOOD TEST: CPT

## 2021-11-25 RX ORDER — INSULIN GLARGINE 100 [IU]/ML
20 INJECTION, SOLUTION SUBCUTANEOUS DAILY
Status: DISCONTINUED | OUTPATIENT
Start: 2021-11-26 | End: 2021-12-10 | Stop reason: HOSPADM

## 2021-11-25 RX ADMIN — Medication 1 CAPSULE: at 08:24

## 2021-11-25 RX ADMIN — CEFEPIME HYDROCHLORIDE 2 G: 2 INJECTION, POWDER, FOR SOLUTION INTRAVENOUS at 13:05

## 2021-11-25 RX ADMIN — Medication 10 ML: at 13:06

## 2021-11-25 RX ADMIN — ASPIRIN 81 MG: 81 TABLET, COATED ORAL at 08:24

## 2021-11-25 RX ADMIN — PANTOPRAZOLE SODIUM 40 MG: 40 TABLET, DELAYED RELEASE ORAL at 06:54

## 2021-11-25 RX ADMIN — MELATONIN 6 MG: at 03:20

## 2021-11-25 RX ADMIN — ATORVASTATIN CALCIUM 40 MG: 40 TABLET, FILM COATED ORAL at 21:09

## 2021-11-25 RX ADMIN — INSULIN GLARGINE 35 UNITS: 100 INJECTION, SOLUTION SUBCUTANEOUS at 08:23

## 2021-11-25 RX ADMIN — CEFEPIME HYDROCHLORIDE 2 G: 2 INJECTION, POWDER, FOR SOLUTION INTRAVENOUS at 22:14

## 2021-11-25 RX ADMIN — PAROXETINE HYDROCHLORIDE 40 MG: 20 TABLET, FILM COATED ORAL at 08:23

## 2021-11-25 RX ADMIN — OXYCODONE 5 MG: 5 TABLET ORAL at 03:21

## 2021-11-25 RX ADMIN — METRONIDAZOLE 500 MG: 250 TABLET ORAL at 08:24

## 2021-11-25 RX ADMIN — METRONIDAZOLE 500 MG: 250 TABLET ORAL at 20:35

## 2021-11-25 RX ADMIN — NYSTATIN OINTMENT: 100000 OINTMENT TOPICAL at 22:00

## 2021-11-25 RX ADMIN — METOPROLOL TARTRATE 12.5 MG: 25 TABLET, FILM COATED ORAL at 21:09

## 2021-11-25 RX ADMIN — Medication 10 ML: at 22:15

## 2021-11-25 RX ADMIN — Medication 10 ML: at 06:52

## 2021-11-25 RX ADMIN — ACETAMINOPHEN 500 MG: 500 TABLET ORAL at 13:09

## 2021-11-25 RX ADMIN — INSULIN LISPRO 2 UNITS: 100 INJECTION, SOLUTION INTRAVENOUS; SUBCUTANEOUS at 07:39

## 2021-11-25 RX ADMIN — ALPRAZOLAM 0.25 MG: 0.5 TABLET ORAL at 03:20

## 2021-11-25 RX ADMIN — CEFEPIME HYDROCHLORIDE 2 G: 2 INJECTION, POWDER, FOR SOLUTION INTRAVENOUS at 06:50

## 2021-11-25 RX ADMIN — ACETAMINOPHEN 500 MG: 500 TABLET ORAL at 08:24

## 2021-11-25 RX ADMIN — AMITRIPTYLINE HYDROCHLORIDE 50 MG: 50 TABLET, FILM COATED ORAL at 21:09

## 2021-11-25 RX ADMIN — ENOXAPARIN SODIUM 120 MG: 100 INJECTION SUBCUTANEOUS at 03:21

## 2021-11-25 RX ADMIN — METOPROLOL TARTRATE 25 MG: 25 TABLET, FILM COATED ORAL at 06:54

## 2021-11-25 RX ADMIN — ACETAMINOPHEN 500 MG: 500 TABLET ORAL at 21:09

## 2021-11-25 RX ADMIN — NYSTATIN OINTMENT: 100000 OINTMENT TOPICAL at 08:24

## 2021-11-25 RX ADMIN — ACETAMINOPHEN 500 MG: 500 TABLET ORAL at 17:55

## 2021-11-25 RX ADMIN — LEVOTHYROXINE SODIUM 112 MCG: 0.11 TABLET ORAL at 06:54

## 2021-11-25 NOTE — PROGRESS NOTES
Problem: Diabetes Self-Management  Goal: *Incorporating nutritional management into lifestyle  Description: Describe effect of type, amount and timing of food on blood glucose; list 3 methods for planning meals. Outcome: Progressing Towards Goal  Goal: *Incorporating physical activity into lifestyle  Description: State effect of exercise on blood glucose levels. Outcome: Progressing Towards Goal  Goal: *Developing strategies to promote health/change behavior  Description: Define the ABC's of diabetes; identify appropriate screenings, schedule and personal plan for screenings. Outcome: Progressing Towards Goal  Goal: *Using medications safely  Description: State effect of diabetes medications on diabetes; name diabetes medication taking, action and side effects. Outcome: Progressing Towards Goal  Goal: *Monitoring blood glucose, interpreting and using results  Description: Identify recommended blood glucose targets  and personal targets. Outcome: Progressing Towards Goal  Goal: *Prevention, detection, treatment of acute complications  Description: List symptoms of hyper- and hypoglycemia; describe how to treat low blood sugar and actions for lowering  high blood glucose level. Outcome: Progressing Towards Goal  Goal: *Prevention, detection and treatment of chronic complications  Description: Define the natural course of diabetes and describe the relationship of blood glucose levels to long term complications of diabetes.   Outcome: Progressing Towards Goal  Goal: *Developing strategies to address psychosocial issues  Description: Describe feelings about living with diabetes; identify support needed and support network  Outcome: Progressing Towards Goal  Goal: *Insulin pump training  Outcome: Progressing Towards Goal  Goal: *Sick day guidelines  Outcome: Progressing Towards Goal  Goal: *Patient Specific Goal (EDIT GOAL, INSERT TEXT)  Outcome: Progressing Towards Goal     Problem: Patient Education: Go to Patient Education Activity  Goal: Patient/Family Education  Outcome: Progressing Towards Goal     Problem: Patient Education: Go to Patient Education Activity  Goal: Patient/Family Education  Outcome: Progressing Towards Goal     Problem: Patient Education: Go to Patient Education Activity  Goal: Patient/Family Education  Outcome: Progressing Towards Goal     Problem: Patient Education: Go to Patient Education Activity  Goal: Patient/Family Education  Outcome: Progressing Towards Goal     Problem: HHS: Day 1  Goal: Off Pathway (Use only if patient is Off Pathway)  Outcome: Progressing Towards Goal  Goal: Activity/Safety  Outcome: Progressing Towards Goal  Goal: Consults, if ordered  Outcome: Progressing Towards Goal  Goal: Diagnostic Test/Procedures  Outcome: Progressing Towards Goal  Goal: Nutrition/Diet  Outcome: Progressing Towards Goal  Goal: Discharge Planning  Outcome: Progressing Towards Goal  Goal: Medications  Outcome: Progressing Towards Goal  Goal: Respiratory  Outcome: Progressing Towards Goal  Goal: Treatments/Interventions/Procedures  Outcome: Progressing Towards Goal  Goal: Psychosocial  Outcome: Progressing Towards Goal  Goal: *Blood glucose decreasing  Outcome: Progressing Towards Goal  Goal: *Potassium normalizing  Outcome: Progressing Towards Goal  Goal: *Adequate urinary output (equal to or greater than 30 milliliters/hour)  Outcome: Progressing Towards Goal  Goal: *Stable cardiac rhythm  Outcome: Progressing Towards Goal     Problem: HHS: Day 2  Goal: Off Pathway (Use only if patient is Off Pathway)  Outcome: Progressing Towards Goal  Goal: Activity/Safety  Outcome: Progressing Towards Goal  Goal: Diagnostic Test/Procedures  Outcome: Progressing Towards Goal  Goal: Nutrition/Diet  Outcome: Progressing Towards Goal  Goal: Discharge Planning  Outcome: Progressing Towards Goal  Goal: Medications  Outcome: Progressing Towards Goal  Goal: Respiratory  Outcome: Progressing Towards Goal  Goal: Treatments/Interventions/Procedures  Outcome: Progressing Towards Goal  Goal: Psychosocial  Outcome: Progressing Towards Goal  Goal: *Blood glucose 80 to 180 mg/dl  Outcome: Progressing Towards Goal  Goal: *Electrolytes within normal limits (dehydration resolved)  Outcome: Progressing Towards Goal  Goal: *Demonstrates progressive activity  Outcome: Progressing Towards Goal  Goal: *Tolerating diet  Outcome: Progressing Towards Goal     Problem: HHS: Day 3  Goal: Off Pathway (Use only if patient is Off Pathway)  Outcome: Progressing Towards Goal  Goal: Activity/Safety  Outcome: Progressing Towards Goal  Goal: Diagnostic Test/Procedures  Outcome: Progressing Towards Goal  Goal: Nutrition/Diet  Outcome: Progressing Towards Goal  Goal: Discharge Planning  Outcome: Progressing Towards Goal  Goal: Medications  Outcome: Progressing Towards Goal  Goal: Treatments/Interventions/Procedures  Outcome: Progressing Towards Goal  Goal: Psychosocial  Outcome: Progressing Towards Goal     Problem: HHS: Discharge Outcomes  Goal: *Demonstrates progressive activity  Outcome: Progressing Towards Goal  Goal: *Describes follow-up/return visits to physicians  Outcome: Progressing Towards Goal  Goal: *Blood glucose at patient's target range  Outcome: Progressing Towards Goal  Goal: *Tolerating diet  Outcome: Progressing Towards Goal  Goal: *Verbalizes understanding and describes prescribed diet  Outcome: Progressing Towards Goal  Goal: *Verbalizes name, dosage, time, side effects, and number of days to continue medications  Outcome: Progressing Towards Goal  Goal: *Describes blood glucose goals, monitoring, sick day rules, hypo/hyperglycemia  Outcome: Progressing Towards Goal  Goal: *Describes available resources and support systems  Outcome: Progressing Towards Goal  Goal: *Demonstrates ability to self-administer insulin  Outcome: Progressing Towards Goal     Problem: Patient Education: Go to Patient Education Activity  Goal: Patient/Family Education  Outcome: Progressing Towards Goal     Problem: Infection - Risk of, Urinary Catheter-Associated Urinary Tract Infection  Goal: *Absence of infection signs and symptoms  Outcome: Progressing Towards Goal     Problem: Patient Education: Go to Patient Education Activity  Goal: Patient/Family Education  Outcome: Progressing Towards Goal

## 2021-11-25 NOTE — PROGRESS NOTES
Bedside and Verbal shift change report given to Brandon Rose RN (oncoming nurse) by Triston Hooper RN (offgoing nurse). Report included the following information SBAR, Kardex, Intake/Output, MAR and Cardiac Rhythm NSR.

## 2021-11-25 NOTE — PROGRESS NOTES
Hospitalist Progress Note    NAME: Benedicto Mir   :  1950   MRN:  [id number]       Assessment / Plan:  Septic shock POA rectal temp 96, , lactic 9.2 -->5. 3   Streptococcus anginosus epidural/right iliopsoas abscess POA  Right paraspinal pain at level L3-4 and R iliac crest pain (complains of Right hip pain), POA   -Patient was started on vancomycin, cefepime and Flagyl  - Followed by Dr Andie Roblero and Infectious diseases, needs close follow-up as outpatient  -Stop vancomycin  as recommended by ID  -Appreciated orthopedic consult, recommended IR placed drain. Orthopedics plans no surgery for now. Surgical management on hold for now  -Drain was placed  draining purulent discharge  -Iliopsoas drain continues to put out purulent discharge  -Posterior abscess/seroma drain/debridement plan per orthopedic  -Culture from drain fluid is growing Streptococcus anginosus  -Continue antibiotics, recommended 8 to 12 weeks of antibiotics per ID (start date ). Already has midline in place  - Pt most likely going to SNF  - MRI done on , Rim-enhancing paraspinal soft tissue collections may reflect abscesses as  well, and partially visualized abscess inferior to the right kidney again seen, but  incompletely evaluated on this exam.  - ID contacted Dr. Andie Roblero for further evaluation based on the MRI results.  -Obtain CT abdomen pelvis for follow-up       : Discussed with ID, reviewed CT scan, ID has discussed with IR, has undrained portion of abscess which need to be drained, IR plans to do drainage either today or Friday. Continue IV antibiotics  -Discussed with IR this afternoon, as patient had Lovenox this a.m., unable to do drain today. We will plan for Friday. Hold Lovenox after Thursday night    : Plan for IR guided drain tomorrow, hold  Lovenox, n.p.o. after midnight.     Celiac Artery Thrombosis with splenic Infarcts:  CT ABD/Pelvis  showed celiac artery thrombosis with splenic infarcts  CT abdomen pelvis today confirmed the celiac artery occlusion with splenic infarcts  Vascular consult appreciated  Renal function stable, switch heparin drip to Lovenox injection 1 mg/kg every 12 hours, continue until definitive decision about surgical debridement  Change to NOAC if no clear abscesses needing drainage      UTI  -Ucx E coli  -sensitive to cefepime   -Rea placed in ER due to poor mobility from hip pain and polyuria, with severe candidal infection in perineum and labia and buttocks. Removing Rea catheter and start voiding trial.  Patient advised to schedule empty bladder every 2 hours. Use pure wick catheter    Type 2 DM, uncontrolled with hyperosmolar hyperglycemia and early DKA POA  - presented with , AG 15, trace ketone in urine  - Required insulin gtt  - A1c 11.7 up from 7.6 in July. - continue holding metformin  - Continue Lantus 35 units. Continue SSI prn  - Blood sugars are better controlled    Prerenal MERCEDES Cr 1.5  -resolved with IVF hydration.       Constipation  -added pericolace and miralax    Severe candida infection in perineum, labia, buttocks  -continue mystatin cream ordered in ER. Status post Diflucan  -wound care consult appreciated     Generalized weakness and gait difficulty   -consult pt/ot     SVT   HTN / HLD  -continue metoprolol and statin  -having runs of SVT 11/07. Cardiology input appreciated     Hypothyroid  -continue levothyroxine. TSH 3.6     Depression and anxiety  -continue paxil with xanax prn     Severe obesity  40 or above Morbid obesity / Body mass index is 44.42 kg/m². Code:  Discussed, full  DVT prophylaxis: lovenox  Surrogate decision maker:   (but has some dementia per patient) or sister Jackie King  HANY: More than 48 hours, to SNF. Subjective:     Chief Complaint / Reason for Physician Visit  No events overnight.   review of Systems:  Symptom Y/N Comments  Symptom Y/N Comments Fever/Chills n   Chest Pain n    Poor Appetite    Edema n    Cough n   Abdominal Pain y    Sputum    Joint Pain     SOB/VAALOS n   Pruritis/Rash     Nausea/vomit n   Tolerating PT/OT     Diarrhea n   Tolerating Diet y    Constipation    Other       Could NOT obtain due to:      PO intake: No data found. Objective:     VITALS:   Last 24hrs VS reviewed since prior progress note. Most recent are:  Patient Vitals for the past 24 hrs:   Temp Pulse Resp BP SpO2   11/25/21 1552 97.8 °F (36.6 °C) 68 18 (!) 157/70 92 %   11/25/21 1141 98 °F (36.7 °C) 61 18 113/66 97 %   11/25/21 0916 98.1 °F (36.7 °C) 65 18 110/68 93 %   11/25/21 0316 98.2 °F (36.8 °C) 70  113/61 96 %   11/24/21 2300 97.6 °F (36.4 °C) 72 17 115/61 95 %   11/24/21 2032 97.7 °F (36.5 °C) 75 18 91/73 98 %       Intake/Output Summary (Last 24 hours) at 11/25/2021 1755  Last data filed at 11/25/2021 2969  Gross per 24 hour   Intake 300 ml   Output 1400 ml   Net -1100 ml        I had a face to face encounter, and independently examined this patient on 11/25/2021, as outlined below:  PHYSICAL EXAM:  General: WD, WN. Alert, cooperative, no acute distress    EENT:  EOMI. Anicteric sclerae. MMM  Resp:  CTA bilaterally, no wheezing or rales. No accessory muscle use  CV:  Regular  rhythm,  No edema  GI:               Soft nontender  Neurologic:  Alert and oriented X 3, normal speech,   Psych:   Good insight. Not anxious nor agitated  Skin:  No rashes. No jaundice.      Reviewed most current lab test results and cultures  YES  Reviewed most current radiology test results   YES  Review and summation of old records today    NO  Reviewed patient's current orders and MAR    YES  PMH/SH reviewed - no change compared to H&P  ________________________________________________________________________  Care Plan discussed with:    Comments   Patient x    Family      RN x    Care Manager x    Consultant                       x Multidiciplinary team rounds were held today with case manager, nursing, pharmacist and clinical coordinator. Patient's plan of care was discussed; medications were reviewed and discharge planning was addressed. ________________________________________________________________________    Total NON critical care TIME:  30   Minutes     Total CRITICAL CARE TIME Spent:   Minutes non procedure based      Comments   >50% of visit spent in counseling and coordination of care x     This includes time during multidisciplinary rounds if indicated above   ________________________________________________________________________  Michelle Gray MD     Procedures: see electronic medical records for all procedures/Xrays and details which were not copied into this note but were reviewed prior to creation of Plan. LABS:  I reviewed today's most current labs and imaging studies. Pertinent labs include:  No results for input(s): WBC, HGB, HCT, PLT, HGBEXT, HCTEXT, PLTEXT, HGBEXT, HCTEXT, PLTEXT in the last 72 hours. No results for input(s): NA, K, CL, CO2, GLU, BUN, CREA, CA, MG, PHOS, ALB, TBIL, TBILI, ALT, INR, INREXT, INREXT in the last 72 hours.     No lab exists for component: SGOT

## 2021-11-26 ENCOUNTER — APPOINTMENT (OUTPATIENT)
Dept: CT IMAGING | Age: 71
DRG: 853 | End: 2021-11-26
Attending: INTERNAL MEDICINE
Payer: MEDICARE

## 2021-11-26 LAB
GLUCOSE BLD STRIP.AUTO-MCNC: 197 MG/DL (ref 65–117)
GLUCOSE BLD STRIP.AUTO-MCNC: 77 MG/DL (ref 65–117)
GLUCOSE BLD STRIP.AUTO-MCNC: 96 MG/DL (ref 65–117)
SERVICE CMNT-IMP: ABNORMAL
SERVICE CMNT-IMP: NORMAL
SERVICE CMNT-IMP: NORMAL

## 2021-11-26 PROCEDURE — 77030010546 HC BG URIN DRNG URES -B

## 2021-11-26 PROCEDURE — 2709999900 HC NON-CHARGEABLE SUPPLY

## 2021-11-26 PROCEDURE — 77030003666 HC NDL SPINAL BD -A

## 2021-11-26 PROCEDURE — 94760 N-INVAS EAR/PLS OXIMETRY 1: CPT

## 2021-11-26 PROCEDURE — 74011000258 HC RX REV CODE- 258: Performed by: INTERNAL MEDICINE

## 2021-11-26 PROCEDURE — 87075 CULTR BACTERIA EXCEPT BLOOD: CPT

## 2021-11-26 PROCEDURE — 82962 GLUCOSE BLOOD TEST: CPT

## 2021-11-26 PROCEDURE — 74011250636 HC RX REV CODE- 250/636: Performed by: INTERNAL MEDICINE

## 2021-11-26 PROCEDURE — C1894 INTRO/SHEATH, NON-LASER: HCPCS

## 2021-11-26 PROCEDURE — 99153 MOD SED SAME PHYS/QHP EA: CPT

## 2021-11-26 PROCEDURE — 74011250637 HC RX REV CODE- 250/637: Performed by: INTERNAL MEDICINE

## 2021-11-26 PROCEDURE — 74011000250 HC RX REV CODE- 250

## 2021-11-26 PROCEDURE — 74011250637 HC RX REV CODE- 250/637: Performed by: GENERAL ACUTE CARE HOSPITAL

## 2021-11-26 PROCEDURE — 87070 CULTURE OTHR SPECIMN AEROBIC: CPT

## 2021-11-26 PROCEDURE — 77030003375 HC MRK BIOP BEEK -A

## 2021-11-26 PROCEDURE — 74011250636 HC RX REV CODE- 250/636: Performed by: GENERAL ACUTE CARE HOSPITAL

## 2021-11-26 PROCEDURE — 65660000000 HC RM CCU STEPDOWN

## 2021-11-26 PROCEDURE — 74011250637 HC RX REV CODE- 250/637: Performed by: HOSPITALIST

## 2021-11-26 PROCEDURE — 77030010548 HC BG WND DRN ARMD -B

## 2021-11-26 RX ORDER — LIDOCAINE HYDROCHLORIDE 10 MG/ML
INJECTION, SOLUTION EPIDURAL; INFILTRATION; INTRACAUDAL; PERINEURAL
Status: COMPLETED
Start: 2021-11-26 | End: 2021-11-26

## 2021-11-26 RX ORDER — FENTANYL CITRATE 50 UG/ML
25-100 INJECTION, SOLUTION INTRAMUSCULAR; INTRAVENOUS
Status: DISCONTINUED | OUTPATIENT
Start: 2021-11-26 | End: 2021-12-10 | Stop reason: HOSPADM

## 2021-11-26 RX ORDER — MIDAZOLAM HYDROCHLORIDE 1 MG/ML
0-10 INJECTION, SOLUTION INTRAMUSCULAR; INTRAVENOUS
Status: DISCONTINUED | OUTPATIENT
Start: 2021-11-26 | End: 2021-12-10 | Stop reason: HOSPADM

## 2021-11-26 RX ADMIN — PAROXETINE HYDROCHLORIDE 40 MG: 20 TABLET, FILM COATED ORAL at 09:04

## 2021-11-26 RX ADMIN — METRONIDAZOLE 500 MG: 250 TABLET ORAL at 21:26

## 2021-11-26 RX ADMIN — MIDAZOLAM 2 MG: 1 INJECTION INTRAMUSCULAR; INTRAVENOUS at 12:40

## 2021-11-26 RX ADMIN — CEFEPIME HYDROCHLORIDE 2 G: 2 INJECTION, POWDER, FOR SOLUTION INTRAVENOUS at 05:16

## 2021-11-26 RX ADMIN — ACETAMINOPHEN 500 MG: 500 TABLET ORAL at 09:04

## 2021-11-26 RX ADMIN — METRONIDAZOLE 500 MG: 250 TABLET ORAL at 09:04

## 2021-11-26 RX ADMIN — ASPIRIN 81 MG: 81 TABLET, COATED ORAL at 09:05

## 2021-11-26 RX ADMIN — Medication 1 CAPSULE: at 09:04

## 2021-11-26 RX ADMIN — LEVOTHYROXINE SODIUM 112 MCG: 0.11 TABLET ORAL at 09:05

## 2021-11-26 RX ADMIN — PANTOPRAZOLE SODIUM 40 MG: 40 TABLET, DELAYED RELEASE ORAL at 09:04

## 2021-11-26 RX ADMIN — ACETAMINOPHEN 500 MG: 500 TABLET ORAL at 21:26

## 2021-11-26 RX ADMIN — FENTANYL CITRATE 50 MCG: 50 INJECTION, SOLUTION INTRAMUSCULAR; INTRAVENOUS at 12:47

## 2021-11-26 RX ADMIN — AMITRIPTYLINE HYDROCHLORIDE 50 MG: 50 TABLET, FILM COATED ORAL at 21:29

## 2021-11-26 RX ADMIN — METOPROLOL TARTRATE 25 MG: 25 TABLET, FILM COATED ORAL at 09:04

## 2021-11-26 RX ADMIN — FENTANYL CITRATE 50 MCG: 50 INJECTION, SOLUTION INTRAMUSCULAR; INTRAVENOUS at 12:40

## 2021-11-26 RX ADMIN — NYSTATIN OINTMENT: 100000 OINTMENT TOPICAL at 21:31

## 2021-11-26 RX ADMIN — OXYCODONE 5 MG: 5 TABLET ORAL at 22:06

## 2021-11-26 RX ADMIN — CEFEPIME HYDROCHLORIDE 2 G: 2 INJECTION, POWDER, FOR SOLUTION INTRAVENOUS at 14:29

## 2021-11-26 RX ADMIN — MIDAZOLAM 2 MG: 1 INJECTION INTRAMUSCULAR; INTRAVENOUS at 12:47

## 2021-11-26 RX ADMIN — ENOXAPARIN SODIUM 120 MG: 100 INJECTION SUBCUTANEOUS at 18:00

## 2021-11-26 RX ADMIN — ACETAMINOPHEN 500 MG: 500 TABLET ORAL at 18:00

## 2021-11-26 RX ADMIN — CEFEPIME HYDROCHLORIDE 2 G: 2 INJECTION, POWDER, FOR SOLUTION INTRAVENOUS at 21:26

## 2021-11-26 RX ADMIN — ATORVASTATIN CALCIUM 40 MG: 40 TABLET, FILM COATED ORAL at 21:29

## 2021-11-26 RX ADMIN — LIDOCAINE HYDROCHLORIDE: 10 INJECTION, SOLUTION EPIDURAL; INFILTRATION; INTRACAUDAL; PERINEURAL at 12:00

## 2021-11-26 RX ADMIN — ACETAMINOPHEN 500 MG: 500 TABLET ORAL at 14:29

## 2021-11-26 RX ADMIN — Medication 10 ML: at 05:16

## 2021-11-26 RX ADMIN — Medication 10 ML: at 21:31

## 2021-11-26 NOTE — PROGRESS NOTES
Physical Therapy    Chart reviewed and attempted to see patient, but she had just returned to room from IR  procedure to add new drain and does not feel she can participate. Will defer today and continue to follow, encouraged her to be up in chair with nursing over the weekend.     Rochelle Raphael

## 2021-11-26 NOTE — H&P
Radiology History and Physical    Patient: Christian Hunter 65 y.o. female       Chief Complaint: High Blood Sugar (arrives via ems from home, EMS states pt found on floor after GLF due to gen weakness, was laying on floor cold to the touch with tympanic reading of 96. Blood sugar reading HI. pt states 1 week of extreme thirst. )      History of Present Illness: 65year old woman with a right lower quadrant fluid collection, and a midline paraspinal fluid collection. Prior drainage of a separate RLQ collection, now resolved. History:    Past Medical History:   Diagnosis Date    Adverse effect of anesthesia     O2 DROPS WITH ANESTHESIA    Arthritis     KNEES    Cancer ([facility] 03/13/2015    SQUAMOUS CELL CARCINOMA; tonsils and lymph nodes. Removed 3-2015 with radiation    GERD (gastroesophageal reflux disease)     Hypertension     NO LONGER ON MEDICATION    Hypothyroid     HYPOTHYROIDISM    Migraine     Nausea & vomiting     Obesity     Other ill-defined conditions(799.89)     chronic back pain    Psychiatric disorder     anxiety    Psychiatric disorder     panic ATTACKS    SVT (supraventricular tachycardia) ([facility] 05/24/2014    Ulcerative colitis      Family History   Problem Relation Age of Onset    Cancer Mother         ovarian ca?     Heart Disease Father         MI    Heart Attack Father     Cancer Father         MULTIPLE MYELOMA    Liver Disease Father         FROM MEDICATIONS FOR MULTIPLE MYELOMA    Arthritis-osteo Sister     Arthritis-osteo Brother     Cancer Paternal Grandmother 79        breast ca    Breast Cancer Paternal Grandmother 79    Breast Cancer Maternal Aunt 55    Breast Cancer Maternal Aunt 61    Breast Cancer Paternal Aunt 43    Breast Cancer Paternal Aunt         52's    Emphysema Paternal Grandfather     No Known Problems Sister     No Known Problems Brother     No Known Problems Brother     Anesth Problems Neg Hx      Social History     Socioeconomic History    Marital status:      Spouse name: Not on file    Number of children: Not on file    Years of education: Not on file    Highest education level: Not on file   Occupational History    Not on file   Tobacco Use    Smoking status: Never Smoker    Smokeless tobacco: Never Used   Vaping Use    Vaping Use: Never used   Substance and Sexual Activity    Alcohol use: No    Drug use: No     Types: Prescription    Sexual activity: Not Currently   Other Topics Concern     Service Not Asked    Blood Transfusions Not Asked    Caffeine Concern Not Asked    Occupational Exposure Not Asked    Hobby Hazards Not Asked    Sleep Concern Not Asked    Stress Concern Not Asked    Weight Concern Not Asked    Special Diet Not Asked    Back Care Not Asked    Exercise Not Asked    Bike Helmet Not Asked    Seat Belt Not Asked    Self-Exams Not Asked   Social History Narrative    Not on file     Social Determinants of Health     Financial Resource Strain:     Difficulty of Paying Living Expenses: Not on file   Food Insecurity:     Worried About Running Out of Food in the Last Year: Not on file    Eliezer of Food in the Last Year: Not on file   Transportation Needs:     Lack of Transportation (Medical): Not on file    Lack of Transportation (Non-Medical):  Not on file   Physical Activity:     Days of Exercise per Week: Not on file    Minutes of Exercise per Session: Not on file   Stress:     Feeling of Stress : Not on file   Social Connections:     Frequency of Communication with Friends and Family: Not on file    Frequency of Social Gatherings with Friends and Family: Not on file    Attends Sabianism Services: Not on file    Active Member of Clubs or Organizations: Not on file    Attends Club or Organization Meetings: Not on file    Marital Status: Not on file   Intimate Partner Violence:     Fear of Current or Ex-Partner: Not on file    Emotionally Abused: Not on file    Physically Abused: Not on file    Sexually Abused: Not on file   Housing Stability:     Unable to Pay for Housing in the Last Year: Not on file    Number of Places Lived in the Last Year: Not on file    Unstable Housing in the Last Year: Not on file       Allergies:    Allergies   Allergen Reactions    Adhesive Tape-Silicones Rash    Aloe Vera Rash    Chlorhexidine Rash    Tussin [Dextromethorphan Hbr] Palpitations    Readyprep Chg [Chlorhexidine Gluconate] Rash       Current Medications:  Current Facility-Administered Medications   Medication Dose Route Frequency    midazolam (VERSED) injection 0-10 mg  0-10 mg IntraVENous Rad Multiple    fentaNYL citrate (PF) injection  mcg   mcg IntraVENous Rad Multiple    lidocaine (PF) (XYLOCAINE) 10 mg/mL (1 %) injection        insulin glargine (LANTUS) injection 20 Units  20 Units SubCUTAneous DAILY    [Held by provider] enoxaparin (LOVENOX) injection 120 mg  1 mg/kg SubCUTAneous Q12H    melatonin tablet 6 mg  6 mg Oral QHS PRN    metroNIDAZOLE (FLAGYL) tablet 500 mg  500 mg Oral Q12H    nystatin (MYCOSTATIN) 100,000 unit/gram ointment   Topical TID    senna-docusate (PERICOLACE) 8.6-50 mg per tablet 1 Tablet  1 Tablet Oral BID    polyethylene glycol (MIRALAX) packet 17 g  17 g Oral DAILY    acetaminophen (TYLENOL) tablet 500 mg  500 mg Oral QID    Or    acetaminophen (TYLENOL) suppository 650 mg  650 mg Rectal QID    ALPRAZolam (XANAX) tablet 0.25 mg  0.25 mg Oral QID PRN    insulin lispro (HUMALOG) injection   SubCUTAneous AC&HS    glucose chewable tablet 16 g  4 Tablet Oral PRN    dextrose (D50W) injection syrg 12.5-25 g  12.5-25 g IntraVENous PRN    glucagon (GLUCAGEN) injection 1 mg  1 mg IntraMUSCular PRN    cefepime (MAXIPIME) 2 g in 0.9% sodium chloride (MBP/ADV) 100 mL MBP  2 g IntraVENous Q8H    L.acidophilus-paracasei-S.thermophil-bifidobacter (RISAQUAD) 8 billion cell capsule  1 Capsule Oral DAILY    amitriptyline (ELAVIL) tablet 50 mg  50 mg Oral QHS    aspirin delayed-release tablet 81 mg  81 mg Oral DAILY    atorvastatin (LIPITOR) tablet 40 mg  40 mg Oral QHS    levothyroxine (SYNTHROID) tablet 112 mcg  112 mcg Oral ACB    metoprolol tartrate (LOPRESSOR) tablet 25 mg  25 mg Oral 7am    metoprolol tartrate (LOPRESSOR) tablet 12.5 mg  12.5 mg Oral QHS    pantoprazole (PROTONIX) tablet 40 mg  40 mg Oral ACB    PARoxetine (PAXIL) tablet 40 mg  40 mg Oral DAILY    oxyCODONE IR (ROXICODONE) tablet 5 mg  5 mg Oral Q6H PRN    naloxone (NARCAN) injection 0.4 mg  0.4 mg IntraVENous PRN    sodium chloride (NS) flush 5-40 mL  5-40 mL IntraVENous Q8H    ondansetron (ZOFRAN ODT) tablet 4 mg  4 mg Oral Q8H PRN    Or    ondansetron (ZOFRAN) injection 4 mg  4 mg IntraVENous Q6H PRN    sodium chloride (NS) flush 5-10 mL  5-10 mL IntraVENous PRN        Physical Exam:  Blood pressure 127/69, pulse 68, temperature 98.3 °F (36.8 °C), resp. rate 16, height 5' 4\" (1.626 m), weight 117.8 kg (259 lb 12.8 oz), SpO2 98 %, not currently breastfeeding. GENERAL: alert, cooperative, no distress, appears stated age,   LUNG: Nonlabored respiration on room air  HEART: regular rate and rhythm    Alerts:    Hospital Problems  Date Reviewed: 7/20/2021          Codes Class Noted POA    Leukocytosis ICD-10-CM: D72.829  ICD-9-CM: 288.60  11/6/2021 Unknown        Candida vaginitis ICD-10-CM: B37.3  ICD-9-CM: 112.1  11/6/2021 Unknown        Hyperglycemia ICD-10-CM: R73.9  ICD-9-CM: 790.29  11/6/2021 Unknown        SIRS (systemic inflammatory response syndrome) ([facility] ICD-10-CM: R65.10  ICD-9-CM: 995.90  11/6/2021 Unknown        MERCEDES (acute kidney injury) [facility]) ICD-10-CM: N17.9  ICD-9-CM: 584.9  11/6/2021 Unknown        Severe sepsis ([facility] ICD-10-CM: A41.9, R65.20  ICD-9-CM: 038.9, 995.92  11/6/2021 Unknown              Laboratory:    No results for input(s): HGB, HCT, WBC, PLT, INR, BUN, CREA, K, CRCLT, HGBEXT, HCTEXT, PLTEXT, INREXT in the last 72 hours.     No lab exists for component: PTT, PT      Plan of Care/Planned Procedure:  Risks, benefits, and alternatives reviewed with patient and she agrees to proceed with the procedure. Removal of indwelling RLQ drain; placement of new RLQ drain; paraspinal collection aspiration    Deemed appropriate for moderate sedation with versed and fentanyl.     Marciano Meneses MD  Interventional Radiology  [facility]  1:38 PM, 11/26/2021

## 2021-11-26 NOTE — PROGRESS NOTES
Bedside and Verbal shift change report given to Josue Arnold (oncoming nurse) by Luis Mcclure RN (offgoing nurse). Report included the following information SBAR, Kardex, Intake/Output, MAR, Accordion, Recent Results and Med Rec Status.

## 2021-11-26 NOTE — PROCEDURES
Interventional Radiology  Procedure Note        11/26/2021 1:40 PM    Patient: Rip Lo     Informed consent obtained    Diagnosis: Abdominal abscess; paraspinal collection    Procedure(s):   - Removal indwelling RLQ drain  - CT guided placement of new 12Fr drain in RLQ collection, with return of thick, bloody purulent fluid  - CT guided aspiration of paraspinal fluid collection, with return of approximately 2cc thick bloody fluid    Specimens removed:  See above    Complications: None    Primary Physician: Washington Blanco MD    Recommendations: N/A    Discharge Disposition: return to floor    Full dictated report to follow    Washington Blanco MD  Interventional Radiology  [facility]  1:40 PM, 11/26/2021

## 2021-11-26 NOTE — PROGRESS NOTES
Occupational Therapy Treatment Attempt    Chart reviewed. Attempted Occupational Therapy Treatment, however, patient unable to be seen due to:  []  Nausea/vomiting  []  Eating  []  Pain  []  Patient too lethargic  [x]  Off Unit for testing/procedure/in IR  []  Dialysis treatment in progress  []  Telemetry Results  []  Other:     Will f/u later as patient's schedule allows.    Thank you for this referral.  Kathrine Shaw, OTR/L, CSRS, CDCS, CFPS

## 2021-11-26 NOTE — PROGRESS NOTES
Hospitalist Progress Note    NAME: Ld Foster   :  1950   MRN:  [id number]       Assessment / Plan:  Septic shock POA rectal temp 96, , lactic 9.2 -->5. 3   Streptococcus anginosus epidural/right iliopsoas abscess POA  Right paraspinal pain at level L3-4 and R iliac crest pain (complains of Right hip pain), POA   -Patient was started on vancomycin, cefepime and Flagyl  - Followed by Dr Patrick Goldman and Infectious diseases, needs close follow-up as outpatient  -Stop vancomycin  as recommended by ID  -Appreciated orthopedic consult, recommended IR placed drain. Orthopedics plans no surgery for now. Surgical management on hold for now  -Drain was placed  draining purulent discharge  -Iliopsoas drain continues to put out purulent discharge  -Posterior abscess/seroma drain/debridement plan per orthopedic  -Culture from drain fluid is growing Streptococcus anginosus  -Continue antibiotics, recommended 8 to 12 weeks of antibiotics per ID (start date ). Already has midline in place  - Pt most likely going to SNF  - MRI done on , Rim-enhancing paraspinal soft tissue collections may reflect abscesses as  well, and partially visualized abscess inferior to the right kidney again seen, but  incompletely evaluated on this exam.  - ID contacted Dr. Patrick Goldman for further evaluation based on the MRI results.  -Obtain CT abdomen pelvis for follow-up       : Discussed with ID, reviewed CT scan, ID has discussed with IR, has undrained portion of abscess which need to be drained, IR plans to do drainage either today or Friday. Continue IV antibiotics  -Discussed with IR this afternoon, as patient had Lovenox this a.m., unable to do drain today. We will plan for Friday. Hold Lovenox after Thursday night    : Plan for IR guided drain tomorrow, hold  Lovenox, n.p.o. after midnight.     : Existing drain removed, new drain placed by IR today for undrained portion of abscess. Sample sent for cultures  Will resume Lovenox from this p.m. Awaiting final recommendation from ID    Celiac Artery Thrombosis with splenic Infarcts:  CT ABD/Pelvis 11/9 showed celiac artery thrombosis with splenic infarcts  CT abdomen pelvis today confirmed the celiac artery occlusion with splenic infarcts  Vascular consult appreciated  Renal function stable, switch heparin drip to Lovenox injection 1 mg/kg every 12 hours, continue until definitive decision about surgical debridement  Change to NOAC if no clear abscesses needing drainage      11/26: Continue Lovenox, will switch to Eliquis before discharge        UTI  -Ucx E coli  -sensitive to cefepime   -Rea placed in ER due to poor mobility from hip pain and polyuria, with severe candidal infection in perineum and labia and buttocks. Removing Rea catheter and start voiding trial.  Patient advised to schedule empty bladder every 2 hours. Use pure wick catheter    Type 2 DM, uncontrolled with hyperosmolar hyperglycemia and early DKA POA  - presented with , AG 15, trace ketone in urine  - Required insulin gtt  - A1c 11.7 up from 7.6 in July. - continue holding metformin  - Continue Lantus 35 units. Continue SSI prn  - Blood sugars are better controlled    Prerenal MERCEDES Cr 1.5  -resolved with IVF hydration.       Constipation  -added pericolace and miralax    Severe candida infection in perineum, labia, buttocks  -continue mystatin cream ordered in ER. Status post Diflucan  -wound care consult appreciated     Generalized weakness and gait difficulty   -consult pt/ot     SVT   HTN / HLD  -continue metoprolol and statin  -having runs of SVT 11/07. Cardiology input appreciated     Hypothyroid  -continue levothyroxine. TSH 3.6     Depression and anxiety  -continue paxil with xanax prn     Severe obesity  40 or above Morbid obesity / Body mass index is 44.42 kg/m².   Code:  Discussed, full  DVT prophylaxis: lovenox  Surrogate decision maker:   (but has some dementia per patient) or sister Rizwan Sullivan  HANY: More than 48 hours, to SNF. Subjective:     Chief Complaint / Reason for Physician Visit  Had IR guided drain placed today  review of Systems:  Symptom Y/N Comments  Symptom Y/N Comments   Fever/Chills n   Chest Pain n    Poor Appetite    Edema n    Cough n   Abdominal Pain y    Sputum    Joint Pain     SOB/AVALOS n   Pruritis/Rash     Nausea/vomit n   Tolerating PT/OT     Diarrhea n   Tolerating Diet y    Constipation    Other       Could NOT obtain due to:      PO intake: No data found. Objective:     VITALS:   Last 24hrs VS reviewed since prior progress note. Most recent are:  Patient Vitals for the past 24 hrs:   Temp Pulse Resp BP SpO2   11/26/21 1338  68 16 125/81 97 %   11/26/21 1325  68 16 127/69 98 %   11/26/21 1320  68 19 120/68 97 %   11/26/21 1315  69 15 117/73 97 %   11/26/21 1310  67 13 113/68 97 %   11/26/21 1305  68 19 118/68 97 %   11/26/21 1300  70 19 115/72 99 %   11/26/21 1255  69 15 117/64 98 %   11/26/21 1250  69 19 108/71 100 %   11/26/21 1245  68 12 114/63 100 %   11/26/21 1240  74 15 115/73 100 %   11/26/21 1205  62 18 119/64 100 %   11/26/21 1126  65 18 132/79 96 %   11/26/21 0812 98.3 °F (36.8 °C) 69 18 135/68 92 %   11/26/21 0402 98 °F (36.7 °C) 68 18 101/63 96 %   11/26/21 0319 97.7 °F (36.5 °C) 66 18 (!) 80/50 93 %   11/25/21 2334 98.2 °F (36.8 °C) 60 18 121/73 95 %   11/25/21 2014 98.3 °F (36.8 °C) 70 18 138/71 95 %   11/25/21 1552 97.8 °F (36.6 °C) 68 18 (!) 157/70 92 %       Intake/Output Summary (Last 24 hours) at 11/26/2021 1425  Last data filed at 11/26/2021 8356  Gross per 24 hour   Intake    Output 1900 ml   Net -1900 ml        I had a face to face encounter, and independently examined this patient on 11/26/2021, as outlined below:  PHYSICAL EXAM:  General: WD, WN. Alert, cooperative, no acute distress    EENT:  EOMI. Anicteric sclerae.  MMM  Resp:  CTA bilaterally, no wheezing or rales. No accessory muscle use  CV:  Regular  rhythm,  No edema  GI:               Soft nontender  Neurologic:  Alert and oriented X 3, normal speech,   Psych:   Good insight. Not anxious nor agitated  Skin:  No rashes. No jaundice. Reviewed most current lab test results and cultures  YES  Reviewed most current radiology test results   YES  Review and summation of old records today    NO  Reviewed patient's current orders and MAR    YES  PMH/SH reviewed - no change compared to H&P  ________________________________________________________________________  Care Plan discussed with:    Comments   Patient x    Family      RN x    Care Manager x    Consultant                       x Multidiciplinary team rounds were held today with , nursing, pharmacist and clinical coordinator. Patient's plan of care was discussed; medications were reviewed and discharge planning was addressed. ________________________________________________________________________    Total NON critical care TIME:  30   Minutes     Total CRITICAL CARE TIME Spent:   Minutes non procedure based      Comments   >50% of visit spent in counseling and coordination of care x     This includes time during multidisciplinary rounds if indicated above   ________________________________________________________________________  Meera Freire MD     Procedures: see electronic medical records for all procedures/Xrays and details which were not copied into this note but were reviewed prior to creation of Plan. LABS:  I reviewed today's most current labs and imaging studies. Pertinent labs include:  No results for input(s): WBC, HGB, HCT, PLT, HGBEXT, HCTEXT, PLTEXT, HGBEXT, HCTEXT, PLTEXT in the last 72 hours. No results for input(s): NA, K, CL, CO2, GLU, BUN, CREA, CA, MG, PHOS, ALB, TBIL, TBILI, ALT, INR, INREXT, INREXT in the last 72 hours.     No lab exists for component: SGOT

## 2021-11-26 NOTE — PROGRESS NOTES
Transition of Care Plan:     RUR: 17% - Medium   Disposition: SNF - [facility]   Follow up appointments: PCP & specialist as indicated  DME needed: To be determined. Transportation at [facility] to [address] [place] [address] or means to access home:  Family has keys  IM Medicare Letter: To be given prior to discharge.   Is patient a BCPI-A Bundle:   No                 If yes, was Bundle Letter given?:   N/A  Caregiver Contact: Son  Discharge Caregiver contacted prior to discharge?  Caregiver to be contacted prior to discharge.     CM reviewed pt's chart. Pt has a procedure today. MD anticipates d/c to SNF either Sunday or Monday. CM will continue to follow for discharge planning while patient is admitted on current unit. Please contact this CM with questions or issues related to discharge.      KRISTOFER Fagan  Care Manager [facility]  [phone]

## 2021-11-26 NOTE — PROGRESS NOTES
Comprehensive Nutrition Assessment    Type and Reason for Visit: Reassess    Nutrition Recommendations/Plan:  Advance to a 4 Carb choice diet s/p procedure  Resume ensure high protein TID    Nutrition Assessment:     Chart reviewed; patient NPO today for procedure in IR. No new documented PO intake since last assessment. Noted insurance authorization obtained for SNF placement. Resume diet s/p procedure. Encourage intake of meals/supplements. Estimated Daily Nutrient Needs:  Energy (kcal): 2010 kcal (BMR 1675 x 1. 2AF); Weight Used for Energy Requirements: Current  Protein (g): 94g (0.8 g/kg bw); Weight Used for Protein Requirements: Current  Fluid (ml/day): 2000 mL; Method Used for Fluid Requirements: 1 ml/kcal    Nutrition Related Findings:   BG [phone]  BM 11/24  Atorvastatin, lantus, Humalog, Risaquad, Synthroid, Lopressor, Protonix, Paxil, Miralax, Pericolace      Wounds:    Surgical incision, Skin tears       Current Nutrition Therapies:  ADULT ORAL NUTRITION SUPPLEMENT Dinner, Breakfast, Lunch;  Low Calorie/High Protein  DIET NPO    Anthropometric Measures:  · Height:  5' 4\" (162.6 cm)  · Current Body Wt:  117.8 kg (259 lb 11.2 oz)   · BMI Category:  Obese class 3 (BMI 40.0 or greater)       Nutrition Diagnosis:   · Inadequate protein-energy intake related to  (decreased appetite) as evidenced by intake 26-50%    Nutrition Interventions:   Food and/or Nutrient Delivery: Start oral diet, Continue oral nutrition supplement  Nutrition Education and Counseling: No recommendations at this time  Coordination of Nutrition Care: No recommendation at this time    Goals:  PO intake >70% of meals/supplements next 5-7 days       Nutrition Monitoring and Evaluation:   Behavioral-Environmental Outcomes: None identified  Food/Nutrient Intake Outcomes: Food and nutrient intake, Supplement intake  Physical Signs/Symptoms Outcomes: Biochemical data, Weight    Discharge Planning:    Continue current diet, Continue oral nutrition supplement     Electronically signed by Xiomara Rogers RD on 11/26/2021 at 10:20 AM    Contact: ext [phone]

## 2021-11-26 NOTE — PROGRESS NOTES
Pt tolerated procedure well,   Start time 1240  End time 1320  Versed 4 mg  Fentanyl 100mcg    Pt tolerated procedure well. Drain removed from back, MD able to aspirate for cultures. New drain placed in left lower abdomen, also cultures sent at this site.   Pt awake and alert, transferred back to 2174, report called to [facility]

## 2021-11-27 LAB
GLUCOSE BLD STRIP.AUTO-MCNC: 134 MG/DL (ref 65–117)
GLUCOSE BLD STRIP.AUTO-MCNC: 167 MG/DL (ref 65–117)
GLUCOSE BLD STRIP.AUTO-MCNC: 172 MG/DL (ref 65–117)
GLUCOSE BLD STRIP.AUTO-MCNC: 226 MG/DL (ref 65–117)
SERVICE CMNT-IMP: ABNORMAL

## 2021-11-27 PROCEDURE — 74011250636 HC RX REV CODE- 250/636: Performed by: GENERAL ACUTE CARE HOSPITAL

## 2021-11-27 PROCEDURE — 74011250637 HC RX REV CODE- 250/637: Performed by: NURSE PRACTITIONER

## 2021-11-27 PROCEDURE — 74011250637 HC RX REV CODE- 250/637: Performed by: INTERNAL MEDICINE

## 2021-11-27 PROCEDURE — 77010033678 HC OXYGEN DAILY

## 2021-11-27 PROCEDURE — 65660000000 HC RM CCU STEPDOWN

## 2021-11-27 PROCEDURE — 94760 N-INVAS EAR/PLS OXIMETRY 1: CPT

## 2021-11-27 PROCEDURE — 82962 GLUCOSE BLOOD TEST: CPT

## 2021-11-27 PROCEDURE — 74011250637 HC RX REV CODE- 250/637: Performed by: HOSPITALIST

## 2021-11-27 PROCEDURE — 74011000258 HC RX REV CODE- 258: Performed by: INTERNAL MEDICINE

## 2021-11-27 PROCEDURE — 74011250636 HC RX REV CODE- 250/636: Performed by: INTERNAL MEDICINE

## 2021-11-27 PROCEDURE — 74011636637 HC RX REV CODE- 636/637: Performed by: INTERNAL MEDICINE

## 2021-11-27 PROCEDURE — 74011250637 HC RX REV CODE- 250/637: Performed by: GENERAL ACUTE CARE HOSPITAL

## 2021-11-27 RX ADMIN — Medication 10 ML: at 06:38

## 2021-11-27 RX ADMIN — METRONIDAZOLE 500 MG: 250 TABLET ORAL at 22:33

## 2021-11-27 RX ADMIN — ACETAMINOPHEN 500 MG: 500 TABLET ORAL at 13:40

## 2021-11-27 RX ADMIN — Medication 10 ML: at 13:41

## 2021-11-27 RX ADMIN — DOCUSATE SODIUM AND SENNOSIDES 1 TABLET: 8.6; 5 TABLET, FILM COATED ORAL at 08:02

## 2021-11-27 RX ADMIN — ACETAMINOPHEN 500 MG: 500 TABLET ORAL at 17:00

## 2021-11-27 RX ADMIN — INSULIN LISPRO 2 UNITS: 100 INJECTION, SOLUTION INTRAVENOUS; SUBCUTANEOUS at 15:36

## 2021-11-27 RX ADMIN — METOPROLOL TARTRATE 25 MG: 25 TABLET, FILM COATED ORAL at 07:36

## 2021-11-27 RX ADMIN — CEFEPIME HYDROCHLORIDE 2 G: 2 INJECTION, POWDER, FOR SOLUTION INTRAVENOUS at 06:37

## 2021-11-27 RX ADMIN — INSULIN LISPRO 2 UNITS: 100 INJECTION, SOLUTION INTRAVENOUS; SUBCUTANEOUS at 07:37

## 2021-11-27 RX ADMIN — ASPIRIN 81 MG: 81 TABLET, COATED ORAL at 08:02

## 2021-11-27 RX ADMIN — METRONIDAZOLE 500 MG: 250 TABLET ORAL at 08:02

## 2021-11-27 RX ADMIN — Medication 1 CAPSULE: at 08:02

## 2021-11-27 RX ADMIN — INSULIN GLARGINE 20 UNITS: 100 INJECTION, SOLUTION SUBCUTANEOUS at 08:02

## 2021-11-27 RX ADMIN — NYSTATIN OINTMENT: 100000 OINTMENT TOPICAL at 22:34

## 2021-11-27 RX ADMIN — ENOXAPARIN SODIUM 120 MG: 100 INJECTION SUBCUTANEOUS at 15:36

## 2021-11-27 RX ADMIN — AMITRIPTYLINE HYDROCHLORIDE 50 MG: 50 TABLET, FILM COATED ORAL at 22:32

## 2021-11-27 RX ADMIN — NYSTATIN OINTMENT: 100000 OINTMENT TOPICAL at 09:00

## 2021-11-27 RX ADMIN — ENOXAPARIN SODIUM 120 MG: 100 INJECTION SUBCUTANEOUS at 03:00

## 2021-11-27 RX ADMIN — INSULIN LISPRO 2 UNITS: 100 INJECTION, SOLUTION INTRAVENOUS; SUBCUTANEOUS at 22:31

## 2021-11-27 RX ADMIN — METOPROLOL TARTRATE 12.5 MG: 25 TABLET, FILM COATED ORAL at 22:33

## 2021-11-27 RX ADMIN — PAROXETINE HYDROCHLORIDE 40 MG: 20 TABLET, FILM COATED ORAL at 08:02

## 2021-11-27 RX ADMIN — ACETAMINOPHEN 500 MG: 500 TABLET ORAL at 08:02

## 2021-11-27 RX ADMIN — MELATONIN 6 MG: at 03:10

## 2021-11-27 RX ADMIN — LEVOTHYROXINE SODIUM 112 MCG: 0.11 TABLET ORAL at 07:36

## 2021-11-27 RX ADMIN — ACETAMINOPHEN 500 MG: 500 TABLET ORAL at 22:32

## 2021-11-27 RX ADMIN — POLYETHYLENE GLYCOL 3350 17 G: 17 POWDER, FOR SOLUTION ORAL at 09:00

## 2021-11-27 RX ADMIN — ATORVASTATIN CALCIUM 40 MG: 40 TABLET, FILM COATED ORAL at 22:33

## 2021-11-27 RX ADMIN — CEFEPIME HYDROCHLORIDE 2 G: 2 INJECTION, POWDER, FOR SOLUTION INTRAVENOUS at 13:41

## 2021-11-27 RX ADMIN — NYSTATIN OINTMENT: 100000 OINTMENT TOPICAL at 15:37

## 2021-11-27 RX ADMIN — PANTOPRAZOLE SODIUM 40 MG: 40 TABLET, DELAYED RELEASE ORAL at 07:36

## 2021-11-27 RX ADMIN — CEFEPIME HYDROCHLORIDE 2 G: 2 INJECTION, POWDER, FOR SOLUTION INTRAVENOUS at 22:33

## 2021-11-27 NOTE — PROGRESS NOTES
Hospitalist Progress Note    NAME: Diego Bhardwaj   :  1950   MRN:  [id number]       Assessment / Plan:  Septic shock POA rectal temp 96, , lactic 9.2 -->5. 3   Streptococcus anginosus epidural/right iliopsoas abscess POA  Right paraspinal pain at level L3-4 and R iliac crest pain (complains of Right hip pain), POA   Paraspinal abscess  -Patient was started on vancomycin, cefepime and Flagyl  - Followed by Dr Evaristo Hilton and Infectious diseases, needs close follow-up as outpatient  -Stop vancomycin  as recommended by ID  -Appreciated orthopedic consult, recommended IR placed drain. Orthopedics plans no surgery for now.   Surgical management on hold for now    -First Drain was placed  draining purulent discharge, repeat CT  showed Resolution, drain removed .  -Though CT on  showed undrained fluid collection on right lower quadrant  -s/p , IR guided Drainage catheter, so far 25 cc output past 24 hours    -Culture from drain fluid is growing Streptococcus anginosus  -Antibiotics as per ID  -Recommendation 8 to 12 weeks, ID will finalize abx and duration      - MRI done on , Rim-enhancing paraspinal soft tissue collections may reflect abscesses as  well, and partially visualized abscess inferior to the right kidney again seen, but  incompletely evaluated on this exam.  -For paraspinal abscess, orthopedic recommends conservative management,   -Successful aspiration of posterior paraspinal abscess     -FOLLOW cultures from paraspinal and right lower quadrant abscess aspirate    Celiac Artery Thrombosis with splenic Infarcts:  -CT ABD/Pelvis  showed celiac artery thrombosis with splenic infarcts  CT abdomen pelvis today confirmed the celiac artery occlusion with splenic infarcts  Vascular consult appreciated  Renal function stable, switched heparin drip to Lovenox injection 1 mg/kg every 12 hours, continue until definitive decision about surgical debridement  Change to NOAC if no clear abscesses needing drainage    -We will switch to Eliquis on discharge    UTI  -Ucx E coli  -sensitive to cefepime   -Rea placed in ER due to poor mobility from hip pain and polyuria, with severe candidal infection in perineum and labia and buttocks. Removing Rea catheter and start voiding trial.  Patient advised to schedule empty bladder every 2 hours. Use pure wick catheter    Type 2 DM, uncontrolled with hyperosmolar hyperglycemia and early DKA POA  - presented with , AG 15, trace ketone in urine  - Required insulin gtt  - A1c 11.7 up from 7.6 in July. - continue holding metformin  - Continue Lantus 35 units. Continue SSI prn  - Blood sugars are better controlled    Prerenal MERCEDES Cr 1.5  -resolved with IVF hydration.       Constipation  -added pericolace and miralax    Severe candida infection in perineum, labia, buttocks  -continue mystatin cream ordered in ER. Status post Diflucan  -wound care consult appreciated     Generalized weakness and gait difficulty   -consult pt/ot     SVT   HTN / HLD  -continue metoprolol and statin  -having runs of SVT 11/07. Cardiology input appreciated     Hypothyroid  -continue levothyroxine. TSH 3.6     Depression and anxiety  -continue paxil with xanax prn     Severe obesity  40 or above Morbid obesity / Body mass index is 44.42 kg/m². Code:  Discussed, full  DVT prophylaxis: lovenox  Surrogate decision maker:   (but has some dementia per patient) or sister Giselle Wayne  HANY: Likely 11/29       Subjective:     Chief Complaint / Reason for Physician Visit  Denies any complaints. No events overnight. Has some pain around drainage site.     review of Systems:  Symptom Y/N Comments  Symptom Y/N Comments   Fever/Chills n   Chest Pain n    Poor Appetite    Edema n    Cough n   Abdominal Pain y    Sputum    Joint Pain     SOB/AVALOS n   Pruritis/Rash     Nausea/vomit n   Tolerating PT/OT     Diarrhea n Tolerating Diet y    Constipation    Other       Could NOT obtain due to:      PO intake: No data found. Objective:     VITALS:   Last 24hrs VS reviewed since prior progress note. Most recent are:  Patient Vitals for the past 24 hrs:   Temp Pulse Resp BP SpO2   11/27/21 1021 97.9 °F (36.6 °C) 65 20 90/70 95 %   11/27/21 0733 98.1 °F (36.7 °C) 68 20 103/61 96 %   11/27/21 0317 97.9 °F (36.6 °C) 69 20 98/64 96 %   11/26/21 2258 98.4 °F (36.9 °C) 68 17 121/68 95 %   11/26/21 2125  67  (!) 94/54    11/26/21 2028 98.2 °F (36.8 °C) 63 17 (!) 104/52 94 %   11/26/21 1529 97.9 °F (36.6 °C) 65 17 99/66 96 %   11/26/21 1338  68 16 125/81 97 %   11/26/21 1325  68 16 127/69 98 %   11/26/21 1320  68 19 120/68 97 %   11/26/21 1315  69 15 117/73 97 %   11/26/21 1310  67 13 113/68 97 %   11/26/21 1305  68 19 118/68 97 %   11/26/21 1300  70 19 115/72 99 %   11/26/21 1255  69 15 117/64 98 %   11/26/21 1250  69 19 108/71 100 %   11/26/21 1245  68 12 114/63 100 %   11/26/21 1240  74 15 115/73 100 %   11/26/21 1205  62 18 119/64 100 %   11/26/21 1126  65 18 132/79 96 %       Intake/Output Summary (Last 24 hours) at 11/27/2021 1059  Last data filed at 11/27/2021 0915  Gross per 24 hour   Intake    Output 1225 ml   Net -1225 ml        I had a face to face encounter, and independently examined this patient on 11/27/2021, as outlined below:  PHYSICAL EXAM:  General: WD, WN. Alert, cooperative, no acute distress    EENT:  EOMI. Anicteric sclerae. MMM  Resp:  CTA bilaterally, no wheezing or rales. No accessory muscle use  CV:  Regular  rhythm,  No edema  GI:               Soft nontender  Neurologic:  Alert and oriented X 3, normal speech,   Psych:   Good insight. Not anxious nor agitated  Skin:  No rashes. No jaundice.      Reviewed most current lab test results and cultures  YES  Reviewed most current radiology test results   YES  Review and summation of old records today    NO  Reviewed patient's current orders and [facility] YES  PMH/SH reviewed - no change compared to H&P  ________________________________________________________________________  Care Plan discussed with:    Comments   Patient x    Family      RN x    Care Manager x    Consultant                       x Multidiciplinary team rounds were held today with , nursing, pharmacist and clinical coordinator. Patient's plan of care was discussed; medications were reviewed and discharge planning was addressed. ________________________________________________________________________    Total NON critical care TIME:  30   Minutes     Total CRITICAL CARE TIME Spent:   Minutes non procedure based      Comments   >50% of visit spent in counseling and coordination of care x     This includes time during multidisciplinary rounds if indicated above   ________________________________________________________________________  Umberto Sawyer MD     Procedures: see electronic medical records for all procedures/Xrays and details which were not copied into this note but were reviewed prior to creation of Plan. LABS:  I reviewed today's most current labs and imaging studies. Pertinent labs include:  No results for input(s): WBC, HGB, HCT, PLT, HGBEXT, HCTEXT, PLTEXT, HGBEXT, HCTEXT, PLTEXT in the last 72 hours. No results for input(s): NA, K, CL, CO2, GLU, BUN, CREA, CA, MG, PHOS, ALB, TBIL, TBILI, ALT, INR, INREXT, INREXT in the last 72 hours.     No lab exists for component: SGOT

## 2021-11-27 NOTE — PROGRESS NOTES
End of Shift Note    Bedside shift change report given to [facility] (oncoming nurse) by ALEX Giron (offgoing nurse). Report included the following information SBAR    Shift worked:  7091-9020   Shift summary and any significant changes:     PRN Oxy given x1; PRN Melatonin x1       Concerns for physician to address:  None   Zone phone for oncoming shift:   [phone]     Patient Joshua Escudero  65 y.o.  11/6/2021 10:25 AM by Misbah Pratt MD. Shari Encinas was admitted from Home    Problem List  Patient Active Problem List    Diagnosis Date Noted    Leukocytosis 11/06/2021    Candida vaginitis 11/06/2021    Hyperglycemia 11/06/2021    SIRS (systemic inflammatory response syndrome) ([address] 11/06/2021    MERCEDES (acute kidney injury) ([address] 11/06/2021    Severe sepsis ([address] 11/06/2021    Right hip pain 07/08/2021    Lumbar stenosis with neurogenic claudication 04/03/2017    Rectal polyp 04/06/2016    Morbid obesity ([address] 02/12/2014    Right knee DJD 02/11/2014    Left knee DJD 09/03/2013     Past Medical History:   Diagnosis Date    Adverse effect of anesthesia     O2 DROPS WITH ANESTHESIA    Arthritis     KNEES    Cancer ([address] 03/13/2015    SQUAMOUS CELL CARCINOMA; tonsils and lymph nodes. Removed 3-2015 with radiation    GERD (gastroesophageal reflux disease)     Hypertension     NO LONGER ON MEDICATION    Hypothyroid     HYPOTHYROIDISM    Migraine     Nausea & vomiting     Obesity     Other ill-defined conditions(799.89)     chronic back pain    Psychiatric disorder     anxiety    Psychiatric disorder     panic ATTACKS    SVT (supraventricular tachycardia) ([address] 05/24/2014    Ulcerative colitis        Core Measures:  CVA: No No  CHF:No No  PNA:No No    Activity:  Activity Level:  Up with Assistance  Number times ambulated in hallways past shift: 0  Number of times OOB to chair past shift: 0    Cardiac:   Cardiac Monitoring: Yes      Cardiac Rhythm: Sinus Rhythm    Access:   Current line(s): PIV and midline     Genitourinary:   Urinary status: external catheter    Respiratory:   O2 Device: None (Room air)  Chronic home O2 use?: N/A  Incentive spirometer at bedside: N/A       GI:  Last Bowel Movement Date: 11/25/21  Current diet:  ADULT ORAL NUTRITION SUPPLEMENT Dinner, Breakfast, Lunch; Low Calorie/High Protein  ADULT DIET Regular  Passing flatus: YES  Tolerating current diet: YES       Pain Management:   Patient states pain is manageable on current regimen: YES    Skin:  Neal Score: 16  Interventions: increase time out of bed, limit briefs and internal/external urinary devices    Patient Safety:  Fall Score:  Total Score: 4  Interventions: bed/chair alarm, gripper socks and pt to call before getting OOB  High Fall Risk: Yes  @Rollbelt  @dexterity to release roll belt  Yes/No ( must document dexterity  here by stating Yes or No here, otherwise this is a restraint and must follow restraint documentation policy.)    DVT prophylaxis:  DVT prophylaxis Med- Yes  DVT prophylaxis SCD or MARCELINA- No     Wounds: (If Applicable)  Wounds- No    Active Consults:  IP CONSULT TO HOSPITALIST  IP CONSULT TO ORTHOPEDIC SURGERY  IP CONSULT TO ORTHOPEDIC SURGERY  IP CONSULT TO CARDIOLOGY  IP CONSULT TO INFECTIOUS DISEASES  IP CONSULT TO VASCULAR SURGERY    Length of Stay:  Expected LOS: 4d 19h  Actual LOS: 21  Discharge Plan: Yes; ALEX Glass

## 2021-11-27 NOTE — PROGRESS NOTES
Problem: Diabetes Self-Management  Goal: *Incorporating nutritional management into lifestyle  Description: Describe effect of type, amount and timing of food on blood glucose; list 3 methods for planning meals. Outcome: Progressing Towards Goal  Goal: *Incorporating physical activity into lifestyle  Description: State effect of exercise on blood glucose levels. Outcome: Progressing Towards Goal  Goal: *Developing strategies to promote health/change behavior  Description: Define the ABC's of diabetes; identify appropriate screenings, schedule and personal plan for screenings. Outcome: Progressing Towards Goal  Goal: *Using medications safely  Description: State effect of diabetes medications on diabetes; name diabetes medication taking, action and side effects. Outcome: Progressing Towards Goal  Goal: *Monitoring blood glucose, interpreting and using results  Description: Identify recommended blood glucose targets  and personal targets. Outcome: Progressing Towards Goal  Goal: *Prevention, detection, treatment of acute complications  Description: List symptoms of hyper- and hypoglycemia; describe how to treat low blood sugar and actions for lowering  high blood glucose level. Outcome: Progressing Towards Goal  Goal: *Prevention, detection and treatment of chronic complications  Description: Define the natural course of diabetes and describe the relationship of blood glucose levels to long term complications of diabetes.   Outcome: Progressing Towards Goal  Goal: *Developing strategies to address psychosocial issues  Description: Describe feelings about living with diabetes; identify support needed and support network  Outcome: Progressing Towards Goal  Goal: *Insulin pump training  Outcome: Progressing Towards Goal  Goal: *Sick day guidelines  Outcome: Progressing Towards Goal  Goal: *Patient Specific Goal (EDIT GOAL, INSERT TEXT)  Outcome: Progressing Towards Goal     Problem: Falls - Risk of  Goal: *Absence of Falls  Description: Document Reynaldo Dallas Fall Risk and appropriate interventions in the flowsheet. Outcome: Progressing Towards Goal  Note: Fall Risk Interventions:  Mobility Interventions: Bed/chair exit alarm, Communicate number of staff needed for ambulation/transfer    Mentation Interventions: Bed/chair exit alarm    Medication Interventions: Bed/chair exit alarm, Patient to call before getting OOB    Elimination Interventions: Bed/chair exit alarm, Call light in reach    History of Falls Interventions: Bed/chair exit alarm         Problem: Pressure Injury - Risk of  Goal: *Prevention of pressure injury  Description: Document Neal Scale and appropriate interventions in the flowsheet.   Outcome: Progressing Towards Goal  Note: Pressure Injury Interventions:  Sensory Interventions: Assess changes in LOC    Moisture Interventions: Internal/External urinary devices, Minimize layers    Activity Interventions: Assess need for specialty bed    Mobility Interventions: Assess need for specialty bed    Nutrition Interventions: Document food/fluid/supplement intake    Friction and Shear Interventions: Minimize layers                Problem: HHS: Day 1  Goal: Off Pathway (Use only if patient is Off Pathway)  Outcome: Progressing Towards Goal  Goal: Activity/Safety  Outcome: Progressing Towards Goal  Goal: Consults, if ordered  Outcome: Progressing Towards Goal  Goal: Diagnostic Test/Procedures  Outcome: Progressing Towards Goal  Goal: Nutrition/Diet  Outcome: Progressing Towards Goal  Goal: Discharge Planning  Outcome: Progressing Towards Goal  Goal: Medications  Outcome: Progressing Towards Goal  Goal: Respiratory  Outcome: Progressing Towards Goal  Goal: Treatments/Interventions/Procedures  Outcome: Progressing Towards Goal  Goal: Psychosocial  Outcome: Progressing Towards Goal  Goal: *Blood glucose decreasing  Outcome: Progressing Towards Goal  Goal: *Potassium normalizing  Outcome: Progressing Towards Goal  Goal: *Adequate urinary output (equal to or greater than 30 milliliters/hour)  Outcome: Progressing Towards Goal  Goal: *Stable cardiac rhythm  Outcome: Progressing Towards Goal     Problem: HHS: Day 2  Goal: Off Pathway (Use only if patient is Off Pathway)  Outcome: Progressing Towards Goal  Goal: Activity/Safety  Outcome: Progressing Towards Goal  Goal: Diagnostic Test/Procedures  Outcome: Progressing Towards Goal  Goal: Nutrition/Diet  Outcome: Progressing Towards Goal  Goal: Discharge Planning  Outcome: Progressing Towards Goal  Goal: Medications  Outcome: Progressing Towards Goal  Goal: Respiratory  Outcome: Progressing Towards Goal  Goal: Treatments/Interventions/Procedures  Outcome: Progressing Towards Goal  Goal: Psychosocial  Outcome: Progressing Towards Goal  Goal: *Blood glucose 80 to 180 mg/dl  Outcome: Progressing Towards Goal  Goal: *Electrolytes within normal limits (dehydration resolved)  Outcome: Progressing Towards Goal  Goal: *Demonstrates progressive activity  Outcome: Progressing Towards Goal  Goal: *Tolerating diet  Outcome: Progressing Towards Goal     Problem: HHS: Day 3  Goal: Off Pathway (Use only if patient is Off Pathway)  Outcome: Progressing Towards Goal  Goal: Activity/Safety  Outcome: Progressing Towards Goal  Goal: Diagnostic Test/Procedures  Outcome: Progressing Towards Goal  Goal: Nutrition/Diet  Outcome: Progressing Towards Goal  Goal: Discharge Planning  Outcome: Progressing Towards Goal  Goal: Medications  Outcome: Progressing Towards Goal  Goal: Treatments/Interventions/Procedures  Outcome: Progressing Towards Goal  Goal: Psychosocial  Outcome: Progressing Towards Goal

## 2021-11-27 NOTE — PROGRESS NOTES
Transition of Care Plan:     RUR: 17% - [facility] & R   Follow up appointments: PCP & specialist as indicated  DME needed: n/a  Transportation at [facility] to transport  [place] or means to access home:  Family has keys  IM Medicare Letter: To be given prior to discharge.   Is patient a BCPI-A Bundle:   No                 If yes, was Bundle Letter given?:   N/A  Caregiver Contact: Son  Discharge Caregiver contacted prior to discharge?  Caregiver to be contacted prior to discharge.       Patient accepted to [facility]. CM spoke with facility staff to verify if patient was on the list for weekend acceptance. Patient was not found on the weekend admission list, anticipated discharge on Monday 11/29, Primary CM will continue to follow patient for discharge planning needs and arrange for services as deemed necessary.      Amadou Pathak, MSN, RN  Care Manager

## 2021-11-28 LAB
GLUCOSE BLD STRIP.AUTO-MCNC: 100 MG/DL (ref 65–117)
GLUCOSE BLD STRIP.AUTO-MCNC: 122 MG/DL (ref 65–117)
GLUCOSE BLD STRIP.AUTO-MCNC: 130 MG/DL (ref 65–117)
GLUCOSE BLD STRIP.AUTO-MCNC: 144 MG/DL (ref 65–117)
SERVICE CMNT-IMP: ABNORMAL
SERVICE CMNT-IMP: NORMAL

## 2021-11-28 PROCEDURE — 74011250637 HC RX REV CODE- 250/637: Performed by: GENERAL ACUTE CARE HOSPITAL

## 2021-11-28 PROCEDURE — 74011250636 HC RX REV CODE- 250/636: Performed by: INTERNAL MEDICINE

## 2021-11-28 PROCEDURE — 74011250636 HC RX REV CODE- 250/636: Performed by: GENERAL ACUTE CARE HOSPITAL

## 2021-11-28 PROCEDURE — 74011636637 HC RX REV CODE- 636/637: Performed by: INTERNAL MEDICINE

## 2021-11-28 PROCEDURE — 74011000258 HC RX REV CODE- 258: Performed by: INTERNAL MEDICINE

## 2021-11-28 PROCEDURE — 74011250637 HC RX REV CODE- 250/637: Performed by: INTERNAL MEDICINE

## 2021-11-28 PROCEDURE — 82962 GLUCOSE BLOOD TEST: CPT

## 2021-11-28 PROCEDURE — 74011250637 HC RX REV CODE- 250/637: Performed by: HOSPITALIST

## 2021-11-28 PROCEDURE — 94760 N-INVAS EAR/PLS OXIMETRY 1: CPT

## 2021-11-28 PROCEDURE — 65660000000 HC RM CCU STEPDOWN

## 2021-11-28 RX ADMIN — ATORVASTATIN CALCIUM 40 MG: 40 TABLET, FILM COATED ORAL at 22:31

## 2021-11-28 RX ADMIN — METOPROLOL TARTRATE 12.5 MG: 25 TABLET, FILM COATED ORAL at 22:30

## 2021-11-28 RX ADMIN — ACETAMINOPHEN 500 MG: 500 TABLET ORAL at 22:30

## 2021-11-28 RX ADMIN — PAROXETINE HYDROCHLORIDE 40 MG: 20 TABLET, FILM COATED ORAL at 09:58

## 2021-11-28 RX ADMIN — LEVOTHYROXINE SODIUM 112 MCG: 0.11 TABLET ORAL at 10:07

## 2021-11-28 RX ADMIN — NYSTATIN OINTMENT: 100000 OINTMENT TOPICAL at 09:00

## 2021-11-28 RX ADMIN — METRONIDAZOLE 500 MG: 250 TABLET ORAL at 22:32

## 2021-11-28 RX ADMIN — CEFEPIME HYDROCHLORIDE 2 G: 2 INJECTION, POWDER, FOR SOLUTION INTRAVENOUS at 06:03

## 2021-11-28 RX ADMIN — ACETAMINOPHEN 500 MG: 500 TABLET ORAL at 09:57

## 2021-11-28 RX ADMIN — NYSTATIN OINTMENT: 100000 OINTMENT TOPICAL at 16:00

## 2021-11-28 RX ADMIN — ASPIRIN 81 MG: 81 TABLET, COATED ORAL at 09:57

## 2021-11-28 RX ADMIN — Medication 10 ML: at 06:03

## 2021-11-28 RX ADMIN — INSULIN GLARGINE 20 UNITS: 100 INJECTION, SOLUTION SUBCUTANEOUS at 09:58

## 2021-11-28 RX ADMIN — ENOXAPARIN SODIUM 120 MG: 100 INJECTION SUBCUTANEOUS at 04:06

## 2021-11-28 RX ADMIN — Medication 1 CAPSULE: at 09:57

## 2021-11-28 RX ADMIN — AMITRIPTYLINE HYDROCHLORIDE 50 MG: 50 TABLET, FILM COATED ORAL at 22:30

## 2021-11-28 RX ADMIN — NYSTATIN OINTMENT: 100000 OINTMENT TOPICAL at 22:34

## 2021-11-28 RX ADMIN — Medication 10 ML: at 14:00

## 2021-11-28 RX ADMIN — Medication 10 ML: at 00:01

## 2021-11-28 RX ADMIN — ACETAMINOPHEN 500 MG: 500 TABLET ORAL at 18:32

## 2021-11-28 RX ADMIN — Medication 10 ML: at 22:29

## 2021-11-28 RX ADMIN — METRONIDAZOLE 500 MG: 250 TABLET ORAL at 09:57

## 2021-11-28 RX ADMIN — CEFEPIME HYDROCHLORIDE 2 G: 2 INJECTION, POWDER, FOR SOLUTION INTRAVENOUS at 22:29

## 2021-11-28 RX ADMIN — ENOXAPARIN SODIUM 120 MG: 100 INJECTION SUBCUTANEOUS at 18:32

## 2021-11-28 RX ADMIN — CEFEPIME HYDROCHLORIDE 2 G: 2 INJECTION, POWDER, FOR SOLUTION INTRAVENOUS at 15:16

## 2021-11-28 RX ADMIN — ACETAMINOPHEN 500 MG: 500 TABLET ORAL at 15:17

## 2021-11-28 RX ADMIN — PANTOPRAZOLE SODIUM 40 MG: 40 TABLET, DELAYED RELEASE ORAL at 10:07

## 2021-11-28 NOTE — PROGRESS NOTES
Hospitalist Progress Note    NAME: Jennifer Cormier   :  1950   MRN:  [id number]       Assessment / Plan:  Septic shock POA rectal temp 96, , lactic 9.2 -->5. 3   Streptococcus anginosus epidural/right iliopsoas abscess POA  Right paraspinal pain at level L3-4 and R iliac crest pain (complains of Right hip pain), POA   Paraspinal abscess  - Followed by Dr Tolu Mcmillan and Infectious diseases, needs close follow-up as outpatient  -Stop vancomycin  as recommended by ID  -Appreciated orthopedic consult, recommended IR placed drain. Orthopedics plans no surgery for now.   Surgical management on hold for now    -First Drain was placed  draining purulent discharge, repeat CT  showed Resolution, drain removed .  -Though CT on  showed undrained fluid collection on right lower quadrant  -s/p , IR guided Drainage catheter, so far 25 cc output past 24 hours    -Culture from drain fluid is growing Streptococcus anginosus  -Antibiotics as per ID  -Recommendation 8 to 12 weeks, ID will finalize abx and duration      - MRI done on , Rim-enhancing paraspinal soft tissue collections may reflect abscesses as  well, and partially visualized abscess inferior to the right kidney again seen, but  incompletely evaluated on this exam.  -For paraspinal abscess, orthopedic recommends conservative management,   -Successful aspiration of posterior paraspinal abscess     -FOLLOW cultures from paraspinal and right lower quadrant abscess aspirate    Celiac Artery Thrombosis with splenic Infarcts:  -CT ABD/Pelvis  showed celiac artery thrombosis with splenic infarcts  CT abdomen pelvis today confirmed the celiac artery occlusion with splenic infarcts  Vascular consult appreciated  Renal function stable, switched heparin drip to Lovenox injection 1 mg/kg every 12 hours, continue until definitive decision about surgical debridement  Change to NOAC if no clear abscesses needing drainage    - will switch to Eliquis on discharge    UTI  -Ucx E coli  -sensitive to cefepime   -Rea placed in ER due to poor mobility from hip pain and polyuria, with severe candidal infection in perineum and labia and buttocks. Removing Rea catheter and start voiding trial.  Patient advised to schedule empty bladder every 2 hours. Use pure wick catheter    Type 2 DM, uncontrolled with hyperosmolar hyperglycemia and early DKA POA  - presented with , AG 15, trace ketone in urine  - Required insulin gtt  - A1c 11.7 up from 7.6 in July. - continue holding metformin  - Continue Lantus 35 units. Continue SSI prn  - Blood sugars are better controlled    Bilateral feet neuropathy, suspected DM related  -Check B12/folic acid level    Prerenal MERCEDES Cr 1.5  -resolved with IVF hydration.       Constipation  -added pericolace and miralax    Severe candida infection in perineum, labia, buttocks  -continue mystatin cream ordered in ER. Status post Diflucan  -wound care consult appreciated     Generalized weakness and gait difficulty   -consult pt/ot     SVT   HTN / HLD  -continue metoprolol and statin       Hypothyroid  -continue levothyroxine. TSH 3.6     Depression and anxiety  -continue paxil with xanax prn     Severe obesity  40 or above Morbid obesity / Body mass index is 44.42 kg/m². Code:  Discussed, full  DVT prophylaxis: lovenox  Surrogate decision maker:   (but has some dementia per patient) or sister Adair Zamora    HANY: Likely 11/29-11/30       Subjective:     Chief Complaint / Reason for Physician Visit  Drain output not recorded past 24 hours. Reports bilateral feet numbness mainly on the plantar surface.     review of Systems:  Symptom Y/N Comments  Symptom Y/N Comments   Fever/Chills n   Chest Pain n    Poor Appetite    Edema n    Cough n   Abdominal Pain y    Sputum    Joint Pain     SOB/AVALOS n   Pruritis/Rash     Nausea/vomit n   Tolerating PT/OT     Diarrhea n   Tolerating Diet y    Constipation    Other       Could NOT obtain due to:      PO intake: No data found. Objective:     VITALS:   Last 24hrs VS reviewed since prior progress note. Most recent are:  Patient Vitals for the past 24 hrs:   Temp Pulse Resp BP SpO2   11/28/21 0336 98.2 °F (36.8 °C) 65 18 101/60 94 %   11/27/21 2352 98.1 °F (36.7 °C) 72 16 119/74 95 %   11/27/21 2042 97.8 °F (36.6 °C) 76 16 129/82 97 %   11/27/21 1444 97.8 °F (36.6 °C) 79 20 91/62 98 %   11/27/21 1021 97.9 °F (36.6 °C) 65 20 90/70 95 %     No intake or output data in the 24 hours ending 11/28/21 0849     I had a face to face encounter, and independently examined this patient on 11/28/2021, as outlined below:  PHYSICAL EXAM:  General: WD, WN. Alert, cooperative, no acute distress    EENT:  EOMI. Anicteric sclerae. MMM  Resp:  CTA bilaterally, no wheezing or rales. No accessory muscle use  CV:  Regular  rhythm,  No edema  GI:               Soft nontender  Neurologic:  Alert and oriented X 3, normal speech,   Psych:   Good insight. Not anxious nor agitated  Skin:  No rashes. No jaundice. Reviewed most current lab test results and cultures  YES  Reviewed most current radiology test results   YES  Review and summation of old records today    NO  Reviewed patient's current orders and MAR    YES  PMH/ reviewed - no change compared to H&P  ________________________________________________________________________  Care Plan discussed with:    Comments   Patient x    Family      RN x    Care Manager x    Consultant                       x Multidiciplinary team rounds were held today with , nursing, pharmacist and clinical coordinator. Patient's plan of care was discussed; medications were reviewed and discharge planning was addressed.      ________________________________________________________________________    Total NON critical care TIME:  30   Minutes     Total CRITICAL CARE TIME Spent:   Minutes non procedure based      Comments >50% of visit spent in counseling and coordination of care x     This includes time during multidisciplinary rounds if indicated above   ________________________________________________________________________  Nilson Broussard MD     Procedures: see electronic medical records for all procedures/Xrays and details which were not copied into this note but were reviewed prior to creation of Plan. LABS:  I reviewed today's most current labs and imaging studies. Pertinent labs include:  No results for input(s): WBC, HGB, HCT, PLT, HGBEXT, HCTEXT, PLTEXT, HGBEXT, HCTEXT, PLTEXT in the last 72 hours. No results for input(s): NA, K, CL, CO2, GLU, BUN, CREA, CA, MG, PHOS, ALB, TBIL, TBILI, ALT, INR, INREXT, INREXT in the last 72 hours.     No lab exists for component: SGOT

## 2021-11-28 NOTE — PROGRESS NOTES
Occupational Therapy Re-Evaluation/Treatment Attempt    Chart reviewed. Attempted Occupational Therapy Evaluation/Treatment, however, patient unable to be seen due to:  []  Nausea/vomiting  []  Eating  []  Pain  []  Patient too lethargic  []  Off Unit for testing/procedure  []  Dialysis treatment in progress   []  Telemetry Results  [x]  Other: Pt reports not sleeping at all last night    Will follow up later as patient's schedule allows.    Thank you for this referral.  Kathrine Mcbride, OTR/L, CSRS, CDCS, CFPS

## 2021-11-28 NOTE — PROGRESS NOTES
Bedside and Verbal shift change report given to [facility] RN (oncoming nurse) by Raiza Traylor RN (offgoing nurse). Report included the following information SBAR, Kardex, Intake/Output, MAR, Accordion, Recent Results and Med Rec Status.

## 2021-11-28 NOTE — PROGRESS NOTES
End of Shift Note    Bedside shift change report given to Amy Nguyen (oncoming nurse) by Yenny Marsh RN (offgoing nurse). Report included the following information SBAR, Kardex, Intake/Output, MAR and Recent Results    Shift worked:  7a-7p     Shift summary and any significant changes:     pt slept most of shift. No isulin coverage required, pt refused miralax and stool softeners. Foam changed on sacrum, some bloody drainage.  Uneventful shift     Concerns for physician to address:  none     Zone phone for oncoming shift:   [phone]

## 2021-11-29 LAB
FOLATE SERPL-MCNC: 17.4 NG/ML (ref 5–21)
GLUCOSE BLD STRIP.AUTO-MCNC: 121 MG/DL (ref 65–117)
GLUCOSE BLD STRIP.AUTO-MCNC: 128 MG/DL (ref 65–117)
GLUCOSE BLD STRIP.AUTO-MCNC: 149 MG/DL (ref 65–117)
GLUCOSE BLD STRIP.AUTO-MCNC: 159 MG/DL (ref 65–117)
SERVICE CMNT-IMP: ABNORMAL
VIT B12 SERPL-MCNC: 627 PG/ML (ref 193–986)

## 2021-11-29 PROCEDURE — 82746 ASSAY OF FOLIC ACID SERUM: CPT

## 2021-11-29 PROCEDURE — 36415 COLL VENOUS BLD VENIPUNCTURE: CPT

## 2021-11-29 PROCEDURE — 74011636637 HC RX REV CODE- 636/637: Performed by: INTERNAL MEDICINE

## 2021-11-29 PROCEDURE — 74011250636 HC RX REV CODE- 250/636: Performed by: GENERAL ACUTE CARE HOSPITAL

## 2021-11-29 PROCEDURE — 74011250636 HC RX REV CODE- 250/636: Performed by: INTERNAL MEDICINE

## 2021-11-29 PROCEDURE — 97116 GAIT TRAINING THERAPY: CPT

## 2021-11-29 PROCEDURE — 94760 N-INVAS EAR/PLS OXIMETRY 1: CPT

## 2021-11-29 PROCEDURE — 82607 VITAMIN B-12: CPT

## 2021-11-29 PROCEDURE — 74011000258 HC RX REV CODE- 258: Performed by: INTERNAL MEDICINE

## 2021-11-29 PROCEDURE — 74011250637 HC RX REV CODE- 250/637: Performed by: HOSPITALIST

## 2021-11-29 PROCEDURE — 74011250637 HC RX REV CODE- 250/637: Performed by: GENERAL ACUTE CARE HOSPITAL

## 2021-11-29 PROCEDURE — 74011250637 HC RX REV CODE- 250/637: Performed by: NURSE PRACTITIONER

## 2021-11-29 PROCEDURE — 74011250637 HC RX REV CODE- 250/637: Performed by: INTERNAL MEDICINE

## 2021-11-29 PROCEDURE — 97535 SELF CARE MNGMENT TRAINING: CPT

## 2021-11-29 PROCEDURE — 82962 GLUCOSE BLOOD TEST: CPT

## 2021-11-29 PROCEDURE — 99233 SBSQ HOSP IP/OBS HIGH 50: CPT | Performed by: STUDENT IN AN ORGANIZED HEALTH CARE EDUCATION/TRAINING PROGRAM

## 2021-11-29 PROCEDURE — 65660000000 HC RM CCU STEPDOWN

## 2021-11-29 RX ADMIN — INSULIN GLARGINE 20 UNITS: 100 INJECTION, SOLUTION SUBCUTANEOUS at 09:03

## 2021-11-29 RX ADMIN — Medication 10 ML: at 14:08

## 2021-11-29 RX ADMIN — LEVOTHYROXINE SODIUM 112 MCG: 0.11 TABLET ORAL at 09:02

## 2021-11-29 RX ADMIN — METRONIDAZOLE 500 MG: 250 TABLET ORAL at 21:13

## 2021-11-29 RX ADMIN — METRONIDAZOLE 500 MG: 250 TABLET ORAL at 09:02

## 2021-11-29 RX ADMIN — CEFEPIME HYDROCHLORIDE 2 G: 2 INJECTION, POWDER, FOR SOLUTION INTRAVENOUS at 22:44

## 2021-11-29 RX ADMIN — ACETAMINOPHEN 500 MG: 500 TABLET ORAL at 09:02

## 2021-11-29 RX ADMIN — Medication 1 CAPSULE: at 09:02

## 2021-11-29 RX ADMIN — ACETAMINOPHEN 500 MG: 500 TABLET ORAL at 12:14

## 2021-11-29 RX ADMIN — Medication 10 ML: at 05:40

## 2021-11-29 RX ADMIN — ENOXAPARIN SODIUM 120 MG: 100 INJECTION SUBCUTANEOUS at 03:08

## 2021-11-29 RX ADMIN — PAROXETINE HYDROCHLORIDE 40 MG: 20 TABLET, FILM COATED ORAL at 09:02

## 2021-11-29 RX ADMIN — PANTOPRAZOLE SODIUM 40 MG: 40 TABLET, DELAYED RELEASE ORAL at 09:02

## 2021-11-29 RX ADMIN — ACETAMINOPHEN 500 MG: 500 TABLET ORAL at 22:52

## 2021-11-29 RX ADMIN — CEFEPIME HYDROCHLORIDE 2 G: 2 INJECTION, POWDER, FOR SOLUTION INTRAVENOUS at 05:40

## 2021-11-29 RX ADMIN — ENOXAPARIN SODIUM 120 MG: 100 INJECTION SUBCUTANEOUS at 17:29

## 2021-11-29 RX ADMIN — NYSTATIN OINTMENT: 100000 OINTMENT TOPICAL at 23:04

## 2021-11-29 RX ADMIN — ASPIRIN 81 MG: 81 TABLET, COATED ORAL at 09:02

## 2021-11-29 RX ADMIN — ATORVASTATIN CALCIUM 40 MG: 40 TABLET, FILM COATED ORAL at 22:52

## 2021-11-29 RX ADMIN — NYSTATIN OINTMENT: 100000 OINTMENT TOPICAL at 17:30

## 2021-11-29 RX ADMIN — INSULIN LISPRO 2 UNITS: 100 INJECTION, SOLUTION INTRAVENOUS; SUBCUTANEOUS at 09:01

## 2021-11-29 RX ADMIN — ACETAMINOPHEN 500 MG: 500 TABLET ORAL at 17:29

## 2021-11-29 RX ADMIN — CEFEPIME HYDROCHLORIDE 2 G: 2 INJECTION, POWDER, FOR SOLUTION INTRAVENOUS at 14:07

## 2021-11-29 RX ADMIN — NYSTATIN OINTMENT: 100000 OINTMENT TOPICAL at 09:05

## 2021-11-29 RX ADMIN — AMITRIPTYLINE HYDROCHLORIDE 50 MG: 50 TABLET, FILM COATED ORAL at 22:52

## 2021-11-29 RX ADMIN — MELATONIN 6 MG: at 03:08

## 2021-11-29 RX ADMIN — METOPROLOL TARTRATE 12.5 MG: 25 TABLET, FILM COATED ORAL at 22:55

## 2021-11-29 RX ADMIN — METOPROLOL TARTRATE 25 MG: 25 TABLET, FILM COATED ORAL at 09:02

## 2021-11-29 NOTE — PROGRESS NOTES
Hospitalist Progress Note    NAME: Francis Sal   :  1950   MRN:  [id number]       Assessment / Plan:  Septic shock POA rectal temp 96, , lactic 9.2 -->5. 3   Streptococcus anginosus epidural/right iliopsoas abscess POA  Right paraspinal pain at level L3-4 and R iliac crest pain (complains of Right hip pain), POA   Paraspinal abscess  - Followed by Dr Larry Baez and Infectious diseases, needs close follow-up as outpatient  -Stop vancomycin  as recommended by ID  -Appreciated orthopedic consult, recommended IR placed drain. Orthopedics plans no surgery for now.   Surgical management on hold for now    -First Drain was placed  draining purulent discharge, repeat CT  showed Resolution, drain removed .  -Though CT on  showed undrained fluid collection on right lower quadrant  -s/p , IR guided Drainage catheter, so far 25 cc output past 24 hours    -Culture from drain fluid is growing Streptococcus anginosus  -Antibiotics as per ID  -Recommendation 8 to 12 weeks, ID will finalize abx and duration  Orthopedic surgery was called by ID due to concern of ongoing pus/blood drainage      - MRI done on , Rim-enhancing paraspinal soft tissue collections may reflect abscesses as  well, and partially visualized abscess inferior to the right kidney again seen, but  incompletely evaluated on this exam.  -For paraspinal abscess, orthopedic recommends conservative management,   -Successful aspiration of posterior paraspinal abscess     -FOLLOW cultures from paraspinal and right lower quadrant abscess aspirate    Celiac Artery Thrombosis with splenic Infarcts:  -CT ABD/Pelvis  showed celiac artery thrombosis with splenic infarcts  CT abdomen pelvis today confirmed the celiac artery occlusion with splenic infarcts  Vascular consult appreciated  Renal function stable, switched heparin drip to Lovenox injection 1 mg/kg every 12 hours, continue until definitive decision about surgical debridement  I will change to Eliquis on discharge      UTI  -Ucx E coli  -sensitive to cefepime   -Rea placed in ER due to poor mobility from hip pain and polyuria, with severe candidal infection in perineum and labia and buttocks. Removing Rea catheter and start voiding trial.  Patient advised to schedule empty bladder every 2 hours. Use pure wick catheter    Type 2 DM, uncontrolled with hyperosmolar hyperglycemia and early DKA POA  - presented with , AG 15, trace ketone in urine  - Required insulin gtt  - A1c 11.7 up from 7.6 in July. - continue holding metformin  - Continue Lantus 35 units. Continue SSI prn  - Blood sugars are better controlled    Bilateral feet neuropathy, suspected DM related  -B00 and folic acid within normal limits  No epidural abscess was seen on previous MRI    Prerenal MERCEDES Cr 1.5  -resolved with IVF hydration.       Constipation  -added pericolace and miralax    Severe candida infection in perineum, labia, buttocks  -continue mystatin cream ordered in ER. Status post Diflucan  -wound care consult appreciated     Generalized weakness and gait difficulty   -consult pt/ot     SVT   HTN / HLD  -continue metoprolol and statin       Hypothyroid  -continue levothyroxine. TSH 3.6     Depression and anxiety  -continue paxil with xanax prn     Severe obesity  40 or above Morbid obesity / Body mass index is 44.42 kg/m². Code:  Discussed, full  DVT prophylaxis: lovenox  Surrogate decision maker:   (but has some dementia per patient) or sister Adair Zamora    HANY: Likely 11/30       Subjective:     Patient was seen and examined. No acute events overnight. Discussed with RN overnight events. All patient's questions were answered.     \"doing well\"    review of Systems:  Symptom Y/N Comments  Symptom Y/N Comments   Fever/Chills n   Chest Pain n    Poor Appetite    Edema n    Cough n   Abdominal Pain y    Sputum    Joint Pain SOB/AVALOS n   Pruritis/Rash     Nausea/vomit n   Tolerating PT/OT     Diarrhea n   Tolerating Diet y    Constipation    Other       Could NOT obtain due to:      PO intake: No data found. Objective:     VITALS:   Last 24hrs VS reviewed since prior progress note. Most recent are:  Patient Vitals for the past 24 hrs:   Temp Pulse Resp BP SpO2   11/29/21 0800 97.8 °F (36.6 °C) 78 18 116/69 96 %   11/29/21 0244 97.8 °F (36.6 °C) 80 16 115/76 96 %   11/28/21 2300 98.4 °F (36.9 °C) 84 16 120/70 96 %   11/28/21 2016 98.1 °F (36.7 °C) 83 18 121/72 95 %   11/28/21 1521 98 °F (36.7 °C) 73 18 (!) 125/90 95 %       Intake/Output Summary (Last 24 hours) at 11/29/2021 1243  Last data filed at 11/29/2021 0546  Gross per 24 hour   Intake    Output 1530 ml   Net -1530 ml        I had a face to face encounter, and independently examined this patient on 11/29/2021, as outlined below:  PHYSICAL EXAM:  General: WD, WN. Alert, cooperative, no acute distress    EENT:  EOMI. Anicteric sclerae. MMM  Resp:  CTA bilaterally, no wheezing or rales. No accessory muscle use  CV:  Regular  rhythm,  No edema  GI:               Soft nontender  Neurologic:  Alert and oriented X 3, normal speech,   Psych:   Good insight. Not anxious nor agitated  Skin:  No rashes. No jaundice. Reviewed most current lab test results and cultures  YES  Reviewed most current radiology test results   YES  Review and summation of old records today    NO  Reviewed patient's current orders and MAR    YES  PMH/SH reviewed - no change compared to H&P  ________________________________________________________________________  Care Plan discussed with:    Comments   Patient x    Family      RN x    Care Manager x    Consultant                       x Multidiciplinary team rounds were held today with , nursing, pharmacist and clinical coordinator. Patient's plan of care was discussed; medications were reviewed and discharge planning was addressed. ________________________________________________________________________    Total NON critical care TIME:  30   Minutes     Total CRITICAL CARE TIME Spent:   Minutes non procedure based      Comments   >50% of visit spent in counseling and coordination of care x     This includes time during multidisciplinary rounds if indicated above   ________________________________________________________________________  Fortino Naqvi MD     Procedures: see electronic medical records for all procedures/Xrays and details which were not copied into this note but were reviewed prior to creation of Plan. LABS:  I reviewed today's most current labs and imaging studies. Pertinent labs include:  No results for input(s): WBC, HGB, HCT, PLT, HGBEXT, HCTEXT, PLTEXT, HGBEXT, HCTEXT, PLTEXT in the last 72 hours. No results for input(s): NA, K, CL, CO2, GLU, BUN, CREA, CA, MG, PHOS, ALB, TBIL, TBILI, ALT, INR, INREXT, INREXT in the last 72 hours.     No lab exists for component: SGOT

## 2021-11-29 NOTE — PROGRESS NOTES
Problem: Mobility Impaired (Adult and Pediatric)  Goal: *Acute Goals and Plan of Care (Insert Text)  Description: FUNCTIONAL STATUS PRIOR TO ADMISSION: Ambulates household distances with RW support vs uses RW, stating she mostly has been relying on WC recently. History of MULTIPLE falls. Caregiver for  with dementia. HOME SUPPORT PRIOR TO ADMISSION: The patient lived with  however states he has dementia. Physical Therapy Goals    Initiated 11/10/2021  1. Patient will move from supine to sit and sit to supine , scoot up and down, and roll side to side in bed with supervision/set-up within 7 day(s). 2.  Patient will transfer from bed to chair and chair to bed with minimal assistance/contact guard assist using the least restrictive device within 7 day(s). 3.  Patient will perform sit to stand with minimal assistance/contact guard assist within 7 day(s). 4.  Patient will ambulate with minimal assistance/contact guard assist for 10 feet with the least restrictive device within 7 day(s). Re-Assessment 11/17/21 Re-Assessment, goals achieved and upgraded    1. Patient will move from supine to sit and sit to supine , scoot up and down, and roll side to side in bed with mod indep within 7 day(s). 2.  Patient will transfer from bed to chair and chair to bed with supervision   using the least restrictive device within 7 day(s). 3.  Patient will perform sit to stand with supervision within 7 day(s). 4.  Patient will ambulate with supervision  for 50 feet with the least restrictive device within 7 day(s). Reassessed 11/29/2021, goals not met and continue progress    1. Patient will move from supine to sit and sit to supine , scoot up and down, and roll side to side in bed with mod indep within 7 day(s). 2.  Patient will transfer from bed to chair and chair to bed with supervision   using the least restrictive device within 7 day(s).   3.  Patient will perform sit to stand with supervision within 7 day(s). 4.  Patient will ambulate with supervision  for 50 feet with the least restrictive device within 7 day(s). Outcome: Progressing Towards Goal   PHYSICAL THERAPY TREATMENT: WEEKLY REASSESSMENT  Patient: Jennifer Cormier (10 y.o. female)  Date: 11/29/2021  Primary Diagnosis: Hyperglycemia [R73.9]  MERCEDES (acute kidney injury) ([facility] [N17.9]  Leukocytosis [D72.829]  SIRS (systemic inflammatory response syndrome) ([facility]) [R65.10]  Candida vaginitis [B37.3]        Precautions:   Fall, Back, WBAT      ASSESSMENT  Patient continues with skilled PT services and is progressing towards goals. Pt received supine in bed and agreeable to therapy. Pt tolerated session fairly well, but remain limited by generalized weakness, decreased functional mobility, impaired balance and gait, decreased tolerance to activity, pain. Pt completed bed mobility via log roll technique with SBA and use of bed railings to assist. Pt performed sit<>stand with RW with CGA with verbal cues for proper hand placement. Pt tolerated gait training x 20 ft with RW with CGA demonstrating decreased christina, wide MALINDA. Pt reported LE fatigue and weakness. Pt encouraged to remain OOB and mobilize with RN staff more frequently. Recommend SNF placement upon discharge. Patient's progression toward goals since last assessment: progress has been made towards goals, but none have met    Current Level of Function Impacting Discharge (mobility/balance): CGA transfers with RW, gait training x 20 ft with RW           PLAN :  Goals have been updated based on progression since last assessment. Patient continues to benefit from skilled intervention to address the above impairments.     Recommendations and Planned Interventions: bed mobility training, transfer training, gait training, therapeutic exercises, neuromuscular re-education, patient and family training/education, and therapeutic activities      Frequency/Duration: Patient will be followed by physical therapy:  3 times a week to address goals. Recommendation for discharge: (in order for the patient to meet his/her long term goals)  Therapy up to 5 days/week in SNF setting    This discharge recommendation:  Has been made in collaboration with the attending provider and/or case management    IF patient discharges home will need the following DME: to be determined (TBD)         SUBJECTIVE:   Patient stated I am not myself today.     OBJECTIVE DATA SUMMARY:   HISTORY:    Past Medical History:   Diagnosis Date    Adverse effect of anesthesia     O2 DROPS WITH ANESTHESIA    Arthritis     KNEES    [facility] 03/13/2015    SQUAMOUS CELL CARCINOMA; tonsils and lymph nodes.   Removed 3-2015 with radiation    GERD (gastroesophageal reflux disease)     Hypertension     NO LONGER ON MEDICATION    Hypothyroid     HYPOTHYROIDISM    Migraine     Nausea & vomiting     Obesity     Other ill-defined conditions(799.89)     chronic back pain    Psychiatric disorder     anxiety    Psychiatric disorder     panic ATTACKS    SVT (supraventricular tachycardia) ([facility] 05/24/2014    Ulcerative colitis      Past Surgical History:   Procedure Laterality Date    COLONOSCOPY,DIAGNOSTIC  11/19/2015         HX GI      colonscopy    HX HEENT  03/2015    tonsillectomy (CANCER) AND NECK LYMPH NODES    HX HEENT  2008    PARATHYROID REMOVAL    HX HEENT Left 2002    EYE LASER    HX HEMORRHOIDECTOMY  2001, 2016     X2    HX HYSTERECTOMY  1985    HX KNEE ARTHROSCOPY  1995    left knee surgery, TOTAL LT  and Right KNEE 2013    HX KNEE REPLACEMENT Bilateral 2013, 2014    HX LAP CHOLECYSTECTOMY  2002    HX LUMBAR FUSION  2011    SPINAL FUSION with hardware L4-L5    HX ORTHOPAEDIC  1990    CYST REMOVED RIGHT WRIST    HX ORTHOPAEDIC  05/12/2021    L2-3 & L5-21 LUMBAR LAMINECTOMY WITH ANDREWS OSTEOTOMY    HX OTHER SURGICAL      cyst removal right wrist    HX UROLOGICAL  2010    bladder surgery(interstim device)    IR INJ FORAMIN EPID LUMB ANES/STER SNGL  8/19/2019    [facility] EGD TRANSORAL BIOPSY SINGLE/MULTIPLE  5/20/2013            Personal factors and/or comorbidities impacting plan of care:     Home Situation  Home Environment: Private residence  # Steps to Enter: 0  Wheelchair Ramp: Yes  One/Two Story Residence: One story  Living Alone: No  Support Systems: Spouse/Significant Other (with dementia whom she is caregiver to)  Patient Expects to be Discharged to[de-identified] House (vs rehab)  Current DME Used/Available at Home: Wheelchair, Walker, rolling, Tub transfer bench, Commode, bedside, Cane, straight  Tub or Shower Type: Tub/Shower combination    EXAMINATION/PRESENTATION/DECISION MAKING:   Critical Behavior:  Neurologic State: Alert  Orientation Level: Oriented X4  Cognition: Appropriate decision making  Safety/Judgement: Awareness of environment, Fall prevention  Hearing:   Auditory  Auditory Impairment: None    Range Of Motion:  AROM: Generally decreased, functional                       Strength:    Strength: Generally decreased, functional                    Tone & Sensation:                                  Coordination:     Vision:      Functional Mobility:  Bed Mobility:  Rolling: Stand-by assistance  Supine to Sit: Stand-by assistance  Sit to Supine: Stand-by assistance     Transfers:  Sit to Stand: Contact guard assistance  Stand to Sit: Contact guard assistance                       Balance:   Sitting: Intact  Standing: Impaired  Standing - Static: Good  Standing - Dynamic : Fair  Ambulation/Gait Training:  Distance (ft): 20 Feet (ft)  Assistive Device: Gait belt; Walker, rolling  Ambulation - Level of Assistance: Contact guard assistance        Gait Abnormalities: Decreased step clearance        Base of Support: Widened     Speed/Catherine: Pace decreased (<100 feet/min)  Step Length: Right shortened; Left shortened          Pain Rating:  Pt reported minimal pain at drain site    Activity Tolerance:   Good and Fair    After treatment patient left in no apparent distress:   Supine in bed, Call bell within reach, and Side rails x 3    COMMUNICATION/EDUCATION:   The patients plan of care was discussed with: Occupational therapist and Registered nurse. Fall prevention education was provided and the patient/caregiver indicated understanding., Patient/family have participated as able in goal setting and plan of care. , and Patient/family agree to work toward stated goals and plan of care.     Thank you for this referral.  Brendon Whitman, PT, DPT   Time Calculation: 13 mins

## 2021-11-29 NOTE — PROGRESS NOTES
Infectious Disease Progress Note         Interval:  NAEO    Subjective:   Patient seen this morning. She reports feeling overall a little bit better since last week. However, she is reporting new numbness on the soles of her left foot (said has chronic right foot neuropathy) and also some weakness in her legs. She said she is afraid she might fall again if she takes too many steps. Denied fevers and chills. Pain in the back is stable. Objective:    Vitals:   Reviewed in chart. Physical Exam:  ?   ? GEN: NAD, patient appears nontoxic.  ? HEENT: Normocephalic, atraumatic, PERRL, no scleral icterus  ? CV: HDS  ? Lungs: room air  ? Abdomen: soft, non distended, non tender, drain in right psoas abscess  ? Genitourinary:  no mittal  ? Extremities: no edema  ? Neuro: Alert, oriented to time, place and situation, moves all extremities to commands, verbal   ? Skin: inferior aspect of the lumbar wound has an opening which is expressing blood to pus-appearing fluid. There is no surrounding inflammation of tenderness. ? Psych: good affect, good eye contact, non tearful   ? Lines: PIVs, right psoas abscess drain with purulent drainage.           Labs:  Recent Results (from the past 24 hour(s))   GLUCOSE, POC    Collection Time: 11/28/21 11:25 AM   Result Value Ref Range    Glucose (POC) 130 (H) 65 - 117 mg/dL    Performed by Serenity Walker RN    GLUCOSE, POC    Collection Time: 11/28/21  4:35 PM   Result Value Ref Range    Glucose (POC) 100 65 - 117 mg/dL    Performed by Serenity Walker RN    GLUCOSE, POC    Collection Time: 11/28/21  9:01 PM   Result Value Ref Range    Glucose (POC) 144 (H) 65 - 117 mg/dL    Performed by Juanita Sun    VITAMIN B12    Collection Time: 11/29/21  2:49 AM   Result Value Ref Range    Vitamin B12 627 193 - 986 pg/mL   FOLATE    Collection Time: 11/29/21  2:49 AM   Result Value Ref Range    Folate 17.4 5.0 - 21.0 ng/mL   GLUCOSE, POC    Collection Time: 11/29/21  7:38 AM   Result Value Ref Range    Glucose (POC) 149 (H) 65 - 117 mg/dL    Performed by Aniyah Jones PCT              Assessment:  64 yo F with:     - Sepsis due to right retroperitoneal abscess 9.7 x 9.1 x 7.5 cm with likely involvement of the lumbar spine. This has likely developed in the setting of poorly healing lumbar wound in July 2021. Patient reports that after the wound VAC care was disrupted when she went home, there was lot of drainage from the lumbar wound. The lumbar wound eventually healed up after the wound VAC was resumed. However this must have created a nidus of infection at that time. Patient is status post drainage and drain placement of the psoas abscess by IR on 11/12/2021. Drainage cultures grew Streptococcus anginosus. On 11/26, underwent CT-guided drain removal and new drain placement into the undrained part of the RLQ abscess. Also had aspiration of one of the paraspinal collections. Cultures growing Strep species.   -History of lumbar fusion surgeries. - Hx of urinary incontinence and multiple diagnosis of UTIs in the past. E.coli in urine this admission.   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   - TTE 11/8 technically difficult. Recommendations:  - Strep anginosus notorious for invasive multiple abscesses. - Today, patient's  inferior aspect of the lumbar wound has an opening which is expressing blood to pus-appearing fluid. There is no surrounding inflammation of tenderness. These areas are corresponding with the paraspinal abscesses seen on the MRI. I will attempt to reach out and discuss the case with Dr. Alanna Krueger.     - Patient reports a new left sole numbness and subjective weakness today in prior days - no epidural abscess was seen. Can continue to monitor for now, though will discuss with Ortho as well.      - Diligent and regular wound care and hygiene needs to be maintained at the lumbar wound given patient is already dealing with a lumbar infection - discussed with nursing at bedside    - Continue cefepime 2 g every 8 hours and metronidazole for now. - Plan for >8 weeks of IV abx.     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 Ruddy Tran last week. Vascular surgery evaluation is appreciated; likely septic emboli however cannot rule out thromboembolic disease and hence on anticoagulation. Will follow up with VS in 6 months. Of note, patient has not had any documented bacteremia this admission. TTE on 11/8/2021 was a technically difficult study however did not comment on presence of any vegetations. Will follow. Thank you for the opportunity to participate in the care of this patient. Please contact with questions or concerns.       Sarai Chapin MD  Infectious Diseases

## 2021-11-29 NOTE — PROGRESS NOTES
Discussed case with Dr. Reji Urena. Will likely be coming to see the patient on 11/30/21. Will decide further course based on that.        Tanika Worthy MD  Infectious Diseases

## 2021-11-29 NOTE — PROGRESS NOTES
Problem: Self Care Deficits Care Plan (Adult)  Goal: *Acute Goals and Plan of Care (Insert Text)  Description: FUNCTIONAL STATUS PRIOR TO ADMISSION: June 2, 2021 with back surgery: prior to surgery very poor sitting and standing tolerance due to P! Went to [address] [place] [address], discharged mod I June 23, 2021, with DME and AE. Fell July 8, 2021 (bumped over [facility] over toilet with RW use.) Returned to [address] [place] [address], with increased fear of falling. Discharge home Mod I at w/c, RW level. Patient was modified independent for basic and instrumental ADLs at w/c and/or RW level except spouse assisted with BM hygiene due to difficulty level. (cares for spouse with dementia who is mod I with self care at w/c and walker level)     Admitted to acute care post fall trying to get out of bed without device 11-06-21. Found to have Post op wound Abscess   Drain placed R flank 11-12;    6-10/10 P! During this re-eval.    Anxiety/panic attack significantly limiting with new distraction/focus on fear of falling. HOME SUPPORT: The patient lived with spouse, but cares for him due to dementia. Has supportive son that lives nearby    Occupational Therapy Goals  Weekly reassessment 11/29/2021  1. Patient will perform grooming with mod I within 7 days. 2.  Patient will perform UB bathing with contact guard assistance using most appropriate DME within 7 days. 3.  Patient will lower body dressing at moderate assistance with A/E within 7 days. 4.  Patient will perform bed mobility at supervision/set-up  within 7 days. 5.  Patient will verbalize/demonstrate 3/3 back precautions during ADL tasks without cues within 7 days. 6.  Patient will complete toilet transfer with mod I in 7 days. Weekly Reassessment 11/22/2021 - Continue all. 1.  Patient will perform grooming with mod I within 7 days. Not met, continue   2. Patient will perform UB bathing with moderate assistance using most appropriate DME within 7 days.  Met, upgraded  3. Patient will lower body dressing at moderate assistance with A/E within 7 days. Not met, continue   4. Patient will perform bed mobility at supervision/set-up within 7 days. Not met, continue   5. Patient will verbalize/demonstrate 3/3 back precautions during ADL tasks without cues within 7 days. - Continues to demonstrate. 6.  Patient will verbalize at least 2 strategies for stress management/panic attack management during every session in 7 days. Discontinue  7. Patient will complete toilet transfer with CGA-S in 7 days. Met, upgraded    Initiated 11/8/2021 Goals reviewed and updated 11-15-21  1. Patient will perform grooming with supervision/set-up within 7 days. Met upgrade to mod I in 7 days  2. Patient will perform UB bathing with moderate assistance  using most appropriate DME within 7 days. Cont for consistency 7 days  3. Patient will lower body dressing at moderate assistance with A/E within 7 days. Cont for consistency for 7 days  4. Patient will perform bed mobility at moderate assistance  within 7 days. Met upgrade to S in 7 days  5. Patient will verbalize/demonstrate 3/3 back precautions during ADL tasks without cues within 7 days. Met  6. Patient will verbalize at least 2 strategies for stress management/panic attack management during every session in 7 days  7. Patient will complete toilet transfer with CGA-S in 7 days    Outcome: Progressing Towards Goal   OCCUPATIONAL THERAPY RE-EVALUATION  Patient: Pooja Faulkner (68 y.o. female)  Date: 11/29/2021  Diagnosis: Hyperglycemia [R73.9]  MERCEDES (acute kidney injury) ([facility] [address] [N17.9]  Leukocytosis [D72.829]  SIRS (systemic inflammatory response syndrome) (HCC) [R65.10]  Candida vaginitis [B37.3]   <principal problem not specified>       Precautions: Fall, Back, WBAT  Chart, occupational therapy assessment, plan of care, and goals were reviewed.     ASSESSMENT  Based on the objective data described below, patient continues to progress in therapy and has met 2/6 OT goals. Patient continues to be limited by general weakness, impaired balance, self-limiting behaviors, back pain, BLE neuropathy, and decreased activity tolerance. Patient received semisupine in bed and agreeable for therapy. Patient completed bed mobility with stand-by assist this session and demonstrated intact sitting balance. Patient unable to don socks without AE and required total assist. Patient completed sit > stand and ambulated around room with contact guard assist using rolling walker. Patient ambulated into bathroom and demonstrated toilet transfer with contact guard assist. Patient stood at sink to complete grooming with contact guard assist and tolerated well. Patient declined sitting in recliner and end of session and returned to supine with stand-by assist. Patient was able to verbalize 2/3 back precautions with cueing required to avoid bending. Patient was left semisupine in bed with all needs met and VSS. Patient encouraged to ambulate to bathroom over using [facility] with RN. Patient would continue to benefit from skilled OT services during acute hospital stay. Recommend patient discharge to SNF rehab. Current Level of Function Impacting Discharge (ADLs): independent to total assist for self-care, stand-by to contact guard assist for functional mobility using rolling walker     Other factors to consider for discharge: fall risk, back precautions          PLAN :  Recommendations and Planned Interventions: self care training, functional mobility training, therapeutic exercise, balance training, therapeutic activities, endurance activities, patient education, home safety training and family training/education    Frequency/Duration: Patient will be followed by occupational therapy 3 times a week to address goals.     Recommendation for discharge: (in order for the patient to meet his/her long term goals)  Therapy up to 5 days/week in SNF setting    This discharge recommendation:  Has been made in collaboration with the attending provider and/or case management    Equipment recommendations for successful discharge (if) home: TBD in SNF rehab        SUBJECTIVE:   Patient stated I can't put my socks on.    OBJECTIVE DATA SUMMARY:     Cognitive/Behavioral Status:  Neurologic State: Alert  Orientation Level: Oriented X4  Cognition: Follows commands  Perception: Appears intact  Perseveration: Perseverates during conversation  Safety/Judgement: Awareness of environment; Fall prevention    Hearing: Auditory  Auditory Impairment: None    Range of Motion:  AROM: Generally decreased, functional    Strength:  Strength: Generally decreased, functional    Coordination:  Coordination: Within functional limits  Fine Motor Skills-Upper: Left Intact; Right Intact    Gross Motor Skills-Upper: Left Intact; Right Intact    Tone & Sensation:  Tone: Normal  Sensation: Impaired (BLE neuropathy )    Functional Mobility and Transfers for ADLs:  Bed Mobility:  Rolling: Stand-by assistance  Supine to Sit: Stand-by assistance  Sit to Supine: Stand-by assistance    Transfers:  Sit to Stand: Contact guard assistance  Stand to Sit: Contact guard assistance   Functional Transfers  Bathroom Mobility: Contact guard assistance  Toilet Transfer : Contact guard assistance  Cues: Tactile cues provided; Verbal cues provided    Balance:  Sitting: Intact  Standing: Impaired  Standing - Static: Good  Standing - Dynamic : Fair    ADL Assessment:  Feeding: Independent  Oral Facial Hygiene/Grooming: Contact guard assistance  Bathing: Moderate assistance  Upper Body Dressing: Minimum assistance  Lower Body Dressing: Total assistance  Toileting: Moderate assistance    ADL Intervention and task modifications:    Grooming  Position Performed: Standing (at sink)  Washing Hands: Contact guard assistance  Cues: Verbal cues provided; Tactile cues provided    Lower Body Dressing Assistance  Socks:  Total assistance (dependent)    Cognitive Retraining  Safety/Judgement: Awareness of environment; Fall prevention    Functional Measure:    Barthel Index:  Bathin  Bladder: 5  Bowels: 10  Groomin  Dressin  Feeding: 10  Mobility: 0  Stairs: 0  Toilet Use: 5  Transfer (Bed to Chair and Back): 10  Total: 45/100      The Barthel ADL Index: Guidelines  1. The index should be used as a record of what a patient does, not as a record of what a patient could do. 2. The main aim is to establish degree of independence from any help, physical or verbal, however minor and for whatever reason. 3. The need for supervision renders the patient not independent. 4. A patient's performance should be established using the best available evidence. Asking the patient, friends/relatives and nurses are the usual sources, but direct observation and common sense are also important. However direct testing is not needed. 5. Usually the patient's performance over the preceding 24-48 hours is important, but occasionally longer periods will be relevant. 6. Middle categories imply that the patient supplies over 50 per cent of the effort. 7. Use of aids to be independent is allowed. Score Interpretation (from [address] [id number])    Independent   60-79 Minimally independent   40-59 Partially dependent   20-39 Very dependent   <20 Totally dependent     -Faith Reilly., Barthel, D.W. (1965). Functional evaluation: the Barthel Index. [facility] ([address] (1997). The Barthel activities of daily living index: self-reporting versus actual performance in the old (> or = 75 years). Journal of [address] [place] [address] 45(7), [facility], ..., [facility] Toby.Yazmin. (1999). Measuring the change in disability after inpatient rehabilitation; comparison of the responsiveness of the Barthel Index and Functional [place] Measure.  Journal of Neurology, Neurosurgery, and Psychiatry, 66(4), [phone]. JEFF Bobo, KATIE Hunter, & Lavinia Mensah M.A. (2004) Assessment of post-stroke quality of life in cost-effectiveness studies: The usefulness of the Barthel Index and the EuroQoL-5D. Quality of Life Research, 13, 427-43     Pain:  Patient c/o back pain. Activity Tolerance:   Fair and requires rest breaks    After treatment patient left in no apparent distress:   Patient positioned in right sidelying for pressure relief, Call bell within reach, and Side rails x 3    COMMUNICATION/COLLABORATION:   The patients plan of care was discussed with: Physical therapist and Registered nurse.      YADIRA Bowling/L  Time Calculation: 11 mins

## 2021-11-29 NOTE — PROGRESS NOTES
End of Shift Note    Bedside shift change report given to Kristyn Denton (oncoming nurse) by Navya Del Rosario RN (offgoing nurse). Report included the following information SBAR, Kardex, Intake/Output, MAR, Accordion, Recent Results and Med Rec Status    Shift worked:  7PM-7AM     Shift summary and any significant changes:     Pt remained awake most of  the  night, requiring melatonin 6 mg for sleep. No  Insulin  Coverage.      Concerns for physician to address:  none     Zone phone for oncoming shift:              Navya Del Rosario RN

## 2021-11-29 NOTE — PROGRESS NOTES
Ortho Spine    Per Dr. Trudy Suarez, patient has drain from most recent psoas abscess that is currently negative for cultures thus far. WBC is wnl. She is currently stable on IV antibiotics per infectious disease. Dr. Trudy Suarez will plan to evaluate her later this week for further treatment planning. To continue with current abx per ID.      Melvi Damon PA-C

## 2021-11-30 LAB
BACTERIA SPEC CULT: ABNORMAL
BACTERIA SPEC CULT: ABNORMAL
BACTERIA SPEC CULT: NORMAL
BACTERIA SPEC CULT: NORMAL
GLUCOSE BLD STRIP.AUTO-MCNC: 123 MG/DL (ref 65–117)
GLUCOSE BLD STRIP.AUTO-MCNC: 128 MG/DL (ref 65–117)
GLUCOSE BLD STRIP.AUTO-MCNC: 149 MG/DL (ref 65–117)
GLUCOSE BLD STRIP.AUTO-MCNC: 155 MG/DL (ref 65–117)
GRAM STN SPEC: NORMAL
GRAM STN SPEC: NORMAL
SERVICE CMNT-IMP: ABNORMAL
SERVICE CMNT-IMP: NORMAL
SERVICE CMNT-IMP: NORMAL

## 2021-11-30 PROCEDURE — 82962 GLUCOSE BLOOD TEST: CPT

## 2021-11-30 PROCEDURE — 97530 THERAPEUTIC ACTIVITIES: CPT

## 2021-11-30 PROCEDURE — 74011636637 HC RX REV CODE- 636/637: Performed by: INTERNAL MEDICINE

## 2021-11-30 PROCEDURE — 74011250636 HC RX REV CODE- 250/636: Performed by: GENERAL ACUTE CARE HOSPITAL

## 2021-11-30 PROCEDURE — 97116 GAIT TRAINING THERAPY: CPT

## 2021-11-30 PROCEDURE — 74011250637 HC RX REV CODE- 250/637: Performed by: HOSPITALIST

## 2021-11-30 PROCEDURE — 74011000258 HC RX REV CODE- 258: Performed by: STUDENT IN AN ORGANIZED HEALTH CARE EDUCATION/TRAINING PROGRAM

## 2021-11-30 PROCEDURE — 74011250636 HC RX REV CODE- 250/636: Performed by: INTERNAL MEDICINE

## 2021-11-30 PROCEDURE — 94760 N-INVAS EAR/PLS OXIMETRY 1: CPT

## 2021-11-30 PROCEDURE — 77030038269 HC DRN EXT URIN PURWCK BARD -A

## 2021-11-30 PROCEDURE — 74011250637 HC RX REV CODE- 250/637: Performed by: GENERAL ACUTE CARE HOSPITAL

## 2021-11-30 PROCEDURE — 74011250636 HC RX REV CODE- 250/636: Performed by: STUDENT IN AN ORGANIZED HEALTH CARE EDUCATION/TRAINING PROGRAM

## 2021-11-30 PROCEDURE — 74011000258 HC RX REV CODE- 258: Performed by: INTERNAL MEDICINE

## 2021-11-30 PROCEDURE — 65660000000 HC RM CCU STEPDOWN

## 2021-11-30 RX ADMIN — ATORVASTATIN CALCIUM 40 MG: 40 TABLET, FILM COATED ORAL at 21:42

## 2021-11-30 RX ADMIN — ACETAMINOPHEN 500 MG: 500 TABLET ORAL at 17:18

## 2021-11-30 RX ADMIN — CEFEPIME HYDROCHLORIDE 2 G: 2 INJECTION, POWDER, FOR SOLUTION INTRAVENOUS at 05:14

## 2021-11-30 RX ADMIN — NYSTATIN OINTMENT: 100000 OINTMENT TOPICAL at 08:03

## 2021-11-30 RX ADMIN — METOPROLOL TARTRATE 25 MG: 25 TABLET, FILM COATED ORAL at 08:02

## 2021-11-30 RX ADMIN — ENOXAPARIN SODIUM 120 MG: 100 INJECTION SUBCUTANEOUS at 15:36

## 2021-11-30 RX ADMIN — PANTOPRAZOLE SODIUM 40 MG: 40 TABLET, DELAYED RELEASE ORAL at 08:02

## 2021-11-30 RX ADMIN — METOPROLOL TARTRATE 12.5 MG: 25 TABLET, FILM COATED ORAL at 21:42

## 2021-11-30 RX ADMIN — LEVOTHYROXINE SODIUM 112 MCG: 0.11 TABLET ORAL at 08:02

## 2021-11-30 RX ADMIN — NYSTATIN OINTMENT: 100000 OINTMENT TOPICAL at 21:44

## 2021-11-30 RX ADMIN — Medication 10 ML: at 05:04

## 2021-11-30 RX ADMIN — Medication 1 CAPSULE: at 08:02

## 2021-11-30 RX ADMIN — CEFTRIAXONE SODIUM 2 G: 2 INJECTION, POWDER, FOR SOLUTION INTRAMUSCULAR; INTRAVENOUS at 07:58

## 2021-11-30 RX ADMIN — ACETAMINOPHEN 500 MG: 500 TABLET ORAL at 21:41

## 2021-11-30 RX ADMIN — ACETAMINOPHEN 500 MG: 500 TABLET ORAL at 13:03

## 2021-11-30 RX ADMIN — ACETAMINOPHEN 500 MG: 500 TABLET ORAL at 09:00

## 2021-11-30 RX ADMIN — NYSTATIN OINTMENT: 100000 OINTMENT TOPICAL at 15:37

## 2021-11-30 RX ADMIN — INSULIN GLARGINE 20 UNITS: 100 INJECTION, SOLUTION SUBCUTANEOUS at 08:04

## 2021-11-30 RX ADMIN — PAROXETINE HYDROCHLORIDE 40 MG: 20 TABLET, FILM COATED ORAL at 08:02

## 2021-11-30 RX ADMIN — INSULIN LISPRO 2 UNITS: 100 INJECTION, SOLUTION INTRAVENOUS; SUBCUTANEOUS at 15:35

## 2021-11-30 RX ADMIN — Medication 10 ML: at 21:42

## 2021-11-30 RX ADMIN — Medication 10 ML: at 13:03

## 2021-11-30 RX ADMIN — ENOXAPARIN SODIUM 120 MG: 100 INJECTION SUBCUTANEOUS at 03:36

## 2021-11-30 RX ADMIN — AMITRIPTYLINE HYDROCHLORIDE 50 MG: 50 TABLET, FILM COATED ORAL at 21:41

## 2021-11-30 RX ADMIN — ASPIRIN 81 MG: 81 TABLET, COATED ORAL at 08:02

## 2021-11-30 NOTE — PROGRESS NOTES
Problem: Mobility Impaired (Adult and Pediatric)  Goal: *Acute Goals and Plan of Care (Insert Text)  Description: FUNCTIONAL STATUS PRIOR TO ADMISSION: Ambulates household distances with RW support vs uses RW, stating she mostly has been relying on WC recently. History of MULTIPLE falls. Caregiver for  with dementia. HOME SUPPORT PRIOR TO ADMISSION: The patient lived with  however states he has dementia. Physical Therapy Goals    Initiated 11/10/2021  1. Patient will move from supine to sit and sit to supine , scoot up and down, and roll side to side in bed with supervision/set-up within 7 day(s). 2.  Patient will transfer from bed to chair and chair to bed with minimal assistance/contact guard assist using the least restrictive device within 7 day(s). 3.  Patient will perform sit to stand with minimal assistance/contact guard assist within 7 day(s). 4.  Patient will ambulate with minimal assistance/contact guard assist for 10 feet with the least restrictive device within 7 day(s). Re-Assessment 11/17/21 Re-Assessment, goals achieved and upgraded    1. Patient will move from supine to sit and sit to supine , scoot up and down, and roll side to side in bed with mod indep within 7 day(s). 2.  Patient will transfer from bed to chair and chair to bed with supervision   using the least restrictive device within 7 day(s). 3.  Patient will perform sit to stand with supervision within 7 day(s). 4.  Patient will ambulate with supervision  for 50 feet with the least restrictive device within 7 day(s). Reassessed 11/29/2021, goals not met and continue progress    1. Patient will move from supine to sit and sit to supine , scoot up and down, and roll side to side in bed with mod indep within 7 day(s). 2.  Patient will transfer from bed to chair and chair to bed with supervision   using the least restrictive device within 7 day(s).   3.  Patient will perform sit to stand with supervision within 7 day(s). 4.  Patient will ambulate with supervision  for 50 feet with the least restrictive device within 7 day(s). Outcome: Progressing Towards Goal    PHYSICAL THERAPY TREATMENT  Patient: Bernard Ghosh (52 y.o. female)  Date: 11/30/2021  Diagnosis: Hyperglycemia [R73.9]  MERCEDES (acute kidney injury) ([facility] [address] [N17.9]  Leukocytosis [D72.829]  SIRS (systemic inflammatory response syndrome) ([facility]) [R65.10]  Candida vaginitis [B37.3]   <principal problem not specified>       Precautions: Fall, Back, WBAT  Chart, physical therapy assessment, plan of care and goals were reviewed. ASSESSMENT  Patient continues with skilled PT services and is progressing towards goals. Pt received supine in bed reporting feeling jittery inside, and willing to work with therapy. Pt limited by anxiety, decreased weakness, independence and endurance with noted SOB with activity with possibility of anxiety driven. Pt able to tolerate bed mobility to R side EOB  with no assistance, followed by STS to RW with CGA. Pt continued with gait training to door of room and back to recliner with noted SOB and reports of her UE/LE feeling weak with increased anxiety. Pt able to sit for seated rest break with PLB and reports of being worried about her , whom she takes care of at home. Pt was able to calm down after talking with rest break. Pt completed session with call bell within reach and all needs met at the time. Rn notified of session. Current Level of Function Impacting Discharge (mobility/balance): CGA for STS, amb up to 30' with RW with anxiety increase near end gait. Other factors to consider for discharge: anxiety, fall risk, history of falls, caregiver of          PLAN :  Patient continues to benefit from skilled intervention to address the above impairments. Continue treatment per established plan of care. to address goals.     Recommendation for discharge: (in order for the patient to meet his/her long term goals)  Therapy 3 hours per day 5-7 days per week/ SNF pending on progress    This discharge recommendation:  Has not yet been discussed the attending provider and/or case management    IF patient discharges home will need the following DME: patient owns DME required for discharge       SUBJECTIVE:   Patient stated [facility]     OBJECTIVE DATA SUMMARY:   Critical Behavior:  Neurologic State: Alert  Orientation Level: Oriented X4  Cognition: Appropriate decision making  Safety/Judgement: Awareness of environment, Fall prevention  Functional Mobility Training:  Bed Mobility:  Rolling: Supervision  Supine to Sit: Supervision  Scooting: Modified independent     Transfers:  Sit to Stand: Contact guard assistance  Stand to Sit: Contact guard assistance     Balance:  Sitting: Intact  Standing: Impaired  Standing - Static: Constant support; Good  Standing - Dynamic : Constant support; Fair  Ambulation/Gait Training:  Distance (ft): 30 Feet (ft)  Assistive Device: Gait belt; Walker, rolling  Ambulation - Level of Assistance: Contact guard assistance  Gait Abnormalities: Decreased step clearance  Base of Support: Widened  Speed/Catherine: Pace decreased (<100 feet/min)  Step Length: Left shortened; Right shortened      Pain Rating:  Reported pain at drain site with bed mobility    Activity Tolerance:   Good, requires rest breaks, and observed SOB with activity -anxiety driven    After treatment patient left in no apparent distress:   Sitting in chair and Call bell within reach    COMMUNICATION/COLLABORATION:   The patients plan of care was discussed with: Registered nurseKasandra Yuan PTA   Time Calculation: 34 mins

## 2021-11-30 NOTE — PROGRESS NOTES
Received about 3 weeks of empiric metronidazole at this point. Will stop this today. Extended course can contribute to peripheral neuropathy. 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 Will follow.        Jono Zurita MD  Infectious Diseases

## 2021-12-01 ENCOUNTER — ANESTHESIA EVENT (OUTPATIENT)
Dept: SURGERY | Age: 71
DRG: 853 | End: 2021-12-01
Payer: MEDICARE

## 2021-12-01 LAB
ALBUMIN SERPL-MCNC: 2.3 G/DL (ref 3.5–5)
ALBUMIN/GLOB SERPL: 0.5 {RATIO} (ref 1.1–2.2)
ALP SERPL-CCNC: 98 U/L (ref 45–117)
ALT SERPL-CCNC: 26 U/L (ref 12–78)
ANION GAP SERPL CALC-SCNC: 6 MMOL/L (ref 5–15)
AST SERPL-CCNC: 36 U/L (ref 15–37)
BILIRUB SERPL-MCNC: 0.5 MG/DL (ref 0.2–1)
BUN SERPL-MCNC: 14 MG/DL (ref 6–20)
BUN/CREAT SERPL: 24 (ref 12–20)
CALCIUM SERPL-MCNC: 9.5 MG/DL (ref 8.5–10.1)
CHLORIDE SERPL-SCNC: 104 MMOL/L (ref 97–108)
CO2 SERPL-SCNC: 23 MMOL/L (ref 21–32)
CREAT SERPL-MCNC: 0.58 MG/DL (ref 0.55–1.02)
GLOBULIN SER CALC-MCNC: 4.9 G/DL (ref 2–4)
GLUCOSE BLD STRIP.AUTO-MCNC: 144 MG/DL (ref 65–117)
GLUCOSE BLD STRIP.AUTO-MCNC: 165 MG/DL (ref 65–117)
GLUCOSE BLD STRIP.AUTO-MCNC: 179 MG/DL (ref 65–117)
GLUCOSE BLD STRIP.AUTO-MCNC: 87 MG/DL (ref 65–117)
GLUCOSE SERPL-MCNC: 117 MG/DL (ref 65–100)
POTASSIUM SERPL-SCNC: 3.9 MMOL/L (ref 3.5–5.1)
PROT SERPL-MCNC: 7.2 G/DL (ref 6.4–8.2)
SERVICE CMNT-IMP: ABNORMAL
SERVICE CMNT-IMP: NORMAL
SODIUM SERPL-SCNC: 133 MMOL/L (ref 136–145)

## 2021-12-01 PROCEDURE — 74011000258 HC RX REV CODE- 258: Performed by: STUDENT IN AN ORGANIZED HEALTH CARE EDUCATION/TRAINING PROGRAM

## 2021-12-01 PROCEDURE — 77010033678 HC OXYGEN DAILY

## 2021-12-01 PROCEDURE — 94760 N-INVAS EAR/PLS OXIMETRY 1: CPT

## 2021-12-01 PROCEDURE — 74011250636 HC RX REV CODE- 250/636: Performed by: GENERAL ACUTE CARE HOSPITAL

## 2021-12-01 PROCEDURE — 82962 GLUCOSE BLOOD TEST: CPT

## 2021-12-01 PROCEDURE — 74011250636 HC RX REV CODE- 250/636: Performed by: STUDENT IN AN ORGANIZED HEALTH CARE EDUCATION/TRAINING PROGRAM

## 2021-12-01 PROCEDURE — 36415 COLL VENOUS BLD VENIPUNCTURE: CPT

## 2021-12-01 PROCEDURE — 65660000000 HC RM CCU STEPDOWN

## 2021-12-01 PROCEDURE — 74011250637 HC RX REV CODE- 250/637: Performed by: HOSPITALIST

## 2021-12-01 PROCEDURE — 74011636637 HC RX REV CODE- 636/637: Performed by: INTERNAL MEDICINE

## 2021-12-01 PROCEDURE — 80053 COMPREHEN METABOLIC PANEL: CPT

## 2021-12-01 PROCEDURE — 74011250637 HC RX REV CODE- 250/637: Performed by: INTERNAL MEDICINE

## 2021-12-01 PROCEDURE — 74011250637 HC RX REV CODE- 250/637: Performed by: GENERAL ACUTE CARE HOSPITAL

## 2021-12-01 PROCEDURE — 99233 SBSQ HOSP IP/OBS HIGH 50: CPT | Performed by: STUDENT IN AN ORGANIZED HEALTH CARE EDUCATION/TRAINING PROGRAM

## 2021-12-01 RX ADMIN — ACETAMINOPHEN 500 MG: 500 TABLET ORAL at 08:51

## 2021-12-01 RX ADMIN — NYSTATIN OINTMENT: 100000 OINTMENT TOPICAL at 09:00

## 2021-12-01 RX ADMIN — Medication 10 ML: at 21:53

## 2021-12-01 RX ADMIN — NYSTATIN OINTMENT: 100000 OINTMENT TOPICAL at 21:54

## 2021-12-01 RX ADMIN — ATORVASTATIN CALCIUM 40 MG: 40 TABLET, FILM COATED ORAL at 21:53

## 2021-12-01 RX ADMIN — INSULIN GLARGINE 20 UNITS: 100 INJECTION, SOLUTION SUBCUTANEOUS at 09:05

## 2021-12-01 RX ADMIN — ACETAMINOPHEN 500 MG: 500 TABLET ORAL at 21:53

## 2021-12-01 RX ADMIN — AMITRIPTYLINE HYDROCHLORIDE 50 MG: 50 TABLET, FILM COATED ORAL at 21:53

## 2021-12-01 RX ADMIN — ALPRAZOLAM 0.25 MG: 0.5 TABLET ORAL at 17:18

## 2021-12-01 RX ADMIN — ACETAMINOPHEN 500 MG: 500 TABLET ORAL at 12:09

## 2021-12-01 RX ADMIN — Medication 1 CAPSULE: at 09:05

## 2021-12-01 RX ADMIN — INSULIN LISPRO 2 UNITS: 100 INJECTION, SOLUTION INTRAVENOUS; SUBCUTANEOUS at 12:10

## 2021-12-01 RX ADMIN — NYSTATIN OINTMENT: 100000 OINTMENT TOPICAL at 17:04

## 2021-12-01 RX ADMIN — LEVOTHYROXINE SODIUM 112 MCG: 0.11 TABLET ORAL at 08:51

## 2021-12-01 RX ADMIN — METOPROLOL TARTRATE 25 MG: 25 TABLET, FILM COATED ORAL at 08:51

## 2021-12-01 RX ADMIN — METOPROLOL TARTRATE 12.5 MG: 25 TABLET, FILM COATED ORAL at 21:53

## 2021-12-01 RX ADMIN — PAROXETINE HYDROCHLORIDE 40 MG: 20 TABLET, FILM COATED ORAL at 08:51

## 2021-12-01 RX ADMIN — CEFTRIAXONE SODIUM 2 G: 2 INJECTION, POWDER, FOR SOLUTION INTRAMUSCULAR; INTRAVENOUS at 08:50

## 2021-12-01 RX ADMIN — PANTOPRAZOLE SODIUM 40 MG: 40 TABLET, DELAYED RELEASE ORAL at 08:52

## 2021-12-01 RX ADMIN — Medication 10 ML: at 14:00

## 2021-12-01 RX ADMIN — INSULIN LISPRO 2 UNITS: 100 INJECTION, SOLUTION INTRAVENOUS; SUBCUTANEOUS at 07:30

## 2021-12-01 RX ADMIN — ASPIRIN 81 MG: 81 TABLET, COATED ORAL at 08:52

## 2021-12-01 RX ADMIN — ENOXAPARIN SODIUM 120 MG: 100 INJECTION SUBCUTANEOUS at 04:31

## 2021-12-01 NOTE — PROGRESS NOTES
Hospitalist Progress Note    NAME: Lavelle Calderon   :  1950   MRN:  [id number]       Assessment / Plan:  Septic shock POA rectal temp 96, , lactic 9.2 -->5. 3   Streptococcus anginosus epidural/right iliopsoas abscess POA  Right paraspinal pain at level L3-4 and R iliac crest pain (complains of Right hip pain), POA   Paraspinal abscess  - Followed by Dr Fernanda Street and Infectious diseases, needs close follow-up as outpatient  -Stop vancomycin  as recommended by ID  -Appreciated orthopedic consult, recommended IR placed drain. Orthopedics plans no surgery for now. Surgical management on hold for now    -First Drain was placed  draining purulent discharge, repeat CT  showed Resolution, drain removed .  -Though CT on  showed undrained fluid collection on right lower quadrant  -s/p , IR guided Drainage catheter, so far 25 cc output past 24 hours    -Culture from drain fluid is growing Streptococcus anginosus  -Antibiotics as per ID  -Recommendation 8 to 12 weeks, ID will finalize abx and duration and choice  Orthopedic surgery was called by ID due to concern of ongoing pus/blood drainage. Still awaiting final ID recommendations and spinal surgeon evaluation.   I contacted with Dr. Fernanda Street over [facility] service to notify him on the need of his evaluation.      - MRI done on , Rim-enhancing paraspinal soft tissue collections may reflect abscesses as  well, and partially visualized abscess inferior to the right kidney again seen, but  incompletely evaluated on this exam.  -For paraspinal abscess, orthopedic recommends conservative management,   -Successful aspiration of posterior paraspinal abscess     -FOLLOW cultures from paraspinal and right lower quadrant abscess aspirate    Celiac Artery Thrombosis with splenic Infarcts:  -CT ABD/Pelvis  showed celiac artery thrombosis with splenic infarcts  CT abdomen pelvis today confirmed the celiac artery occlusion with splenic infarcts  Vascular consult appreciated  Renal function stable, switched heparin drip to Lovenox injection 1 mg/kg every 12 hours, continue until definitive decision about surgical debridement  I will change to Eliquis on discharge      UTI  -Ucx E coli  -sensitive to cefepime   -Rea placed in ER due to poor mobility from hip pain and polyuria, with severe candidal infection in perineum and labia and buttocks. Removing Rea catheter and start voiding trial.  Patient advised to schedule empty bladder every 2 hours. Use pure wick catheter    Type 2 DM, uncontrolled with hyperosmolar hyperglycemia and early DKA POA  - presented with , AG 15, trace ketone in urine  - Required insulin gtt  - A1c 11.7 up from 7.6 in July. - continue holding metformin  - Continue Lantus 35 units. Continue SSI prn  - Blood sugars are better controlled    Bilateral feet neuropathy, suspected DM related  -P45 and folic acid within normal limits  No epidural abscess was seen on previous MRI    Prerenal MERCEDES Cr 1.5  -resolved with IVF hydration.       Constipation  -added pericolace and miralax    Severe candida infection in perineum, labia, buttocks  -continue mystatin cream ordered in ER. Status post Diflucan  -wound care consult appreciated     Generalized weakness and gait difficulty   -consult pt/ot     SVT   HTN / HLD  -continue metoprolol and statin       Hypothyroid  -continue levothyroxine. TSH 3.6     Depression and anxiety  -continue paxil with xanax prn     Severe obesity  40 or above Morbid obesity / Body mass index is 44.42 kg/m². Code:  Discussed, full  DVT prophylaxis: lovenox  Surrogate decision maker:   (but has some dementia per patient) or sister Mckinley Cutler    HANY: Likely 11/30  Awaiting surgery and ID final recommendations and plan     Subjective:     Patient was seen and examined. No acute events overnight.     Discussed with RN overnight events. All patient's questions were answered. \"doing well\"    review of Systems:  Symptom Y/N Comments  Symptom Y/N Comments   Fever/Chills n   Chest Pain n    Poor Appetite    Edema n    Cough n   Abdominal Pain y    Sputum    Joint Pain     SOB/AVALOS n   Pruritis/Rash     Nausea/vomit n   Tolerating PT/OT     Diarrhea n   Tolerating Diet y    Constipation    Other       Could NOT obtain due to:      PO intake: No data found. Objective:     VITALS:   Last 24hrs VS reviewed since prior progress note. Most recent are:  Patient Vitals for the past 24 hrs:   Temp Pulse Resp BP SpO2   12/01/21 1102 97 °F (36.1 °C) 80 16 120/78 97 %   12/01/21 0732 98.6 °F (37 °C) 82 14 (!) 151/96 93 %   12/01/21 0407 97.7 °F (36.5 °C) 79 18 106/70 96 %   11/30/21 2100 97.9 °F (36.6 °C) 97 18 122/69 91 %   11/30/21 1652 98.4 °F (36.9 °C) 87 18 115/67 92 %       Intake/Output Summary (Last 24 hours) at 12/1/2021 1440  Last data filed at 12/1/2021 6280  Gross per 24 hour   Intake    Output 30 ml   Net -30 ml        I had a face to face encounter, and independently examined this patient on 12/1/2021, as outlined below:  PHYSICAL EXAM:  General: WD, WN. Alert, cooperative, no acute distress    EENT:  EOMI. Anicteric sclerae. MMM  Resp:  CTA bilaterally, no wheezing or rales. No accessory muscle use  CV:  Regular  rhythm,  No edema  GI:               Soft nontender  Neurologic:  Alert and oriented X 3, normal speech,   Psych:   Good insight. Not anxious nor agitated  Skin:  No rashes. No jaundice.      Reviewed most current lab test results and cultures  YES  Reviewed most current radiology test results   YES  Review and summation of old records today    NO  Reviewed patient's current orders and MAR    YES  PMH/SH reviewed - no change compared to H&P  ________________________________________________________________________  Care Plan discussed with:    Comments   Patient x    Family      RN x    Care Manager x    Consultant x Multidiciplinary team rounds were held today with , nursing, pharmacist and clinical coordinator. Patient's plan of care was discussed; medications were reviewed and discharge planning was addressed. ________________________________________________________________________    Total NON critical care TIME:  30   Minutes     Total CRITICAL CARE TIME Spent:   Minutes non procedure based      Comments   >50% of visit spent in counseling and coordination of care x     This includes time during multidisciplinary rounds if indicated above   ________________________________________________________________________  Bharathi Tello MD     Procedures: see electronic medical records for all procedures/Xrays and details which were not copied into this note but were reviewed prior to creation of Plan. LABS:  I reviewed today's most current labs and imaging studies. Pertinent labs include:  No results for input(s): WBC, HGB, HCT, PLT, HGBEXT, HCTEXT, PLTEXT, HGBEXT, HCTEXT, PLTEXT in the last 72 hours.   Recent Labs     12/01/21  0450   *   K 3.9      CO2 23   *   BUN 14   CREA 0.58   CA 9.5   ALB 2.3*   TBILI 0.5   ALT 26

## 2021-12-01 NOTE — PROGRESS NOTES
Ortho Spine    Dr. Denisse Alexis has seen and evaluated patient. She has some incisional drainage over mid portion of the incision. Would recommend I and D of lumbar incision if ok per medicine and vascular sx. Patient currently on Lovenox. Please hold Lovenox tentatively and will make pt NPO for tentative I&D tomorrow morning.      Joshua Field PA-C

## 2021-12-01 NOTE — PROGRESS NOTES
Transition of Care Plan:     RUR: 14% low  Disposition: SNF - [facility]   Follow up appointments: PCP & specialist as indicated  DME needed: To be determined. [facility] or means to access home:  Family has keys  IM Medicare Letter: To be given prior to discharge.   Is patient a BCPI-A Bundle:   No                 If yes, was Bundle Letter given?:   N/A  Caregiver Contact: Son  Discharge Caregiver contacted prior to discharge?  Caregiver to be contacted prior to discharge.     CM reviewed pt's chart. Per MD pt needs to be seen by the spinal surgeon and get final ID recommendations prior to d/c. Pt's insurance auth for Job has  and Shelly with the facility reports she will ask the insurance to extend it for a few more days. CM will continue to follow for discharge planning while patient is admitted on current unit. Please contact this CM with questions or issues related to discharge.      KRISTOFER Pruitt  Care Manager [facility]  [phone]

## 2021-12-01 NOTE — ANESTHESIA PREPROCEDURE EVALUATION
Relevant Problems   RENAL FAILURE   (+) MERCEDES (acute kidney injury) ([facility]      ENDOCRINE   (+) Morbid obesity ([facility] [address]       Anesthetic History     PONV          Review of Systems / Medical History  Patient summary reviewed, nursing notes reviewed and pertinent labs reviewed    Pulmonary  Within defined limits                 Neuro/Psych         Psychiatric history    Comments: Hx Lumbar stenosis with neurogenic claudication s/p L2-L3 and L5-S1 laminectomy with L2-L3 and L3-L4 Lydia osteotomy (5/12/21 and 6/2/21)  Cardiovascular  Within defined limits  Hypertension        Dysrhythmias : SVT      Exercise tolerance: >4 METS  Comments: Celiac Artery Thrombosis with splenic Infarcts    TTE (11/8/21):  ·LV: Estimated LVEF is 50 - 55%. Visually measured ejection fraction. Normal cavity size. Left ventricle not well visualized. Low normal systolic function. Mild (grade 1) left ventricular diastolic dysfunction. ·RV: Not well visualized. ·Image quality for this study was technically difficult. ·Contrast used: DEFINITY. ·Echo study was technically difficulty. ECG (11/6/21): Sinus tachycardia   Poor R wave progression   Abnormal ECG    Left Heart Cath (12/30/19):  Left Main  The vessel is large. The vessel is angiographically normal.  Left Anterior Descending  The proximal segment of the vessel is moderate in size. The middle segment of the vessel is moderate in size. The distal segment of the vessel is small. There is mild disease in the proximal to mid segment. The vessel is moderately calcified. Prox LAD to Mid LAD lesion 70% stenosed. . Pressure wire/iFR was perfromed on the lesion. Flow [place]: 0.91. FFR 0.82  First Diagonal Branch  The vessel is small. Left Circumflex  The vessel exhibits minimal luminal irregularities. First Obtuse Marginal Branch  1st Mrg lesion 50% stenosed. . Pressure wire/iFR was perfromed on the lesion. Flow [place]: 0.97. Right Coronary Artery  The vessel is moderate in size.  The vessel exhibits minimal luminal irregularities. Intervention  No interventions have been documented. GI/Hepatic/Renal  Within defined limits   GERD: well controlled      PUD     Endo/Other    Diabetes: well controlled, type 2  Hypothyroidism: well controlled  Morbid obesity, arthritis and anemia    Comments:  Thrombocytosis Other Findings   Comments: Septic Shock  Streptococcus anginosus epidural/right iliopsoas abscess and paraspinal abscess s/p L2-L3 and L5-S1 laminectomy with L2-L3 and L3-L4 Lydia osteotomy (5/12/21 and 6/2/21)     UTI           Physical Exam    Airway  Mallampati: II  TM Distance: > 6 cm  Neck ROM: normal range of motion   Mouth opening: Normal     Cardiovascular  Regular rate and rhythm,  S1 and S2 normal,  no murmur, click, rub, or gallop             Dental  No notable dental hx       Pulmonary  Breath sounds clear to auscultation               Abdominal  GI exam deferred       Other Findings            Anesthetic Plan    ASA: 3  Anesthesia type: general    Monitoring Plan: BIS      Induction: Intravenous  Anesthetic plan and risks discussed with: Patient

## 2021-12-01 NOTE — PROGRESS NOTES
Infectious Disease Progress Note         Interval:  NAEO    Subjective:   Patient seen this morning. Reports feeling OK, and says she has been anxious for further plan. Objective:    Vitals:   Reviewed in chart. Physical Exam:  ?   ? GEN: NAD, patient appears nontoxic.  ? HEENT: Normocephalic, atraumatic, PERRL, no scleral icterus  ? CV: HDS  ? Lungs: room air  ? Abdomen: soft, non distended, non tender, drain in right psoas abscess  ? Genitourinary:  no mittal  ? Extremities: no edema  ? Neuro: Alert, oriented to time, place and situation, moves all extremities to commands, verbal   ? Skin: inferior aspect of the lumbar wound has an opening  ? Psych: good affect, good eye contact, non tearful   ? Lines: PIVs, right psoas abscess drain with purulent drainage. Labs:  Recent Results (from the past 24 hour(s))   GLUCOSE, POC    Collection Time: 11/30/21  3:10 PM   Result Value Ref Range    Glucose (POC) 149 (H) 65 - 117 mg/dL    Performed by Joel Garcia (PCT)    GLUCOSE, POC    Collection Time: 11/30/21  9:20 PM   Result Value Ref Range    Glucose (POC) 155 (H) 65 - 117 mg/dL    Performed by April Drake (CON)    METABOLIC PANEL, COMPREHENSIVE    Collection Time: 12/01/21  4:50 AM   Result Value Ref Range    Sodium 133 (L) 136 - 145 mmol/L    Potassium 3.9 3.5 - 5.1 mmol/L    Chloride 104 97 - 108 mmol/L    CO2 23 21 - 32 mmol/L    Anion gap 6 5 - 15 mmol/L    Glucose 117 (H) 65 - 100 mg/dL    BUN 14 6 - 20 MG/DL    Creatinine 0.58 0.55 - 1.02 MG/DL    BUN/Creatinine ratio 24 (H) 12 - 20      GFR est AA >60 >60 ml/min/1.73m2    GFR est non-AA >60 >60 ml/min/1.73m2    Calcium 9.5 8.5 - 10.1 MG/DL    Bilirubin, total 0.5 0.2 - 1.0 MG/DL    ALT (SGPT) 26 12 - 78 U/L    AST (SGOT) 36 15 - 37 U/L    Alk.  phosphatase 98 45 - 117 U/L    Protein, total 7.2 6.4 - 8.2 g/dL    Albumin 2.3 (L) 3.5 - 5.0 g/dL    Globulin 4.9 (H) 2.0 - 4.0 g/dL    A-G Ratio 0.5 (L) 1.1 - 2.2     GLUCOSE, POC Collection Time: 12/01/21  7:39 AM   Result Value Ref Range    Glucose (POC) 144 (H) 65 - 117 mg/dL    Performed by [facility] PCT    GLUCOSE, POC    Collection Time: 12/01/21 11:00 AM   Result Value Ref Range    Glucose (POC) 179 (H) 65 - 117 mg/dL    Performed by Gertrude Carnishant PCT              Assessment:  64 yo F with:     - Sepsis due to right retroperitoneal abscess 9.7 x 9.1 x 7.5 cm with likely involvement of the lumbar spine. This has likely developed in the setting of poorly healing lumbar wound in July 2021. Patient reports that after the wound VAC care was disrupted when she went home, there was lot of drainage from the lumbar wound. The lumbar wound eventually healed up after the wound VAC was resumed. However this must have created a nidus of infection at that time. Patient is status post drainage and drain placement of the psoas abscess by IR on 11/12/2021. Drainage cultures grew Streptococcus anginosus. On 11/26, underwent CT-guided drain removal and new drain placement into the undrained part of the RLQ abscess. Also had aspiration of one of the paraspinal collections. Cultures growing alpha Strep   -History of lumbar fusion surgeries. - Hx of urinary incontinence and multiple diagnosis of UTIs in the past. E.coli in urine this admission.   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   - TTE 11/8 technically difficult. Recommendations:  - Strep anginosus notorious for invasive multiple abscesses.      - Awaiting plan by Orthopedics.       - Diligent and regular wound care and hygiene needs to be maintained at the lumbar wound given patient is already dealing with a lumbar infection - discussed with nursing at bedside    - Continue ceftriaxone 2gm daily     - Plan for >8 weeks of IV abx.     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 Mayur Wang last week. Vascular surgery evaluation is appreciated; likely septic emboli however cannot rule out thromboembolic disease and hence on anticoagulation. Will follow up with VS in 6 months. Of note, patient has not had any documented bacteremia this admission. TTE on 11/8/2021 was a technically difficult study however did not comment on presence of any vegetations. Will follow. Dr. Jimmy Christie covers from 12/2/21. Thank you for the opportunity to participate in the care of this patient. Please contact with questions or concerns.       Rico Talley MD  Infectious Diseases

## 2021-12-02 ENCOUNTER — APPOINTMENT (OUTPATIENT)
Dept: GENERAL RADIOLOGY | Age: 71
DRG: 853 | End: 2021-12-02
Attending: ORTHOPAEDIC SURGERY
Payer: MEDICARE

## 2021-12-02 ENCOUNTER — ANESTHESIA (OUTPATIENT)
Dept: SURGERY | Age: 71
DRG: 853 | End: 2021-12-02
Payer: MEDICARE

## 2021-12-02 LAB
GLUCOSE BLD STRIP.AUTO-MCNC: 103 MG/DL (ref 65–117)
GLUCOSE BLD STRIP.AUTO-MCNC: 149 MG/DL (ref 65–117)
GLUCOSE BLD STRIP.AUTO-MCNC: 214 MG/DL (ref 65–117)
GLUCOSE BLD STRIP.AUTO-MCNC: 268 MG/DL (ref 65–117)
SERVICE CMNT-IMP: ABNORMAL
SERVICE CMNT-IMP: NORMAL

## 2021-12-02 PROCEDURE — 74011250637 HC RX REV CODE- 250/637: Performed by: HOSPITALIST

## 2021-12-02 PROCEDURE — 77030040361 HC SLV COMPR DVT MDII -B: Performed by: ORTHOPAEDIC SURGERY

## 2021-12-02 PROCEDURE — 74011636637 HC RX REV CODE- 636/637: Performed by: INTERNAL MEDICINE

## 2021-12-02 PROCEDURE — 77030038692 HC WND DEB SYS IRMX -B: Performed by: ORTHOPAEDIC SURGERY

## 2021-12-02 PROCEDURE — 76060000034 HC ANESTHESIA 1.5 TO 2 HR: Performed by: ORTHOPAEDIC SURGERY

## 2021-12-02 PROCEDURE — 74011250637 HC RX REV CODE- 250/637: Performed by: GENERAL ACUTE CARE HOSPITAL

## 2021-12-02 PROCEDURE — 76210000016 HC OR PH I REC 1 TO 1.5 HR: Performed by: ORTHOPAEDIC SURGERY

## 2021-12-02 PROCEDURE — 74011000250 HC RX REV CODE- 250: Performed by: NURSE ANESTHETIST, CERTIFIED REGISTERED

## 2021-12-02 PROCEDURE — 87077 CULTURE AEROBIC IDENTIFY: CPT

## 2021-12-02 PROCEDURE — 82962 GLUCOSE BLOOD TEST: CPT

## 2021-12-02 PROCEDURE — 74011250636 HC RX REV CODE- 250/636: Performed by: ANESTHESIOLOGY

## 2021-12-02 PROCEDURE — 74011250636 HC RX REV CODE- 250/636: Performed by: ORTHOPAEDIC SURGERY

## 2021-12-02 PROCEDURE — 77030012406 HC DRN WND PENRS BARD -A: Performed by: ORTHOPAEDIC SURGERY

## 2021-12-02 PROCEDURE — 10180 I&D COMPLEX PO WOUND INFCTJ: CPT | Performed by: ORTHOPAEDIC SURGERY

## 2021-12-02 PROCEDURE — 74011000272 HC RX REV CODE- 272: Performed by: ORTHOPAEDIC SURGERY

## 2021-12-02 PROCEDURE — 77030038269 HC DRN EXT URIN PURWCK BARD -A

## 2021-12-02 PROCEDURE — 74011250637 HC RX REV CODE- 250/637: Performed by: ORTHOPAEDIC SURGERY

## 2021-12-02 PROCEDURE — 77010033678 HC OXYGEN DAILY

## 2021-12-02 PROCEDURE — 77030038613 HC SUT PDS STRATA SPIRL J&J -B: Performed by: ORTHOPAEDIC SURGERY

## 2021-12-02 PROCEDURE — 87075 CULTR BACTERIA EXCEPT BLOOD: CPT

## 2021-12-02 PROCEDURE — C1713 ANCHOR/SCREW BN/BN,TIS/BN: HCPCS | Performed by: ORTHOPAEDIC SURGERY

## 2021-12-02 PROCEDURE — 77030008684 HC TU ET CUF COVD -B: Performed by: ANESTHESIOLOGY

## 2021-12-02 PROCEDURE — 94760 N-INVAS EAR/PLS OXIMETRY 1: CPT

## 2021-12-02 PROCEDURE — 65660000000 HC RM CCU STEPDOWN

## 2021-12-02 PROCEDURE — 2709999900 HC NON-CHARGEABLE SUPPLY: Performed by: ORTHOPAEDIC SURGERY

## 2021-12-02 PROCEDURE — 2709999900 HC NON-CHARGEABLE SUPPLY

## 2021-12-02 PROCEDURE — 76010000162 HC OR TIME 1.5 TO 2 HR INTENSV-TIER 1: Performed by: ORTHOPAEDIC SURGERY

## 2021-12-02 PROCEDURE — 77030013079 HC BLNKT BAIR HGGR 3M -A: Performed by: ANESTHESIOLOGY

## 2021-12-02 PROCEDURE — 77030038600 HC TU BPLR IRR DISP STRY -B: Performed by: ORTHOPAEDIC SURGERY

## 2021-12-02 PROCEDURE — 77030013708 HC HNDPC SUC IRR PULS STRY –B: Performed by: ORTHOPAEDIC SURGERY

## 2021-12-02 PROCEDURE — 77030026438 HC STYL ET INTUB CARD -A: Performed by: ANESTHESIOLOGY

## 2021-12-02 PROCEDURE — 87102 FUNGUS ISOLATION CULTURE: CPT

## 2021-12-02 PROCEDURE — P9045 ALBUMIN (HUMAN), 5%, 250 ML: HCPCS | Performed by: NURSE ANESTHETIST, CERTIFIED REGISTERED

## 2021-12-02 PROCEDURE — 77030035236 HC SUT PDS STRATFX BARB J&J -B: Performed by: ORTHOPAEDIC SURGERY

## 2021-12-02 PROCEDURE — 77030002916 HC SUT ETHLN J&J -A: Performed by: ORTHOPAEDIC SURGERY

## 2021-12-02 PROCEDURE — 74011250636 HC RX REV CODE- 250/636: Performed by: NURSE ANESTHETIST, CERTIFIED REGISTERED

## 2021-12-02 PROCEDURE — 87070 CULTURE OTHR SPECIMN AEROBIC: CPT

## 2021-12-02 PROCEDURE — 74011250637 HC RX REV CODE- 250/637: Performed by: INTERNAL MEDICINE

## 2021-12-02 PROCEDURE — 87186 SC STD MICRODIL/AGAR DIL: CPT

## 2021-12-02 PROCEDURE — 0JB70ZZ EXCISION OF BACK SUBCUTANEOUS TISSUE AND FASCIA, OPEN APPROACH: ICD-10-PCS | Performed by: ORTHOPAEDIC SURGERY

## 2021-12-02 DEVICE — STIMULAN® RAPID CURE PROVIDED STERILE FOR SINGLE PATIENT USE. STIMULAN® RAPID CURE CONTAINS CALCIUM SULFATE POWDER AND MIXING SOLUTION IN PRE-MEASURED QUANTITIES SO THAT WHEN MIXED TOGETHER IN A STERILE MIXING BOWL, THE RESULTANT PASTE IS TO BE DIGITALLY PACKED INTO OPEN BONE VOID/GAP TO SET INSITU OR PLACED INTO THE MOULD PROVIDED, THE MIXTURE SETS TO FORM BEADS. THE BIODEGRADABLE, RADIOPAQUE BEADS ARE RESORBED IN APPROXIMATELY 30 – 60 DAYS WHEN USED IN ACCORDANCE WITH THE DEVICE LABELLING. STIMULAN® RAPID CURE IS MANUFACTURED FROM SYNTHETIC IMPLANT GRADE CALCIUM SULFATE DIHYDRATE(CASO4.2H2O) THAT RESORBS AND IS REPLACED WITH BONE DURING THE HEALING PROCESS. ALSO, AS THE BONE VOID FILLER BEADS ARE BIODEGRADABLE AND BIOCOMPATIBLE, THEY MAY BE USED AT AN INFECTED SITE.
Type: IMPLANTABLE DEVICE | Site: SPINE LUMBAR | Status: FUNCTIONAL
Brand: STIMULAN® RAPID CURE

## 2021-12-02 RX ORDER — MIDAZOLAM HYDROCHLORIDE 1 MG/ML
INJECTION, SOLUTION INTRAMUSCULAR; INTRAVENOUS AS NEEDED
Status: DISCONTINUED | OUTPATIENT
Start: 2021-12-02 | End: 2021-12-02 | Stop reason: HOSPADM

## 2021-12-02 RX ORDER — LIDOCAINE HYDROCHLORIDE 10 MG/ML
0.1 INJECTION, SOLUTION EPIDURAL; INFILTRATION; INTRACAUDAL; PERINEURAL AS NEEDED
Status: DISCONTINUED | OUTPATIENT
Start: 2021-12-02 | End: 2021-12-02 | Stop reason: HOSPADM

## 2021-12-02 RX ORDER — ONDANSETRON 2 MG/ML
4 INJECTION INTRAMUSCULAR; INTRAVENOUS AS NEEDED
Status: DISCONTINUED | OUTPATIENT
Start: 2021-12-02 | End: 2021-12-02 | Stop reason: HOSPADM

## 2021-12-02 RX ORDER — DEXAMETHASONE SODIUM PHOSPHATE 4 MG/ML
INJECTION, SOLUTION INTRA-ARTICULAR; INTRALESIONAL; INTRAMUSCULAR; INTRAVENOUS; SOFT TISSUE AS NEEDED
Status: DISCONTINUED | OUTPATIENT
Start: 2021-12-02 | End: 2021-12-02 | Stop reason: HOSPADM

## 2021-12-02 RX ORDER — FENTANYL CITRATE 50 UG/ML
50 INJECTION, SOLUTION INTRAMUSCULAR; INTRAVENOUS AS NEEDED
Status: DISCONTINUED | OUTPATIENT
Start: 2021-12-02 | End: 2021-12-02 | Stop reason: HOSPADM

## 2021-12-02 RX ORDER — FENTANYL CITRATE 50 UG/ML
INJECTION, SOLUTION INTRAMUSCULAR; INTRAVENOUS AS NEEDED
Status: DISCONTINUED | OUTPATIENT
Start: 2021-12-02 | End: 2021-12-02 | Stop reason: HOSPADM

## 2021-12-02 RX ORDER — FENTANYL CITRATE 50 UG/ML
25 INJECTION, SOLUTION INTRAMUSCULAR; INTRAVENOUS
Status: DISCONTINUED | OUTPATIENT
Start: 2021-12-02 | End: 2021-12-02 | Stop reason: HOSPADM

## 2021-12-02 RX ORDER — ETOMIDATE 2 MG/ML
INJECTION INTRAVENOUS AS NEEDED
Status: DISCONTINUED | OUTPATIENT
Start: 2021-12-02 | End: 2021-12-02 | Stop reason: HOSPADM

## 2021-12-02 RX ORDER — LIDOCAINE HYDROCHLORIDE 20 MG/ML
INJECTION, SOLUTION EPIDURAL; INFILTRATION; INTRACAUDAL; PERINEURAL AS NEEDED
Status: DISCONTINUED | OUTPATIENT
Start: 2021-12-02 | End: 2021-12-02 | Stop reason: HOSPADM

## 2021-12-02 RX ORDER — MIDAZOLAM HYDROCHLORIDE 1 MG/ML
1 INJECTION, SOLUTION INTRAMUSCULAR; INTRAVENOUS AS NEEDED
Status: DISCONTINUED | OUTPATIENT
Start: 2021-12-02 | End: 2021-12-02 | Stop reason: HOSPADM

## 2021-12-02 RX ORDER — GLYCOPYRROLATE 0.2 MG/ML
INJECTION INTRAMUSCULAR; INTRAVENOUS AS NEEDED
Status: DISCONTINUED | OUTPATIENT
Start: 2021-12-02 | End: 2021-12-02 | Stop reason: HOSPADM

## 2021-12-02 RX ORDER — HYDROMORPHONE HYDROCHLORIDE 1 MG/ML
.2-.5 INJECTION, SOLUTION INTRAMUSCULAR; INTRAVENOUS; SUBCUTANEOUS
Status: DISCONTINUED | OUTPATIENT
Start: 2021-12-02 | End: 2021-12-02 | Stop reason: HOSPADM

## 2021-12-02 RX ORDER — ALBUMIN HUMAN 50 G/1000ML
SOLUTION INTRAVENOUS AS NEEDED
Status: DISCONTINUED | OUTPATIENT
Start: 2021-12-02 | End: 2021-12-02 | Stop reason: HOSPADM

## 2021-12-02 RX ORDER — PHENYLEPHRINE HCL IN 0.9% NACL 0.4MG/10ML
SYRINGE (ML) INTRAVENOUS AS NEEDED
Status: DISCONTINUED | OUTPATIENT
Start: 2021-12-02 | End: 2021-12-02 | Stop reason: HOSPADM

## 2021-12-02 RX ORDER — SODIUM CHLORIDE, SODIUM LACTATE, POTASSIUM CHLORIDE, CALCIUM CHLORIDE 600; 310; 30; 20 MG/100ML; MG/100ML; MG/100ML; MG/100ML
25 INJECTION, SOLUTION INTRAVENOUS CONTINUOUS
Status: DISCONTINUED | OUTPATIENT
Start: 2021-12-02 | End: 2021-12-02 | Stop reason: HOSPADM

## 2021-12-02 RX ORDER — ROCURONIUM BROMIDE 10 MG/ML
INJECTION, SOLUTION INTRAVENOUS AS NEEDED
Status: DISCONTINUED | OUTPATIENT
Start: 2021-12-02 | End: 2021-12-02 | Stop reason: HOSPADM

## 2021-12-02 RX ORDER — KETOROLAC TROMETHAMINE 30 MG/ML
INJECTION, SOLUTION INTRAMUSCULAR; INTRAVENOUS AS NEEDED
Status: DISCONTINUED | OUTPATIENT
Start: 2021-12-02 | End: 2021-12-02 | Stop reason: HOSPADM

## 2021-12-02 RX ORDER — ONDANSETRON 2 MG/ML
INJECTION INTRAMUSCULAR; INTRAVENOUS AS NEEDED
Status: DISCONTINUED | OUTPATIENT
Start: 2021-12-02 | End: 2021-12-02 | Stop reason: HOSPADM

## 2021-12-02 RX ORDER — HYDROMORPHONE HYDROCHLORIDE 2 MG/ML
INJECTION, SOLUTION INTRAMUSCULAR; INTRAVENOUS; SUBCUTANEOUS AS NEEDED
Status: DISCONTINUED | OUTPATIENT
Start: 2021-12-02 | End: 2021-12-02 | Stop reason: HOSPADM

## 2021-12-02 RX ORDER — VANCOMYCIN HYDROCHLORIDE 1 G/20ML
INJECTION, POWDER, LYOPHILIZED, FOR SOLUTION INTRAVENOUS AS NEEDED
Status: DISCONTINUED | OUTPATIENT
Start: 2021-12-02 | End: 2021-12-02 | Stop reason: HOSPADM

## 2021-12-02 RX ORDER — EPHEDRINE SULFATE/0.9% NACL/PF 50 MG/5 ML
SYRINGE (ML) INTRAVENOUS AS NEEDED
Status: DISCONTINUED | OUTPATIENT
Start: 2021-12-02 | End: 2021-12-02

## 2021-12-02 RX ORDER — NEOSTIGMINE METHYLSULFATE 1 MG/ML
INJECTION, SOLUTION INTRAVENOUS AS NEEDED
Status: DISCONTINUED | OUTPATIENT
Start: 2021-12-02 | End: 2021-12-02 | Stop reason: HOSPADM

## 2021-12-02 RX ORDER — SUCCINYLCHOLINE CHLORIDE 20 MG/ML
INJECTION INTRAMUSCULAR; INTRAVENOUS AS NEEDED
Status: DISCONTINUED | OUTPATIENT
Start: 2021-12-02 | End: 2021-12-02 | Stop reason: HOSPADM

## 2021-12-02 RX ADMIN — ETOMIDATE 14 MG: 2 INJECTION, SOLUTION INTRAVENOUS at 07:40

## 2021-12-02 RX ADMIN — Medication 10 ML: at 23:36

## 2021-12-02 RX ADMIN — Medication 1 AMPULE: at 12:10

## 2021-12-02 RX ADMIN — AMITRIPTYLINE HYDROCHLORIDE 50 MG: 50 TABLET, FILM COATED ORAL at 21:06

## 2021-12-02 RX ADMIN — ATORVASTATIN CALCIUM 40 MG: 40 TABLET, FILM COATED ORAL at 21:07

## 2021-12-02 RX ADMIN — Medication 100 MCG: at 08:37

## 2021-12-02 RX ADMIN — MIDAZOLAM HYDROCHLORIDE 1 MG: 1 INJECTION, SOLUTION INTRAMUSCULAR; INTRAVENOUS at 07:35

## 2021-12-02 RX ADMIN — FENTANYL CITRATE 25 MCG: 50 INJECTION INTRAMUSCULAR; INTRAVENOUS at 10:07

## 2021-12-02 RX ADMIN — INSULIN LISPRO 3 UNITS: 100 INJECTION, SOLUTION INTRAVENOUS; SUBCUTANEOUS at 21:06

## 2021-12-02 RX ADMIN — Medication 1 AMPULE: at 22:32

## 2021-12-02 RX ADMIN — KETOROLAC TROMETHAMINE 15 MG: 30 INJECTION, SOLUTION INTRAMUSCULAR; INTRAVENOUS at 08:55

## 2021-12-02 RX ADMIN — SODIUM CHLORIDE, POTASSIUM CHLORIDE, SODIUM LACTATE AND CALCIUM CHLORIDE: 600; 310; 30; 20 INJECTION, SOLUTION INTRAVENOUS at 07:40

## 2021-12-02 RX ADMIN — SUCCINYLCHOLINE CHLORIDE 160 MG: 20 INJECTION, SOLUTION INTRAMUSCULAR; INTRAVENOUS at 07:40

## 2021-12-02 RX ADMIN — LIDOCAINE HYDROCHLORIDE 100 MG: 20 INJECTION, SOLUTION INTRAVENOUS at 07:40

## 2021-12-02 RX ADMIN — PANTOPRAZOLE SODIUM 40 MG: 40 TABLET, DELAYED RELEASE ORAL at 12:18

## 2021-12-02 RX ADMIN — FENTANYL CITRATE 25 MCG: 50 INJECTION INTRAMUSCULAR; INTRAVENOUS at 09:46

## 2021-12-02 RX ADMIN — Medication 80 MCG: at 08:29

## 2021-12-02 RX ADMIN — LEVOTHYROXINE SODIUM 112 MCG: 0.11 TABLET ORAL at 12:18

## 2021-12-02 RX ADMIN — INSULIN LISPRO 3 UNITS: 100 INJECTION, SOLUTION INTRAVENOUS; SUBCUTANEOUS at 17:51

## 2021-12-02 RX ADMIN — METOPROLOL TARTRATE 25 MG: 25 TABLET, FILM COATED ORAL at 12:12

## 2021-12-02 RX ADMIN — Medication 80 MCG: at 08:33

## 2021-12-02 RX ADMIN — Medication 40 MCG: at 07:41

## 2021-12-02 RX ADMIN — CEFTRIAXONE SODIUM 2 G: 2 INJECTION, POWDER, FOR SOLUTION INTRAMUSCULAR; INTRAVENOUS at 08:00

## 2021-12-02 RX ADMIN — ALBUMIN (HUMAN) 250 ML: 2.5 SOLUTION INTRAVENOUS at 08:40

## 2021-12-02 RX ADMIN — ACETAMINOPHEN 500 MG: 500 TABLET ORAL at 17:43

## 2021-12-02 RX ADMIN — Medication 10 ML: at 18:32

## 2021-12-02 RX ADMIN — NYSTATIN OINTMENT: 100000 OINTMENT TOPICAL at 22:33

## 2021-12-02 RX ADMIN — ONDANSETRON HYDROCHLORIDE 4 MG: 2 INJECTION, SOLUTION INTRAMUSCULAR; INTRAVENOUS at 08:51

## 2021-12-02 RX ADMIN — HYDROMORPHONE HYDROCHLORIDE 0.5 MG: 1 INJECTION, SOLUTION INTRAMUSCULAR; INTRAVENOUS; SUBCUTANEOUS at 10:17

## 2021-12-02 RX ADMIN — Medication 40 MCG: at 08:01

## 2021-12-02 RX ADMIN — NEOSTIGMINE METHYLSULFATE 2 MG: 1 INJECTION, SOLUTION INTRAVENOUS at 08:51

## 2021-12-02 RX ADMIN — Medication 80 MCG: at 08:20

## 2021-12-02 RX ADMIN — GLYCOPYRROLATE 0.2 MG: 0.2 INJECTION, SOLUTION INTRAMUSCULAR; INTRAVENOUS at 08:51

## 2021-12-02 RX ADMIN — INSULIN GLARGINE 20 UNITS: 100 INJECTION, SOLUTION SUBCUTANEOUS at 12:35

## 2021-12-02 RX ADMIN — FENTANYL CITRATE 25 MCG: 50 INJECTION INTRAMUSCULAR; INTRAVENOUS at 09:37

## 2021-12-02 RX ADMIN — FENTANYL CITRATE 50 MCG: 50 INJECTION, SOLUTION INTRAMUSCULAR; INTRAVENOUS at 07:40

## 2021-12-02 RX ADMIN — Medication 1 CAPSULE: at 12:01

## 2021-12-02 RX ADMIN — NYSTATIN OINTMENT 1 G: 100000 OINTMENT TOPICAL at 17:56

## 2021-12-02 RX ADMIN — OXYCODONE 5 MG: 5 TABLET ORAL at 21:06

## 2021-12-02 RX ADMIN — OXYCODONE 5 MG: 5 TABLET ORAL at 13:05

## 2021-12-02 RX ADMIN — PAROXETINE HYDROCHLORIDE 40 MG: 20 TABLET, FILM COATED ORAL at 12:02

## 2021-12-02 RX ADMIN — HYDROMORPHONE HYDROCHLORIDE 0.2 MG: 2 INJECTION, SOLUTION INTRAMUSCULAR; INTRAVENOUS; SUBCUTANEOUS at 07:31

## 2021-12-02 RX ADMIN — ACETAMINOPHEN 500 MG: 500 TABLET ORAL at 21:05

## 2021-12-02 RX ADMIN — ROCURONIUM BROMIDE 5 MG: 10 INJECTION INTRAVENOUS at 07:40

## 2021-12-02 RX ADMIN — FENTANYL CITRATE 25 MCG: 50 INJECTION INTRAMUSCULAR; INTRAVENOUS at 10:15

## 2021-12-02 RX ADMIN — ACETAMINOPHEN 500 MG: 500 TABLET ORAL at 12:02

## 2021-12-02 RX ADMIN — ASPIRIN 81 MG: 81 TABLET, COATED ORAL at 12:01

## 2021-12-02 RX ADMIN — DEXAMETHASONE SODIUM PHOSPHATE 4 MG: 4 INJECTION, SOLUTION INTRAMUSCULAR; INTRAVENOUS at 08:13

## 2021-12-02 RX ADMIN — INSULIN LISPRO 149 UNITS: 100 INJECTION, SOLUTION INTRAVENOUS; SUBCUTANEOUS at 12:34

## 2021-12-02 NOTE — PROGRESS NOTES
Hospitalist Progress Note    NAME: Veronica Lamb   :  1950   MRN:  [id number]       Assessment / Plan:  Septic shock POA rectal temp 96, , lactic 9.2 -->5. 3   Streptococcus anginosus epidural/right iliopsoas abscess POA  Right paraspinal pain at level L3-4 and R iliac crest pain (complains of Right hip pain), POA   Paraspinal abscess  - Followed by Dr Vidya Sebastian and Infectious diseases, needs close follow-up as outpatient  -Stopped vancomycin  as recommended by ID  -Appreciated orthopedic consult, recommended IR placed drain. Orthopedics plans no surgery for now. Surgical management on hold for now    -First Drain was placed  draining purulent discharge, repeat CT  showed Resolution, drain removed .  -Though CT on  showed undrained fluid collection on right lower quadrant  -s/p , IR guided Drainage catheter, so far 25 cc output past 24 hours    -Culture from drain fluid is growing Streptococcus anginosus  -Antibiotics as per ID currently to continue ceftriaxone 2 g daily  -Recommendation 8 to 12 weeks, ID will finalize abx and duration and choice  Orthopedic surgery was called by ID due to concern of ongoing pus/blood drainage. Still awaiting final ID recommendations and spinal surgeon evaluation.   Status post I&D for lumbar wound today by orthopedic surgery team, input is appreciated  ID recommending to send for aerobic, anaerobic, fungal and AFB cultures from the I&D  We will repeat CT abdomen to assess previous right lower quadrant abscess as per ID recommendations      - MRI done on , Rim-enhancing paraspinal soft tissue collections may reflect abscesses as  well, and partially visualized abscess inferior to the right kidney again seen, but  incompletely evaluated on this exam.  -For paraspinal abscess, orthopedic recommends conservative management,   -Successful aspiration of posterior paraspinal abscess 11/26    -FOLLOW cultures from paraspinal and right lower quadrant abscess aspirate    Celiac Artery Thrombosis with splenic Infarcts:  -CT ABD/Pelvis 11/9 showed celiac artery thrombosis with splenic infarcts  CT abdomen pelvis today confirmed the celiac artery occlusion with splenic infarcts  Vascular consult appreciated  Renal function stable, switched heparin drip to Lovenox injection 1 mg/kg every 12 hours, continue until definitive decision about surgical debridement. Will resume anticoagulation, it was on hold for I&D today  I will change to Eliquis on discharge      UTI  -Ucx E coli  -sensitive to cefepime   -Rea placed in ER due to poor mobility from hip pain and polyuria, with severe candidal infection in perineum and labia and buttocks. Removing Rea catheter and start voiding trial.  Patient advised to schedule empty bladder every 2 hours. Use pure wick catheter    Type 2 DM, uncontrolled with hyperosmolar hyperglycemia and early DKA POA  - presented with , AG 15, trace ketone in urine  - Required insulin gtt  - A1c 11.7 up from 7.6 in July. - continue holding metformin  - Continue Lantus 35 units. Continue SSI prn  - Blood sugars are better controlled    Bilateral feet neuropathy, suspected DM related  -Y99 and folic acid within normal limits  No epidural abscess was seen on previous MRI    Prerenal MERCEDES Cr 1.5  -resolved with IVF hydration.       Constipation  -added pericolace and miralax    Severe candida infection in perineum, labia, buttocks  -continue mystatin cream ordered in ER. Status post Diflucan  -wound care consult appreciated     Generalized weakness and gait difficulty   -consult pt/ot     SVT   HTN / HLD  -continue metoprolol and statin       Hypothyroid  -continue levothyroxine. TSH 3.6     Depression and anxiety  -continue paxil with xanax prn     Severe obesity  40 or above Morbid obesity / Body mass index is 44.42 kg/m².   Code:  Discussed, full  DVT prophylaxis: lovenox  Surrogate decision maker:   (but has some dementia per patient) or sister Oral Speaker    HANY: Likely 12/5  Awaiting final cultures which were sent today from I&D, final antibiotics recommendation      Subjective:     Patient was seen and examined. No acute events overnight. Discussed with RN overnight events. All patient's questions were answered. \"so sleepy\"    review of Systems:  Symptom Y/N Comments  Symptom Y/N Comments   Fever/Chills n   Chest Pain n    Poor Appetite    Edema n    Cough n   Abdominal Pain y    Sputum    Joint Pain     SOB/AVALOS n   Pruritis/Rash     Nausea/vomit n   Tolerating PT/OT     Diarrhea n   Tolerating Diet y    Constipation    Other       Could NOT obtain due to:      PO intake: No data found. Objective:     VITALS:   Last 24hrs VS reviewed since prior progress note. Most recent are:  Patient Vitals for the past 24 hrs:   Temp Pulse Resp BP SpO2   12/02/21 1045  77 12 121/66 95 %   12/02/21 1030  78 16 117/69 94 %   12/02/21 1015  82 24 124/76 96 %   12/02/21 1000  75 12 121/73 98 %   12/02/21 0945  76 14 134/74 98 %   12/02/21 0940  72 17 (!) 140/76 99 %   12/02/21 0935  69 18 137/82 99 %   12/02/21 0930  68 19 130/69 99 %   12/02/21 0925  72 16 136/69 98 %   12/02/21 0922 99.1 °F (37.3 °C) 73 12 (!) 149/88 100 %   12/02/21 0659 98.9 °F (37.2 °C) 76 17 109/69 98 %   12/02/21 0353 98 °F (36.7 °C) 78 15 115/63 99 %   12/02/21 0242 97.3 °F (36.3 °C) 78 15 105/64 95 %   12/01/21 1900 97.9 °F (36.6 °C) 81 15 123/69 98 %   12/01/21 1450 97.2 °F (36.2 °C) 71 16 (!) 154/85 98 %       Intake/Output Summary (Last 24 hours) at 12/2/2021 1151  Last data filed at 12/2/2021 6252  Gross per 24 hour   Intake 950 ml   Output 861 ml   Net 89 ml        I had a face to face encounter, and independently examined this patient on 12/2/2021, as outlined below:  PHYSICAL EXAM:  General: WD, WN. Alert, cooperative, no acute distress    EENT:  EOMI. Anicteric sclerae. MMM  Resp:  CTA bilaterally, no wheezing or rales. No accessory muscle use  CV:  Regular  rhythm,  No edema  GI:               Soft nontender  Neurologic:  Alert and oriented X 3, normal speech,   Psych:   Good insight. Not anxious nor agitated  Skin:  No rashes. No jaundice. Reviewed most current lab test results and cultures  YES  Reviewed most current radiology test results   YES  Review and summation of old records today    NO  Reviewed patient's current orders and MAR    YES  PMH/SH reviewed - no change compared to H&P  ________________________________________________________________________  Care Plan discussed with:    Comments   Patient x    Family      RN x    Care Manager x    Consultant                       x Multidiciplinary team rounds were held today with , nursing, pharmacist and clinical coordinator. Patient's plan of care was discussed; medications were reviewed and discharge planning was addressed. ________________________________________________________________________    Total NON critical care TIME:  30   Minutes     Total CRITICAL CARE TIME Spent:   Minutes non procedure based      Comments   >50% of visit spent in counseling and coordination of care x     This includes time during multidisciplinary rounds if indicated above   ________________________________________________________________________  Bharathi Tello MD     Procedures: see electronic medical records for all procedures/Xrays and details which were not copied into this note but were reviewed prior to creation of Plan. LABS:  I reviewed today's most current labs and imaging studies. Pertinent labs include:  No results for input(s): WBC, HGB, HCT, PLT, HGBEXT, HCTEXT, PLTEXT, HGBEXT, HCTEXT, PLTEXT in the last 72 hours.   Recent Labs     12/01/21  0450   *   K 3.9      CO2 23   *   BUN 14   CREA 0.58   CA 9.5   ALB 2.3*   TBILI 0.5   ALT 26

## 2021-12-02 NOTE — PROGRESS NOTES
Chart reviewed. Pt currently off the floor to OR for I&D of spinal abscess. Will defer PT intervention however continue to follow. Continue to recommend SNF at discharge.     Celso Hobson, PT, DPT

## 2021-12-02 NOTE — BRIEF OP NOTE
Brief Postoperative Note    Patient: Jennifer Cormier  YOB: 1950  MRN: [id number]    Date of Procedure: 12/2/2021     Pre-Op Diagnosis: POST OP INFECTION    Post-Op Diagnosis: Same as preoperative diagnosis. Procedure(s):  INCISION AND DRAINAGE TO LUMBAR WOUND    Surgeon(s):  Rosy Badillo MD    Surgical Assistant: Surg Asst-1: Brooke Little    Anesthesia: General     Estimated Blood Loss (mL): Minimal    Complications: None    Specimens:   ID Type Source Tests Collected by Time Destination   1 : lumbar wound Wound Spine CULTURE, ANAEROBIC, CULTURE, WOUND W Amy Coffman STAIN, CULTURE, Chaparro Pineda MD 12/2/2021 0815 Microbiology        Implants:   Implant Name Type Inv. Item Serial No.  Lot No. LRB No. Used Action   GRAFT BNE SUB 10CC BEAD 25CC CA SULPHATE RAP SET W/ INDIV - SN/A  GRAFT BNE SUB 10CC BEAD 25CC CA SULPHATE RAP SET W/ INDIV N/A [id number] N/A 1 Implanted       Drains:   Hemovac Lower Back (Active)       Drain Abscess drainage catheter 11/12/21 Right; Lower Abdomen (Active)   Site Assessment Clean, dry, & intact 12/01/21 2244   Dressing Status Clean, dry, & intact 12/01/21 2244   Status Patent; Charged; Draining 12/01/21 2244   Drainage Description Serosanguinous 12/01/21 2244   Intake (ml) 10 ml 11/22/21 2124   Output (ml) 30 ml 12/01/21 0637       External Urinary Catheter 11/23/21 (Active)   Site Assessment Clean, dry, & intact 12/01/21 1102   Repositioned Yes 12/01/21 1102   Perineal Care Yes 12/01/21 1102   Wick Changed Yes 12/01/21 1102   Suction Canister/Tubing Changed Yes 12/01/21 1102   Urine Output (mL) 450 ml 12/02/21 0540       [REMOVED] Hemovac Right; Lower Back (Removed)       [REMOVED] External Urinary Catheter 06/09/21 (Removed)       [REMOVED] External Urinary Catheter 07/08/21 (Removed)       Findings: superficial wound infection    Electronically Signed by MAIKEL Dillard on 12/2/2021 at 9:14 AM

## 2021-12-02 NOTE — PERIOP NOTES
[address] from Operating Room to PACU    Report received from Digna Devine CRNA regarding Saba Florez. Surgeon(s):  Dash Ordoñez MD  And Procedure(s) (LRB):  INCISION AND DRAINAGE TO LUMBAR WOUND (N/A)  confirmed   with allergies, drains and dressings discussed. Anesthesia type, drugs, patient history, complications, estimated blood loss, vital signs, intake and output, and last pain medication, lines, reversal medications and temperature were reviewed. 0935  Right arm 20g PIV removed due to dated of insertion documented as 11/9/2021. Site with old drainage and very difficult to flush. Patient has left arm midline. 1039 TRANSFER - OUT REPORT:    Verbal report given to Estrella BRUCE(name) on Saba Florez  being transferred to [facility](unit) for routine post - op       Report consisted of patients Situation, Background, Assessment and   Recommendations(SBAR). Information from the following report(s) SBAR, Kardex, OR Summary, Procedure Summary, Intake/Output and MAR was reviewed with the receiving nurse. Opportunity for questions and clarification was provided. Patient transported with:   Monitor  O2 @ 2 liters  Registered Nurse  Tech   Patient chart  No patient belongings in the PACU.

## 2021-12-02 NOTE — PROGRESS NOTES
ID note:    - Please send for aerobic, anaerobic, fungal and AFB cultures from the I&D. Communicated with Ortho. - For the RLQ abscess where the drain was put in 12/26/21, plan to repeat CT (can be with or without contrast) this weekend to assess the size change in the abscess. - Continue ceftriaxone 2gm daily.   - Plan is for upto 8-12 weeks of IV abx as anticipated right now. Dr. Natasha Moreland cover through 12/3/21. Please call with concerns.          Ramírez Sandoval MD  Infectious Diseases

## 2021-12-02 NOTE — ANESTHESIA POSTPROCEDURE EVALUATION
Procedure(s):  INCISION AND DRAINAGE TO LUMBAR WOUND.    general    Anesthesia Post Evaluation      Multimodal analgesia: multimodal analgesia used between 6 hours prior to anesthesia start to PACU discharge  Patient location during evaluation: PACU  Level of consciousness: sleepy but conscious  Pain management: adequate  Airway patency: patent  Anesthetic complications: no  Cardiovascular status: acceptable  Respiratory status: acceptable  Hydration status: acceptable  Comments: +Post-Anesthesia Evaluation and Assessment    Patient: Josh Presley MRN: [id number]  SSN: [id number]   YOB: 1950  Age: 65 y.o. Sex: female      Cardiovascular Function/Vital Signs    /69   Pulse 78   Temp 37.3 °C (99.1 °F)   Resp 16   Ht 5' 4\" (1.626 m)   Wt 117.8 kg (259 lb 12.8 oz)   SpO2 94%   Breastfeeding No   BMI 44.59 kg/m²     Patient is status post Procedure(s):  INCISION AND DRAINAGE TO LUMBAR WOUND. Nausea/Vomiting: Controlled. Postoperative hydration reviewed and adequate. Pain:  Pain Scale 1: Visual (12/02/21 1030)  Pain Intensity 1: 5 (12/02/21 1015)   Managed. Neurological Status:   Neuro (WDL): Exceptions to WDL (12/02/21 0922)   At baseline. Mental Status and Level of Consciousness: Arousable. Pulmonary Status:   O2 Device: Nasal cannula (12/02/21 1030)   Adequate oxygenation and airway patent. Complications related to anesthesia: None    Post-anesthesia assessment completed. No concerns. Signed By: Jami Kramer MD    12/2/2021  Post anesthesia nausea and vomiting:  controlled  Final Post Anesthesia Temperature Assessment:  Normothermia (36.0-37.5 degrees C)      INITIAL Post-op Vital signs:   Vitals Value Taken Time   /66 12/02/21 1045   Temp 37.3 °C (99.1 °F) 12/02/21 0922   Pulse 73 12/02/21 1047   Resp 14 12/02/21 1047   SpO2 95 % 12/02/21 1047   Vitals shown include unvalidated device data.

## 2021-12-02 NOTE — PERIOP NOTES
TRANSFER - IN REPORT:    Verbal report received from [facility] on [facility]  being received from 2174 for ordered procedure      Report consisted of patients Situation, Background, Assessment and   Recommendations(SBAR). Information from the following report(s) SBAR, Kardex, Intake/Output, MAR, Recent Results and Cardiac Rhythm NSR was reviewed with the receiving nurse. Opportunity for questions and clarification was provided. Assessment completed upon patients arrival to unit and care assumed.

## 2021-12-03 LAB
ANION GAP SERPL CALC-SCNC: 5 MMOL/L (ref 5–15)
BACTERIA SPEC CULT: ABNORMAL
BUN SERPL-MCNC: 14 MG/DL (ref 6–20)
BUN/CREAT SERPL: 22 (ref 12–20)
CALCIUM SERPL-MCNC: 9.3 MG/DL (ref 8.5–10.1)
CHLORIDE SERPL-SCNC: 105 MMOL/L (ref 97–108)
CO2 SERPL-SCNC: 27 MMOL/L (ref 21–32)
CREAT SERPL-MCNC: 0.65 MG/DL (ref 0.55–1.02)
GLUCOSE BLD STRIP.AUTO-MCNC: 132 MG/DL (ref 65–117)
GLUCOSE BLD STRIP.AUTO-MCNC: 147 MG/DL (ref 65–117)
GLUCOSE BLD STRIP.AUTO-MCNC: 151 MG/DL (ref 65–117)
GLUCOSE BLD STRIP.AUTO-MCNC: 157 MG/DL (ref 65–117)
GLUCOSE SERPL-MCNC: 154 MG/DL (ref 65–100)
GRAM STN SPEC: ABNORMAL
GRAM STN SPEC: ABNORMAL
HGB BLD-MCNC: 10 G/DL (ref 11.5–16)
POTASSIUM SERPL-SCNC: 4.1 MMOL/L (ref 3.5–5.1)
SERVICE CMNT-IMP: ABNORMAL
SODIUM SERPL-SCNC: 137 MMOL/L (ref 136–145)

## 2021-12-03 PROCEDURE — 85018 HEMOGLOBIN: CPT

## 2021-12-03 PROCEDURE — 2709999900 HC NON-CHARGEABLE SUPPLY

## 2021-12-03 PROCEDURE — 80048 BASIC METABOLIC PNL TOTAL CA: CPT

## 2021-12-03 PROCEDURE — 74011636637 HC RX REV CODE- 636/637: Performed by: INTERNAL MEDICINE

## 2021-12-03 PROCEDURE — 74011250636 HC RX REV CODE- 250/636: Performed by: STUDENT IN AN ORGANIZED HEALTH CARE EDUCATION/TRAINING PROGRAM

## 2021-12-03 PROCEDURE — 65660000000 HC RM CCU STEPDOWN

## 2021-12-03 PROCEDURE — 74011250637 HC RX REV CODE- 250/637: Performed by: PHYSICIAN ASSISTANT

## 2021-12-03 PROCEDURE — 36415 COLL VENOUS BLD VENIPUNCTURE: CPT

## 2021-12-03 PROCEDURE — 74011250636 HC RX REV CODE- 250/636: Performed by: PHYSICIAN ASSISTANT

## 2021-12-03 PROCEDURE — 74011250637 HC RX REV CODE- 250/637: Performed by: ORTHOPAEDIC SURGERY

## 2021-12-03 PROCEDURE — 74011250637 HC RX REV CODE- 250/637: Performed by: GENERAL ACUTE CARE HOSPITAL

## 2021-12-03 PROCEDURE — 97530 THERAPEUTIC ACTIVITIES: CPT

## 2021-12-03 PROCEDURE — 82962 GLUCOSE BLOOD TEST: CPT

## 2021-12-03 PROCEDURE — 77010033678 HC OXYGEN DAILY

## 2021-12-03 PROCEDURE — 74011250637 HC RX REV CODE- 250/637: Performed by: INTERNAL MEDICINE

## 2021-12-03 PROCEDURE — 94760 N-INVAS EAR/PLS OXIMETRY 1: CPT

## 2021-12-03 PROCEDURE — 99233 SBSQ HOSP IP/OBS HIGH 50: CPT | Performed by: INTERNAL MEDICINE

## 2021-12-03 PROCEDURE — 97535 SELF CARE MNGMENT TRAINING: CPT

## 2021-12-03 PROCEDURE — 74011000258 HC RX REV CODE- 258: Performed by: STUDENT IN AN ORGANIZED HEALTH CARE EDUCATION/TRAINING PROGRAM

## 2021-12-03 PROCEDURE — 74011250637 HC RX REV CODE- 250/637: Performed by: HOSPITALIST

## 2021-12-03 RX ORDER — AMOXICILLIN 250 MG
1 CAPSULE ORAL 2 TIMES DAILY
Status: DISCONTINUED | OUTPATIENT
Start: 2021-12-03 | End: 2021-12-03 | Stop reason: SDUPTHER

## 2021-12-03 RX ORDER — OXYCODONE HYDROCHLORIDE 5 MG/1
10 TABLET ORAL
Status: DISCONTINUED | OUTPATIENT
Start: 2021-12-03 | End: 2021-12-10 | Stop reason: HOSPADM

## 2021-12-03 RX ORDER — SODIUM CHLORIDE 9 MG/ML
125 INJECTION, SOLUTION INTRAVENOUS CONTINUOUS
Status: DISPENSED | OUTPATIENT
Start: 2021-12-03 | End: 2021-12-04

## 2021-12-03 RX ORDER — OXYCODONE HYDROCHLORIDE 5 MG/1
5 TABLET ORAL
Status: DISCONTINUED | OUTPATIENT
Start: 2021-12-03 | End: 2021-12-10 | Stop reason: HOSPADM

## 2021-12-03 RX ORDER — ENOXAPARIN SODIUM 150 MG/ML
1 INJECTION SUBCUTANEOUS EVERY 12 HOURS
Status: DISCONTINUED | OUTPATIENT
Start: 2021-12-03 | End: 2021-12-06

## 2021-12-03 RX ORDER — POLYETHYLENE GLYCOL 3350 17 G/17G
17 POWDER, FOR SOLUTION ORAL DAILY
Status: DISCONTINUED | OUTPATIENT
Start: 2021-12-03 | End: 2021-12-10 | Stop reason: HOSPADM

## 2021-12-03 RX ORDER — SODIUM CHLORIDE 0.9 % (FLUSH) 0.9 %
5-40 SYRINGE (ML) INJECTION EVERY 8 HOURS
Status: DISCONTINUED | OUTPATIENT
Start: 2021-12-03 | End: 2021-12-10 | Stop reason: HOSPADM

## 2021-12-03 RX ORDER — ACETAMINOPHEN 500 MG
1000 TABLET ORAL EVERY 6 HOURS
Status: DISCONTINUED | OUTPATIENT
Start: 2021-12-03 | End: 2021-12-10 | Stop reason: HOSPADM

## 2021-12-03 RX ORDER — NALOXONE HYDROCHLORIDE 0.4 MG/ML
0.4 INJECTION, SOLUTION INTRAMUSCULAR; INTRAVENOUS; SUBCUTANEOUS AS NEEDED
Status: DISCONTINUED | OUTPATIENT
Start: 2021-12-03 | End: 2021-12-10 | Stop reason: HOSPADM

## 2021-12-03 RX ORDER — FACIAL-BODY WIPES
10 EACH TOPICAL DAILY PRN
Status: DISCONTINUED | OUTPATIENT
Start: 2021-12-04 | End: 2021-12-10 | Stop reason: HOSPADM

## 2021-12-03 RX ORDER — ONDANSETRON 2 MG/ML
4 INJECTION INTRAMUSCULAR; INTRAVENOUS
Status: ACTIVE | OUTPATIENT
Start: 2021-12-03 | End: 2021-12-04

## 2021-12-03 RX ORDER — DIPHENHYDRAMINE HYDROCHLORIDE 50 MG/ML
12.5 INJECTION, SOLUTION INTRAMUSCULAR; INTRAVENOUS
Status: ACTIVE | OUTPATIENT
Start: 2021-12-03 | End: 2021-12-04

## 2021-12-03 RX ORDER — MORPHINE SULFATE 2 MG/ML
2 INJECTION, SOLUTION INTRAMUSCULAR; INTRAVENOUS
Status: ACTIVE | OUTPATIENT
Start: 2021-12-03 | End: 2021-12-04

## 2021-12-03 RX ORDER — SODIUM CHLORIDE 0.9 % (FLUSH) 0.9 %
5-40 SYRINGE (ML) INJECTION AS NEEDED
Status: DISCONTINUED | OUTPATIENT
Start: 2021-12-03 | End: 2021-12-10 | Stop reason: HOSPADM

## 2021-12-03 RX ADMIN — PAROXETINE HYDROCHLORIDE 40 MG: 20 TABLET, FILM COATED ORAL at 09:34

## 2021-12-03 RX ADMIN — ACETAMINOPHEN 1000 MG: 500 TABLET, FILM COATED ORAL at 17:56

## 2021-12-03 RX ADMIN — ENOXAPARIN SODIUM 120 MG: 150 INJECTION SUBCUTANEOUS at 06:18

## 2021-12-03 RX ADMIN — ALPRAZOLAM 0.25 MG: 0.5 TABLET ORAL at 14:11

## 2021-12-03 RX ADMIN — Medication 10 ML: at 22:35

## 2021-12-03 RX ADMIN — INSULIN LISPRO 2 UNITS: 100 INJECTION, SOLUTION INTRAVENOUS; SUBCUTANEOUS at 12:47

## 2021-12-03 RX ADMIN — INSULIN LISPRO 2 UNITS: 100 INJECTION, SOLUTION INTRAVENOUS; SUBCUTANEOUS at 09:26

## 2021-12-03 RX ADMIN — NYSTATIN OINTMENT 1 G: 100000 OINTMENT TOPICAL at 16:50

## 2021-12-03 RX ADMIN — Medication 1 AMPULE: at 22:32

## 2021-12-03 RX ADMIN — METOPROLOL TARTRATE 12.5 MG: 25 TABLET, FILM COATED ORAL at 22:32

## 2021-12-03 RX ADMIN — NYSTATIN OINTMENT: 100000 OINTMENT TOPICAL at 22:37

## 2021-12-03 RX ADMIN — ENOXAPARIN SODIUM 120 MG: 150 INJECTION SUBCUTANEOUS at 17:01

## 2021-12-03 RX ADMIN — ACETAMINOPHEN 1000 MG: 500 TABLET, FILM COATED ORAL at 06:18

## 2021-12-03 RX ADMIN — Medication 1 AMPULE: at 09:33

## 2021-12-03 RX ADMIN — ACETAMINOPHEN 500 MG: 500 TABLET ORAL at 09:34

## 2021-12-03 RX ADMIN — ASPIRIN 81 MG: 81 TABLET, COATED ORAL at 09:33

## 2021-12-03 RX ADMIN — CEFTRIAXONE SODIUM 2 G: 2 INJECTION, POWDER, FOR SOLUTION INTRAMUSCULAR; INTRAVENOUS at 09:45

## 2021-12-03 RX ADMIN — AMITRIPTYLINE HYDROCHLORIDE 50 MG: 50 TABLET, FILM COATED ORAL at 22:32

## 2021-12-03 RX ADMIN — LEVOTHYROXINE SODIUM 112 MCG: 0.11 TABLET ORAL at 09:34

## 2021-12-03 RX ADMIN — Medication 1 CAPSULE: at 09:33

## 2021-12-03 RX ADMIN — NYSTATIN OINTMENT 1 G: 100000 OINTMENT TOPICAL at 09:36

## 2021-12-03 RX ADMIN — Medication 10 ML: at 07:51

## 2021-12-03 RX ADMIN — INSULIN GLARGINE 20 UNITS: 100 INJECTION, SOLUTION SUBCUTANEOUS at 09:32

## 2021-12-03 RX ADMIN — ACETAMINOPHEN 1000 MG: 500 TABLET, FILM COATED ORAL at 12:48

## 2021-12-03 RX ADMIN — PANTOPRAZOLE SODIUM 40 MG: 40 TABLET, DELAYED RELEASE ORAL at 09:35

## 2021-12-03 RX ADMIN — ATORVASTATIN CALCIUM 40 MG: 40 TABLET, FILM COATED ORAL at 22:32

## 2021-12-03 NOTE — PROGRESS NOTES
Patient seen and examined. Reports that she refused PT today because of anxiety. Generally feeling ok though. Some subjective decreased sensation of feet. PE -     . Visit Vitals  /71 (BP 1 Location: Left upper arm, BP Patient Position: At rest;Lying)   Pulse 71   Temp 98 °F (36.7 °C)   Resp 18   Ht 5' 4\" (1.626 m)   Wt 259 lb 12.8 oz (117.8 kg)   SpO2 96%   Breastfeeding No   BMI 44.59 kg/m²     BLE - sensation in tact throughout   - EHL, TA, G/S in tact   - no calf TTP    Spine - drain in place with serosanguinous drainage in cannister   - dressing c/d/i    .   Recent Results (from the past 12 hour(s))   GLUCOSE, POC    Collection Time: 12/03/21  7:45 AM   Result Value Ref Range    Glucose (POC) 147 (H) 65 - 117 mg/dL    Performed by Sherine Duel PCT    METABOLIC PANEL, BASIC    Collection Time: 12/03/21  8:05 AM   Result Value Ref Range    Sodium 137 136 - 145 mmol/L    Potassium 4.1 3.5 - 5.1 mmol/L    Chloride 105 97 - 108 mmol/L    CO2 27 21 - 32 mmol/L    Anion gap 5 5 - 15 mmol/L    Glucose 154 (H) 65 - 100 mg/dL    BUN 14 6 - 20 MG/DL    Creatinine 0.65 0.55 - 1.02 MG/DL    BUN/Creatinine ratio 22 (H) 12 - 20      GFR est AA >60 >60 ml/min/1.73m2    GFR est non-AA >60 >60 ml/min/1.73m2    Calcium 9.3 8.5 - 10.1 MG/DL   HEMOGLOBIN    Collection Time: 12/03/21  8:05 AM   Result Value Ref Range    HGB 10.0 (L) 11.5 - 16.0 g/dL   GLUCOSE, POC    Collection Time: 12/03/21 11:02 AM   Result Value Ref Range    Glucose (POC) 157 (H) 65 - 117 mg/dL    Performed by Maxime Nayak, POC    Collection Time: 12/03/21  3:56 PM   Result Value Ref Range    Glucose (POC) 132 (H) 65 - 117 mg/dL    Performed by La Verne Duel PCT      Assessment - doing well orthopaedically post-op    Plan - Will follow   - Abx as per ID

## 2021-12-03 NOTE — PROGRESS NOTES
Transition of Care Plan:     RUR: 14% low  Disposition: SNF - [facility]   Follow up appointments: PCP & specialist as indicated  DME needed: To be determined. [facility] to [facility] or means to access home:  Family has keys  IM Medicare Letter: To be given prior to discharge.   Is patient a BCPI-A Bundle:   No                 If yes, was Bundle Letter given?:   N/A  Caregiver Contact: Son  Discharge Caregiver contacted prior to discharge?  Caregiver to be contacted prior to discharge.     CM reviewed pt's chart. Pt is not medically stable at this time. Anticipate d/c to [address] at d/c. CM will continue to follow for discharge planning while patient is admitted on current unit. Please contact this CM with questions or issues related to discharge.      KRISTOFER Zaldivar  Care Manager [facility]  [phone]

## 2021-12-03 NOTE — PROGRESS NOTES
Problem: Mobility Impaired (Adult and Pediatric)  Goal: *Acute Goals and Plan of Care (Insert Text)  Description: FUNCTIONAL STATUS PRIOR TO ADMISSION: Ambulates household distances with RW support vs uses RW, stating she mostly has been relying on WC recently. History of MULTIPLE falls. Caregiver for  with dementia. HOME SUPPORT PRIOR TO ADMISSION: The patient lived with  however states he has dementia. Physical Therapy Goals    Initiated 11/10/2021  1. Patient will move from supine to sit and sit to supine , scoot up and down, and roll side to side in bed with supervision/set-up within 7 day(s). 2.  Patient will transfer from bed to chair and chair to bed with minimal assistance/contact guard assist using the least restrictive device within 7 day(s). 3.  Patient will perform sit to stand with minimal assistance/contact guard assist within 7 day(s). 4.  Patient will ambulate with minimal assistance/contact guard assist for 10 feet with the least restrictive device within 7 day(s). Re-Assessment 11/17/21 Re-Assessment, goals achieved and upgraded    1. Patient will move from supine to sit and sit to supine , scoot up and down, and roll side to side in bed with mod indep within 7 day(s). 2.  Patient will transfer from bed to chair and chair to bed with supervision   using the least restrictive device within 7 day(s). 3.  Patient will perform sit to stand with supervision within 7 day(s). 4.  Patient will ambulate with supervision  for 50 feet with the least restrictive device within 7 day(s). Reassessed 11/29/2021, goals not met and continue progress    1. Patient will move from supine to sit and sit to supine , scoot up and down, and roll side to side in bed with mod indep within 7 day(s). 2.  Patient will transfer from bed to chair and chair to bed with supervision   using the least restrictive device within 7 day(s).   3.  Patient will perform sit to stand with supervision within 7 day(s). 4.  Patient will ambulate with supervision  for 50 feet with the least restrictive device within 7 day(s). 12/3/2021 1735 by Kip Fatima PTA  Outcome: Not Progressing Towards Goal   PHYSICAL THERAPY TREATMENT  Patient: Ld Foster (67 y.o. female)  Date: 12/3/2021  Diagnosis: Hyperglycemia [R73.9]  MERCEDES (acute kidney injury) ([facility] [address] [N17.9]  Leukocytosis [D72.829]  SIRS (systemic inflammatory response syndrome) (HCC) [R65.10]  Candida vaginitis [B37.3]   <principal problem not specified>  Procedure(s) (LRB):  INCISION AND DRAINAGE TO LUMBAR WOUND (N/A) 1 Day Post-Op  Precautions: Fall, Back, WBAT  Chart, physical therapy assessment, plan of care and goals were reviewed. ASSESSMENT  Patient continues with skilled PT services and is not progressing towards goals. RN cleared pt for PT/OT session, but warns of pt's probable reluctance. OT assists pt to EOB, prior to standing attempts. Pt required cuing to improve UE use for safe standing trials. During each standing trial (3), pt report sharp pain midway up. Not sharp pain on return to sitting. During 2nd standing trial pt report fatigue after 2 min and returns to sitting, while progressing into anxiety attack. Pt required intense cuing for respirations and diversion, to calm (8 min). Pt refused transfer to chair       Current Level of Function Impacting Discharge (mobility/balance): min assist    Other factors to consider for discharge: anxiety         PLAN :  Patient continues to benefit from skilled intervention to address the above impairments. Continue treatment per established plan of care. to address goals.     Recommendation for discharge: (in order for the patient to meet his/her long term goals)  Therapy up to 5 days/week in SNF setting    This discharge recommendation:  Has been made in collaboration with the attending provider and/or case management    IF patient discharges home will need the following DME: [facility] bed and rolling walker       SUBJECTIVE:   Patient stated Colby Kumar had surgery yesterday. I can't do it yet.     OBJECTIVE DATA SUMMARY:   Critical Behavior:  Neurologic State: Alert (very anxious)  Orientation Level: Oriented X4  Cognition: Follows commands, Decreased attention/concentration  Safety/Judgement: Fall prevention, Decreased insight into deficits  Functional Mobility Training:  Bed Mobility:  Sit to Supine: Minimum assistance  Transfers:  Sit to Stand: Contact guard assistance; Minimum assistance  Stand to Sit: Contact guard assistance  Balance:  Sitting - Static: Good (unsupported)  Sitting - Dynamic: Good (unsupported)  Standing - Static: Good; Constant support  Ambulation/Gait Training:  Unable dur to onset of anxiety  Pain Rating:  Pain in: 6  Pain out: 6    Activity Tolerance:   Poor    After treatment patient left in no apparent distress:   Supine in bed, Patient positioned in R sidelying for pressure relief, and Call bell within reach    COMMUNICATION/COLLABORATION:   The patients plan of care was discussed with: Occupational therapist and Registered nurse.      Moris Romero PTA   Time Calculation: 25 mins

## 2021-12-03 NOTE — PROGRESS NOTES
Supportive follow up visit on [facility] for ongoing pastoral support due to patient's length of stay. Patient shared she has had multiple hospitalizations since June. She is the primary caregiver for her  who is not in good health. He is currently a patient at [facility].  Patient draws strength from her migue. She is a member of [employer]. Offered bedside presence, empathic listening and assurance of prayer for her and her . Refreshed her water with a cup of ice as requested. Advised of ongoing availability of pastoral support.       ELLA Quiroz, [facility], Staff [address]    [address] Paging Service  [phone])

## 2021-12-03 NOTE — OP NOTES
[address]  OPERATIVE REPORT    Name:  Flaquito Chang  MR#:  [id number]  :  1950  ACCOUNT #:  [de-identified]  DATE OF SERVICE:  2021    PREOPERATIVE DIAGNOSES:  Status post thoracolumbar fusion, right psoas abscess, possible posterior lumbar wound infection. POSTOPERATIVE DIAGNOSES:  Status post thoracolumbar fusion, right psoas abscess, possible posterior lumbar wound infection. PROCEDURE PERFORMED:  Incision and drainage, superficial and deep of the lumbar wound. SURGEON:  Marii Ballard MD    ASSISTANT:  Madisyn Epperson PA-C    ANESTHESIA:  General.    COMPLICATIONS:  None. SPECIMENS REMOVED:  Include anaerobic, aerobic, and fungal cultures. IMPLANTS:  None. ESTIMATED BLOOD LOSS:  Minimal.    INDICATIONS:  This is a pleasant 57-year-old female. She is approximately six months out from a revision thoracolumbar fusion. She had presented to the hospital a couple of weeks ago with a right psoas abscess, which had been drained percutaneously. She has on MRI some collections in the posterior subcutaneous tissue that is unknown if it is a seroma versus an abscess. She has had some drainage started recently in the inferior portion of the wound and is for irrigation, debridement, and exploration. The patient understood the risks and benefits, elected to proceed. PROCEDURE:  The patient was identified, brought to the operative suite, and underwent general anesthesia without difficulty. She already was on antibiotics and placed prone on the Chance frame with all bony prominences well padded. Back was prepped and draped sterilely. Time-out confirmed. At which time, we then did a midline incision. The one area that was draining, was followed down and there was a small superficial collection in the subcutaneous tissue with some mild purulence. This was debrided and irrigated. We sharply debrided the cavity and the track as well.   The rest of the wound appeared to be clean, dry, and intact. Beginning the right psoas abscess, we did dissect down and did a paraspinal fascial split over the hardware and dissected down to the hardware roughly in the mid lumbar region. No obvious purulent material was noted. We irrigated the wound with Irrisept irrigation, followed by pulse irrigation as well. Antibiotic beads impregnated with vancomycin and gentamicin as well were packed into the deep wound around the exposed hardware. We then sprinkled vancomycin powder in subcutaneous tissue, drain was placed as well, and #1 Stratafix in the fascial layer, #2 Stratafix in the skin, and then also nylons. The patient returned to the PACU in stable condition. The physician assistant was present during the entire operative procedure and assisted in all critical elements of the surgery. No surgical assist or resident was available.      Jessenia Villalobos MD      [id number]  D:  12/02/2021 9:26  T:  12/02/2021 19:44  JOB #:  [id number]

## 2021-12-03 NOTE — PROGRESS NOTES
Hospitalist Progress Note    NAME: Benedicto Mir   :  1950   MRN:  [id number]       Assessment / Plan:  Septic shock POA rectal temp 96, , lactic 9.2 -->5. 3   Streptococcus anginosus epidural/right iliopsoas abscess POA  Right paraspinal pain at level L3-4 and R iliac crest pain (complains of Right hip pain), POA   Paraspinal abscess  - Followed by Dr Andie Roblero and Infectious diseases, needs close follow-up as outpatient  -Stopped vancomycin  as recommended by ID  -Appreciated orthopedic consult, recommended IR placed drain. Orthopedics plans no surgery for now. Surgical management on hold for now    -First Drain was placed  draining purulent discharge, repeat CT  showed Resolution, drain removed .  -Though CT on  showed undrained fluid collection on right lower quadrant  -s/p , IR guided Drainage catheter, so far 25 cc output past 24 hours    -Culture from drain fluid is growing Streptococcus anginosus  -Antibiotics as per ID currently to continue ceftriaxone 2 g daily  -Recommendation 8 to 12 weeks, ID will finalize abx and duration and choice  Orthopedic surgery was called by ID due to concern of ongoing pus/blood drainage. Still awaiting final ID recommendations and spinal surgeon evaluation.   Status post I&D for lumbar wound today by orthopedic surgery team, input is appreciated  ID recommending to send for aerobic, anaerobic, fungal and AFB cultures from the I&D  Repeat CT abdomen to assess previous right lower quadrant abscess as per ID recommendations      - MRI done on , Rim-enhancing paraspinal soft tissue collections may reflect abscesses as  well, and partially visualized abscess inferior to the right kidney again seen, but  incompletely evaluated on this exam.  -For paraspinal abscess, orthopedic recommends conservative management,   -Successful aspiration of posterior paraspinal abscess     -Follow-up cultures sent from most recent I&D    Celiac Artery Thrombosis with splenic Infarcts:  -CT ABD/Pelvis 11/9 showed celiac artery thrombosis with splenic infarcts  CT abdomen pelvis today confirmed the celiac artery occlusion with splenic infarcts  Vascular consult appreciated  Renal function stable, switched heparin drip to Lovenox injection 1 mg/kg every 12 hours, continue until definitive decision about surgical debridement. resume anticoagulation, it was on hold for I&D on December 1  I will change to Eliquis on discharge      UTI  -Ucx E coli  -sensitive to cefepime   -Rea placed in ER due to poor mobility from hip pain and polyuria, with severe candidal infection in perineum and labia and buttocks. Removing Rea catheter and start voiding trial.  Patient advised to schedule empty bladder every 2 hours. Use pure wick catheter    Type 2 DM, uncontrolled with hyperosmolar hyperglycemia and early DKA POA  - presented with , AG 15, trace ketone in urine  - Required insulin gtt  - A1c 11.7 up from 7.6 in July. - continue holding metformin  - Continue Lantus 35 units. Continue SSI prn  - Blood sugars are better controlled    Bilateral feet neuropathy, suspected DM related  -S22 and folic acid within normal limits  No epidural abscess was seen on previous MRI    Prerenal EMRCEDES Cr 1.5  -resolved with IVF hydration.       Constipation  -added pericolace and miralax    Severe candida infection in perineum, labia, buttocks  -continue mystatin cream ordered in ER. Status post Diflucan  -wound care consult appreciated     Generalized weakness and gait difficulty   -consult pt/ot     SVT   HTN / HLD  -continue metoprolol and statin       Hypothyroid  -continue levothyroxine. TSH 3.6     Depression and anxiety  -continue paxil with xanax prn     Severe obesity  40 or above Morbid obesity / Body mass index is 44.42 kg/m².   Code:  Discussed, full  DVT prophylaxis: lovenox  Surrogate decision maker:   (but has some dementia per patient) or sister Rizwan Sullivan    HANY: Likely 12/5  Awaiting final cultures which were sent from I&D, final antibiotics recommendation      Subjective:     Patient was seen and examined. No acute events overnight. Discussed with RN overnight events. All patient's questions were answered. \"feeling better\"    review of Systems:  Symptom Y/N Comments  Symptom Y/N Comments   Fever/Chills n   Chest Pain n    Poor Appetite    Edema n    Cough n   Abdominal Pain y    Sputum    Joint Pain     SOB/AVALOS n   Pruritis/Rash     Nausea/vomit n   Tolerating PT/OT     Diarrhea n   Tolerating Diet y    Constipation    Other       Could NOT obtain due to:      PO intake: No data found. Objective:     VITALS:   Last 24hrs VS reviewed since prior progress note. Most recent are:  Patient Vitals for the past 24 hrs:   Temp Pulse Resp BP SpO2   12/03/21 1228 98.2 °F (36.8 °C) 84 16 112/84 95 %   12/03/21 0700 97.8 °F (36.6 °C) 76 16 108/60 96 %   12/03/21 0619  79  95/64    12/03/21 0319 97.7 °F (36.5 °C) 73 18 112/62 99 %   12/02/21 2101  73  (!) 96/54    12/02/21 1752   16 98/64 96 %   12/02/21 1524 98.1 °F (36.7 °C) 95 18 107/76 96 %       Intake/Output Summary (Last 24 hours) at 12/3/2021 1323  Last data filed at 12/3/2021 0945  Gross per 24 hour   Intake 50 ml   Output    Net 50 ml        I had a face to face encounter, and independently examined this patient on 12/3/2021, as outlined below:  PHYSICAL EXAM:  General: WD, WN. Alert, cooperative, no acute distress    EENT:  EOMI. Anicteric sclerae. MMM  Resp:  CTA bilaterally, no wheezing or rales. No accessory muscle use  CV:  Regular  rhythm,  No edema  GI:               Soft nontender  Neurologic:  Alert and oriented X 3, normal speech,   Psych:   Good insight. Not anxious nor agitated  Skin:  No rashes. No jaundice.      Reviewed most current lab test results and cultures  YES  Reviewed most current radiology test results   YES  Review and summation of old records today    NO  Reviewed patient's current orders and MAR    YES  PMH/SH reviewed - no change compared to H&P  ________________________________________________________________________  Care Plan discussed with:    Comments   Patient x    Family      RN x    Care Manager x    Consultant                       x Multidiciplinary team rounds were held today with , nursing, pharmacist and clinical coordinator. Patient's plan of care was discussed; medications were reviewed and discharge planning was addressed. ________________________________________________________________________    Total NON critical care TIME:  30   Minutes     Total CRITICAL CARE TIME Spent:   Minutes non procedure based      Comments   >50% of visit spent in counseling and coordination of care x     This includes time during multidisciplinary rounds if indicated above   ________________________________________________________________________  Tsering Morales MD     Procedures: see electronic medical records for all procedures/Xrays and details which were not copied into this note but were reviewed prior to creation of Plan. LABS:  I reviewed today's most current labs and imaging studies.   Pertinent labs include:  Recent Labs     12/03/21  0805   HGB 10.0*     Recent Labs     12/03/21  0805 12/01/21  0450    133*   K 4.1 3.9    104   CO2 27 23   * 117*   BUN 14 14   CREA 0.65 0.58   CA 9.3 9.5   ALB  --  2.3*   TBILI  --  0.5   ALT  --  26

## 2021-12-03 NOTE — PROGRESS NOTES
Comprehensive Nutrition Assessment    Type and Reason for Visit: Reassess    Nutrition Recommendations/Plan:   · Continue regular diet + Ensure HP shakes with meals. · Please document % meals and supplements consumed in flowsheet I/O's under intake. Nutrition Assessment:     12/3: Chart reviewed; med noted for status post I&D for lumbar wound today by orthopedic surgery team, ID recommends continued antibiotics. Continues to tolerate diet with ensure HP shakes. No new nutrition needs identified at this time. No data found. No data found. Last Weight Metric  Weight Loss Metrics 11/25/2021 7/10/2021 6/4/2021 6/2/2021 5/12/2021 5/4/2021 9/18/2020   Today's Wt 259 lb 12.8 oz 268 lb 6.4 oz 291 lb 0.1 oz - 267 lb 267 lb 3.2 oz 244 lb 14.9 oz   BMI 44.59 kg/m2 46.07 kg/m2 - 49.95 kg/m2 45.47 kg/m2 45.51 kg/m2 40.76 kg/m2     Estimated Daily Nutrient Needs:  Energy (kcal): 2010 kcal (BMR 1675 x 1. 2AF); Weight Used for Energy Requirements: Current  Protein (g): 94g (0.8 g/kg bw); Weight Used for Protein Requirements: Current  Fluid (ml/day): 2000 mL; Method Used for Fluid Requirements: 1 ml/kcal    Nutrition Related Findings:  BM: 12/1; Labs: reviewed; Meds: reviewed      Wounds:    Surgical incision, Skin tears       Current Nutrition Therapies:  ADULT ORAL NUTRITION SUPPLEMENT Dinner, Breakfast, Lunch;  Low Calorie/High Protein  ADULT DIET Regular    Anthropometric Measures:  · Height:  5' 4\" (162.6 cm)  · Current Body Wt:  117.8 kg (259 lb 11.2 oz)   · Ideal Body Wt:  120 lbs:  216.2 %   · BMI Category:  Obese class 3 (BMI 40.0 or greater)       Nutrition Diagnosis:   · Inadequate protein-energy intake related to  (decreased appetite) as evidenced by intake 26-50%    Nutrition Interventions:   Food and/or Nutrient Delivery: Continue current diet, Continue oral nutrition supplement  Nutrition Education and Counseling: No recommendations at this time  Coordination of Nutrition Care: No recommendation at this time    Goals:  PO intake >70% of meals/supplements next 5-7 days       Nutrition Monitoring and Evaluation:   Behavioral-Environmental Outcomes: None identified  Food/Nutrient Intake Outcomes: Food and nutrient intake, Supplement intake  Physical Signs/Symptoms Outcomes: Biochemical data, Weight    Discharge Planning:    Continue current diet, Continue oral nutrition supplement     Electronically signed by Reji Smart RD on 12/3/2021 at 3:37 PM

## 2021-12-03 NOTE — PROGRESS NOTES
Problem: Self Care Deficits Care Plan (Adult)  Goal: *Acute Goals and Plan of Care (Insert Text)  Description: FUNCTIONAL STATUS PRIOR TO ADMISSION: June 2, 2021 with back surgery: prior to surgery very poor sitting and standing tolerance due to P! Went to [address] [place] [address], discharged mod I June 23, 2021, with DME and AE. Fell July 8, 2021 (bumped over [facility] over toilet with RW use.) Returned to [address] [place] [address], with increased fear of falling. Discharge home Mod I at w/c, RW level. Patient was modified independent for basic and instrumental ADLs at w/c and/or RW level except spouse assisted with BM hygiene due to difficulty level. (cares for spouse with dementia who is mod I with self care at w/c and walker level)     Admitted to acute care post fall trying to get out of bed without device 11-06-21. Found to have Post op wound Abscess   Drain placed R flank 11-12;    6-10/10 P! During this re-eval.    Anxiety/panic attack significantly limiting with new distraction/focus on fear of falling. HOME SUPPORT: The patient lived with spouse, but cares for him due to dementia. Has supportive son that lives nearby    Occupational Therapy Goals  Weekly reassessment 11/29/2021  1. Patient will perform grooming with mod I within 7 days. 2.  Patient will perform UB bathing with contact guard assistance using most appropriate DME within 7 days. 3.  Patient will lower body dressing at moderate assistance with A/E within 7 days. 4.  Patient will perform bed mobility at supervision/set-up  within 7 days. 5.  Patient will verbalize/demonstrate 3/3 back precautions during ADL tasks without cues within 7 days. 6.  Patient will complete toilet transfer with mod I in 7 days. Weekly Reassessment 11/22/2021 - Continue all. 1.  Patient will perform grooming with mod I within 7 days. Not met, continue   2. Patient will perform UB bathing with moderate assistance using most appropriate DME within 7 days.  Met, upgraded  3. Patient will lower body dressing at moderate assistance with A/E within 7 days. Not met, continue   4. Patient will perform bed mobility at supervision/set-up within 7 days. Not met, continue   5. Patient will verbalize/demonstrate 3/3 back precautions during ADL tasks without cues within 7 days. - Continues to demonstrate. 6.  Patient will verbalize at least 2 strategies for stress management/panic attack management during every session in 7 days. Discontinue  7. Patient will complete toilet transfer with CGA-S in 7 days. Met, upgraded    Initiated 11/8/2021 Goals reviewed and updated 11-15-21  1. Patient will perform grooming with supervision/set-up within 7 days. Met upgrade to mod I in 7 days  2. Patient will perform UB bathing with moderate assistance  using most appropriate DME within 7 days. Cont for consistency 7 days  3. Patient will lower body dressing at moderate assistance with A/E within 7 days. Cont for consistency for 7 days  4. Patient will perform bed mobility at moderate assistance  within 7 days. Met upgrade to S in 7 days  5. Patient will verbalize/demonstrate 3/3 back precautions during ADL tasks without cues within 7 days. Met  6. Patient will verbalize at least 2 strategies for stress management/panic attack management during every session in 7 days  7.  Patient will complete toilet transfer with CGA-S in 7 days    12/3/2021 1657 by Bhavya Vaughn OT  Outcome: Progressing Towards Goal   OCCUPATIONAL THERAPY TREATMENT  Patient: Benedicto Mir (78 y.o. female)  Date: 12/3/2021  Diagnosis: Hyperglycemia [R73.9]  MERCEDES (acute kidney injury) ([facility] [address] [N17.9]  Leukocytosis [D72.829]  SIRS (systemic inflammatory response syndrome) ([facility]) [R65.10]  Candida vaginitis [B37.3]   <principal problem not specified>  Procedure(s) (LRB):  INCISION AND DRAINAGE TO LUMBAR WOUND (N/A) 1 Day Post-Op  Precautions: Fall, Back, WBAT  Chart, occupational therapy assessment, plan of care, and goals were reviewed. ASSESSMENT  Patient continues with skilled OT services and is progressing towards goals. Patient now s/p I&D of lumbar wound, POD 1. Received patient sitting EOB, reporting feeling slightly lightheaded but with BP stable. Patient anxious about mobilizing POD 1 but agreeable to session with minimal encouragement and education regarding benefits. Patient standing from EOB with CGA - min assist x2 with cues for best hand placement. Patient only tolerating <60 seconds before reporting need to sit. Patient safely returning to seated position with CGA, however then with panic attack. Patient participating in breathing / focus exercise with therapist - patient calming after several minutes, however extended time needed for recovery and patient then reporting extremely fatigued and declining OOB to chair. Anticipate patient will progress well with no change in goals necessary. Continue to recommend SNF at discharge. Current Level of Function Impacting Discharge (ADLs): CGA - min A    Other factors to consider for discharge: caregiver for spouse         PLAN :  Patient continues to benefit from skilled intervention to address the above impairments. Continue treatment per established plan of care to address goals. Recommend with staff: OOB to chair and toileting in bathroom with RW and assist x1    Recommend next OT session: bathroom ADLs    Recommendation for discharge: (in order for the patient to meet his/her long term goals)  Therapy up to 5 days/week in SNF setting    This discharge recommendation:  Has been made in collaboration with the attending provider and/or case management    IF patient discharges home will need the following DME:        SUBJECTIVE:   Patient stated I think I need a xanax - it's in my chart.     OBJECTIVE DATA SUMMARY:   Cognitive/Behavioral Status:  Neurologic State: Alert (very anxious)  Orientation Level: Oriented X4  Cognition: Follows commands; Decreased attention/concentration  Perception: Appears intact  Perseveration: Perseverates during conversation  Safety/Judgement: Fall prevention; Decreased insight into deficits    Functional Mobility and Transfers for ADLs:  Bed Mobility:  Sit to Supine: Minimum assistance    Transfers:  Sit to Stand: Contact guard assistance; Minimum assistance   Stand to Sit: Contact guard assistance     Balance:  Sitting - Static: Good (unsupported)  Sitting - Dynamic: Good (unsupported)  Standing - Static: Good; Constant support    ADL Intervention:  Upper Body [address] Gown: Minimum  assistance    Cognitive Retraining  Orientation Retraining: Reorienting  Problem Solving: Identifying the problem; Awareness of environment  Executive Functions: Regulating behavior  Organizing/Sequencing: Prioritizing  Attention to Task: Single task  Safety/Judgement: Fall prevention; Decreased insight into deficits    Therapeutic Exercises:   Breathing and focusing exercise to calm during / after panic attack. Cues for breathing in tandem with therapist, focusing on something concrete in sight, etc.    Pain:  Patient reporting back pain. Activity Tolerance:   Fair    After treatment patient left in no apparent distress:   Supine in bed, Call bell within reach, Side rails x 3 and nursing students present    COMMUNICATION/COLLABORATION:   The patients plan of care was discussed with: Physical therapist, Physical therapy assistant and Registered nurse.      Charanjit Lawson OT  Time Calculation: 29 mins

## 2021-12-03 NOTE — PROGRESS NOTES
End of Shift Note    Bedside shift change report given to JANIS Blount (oncoming nurse) by Alecia De Paz RN (offgoing nurse).   Report included the following information SBAR and Kardex    Shift worked:  [id number]     Shift summary and any significant changes:     Uneventful shift     Concerns for physician to address:  none     Zone phone for oncoming shift:   [phone], RN

## 2021-12-03 NOTE — PROGRESS NOTES
Bedside and Verbal shift change report given to Madhu (oncoming nurse) by Judit Santo (offgoing nurse). Report included the following information SBAR, Kardex, Intake/Output, MAR and Recent Results.

## 2021-12-04 ENCOUNTER — APPOINTMENT (OUTPATIENT)
Dept: CT IMAGING | Age: 71
DRG: 853 | End: 2021-12-04
Attending: INTERNAL MEDICINE
Payer: MEDICARE

## 2021-12-04 LAB
GLUCOSE BLD STRIP.AUTO-MCNC: 121 MG/DL (ref 65–117)
GLUCOSE BLD STRIP.AUTO-MCNC: 137 MG/DL (ref 65–117)
GLUCOSE BLD STRIP.AUTO-MCNC: 154 MG/DL (ref 65–117)
GLUCOSE BLD STRIP.AUTO-MCNC: 158 MG/DL (ref 65–117)
HGB BLD-MCNC: 10 G/DL (ref 11.5–16)
SERVICE CMNT-IMP: ABNORMAL

## 2021-12-04 PROCEDURE — 74011000250 HC RX REV CODE- 250: Performed by: INTERNAL MEDICINE

## 2021-12-04 PROCEDURE — 65660000000 HC RM CCU STEPDOWN

## 2021-12-04 PROCEDURE — 74011250637 HC RX REV CODE- 250/637: Performed by: ORTHOPAEDIC SURGERY

## 2021-12-04 PROCEDURE — 85018 HEMOGLOBIN: CPT

## 2021-12-04 PROCEDURE — 82962 GLUCOSE BLOOD TEST: CPT

## 2021-12-04 PROCEDURE — 74011250637 HC RX REV CODE- 250/637: Performed by: HOSPITALIST

## 2021-12-04 PROCEDURE — 74177 CT ABD & PELVIS W/CONTRAST: CPT

## 2021-12-04 PROCEDURE — 74011000636 HC RX REV CODE- 636: Performed by: INTERNAL MEDICINE

## 2021-12-04 PROCEDURE — 74011250637 HC RX REV CODE- 250/637: Performed by: PHYSICIAN ASSISTANT

## 2021-12-04 PROCEDURE — 74011250636 HC RX REV CODE- 250/636: Performed by: STUDENT IN AN ORGANIZED HEALTH CARE EDUCATION/TRAINING PROGRAM

## 2021-12-04 PROCEDURE — 94760 N-INVAS EAR/PLS OXIMETRY 1: CPT

## 2021-12-04 PROCEDURE — 74011000258 HC RX REV CODE- 258: Performed by: STUDENT IN AN ORGANIZED HEALTH CARE EDUCATION/TRAINING PROGRAM

## 2021-12-04 PROCEDURE — 74011250636 HC RX REV CODE- 250/636: Performed by: PHYSICIAN ASSISTANT

## 2021-12-04 PROCEDURE — 74011636637 HC RX REV CODE- 636/637: Performed by: INTERNAL MEDICINE

## 2021-12-04 PROCEDURE — 36415 COLL VENOUS BLD VENIPUNCTURE: CPT

## 2021-12-04 RX ORDER — BARIUM SULFATE 20 MG/ML
900 SUSPENSION ORAL
Status: COMPLETED | OUTPATIENT
Start: 2021-12-04 | End: 2021-12-04

## 2021-12-04 RX ADMIN — ACETAMINOPHEN 1000 MG: 500 TABLET, FILM COATED ORAL at 17:30

## 2021-12-04 RX ADMIN — Medication 10 ML: at 05:49

## 2021-12-04 RX ADMIN — METOPROLOL TARTRATE 12.5 MG: 25 TABLET, FILM COATED ORAL at 22:04

## 2021-12-04 RX ADMIN — ASPIRIN 81 MG: 81 TABLET, COATED ORAL at 09:46

## 2021-12-04 RX ADMIN — NYSTATIN OINTMENT: 100000 OINTMENT TOPICAL at 18:53

## 2021-12-04 RX ADMIN — AMITRIPTYLINE HYDROCHLORIDE 50 MG: 50 TABLET, FILM COATED ORAL at 22:04

## 2021-12-04 RX ADMIN — Medication 1 AMPULE: at 22:11

## 2021-12-04 RX ADMIN — INSULIN LISPRO 2 UNITS: 100 INJECTION, SOLUTION INTRAVENOUS; SUBCUTANEOUS at 09:50

## 2021-12-04 RX ADMIN — ENOXAPARIN SODIUM 120 MG: 150 INJECTION SUBCUTANEOUS at 17:30

## 2021-12-04 RX ADMIN — METOPROLOL TARTRATE 25 MG: 25 TABLET, FILM COATED ORAL at 09:48

## 2021-12-04 RX ADMIN — ATORVASTATIN CALCIUM 40 MG: 40 TABLET, FILM COATED ORAL at 22:04

## 2021-12-04 RX ADMIN — CEFTRIAXONE SODIUM 2 G: 2 INJECTION, POWDER, FOR SOLUTION INTRAMUSCULAR; INTRAVENOUS at 09:34

## 2021-12-04 RX ADMIN — Medication 1 AMPULE: at 09:58

## 2021-12-04 RX ADMIN — ACETAMINOPHEN 1000 MG: 500 TABLET, FILM COATED ORAL at 05:49

## 2021-12-04 RX ADMIN — LEVOTHYROXINE SODIUM 112 MCG: 0.11 TABLET ORAL at 09:46

## 2021-12-04 RX ADMIN — PAROXETINE HYDROCHLORIDE 40 MG: 20 TABLET, FILM COATED ORAL at 09:49

## 2021-12-04 RX ADMIN — BARIUM SULFATE 900 ML: 20 SUSPENSION ORAL at 11:51

## 2021-12-04 RX ADMIN — ENOXAPARIN SODIUM 120 MG: 150 INJECTION SUBCUTANEOUS at 04:08

## 2021-12-04 RX ADMIN — Medication 1 CAPSULE: at 09:46

## 2021-12-04 RX ADMIN — IOPAMIDOL 100 ML: 755 INJECTION, SOLUTION INTRAVENOUS at 11:00

## 2021-12-04 RX ADMIN — INSULIN GLARGINE 20 UNITS: 100 INJECTION, SOLUTION SUBCUTANEOUS at 09:50

## 2021-12-04 RX ADMIN — NYSTATIN OINTMENT 1 G: 100000 OINTMENT TOPICAL at 09:49

## 2021-12-04 RX ADMIN — OXYCODONE 5 MG: 5 TABLET ORAL at 23:17

## 2021-12-04 RX ADMIN — PANTOPRAZOLE SODIUM 40 MG: 40 TABLET, DELAYED RELEASE ORAL at 09:46

## 2021-12-04 RX ADMIN — Medication 10 ML: at 22:09

## 2021-12-04 RX ADMIN — ACETAMINOPHEN 1000 MG: 500 TABLET, FILM COATED ORAL at 12:47

## 2021-12-04 NOTE — PROGRESS NOTES
End of Shift Note    Bedside shift change report given to [address] [place] [address] (oncoming nurse) by Redd Armijo RN (offgoing nurse). Report included the following information SBAR, Kardex, Procedure Summary, Intake/Output, MAR, Accordion and Recent Results    Shift worked:  3p-7p     Shift summary and any significant changes:     Pt to CT scan at start of shift. Both drains with very minimal serosang output, both charged, neither emptied. Otherwise uneventful shift. Concerns for physician to address:  none     Zone phone for oncoming shift:          Activity:  Activity Level: Up with Assistance  Number times ambulated in hallways past shift: 0  Number of times OOB to chair past shift: 0    Cardiac:   Cardiac Monitoring: Yes      Cardiac Rhythm: Sinus Rhythm    Access:   Current line(s): PICC     Genitourinary:   Urinary status: voiding and external catheter    Respiratory:   O2 Device: None (Room air)  Chronic home O2 use?: NO  Incentive spirometer at bedside: YES     GI:  Last Bowel Movement Date: 12/01/21  Current diet:  ADULT ORAL NUTRITION SUPPLEMENT Dinner, Breakfast, Lunch; Low Calorie/High Protein  ADULT DIET Regular  Passing flatus: YES  Tolerating current diet: YES       Pain Management:   Patient states pain is manageable on current regimen: N/A    Skin:  Neal Score: 19  Interventions: internal/external urinary devices    Patient Safety:  Fall Score:  Total Score: 3  Interventions: gripper socks  High Fall Risk: Yes    Length of Stay:  Expected LOS: 9d 14h  Actual LOS: [address], RN

## 2021-12-04 NOTE — PROGRESS NOTES
Hospitalist Progress Note    NAME: Tae Golden   :  1950   MRN:  [id number]       Assessment / Plan:  Septic shock POA rectal temp 96, , lactic 9.2 -->5. 3   Streptococcus anginosus epidural/right iliopsoas abscess POA  Right paraspinal pain at level L3-4 and R iliac crest pain (complains of Right hip pain), POA   Paraspinal abscess  - Followed by Dr Alanna Krueger and Infectious diseases, needs close follow-up as outpatient  -Stopped vancomycin  as recommended by ID  -Appreciated orthopedic consult, recommended IR placed drain. Orthopedics plans no surgery for now. Surgical management on hold for now    -First Drain was placed  draining purulent discharge, repeat CT  showed Resolution, drain removed .  -Though CT on  showed undrained fluid collection on right lower quadrant  -s/p , IR guided Drainage catheter, so far 25 cc output past 24 hours    -Culture from drain fluid is growing Streptococcus anginosus  -Antibiotics as per ID currently to continue ceftriaxone 2 g daily  -Recommendation 8 to 12 weeks, ID will finalize abx and duration and choice  Orthopedic surgery was called by ID due to concern of ongoing pus/blood drainage. Still awaiting final ID recommendations and spinal surgeon evaluation. Status post I&D for lumbar wound today by orthopedic surgery team, input is appreciated  ID recommending to send for aerobic, anaerobic, fungal and AFB cultures from the I&D  Repeat CT abdomen to assess previous right lower quadrant abscess as per ID recommendations was ordered  I discussed with ID this morning we will follow CT abdomen finding and see how she does over the weekend.   ID recommendations are appreciated      - MRI done on , Rim-enhancing paraspinal soft tissue collections may reflect abscesses as  well, and partially visualized abscess inferior to the right kidney again seen, but  incompletely evaluated on this exam.  -For paraspinal abscess, orthopedic recommends conservative management,   -Successful aspiration of posterior paraspinal abscess 11/26    -Follow-up cultures sent from most recent I&D    Celiac Artery Thrombosis with splenic Infarcts:  -CT ABD/Pelvis 11/9 showed celiac artery thrombosis with splenic infarcts  CT abdomen pelvis today confirmed the celiac artery occlusion with splenic infarcts  Vascular consult appreciated  Renal function stable, switched heparin drip to Lovenox injection 1 mg/kg every 12 hours, continue until definitive decision about surgical debridement. resume anticoagulation, it was on hold for I&D on December 1  I will change to Eliquis on discharge      UTI  -Ucx E coli  -sensitive to cefepime   -Rea placed in ER due to poor mobility from hip pain and polyuria, with severe candidal infection in perineum and labia and buttocks. Removing Rea catheter and start voiding trial.  Patient advised to schedule empty bladder every 2 hours. Use pure wick catheter    Type 2 DM, uncontrolled with hyperosmolar hyperglycemia and early DKA POA  - presented with , AG 15, trace ketone in urine  - Required insulin gtt  - A1c 11.7 up from 7.6 in July. - continue holding metformin  - Continue Lantus 35 units. 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       Constipation  -added pericolace and miralax    Severe candida infection in perineum, labia, buttocks  -continue mystatin cream ordered in ER. Status post Diflucan  -wound care consult appreciated     Generalized weakness and gait difficulty   -consult pt/ot     SVT   HTN / HLD  -continue metoprolol and statin       Hypothyroid  -continue levothyroxine.   TSH 3.6     Depression and anxiety  -continue paxil with xanax prn     Severe obesity  40 or above Morbid obesity / Body mass index is 44.42 kg/m². Code:  Discussed, full  DVT prophylaxis: lovenox  Surrogate decision maker:   (but has some dementia per patient) or sister Giselle Wayne    HANY: Likely 12/6  Awaiting final cultures which were sent from I&D, final antibiotics recommendation      Subjective:     Patient was seen and examined. No acute events overnight. Discussed with RN overnight events. All patient's questions were answered. \"doing well\"    review of Systems:  Symptom Y/N Comments  Symptom Y/N Comments   Fever/Chills n   Chest Pain n    Poor Appetite    Edema n    Cough n   Abdominal Pain y    Sputum    Joint Pain     SOB/AVALOS n   Pruritis/Rash     Nausea/vomit n   Tolerating PT/OT     Diarrhea n   Tolerating Diet y    Constipation    Other       Could NOT obtain due to:      PO intake: No data found. Objective:     VITALS:   Last 24hrs VS reviewed since prior progress note. Most recent are:  Patient Vitals for the past 24 hrs:   Temp Pulse Resp BP SpO2   12/04/21 0744 97.9 °F (36.6 °C) 70  110/67    12/04/21 0319 97.9 °F (36.6 °C) 69 16 131/78 96 %   12/03/21 2232  80  121/84    12/03/21 1916 97.7 °F (36.5 °C) 77 16 125/70 98 %   12/03/21 1546 98 °F (36.7 °C) 71 18 113/71 96 %   12/03/21 1228 98.2 °F (36.8 °C) 84 16 112/84 95 %       Intake/Output Summary (Last 24 hours) at 12/4/2021 1117  Last data filed at 12/4/2021 0551  Gross per 24 hour   Intake 20 ml   Output 1205 ml   Net -1185 ml        I had a face to face encounter, and independently examined this patient on 12/4/2021, as outlined below:  PHYSICAL EXAM:  General: WD, WN. Alert, cooperative, no acute distress    EENT:  EOMI. Anicteric sclerae. MMM  Resp:  CTA bilaterally, no wheezing or rales. No accessory muscle use  CV:  Regular  rhythm,  No edema  GI:               Soft nontender  Neurologic:  Alert and oriented X 3, normal speech,   Psych:   Good insight. Not anxious nor agitated  Skin:  No rashes. No jaundice. Reviewed most current lab test results and cultures  YES  Reviewed most current radiology test results   YES  Review and summation of old records today    NO  Reviewed patient's current orders and MAR    YES  PMH/SH reviewed - no change compared to H&P  ________________________________________________________________________  Care Plan discussed with:    Comments   Patient x    Family      RN x    Care Manager x    Consultant  x ID                    x Multidiciplinary team rounds were held today with , nursing, pharmacist and clinical coordinator. Patient's plan of care was discussed; medications were reviewed and discharge planning was addressed. ________________________________________________________________________    Total NON critical care TIME:  30   Minutes     Total CRITICAL CARE TIME Spent:   Minutes non procedure based      Comments   >50% of visit spent in counseling and coordination of care x     This includes time during multidisciplinary rounds if indicated above   ________________________________________________________________________  Abida Craft MD     Procedures: see electronic medical records for all procedures/Xrays and details which were not copied into this note but were reviewed prior to creation of Plan. LABS:  I reviewed today's most current labs and imaging studies.   Pertinent labs include:  Recent Labs     12/04/21 0412 12/03/21  0805   HGB 10.0* 10.0*     Recent Labs     12/03/21  0805      K 4.1      CO2 27   *   BUN 14   CREA 0.65   CA 9.3

## 2021-12-04 NOTE — PROGRESS NOTES
End of Shift Note    Bedside shift change report given to RN (oncoming nurse) by Shyanne Tran (offgoing nurse). Report included the following information SBAR, Kardex, Intake/Output, MAR, Accordion, Recent Results and Med Rec Status    Shift worked:  night     Shift summary and any significant changes:     no blood return from midline     Concerns for physician to address:  none     Zone phone for oncoming shift:   [phone]       Activity:  Activity Level: Bed Rest  Number times ambulated in hallways past shift: 0  Number of times OOB to chair past shift: 0    Cardiac:   Cardiac Monitoring: Yes      Cardiac Rhythm: Sinus Rhythm    Access:   Current line(s): midline     Genitourinary:   Urinary status: voiding and external catheter    Respiratory:   O2 Device: None (Room air)  Chronic home O2 use?: NO  Incentive spirometer at bedside: YES     GI:  Last Bowel Movement Date: 12/01/21  Current diet:  ADULT ORAL NUTRITION SUPPLEMENT Dinner, Breakfast, Lunch; Low Calorie/High Protein  ADULT DIET Regular  Passing flatus: YES  Tolerating current diet: YES       Pain Management:   Patient states pain is manageable on current regimen: YES    Skin:  Neal Score: 17  Interventions: float heels and increase time out of bed    Patient Safety:  Fall Score:  Total Score: 4  Interventions: assistive device (walker, cane, etc), gripper socks and pt to call before getting OOB  High Fall Risk: Yes    Length of Stay:  Expected LOS: 9d 14h  Actual LOS: Ishaan Sotomayor

## 2021-12-04 NOTE — PROGRESS NOTES
Problem: Diabetes Self-Management  Goal: *Incorporating nutritional management into lifestyle  Description: Describe effect of type, amount and timing of food on blood glucose; list 3 methods for planning meals. Outcome: Progressing Towards Goal  Goal: *Incorporating physical activity into lifestyle  Description: State effect of exercise on blood glucose levels. Outcome: Progressing Towards Goal  Goal: *Developing strategies to promote health/change behavior  Description: Define the ABC's of diabetes; identify appropriate screenings, schedule and personal plan for screenings. Outcome: Progressing Towards Goal  Goal: *Using medications safely  Description: State effect of diabetes medications on diabetes; name diabetes medication taking, action and side effects. Outcome: Progressing Towards Goal  Goal: *Monitoring blood glucose, interpreting and using results  Description: Identify recommended blood glucose targets  and personal targets. Outcome: Progressing Towards Goal  Goal: *Prevention, detection, treatment of acute complications  Description: List symptoms of hyper- and hypoglycemia; describe how to treat low blood sugar and actions for lowering  high blood glucose level. Outcome: Progressing Towards Goal  Goal: *Prevention, detection and treatment of chronic complications  Description: Define the natural course of diabetes and describe the relationship of blood glucose levels to long term complications of diabetes.   Outcome: Progressing Towards Goal  Goal: *Developing strategies to address psychosocial issues  Description: Describe feelings about living with diabetes; identify support needed and support network  Outcome: Progressing Towards Goal  Goal: *Insulin pump training  Outcome: Progressing Towards Goal  Goal: *Sick day guidelines  Outcome: Progressing Towards Goal  Goal: *Patient Specific Goal (EDIT GOAL, INSERT TEXT)  Outcome: Progressing Towards Goal     Problem: Falls - Risk of  Goal: *Absence of Falls  Description: Document Danne Lobe Fall Risk and appropriate interventions in the flowsheet. Outcome: Progressing Towards Goal  Note: Fall Risk Interventions:  Mobility Interventions: Communicate number of staff needed for ambulation/transfer    Mentation Interventions: Adequate sleep, hydration, pain control    Medication Interventions: Patient to call before getting OOB    Elimination Interventions: Patient to call for help with toileting needs    History of Falls Interventions: Investigate reason for fall, Room close to nurse's station         Problem: Pressure Injury - Risk of  Goal: *Prevention of pressure injury  Description: Document Neal Scale and appropriate interventions in the flowsheet.   Outcome: Progressing Towards Goal  Note: Pressure Injury Interventions:  Sensory Interventions: Assess changes in LOC    Moisture Interventions: Absorbent underpads    Activity Interventions: Increase time out of bed, Assess need for specialty bed    Mobility Interventions: Assess need for specialty bed    Nutrition Interventions: Document food/fluid/supplement intake    Friction and Shear Interventions: Lift sheet

## 2021-12-04 NOTE — PROGRESS NOTES
Pt in stable condition, denies c/o pain, discomfort, n/v. Hemovac drain site c/d/i blood tinged drainage; rt accordian drain sit c/d/i blood tinged drainage. Good bed mobility, refused oob to chair. Tolerated po well, CT abd complete late this afternoon. Voiding clear, yellow urine via purewick catheter. Repositioned frequently for comfort. Plan of care discussed w/ patient, verbalizes understanding. Off going shift report provided to on coming RN; discussion included kardex, medications, assessment, and plan of care.

## 2021-12-04 NOTE — PROGRESS NOTES
Infectious Disease Progress Note    IMPRESSION:       -R/ retroperitoneal abscess 9.7 x 9.1 x 7.5 cm & possible fluid tract to the right L3-4 or right L4-5 foramen. No evidence of OM/discitis. Posterior decompression and fusion L2-S1 on CT lumbar spine  - S/p drainage of abscess and placement of drain   Fluid culture +  -light strep anginosus. - CT-guided drain removal and new drain placement of undrained part of the RLQ abscess,aspiration of posterior paraspinal abscess . Cultures + for alpha Strep. -S/p I&D of superficial deep lumbar wounds by & Ortho on . Wound cultures-NGTD, pending. -S/p Exploration of fusion, revision laminectomy L2-3, L5-S1 on 2021  Patient reports delayed wound healing, wound VAC placement, frequent disruption of wound VAC system    -E. coli UTI  Urine culture--.coli 50,000 colonies  ( R to Amp, quinolones, aminoglycosides, cefoxitin I    - H/o recurrent UTI, urinary incontinence    -Poorly controlled diabetes A1c 11.7    - S/p MERCEDES creatinine 0.65    -Morbid obesity BMI 44.5       PLAN:        -Continue ceftriaxone IV  -Await final cultures  -Dr. Mary Strong to resume care tomorrow & provide final recommendations. .               Subjective:     Patient seen Katerin Mortensen in bed. Denies complaints. Feels better overall  Bloodstained drainage from lumbar wound. Review of Systems:  A comprehensive review of systems was negative except for that written in the History of Present Illness. 14 point ROS obtained . All other systems negative . Objective:     Blood pressure 125/70, pulse 77, temperature 97.7 °F (36.5 °C), resp. rate 16, height 5' 4\" (1.626 m), weight 259 lb 12.8 oz (117.8 kg), SpO2 98 %, not currently breastfeeding.   Temp (24hrs), Av.9 °F (36.6 °C), Min:97.7 °F (36.5 °C), Max:98.2 °F (36.8 °C)      Patient Vitals for the past 24 hrs:   Temp Pulse Resp BP SpO2   21 1916 97.7 °F (36.5 °C) 77 16 125/70 98 %   21 1546 98 °F (36.7 °C) 71 18 113/71 96 %   12/03/21 1228 98.2 °F (36.8 °C) 84 16 112/84 95 %   12/03/21 0700 97.8 °F (36.6 °C) 76 16 108/60 96 %   12/03/21 0619  79  95/64    12/03/21 0319 97.7 °F (36.5 °C) 73 18 112/62 99 %   12/02/21 2101  73  (!) 96/54          Lines:  Peripheral IV:       Physical Exam:   General:  Awake, cooperative,    Eyes:  Sclera anicteric. Pupils equally round and reactive to light. Mouth/Throat: Mucous membranes normal, oral pharynx clear   Neck: Supple   Lungs:   Clear to auscultation bilaterally, good effort   CV:  Regular rate and rhythm,no murmur, click, rub or gallop   Abdomen:   Soft, non-tender.  bowel sounds normal. non-distended   Extremities: No cyanosis or edema   Skin: Skin color, texture, turgor normal. no acute rash or lesions   Lymph nodes: Cervical and supraclavicular normal   Musculoskeletal: No swelling or deformity   Lines/Devices:  Intact, no erythema, drainage or tenderness   Psych: Awake and oriented, normal mood affect       Data Review:ed   CBC:   Recent Labs     12/03/21  0805   HGB 10.0*     CMP:   Recent Labs     12/03/21  0805 12/01/21  0450   * 117*    133*   K 4.1 3.9    104   CO2 27 23   BUN 14 14   CREA 0.65 0.58   CA 9.3 9.5   AGAP 5 6   BUCR 22* 24*   AP  --  98   TP  --  7.2   ALB  --  2.3*   GLOB  --  4.9*   AGRAT  --  0.5*       Studies reviewed:      Lab Results   Component Value Date/Time    Culture result: NO GROWTH THUS FAR 12/02/2021 08:15 AM    Culture result: NO GROWTH THUS FAR 12/02/2021 08:15 AM    Culture result: SCANT ALPHA STREPTOCOCCUS (A) 11/26/2021 01:53 PM    Culture result: NO ANAEROBES ISOLATED 11/26/2021 01:53 PM    Culture result: RARE ALPHA STREPTOCOCCUS (A) 11/26/2021 01:53 PM    Culture result: NO GROWTH 4 DAYS 11/26/2021 01:53 PM    Culture result: NO GROWTH 4 DAYS 11/26/2021 01:53 PM        XR Results (most recent):  Results from Hospital Encounter encounter on 11/06/21    XR CHEST PORT    Narrative  INDICATION: . weakness  Additional history:  COMPARISON: Previous chest xray, 7/12/2021 and 7/8/2021. LIMITATIONS: Portable technique. Emma Godinez FINDINGS: Single frontal view of the chest.  .  Lines/tubes/surgical: Cardiac monitor leads overly the patient. Heart/mediastinum: Unremarkable. Lungs/pleura: Low lung volumes without definite acute process. No visualized  pleural effusion or pneumothorax. Additional Comments: None. .    Impression  1. Low lung volumes without definite acute cardiopulmonary disease. Consider PA  and lateral exam when clinically appropriate      Patient Active Problem List   Diagnosis Code    Left knee DJD M17.12    Right knee DJD M17.11    [facility] E66.01    Rectal polyp K62.1    Lumbar stenosis with neurogenic claudication M48.062    Right hip pain M25.551    Leukocytosis D72.829    Candida vaginitis B37.3    Hyperglycemia R73.9    SIRS (systemic inflammatory response syndrome) ([facility]) R65.10    MERCEDES (acute kidney injury) ([facility] N17.9    Severe sepsis ([facility]) A41.9, R65.20         ICD-10-CM ICD-9-CM    1. Hyperosmolar hyperglycemic state (HHS) ([facility]  E11.00 250.22     E11.65     2. Diabetic ketoacidosis without coma associated with type 2 diabetes mellitus ([facility]  E11.10 250.12    3. Severe sepsis ([facility])  A41.9 038.9     R65.20 995.92    4. Lactic acidosis  E87.2 276.2    5. MERCEDES (acute kidney injury) ([facility]  N17.9 584.9    6. Infection of spine ([facility])  M46.20 730.28    7. Candidal vulvovaginitis  B37.3 112.1    8. Hyponatremia  E87.1 276.1    9. E-coli UTI  N39.0 599.0     B96.20 041.49    10. Fusion of lumbar spine  M43.26 724.9    11. History of recurrent UTIs  Z87.440 V13.02    12. Morbid obesity with BMI of 45.0-49.9, adult ([facility])  E66.01 278.01     Z68.42 V85.42    13. Poorly controlled diabetes mellitus ([facility]  E11.65 250.00    14. Retroperitoneal abscess ([facility]  K68.19 567.38    15. Streptococcal infection  A49.1 041.00    16.  Bandemia  D72.825 288.66        I have discussed the diagnosis with the patient and the intended plan as seen in the above orders. I have discussed medication side effects and warnings with the patient as well.     Reviewed test results at length with patient    Anti-infectives:     Ceftriaxone IV  S/p Vancomycin, Cefepime IV     Randy Pantoja

## 2021-12-05 LAB
GLUCOSE BLD STRIP.AUTO-MCNC: 151 MG/DL (ref 65–117)
GLUCOSE BLD STRIP.AUTO-MCNC: 155 MG/DL (ref 65–117)
GLUCOSE BLD STRIP.AUTO-MCNC: 214 MG/DL (ref 65–117)
SERVICE CMNT-IMP: ABNORMAL

## 2021-12-05 PROCEDURE — 74011636637 HC RX REV CODE- 636/637: Performed by: INTERNAL MEDICINE

## 2021-12-05 PROCEDURE — 74011250637 HC RX REV CODE- 250/637: Performed by: NURSE PRACTITIONER

## 2021-12-05 PROCEDURE — 65660000000 HC RM CCU STEPDOWN

## 2021-12-05 PROCEDURE — 74011000258 HC RX REV CODE- 258: Performed by: STUDENT IN AN ORGANIZED HEALTH CARE EDUCATION/TRAINING PROGRAM

## 2021-12-05 PROCEDURE — 74011250636 HC RX REV CODE- 250/636: Performed by: STUDENT IN AN ORGANIZED HEALTH CARE EDUCATION/TRAINING PROGRAM

## 2021-12-05 PROCEDURE — 74011250637 HC RX REV CODE- 250/637: Performed by: ORTHOPAEDIC SURGERY

## 2021-12-05 PROCEDURE — 74011250637 HC RX REV CODE- 250/637: Performed by: HOSPITALIST

## 2021-12-05 PROCEDURE — 74011250636 HC RX REV CODE- 250/636: Performed by: PHYSICIAN ASSISTANT

## 2021-12-05 PROCEDURE — 94760 N-INVAS EAR/PLS OXIMETRY 1: CPT

## 2021-12-05 PROCEDURE — 82962 GLUCOSE BLOOD TEST: CPT

## 2021-12-05 PROCEDURE — 74011250637 HC RX REV CODE- 250/637: Performed by: PHYSICIAN ASSISTANT

## 2021-12-05 PROCEDURE — 99233 SBSQ HOSP IP/OBS HIGH 50: CPT | Performed by: STUDENT IN AN ORGANIZED HEALTH CARE EDUCATION/TRAINING PROGRAM

## 2021-12-05 RX ADMIN — AMITRIPTYLINE HYDROCHLORIDE 50 MG: 50 TABLET, FILM COATED ORAL at 23:07

## 2021-12-05 RX ADMIN — NYSTATIN OINTMENT: 100000 OINTMENT TOPICAL at 23:10

## 2021-12-05 RX ADMIN — Medication 10 ML: at 23:09

## 2021-12-05 RX ADMIN — ASPIRIN 81 MG: 81 TABLET, COATED ORAL at 10:20

## 2021-12-05 RX ADMIN — INSULIN GLARGINE 20 UNITS: 100 INJECTION, SOLUTION SUBCUTANEOUS at 10:20

## 2021-12-05 RX ADMIN — ENOXAPARIN SODIUM 120 MG: 150 INJECTION SUBCUTANEOUS at 04:59

## 2021-12-05 RX ADMIN — METOPROLOL TARTRATE 12.5 MG: 25 TABLET, FILM COATED ORAL at 23:07

## 2021-12-05 RX ADMIN — MELATONIN 6 MG: at 23:07

## 2021-12-05 RX ADMIN — Medication 1 CAPSULE: at 10:20

## 2021-12-05 RX ADMIN — CEFTRIAXONE SODIUM 2 G: 2 INJECTION, POWDER, FOR SOLUTION INTRAMUSCULAR; INTRAVENOUS at 10:20

## 2021-12-05 RX ADMIN — LEVOTHYROXINE SODIUM 112 MCG: 0.11 TABLET ORAL at 10:20

## 2021-12-05 RX ADMIN — Medication 10 ML: at 15:28

## 2021-12-05 RX ADMIN — Medication 10 ML: at 05:01

## 2021-12-05 RX ADMIN — INSULIN LISPRO 2 UNITS: 100 INJECTION, SOLUTION INTRAVENOUS; SUBCUTANEOUS at 10:38

## 2021-12-05 RX ADMIN — NYSTATIN OINTMENT: 100000 OINTMENT TOPICAL at 17:44

## 2021-12-05 RX ADMIN — PAROXETINE HYDROCHLORIDE 40 MG: 20 TABLET, FILM COATED ORAL at 10:19

## 2021-12-05 RX ADMIN — PANTOPRAZOLE SODIUM 40 MG: 40 TABLET, DELAYED RELEASE ORAL at 10:19

## 2021-12-05 RX ADMIN — ATORVASTATIN CALCIUM 40 MG: 40 TABLET, FILM COATED ORAL at 23:07

## 2021-12-05 RX ADMIN — Medication 1 AMPULE: at 23:07

## 2021-12-05 RX ADMIN — ACETAMINOPHEN 1000 MG: 500 TABLET, FILM COATED ORAL at 15:26

## 2021-12-05 RX ADMIN — NYSTATIN OINTMENT: 100000 OINTMENT TOPICAL at 10:40

## 2021-12-05 RX ADMIN — Medication 10 ML: at 23:08

## 2021-12-05 RX ADMIN — METOPROLOL TARTRATE 25 MG: 25 TABLET, FILM COATED ORAL at 06:19

## 2021-12-05 RX ADMIN — ACETAMINOPHEN 1000 MG: 500 TABLET, FILM COATED ORAL at 05:00

## 2021-12-05 RX ADMIN — ENOXAPARIN SODIUM 120 MG: 150 INJECTION SUBCUTANEOUS at 17:47

## 2021-12-05 RX ADMIN — INSULIN LISPRO 3 UNITS: 100 INJECTION, SOLUTION INTRAVENOUS; SUBCUTANEOUS at 17:43

## 2021-12-05 NOTE — PROGRESS NOTES
The morning blood glucose was taken at 0953. Dr. Mary Reyez notified and ordered to given the sliding as the lunch time coverage.

## 2021-12-05 NOTE — PROGRESS NOTES
Infectious Disease Progress Note         Interval:  NAEO    Subjective:   Spoke to patient on the phone this morning. She reports feeling tired and sleepy today saying she has been up since 4am.   Overall reports to be feeling better since the surgery - back pain is now more soreness. Appetite is getting better. Objective:    Vitals:   Reviewed in chart. Physical Exam:  Spoke to patient on the phone. Labs:  Recent Results (from the past 24 hour(s))   GLUCOSE, POC    Collection Time: 12/04/21 11:29 AM   Result Value Ref Range    Glucose (POC) 121 (H) 65 - 117 mg/dL    Performed by Neri Bar (TRV RN)    GLUCOSE, POC    Collection Time: 12/04/21  4:19 PM   Result Value Ref Range    Glucose (POC) 137 (H) 65 - 117 mg/dL    Performed by Neri Bar (BRIANV RN)    GLUCOSE, POC    Collection Time: 12/04/21  9:40 PM   Result Value Ref Range    Glucose (POC) 158 (H) 65 - 117 mg/dL    Performed by Lorrie Duran RN    GLUCOSE, POC    Collection Time: 12/05/21  9:53 AM   Result Value Ref Range    Glucose (POC) 155 (H) 65 - 117 mg/dL    Performed by Macey Hameed PCT            Assessment:  64 yo F with:     - Sepsis due to right retroperitoneal abscess 9.7 x 9.1 x 7.5 cm with likely involvement of the lumbar spine. This has likely developed in the setting of poorly healing lumbar wound in July 2021. Trudy Bailey reports that after the wound VAC care was disrupted when she went home, there was lot of drainage from the lumbar wound.  The lumbar wound eventually healed up after the wound VAC was resumed. [facility] OLINDA this must have created a nidus of infection at that time. Patient is status post drainage and drain placement of the psoas abscess by IR on 11/12/2021. Drainage cultures grew Streptococcus anginosus. On 11/26, underwent CT-guided drain removal and new drain placement into the undrained part of the RLQ abscess. Also had aspiration of one of the paraspinal collections.  Cultures growing alpha Strep   -History of lumbar fusion surgeries. - Hx of urinary incontinence and multiple diagnosis of UTIs in the past. E.coli in urine this admission.   - Uncontrolled DM2 (A1c 11.7 on 11/6/21), hyperglycemic hyperosmolar hyperglycemia PTA  -Celiac artery thrombosis and splenic infarcts seen on CT lumbar spine 11/9/2021.   - TTE 11/8 technically difficult.       On 12/2/21, underwent Incision and drainage, superficial and deep of the lumbar wound. RLQ abscess was drained as well. Still has hemovac. Recommendations:  - Patient is reporting clinical improvement since the surgery. Appreciate Orthopedic help. - Repeat CT 12/4/21 shows resolution of the RLQ abscess. - Follow up operative cultures from 12/2/21 until final - growing GPC in thio broth right now. - Continue ceftriaxone 2gm daily for now. - Plan for 8-12 weeks of IV abx given invasive infection with presumed lumbar infection. Explained to the patient. - Final choice and duration of abx is pending clinical course. - Vascular surgery follow up will need to be arranged as well for the splenic artery thrombosis; patient expressed concern that she wants to make sure follow up is set up prior to discharge. Will follow for final recommendations. Thank you for the opportunity to participate in the care of this patient. Please contact with questions or concerns.       Hortencia Garg MD  Infectious Diseases

## 2021-12-05 NOTE — PROGRESS NOTES
Hospitalist Progress Note    NAME: Ese Blanchard   :  1950   MRN:  [id number]       Assessment / Plan:  Septic shock POA rectal temp 96, , lactic 9.2 -->5. 3   Streptococcus anginosus epidural/right iliopsoas abscess POA  Right paraspinal pain at level L3-4 and R iliac crest pain (complains of Right hip pain), POA   Paraspinal abscess  - Followed by Dr Ammon Boyle and Infectious diseases, needs close follow-up as outpatient  -Stopped vancomycin  as recommended by ID  -Appreciated orthopedic consult, recommended IR placed drain. Orthopedics plans no surgery for now. Surgical management on hold for now    -First Drain was placed  draining purulent discharge, repeat CT  showed Resolution, drain removed .  -Though CT on  showed undrained fluid collection on right lower quadrant  -s/p , IR guided Drainage catheter, so far 25 cc output past 24 hours    -Culture from drain fluid is growing Streptococcus anginosus  -Antibiotics as per ID currently to continue ceftriaxone 2 g daily  -Recommendation 8 to 12 weeks, ID will finalize abx and duration and choice  Orthopedic surgery was called by ID due to concern of ongoing pus/blood drainage. Still awaiting final ID recommendations and spinal surgeon evaluation. Status post I&D for lumbar wound today by orthopedic surgery team, input is appreciated  ID recommending to send for aerobic, anaerobic, fungal and AFB cultures from the I&D  Repeat CT abdomen on  showed resolution of right lower quadrant abscess    ID awaiting final cultures from last week to finalize recommendations for choice and duration of antibiotics.       - MRI done on , Rim-enhancing paraspinal soft tissue collections may reflect abscesses as  well, and partially visualized abscess inferior to the right kidney again seen, but  incompletely evaluated on this exam.  -For paraspinal abscess, orthopedic recommends conservative management,   -Successful aspiration of posterior paraspinal abscess 11/26    -Follow-up cultures sent from most recent I&D    Celiac Artery Thrombosis with splenic Infarcts:  -CT ABD/Pelvis 11/9 showed celiac artery thrombosis with splenic infarcts  CT abdomen pelvis today confirmed the celiac artery occlusion with splenic infarcts  Vascular consult appreciated  Renal function stable, switched heparin drip to Lovenox injection 1 mg/kg every 12 hours, continue until definitive decision about surgical debridement. resume anticoagulation, it was on hold for I&D on December 1  I will change to Eliquis on discharge      UTI  -Ucx E coli  -sensitive to cefepime   -Rea placed in ER due to poor mobility from hip pain and polyuria, with severe candidal infection in perineum and labia and buttocks. Removing Rea catheter and start voiding trial.  Patient advised to schedule empty bladder every 2 hours. Use pure wick catheter    Type 2 DM, uncontrolled with hyperosmolar hyperglycemia and early DKA POA  - presented with , AG 15, trace ketone in urine  - Required insulin gtt  - A1c 11.7 up from 7.6 in July. - continue holding metformin  - Continue Lantus 35 units. Continue SSI prn  - Blood sugars are better controlled    Bilateral feet neuropathy, suspected DM related  -Y28 and folic acid within normal limits  No epidural abscess was seen on previous MRI    Prerenal MERCEDES Cr 1.5  -resolved with IVF hydration.       Constipation  -added pericolace and miralax    Severe candida infection in perineum, labia, buttocks  -continue mystatin cream ordered in ER. Status post Diflucan  -wound care consult appreciated     Generalized weakness and gait difficulty   -consult pt/ot     SVT   HTN / HLD  -continue metoprolol and statin       Hypothyroid  -continue levothyroxine.   TSH 3.6     Depression and anxiety  -continue paxil with xanax prn     Severe obesity  40 or above Morbid obesity / Body mass index is 44.42 kg/m². Code:  Discussed, full  DVT prophylaxis: lovenox  Surrogate decision maker:   (but has some dementia per patient) or sister Abida Mcdermott    HANY: Likely 12/6  Awaiting final cultures which were sent from I&D, final antibiotics recommendation      Subjective:     Patient was seen and examined. No acute events overnight. Discussed with RN overnight events. All patient's questions were answered. \"slept well last night\"    review of Systems:  Symptom Y/N Comments  Symptom Y/N Comments   Fever/Chills n   Chest Pain n    Poor Appetite    Edema n    Cough n   Abdominal Pain y    Sputum    Joint Pain     SOB/AVALOS n   Pruritis/Rash     Nausea/vomit n   Tolerating PT/OT     Diarrhea n   Tolerating Diet y    Constipation    Other       Could NOT obtain due to:      PO intake: No data found. Objective:     VITALS:   Last 24hrs VS reviewed since prior progress note. Most recent are:  Patient Vitals for the past 24 hrs:   Temp Pulse Resp BP SpO2   12/05/21 1036 97.3 °F (36.3 °C) 67 18 112/67 96 %   12/05/21 0820 98.1 °F (36.7 °C) 78 16 115/63 95 %   12/05/21 0619  74  125/75    12/05/21 0320 98 °F (36.7 °C) 87 18 (!) 117/58 95 %   12/04/21 2358 97.8 °F (36.6 °C) 88 16 106/70 97 %   12/04/21 2204  76  121/68    12/04/21 2058 97.7 °F (36.5 °C) 76 16 121/68 95 %   12/04/21 1605 97.7 °F (36.5 °C) 67 18 121/69 96 %       Intake/Output Summary (Last 24 hours) at 12/5/2021 1149  Last data filed at 12/5/2021 4255  Gross per 24 hour   Intake 1000 ml   Output 2040 ml   Net -1040 ml        I had a face to face encounter, and independently examined this patient on 12/5/2021, as outlined below:  PHYSICAL EXAM:  General: WD, WN. Alert, cooperative, no acute distress    EENT:  EOMI. Anicteric sclerae. MMM  Resp:  CTA bilaterally, no wheezing or rales.   No accessory muscle use  CV:  Regular  rhythm,  No edema  GI:               Soft nontender  Neurologic:  Alert and oriented X 3, normal speech,   Psych: Good insight. Not anxious nor agitated  Skin:  No rashes. No jaundice. Reviewed most current lab test results and cultures  YES  Reviewed most current radiology test results   YES  Review and summation of old records today    NO  Reviewed patient's current orders and MAR    YES  PMH/SH reviewed - no change compared to H&P  ________________________________________________________________________  Care Plan discussed with:    Comments   Patient x    Family      RN x    Care Manager x    Consultant  x ID                    x Multidiciplinary team rounds were held today with , nursing, pharmacist and clinical coordinator. Patient's plan of care was discussed; medications were reviewed and discharge planning was addressed. ________________________________________________________________________    Total NON critical care TIME:  30   Minutes     Total CRITICAL CARE TIME Spent:   Minutes non procedure based      Comments   >50% of visit spent in counseling and coordination of care x     This includes time during multidisciplinary rounds if indicated above   ________________________________________________________________________  Tc Joshi MD     Procedures: see electronic medical records for all procedures/Xrays and details which were not copied into this note but were reviewed prior to creation of Plan. LABS:  I reviewed today's most current labs and imaging studies.   Pertinent labs include:  Recent Labs     12/04/21  0412 12/03/21  0805   HGB 10.0* 10.0*     Recent Labs     12/03/21  0805      K 4.1      CO2 27   *   BUN 14   CREA 0.65   CA 9.3

## 2021-12-06 LAB
BACTERIA SPEC CULT: NORMAL
GLUCOSE BLD STRIP.AUTO-MCNC: 127 MG/DL (ref 65–117)
GLUCOSE BLD STRIP.AUTO-MCNC: 144 MG/DL (ref 65–117)
GLUCOSE BLD STRIP.AUTO-MCNC: 147 MG/DL (ref 65–117)
GLUCOSE BLD STRIP.AUTO-MCNC: 169 MG/DL (ref 65–117)
SERVICE CMNT-IMP: ABNORMAL
SERVICE CMNT-IMP: NORMAL

## 2021-12-06 PROCEDURE — 82962 GLUCOSE BLOOD TEST: CPT

## 2021-12-06 PROCEDURE — 74011000258 HC RX REV CODE- 258: Performed by: STUDENT IN AN ORGANIZED HEALTH CARE EDUCATION/TRAINING PROGRAM

## 2021-12-06 PROCEDURE — 97535 SELF CARE MNGMENT TRAINING: CPT

## 2021-12-06 PROCEDURE — 94760 N-INVAS EAR/PLS OXIMETRY 1: CPT

## 2021-12-06 PROCEDURE — 74011250636 HC RX REV CODE- 250/636: Performed by: PHYSICIAN ASSISTANT

## 2021-12-06 PROCEDURE — 97530 THERAPEUTIC ACTIVITIES: CPT

## 2021-12-06 PROCEDURE — 74011250636 HC RX REV CODE- 250/636: Performed by: STUDENT IN AN ORGANIZED HEALTH CARE EDUCATION/TRAINING PROGRAM

## 2021-12-06 PROCEDURE — 2709999900 HC NON-CHARGEABLE SUPPLY

## 2021-12-06 PROCEDURE — 74011250636 HC RX REV CODE- 250/636: Performed by: INTERNAL MEDICINE

## 2021-12-06 PROCEDURE — 74011250637 HC RX REV CODE- 250/637: Performed by: ORTHOPAEDIC SURGERY

## 2021-12-06 PROCEDURE — 99233 SBSQ HOSP IP/OBS HIGH 50: CPT | Performed by: INTERNAL MEDICINE

## 2021-12-06 PROCEDURE — 74011250637 HC RX REV CODE- 250/637: Performed by: PHYSICIAN ASSISTANT

## 2021-12-06 PROCEDURE — 74011636637 HC RX REV CODE- 636/637: Performed by: INTERNAL MEDICINE

## 2021-12-06 PROCEDURE — 74011250637 HC RX REV CODE- 250/637: Performed by: HOSPITALIST

## 2021-12-06 PROCEDURE — 74011250637 HC RX REV CODE- 250/637: Performed by: INTERNAL MEDICINE

## 2021-12-06 PROCEDURE — 65660000000 HC RM CCU STEPDOWN

## 2021-12-06 RX ORDER — VANCOMYCIN HYDROCHLORIDE
1250 EVERY 12 HOURS
Status: DISCONTINUED | OUTPATIENT
Start: 2021-12-07 | End: 2021-12-07

## 2021-12-06 RX ADMIN — Medication 10 ML: at 22:13

## 2021-12-06 RX ADMIN — ALPRAZOLAM 0.25 MG: 0.5 TABLET ORAL at 12:43

## 2021-12-06 RX ADMIN — LEVOTHYROXINE SODIUM 112 MCG: 0.11 TABLET ORAL at 12:43

## 2021-12-06 RX ADMIN — CEFTRIAXONE SODIUM 2 G: 2 INJECTION, POWDER, FOR SOLUTION INTRAMUSCULAR; INTRAVENOUS at 12:54

## 2021-12-06 RX ADMIN — ATORVASTATIN CALCIUM 40 MG: 40 TABLET, FILM COATED ORAL at 22:06

## 2021-12-06 RX ADMIN — METOPROLOL TARTRATE 25 MG: 25 TABLET, FILM COATED ORAL at 12:43

## 2021-12-06 RX ADMIN — VANCOMYCIN HYDROCHLORIDE 2500 MG: 10 INJECTION, POWDER, LYOPHILIZED, FOR SOLUTION INTRAVENOUS at 13:41

## 2021-12-06 RX ADMIN — APIXABAN 5 MG: 5 TABLET, FILM COATED ORAL at 22:06

## 2021-12-06 RX ADMIN — NYSTATIN OINTMENT: 100000 OINTMENT TOPICAL at 22:11

## 2021-12-06 RX ADMIN — Medication 10 ML: at 06:26

## 2021-12-06 RX ADMIN — ACETAMINOPHEN 1000 MG: 500 TABLET, FILM COATED ORAL at 23:10

## 2021-12-06 RX ADMIN — Medication 10 ML: at 06:27

## 2021-12-06 RX ADMIN — METOPROLOL TARTRATE 12.5 MG: 25 TABLET, FILM COATED ORAL at 22:06

## 2021-12-06 RX ADMIN — INSULIN LISPRO 2 UNITS: 100 INJECTION, SOLUTION INTRAVENOUS; SUBCUTANEOUS at 18:16

## 2021-12-06 RX ADMIN — NYSTATIN OINTMENT: 100000 OINTMENT TOPICAL at 18:21

## 2021-12-06 RX ADMIN — INSULIN GLARGINE 20 UNITS: 100 INJECTION, SOLUTION SUBCUTANEOUS at 12:46

## 2021-12-06 RX ADMIN — PANTOPRAZOLE SODIUM 40 MG: 40 TABLET, DELAYED RELEASE ORAL at 12:43

## 2021-12-06 RX ADMIN — PAROXETINE HYDROCHLORIDE 40 MG: 20 TABLET, FILM COATED ORAL at 12:29

## 2021-12-06 RX ADMIN — ASPIRIN 81 MG: 81 TABLET, COATED ORAL at 12:29

## 2021-12-06 RX ADMIN — AMITRIPTYLINE HYDROCHLORIDE 50 MG: 50 TABLET, FILM COATED ORAL at 22:06

## 2021-12-06 RX ADMIN — ENOXAPARIN SODIUM 120 MG: 150 INJECTION SUBCUTANEOUS at 04:59

## 2021-12-06 RX ADMIN — Medication 1 AMPULE: at 22:05

## 2021-12-06 RX ADMIN — OXYCODONE 10 MG: 5 TABLET ORAL at 12:43

## 2021-12-06 RX ADMIN — Medication 1 CAPSULE: at 12:29

## 2021-12-06 NOTE — PROGRESS NOTES
Infectious Disease Progress Note    IMPRESSION:       -R/ retroperitoneal abscess 9.7 x 9.1 x 7.5 cm & possible fluid tract to the right L3-4 or right L4-5 foramen. No evidence of OM/discitis. Posterior decompression and fusion L2-S1 on CT lumbar spine.  - S/p drainage of abscess and placement of drain   Fluid culture +  -light strep anginosus. - CT-guided drain removal and new drain placement of undrained part of the RLQ abscess,aspiration of posterior paraspinal abscess . Cultures + for alpha Strep. Repeat CT -  -no evidence of residual abscess RLQ. -S/p I&D of superficial deep lumbar wounds by & Ortho on . Wound cultures-CoNS species( thio broth only) ID, sensitivity pending. -S/p Exploration of fusion, revision laminectomy L2-3, L5-S1 on 2021  Patient reports delayed wound healing, wound VAC placement, frequent disruption of wound VAC system    -E. coli UTI  Urine culture--.coli 50,000 colonies  ( R to Amp, quinolones, aminoglycosides, cefoxitin I    - H/o recurrent UTI, urinary incontinence    -Poorly controlled diabetes A1c 11.7    - S/p MERCEDES creatinine 0.65    -Morbid obesity BMI 44.5       PLAN:        -Continue ceftriaxone IV, add vancomycin pending final cultures  -Await final cultures. -DC RLQ drain if output<20 cc  -Lumbar wound dressing change per Ortho. - D/w Dr Kenrick Garcia. Subjective:     Patient seen Sea Crocker in bed. Denies complaints. Feels good  Bloodstained drainage from lumbar wound drain. Review of Systems:  A comprehensive review of systems was negative except for that written in the History of Present Illness. 14 point ROS obtained . All other systems negative . Objective:     Blood pressure 111/68, pulse 92, temperature 97.8 °F (36.6 °C), resp. rate 14, height 5' 4\" (1.626 m), weight 255 lb 11.2 oz (116 kg), SpO2 97 %, not currently breastfeeding.   Temp (24hrs), Av.2 °F (36.8 °C), Min:97.8 °F (36.6 °C), Max:98.8 °F (37.1 °C)      Patient Vitals for the past 24 hrs:   Temp Pulse Resp BP SpO2   12/06/21 1100 97.8 °F (36.6 °C) 92 14 111/68 97 %   12/06/21 0919 97.9 °F (36.6 °C) 94  108/70 95 %   12/06/21 0355 98.8 °F (37.1 °C) 80 16 118/71 96 %   12/05/21 2344 98 °F (36.7 °C) 76 15 119/80 95 %   12/05/21 2041 98.2 °F (36.8 °C) 82 18 115/67 95 %   12/05/21 1458 98.2 °F (36.8 °C) 69 17 119/76 96 %         Lines:  Peripheral IV:       Physical Exam:   General:  Awake, cooperative,    Eyes:  Sclera anicteric. Pupils equally round and reactive to light. Mouth/Throat: Mucous membranes normal, oral pharynx clear   Neck: Supple   Lungs:   Clear to auscultation bilaterally, good effort   CV:  Regular rate and rhythm,no murmur, click, rub or gallop   Abdomen:   Soft, non-tender. bowel sounds normal. non-distended   Extremities: No cyanosis or edema   Skin: Skin color, texture, turgor normal. no acute rash or lesions   Lymph nodes: Cervical and supraclavicular normal   Musculoskeletal: No swelling or deformity   Lines/Devices:  Intact, no erythema, drainage or tenderness   Psych: Awake and oriented, normal mood affect       Data Review:ed   CBC:   Recent Labs     12/04/21  0412   HGB 10.0*     CMP:   No results for input(s): GLU, NA, K, CL, CO2, BUN, CREA, CA, AGAP, BUCR, TBIL, AP, TP, ALB, GLOB, AGRAT in the last 72 hours. No lab exists for component: GPT    Studies reviewed:      Lab Results   Component Value Date/Time    Culture result: NO GROWTH 4 DAYS 12/02/2021 08:15 AM    Culture result: NO FUNGUS ISOLATED 4 DAYS 12/02/2021 08:15 AM    Culture result: NO GROWTH ON SOLID MEDIA 12/02/2021 08:15 AM    Culture result: (A) 12/02/2021 08:15 AM     STAPHYLOCOCCUS SPECIES, COAGULASE NEGATIVE ISOLATED FROM THIO BROTH ONLY    Culture result:  12/02/2021 08:15 AM     DR. EAGLE REQUESTED ID AND SENSITIVITIES ON THE COAG NEGATIVE STAPH GROWING IN THE THIO BROTH         XR Results (most recent):  Results from Hospital Encounter encounter on 11/06/21    XR CHEST PORT    Narrative  INDICATION: . weakness  Additional history:  COMPARISON: Previous chest xray, 7/12/2021 and 7/8/2021. LIMITATIONS: Portable technique. Thiago Patel FINDINGS: Single frontal view of the chest.  .  Lines/tubes/surgical: Cardiac monitor leads overly the patient. Heart/mediastinum: Unremarkable. Lungs/pleura: Low lung volumes without definite acute process. No visualized  pleural effusion or pneumothorax. Additional Comments: None. .    Impression  1. Low lung volumes without definite acute cardiopulmonary disease. 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 Hyperosmolar hyperglycemic state (HHS) ([facility]  E11.00 250.22     E11.65     2. Diabetic ketoacidosis without coma associated with type 2 diabetes mellitus ([facility]  E11.10 250.12    3. Severe sepsis ([facility])  A41.9 038.9     R65.20 995.92    4. Lactic acidosis  E87.2 276.2    5. MERCEDES (acute kidney injury) ([facility]  N17.9 584.9    6. Infection of spine ([facility])  M46.20 730.28    7. Candidal vulvovaginitis  B37.3 112.1    8. Hyponatremia  E87.1 276.1    9. E-coli UTI  N39.0 599.0     B96.20 041.49    10. Fusion of lumbar spine  M43.26 724.9    11. History of recurrent UTIs  Z87.440 V13.02    12. Morbid obesity with BMI of 45.0-49.9, adult ([facility])  E66.01 278.01     Z68.42 V85.42    13. Poorly controlled diabetes mellitus ([facility]  E11.65 250.00    14. Retroperitoneal abscess ([facility]  K68.19 567.38    15. Streptococcal infection  A49.1 041.00    16. Bandemia  D72.825 288.66    17. Candida vaginitis  B37.3 112.1    18. Morbid obesity with BMI of 40.0-44.9, adult ([facility])  E66.01 278.01     Z68.41 V85.41    19. E. coli UTI  N39.0 599.0     B96.20 041.49    20. Recurrent UTI  N39.0 599.0        I have discussed the diagnosis with the patient and the intended plan as seen in the above orders. I have discussed medication side effects and warnings with the patient as well.     Reviewed test results at length with patient    Anti-infectives:     Ceftriaxone IV  S/p Vancomycin, Cefepime IV     Kandice Dakin MD Breck Gaul

## 2021-12-06 NOTE — PROGRESS NOTES
Problem: Mobility Impaired (Adult and Pediatric)  Goal: *Acute Goals and Plan of Care (Insert Text)  Description: FUNCTIONAL STATUS PRIOR TO ADMISSION: Ambulates household distances with RW support vs uses RW, stating she mostly has been relying on WC recently. History of MULTIPLE falls. Caregiver for  with dementia. HOME SUPPORT PRIOR TO ADMISSION: The patient lived with  however states he has dementia. Physical Therapy Goals    Initiated 11/10/2021  1. Patient will move from supine to sit and sit to supine , scoot up and down, and roll side to side in bed with supervision/set-up within 7 day(s). 2.  Patient will transfer from bed to chair and chair to bed with minimal assistance/contact guard assist using the least restrictive device within 7 day(s). 3.  Patient will perform sit to stand with minimal assistance/contact guard assist within 7 day(s). 4.  Patient will ambulate with minimal assistance/contact guard assist for 10 feet with the least restrictive device within 7 day(s). Re-Assessment 11/17/21 Re-Assessment, goals achieved and upgraded    1. Patient will move from supine to sit and sit to supine , scoot up and down, and roll side to side in bed with mod indep within 7 day(s). 2.  Patient will transfer from bed to chair and chair to bed with supervision   using the least restrictive device within 7 day(s). 3.  Patient will perform sit to stand with supervision within 7 day(s). 4.  Patient will ambulate with supervision  for 50 feet with the least restrictive device within 7 day(s). Reassessed 11/29/2021, Reviewed 12/6/2021 goals not met and continue progress    1. Patient will move from supine to sit and sit to supine , scoot up and down, and roll side to side in bed with mod indep within 7 day(s). 2.  Patient will transfer from bed to chair and chair to bed with supervision   using the least restrictive device within 7 day(s).   3.  Patient will perform sit to stand with supervision within 7 day(s). 4.  Patient will ambulate with supervision  for 50 feet with the least restrictive device within 7 day(s). Outcome: Progressing Towards Goal     PHYSICAL THERAPY TREATMENT: WEEKLY REASSESSMENT  Patient: Santiago Gaspar (56 y.o. female)  Date: 12/6/2021  Primary Diagnosis: Hyperglycemia [R73.9]  MERCEDES (acute kidney injury) ([facility] [N17.9]  Leukocytosis [D72.829]  SIRS (systemic inflammatory response syndrome) ([facility]) [R65.10]  Candida vaginitis [B37.3]  Procedure(s) (LRB):  INCISION AND DRAINAGE TO LUMBAR WOUND (N/A) 4 Days Post-Op   Precautions:   Fall, Back, WBAT      ASSESSMENT  Patient continues with skilled PT services and is progressing slowly towards goals. Received in bed and agreeable to transfer OOB. Noted loose wound packing in the bed and awaiting nursing assessment at bedside. Patient required CGA-SBA for all mobility and reassurance to minimize anxiety. Able to ambulate ~5' with RW and CGA to bedside chair and seated in NAD. Nursing arrived to dress the patient's lumbar wound and patient able to stand ~2-3 minutes with SBA using a RW and bilateral knees guarded. The patient's anxiety escalated with prolonged standing requiring emotional reassurance > physical assist as she fatigued back to bedside chair. Patient is making overall gains but remains limited by panic attacks and anxiety. Encouraged her to focus on the positive progress made > fear of falling with therapy planning for safety. Agree with prior recommendations for SNF level rehab when medically stable. Patient's progression toward goals since last assessment: Slowly progressing towards goals but not met and continue. Current Level of Function Impacting Discharge (mobility/balance): CGA-SBA for mobility, limited standing and gait tolerance           PLAN :  Goals have been updated based on progression since last assessment.   Patient continues to benefit from skilled intervention to address the above impairments. Recommendations and Planned Interventions: bed mobility training, transfer training, gait training, therapeutic exercises, neuromuscular re-education and therapeutic activities      Frequency/Duration: Patient will be followed by physical therapy:  3 times a week to address goals. Recommendation for discharge: (in order for the patient to meet his/her long term goals)  Therapy up to 5 days/week in SNF setting      IF patient discharges home will need the following DME: to be determined (TBD)         SUBJECTIVE:   Patient stated I need to sit! Can I sit now?     OBJECTIVE DATA SUMMARY:   HISTORY:    Past Medical History:   Diagnosis Date    Adverse effect of anesthesia     O2 DROPS WITH ANESTHESIA    Arthritis     KNEES    Cancer ([facility] 03/13/2015    SQUAMOUS CELL CARCINOMA; tonsils and lymph nodes.   Removed 3-2015 with radiation    GERD (gastroesophageal reflux disease)     Hypertension     NO LONGER ON MEDICATION    Hypothyroid     HYPOTHYROIDISM    Migraine     Nausea & vomiting     Obesity     Other ill-defined conditions(799.89)     chronic back pain    Psychiatric disorder     anxiety    Psychiatric disorder     panic ATTACKS    SVT (supraventricular tachycardia) ([facility] 05/24/2014    Ulcerative colitis      Past Surgical History:   Procedure Laterality Date    COLONOSCOPY,DIAGNOSTIC  11/19/2015         HX GI      colonscopy    HX HEENT  03/2015    tonsillectomy (CANCER) AND NECK LYMPH NODES    HX HEENT  2008    PARATHYROID REMOVAL    HX HEENT Left 2002    EYE LASER    HX HEMORRHOIDECTOMY  2001, 2016     X2    HX HYSTERECTOMY  1985    HX KNEE ARTHROSCOPY  1995    left knee surgery, TOTAL LT  and Right KNEE 2013    HX KNEE REPLACEMENT Bilateral 2013, 2014    HX LAP CHOLECYSTECTOMY  2002    HX LUMBAR FUSION  2011    SPINAL FUSION with hardware L4-L5    HX ORTHOPAEDIC  1990    CYST REMOVED RIGHT WRIST    HX ORTHOPAEDIC  05/12/2021    L2-3 & L5-21 LUMBAR LAMINECTOMY WITH ANDREWS OSTEOTOMY    HX OTHER SURGICAL      cyst removal right wrist    HX UROLOGICAL  2010    bladder surgery(interstim device)    IR INJ FORAMIN EPID LUMB ANES/STER SNGL  8/19/2019    GA EGD TRANSORAL BIOPSY SINGLE/MULTIPLE  5/20/2013            Personal factors and/or comorbidities impacting plan of care:     Home Situation  Home Environment: Private residence  # Steps to Enter: 0  Wheelchair Ramp: Yes  One/Two Story Residence: One story  Living Alone: No  Support Systems: Spouse/Significant Other (with dementia whom she is caregiver to)  Patient Expects to be Discharged to[de-identified] House (vs rehab)  Current DME Used/Available at Home: Wheelchair, Walker, rolling, Tub transfer bench, Commode, bedside, Cane, straight  Tub or Shower Type: Tub/Shower combination    EXAMINATION/PRESENTATION/DECISION MAKING:   Critical Behavior:  Neurologic State: Alert  Orientation Level: Oriented X4  Cognition: Follows commands  Safety/Judgement: Fall prevention, Decreased insight into deficits  Hearing:   Auditory  Auditory Impairment: None  Skin:    Edema:   Range Of Motion:  AROM: Generally decreased, functional           PROM: Generally decreased, functional           Strength:    Strength: Generally decreased, functional                    Tone & Sensation:   Tone: Normal                              Coordination:  Coordination: Within functional limits  Vision:      Functional Mobility:  Bed Mobility:  Rolling: Supervision  Supine to Sit: Supervision        Transfers:  Sit to Stand: Contact guard assistance  Stand to Sit: Contact guard assistance                       Balance:   Sitting: Intact  Sitting - Static: Good (unsupported)  Sitting - Dynamic: Good (unsupported)  Standing: Impaired  Standing - Static: Good; Constant support  Standing - Dynamic : Fair; Constant support  Ambulation/Gait Training:  Distance (ft): 5 Feet (ft)  Assistive Device: Gait belt; Walker, rolling  Ambulation - Level of Assistance: Contact guard assistance        Gait Abnormalities: Decreased step clearance        Base of Support: Widened     Speed/Catherine: Pace decreased (<100 feet/min)  Step Length: Right shortened; Left shortened     Therapeutic Exercises:   Seated ankle pumps, LAQs, marches x10    Functional Measure:        Pain Ratin-8/10 back and right side    Activity Tolerance:   Fair; activity impaired by anxiety    After treatment patient left in no apparent distress:   Sitting in chair and Call bell within reach    COMMUNICATION/EDUCATION:   The patients plan of care was discussed with: Registered nurse. Fall prevention education was provided and the patient/caregiver indicated understanding., Patient/family have participated as able in goal setting and plan of care. , and Patient/family agree to work toward stated goals and plan of care.     Thank you for this referral.  Arun Chun, PT, DPT   Time Calculation: 18 mins

## 2021-12-06 NOTE — PROGRESS NOTES
Problem: Self Care Deficits Care Plan (Adult)  Goal: *Acute Goals and Plan of Care (Insert Text)  Description: FUNCTIONAL STATUS PRIOR TO ADMISSION: June 2, 2021 with back surgery: prior to surgery very poor sitting and standing tolerance due to P! Went to [address] [place] [address], discharged mod I June 23, 2021, with DME and AE. Fell July 8, 2021 (bumped over [facility] over toilet with RW use.) Returned to [address] [place] [address], with increased fear of falling. Discharge home Mod I at w/c, RW level. Patient was modified independent for basic and instrumental ADLs at w/c and/or RW level except spouse assisted with BM hygiene due to difficulty level. (cares for spouse with dementia who is mod I with self care at w/c and walker level)     Admitted to acute care post fall trying to get out of bed without device 11-06-21. Found to have Post op wound Abscess   Drain placed R flank 11-12;    6-10/10 P! During this re-eval.    Anxiety/panic attack significantly limiting with new distraction/focus on fear of falling. HOME SUPPORT: The patient lived with spouse, but cares for him due to dementia. Has supportive son that lives nearby    Occupational Therapy Goals  Weekly reassessment 11/29/2021; Reviewed 12/6/2021 for weekly reassessment, goals remain appropriate, continue all  1. Patient will perform grooming with mod I within 7 days. 2.  Patient will perform UB bathing with contact guard assistance using most appropriate DME within 7 days. 3.  Patient will lower body dressing at moderate assistance with A/E within 7 days. 4.  Patient will perform bed mobility at supervision/set-up  within 7 days. 5.  Patient will verbalize/demonstrate 3/3 back precautions during ADL tasks without cues within 7 days. 6.  Patient will complete toilet transfer with mod I in 7 days. Weekly Reassessment 11/22/2021 - Continue all. 1.  Patient will perform grooming with mod I within 7 days. Not met, continue   2.   Patient will perform UB bathing with moderate assistance using most appropriate DME within 7 days. Met, upgraded  3. Patient will lower body dressing at moderate assistance with A/E within 7 days. Not met, continue   4. Patient will perform bed mobility at supervision/set-up within 7 days. Not met, continue   5. Patient will verbalize/demonstrate 3/3 back precautions during ADL tasks without cues within 7 days. - Continues to demonstrate. 6.  Patient will verbalize at least 2 strategies for stress management/panic attack management during every session in 7 days. Discontinue  7. Patient will complete toilet transfer with CGA-S in 7 days. Met, upgraded    Initiated 11/8/2021 Goals reviewed and updated 11-15-21  1. Patient will perform grooming with supervision/set-up within 7 days. Met upgrade to mod I in 7 days  2. Patient will perform UB bathing with moderate assistance  using most appropriate DME within 7 days. Cont for consistency 7 days  3. Patient will lower body dressing at moderate assistance with A/E within 7 days. Cont for consistency for 7 days  4. Patient will perform bed mobility at moderate assistance  within 7 days. Met upgrade to S in 7 days  5. Patient will verbalize/demonstrate 3/3 back precautions during ADL tasks without cues within 7 days. Met  6. Patient will verbalize at least 2 strategies for stress management/panic attack management during every session in 7 days  7.  Patient will complete toilet transfer with CGA-S in 7 days    Outcome: Progressing Towards Goal   OCCUPATIONAL THERAPY TREATMENT/WEEKLY RE-EVALUATION  Patient: Maritza Coates (69 y.o. female)  Date: 12/6/2021  Diagnosis: Hyperglycemia [R73.9]  MERCEDES (acute kidney injury) ([facility] [address] [N17.9]  Leukocytosis [D72.829]  SIRS (systemic inflammatory response syndrome) ([facility]) [R65.10]  Candida vaginitis [B37.3]   <principal problem not specified>  Procedure(s) (LRB):  INCISION AND DRAINAGE TO LUMBAR WOUND (N/A) 4 Days Post-Op  Precautions: Fall, Back, WBAT  Chart, occupational therapy assessment, plan of care, and goals were reviewed. ASSESSMENT  Based on the objective data described below, pt is progressing slowly towards goals, limited from baseline by decreased endurance and strength, impaired functional mobility, back pain and anxiety. She transferred supine>sit with supervision and review of logroll technique. Demo'd good sitting balance, required total A to don socks as pt unable to reach to distal LEs. Using RW pt stood with CGA and transferred to chair with cues for safe technique. Pt stood again with CGA for nurse to manage lumbar wound dressing, maintaining standing for approx 2-3 minutes, anxiety increasing with time and requiring emotional reassurance. She returned to chair at end of session to perform grooming ADLs with setup. Pt is progressing slowly towards goals and continues to benefit from skilled intervention to address the above impairments. Continue to recommend SNF rehab at discharge. Current Level of Function Impacting Discharge (ADLs): CGA transfers, setup-total A ADLs    Other factors to consider for discharge: fall risk         PLAN :  Goals have been updated based on progression since last assessment. Patient continues to benefit from skilled intervention to address the above impairments. Continue to follow patient 5 times a week to address goals. Recommendation for discharge: (in order for the patient to meet his/her long term goals)  Therapy up to 5 days/week in SNF setting    This discharge recommendation:  Has been made in collaboration with the attending provider and/or case management    Equipment recommendations for successful discharge (if) home: TBD       SUBJECTIVE:   Patient stated I need to sit down.     OBJECTIVE DATA SUMMARY:   Cognitive/Behavioral Status:  Neurologic State: Alert  Orientation Level: Oriented X4  Cognition: Follows commands       Functional Mobility and Transfers for ADLs:  Bed Mobility:  Rolling: Supervision  Supine to Sit: Supervision    Transfers:  Sit to Stand: Contact guard assistance          Balance:  Sitting: Intact  Sitting - Static: Good (unsupported)  Sitting - Dynamic: Good (unsupported)  Standing: Impaired  Standing - Static: Good; Constant support  Standing - Dynamic : Fair; Constant support    ADL Intervention:       Grooming  Position Performed: Seated in chair  Washing Face: Set-up  Brushing Teeth: Set-up    Lower Body Dressing Assistance  Socks:  Total assistance (dependent)      Pain:  Pt reported some back pain but did not quantify    Activity Tolerance:   Fair    After treatment patient left in no apparent distress:   Sitting in chair and Call bell within reach    COMMUNICATION/COLLABORATION:   The patients plan of care was discussed with: Physical Therapist and Registered Nurse    Remi Herrera OT  Time Calculation: 25 mins

## 2021-12-06 NOTE — PROGRESS NOTES
Hospitalist Progress Note    NAME: Shari Encinas   :  1950   MRN:  [id number]       Assessment / Plan:  Septic shock POA rectal temp 96, , lactic 9.2 -->5. 3   Streptococcus anginosus epidural/right iliopsoas abscess POA  Right paraspinal pain at level L3-4 and R iliac crest pain (complains of Right hip pain), POA   Paraspinal abscess  - Followed by Dr Katelyn Herrera and Infectious diseases, needs close follow-up as outpatient  -Stopped vancomycin  as recommended by ID  -Appreciated orthopedic consult, recommended IR placed drain. Orthopedics plans no surgery for now. Surgical management on hold for now    -First Drain was placed  draining purulent discharge, repeat CT  showed Resolution, drain removed .  -Though CT on  showed undrained fluid collection on right lower quadrant  -s/p , IR guided Drainage catheter, so far 25 cc output past 24 hours    -Culture from drain fluid is growing Streptococcus anginosus  -Antibiotics as per ID currently to continue ceftriaxone 2 g daily  -Recommendation 8 to 12 weeks, ID will finalize abx and duration and choice  Orthopedic surgery was called by ID due to concern of ongoing pus/blood drainage. Still awaiting final ID recommendations and spinal surgeon evaluation.   Status post I&D for lumbar wound today by orthopedic surgery team, input is appreciated  ID recommending to send for aerobic, anaerobic, fungal and AFB cultures from the I&D  Repeat CT abdomen on  showed resolution of right lower quadrant abscess    Discussed with ID today on final recommendation for antibiotics      - MRI done on , Rim-enhancing paraspinal soft tissue collections may reflect abscesses as  well, and partially visualized abscess inferior to the right kidney again seen, but  incompletely evaluated on this exam.  -For paraspinal abscess, orthopedic recommends conservative management,   -Successful aspiration of posterior paraspinal abscess 11/26    -Follow-up cultures sent from most recent I&D    Celiac Artery Thrombosis with splenic Infarcts:  -CT ABD/Pelvis 11/9 showed celiac artery thrombosis with splenic infarcts  CT abdomen pelvis today confirmed the celiac artery occlusion with splenic infarcts  Vascular consult appreciated  Renal function stable, switched heparin drip to Lovenox injection 1 mg/kg every 12 hours, switch to Eliquis 5 mg twice daily      UTI  -Ucx E coli  -sensitive to cefepime   -Rea placed in ER due to poor mobility from hip pain and polyuria, with severe candidal infection in perineum and labia and buttocks. Removing Rea catheter and start voiding trial.  Patient advised to schedule empty bladder every 2 hours. Use pure wick catheter    Type 2 DM, uncontrolled with hyperosmolar hyperglycemia and early DKA POA  - presented with , AG 15, trace ketone in urine  - Required insulin gtt  - A1c 11.7 up from 7.6 in July. - continue holding metformin  - Continue Lantus 35 units. 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       Constipation  -added pericolace and miralax    Severe candida infection in perineum, labia, buttocks  -continue mystatin cream ordered in ER. Status post Diflucan  -wound care consult appreciated     Generalized weakness and gait difficulty   -consult pt/ot     SVT   HTN / HLD  -continue metoprolol and statin       Hypothyroid  -continue levothyroxine. TSH 3.6     Depression and anxiety  -continue paxil with xanax prn     Severe obesity  40 or above Morbid obesity / Body mass index is 44.42 kg/m².   Code:  Discussed, full  DVT prophylaxis: lovenox  Surrogate decision maker:   (but has some dementia per patient) or sister Emilylucie Rosana    HANY: Likely 12/7  Awaiting clinical improvement     Subjective:     Patient was seen and examined. No acute events overnight. Discussed with RN overnight events. All patient's questions were answered. \"doing well\"    review of Systems:  Symptom Y/N Comments  Symptom Y/N Comments   Fever/Chills n   Chest Pain n    Poor Appetite    Edema n    Cough n   Abdominal Pain y    Sputum    Joint Pain     SOB/AVALOS n   Pruritis/Rash     Nausea/vomit n   Tolerating PT/OT     Diarrhea n   Tolerating Diet y    Constipation    Other       Could NOT obtain due to:      PO intake: No data found. Objective:     VITALS:   Last 24hrs VS reviewed since prior progress note. Most recent are:  Patient Vitals for the past 24 hrs:   Temp Pulse Resp BP SpO2   12/06/21 1100 97.8 °F (36.6 °C) 92 14 111/68 97 %   12/06/21 0919 97.9 °F (36.6 °C) 94  108/70 95 %   12/06/21 0355 98.8 °F (37.1 °C) 80 16 118/71 96 %   12/05/21 2344 98 °F (36.7 °C) 76 15 119/80 95 %   12/05/21 2041 98.2 °F (36.8 °C) 82 18 115/67 95 %   12/05/21 1458 98.2 °F (36.8 °C) 69 17 119/76 96 %       Intake/Output Summary (Last 24 hours) at 12/6/2021 1229  Last data filed at 12/6/2021 0500  Gross per 24 hour   Intake    Output 1020 ml   Net -1020 ml        I had a face to face encounter, and independently examined this patient on 12/6/2021, as outlined below:  PHYSICAL EXAM:  General: WD, WN. Alert, cooperative, no acute distress    EENT:  EOMI. Anicteric sclerae. MMM  Resp:  CTA bilaterally, no wheezing or rales. No accessory muscle use  CV:  Regular  rhythm,  No edema  GI:               Soft nontender  Neurologic:  Alert and oriented X 3, normal speech,   Psych:   Good insight. Not anxious nor agitated  Skin:  No rashes. No jaundice.      Reviewed most current lab test results and cultures  YES  Reviewed most current radiology test results   YES  Review and summation of old records today    NO  Reviewed patient's current orders and MAR    YES  PMH/SH reviewed - no change compared to H&P  ________________________________________________________________________  Care Plan discussed with:    Comments   Patient x    Family      RN x    Care Manager x    Consultant  x ID                    x Multidiciplinary team rounds were held today with , nursing, pharmacist and clinical coordinator. Patient's plan of care was discussed; medications were reviewed and discharge planning was addressed. ________________________________________________________________________    Total NON critical care TIME:  35   Minutes     Total CRITICAL CARE TIME Spent:   Minutes non procedure based      Comments   >50% of visit spent in counseling and coordination of care x     This includes time during multidisciplinary rounds if indicated above   ________________________________________________________________________  Fortino Naqvi MD     Procedures: see electronic medical records for all procedures/Xrays and details which were not copied into this note but were reviewed prior to creation of Plan. LABS:  I reviewed today's most current labs and imaging studies. Pertinent labs include:  Recent Labs     12/04/21  0412   HGB 10.0*     No results for input(s): NA, K, CL, CO2, GLU, BUN, CREA, CA, MG, PHOS, ALB, TBIL, TBILI, ALT, INR, INREXT, INREXT in the last 72 hours.     No lab exists for component: SGOT

## 2021-12-06 NOTE — PROGRESS NOTES
Physician Progress Note      Justyna Olea  CSN #:                  [id number]  :                       1950  ADMIT DATE:       2021 10:25 AM  DISCH DATE:  RESPONDING  PROVIDER #:        Svitlana Galindo MD          QUERY TEXT:    Dr Jazz Bains:    Pt admitted with Sepsis POA secondary to epidural/right iliopsoas abscess s/p a third GLF. Pt is 6 months s/p revision thoracolumbar fusion. Pt underwent an incision & drainage debridement on  for a Postop infection dx. If possible, please document in progress notes and discharge summary:    The medical record reflects the following:  Risk Factors: 70yoF w/ hx of  lumbar spinal fusion and laminectomy surgeries, s/p revision L2-S1. lumbar wound infection, non-healing post surgical wound requiring wound vac 2021 with delayed application upon returning home from rehab. Clinical Indicators: s/p fall 3rd time w/ c/o R hip pain, Streptococcus anginosus epidural/right iliopsoas abscess; Right paraspinal pain at level L3-4 and R iliac crest pain (complains of Right hip pain), POA? Paraspinal abscess  CT lumbar spine showed \"Gas filled abscess in the right retroperitoneum has increased in size since 3 days ago and measures 9.7 x 9.1 x 7.5 cm. Possible fluid tract to the right L3-4 to right L4-5 foramen. \"   Brief OP Note:  POST OP INFECTION  Treatment: started on vancomycin, cefepime and flagyl on 21, IR drainage on  drain removed ,  surgical debridement.     Thank you,  Pillo Galvez RN, cDI    Thank you ,  Pillo Galvez RN, cDI  Options provided:  -- Lumbar wound infection is a postoperative complication  -- Lumbar wound infection is an expected/inherent condition that occurred postoperatively and not a complication  -- Lumbar wound infection is not a postoperative complication, but is due to other incidental risk factor, Please specify other incidental risk factor  -- Other - I will add my own diagnosis  -- Disagree - Not applicable / Not valid  -- Disagree - Clinically unable to determine / Unknown  -- Refer to Clinical Documentation Reviewer    PROVIDER RESPONSE TEXT:    Patient has Lumbar wound infection as an expected condition that occurred postoperatively and not a complication.     Query created by: Jamie Morillo on 12/3/2021 5:17 PM      Electronically signed by:  Andre Thomas MD 12/6/2021 8:08 AM

## 2021-12-06 NOTE — PROGRESS NOTES
Pharmacy Automatic Renal Dosing Protocol - Antimicrobials    Indication for Antimicrobials: SSTI      Current Regimen of Each Antimicrobial:  Vancomycin 2500 mg x 1 then 1250 mg Q12H (Start Date ; Day # )    Previous Antimicrobial Therapy:    Vancomycin Goal Level: 400-600 mg*hours/liter per 24 hours    Vancomycin Levels  Date Dose & Interval Measured (mcg/mL) Steady State (mcg/mL)                       Date & time of next level:     Significant Cultures:     Radiology / Imaging results: (X-ray, CT scan or MRI):       Labs:  No results for input(s): CREA, BUN, WBC in the last 72 hours. Temp (24hrs), Av.2 °F (36.8 °C), Min:97.8 °F (36.6 °C), Max:98.8 °F (37.1 °C)      Paralysis, amputations, malnutrition:   Creatinine Clearance (mL/min) or Dialysis: 62.7    Impression/Plan:   Antibiotics as above  BMP daily     Pharmacy will follow daily and adjust medications as appropriate for renal function and/or serum levels. Thank you,  Wilfredo Morris [facility]      Recommended duration of therapy  http://[facility]/[place]/[facility]/Pharmacy/Clinical%20Companion/Duration%20of%20ABX%20therapy. docx    Renal Dosing  http://[facility]/[place]/[facility]/Pharmacy/Clinical%20Companion/Renal%[id number]. pdf

## 2021-12-06 NOTE — PROGRESS NOTES
Bedside and Verbal shift change report given to [address] (oncoming nurse) by Vijay Rodriguez RN (offgoing nurse). Report included the following information SBAR, Kardex, Intake/Output, MAR, Accordion, Recent Results and Med Rec Status.

## 2021-12-06 NOTE — PROGRESS NOTES
Ortho Spine    Last hemovac output per 8 hour shift noted to be 10 mL. Per Dr. Wuside please discontinue lower back hemovac.      David Loera, PAC

## 2021-12-07 ENCOUNTER — TELEPHONE (OUTPATIENT)
Dept: INFECTIOUS DISEASES | Age: 71
End: 2021-12-07

## 2021-12-07 LAB
ANION GAP SERPL CALC-SCNC: 6 MMOL/L (ref 5–15)
BACTERIA SPEC CULT: ABNORMAL
BUN SERPL-MCNC: 14 MG/DL (ref 6–20)
BUN/CREAT SERPL: 25 (ref 12–20)
CALCIUM SERPL-MCNC: 9.4 MG/DL (ref 8.5–10.1)
CHLORIDE SERPL-SCNC: 106 MMOL/L (ref 97–108)
CO2 SERPL-SCNC: 24 MMOL/L (ref 21–32)
CREAT SERPL-MCNC: 0.57 MG/DL (ref 0.55–1.02)
ERYTHROCYTE [SEDIMENTATION RATE] IN BLOOD: 89 MM/HR (ref 0–30)
GLUCOSE BLD STRIP.AUTO-MCNC: 115 MG/DL (ref 65–117)
GLUCOSE BLD STRIP.AUTO-MCNC: 118 MG/DL (ref 65–117)
GLUCOSE BLD STRIP.AUTO-MCNC: 146 MG/DL (ref 65–117)
GLUCOSE BLD STRIP.AUTO-MCNC: 147 MG/DL (ref 65–117)
GLUCOSE BLD STRIP.AUTO-MCNC: 98 MG/DL (ref 65–117)
GLUCOSE SERPL-MCNC: 108 MG/DL (ref 65–100)
GRAM STN SPEC: ABNORMAL
GRAM STN SPEC: ABNORMAL
POTASSIUM SERPL-SCNC: 4.4 MMOL/L (ref 3.5–5.1)
SERVICE CMNT-IMP: ABNORMAL
SERVICE CMNT-IMP: NORMAL
SERVICE CMNT-IMP: NORMAL
SODIUM SERPL-SCNC: 136 MMOL/L (ref 136–145)

## 2021-12-07 PROCEDURE — 65660000000 HC RM CCU STEPDOWN

## 2021-12-07 PROCEDURE — 74011000258 HC RX REV CODE- 258: Performed by: STUDENT IN AN ORGANIZED HEALTH CARE EDUCATION/TRAINING PROGRAM

## 2021-12-07 PROCEDURE — 97116 GAIT TRAINING THERAPY: CPT

## 2021-12-07 PROCEDURE — 77030038269 HC DRN EXT URIN PURWCK BARD -A

## 2021-12-07 PROCEDURE — 74011250636 HC RX REV CODE- 250/636: Performed by: STUDENT IN AN ORGANIZED HEALTH CARE EDUCATION/TRAINING PROGRAM

## 2021-12-07 PROCEDURE — 99233 SBSQ HOSP IP/OBS HIGH 50: CPT | Performed by: INTERNAL MEDICINE

## 2021-12-07 PROCEDURE — 85652 RBC SED RATE AUTOMATED: CPT

## 2021-12-07 PROCEDURE — 74011250637 HC RX REV CODE- 250/637: Performed by: ORTHOPAEDIC SURGERY

## 2021-12-07 PROCEDURE — 74011250637 HC RX REV CODE- 250/637: Performed by: INTERNAL MEDICINE

## 2021-12-07 PROCEDURE — 94760 N-INVAS EAR/PLS OXIMETRY 1: CPT

## 2021-12-07 PROCEDURE — 74011636637 HC RX REV CODE- 636/637: Performed by: INTERNAL MEDICINE

## 2021-12-07 PROCEDURE — 36415 COLL VENOUS BLD VENIPUNCTURE: CPT

## 2021-12-07 PROCEDURE — 74011250637 HC RX REV CODE- 250/637: Performed by: HOSPITALIST

## 2021-12-07 PROCEDURE — 97530 THERAPEUTIC ACTIVITIES: CPT

## 2021-12-07 PROCEDURE — 74011250637 HC RX REV CODE- 250/637: Performed by: PHYSICIAN ASSISTANT

## 2021-12-07 PROCEDURE — 74011250636 HC RX REV CODE- 250/636: Performed by: INTERNAL MEDICINE

## 2021-12-07 PROCEDURE — 74011000258 HC RX REV CODE- 258: Performed by: INTERNAL MEDICINE

## 2021-12-07 PROCEDURE — 80048 BASIC METABOLIC PNL TOTAL CA: CPT

## 2021-12-07 PROCEDURE — 82962 GLUCOSE BLOOD TEST: CPT

## 2021-12-07 RX ORDER — METOPROLOL TARTRATE 25 MG/1
12.5 TABLET, FILM COATED ORAL 2 TIMES DAILY
Status: DISCONTINUED | OUTPATIENT
Start: 2021-12-07 | End: 2021-12-10 | Stop reason: HOSPADM

## 2021-12-07 RX ADMIN — ACETAMINOPHEN 1000 MG: 500 TABLET, FILM COATED ORAL at 12:44

## 2021-12-07 RX ADMIN — INSULIN GLARGINE 20 UNITS: 100 INJECTION, SOLUTION SUBCUTANEOUS at 10:02

## 2021-12-07 RX ADMIN — ACETAMINOPHEN 1000 MG: 500 TABLET, FILM COATED ORAL at 06:25

## 2021-12-07 RX ADMIN — AMITRIPTYLINE HYDROCHLORIDE 50 MG: 50 TABLET, FILM COATED ORAL at 21:29

## 2021-12-07 RX ADMIN — DAPTOMYCIN 850 MG: 500 INJECTION, POWDER, LYOPHILIZED, FOR SOLUTION INTRAVENOUS at 18:02

## 2021-12-07 RX ADMIN — NYSTATIN OINTMENT: 100000 OINTMENT TOPICAL at 18:04

## 2021-12-07 RX ADMIN — ACETAMINOPHEN 1000 MG: 500 TABLET, FILM COATED ORAL at 18:02

## 2021-12-07 RX ADMIN — Medication 10 ML: at 13:00

## 2021-12-07 RX ADMIN — Medication 1 CAPSULE: at 10:02

## 2021-12-07 RX ADMIN — PANTOPRAZOLE SODIUM 40 MG: 40 TABLET, DELAYED RELEASE ORAL at 10:02

## 2021-12-07 RX ADMIN — INSULIN LISPRO 2 UNITS: 100 INJECTION, SOLUTION INTRAVENOUS; SUBCUTANEOUS at 18:03

## 2021-12-07 RX ADMIN — ACETAMINOPHEN 1000 MG: 500 TABLET, FILM COATED ORAL at 23:19

## 2021-12-07 RX ADMIN — NYSTATIN OINTMENT: 100000 OINTMENT TOPICAL at 21:30

## 2021-12-07 RX ADMIN — LEVOTHYROXINE SODIUM 112 MCG: 0.11 TABLET ORAL at 10:02

## 2021-12-07 RX ADMIN — Medication 10 ML: at 06:26

## 2021-12-07 RX ADMIN — APIXABAN 5 MG: 5 TABLET, FILM COATED ORAL at 10:02

## 2021-12-07 RX ADMIN — Medication 10 ML: at 21:31

## 2021-12-07 RX ADMIN — PAROXETINE HYDROCHLORIDE 40 MG: 20 TABLET, FILM COATED ORAL at 10:02

## 2021-12-07 RX ADMIN — NYSTATIN OINTMENT: 100000 OINTMENT TOPICAL at 10:03

## 2021-12-07 RX ADMIN — Medication 20 ML: at 06:25

## 2021-12-07 RX ADMIN — VANCOMYCIN HYDROCHLORIDE 1250 MG: 10 INJECTION, POWDER, LYOPHILIZED, FOR SOLUTION INTRAVENOUS at 02:35

## 2021-12-07 RX ADMIN — ASPIRIN 81 MG: 81 TABLET, COATED ORAL at 10:02

## 2021-12-07 RX ADMIN — METOPROLOL TARTRATE 12.5 MG: 25 TABLET, FILM COATED ORAL at 18:02

## 2021-12-07 RX ADMIN — Medication 1 AMPULE: at 21:29

## 2021-12-07 RX ADMIN — APIXABAN 5 MG: 5 TABLET, FILM COATED ORAL at 21:29

## 2021-12-07 RX ADMIN — CEFTRIAXONE SODIUM 2 G: 2 INJECTION, POWDER, FOR SOLUTION INTRAMUSCULAR; INTRAVENOUS at 09:59

## 2021-12-07 NOTE — PROGRESS NOTES
Infectious Disease Progress Note    IMPRESSION:       -R/ retroperitoneal abscess 9.7 x 9.1 x 7.5 cm & possible fluid tract to the right L3-4 or right L4-5 foramen. No evidence of OM/discitis. Posterior decompression and fusion L2-S1 on CT lumbar spine.  - S/p drainage of abscess and placement of drain 11/12  Fluid culture + 11/12 -light strep anginosus. - CT-guided drain removal and new drain placement of undrained part of the RLQ abscess,aspiration of posterior paraspinal abscess . Cultures + for alpha Strep. Repeat CT - 12/4 -no evidence of residual abscess RLQ. -S/p I&D of superficial deep lumbar wounds by & Ortho on 12/2. Wound cultures-MRSE ( thio broth only). -S/p Exploration of fusion, revision laminectomy L2-3, L5-S1 on 6/2/2021  Patient reports delayed wound healing, wound VAC placement, frequent disruption of wound VAC system    -E. coli UTI  Urine culture-11/6-.coli 50,000 colonies  ( R to Amp, quinolones, aminoglycosides, cefoxitin I    - H/o recurrent UTI, urinary incontinence    -Poorly controlled diabetes A1c 11.7    - S/p MERCEDES creatinine 0.65    -Morbid obesity BMI 44.5       PLAN:        -Change to Daptomycin 8 mg/kg IV daily x 8 weeks from surgery extend therapy as needed  --DC RLQ drain if output<20 cc  -Lumbar wound dressing change per Ortho. - D/w Dr Poonam Donald. Antibiotic orders for discharge  -Daptomycin 8 mg/kg IV every 24 hours end date 1/27/2022  May require extension, to be determined at [facility] follow-up. -Weekly CBC, CMP, CK & ESR every other week fax reports to Dr. Flor Yeboah at [phone], call with critical labs at [phone].   Call with any critical elevation of CK, Cr.  -Encourage daily probiotic/yogurt, adequate fluids.  -No statin while on Daptomycin  -ID follow-up in person visit with Dr. Flor Yeboah on 12/22 at 2:30 PM      Possible adverse effects of long term antibiotics are inclusive of but not limited to following  BM suppression, neutropenia , cytopenias , aplastic anemia hemorrhage liver & renal dysfunction/ liver , renal failure  , GI dysfunction- N, V  Diarrhea,C.difficile disease, rash , allergy , anaphylaxis. toxic epidermal necrolysis  Neuro toxicity , seizure disorder  Side effects tend to be more pronounced in the elderly                   Subjective:     Patient seen laying in bed. Denies complaints. Feels good  Bloodstained drainage from lumbar wound drain. Review of Systems:  A comprehensive review of systems was negative except for that written in the History of Present Illness. 14 point ROS obtained . All other systems negative . Objective:     Blood pressure 129/82, pulse 68, temperature 97.6 °F (36.4 °C), resp. rate 16, height 5' 4\" (1.626 m), weight 255 lb 11.2 oz (116 kg), SpO2 98 %, not currently breastfeeding. Temp (24hrs), Av °F (36.7 °C), Min:97.6 °F (36.4 °C), Max:98.3 °F (36.8 °C)      Patient Vitals for the past 24 hrs:   Temp Pulse Resp BP SpO2   21 1130 97.6 °F (36.4 °C) 68 16 129/82 98 %   21 0730 98.3 °F (36.8 °C) 67 16 (!) 93/58 97 %   21 2315 98.3 °F (36.8 °C) 77 14 97/64 97 %   21 1936 97.9 °F (36.6 °C) 76 15 104/69 94 %   21 1758 98 °F (36.7 °C) 79 16 93/61 95 %         Lines:  Peripheral IV:       Physical Exam:   General:  Awake, cooperative,    Eyes:  Sclera anicteric. Pupils equally round and reactive to light. Mouth/Throat: Mucous membranes normal, oral pharynx clear   Neck: Supple   Lungs:   Clear to auscultation bilaterally, good effort   CV:  Regular rate and rhythm,no murmur, click, rub or gallop   Abdomen:   Soft, non-tender.  bowel sounds normal. non-distended   Extremities: No cyanosis or edema   Skin: Skin color, texture, turgor normal. no acute rash or lesions   Lymph nodes: Cervical and supraclavicular normal   Musculoskeletal: No swelling or deformity   Lines/Devices:  Intact, no erythema, drainage or tenderness   Psych: Awake and oriented, normal mood affect       Data Review:ed   CBC: No results for input(s): WBC, RBC, HGB, HCT, PLT, GRANS, LYMPH, EOS, HGBEXT, HCTEXT, PLTEXT in the last 72 hours. CMP:   Recent Labs     12/07/21  0418   *      K 4.4      CO2 24   BUN 14   CREA 0.57   CA 9.4   AGAP 6   BUCR 25*       Studies reviewed:      Lab Results   Component Value Date/Time    Culture result: NO GROWTH 4 DAYS 12/02/2021 08:15 AM    Culture result: NO FUNGUS ISOLATED 4 DAYS 12/02/2021 08:15 AM    Culture result: (A) 12/02/2021 08:15 AM     STAPHYLOCOCCUS EPIDERMIDIS (OXACILLIN RESISTANT) ISOLATED FROM THIO BROTH ONLY    Culture result: NO GROWTH ON SOLID MEDIA AT 4 DAYS 12/02/2021 08:15 AM    Culture result:  12/02/2021 08:15 AM     DR. EAGLE REQUESTED ID AND SENSITIVITIES ON THE COAG NEGATIVE STAPH GROWING IN THE THIO BROTH         XR Results (most recent):  Results from Hospital Encounter encounter on 11/06/21    XR CHEST PORT    Narrative  INDICATION: . weakness  Additional history:  COMPARISON: Previous chest xray, 7/12/2021 and 7/8/2021. LIMITATIONS: Portable technique. Michaeldetta Ou FINDINGS: Single frontal view of the chest.  .  Lines/tubes/surgical: Cardiac monitor leads overly the patient. Heart/mediastinum: Unremarkable. Lungs/pleura: Low lung volumes without definite acute process. No visualized  pleural effusion or pneumothorax. Additional Comments: None. .    Impression  1. Low lung volumes without definite acute cardiopulmonary disease.  Consider PA  and lateral exam when clinically appropriate      Patient Active Problem List   Diagnosis Code    Left knee DJD M17.12    Right knee DJD M17.11    Morbid obesity ([facility] [address] E66.01    Rectal polyp K62.1    Lumbar stenosis with neurogenic claudication M48.062    Right hip pain M25.551    Leukocytosis D72.829    Candida vaginitis B37.3    Hyperglycemia R73.9    SIRS (systemic inflammatory response syndrome) ([facility]) R65.10    MERCEDES (acute kidney injury) ([facility] [address] N17.9    Severe sepsis ([facility]) A41.9, R65.20         ICD-10-CM ICD-9-CM    1. Hyperosmolar hyperglycemic state (HHS) ([facility]  E11.00 250.22     E11.65     2. Diabetic ketoacidosis without coma associated with type 2 diabetes mellitus ([facility]  E11.10 250.12    3. Severe sepsis ([facility])  A41.9 038.9     R65.20 995.92    4. Lactic acidosis  E87.2 276.2    5. MERCEDES (acute kidney injury) ([facility]  N17.9 584.9    6. Infection of spine ([facility])  M46.20 730.28    7. Candidal vulvovaginitis  B37.3 112.1    8. Hyponatremia  E87.1 276.1    9. E-coli UTI  N39.0 599.0     B96.20 041.49    10. Fusion of lumbar spine  M43.26 724.9    11. History of recurrent UTIs  Z87.440 V13.02    12. Morbid obesity with BMI of 45.0-49.9, adult ([facility])  E66.01 278.01     Z68.42 V85.42    13. Poorly controlled diabetes mellitus ([facility]  E11.65 250.00    14. Retroperitoneal abscess ([place] [address]  K68.19 567.38    15. Streptococcal infection  A49.1 041.00    16. Bandemia  D72.825 288.66    17. Candida vaginitis  B37.3 112.1    18. Morbid obesity with BMI of 40.0-44.9, adult ([facility])  E66.01 278.01     Z68.41 V85.41    19. E. coli UTI  N39.0 599.0     B96.20 041.49    20. Recurrent UTI  N39.0 599.0        I have discussed the diagnosis with the patient and the intended plan as seen in the above orders. I have discussed medication side effects and warnings with the patient as well.     Reviewed test results at length with patient    Anti-infectives:     Ceftriaxone IV  S/p Vancomycin, Cefepime IV     Hung Frank MD [place]

## 2021-12-07 NOTE — PROGRESS NOTES
Hospitalist Progress Note    NAME: Lidia Florence   :  1950   MRN:  [id number]       Assessment / Plan:  Septic shock POA rectal temp 96, , lactic 9.2 -->5. 3   Streptococcus anginosus epidural/right iliopsoas abscess POA  Right paraspinal pain at level L3-4 and R iliac crest pain (complains of Right hip pain), POA   Paraspinal abscess  - Followed by Dr Taniya Sam and Infectious diseases, needs close follow-up as outpatient  -Stopped vancomycin  as recommended by ID  -Appreciated orthopedic consult, recommended IR placed drain. Orthopedics plans no surgery for now. Surgical management on hold for now    -First Drain was placed  draining purulent discharge, repeat CT  showed Resolution, drain removed .  -Though CT on  showed undrained fluid collection on right lower quadrant  -s/p , IR guided Drainage catheter, so far 25 cc output past 24 hours    -Culture from drain fluid is growing Streptococcus anginosus  -Antibiotics as per ID currently to continue ceftriaxone 2 g daily  -Recommendation 8 to 12 weeks, ID will finalize abx and duration and choice  Orthopedic surgery was called by ID due to concern of ongoing pus/blood drainage. Still awaiting final ID recommendations and spinal surgeon evaluation.   Status post I&D for lumbar wound today by orthopedic surgery team, input is appreciated  ID recommending to send for aerobic, anaerobic, fungal and AFB cultures from the I&D  Repeat CT abdomen on  showed resolution of right lower quadrant abscess    Discussed with ID today on final recommendation for antibiotics      - MRI done on , Rim-enhancing paraspinal soft tissue collections may reflect abscesses as  well, and partially visualized abscess inferior to the right kidney again seen, but  incompletely evaluated on this exam.  -For paraspinal abscess, orthopedic recommends conservative management,   -Successful aspiration of posterior paraspinal abscess 11/26    -Follow-up cultures sent from most recent I&D    Celiac Artery Thrombosis with splenic Infarcts:  -CT ABD/Pelvis 11/9 showed celiac artery thrombosis with splenic infarcts  CT abdomen pelvis today confirmed the celiac artery occlusion with splenic infarcts  Vascular consult appreciated  Renal function stable, switched heparin drip to Lovenox injection 1 mg/kg every 12 hours, switched to Eliquis 5 mg twice daily      UTI  -Ucx E coli  -sensitive to cefepime   -Rea placed in ER due to poor mobility from hip pain and polyuria, with severe candidal infection in perineum and labia and buttocks. Removing Rea catheter and start voiding trial.  Patient advised to schedule empty bladder every 2 hours. Use pure wick catheter    Type 2 DM, uncontrolled with hyperosmolar hyperglycemia and early DKA POA  - presented with , AG 15, trace ketone in urine  - Required insulin gtt  - A1c 11.7 up from 7.6 in July. - continue holding metformin  - Continue Lantus 35 units. 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       Constipation  -added pericolace and miralax    Severe candida infection in perineum, labia, buttocks  -continue mystatin cream ordered in ER. Status post Diflucan  -wound care consult appreciated     Generalized weakness and gait difficulty   -consult pt/ot     SVT   HTN / HLD  -continue metoprolol and statin       Hypothyroid  -continue levothyroxine. TSH 3.6     Depression and anxiety  -continue paxil with xanax prn     Severe obesity  40 or above Morbid obesity / Body mass index is 44.42 kg/m².   Code:  Discussed, full  DVT prophylaxis: lovenox  Surrogate decision maker:   (but has some dementia per patient) or sister Ramon Mondragon    HANY: Likely 12/7  Awaiting final recommendation from ID on antibiotics     Subjective:     Patient was seen and examined. No acute events overnight. Discussed with RN overnight events. All patient's questions were answered. \"Doing well\"    review of Systems:  Symptom Y/N Comments  Symptom Y/N Comments   Fever/Chills n   Chest Pain n    Poor Appetite    Edema n    Cough n   Abdominal Pain y    Sputum    Joint Pain     SOB/AVALOS n   Pruritis/Rash     Nausea/vomit n   Tolerating PT/OT     Diarrhea n   Tolerating Diet y    Constipation    Other       Could NOT obtain due to:      PO intake: No data found. Objective:     VITALS:   Last 24hrs VS reviewed since prior progress note. Most recent are:  Patient Vitals for the past 24 hrs:   Temp Pulse Resp BP SpO2   12/07/21 1130 97.6 °F (36.4 °C) 68 16 129/82 98 %   12/07/21 0730 98.3 °F (36.8 °C) 67 16 (!) 93/58 97 %   12/06/21 2315 98.3 °F (36.8 °C) 77 14 97/64 97 %   12/06/21 1936 97.9 °F (36.6 °C) 76 15 104/69 94 %   12/06/21 1758 98 °F (36.7 °C) 79 16 93/61 95 %       Intake/Output Summary (Last 24 hours) at 12/7/2021 1310  Last data filed at 12/7/2021 0608  Gross per 24 hour   Intake    Output 800 ml   Net -800 ml        I had a face to face encounter, and independently examined this patient on 12/7/2021, as outlined below:  PHYSICAL EXAM:  General: WD, WN. Alert, cooperative, no acute distress    EENT:  EOMI. Anicteric sclerae. MMM  Resp:  CTA bilaterally, no wheezing or rales. No accessory muscle use  CV:  Regular  rhythm,  No edema  GI:               Soft nontender  Neurologic:  Alert and oriented X 3, normal speech,   Psych:   Good insight. Not anxious nor agitated  Skin:  No rashes. No jaundice.      Reviewed most current lab test results and cultures  YES  Reviewed most current radiology test results   YES  Review and summation of old records today    NO  Reviewed patient's current orders and MAR    YES  PMH/SH reviewed - no change compared to H&P  ________________________________________________________________________  Care Plan discussed with:    Comments   Patient x    Family      RN x    Care Manager x    Consultant                       x Multidiciplinary team rounds were held today with , nursing, pharmacist and clinical coordinator. Patient's plan of care was discussed; medications were reviewed and discharge planning was addressed. ________________________________________________________________________    Total NON critical care TIME:  35   Minutes     Total CRITICAL CARE TIME Spent:   Minutes non procedure based      Comments   >50% of visit spent in counseling and coordination of care x     This includes time during multidisciplinary rounds if indicated above   ________________________________________________________________________  Donavan Saenz MD     Procedures: see electronic medical records for all procedures/Xrays and details which were not copied into this note but were reviewed prior to creation of Plan. LABS:  I reviewed today's most current labs and imaging studies. Pertinent labs include:  No results for input(s): WBC, HGB, HCT, PLT, HGBEXT, HCTEXT, PLTEXT, HGBEXT, HCTEXT, PLTEXT in the last 72 hours.   Recent Labs     12/07/21  0418      K 4.4      CO2 24   *   BUN 14   CREA 0.57   CA 9.4

## 2021-12-07 NOTE — TELEPHONE ENCOUNTER
----- Message from Opal Holman MD sent at 12/5/2021 11:29 AM EST -----  Please set up ID clinic follow up in 2-3 weeks. In clinic. Inpatient currently. Msg back please.        Opal Holman MD  Infectious Diseases

## 2021-12-07 NOTE — PROGRESS NOTES
Transition of Care Plan:     RUR: 14% low  Disposition: SNF - [facility]   Follow up appointments: PCP & specialist as indicated  DME needed: To be determined. [facility] or means to access home:  Family has keys  IM Medicare Letter: To be given prior to discharge.   Is patient a BCPI-A Bundle:   No                 If yes, was Bundle Letter given?:   N/A  Caregiver Contact: Son  Discharge Caregiver contacted prior to discharge?  Caregiver to be contacted prior to discharge.     4:24 PM  CM acknowledged case management consult. Pt is suppose to d/c to SNF however ID recommended IV Daptomycin and SNFs will not accept pt on that medication. CM updated ID MD.    9:24 AM  CM reviewed pt's chart. ID recommendations are still pending at this time for IV ABX.      Vaughn Ratliff MSW  Care Manager [id number]  [phone]

## 2021-12-07 NOTE — PROGRESS NOTES
ID  Rehab will not accept patient on daptomycin per case management. Will change to vancomycin IV, for close monitoring. Antibiotic orders for discharge  - Vancomycin 1.25 g IV every 12 hours end date 1/27/2022  -Pharmacy on consult for dosing target trough 15-20  May require extension, to be determined at ID clinic follow-up. -Weekly CBC, CMP, vancomycin trough & ESR every other week fax reports to Dr. Kandice Dakin at [phone], call with critical labs at [phone].    -Encourage daily probiotic/yogurt, adequate fluids.     -ID follow-up in person visit with Dr. Kandice Dakin on 12/22 at 2:30 PM

## 2021-12-07 NOTE — PROGRESS NOTES
1220 - when assessing patient's lower back hemovac drain, drain found to be displaced from patient's skin. Notified IR MD, olga lidia to remove sutures and drain then place dressing. Perfectserve message sent to ortho surgery.

## 2021-12-07 NOTE — ADT AUTH CERT NOTES
Cellulitis - Care Day 27 (12/6/2021) by Kodak Beckett       Review Status Review Entered   Completed 12/7/2021 15:39      Criteria Review      Care Day: 27 Care Date: 12/6/2021 Level of Care: Inpatient Floor    Guideline Day 3    Clinical Status    (X) * Hemodynamic stability    12/7/2021 15:39:50 EST by Svitlana Mason      77, 93/61, 16, RA sat 94%    (X) * Afebrile or fever improved    12/7/2021 15:39:50 EST by Svitlana Mason      Tmax 98.8    ( ) * Skin exam stable or improved    (X) * Mental status at baseline    12/7/2021 15:39:50 EST by Svitlana Mason      baseline    ( ) * Antibiotic treatment needs appropriate for next level of care    12/7/2021 15:39:50 EST by Carline Oconnell Per ID note today, awaiting final intra-op cultures from 12/2 before final IV abx determination can be made for outpt IV abx. ID rec: cont ceftriaxone for now, add vanco pending final cultures. (X) * Pain absent or manageable at next level of care    12/7/2021 15:39:50 EST by Svitlana Mason      po pain meds    ( ) * Discharge plans and education understood    Activity    ( ) * Ambulatory or acceptable for next level of care    Routes    (X) * Oral hydration    12/7/2021 15:39:50 EST by Svitlana Mason      po diet as doc. ADD MEDS: eliquis 5mg po q12H, elavil 50mg po qhs, xanax 0.25mg po qid prn x1, tylenol 1000mg po Q6H RTC-refused x4 doses/allowed one. (X) * Oral medications or regimen acceptable for next level of care    12/7/2021 15:39:50 EST by Svitlana Mason      Lopressor 25mg po qd and 12.5mg po qhs, lipitor 40mg po qhs, paxil 40mg po qd, protonix 40mg po qd, roxicodone 10mg po q3H PRN x1, probiotic po qd, synthroid 112mcg po qd, asa 81mg qd. Pt refused miralax+ pericolace. Lovenox 120mg sq q12H x1 and dc    (X) * Oral diet or acceptable for next level of care    12/7/2021 15:39:50 EST by Svitlana Mason      Regular diet. GLUCOSE CONTROL: Lantus 20un sq qd.  Humalog ssi ac/hs: (0un)- (0un)- (2un)- (Leafy Born) Medications    (X) Parenteral or oral antibiotics    2021 15:39:50 EST by Wild Paulson      rocephin 2gram IV Q24H, vancomycin 2500mg IV x1    * Milestone   Additional Notes   FAUZIA x1 to lower back removed by Ortho      IMPRESSION: (INFECTIOUS DISEASE)        -R/ retroperitoneal abscess 9.7 x 9.1 x 7.5 cm & possible fluid tract to the right L3-4 or right L4-5 foramen.  No evidence of OM/discitis. Posterior decompression and fusion L2-S1 on CT lumbar spine.   - S/p drainage of abscess and placement of drain    Fluid culture +  -light strep anginosus. - CT-guided drain removal and new drain placement of undrained part of the RLQ abscess,aspiration of posterior paraspinal abscess . Cultures + for alpha Strep. Repeat CT -  -no evidence of residual abscess RLQ.       -S/p I&D of superficial deep lumbar wounds by & Ortho on . Wound cultures-CoNS species( thio broth only) ID, sensitivity pending.       -S/p Exploration of fusion, revision laminectomy L2-3, L5-S1 on 2021   Patient reports delayed wound healing, wound VAC placement, frequent disruption of wound VAC system       -E. coli UTI   Urine culture--.coli 50,000 colonies   ( R to Amp, quinolones, aminoglycosides, cefoxitin I       - H/o recurrent UTI, urinary incontinence       -Poorly controlled diabetes A1c 11.7       - S/p MERCEDES creatinine 0.65       -Morbid obesity BMI 44.5           PLAN:    -Continue ceftriaxone IV, add vancomycin pending final cultures   -Await final cultures.    -DC RLQ drain if output<20 cc   -Lumbar wound dressing change per Ortho              Op Note-21 by Rose Lamp       Review Status Review Entered   In Primary 2021 13:57      Criteria Review   OPERATIVE REPORT     Name: Fortino Pulliam  MR#:  [id number]  :  1950  ACCOUNT #: [de-identified]  DATE OF SERVICE:  2021     PREOPERATIVE DIAGNOSES:  Status post thoracolumbar fusion, right psoas abscess, possible posterior lumbar wound infection.     POSTOPERATIVE DIAGNOSES:  Status post thoracolumbar fusion, right psoas abscess, possible posterior lumbar wound infection.     PROCEDURE PERFORMED:  Incision and drainage, superficial and deep of the lumbar wound.     SURGEON: Darrin Fagan MD     ASSISTANT: Kaycee Lenz PA-C     ANESTHESIA:  General.     COMPLICATIONS:  None.     SPECIMENS REMOVED:  Include anaerobic, aerobic, and fungal cultures.     IMPLANTS:  None.     ESTIMATED BLOOD LOSS:  Minimal.     INDICATIONS:  This is a pleasant 66-year-old female. Dana Rubio is approximately six months out from a revision thoracolumbar fusion.  She had presented to the hospital a couple of weeks ago with a right psoas abscess, which had been drained percutaneously.  She has on MRI some collections in the posterior subcutaneous tissue that is unknown if it is a seroma versus an abscess.  She has had some drainage started recently in the inferior portion of the wound and is for irrigation, debridement, and exploration.  The patient understood the risks and benefits, elected to proceed.     PROCEDURE:  The patient was identified, brought to the operative suite, and underwent general anesthesia without difficulty.  She already was on antibiotics and placed prone on the Chance frame with all bony prominences well padded.  Back was prepped and draped sterilely.  Time-out confirmed.  At which time, we then did a midline incision.  The one area that was draining, was followed down and there was a small superficial collection in the subcutaneous tissue with some mild purulence.  This was debrided and irrigated.  We sharply debrided the cavity and the track as well.  The rest of the wound appeared to be clean, dry, and intact.  Beginning the right psoas abscess, we did dissect down and did a paraspinal fascial split over the hardware and dissected down to the hardware roughly in the mid lumbar region.  No obvious purulent material was noted.  We irrigated the wound with Irrisept irrigation, followed by pulse irrigation as well.  Antibiotic beads impregnated with vancomycin and gentamicin as well were packed into the deep wound around the exposed hardware.  We then sprinkled vancomycin powder in subcutaneous tissue, drain was placed as well, and #1 Stratafix in the fascial layer, #2 Stratafix in the skin, and then also nylons.  The patient returned to the PACU in stable condition.        The physician assistant was present during the entire operative procedure and assisted in all critical elements of the surgery. No surgical assist or resident was available.      Pablo Eastman MD        [id number]  D:  12/02/2021 9:26   Additional Notes   Assessment / Plan:   Septic shock POA rectal temp 96, , lactic 9.2 -->5. 3    Streptococcus anginosus epidural/right iliopsoas abscess POA   Right paraspinal pain at level L3-4 and R iliac crest pain (complains of Right hip pain), POA    Paraspinal abscess   - Followed by Dr Patricia Tracey and Infectious diseases, needs close follow-up as outpatient   -Stopped vancomycin 11/16 as recommended by ID   -Appreciated orthopedic consult, recommended IR placed drain.  Orthopedics plans no surgery for now.  Surgical management on hold for now       -First Drain was placed 11/12 draining purulent discharge, repeat CT 11/23 showed Resolution, drain removed 11/26.   -Though CT on 11/23 showed undrained fluid collection on right lower quadrant   -s/p 11/26, IR guided Drainage catheter, so far 25 cc output past 24 hours       -Culture from drain fluid is growing Streptococcus anginosus   -Antibiotics as per ID currently to continue ceftriaxone 2 g daily   -Recommendation 8 to 12 weeks, ID will finalize abx and duration and choice   -Orthopedic surgery was called by ID due to concern of ongoing pus/blood drainage.  Still awaiting final ID recommendations and spinal surgeon evaluation.   -Status post I&D for lumbar wound today by orthopedic surgery team, input is appreciated   -ID recommending to send for aerobic, anaerobic, fungal and AFB cultures from the I&D   -Repeat CT abdomen on December 4 showed resolution of right lower quadrant abscess       -ID awaiting final cultures from last week to finalize recommendations for choice and duration of antibiotics.           - MRI done on 11/22, Rim-enhancing paraspinal soft tissue collections may reflect abscesses as   well, and partially visualized abscess inferior to the right kidney again seen, but   incompletely evaluated on this exam.   -For paraspinal abscess, orthopedic recommends conservative management,    -Successful aspiration of posterior paraspinal abscess 11/26       -Follow-up cultures sent from most recent I&D       Celiac Artery Thrombosis with splenic Infarcts:   -CT ABD/Pelvis 11/9 showed celiac artery thrombosis with splenic infarcts   CT abdomen pelvis today confirmed the celiac artery occlusion with splenic infarcts   Vascular consult appreciated   Renal function stable, switched heparin drip to Lovenox injection 1 mg/kg every 12 hours, continue until definitive decision about surgical debridement.  resume anticoagulation, it was on hold for I&D on December 1   I will change to Eliquis on discharge        UTI   -Ucx E coli   -sensitive to cefepime    -Rea placed in ER due to poor mobility from hip pain and polyuria, with severe candidal infection in perineum and labia and buttocks.     Removing Rea catheter and start voiding trial.  Patient advised to schedule empty bladder every 2 hours.  Use pure wick catheter       Type 2 DM, uncontrolled with hyperosmolar hyperglycemia and early DKA POA   - presented with , AG 15, trace ketone in urine   - Required insulin gtt   - A1c 11.7 up from 7.6 in July.    - continue holding metformin   - Continue Lantus 35 units.  Continue SSI prn   - Blood sugars are better controlled       Bilateral feet neuropathy, suspected DM related   -C26 and folic acid within normal limits   -No epidural abscess was seen on previous MRI       Prerenal MERCEDES Cr 1.5   -resolved with IVF hydration.         Constipation   -added pericolace and miralax       Severe candida infection in perineum, labia, buttocks   -continue mystatin cream ordered in ER.  Status post Diflucan   -wound care consult appreciated       Generalized weakness and gait difficulty    -consult pt/ot       SVT    HTN / HLD   -continue metoprolol and statin        Hypothyroid   -continue levothyroxine.  TSH 3.6       Depression and anxiety   -continue paxil with xanax prn       Severe obesity   40 or above Morbid obesity / Body mass index is 44.42 kg/m². Code:  Discussed, full   DVT prophylaxis: lovenox   Surrogate decision maker:   (but has some dementia per patient) or sister Costa Llamas       HANY: Likely 12/6   Awaiting final cultures which were sent from I&D, final antibiotics recommendation               Cellulitis - Care Day 26 (12/5/2021) by Rebeka Posada       Review Status Review Entered   Completed 12/6/2021 13:56      Criteria Review      Care Day: 26 Care Date: 12/5/2021 Level of Care: Inpatient Floor    Guideline Day 2    Level Of Care    (X) Floor    Clinical Status    (X) * Dehydration absent    12/6/2021 13:56:55 EST by Wanpedro Leon      po diet only. No labs or imaging today. (X) * Mental status at baseline    12/6/2021 13:56:55 EST by Wannetta Kil      alert and oriented x4    (X) * Hemodynamic stability    12/6/2021 13:56:55 EST by Wanshwetaa Edward      117/58, 87, 18, RA sat 95%    (X) * Afebrile or fever improved    12/6/2021 13:56:55 EST by Wannetta Kil      Tmax 98.2    Activity    (X) Activity as tolerated    12/6/2021 13:56:55 EST by Mary Starr Pt is POD 3 s/p I+D, superfiscial and deep of lumbar wound. POST OP DX: s/p thoracolumbar fusion, rt psoas abscess, poss post lumbar wound infection. Ambulate with asst Q8H, Fall precautions. Routes    (X) * Oral hydration    12/6/2021 13:56:55 EST by Davi Lou      po diet. ADD MEDS: elavil 50mg po qhs, lipitor 40mg po qhs. GLUCOSE CONTROL: Lantus 20un sq qd. Humalog ssi: (refused)- (2un)- (3un)-(0un). Pt refused miralax + pericolace. (X) * Oral medications    12/6/2021 13:56:55 EST by Davi Lou      Lovenox 120mg SQ Q12H, protonix 40mg po qd, lopressor 25mg po qam and 12.5mg po qhs, synthroid 112mcg po qd, probiotic po qd, paxil 40mg po qd, melatonin 6mg po qhs prn x1, tylenol 1000mg po q6H RTC, asa 81mg po qd. (X) Diet as tolerated    12/6/2021 13:56:55 EST by Davi Lou      Regular diet    Medications    (X) IV antibiotics    12/6/2021 13:56:55 EST by Davi Lou      Rocephin 2g IV Q24H,    * Milestone   Additional Notes   Assessment / Plan:   Septic shock POA rectal temp 96, , lactic 9.2 -->5. 3    Streptococcus anginosus epidural/right iliopsoas abscess POA   Right paraspinal pain at level L3-4 and R iliac crest pain (complains of Right hip pain), POA    Paraspinal abscess   - Followed by Dr Dayo Melton and Infectious diseases, needs close follow-up as outpatient   -Stopped vancomycin 11/16 as recommended by ID   -Appreciated orthopedic consult, recommended IR placed drain.  Orthopedics plans no surgery for now.  Surgical management on hold for now       -First Drain was placed 11/12 draining purulent discharge, repeat CT 11/23 showed Resolution, drain removed 11/26.   -Though CT on 11/23 showed undrained fluid collection on right lower quadrant   -s/p 11/26, IR guided Drainage catheter, so far 25 cc output past 24 hours       -Culture from drain fluid is growing Streptococcus anginosus   -Antibiotics as per ID currently to continue ceftriaxone 2 g daily   -Recommendation 8 to 12 weeks, ID will finalize abx and duration and choice   -Orthopedic surgery was called by ID due to concern of ongoing pus/blood drainage.  Still awaiting final ID recommendations and spinal surgeon evaluation.   -Status post I&D for lumbar wound today by orthopedic surgery team, input is appreciated   -ID recommending to send for aerobic, anaerobic, fungal and AFB cultures from the I&D   -Repeat CT abdomen on December 4 showed resolution of right lower quadrant abscess       -ID awaiting final cultures from last week to finalize recommendations for choice and duration of antibiotics.           - MRI done on 11/22, Rim-enhancing paraspinal soft tissue collections may reflect abscesses as   well, and partially visualized abscess inferior to the right kidney again seen, but   incompletely evaluated on this exam.   -For paraspinal abscess, orthopedic recommends conservative management,    -Successful aspiration of posterior paraspinal abscess 11/26       -Follow-up cultures sent from most recent I&D       Celiac Artery Thrombosis with splenic Infarcts:   -CT ABD/Pelvis 11/9 showed celiac artery thrombosis with splenic infarcts   CT abdomen pelvis today confirmed the celiac artery occlusion with splenic infarcts   Vascular consult appreciated   Renal function stable, switched heparin drip to Lovenox injection 1 mg/kg every 12 hours, continue until definitive decision about surgical debridement.  resume anticoagulation, it was on hold for I&D on December 1   I will change to Eliquis on discharge           UTI   -Ucx E coli   -sensitive to cefepime    -Rea placed in ER due to poor mobility from hip pain and polyuria, with severe candidal infection in perineum and labia and buttocks.     Removing Rea catheter and start voiding trial.  Patient advised to schedule empty bladder every 2 hours.  Use pure wick catheter       Type 2 DM, uncontrolled with hyperosmolar hyperglycemia and early DKA POA   - presented with , AG 15, trace ketone in urine   - Required insulin gtt   - A1c 11.7 up from 7.6 in July.    - continue holding metformin   - Continue Lantus 35 units.  Continue SSI prn   - Blood sugars are better controlled       Bilateral feet neuropathy, suspected DM related   -S42 and folic acid within normal limits   -No epidural abscess was seen on previous MRI       Prerenal MERCEDES Cr 1.5   -resolved with IVF hydration.         Constipation   -added pericolace and miralax       Severe candida infection in perineum, labia, buttocks   -continue mystatin cream ordered in ER.  Status post Diflucan   -wound care consult appreciated       Generalized weakness and gait difficulty    -consult pt/ot       SVT    HTN / HLD   -continue metoprolol and statin           Hypothyroid   -continue levothyroxine.  TSH 3.6       Depression and anxiety   -continue paxil with xanax prn       Severe obesity   40 or above Morbid obesity / Body mass index is 44.42 kg/m².    Code:  Discussed, full   DVT prophylaxis: lovenox   Surrogate decision maker:   (but has some dementia per patient) or sister Aishwarya Mcnair       HANY: Likely 12/6   Awaiting final cultures which were sent from I&D, final antibiotics recommendation

## 2021-12-07 NOTE — PROGRESS NOTES
Problem: Mobility Impaired (Adult and Pediatric)  Goal: *Acute Goals and Plan of Care (Insert Text)  Description: FUNCTIONAL STATUS PRIOR TO ADMISSION: Ambulates household distances with RW support vs uses RW, stating she mostly has been relying on WC recently. History of MULTIPLE falls. Caregiver for  with dementia. HOME SUPPORT PRIOR TO ADMISSION: The patient lived with  however states he has dementia. Physical Therapy Goals    Initiated 11/10/2021  1. Patient will move from supine to sit and sit to supine , scoot up and down, and roll side to side in bed with supervision/set-up within 7 day(s). 2.  Patient will transfer from bed to chair and chair to bed with minimal assistance/contact guard assist using the least restrictive device within 7 day(s). 3.  Patient will perform sit to stand with minimal assistance/contact guard assist within 7 day(s). 4.  Patient will ambulate with minimal assistance/contact guard assist for 10 feet with the least restrictive device within 7 day(s). Re-Assessment 11/17/21 Re-Assessment, goals achieved and upgraded    1. Patient will move from supine to sit and sit to supine , scoot up and down, and roll side to side in bed with mod indep within 7 day(s). 2.  Patient will transfer from bed to chair and chair to bed with supervision   using the least restrictive device within 7 day(s). 3.  Patient will perform sit to stand with supervision within 7 day(s). 4.  Patient will ambulate with supervision  for 50 feet with the least restrictive device within 7 day(s). Reassessed 11/29/2021, Reviewed 12/6/2021 goals not met and continue progress    1. Patient will move from supine to sit and sit to supine , scoot up and down, and roll side to side in bed with mod indep within 7 day(s). 2.  Patient will transfer from bed to chair and chair to bed with supervision   using the least restrictive device within 7 day(s).   3.  Patient will perform sit to stand with supervision within 7 day(s). 4.  Patient will ambulate with supervision  for 50 feet with the least restrictive device within 7 day(s). Outcome: Progressing Towards Goal   PHYSICAL THERAPY TREATMENT  Patient: Diego Bhardwaj (64 y.o. female)  Date: 12/7/2021  Diagnosis: Hyperglycemia [R73.9]  MERCEDES (acute kidney injury) ([facility] [address] [N17.9]  Leukocytosis [D72.829]  SIRS (systemic inflammatory response syndrome) ([facility]) [R65.10]  Candida vaginitis [B37.3]   <principal problem not specified>  Procedure(s) (LRB):  INCISION AND DRAINAGE TO LUMBAR WOUND (N/A) 5 Days Post-Op  Precautions: Fall, Back, WBAT  Chart, physical therapy assessment, plan of care and goals were reviewed. ASSESSMENT  Patient continues with skilled PT services and is progressing towards goals. Pt remains limited by impaired balance without AD, decreased activity tolerance, fear of falling and functional mobility below baseline. Pt with improved anxiety levels this day. Completed several short gait distances in pt from from 10ft to 30ft at the longest. Tolerated all distances well though requires increased rest time following 30ft. Plan for d/c to SNF as early as tomorrow. Current Level of Function Impacting Discharge (mobility/balance): short gait distances only, high fear of falling    Other factors to consider for discharge:  has dementia and can not assist, infection with antibiotics needed         PLAN :  Patient continues to benefit from skilled intervention to address the above impairments. Continue treatment per established plan of care. to address goals.     Recommendation for discharge: (in order for the patient to meet his/her long term goals)  Therapy up to 5 days/week in SNF setting    This discharge recommendation:  Has been made in collaboration with the attending provider and/or case management    IF patient discharges home will need the following DME: patient owns DME required for discharge       SUBJECTIVE: Patient stated I am so afraid of falling.     OBJECTIVE DATA SUMMARY:   Critical Behavior:  Neurologic State: Alert  Orientation Level: Oriented X4  Cognition: Follows commands  Safety/Judgement: Fall prevention, Decreased insight into deficits  Functional Mobility Training:  Bed Mobility:  Rolling: Supervision  Supine to Sit: Supervision              Transfers:  Sit to Stand: Contact guard assistance  Stand to Sit: Contact guard assistance                             Balance:  Sitting: Intact  Sitting - Static: Good (unsupported)  Sitting - Dynamic: Good (unsupported)  Standing: Impaired  Standing - Static: Good  Standing - Dynamic : Fair; Constant support  Ambulation/Gait Training:  Distance (ft): 30 Feet (ft) (additional 15 ft x 2)  Assistive Device: Gait belt; Walker, rolling  Ambulation - Level of Assistance: Contact guard assistance        Gait Abnormalities: Decreased step clearance        Base of Support: Widened     Speed/Catherine: Pace decreased (<100 feet/min); Slow  Step Length: Right shortened; Left shortened               Activity Tolerance:   Good    After treatment patient left in no apparent distress:   Sitting in chair and Call bell within reach    COMMUNICATION/COLLABORATION:   The patients plan of care was discussed with: Registered nurse.      Michael Rodriguez, PT   Time Calculation: 36 mins

## 2021-12-08 LAB
ANION GAP SERPL CALC-SCNC: 6 MMOL/L (ref 5–15)
BUN SERPL-MCNC: 14 MG/DL (ref 6–20)
BUN/CREAT SERPL: 21 (ref 12–20)
CALCIUM SERPL-MCNC: 9.7 MG/DL (ref 8.5–10.1)
CHLORIDE SERPL-SCNC: 105 MMOL/L (ref 97–108)
CK SERPL-CCNC: 27 U/L (ref 26–192)
CO2 SERPL-SCNC: 26 MMOL/L (ref 21–32)
CREAT SERPL-MCNC: 0.68 MG/DL (ref 0.55–1.02)
GLUCOSE BLD STRIP.AUTO-MCNC: 143 MG/DL (ref 65–117)
GLUCOSE BLD STRIP.AUTO-MCNC: 143 MG/DL (ref 65–117)
GLUCOSE BLD STRIP.AUTO-MCNC: 165 MG/DL (ref 65–117)
GLUCOSE BLD STRIP.AUTO-MCNC: 175 MG/DL (ref 65–117)
GLUCOSE SERPL-MCNC: 132 MG/DL (ref 65–100)
POTASSIUM SERPL-SCNC: 3.8 MMOL/L (ref 3.5–5.1)
SERVICE CMNT-IMP: ABNORMAL
SODIUM SERPL-SCNC: 137 MMOL/L (ref 136–145)

## 2021-12-08 PROCEDURE — 82962 GLUCOSE BLOOD TEST: CPT

## 2021-12-08 PROCEDURE — 36415 COLL VENOUS BLD VENIPUNCTURE: CPT

## 2021-12-08 PROCEDURE — 74011250636 HC RX REV CODE- 250/636: Performed by: INTERNAL MEDICINE

## 2021-12-08 PROCEDURE — 94760 N-INVAS EAR/PLS OXIMETRY 1: CPT

## 2021-12-08 PROCEDURE — 65660000000 HC RM CCU STEPDOWN

## 2021-12-08 PROCEDURE — 74011250637 HC RX REV CODE- 250/637: Performed by: INTERNAL MEDICINE

## 2021-12-08 PROCEDURE — 74011250637 HC RX REV CODE- 250/637: Performed by: PHYSICIAN ASSISTANT

## 2021-12-08 PROCEDURE — 74011250637 HC RX REV CODE- 250/637: Performed by: HOSPITALIST

## 2021-12-08 PROCEDURE — 74011636637 HC RX REV CODE- 636/637: Performed by: INTERNAL MEDICINE

## 2021-12-08 PROCEDURE — 80048 BASIC METABOLIC PNL TOTAL CA: CPT

## 2021-12-08 PROCEDURE — 74011250637 HC RX REV CODE- 250/637: Performed by: ORTHOPAEDIC SURGERY

## 2021-12-08 PROCEDURE — 82550 ASSAY OF CK (CPK): CPT

## 2021-12-08 PROCEDURE — 99233 SBSQ HOSP IP/OBS HIGH 50: CPT | Performed by: INTERNAL MEDICINE

## 2021-12-08 RX ORDER — ATORVASTATIN CALCIUM 40 MG/1
40 TABLET, FILM COATED ORAL
Status: DISCONTINUED | OUTPATIENT
Start: 2021-12-08 | End: 2021-12-10 | Stop reason: HOSPADM

## 2021-12-08 RX ORDER — VANCOMYCIN HYDROCHLORIDE
1250 EVERY 12 HOURS
Status: DISCONTINUED | OUTPATIENT
Start: 2021-12-08 | End: 2021-12-10 | Stop reason: HOSPADM

## 2021-12-08 RX ADMIN — ATORVASTATIN CALCIUM 40 MG: 40 TABLET, FILM COATED ORAL at 21:11

## 2021-12-08 RX ADMIN — VANCOMYCIN HYDROCHLORIDE 1250 MG: 10 INJECTION, POWDER, LYOPHILIZED, FOR SOLUTION INTRAVENOUS at 18:37

## 2021-12-08 RX ADMIN — PANTOPRAZOLE SODIUM 40 MG: 40 TABLET, DELAYED RELEASE ORAL at 06:55

## 2021-12-08 RX ADMIN — AMITRIPTYLINE HYDROCHLORIDE 50 MG: 50 TABLET, FILM COATED ORAL at 21:11

## 2021-12-08 RX ADMIN — Medication 10 ML: at 06:57

## 2021-12-08 RX ADMIN — APIXABAN 5 MG: 5 TABLET, FILM COATED ORAL at 21:11

## 2021-12-08 RX ADMIN — LEVOTHYROXINE SODIUM 112 MCG: 0.11 TABLET ORAL at 06:55

## 2021-12-08 RX ADMIN — Medication 1 AMPULE: at 12:19

## 2021-12-08 RX ADMIN — INSULIN LISPRO 2 UNITS: 100 INJECTION, SOLUTION INTRAVENOUS; SUBCUTANEOUS at 16:30

## 2021-12-08 RX ADMIN — Medication 1 CAPSULE: at 10:24

## 2021-12-08 RX ADMIN — METOPROLOL TARTRATE 12.5 MG: 25 TABLET, FILM COATED ORAL at 18:29

## 2021-12-08 RX ADMIN — INSULIN GLARGINE 20 UNITS: 100 INJECTION, SOLUTION SUBCUTANEOUS at 10:25

## 2021-12-08 RX ADMIN — Medication 10 ML: at 21:14

## 2021-12-08 RX ADMIN — ACETAMINOPHEN 1000 MG: 500 TABLET, FILM COATED ORAL at 12:15

## 2021-12-08 RX ADMIN — NYSTATIN OINTMENT: 100000 OINTMENT TOPICAL at 21:13

## 2021-12-08 RX ADMIN — ASPIRIN 81 MG: 81 TABLET, COATED ORAL at 10:24

## 2021-12-08 RX ADMIN — NYSTATIN OINTMENT: 100000 OINTMENT TOPICAL at 18:30

## 2021-12-08 RX ADMIN — OXYCODONE 10 MG: 5 TABLET ORAL at 15:28

## 2021-12-08 RX ADMIN — ACETAMINOPHEN 1000 MG: 500 TABLET, FILM COATED ORAL at 18:29

## 2021-12-08 RX ADMIN — Medication 1 AMPULE: at 21:16

## 2021-12-08 RX ADMIN — ACETAMINOPHEN 1000 MG: 500 TABLET, FILM COATED ORAL at 06:55

## 2021-12-08 RX ADMIN — METOPROLOL TARTRATE 12.5 MG: 25 TABLET, FILM COATED ORAL at 10:24

## 2021-12-08 RX ADMIN — ACETAMINOPHEN 1000 MG: 500 TABLET, FILM COATED ORAL at 23:47

## 2021-12-08 RX ADMIN — NYSTATIN OINTMENT: 100000 OINTMENT TOPICAL at 10:26

## 2021-12-08 RX ADMIN — PAROXETINE HYDROCHLORIDE 40 MG: 20 TABLET, FILM COATED ORAL at 10:24

## 2021-12-08 RX ADMIN — APIXABAN 5 MG: 5 TABLET, FILM COATED ORAL at 10:24

## 2021-12-08 NOTE — PROGRESS NOTES
Chart reviewed. Attempted to see pt for PT intervention however pt politely declining all therapy this date, reporting increased fatigue this date. Pt requesting therapy hold for today and follow back tomorrow. Will defer however continue to follow.  Thank you    Cindy Thompson, PT, DPT

## 2021-12-08 NOTE — PROGRESS NOTES
Hospitalist Progress Note    NAME: Ese Blanchard   :  1950   MRN:  [id number]       Assessment / Plan:  Septic shock POA rectal temp 96, , lactic 9.2 -->5. 3   Streptococcus anginosus epidural/right iliopsoas abscess POA  Right paraspinal pain at level L3-4 and R iliac crest pain (complains of Right hip pain), POA   Paraspinal abscess  - Followed by Dr Ammon Boyle and Infectious diseases, needs close follow-up as outpatient  -Stopped vancomycin  as recommended by ID  -Appreciated orthopedic consult, recommended IR placed drain. Orthopedics plans no surgery for now. -First Drain was placed  draining purulent discharge, repeat CT  showed Resolution, drain removed .  -s/p , IR guided Drainage catheter, Fluid culture + , light strep anginosus.  - MRI done on , Rim-enhancing paraspinal soft tissue collections may reflect abscesses as  well, and partially visualized abscess inferior to the right kidney again see, but  incompletely evaluated on this exam  -CT-guided drain removal and new drain placement of undrained part of the RLQ abscess,aspiration of posterior paraspinal abscess . Cultures + for alpha Strep. Orthopedic surgery was called by ID due to concern of ongoing pus/blood drainage. Status post I&D for lumbar wound by orthopedic surgery team, wound cultures-MRSE ( thio broth only).    Repeat CT abdomen on  showed resolution of right lower quadrant abscess  ID recommending IV Vancomycin 1.25 g IV every 12 hours end date 2022  -stable for discharge, awaiting auth. Celiac Artery Thrombosis with splenic Infarcts  -CT ABD/Pelvis  showed celiac artery thrombosis with splenic infarcts  CT abdomen pelvis confirmed the celiac artery occlusion with splenic infarcts  Vascular consult appreciated, no vascular surgery or intervention. Recommended AC.   Pt is currently on Eliquis for 6 months, will cont' at discharge. outpt f/u with vascular to determine if indefinite AC is required    UTI  -Ucx E coli  -sensitive to cefepime   -Rea placed in ER due to poor mobility from hip pain and polyuria, with severe candidal infection in perineum and labia and buttocks. Rea now removed. Type 2 DM, uncontrolled with hyperosmolar hyperglycemia and early DKA POA  - presented with , AG 15, trace ketone in urine  - Required insulin gtt  - A1c 11.7 up from 7.6 in July. - continue holding metformin  - Continue Lantus 35 units. 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       Constipation  -pericolace and miralax    Severe candida infection in perineum, labia, buttocks  -continue mystatin cream.  Status post Diflucan  -wound care consult appreciated     Generalized weakness and gait difficulty   -consult pt/ot     SVT   HTN / HLD  -continue metoprolol and statin       Hypothyroid  -continue levothyroxine. TSH 3.6     Depression and anxiety  -continue paxil with xanax prn     Severe obesity  40 or above Morbid obesity / Body mass index is 44.42 kg/m². Code:  Discussed, full  DVT prophylaxis: lovenox  Surrogate decision maker:   (but has some dementia per patient) or sister Sabina Hatchet    HANY: Likely 12/7  Awaiting final recommendation from ID on antibiotics     Subjective:     Patient was seen and examined. No acute events overnight. Discussed with RN overnight events. review of Systems:  Symptom Y/N Comments  Symptom Y/N Comments   Fever/Chills n   Chest Pain n    Poor Appetite    Edema n    Cough n   Abdominal Pain y    Sputum    Joint Pain     SOB/AVALOS n   Pruritis/Rash     Nausea/vomit n   Tolerating PT/OT     Diarrhea n   Tolerating Diet y    Constipation    Other       Could NOT obtain due to:      PO intake: No data found.   Objective: VITALS:   Last 24hrs VS reviewed since prior progress note. Most recent are:  Patient Vitals for the past 24 hrs:   Temp Pulse Resp BP SpO2   12/08/21 0912 98.1 °F (36.7 °C) 79 18 121/72 97 %   12/08/21 0354 98 °F (36.7 °C) 89 18  99 %   12/07/21 2316  94 18 118/78 98 %   12/07/21 1957 97.8 °F (36.6 °C) 86 18 117/71 96 %   12/07/21 1515 97.7 °F (36.5 °C) 92 18 123/73 98 %       Intake/Output Summary (Last 24 hours) at 12/8/2021 1412  Last data filed at 12/8/2021 0354  Gross per 24 hour   Intake    Output 1200 ml   Net -1200 ml        I had a face to face encounter, and independently examined this patient on 12/8/2021, as outlined below:  PHYSICAL EXAM:  General: WD, WN. Alert, cooperative, no acute distress    EENT:  EOMI. Anicteric sclerae. MMM  Resp:  CTA bilaterally, no wheezing or rales. No accessory muscle use  CV:  Regular  rhythm,  No edema  GI:               Soft nontender  Neurologic:  Alert and oriented X 3, normal speech,   Psych:   Good insight. Not anxious nor agitated  Skin:  No rashes. No jaundice. Reviewed most current lab test results and cultures  YES  Reviewed most current radiology test results   YES  Review and summation of old records today    NO  Reviewed patient's current orders and MAR    YES  PMH/ reviewed - no change compared to H&P  ________________________________________________________________________  Care Plan discussed with:    Comments   Patient x    Family      RN x    Care Manager x    Consultant                       x Multidiciplinary team rounds were held today with , nursing, pharmacist and clinical coordinator. Patient's plan of care was discussed; medications were reviewed and discharge planning was addressed.      ________________________________________________________________________    Total NON critical care TIME:  35   Minutes     Total CRITICAL CARE TIME Spent:   Minutes non procedure based      Comments   >50% of visit spent in counseling and coordination of care x     This includes time during multidisciplinary rounds if indicated above   ________________________________________________________________________  Chapis Hutson MD     Procedures: see electronic medical records for all procedures/Xrays and details which were not copied into this note but were reviewed prior to creation of Plan. LABS:  I reviewed today's most current labs and imaging studies. Pertinent labs include:  No results for input(s): WBC, HGB, HCT, PLT, HGBEXT, HCTEXT, PLTEXT, HGBEXT, HCTEXT, PLTEXT in the last 72 hours.   Recent Labs     12/08/21  0347 12/07/21  0418    136   K 3.8 4.4    106   CO2 26 24   * 108*   BUN 14 14   CREA 0.68 0.57   CA 9.7 9.4

## 2021-12-08 NOTE — PROGRESS NOTES
Pharmacy Automatic Renal Dosing Protocol - Antimicrobials    Indication for Antimicrobials: SSTI      Current Regimen of Each Antimicrobial:  Vancomycin 2500 mg x 1 then 1250 mg Q12H (Start Date ; Day # )    Previous Antimicrobial Therapy:    Vancomycin Goal Level: 400-600 mg*hours/liter per 24 hours    Vancomycin Levels  Date Dose & Interval Measured (mcg/mL) Steady State (mcg/mL)                       Date & time of next level:     Significant Cultures:     Radiology / Imaging results: (X-ray, CT scan or MRI):       Labs:  Recent Labs     21  0347 21  0418   CREA 0.68 0.57   BUN 14 14     Temp (24hrs), Av.9 °F (36.6 °C), Min:97.7 °F (36.5 °C), Max:98.1 °F (36.7 °C)      Paralysis, amputations, malnutrition:   Creatinine Clearance (mL/min) or Dialysis: 62.7    Impression/Plan:   Antibiotics as above. Will check level after 3 doses. [facility] daily     Pharmacy will follow daily and adjust medications as appropriate for renal function and/or serum levels. Thank you,  Alain Klinefelter, PHARMD      Recommended duration of therapy  [email or web address]/[facility]/Pharmacy/Clinical%20Companion/[email or web address]%20ABX%20therapy. docx    Renal Dosing  [email or web address]/[facility]/Pharmacy/Clinical%20Companion/Renal%[email or web address]. pdf

## 2021-12-08 NOTE — PROGRESS NOTES
Infectious Disease Progress Note    IMPRESSION:       -R/ retroperitoneal abscess 9.7 x 9.1 x 7.5 cm & possible fluid tract to the right L3-4 or right L4-5 foramen. No evidence of OM/discitis. Posterior decompression and fusion L2-S1 on CT lumbar spine.  - S/p drainage of abscess and placement of drain 11/12  Fluid culture + 11/12 -light strep anginosus. - CT-guided drain removal and new drain placement of undrained part of the RLQ abscess,aspiration of posterior paraspinal abscess . Cultures + for alpha Strep. Repeat CT - 12/4 -no evidence of residual abscess RLQ. -S/p I&D of superficial deep lumbar wounds by & Ortho on 12/2. Wound cultures-MRSE ( thio broth only). -S/p Exploration of fusion, revision laminectomy L2-3, L5-S1 on 6/2/2021  Patient reports delayed wound healing, wound VAC placement, frequent disruption of wound VAC system    -E. coli UTI  Urine culture-11/6-.coli 50,000 colonies  ( R to Amp, quinolones, aminoglycosides, cefoxitin I    - H/o recurrent UTI, urinary incontinence    -Poorly controlled diabetes A1c 11.7    - S/p MERCEDES    -Morbid obesity BMI 44.5       PLAN:        -Resume vancomycin IV, facility unable to accept patient on daptomycin IV  --DC RLQ drain if output<20 cc  -Lumbar wound dressing change per Ortho. Antibiotic orders for discharge  - Vancomycin 1.25 g IV every 12 hours end date 1/27/2022  -Pharmacy on consult for dosing target trough 15-20  May require extension, to be determined at [facility] follow-up.   -Weekly CBC, CMP, vancomycin trough & ESR every other week fax reports to Dr. Zachary Hoover at [phone], TLUA with critical labs at [phone].    -Encourage daily probiotic/yogurt, adequate fluids.     -ID follow-up in person visit with Dr. Zachary Hoover on 12/22 at 2:30 PM      Possible adverse effects of long term antibiotics are inclusive of but not limited to following  BM suppression, neutropenia , cytopenias , aplastic anemia hemorrhage liver & renal dysfunction/ liver , renal failure  , GI dysfunction- N, V  Diarrhea,C.difficile disease, rash , allergy , anaphylaxis. toxic epidermal necrolysis  Neuro toxicity , seizure disorder  Side effects tend to be more pronounced in the elderly                   Subjective:     Patient seen . Denies complaints. Feels good  Bloodstained drainage from RLQ drain. S/p removal of lumbar drain. Review of Systems:  A comprehensive review of systems was negative except for that written in the History of Present Illness. 14 point ROS obtained . All other systems negative . Objective:     Blood pressure 121/72, pulse 79, temperature 98.1 °F (36.7 °C), resp. rate 18, height 5' 4\" (1.626 m), weight 255 lb 11.2 oz (116 kg), SpO2 97 %, not currently breastfeeding. Temp (24hrs), Av.9 °F (36.6 °C), Min:97.7 °F (36.5 °C), Max:98.1 °F (36.7 °C)      Patient Vitals for the past 24 hrs:   Temp Pulse Resp BP SpO2   21 0912 98.1 °F (36.7 °C) 79 18 121/72 97 %   21 0354 98 °F (36.7 °C) 89 18  99 %   21 2316  94 18 118/78 98 %   21 1957 97.8 °F (36.6 °C) 86 18 117/71 96 %   21 1515 97.7 °F (36.5 °C) 92 18 123/73 98 %         Lines:  Peripheral IV:       Physical Exam:   General:  Awake, cooperative,    Eyes:  Sclera anicteric. Pupils equally round and reactive to light. Mouth/Throat: Mucous membranes normal, oral pharynx clear   Neck: Supple   Lungs:   Clear to auscultation bilaterally, good effort   CV:  Regular rate and rhythm,no murmur, click, rub or gallop   Abdomen:   Soft, non-tender.  bowel sounds normal. non-distended   Extremities: No cyanosis or edema   Skin: Skin color, texture, turgor normal. no acute rash or lesions   Lymph nodes: Cervical and supraclavicular normal   Musculoskeletal: No swelling or deformity   Lines/Devices:  Intact, no erythema, drainage or tenderness   Psych: Awake and oriented, normal mood affect       Data Review:ed   CBC:   No results for input(s): WBC, RBC, HGB, HCT, PLT, GRANS, LYMPH, EOS, HGBEXT, HCTEXT, PLTEXT in the last 72 hours. CMP:   Recent Labs     12/08/21  0347 12/07/21  0418   * 108*    136   K 3.8 4.4    106   CO2 26 24   BUN 14 14   CREA 0.68 0.57   CA 9.7 9.4   AGAP 6 6   BUCR 21* 25*       Studies reviewed:      Lab Results   Component Value Date/Time    Culture result: NO GROWTH 4 DAYS 12/02/2021 08:15 AM    Culture result: NO FUNGUS ISOLATED 4 DAYS 12/02/2021 08:15 AM    Culture result: (A) 12/02/2021 08:15 AM     STAPHYLOCOCCUS EPIDERMIDIS (OXACILLIN RESISTANT) ISOLATED FROM THIO BROTH ONLY    Culture result: NO GROWTH ON SOLID MEDIA AT 4 DAYS 12/02/2021 08:15 AM    Culture result:  12/02/2021 08:15 AM     DR. EAGLE REQUESTED ID AND SENSITIVITIES ON THE COAG NEGATIVE STAPH GROWING IN THE THIO BROTH         XR Results (most recent):  Results from Hospital Encounter encounter on 11/06/21    XR CHEST PORT    Narrative  INDICATION: . weakness  Additional history:  COMPARISON: Previous chest xray, 7/12/2021 and 7/8/2021. LIMITATIONS: Portable technique. Angelica Money FINDINGS: Single frontal view of the chest.  .  Lines/tubes/surgical: Cardiac monitor leads overly the patient. Heart/mediastinum: Unremarkable. Lungs/pleura: Low lung volumes without definite acute process. No visualized  pleural effusion or pneumothorax. Additional Comments: None. .    Impression  1. Low lung volumes without definite acute cardiopulmonary disease.  Consider PA  and lateral exam when clinically appropriate      Patient Active Problem List   Diagnosis Code    Left knee DJD M17.12    Right knee DJD M17.11    Morbid obesity ([facility] E66.01    Rectal polyp K62.1    Lumbar stenosis with neurogenic claudication M48.062    Right hip pain M25.551    Leukocytosis D72.829    Candida vaginitis B37.3    Hyperglycemia R73.9    SIRS (systemic inflammatory response syndrome) ([facility]) R65.10    MERCEDES (acute kidney injury) ([facility] N17.9    Severe sepsis ([facility]) A41.9, R65.20         ICD-10-CM ICD-9-CM 1. Hyperosmolar hyperglycemic state (HHS) ([place] [address]  E11.00 250.22     E11.65     2. Diabetic ketoacidosis without coma associated with type 2 diabetes mellitus ([facility]  E11.10 250.12    3. Severe sepsis ([facility])  A41.9 038.9     R65.20 995.92    4. Lactic acidosis  E87.2 276.2    5. MERCEDES (acute kidney injury) ([facility]  N17.9 584.9    6. Infection of spine ([facility])  M46.20 730.28    7. Candidal vulvovaginitis  B37.3 112.1    8. Hyponatremia  E87.1 276.1    9. E-coli UTI  N39.0 599.0     B96.20 041.49    10. Fusion of lumbar spine  M43.26 724.9    11. History of recurrent UTIs  Z87.440 V13.02    12. Morbid obesity with BMI of 45.0-49.9, adult ([facility])  E66.01 278.01     Z68.42 V85.42    13. Poorly controlled diabetes mellitus ([facility]  E11.65 250.00    14. Retroperitoneal abscess ([place] [address]  K68.19 567.38    15. Streptococcal infection  A49.1 041.00    16. Bandemia  D72.825 288.66    17. Candida vaginitis  B37.3 112.1    18. Morbid obesity with BMI of 40.0-44.9, adult ([facility])  E66.01 278.01     Z68.41 V85.41    19. E. coli UTI  N39.0 599.0     B96.20 041.49    20. Recurrent UTI  N39.0 599.0    21. Alpha-streptococcal sepsis ([facility]  A40.8 038.0      995.91    22. Coagulase-negative staphylococcal infection  B95.7 041.19    23. History of laminectomy  Z98.890 V45.89    24. Escherichia coli infection  A49.8 041.49        I have discussed the diagnosis with the patient and the intended plan as seen in the above orders. I have discussed medication side effects and warnings with the patient as well.     Reviewed test results at length with patient    Anti-infectives:     Ceftriaxone IV  S/p Vancomycin, Cefepime IV     Sherra Ragini MD [place]

## 2021-12-08 NOTE — PROGRESS NOTES
Occupational Therapy Note:    Chart reviewed. Attempted to see pt for OT session. Pt was received semisupine in bed, politely declining participation in therapy at that time as she was fatigued. Will continue to follow and attempt again as able. Thank you.     Prasanth Kwong, OTR/L

## 2021-12-09 VITALS
HEIGHT: 64 IN | OXYGEN SATURATION: 98 % | BODY MASS INDEX: 43.65 KG/M2 | TEMPERATURE: 98.2 F | RESPIRATION RATE: 16 BRPM | WEIGHT: 255.7 LBS | DIASTOLIC BLOOD PRESSURE: 76 MMHG | SYSTOLIC BLOOD PRESSURE: 129 MMHG | HEART RATE: 78 BPM

## 2021-12-09 LAB
ANION GAP SERPL CALC-SCNC: 5 MMOL/L (ref 5–15)
BUN SERPL-MCNC: 15 MG/DL (ref 6–20)
BUN/CREAT SERPL: 28 (ref 12–20)
CALCIUM SERPL-MCNC: 9.4 MG/DL (ref 8.5–10.1)
CHLORIDE SERPL-SCNC: 106 MMOL/L (ref 97–108)
CO2 SERPL-SCNC: 28 MMOL/L (ref 21–32)
COVID-19 RAPID TEST, COVR: NOT DETECTED
CREAT SERPL-MCNC: 0.53 MG/DL (ref 0.55–1.02)
GLUCOSE BLD STRIP.AUTO-MCNC: 110 MG/DL (ref 65–117)
GLUCOSE BLD STRIP.AUTO-MCNC: 119 MG/DL (ref 65–117)
GLUCOSE BLD STRIP.AUTO-MCNC: 122 MG/DL (ref 65–117)
GLUCOSE SERPL-MCNC: 113 MG/DL (ref 65–100)
POTASSIUM SERPL-SCNC: 4 MMOL/L (ref 3.5–5.1)
SERVICE CMNT-IMP: ABNORMAL
SERVICE CMNT-IMP: ABNORMAL
SERVICE CMNT-IMP: NORMAL
SODIUM SERPL-SCNC: 139 MMOL/L (ref 136–145)
SOURCE, COVRS: NORMAL

## 2021-12-09 PROCEDURE — 36415 COLL VENOUS BLD VENIPUNCTURE: CPT

## 2021-12-09 PROCEDURE — 82962 GLUCOSE BLOOD TEST: CPT

## 2021-12-09 PROCEDURE — 74011250636 HC RX REV CODE- 250/636: Performed by: INTERNAL MEDICINE

## 2021-12-09 PROCEDURE — 74011250637 HC RX REV CODE- 250/637: Performed by: HOSPITALIST

## 2021-12-09 PROCEDURE — 65660000000 HC RM CCU STEPDOWN

## 2021-12-09 PROCEDURE — 2709999900 HC NON-CHARGEABLE SUPPLY

## 2021-12-09 PROCEDURE — 80048 BASIC METABOLIC PNL TOTAL CA: CPT

## 2021-12-09 PROCEDURE — 74011250637 HC RX REV CODE- 250/637: Performed by: INTERNAL MEDICINE

## 2021-12-09 PROCEDURE — 74011250637 HC RX REV CODE- 250/637: Performed by: PHYSICIAN ASSISTANT

## 2021-12-09 PROCEDURE — 87635 SARS-COV-2 COVID-19 AMP PRB: CPT

## 2021-12-09 PROCEDURE — 94760 N-INVAS EAR/PLS OXIMETRY 1: CPT

## 2021-12-09 PROCEDURE — 74011250637 HC RX REV CODE- 250/637: Performed by: ORTHOPAEDIC SURGERY

## 2021-12-09 PROCEDURE — 74011636637 HC RX REV CODE- 636/637: Performed by: INTERNAL MEDICINE

## 2021-12-09 RX ORDER — ALPRAZOLAM 0.5 MG/1
0.5 TABLET ORAL 2 TIMES DAILY
Qty: 10 TABLET | Refills: 0 | Status: SHIPPED | OUTPATIENT
Start: 2021-12-09 | End: 2021-12-09 | Stop reason: SDUPTHER

## 2021-12-09 RX ORDER — NYSTATIN 100000 U/G
OINTMENT TOPICAL 3 TIMES DAILY
Qty: 15 G | Refills: 0 | Status: SHIPPED
Start: 2021-12-09

## 2021-12-09 RX ORDER — INSULIN GLARGINE 100 [IU]/ML
20 INJECTION, SOLUTION SUBCUTANEOUS
Qty: 6 ML | Refills: 0 | Status: SHIPPED
Start: 2021-12-09 | End: 2022-01-04

## 2021-12-09 RX ORDER — ALPRAZOLAM 0.5 MG/1
0.5 TABLET ORAL
Qty: 10 TABLET | Refills: 0 | Status: SHIPPED
Start: 2021-12-09 | End: 2022-01-27

## 2021-12-09 RX ORDER — VANCOMYCIN HYDROCHLORIDE
1250 EVERY 12 HOURS
Qty: 24500 ML | Refills: 0 | Status: SHIPPED
Start: 2021-12-09 | End: 2022-01-04

## 2021-12-09 RX ORDER — VANCOMYCIN HYDROCHLORIDE
1250 EVERY 12 HOURS
Qty: 2000 ML | Refills: 0 | Status: SHIPPED
Start: 2021-12-09 | End: 2021-12-09

## 2021-12-09 RX ADMIN — ACETAMINOPHEN 1000 MG: 500 TABLET, FILM COATED ORAL at 17:40

## 2021-12-09 RX ADMIN — INSULIN GLARGINE 20 UNITS: 100 INJECTION, SOLUTION SUBCUTANEOUS at 09:25

## 2021-12-09 RX ADMIN — VANCOMYCIN HYDROCHLORIDE 1250 MG: 10 INJECTION, POWDER, LYOPHILIZED, FOR SOLUTION INTRAVENOUS at 02:06

## 2021-12-09 RX ADMIN — NYSTATIN OINTMENT: 100000 OINTMENT TOPICAL at 17:41

## 2021-12-09 RX ADMIN — Medication 1 AMPULE: at 09:25

## 2021-12-09 RX ADMIN — Medication 1 CAPSULE: at 09:26

## 2021-12-09 RX ADMIN — ACETAMINOPHEN 1000 MG: 500 TABLET, FILM COATED ORAL at 06:39

## 2021-12-09 RX ADMIN — Medication 10 ML: at 13:26

## 2021-12-09 RX ADMIN — PANTOPRAZOLE SODIUM 40 MG: 40 TABLET, DELAYED RELEASE ORAL at 06:39

## 2021-12-09 RX ADMIN — METOPROLOL TARTRATE 12.5 MG: 25 TABLET, FILM COATED ORAL at 09:26

## 2021-12-09 RX ADMIN — NYSTATIN OINTMENT: 100000 OINTMENT TOPICAL at 09:26

## 2021-12-09 RX ADMIN — Medication 10 ML: at 06:42

## 2021-12-09 RX ADMIN — PAROXETINE HYDROCHLORIDE 40 MG: 20 TABLET, FILM COATED ORAL at 09:26

## 2021-12-09 RX ADMIN — ACETAMINOPHEN 1000 MG: 500 TABLET, FILM COATED ORAL at 13:24

## 2021-12-09 RX ADMIN — ASPIRIN 81 MG: 81 TABLET, COATED ORAL at 09:26

## 2021-12-09 RX ADMIN — LEVOTHYROXINE SODIUM 112 MCG: 0.11 TABLET ORAL at 06:38

## 2021-12-09 RX ADMIN — APIXABAN 5 MG: 5 TABLET, FILM COATED ORAL at 09:26

## 2021-12-09 RX ADMIN — METOPROLOL TARTRATE 12.5 MG: 25 TABLET, FILM COATED ORAL at 17:40

## 2021-12-09 RX ADMIN — VANCOMYCIN HYDROCHLORIDE 1250 MG: 10 INJECTION, POWDER, LYOPHILIZED, FOR SOLUTION INTRAVENOUS at 13:24

## 2021-12-09 NOTE — PROGRESS NOTES
Orthopedic Spine Progress Note  Post Op day: 7 Days Post-Op    2021 1:02 PM   Admit Date: 2021  Procedure: Procedure(s):  INCISION AND DRAINAGE TO LUMBAR WOUND    Subjective:     Ld Foster has no complaints. Patient is resting comfortably in bed. She does report some intermittent tingling in both feet. She reports her LLQ drain was recently removed by IR. Pain is well controlled. Tolerating diet. No N/V. Pain Control:   Pain Assessment  Pain Scale 1: Numeric (0 - 10)  Pain Intensity 1: 0  Pain Onset 1: post op  Pain Location 1: Generalized, Abdomen, Back  Pain Orientation 1: Anterior, Posterior  Pain Description 1: Sharp, Constant  Pain Intervention(s) 1: Medication (see MAR)    Objective:          Physical Exam:  General:  Alert and oriented. No acute distress. Heart:  Respirations unlabored. Abdomen:   Extremities: Soft, non-tender. No evidence of cyanosis. Pulses palpable in both upper and lower extremities. Neurologic:  Musculoskeletal:  No new motor deficits. Neurovascular exam within normal limits. Sensation stable. Motor: unchanged C5-T1 and L2-S1. Neema's sign negative in bilateral lower extremities. Calves soft, nontender upon palpation and with passive twitch. Moves both upper and lower extremities. Incision: clean, dry, and intact. No significant erythema or swelling. No active drainage noted. Vital Signs:    Blood pressure 121/75, pulse 74, temperature 98.2 °F (36.8 °C), resp. rate 16, height 5' 4\" (1.626 m), weight 255 lb 11.2 oz (116 kg), SpO2 100 %, not currently breastfeeding. Temp (24hrs), Av.8 °F (36.6 °C), Min:97.6 °F (36.4 °C), Max:98.2 °F (36.8 °C)      LAB:    No results for input(s): HCT, HGB, PLT, HCTEXT, HGBEXT, PLTEXT in the last 72 hours.   Lab Results   Component Value Date/Time    Sodium 139 2021 04:47 AM    Potassium 4.0 2021 04:47 AM    Chloride 106 2021 04:47 AM    CO2 28 2021 04:47 AM    Glucose 113 (H) 12/09/2021 04:47 AM    BUN 15 12/09/2021 04:47 AM    Creatinine 0.53 (L) 12/09/2021 04:47 AM    Calcium 9.4 12/09/2021 04:47 AM       Intake/Output:No intake/output data recorded. 12/07 1901 - 12/09 0700  In: -   Out: 2000 [Urine:2000]    PT/OT:   Gait:  Gait  Base of Support: Widened  Speed/Catherine: Pace decreased (<100 feet/min), Slow  Step Length: Right shortened, Left shortened  Gait Abnormalities: Decreased step clearance  Ambulation - Level of Assistance: Contact guard assistance  Distance (ft): 30 Feet (ft) (additional 15 ft x 2)  Assistive Device: Gait belt, Walker, rolling                 Assessment:   Patient is 7 Days Post-Op s/p Procedure(s):  INCISION AND DRAINAGE TO LUMBAR WOUND    Plan:     1. Patient reports she is to discharge to [place] [address]. 2.  Continue established methods of pain control  3. VTE Prophylaxes - TEDS &/or SCDs   4. Continue care and discharge plans per primary team.   5.  Mild-moderate amount of bloody drainage noted on dressing. Wound edges well approximated. Continue with daily incisional care and dressing changes. .   6.  She will follow up with our office outpatient next week.        Signed By: MAIKEL Hawkins

## 2021-12-09 NOTE — PROGRESS NOTES
No further needs identified at this time. Patient is ready to d/c on a CM standpoint. RN aware. Transition of Care Plan:     RUR: 15% low  Disposition: SNF - [facility]   Follow up appointments: PCP & specialist as indicated  DME needed: To be determined. Transportation at [address] via [facility] [address] or means to access home:  Family has keys  IM Medicare Letter:Received on 12/9/2021  Is patient a BCPI-A Bundle:   No                 If yes, was Bundle Letter given?:   N/A  Caregiver Contact: Son  Discharge Caregiver contacted prior to discharge?  Caregiver to be contacted prior to discharge. CM acknowledged d/c orders. Pt will d/c to [address] [place] [address] via [facility] at 5:30 pm. Nurse call report to [phone]. CM met with pt at the bedside and she is in agreement with d/c plan. Medicare pt has received, reviewed, and signed 2nd  letter informing them of their right to appeal the discharge. Signed copy has been placed on pt bedside chart. Care Management Interventions  PCP Verified by CM: Yes  Mode of Transport at Discharge: [address]  [address] Time of Discharge: [facility] (CM Consult): SNF  Partner SNF: Yes  Discharge Durable Medical Equipment:  (Patient has scooter, walker, rollator, shower chair and bed side commode.)  Physical Therapy Consult: Yes  Occupational Therapy Consult: Yes  Support Systems: Spouse/Significant Other (with dementia whom she is caregiver to)  Confirm Follow Up Transport: Family  Discharge Location  Discharge Placement: Skilled nursing facility    Transition of Care Plan to SNF/Rehab    SNF/Rehab Transition:  Patient has been accepted to [address] [place] [address] and meets criteria for admission. Patient will transported by [facility] and expected to leave at 5:30. Communication to Patient/Family:  Met with patient and pt's family and they are agreeable to the transition plan.     Communication to SNF/Rehab:  Bedside RN, Bethany Cr, has been notified to update the transition plan to the facility and call report (phone number  [phone]). Discharge information has been updated on the AVS.       Nursing Please include all hard scripts for controlled substances, med rec and dc summary, and AVS in packet. Reviewed and confirmed with facility, [address] [place] [address], can manage the patient care needs for the following:     Yvonne Samples with (X) only those applicable:    Medication:  [x]  Medications will be available at the facility  [x]  IV Antibiotics   [x]  Controlled Substance - hard copy to be sent with patient   []  Weekly Labs   Documents:  [] Hard RX  [x] MAR  [x] Kardex  [x] AVS  []Transfer Summary  [x]Discharge   Equipment:  []  CPAP/BiPAP  []  Wound Vacuum  []  Rea or Urinary Device  []  PICC/Central Line  []  Nebulizer  []  Ventilator   Treatment:  []Isolation (for MRSA, VRE, etc.)  []Surgical Drain Management  []Tracheostomy Care  []Dressing Changes  []Dialysis with transportation and chair time. []PEG Care  []Oxygen  []Daily Weights for Heart Failure   Dietary:  [x]Any diet limitations  []Tube Feedings   []Total Parenteral Management (TPN)   Eligible for Medicaid Long Term Services and Supports  Yes:  [] Eligible for medical assistance or will become eligible within 180 days and UAI completed. [] Provider/Patient and/or support system has requested screening. [] UAI copy provided to patient or responsible party, .  [] UAI unavailable at discharge will send once processed to SNF provider. [] UAI unavailable at discharged mailed to patient  No:   [] Private pay and is not financially eligible for Medicaid within the next 180 days. [] Reside out-of-state.   [] A residents of a state owned/operated facility that is licensed  by [place] [address] [employer] or [facility]  [] Enrollment in [facility] hospice services  [] [address]  [x] Patient /Family declines to have screening completed or provide financial information for screening     Financial Resources:  Medicaid    [] Initiated and application pending   [] Full coverage     Advanced Care Plan:  []Surrogate Decision Maker of Care  []POA  [x]Communicated Code Status FULL   Other     KRISTOFER Stover  Care Manager [facility]  [phone]

## 2021-12-09 NOTE — PROGRESS NOTES
Hospitalist Progress Note    NAME: Veronica Lamb   :  1950   MRN:  [id number]       Assessment / Plan:  Septic shock POA rectal temp 96, , lactic 9.2 -->5. 3   Streptococcus anginosus epidural/right iliopsoas abscess POA  Right paraspinal pain at level L3-4 and R iliac crest pain (complains of Right hip pain), POA   Paraspinal abscess  - Followed by Dr Vidya Sebastian and Infectious diseases, needs close follow-up as outpatient  -Stopped vancomycin  as recommended by ID  -Appreciated orthopedic consult, recommended IR placed drain. Orthopedics plans no surgery for now. -First Drain was placed  draining purulent discharge, repeat CT  showed resolution, drain removed .  -s/p , IR guided Drainage catheter, Fluid culture + , light strep anginosus.  - MRI done on , Rim-enhancing paraspinal soft tissue collections may reflect abscesses as  well, and partially visualized abscess inferior to the right kidney again see, but  incompletely evaluated on this exam  -CT-guided drain removal and new drain placement of undrained part of the RLQ abscess,aspiration of posterior paraspinal abscess . Cultures + for alpha Strep. Orthopedic surgery was called by ID due to concern of ongoing pus/blood drainage. Status post I&D for lumbar wound by orthopedic surgery team, wound cultures-MRSE ( thio broth only).    Repeat CT abdomen on  showed resolution of right lower quadrant abscess  ID recommending IV Vancomycin 1.25 g IV every 12 hours end date 2022  -IR to remove drain today as drain outpt <20cc  -stable for discharge, awaiting auth. Celiac Artery Thrombosis with splenic Infarcts  -CT ABD/Pelvis  showed celiac artery thrombosis with splenic infarcts  CT abdomen pelvis confirmed the celiac artery occlusion with splenic infarcts  Vascular consult appreciated, no vascular surgery or intervention. Recommended AC.   Pt is currently on Eliquis for 6 months, will cont' at discharge. outpt f/u with vascular to determine if indefinite AC is required    UTI  -Ucx E coli  -sensitive to cefepime   -Rea placed in ER due to poor mobility from hip pain and polyuria, with severe candidal infection in perineum and labia and buttocks. Rea now removed. Type 2 DM, uncontrolled with hyperosmolar hyperglycemia and early DKA POA  - presented with , AG 15, trace ketone in urine  - Required insulin gtt  - A1c 11.7 up from 7.6 in July. - continue holding metformin  - Continue Lantus 35 units. Continue SSI prn    Bilateral feet neuropathy, suspected DM related  -Y04 and folic acid within normal limits  No epidural abscess was seen on previous MRI    Prerenal MERCEDES Cr 1.5  -resolved with IVF hydration.       Constipation  -pericolace and miralax    Severe candida infection in perineum, labia, buttocks  -continue mystatin cream.  Status post Diflucan  -wound care consult appreciated     Generalized weakness and gait difficulty   -consult pt/ot     SVT   HTN / HLD  -continue metoprolol and statin       Hypothyroid  -continue levothyroxine. TSH 3.6     Depression and anxiety  -continue paxil with xanax prn     Severe obesity  40 or above Morbid obesity / Body mass index is 44.42 kg/m². Code:  Discussed, full  DVT prophylaxis: lovenox  Surrogate decision maker:   (but has some dementia per patient) or sister Sallie [place]    HANY: Likely 12/7  Awaiting final recommendation from ID on antibiotics     Subjective:     Patient was seen and examined. No acute events overnight. Not much drainage in RUQ drain  Discussed with RN overnight events.       review of Systems:  Symptom Y/N Comments  Symptom Y/N Comments   Fever/Chills n   Chest Pain n    Poor Appetite    Edema n    Cough n   Abdominal Pain y    Sputum    Joint Pain     SOB/AVALOS n   Pruritis/Rash     Nausea/vomit n   Tolerating PT/OT     Diarrhea n   Tolerating Diet y    Constipation Other       Could NOT obtain due to:      PO intake: No data found. Objective:     VITALS:   Last 24hrs VS reviewed since prior progress note. Most recent are:  Patient Vitals for the past 24 hrs:   Temp Pulse Resp BP SpO2   12/09/21 0339 97.6 °F (36.4 °C) 68 16 122/69 99 %   12/08/21 2326 97.6 °F (36.4 °C) 77 16 111/73 95 %   12/08/21 1916 97.9 °F (36.6 °C) 84 16 117/77 95 %   12/08/21 1842 97.9 °F (36.6 °C) 85  117/85 97 %   12/08/21 1321 97.6 °F (36.4 °C) 77 18 101/63 92 %       Intake/Output Summary (Last 24 hours) at 12/9/2021 0945  Last data filed at 12/8/2021 1843  Gross per 24 hour   Intake    Output 800 ml   Net -800 ml        I had a face to face encounter, and independently examined this patient on 12/9/2021, as outlined below:  PHYSICAL EXAM:  General: WD, WN. Alert, cooperative, no acute distress    EENT:  EOMI. Anicteric sclerae. MMM  Resp:  CTA bilaterally, no wheezing or rales. No accessory muscle use  CV:  Regular  rhythm,  No edema  GI:               Soft nontender  Neurologic:  Alert and oriented X 3, normal speech  Psych:   Good insight. Not anxious nor agitated  Skin:  No rashes. No jaundice. Reviewed most current lab test results and cultures  YES  Reviewed most current radiology test results   YES  Review and summation of old records today    NO  Reviewed patient's current orders and MAR    YES  PMH/SH reviewed - no change compared to H&P  ________________________________________________________________________  Care Plan discussed with:    Comments   Patient x    Family      RN x    Care Manager x    Consultant                       x Multidiciplinary team rounds were held today with , nursing, pharmacist and clinical coordinator. Patient's plan of care was discussed; medications were reviewed and discharge planning was addressed.      ________________________________________________________________________    Total NON critical care TIME:  35   Minutes     Total CRITICAL CARE TIME Spent:   Minutes non procedure based      Comments   >50% of visit spent in counseling and coordination of care x     This includes time during multidisciplinary rounds if indicated above   ________________________________________________________________________  Kayce Cortes MD     Procedures: see electronic medical records for all procedures/Xrays and details which were not copied into this note but were reviewed prior to creation of Plan. LABS:  I reviewed today's most current labs and imaging studies. Pertinent labs include:  No results for input(s): WBC, HGB, HCT, PLT, HGBEXT, HCTEXT, PLTEXT, HGBEXT, HCTEXT, PLTEXT in the last 72 hours.   Recent Labs     12/09/21  0447 12/08/21  0347 12/07/21  0418    137 136   K 4.0 3.8 4.4    105 106   CO2 28 26 24   * 132* 108*   BUN 15 14 14   CREA 0.53* 0.68 0.57   CA 9.4 9.7 9.4

## 2021-12-09 NOTE — DISCHARGE SUMMARY
Discharge Summary      Name: Tae Golden  [id number]  YOB: 1950 (Age: 65 y.o.)   Date of Admission: 11/6/2021  Date of Discharge: 12/9/2021  Attending Physician: Ami Baptiste MD    Discharge Diagnosis:   Celiac Artery Thrombosis with splenic InfarctsSeptic shock POA   Streptococcus anginosus epidural/right iliopsoas abscess POA  Right paraspinal pain at level L3-4 and R iliac crest pain (complains of Right hip pain), POA   Paraspinal abscess  Type 2 DM, uncontrolled with hyperosmolar hyperglycemia and early DKA POA  Bilateral feet neuropathy, suspected DM related   Constipation   Severe candida infection in perineum, labia, buttocks   Generalized weakness and gait difficulty   SVT   HTN / HLD    Hypothyroid  Depression and anxiety    Consultations:  IP CONSULT TO HOSPITALIST  IP CONSULT TO ORTHOPEDIC SURGERY  IP CONSULT TO ORTHOPEDIC SURGERY  IP CONSULT TO CARDIOLOGY  IP CONSULT TO INFECTIOUS DISEASES  IP CONSULT TO VASCULAR SURGERY      Brief Admission History/Reason for Admission Per Leandra Rios MD:   Tae Goldne is a 65 y.o. female with DM type 2 referred for admission for Hyperosmolar nonketotic hyperglycemia , polyuria, polydipsia for 3 days.   In ER hypothermic rectal temp 96.0, , 101.87. CT abd/pelvis pending.   6/2/21:  L2-3 AND L5-S1  LUMBAR LAMINECTOMY WITH ANDREWS OSTEOTOMY L2-3 AND L3-4 WITH PLF L2-S1 by Dr. Matteo Mcrae for sciatica, DDD. Spondylolisthesis   Admitted to [facility]  7/8 to 7/20/21:  Intractable right hip pain in setting of fall POA  - MRI on 07/11 showed edema of the right iliopsoas muscle centered at the myotendinous junction compatible with a strain.  - Patient had L2-L5 laminectomy 06/2021 with some delayed wound healing, seen by Surgical team on  07/09/21 - recommended continuing current wound care while in acute care setting and transitioning to wound VAC once at rehab.   Suspected bacterial pneumonia POA  - CXR on 07/08/21 revealed mild left basilar opacity, repeat CXR on 07/12/21 revealed no acute process.     - Patient completed 5 day course of ceftriaxone and doxycycline.    - Afebrile and non-toxic in appearance. - Leucocytosis resolved.        DM II  - Uncontrolled with hyperglycemia present on admission .  A1c 7.8  - Low dose basal (glargine) insulin was started on 07/10/21.  - Resume metformin at time of d/c to rehab.      Patient reports discharged from [address] [place] [address] after [facility] admission in July. Continued to be plagued by right hip pain and has to walk holding on to furniture or with RW. Not able to f/u Dr. Nito Lindsey due to mobility issue; he has told her she needs to see a hip specialist for hip pain. Since last Thursday, been very weak, chills, nausea and vomiting for 4 days, with loss of appetite. Has been having polyuria and polydipsia. Her vaginal area is very raw and pain with walking. Has not been out of bed for past 4 days. Denies chest pain. Has cough only when vomiting, no edema, SOB. Denies back pain. Having a lot of right hip pain, worse with movement, only comfortable when laying on her right hip. We were asked to admit for work up and evaluation of the above problems.   [address] Course by Main Problems:   Septic shock POA  Streptococcus anginosus epidural/right iliopsoas abscess POA  Right paraspinal pain at level L3-4 and R iliac crest pain (complains of Right hip pain), POA   Paraspinal abscess  Followed by Dr Meghan Gaines and Infectious diseases, needs close follow-up as outpatient  Stopped vancomycin 11/16 as recommended by ID  Appreciated orthopedic consult, recommended IR placed drain. Orthopedics plans no surgery for now. First Drain was placed 11/12 draining purulent discharge, repeat CT 11/23 showed resolution, drain removed 11/26. S/p 11/26, IR guided Drainage catheter, Fluid culture + 11/12, light strep anginosus.   MRI done on 11/22, Rim-enhancing paraspinal soft tissue collections may reflect abscesses as  well, and partially visualized abscess inferior to the right kidney again see, but  incompletely evaluated on this exam  CT-guided drain removal and new drain placement of undrained part of the RLQ abscess,aspiration of posterior paraspinal abscess . Cultures + for alpha Strep. Orthopedic surgery was called by ID due to concern of ongoing pus/blood drainage. Status post I&D for lumbar wound by orthopedic surgery team, wound cultures-MRSE ( thio broth only).     Repeat CT abdomen on December 4 showed resolution of right lower quadrant abscess  ID recommending IV Vancomycin 1.25 g IV every 12 hours end date 1/27/2022  Pt is medically stable for discharge to SNF. Celiac Artery Thrombosis with splenic Infarcts  CT ABD/Pelvis 11/9 showed celiac artery thrombosis with splenic infarcts  CT abdomen pelvis confirmed the celiac artery occlusion with splenic infarcts  Vascular consult appreciated, no vascular surgery or intervention. Recommended AC. Pt is currently on Eliquis for 6 months, will cont' at discharge. outpt f/u with vascular to determine if indefinite AC is required     UTI  Ucx E coli. Sensitive to cefepime   Rea placed in ER due to poor mobility from hip pain and polyuria, with severe candidal infection in perineum and labia and buttocks.  Rea now removed.     Type 2 DM, uncontrolled with hyperosmolar hyperglycemia and early DKA POA  Presented with , AG 15, trace ketone in urine  Required insulin gtt. A1c 11.7 up from 7.6 in July. Continue holding metformin  Continue Lantus.   titrate prn outpt     Bilateral feet neuropathy, suspected DM related  E30 and folic acid within normal limits  No epidural abscess was seen on previous MRI     Prerenal MERCEDES Cr 1.5  Resolved with IVF hydration.       Constipation  Pericolace and miralax     Severe candida infection in perineum, labia, buttocks  Continue mystatin cream.  Status post Diflucan     Generalized weakness and gait difficulty   Consult pt/ot     SVT   HTN / HLD  Continue metoprolol and statin      Hypothyroid  Continue levothyroxine. TSH 3.6     Depression and anxiety  Continue paxil with xanax prn    Discharge Exam:  Patient seen and examined by me on discharge day. Pertinent Findings:  Visit Vitals  /75   Pulse 74   Temp 98.2 °F (36.8 °C)   Resp 16   Ht 5' 4\" (1.626 m)   Wt 116 kg (255 lb 11.2 oz)   SpO2 100%   Breastfeeding No   BMI 43.89 kg/m²     Gen:    Not in distress  Chest: Clear lungs  CVS:   Regular rhythm. No edema  Abd:  Soft, not distended, not tender    Discharge/Recent Laboratory Results:  Recent Labs     12/09/21  0447      K 4.0      CO2 28   BUN 15   *   CA 9.4     No results for input(s): HGB, HCT, WBC, PLT, HGBEXT, HCTEXT, PLTEXT, HGBEXT, HCTEXT, PLTEXT in the last 72 hours. Discharge Medications:  Current Discharge Medication List      START taking these medications    Details   nystatin (MYCOSTATIN) 100,000 unit/gram ointment Apply  to affected area three (3) times daily. Qty: 15 g, Refills: 0  Start date: 12/9/2021      insulin glargine (LANTUS) 100 unit/mL injection 20 Units by SubCUTAneous route nightly for 30 days. Qty: 6 mL, Refills: 0  Start date: 12/9/2021, End date: 1/8/2022      apixaban (ELIQUIS) 5 mg tablet Take 1 Tablet by mouth every twelve (12) hours for 30 days. Qty: 60 Tablet, Refills: 0  Start date: 12/9/2021, End date: 1/8/2022      vancomycin/0.9 % sod chloride (vancomycin in 0.9% Sodium Cl) 1.25 gram/250 mL soln 250 mL by IntraVENous route every twelve (12) hours every twelve (12) hours for 49 days. Qty: 54809 mL, Refills: 0  Start date: 12/9/2021, End date: 1/27/2022         CONTINUE these medications which have CHANGED    Details   ALPRAZolam (Xanax) 0.5 mg tablet Take 1 Tablet by mouth two (2) times daily as needed for Anxiety. Max Daily Amount: 1 mg.   Qty: 10 Tablet, Refills: 0  Start date: 12/9/2021    Associated Diagnoses: Anxiety CONTINUE these medications which have NOT CHANGED    Details   L.acid,para-B. bifidum-S.therm (RISAQUAD) 8 billion cell cap cap Take 1 Capsule by mouth daily. Qty: 30 Capsule, Refills: 0      miconazole (MICOTIN) 2 % topical powder Apply  to affected area two (2) times a day. Qty: 1 Bottle, Refills: 0      amitriptyline (ELAVIL) 50 mg tablet Take 50 mg by mouth nightly. atorvastatin (LIPITOR) 40 mg tablet Take 40 mg by mouth nightly. metFORMIN (GLUCOPHAGE) 500 mg tablet Take 500 mg by mouth two (2) times daily (with meals). butalb/acetaminophen/caffeine (FIORICET PO) Take 1 Tablet by mouth as needed. aspirin delayed-release 81 mg tablet Take 81 mg by mouth daily. levothyroxine (SYNTHROID) 75 mcg tablet Take 112 mcg by mouth Daily (before breakfast). PARoxetine (PAXIL) 40 mg tablet Take 40 mg by mouth every morning. !! metoprolol tartrate (LOPRESSOR) 25 mg tablet Take 12.5 mg by mouth nightly. !! metoprolol (LOPRESSOR) 25 mg tablet Take 25 mg by mouth every morning. omeprazole (PRILOSEC) 40 mg capsule Take 40 mg by mouth every morning. acetaminophen (TYLENOL) 325 mg tablet Take 2 Tablets by mouth every six (6) hours as needed for Pain. Qty: 30 Tablet, Refills: 0      magnesium hydroxide (MILK OF MAGNESIA) 400 mg/5 mL suspension Take 30 mL by mouth daily. Qty: 1 Bottle, Refills: 0      polyethylene glycol (MIRALAX) 17 gram packet Take 1 Packet by mouth daily. Qty: 30 Packet, Refills: 0      senna (SENOKOT) 8.6 mg tablet Take 2 Tablets by mouth daily. Qty: 60 Tablet, Refills: 0      heparin sodium,porcine (heparin, porcine,) 5,000 unit/mL injection 1 mL by SubCUTAneous route every eight (8) hours. Qty: 90 Syringe, Refills: 0      naloxone (NARCAN) 4 mg/actuation nasal spray Use 1 spray intranasally, then discard. Repeat with new spray every 2 min as needed for opioid overdose symptoms, alternating nostrils.   Qty: 1 Each, Refills: 0       !! - Potential duplicate medications found. Please discuss with provider. DISPOSITION:    Home with Family:    Home with HH/PT/OT/RN:    SNF/LTC: x   ANANTH:    OTHER:          Follow up with:   PCP : Amos Arcos MD  Follow-up Information     Follow up With Specialties Details Why [address] , Kvng Chawla MD Vascular Surgery On 5/9/2021 @ 10:00 am. Follow up to determine need for continued anticoagulation with history of splenic infarct and celiac artery occlusion.   [address] [place] [address]  [phone]      Amos Arcos, [facility] [address]  [phone]              Total time in minutes spent coordinating this discharge (includes going over instructions, follow-up, prescriptions, and preparing report for sign off to her PCP) :  35 minutes

## 2021-12-09 NOTE — PROGRESS NOTES
Called IR regarding having the LLQ drain removed from her abscess. They said that they would be the one to pull the drain. Told them that the patient is discharging today and will need the drain pulled. They said they weren't sure when they would be able to get to her but that they will do it as soon as possible. They were also made aware that the patient is on Eliquis and they said that wont be a barrier for discontinuing the drain.

## 2021-12-13 LAB
BACTERIA SPEC CULT: NORMAL
SERVICE CMNT-IMP: NORMAL

## 2021-12-15 NOTE — ADT AUTH CERT NOTES
Cellulitis - Care Day 29 (12/8/2021) by Inderjit Colon       Review Status Review Entered   Completed 12/10/2021 15:48      Criteria Review      Care Day: 29 Care Date: 12/8/2021 Level of Care: Inpatient Floor    Guideline Day 3    Clinical Status    (X) * Hemodynamic stability    12/10/2021 15:48:03 EST by Genevieve Jimenez      77, 101/63, 16, RA sat 92%    (X) * Afebrile or fever improved    12/10/2021 15:48:03 EST by Genevieve Jimenez      Tmax 98.1    ( ) * Skin exam stable or improved    (X) * Mental status at baseline    12/10/2021 15:48:03 EST by Genevieve Jimenez      baseline    (X) * Antibiotic treatment needs appropriate for next level of care    12/10/2021 15:48:03 EST by Karen CHAWLA MD: vanco 1.25g IV Q12H with EOT 1/27/22. (X) * Pain absent or manageable at next level of care    12/10/2021 15:48:03 EST by Genevieve Jimenez      po pain meds    ( ) * Discharge plans and education understood    Activity    ( ) * Ambulatory or acceptable for next level of care    Routes    (X) * Oral hydration    12/10/2021 15:48:03 EST by Genevieve Jimenez      po diet    (X) * Oral medications or regimen acceptable for next level of care    12/10/2021 15:48:03 EST by Genevieve Jimenez      Tylenol 1000mg po q6H RTC, lipitor 40mg po qhs, asa 81mg po qd, eliquis 5mg po q12H, elavil 50mg po qhs, probiotic po qd, synthroid 112mcg po qd, lopressor 12.5mg po bid, roxicodone IR 10mg po q3H PRN x1, protonix 40mg po qd, paxil 40mg po qd    (X) * Oral diet or acceptable for next level of care    12/10/2021 15:48:03 EST by Genevieve Jimenez      Regular diet. GLUCOSE CONTROL: Lantus 20un sq qd. Humalog ssi ac/hs: (ref)- (ref)- (2un)- (0un)    Interventions    ( ) WBC    12/10/2021 15:48:03 EST by Genevieve Jimenez      LABS: Gluc 132.     Medications    (X) Parenteral or oral antibiotics    12/10/2021 15:48:03 EST by Genevieve Jimenez      Vancomycin 1250mg IV Q12H    * Milestone   Additional Notes   Assessment / Plan:   Septic shock POA rectal temp 96, , lactic 9.2 -->5. 3    Streptococcus anginosus epidural/right iliopsoas abscess POA   Right paraspinal pain at level L3-4 and R iliac crest pain (complains of Right hip pain), POA    Paraspinal abscess   - Followed by Dr Nicole Ballesteros and Infectious diseases, needs close follow-up as outpatient   -Stopped vancomycin 11/16 as recommended by ID   -Appreciated orthopedic consult, recommended IR placed drain.  Orthopedics plans no surgery for now. -First Drain was placed 11/12 draining purulent discharge, repeat CT 11/23 showed Resolution, drain removed 11/26.   -s/p 11/26, IR guided Drainage catheter, Fluid culture + 11/12, light strep anginosus.   - MRI done on 11/22, Rim-enhancing paraspinal soft tissue collections may reflect abscesses as   well, and partially visualized abscess inferior to the right kidney again see, but   incompletely evaluated on this exam   -CT-guided drain removal and new drain placement of undrained part of the RLQ abscess,aspiration of posterior paraspinal abscess . Cultures + for alpha Strep.   -Orthopedic surgery was called by ID due to concern of ongoing pus/blood drainage.   -Status post I&D for lumbar wound by orthopedic surgery team, wound cultures-MRSE ( thio broth only).      -Repeat CT abdomen on December 4 showed resolution of right lower quadrant abscess   -ID recommending IV Vancomycin 1.25 g IV every 12 hours end date 1/27/2022   -stable for discharge, awaiting auth.       Celiac Artery Thrombosis with splenic Infarcts   -CT ABD/Pelvis 11/9 showed celiac artery thrombosis with splenic infarcts   CT abdomen pelvis confirmed the celiac artery occlusion with splenic infarcts   Vascular consult appreciated, no vascular surgery or intervention.  Recommended AC.  Pt is currently on Eliquis for 6 months, will cont' at discharge.  outpt f/u with vascular to determine if indefinite AC is required       UTI   -Ucx E coli   -sensitive to cefepime    -Rea placed in ER due to poor mobility from hip pain and polyuria, with severe candidal infection in perineum and labia and buttocks.     Rea now removed.       Type 2 DM, uncontrolled with hyperosmolar hyperglycemia and early DKA POA   - presented with , AG 15, trace ketone in urine   - Required insulin gtt   - A1c 11.7 up from 7.6 in July. - continue holding metformin   - Continue Lantus 35 units.  Continue SSI prn       Bilateral feet neuropathy, suspected DM related   -N84 and folic acid within normal limits   -No epidural abscess was seen on previous MRI       Prerenal MERCEDES Cr 1.5   -resolved with IVF hydration.         Constipation   -pericolace and miralax       Severe candida infection in perineum, labia, buttocks   -continue mystatin cream.  Status post Diflucan   -wound care consult appreciated       Generalized weakness and gait difficulty    -consult pt/ot       SVT    HTN / HLD   -continue metoprolol and statin        Hypothyroid   -continue levothyroxine.  TSH 3.6       Depression and anxiety   -continue paxil with xanax prn                  Cellulitis - Care Day 28 (12/7/2021) by Kyrie Walters       Review Status Review Entered   Completed 12/9/2021 12:04      Criteria Review      Care Day: 28 Care Date: 12/7/2021 Level of Care: Inpatient Floor    Guideline Day 3    Clinical Status    (X) * Hemodynamic stability    12/9/2021 12:04:30 EST by [facility]      93/58, 67, 18, RA sat 96%    (X) * Afebrile or fever improved    12/9/2021 12:04:30 EST by [facility] Heather      Tmax 98.3    ( ) * Skin exam stable or improved    (X) * Mental status at baseline    12/9/2021 12:04:30 EST by [employer]      alert and oriented    ( ) * Antibiotic treatment needs appropriate for next level of care    12/9/2021 12:04:30 EST by [employer]      ID recommending Cubicin at [place] and SNF will not accept on that med.  See ID plan    (X) * Pain absent or manageable at next level of care    12/9/2021 12:04:30 EST by Kristina Pérez      pain managed with po tylenol    ( ) * Discharge plans and education understood    Activity    ( ) * Ambulatory or acceptable for next level of care    Routes    (X) * Oral hydration    (X) * Oral medications or regimen acceptable for next level of care    12/9/2021 12:04:30 EST by Lisandra Cristobal      Tylenol 1000mg po Q6H RTC, elavil 50mg po qhs, eliquis 5mg po q12H, asa 81mg po qd, synthroid 112mcg po qd, lopressor 12.5mg po bid, probiotic po qd, protonix 40mg po qd, paxil 40mg po qd    (X) * Oral diet or acceptable for next level of care    12/9/2021 12:04:30 EST by Lisandra Cristobal      Regular diet. Glucose control: Lantus 20un sq qd. Humalog ssi ac/hs: (0un)- (Cheryal Elly)- (2un)- (0un)    Interventions    ( ) WBC    12/9/2021 12:04:30 EST by Lisandra Cristobal      Sed rate 89. Gluc 108. Medications    (X) Parenteral or oral antibiotics    12/9/2021 12:04:30 EST by Lisandra Cristobal      rocephin 2gram IV Q24H x1 and dc, vancomycin 1250mg IV Q12H x1 and dc. NEW: Cubicin 850mg IV Q24H    * Milestone   Additional Notes   PLAN: (INFECTIOUS DISEASE)        -Change to Daptomycin 8 mg/kg IV daily x 8 weeks from surgery extend therapy as needed   --DC RLQ drain if output<20 cc   -Lumbar wound dressing change per Ortho. - D/w Dr Wellington Harley. Antibiotic orders for discharge   -Daptomycin 8 mg/kg IV every 24 hours end date 1/27/2022   May require extension, to be determined at [facility] follow-up. -Weekly CBC, CMP, CK & ESR every other week fax reports to Dr. Randy Nunez at [phone], call with critical labs at [phone].  Call with any critical elevation of CK, Cr.   -Encourage daily probiotic/yogurt, adequate fluids.   -No statin while on Daptomycin   -ID follow-up in person visit with Dr. Randy Nunez on 12/22 at 2:30 PM      **ID addendum at [phone] today:    ID   Rehab will not accept patient on daptomycin per case management. Will change to vancomycin IV, for close monitoring.    Antibiotic orders for discharge - Vancomycin 1.25 g IV every 12 hours end date 1/27/2022   -Pharmacy on consult for dosing target trough 15-20   May require extension, to be determined at [facility] follow-up. -Weekly CBC, CMP, vancomycin trough & ESR every other week fax reports to Dr. Charissa Silva at [phone], GP with critical labs at [phone].     -Encourage daily probiotic/yogurt, adequate fluids.              Cellulitis - Care Day 27 (12/6/2021) by Kyrie Walters       Review Status Review Entered   Completed 12/7/2021 15:39      Criteria Review      Care Day: 27 Care Date: 12/6/2021 Level of Care: Inpatient Floor    Guideline Day 3    Clinical Status    (X) * Hemodynamic stability    12/7/2021 15:39:50 EST by Ruchi Mcphersonical      77, 93/61, 16, RA sat 94%    (X) * Afebrile or fever improved    12/7/2021 15:39:50 EST by Ruchi Kyical      Tmax 98.8    ( ) * Skin exam stable or improved    (X) * Mental status at baseline    12/7/2021 15:39:50 EST by Ruchi Heather      baseline    ( ) * Antibiotic treatment needs appropriate for next level of care    12/7/2021 15:39:50 EST by Yuli Amador Per ID note today, awaiting final intra-op cultures from 12/2 before final IV abx determination can be made for outpt IV abx. ID rec: cont ceftriaxone for now, add vanco pending final cultures. (X) * Pain absent or manageable at next level of care    12/7/2021 15:39:50 EST by Ruchi Heather      po pain meds    ( ) * Discharge plans and education understood    Activity    ( ) * Ambulatory or acceptable for next level of care    Routes    (X) * Oral hydration    12/7/2021 15:39:50 EST by Ruchi Heather      po diet as doc. ADD MEDS: eliquis 5mg po q12H, elavil 50mg po qhs, xanax 0.25mg po qid prn x1, tylenol 1000mg po Q6H RTC-refused x4 doses/allowed one.     (X) * Oral medications or regimen acceptable for next level of care    12/7/2021 15:39:50 EST by Ruchi Romero      Lopressor 25mg po qd and 12.5mg po qhs, lipitor 40mg po qhs, paxil 40mg po qd, protonix 40mg po qd, roxicodone 10mg po q3H PRN x1, probiotic po qd, synthroid 112mcg po qd, asa 81mg qd. Pt refused miralax+ pericolace. Lovenox 120mg sq q12H x1 and dc    (X) * Oral diet or acceptable for next level of care    12/7/2021 15:39:50 EST by Mary Ulrich      Regular diet. GLUCOSE CONTROL: Lantus 20un sq qd. Humalog ssi ac/hs: (0un)- (0un)- (2un)- (0un)    Medications    (X) Parenteral or oral antibiotics    12/7/2021 15:39:50 EST by Mary Ulrich      rocephin 2gram IV Q24H, vancomycin 2500mg IV x1    * Milestone   Additional Notes   FAUZIA x1 to lower back removed by Ortho      IMPRESSION: (INFECTIOUS DISEASE)        -R/ retroperitoneal abscess 9.7 x 9.1 x 7.5 cm & possible fluid tract to the right L3-4 or right L4-5 foramen.  No evidence of OM/discitis. Posterior decompression and fusion L2-S1 on CT lumbar spine.   - S/p drainage of abscess and placement of drain 11/12   Fluid culture + 11/12 -light strep anginosus. - CT-guided drain removal and new drain placement of undrained part of the RLQ abscess,aspiration of posterior paraspinal abscess . Cultures + for alpha Strep. Repeat CT - 12/4 -no evidence of residual abscess RLQ.       -S/p I&D of superficial deep lumbar wounds by & Ortho on 12/2. Wound cultures-CoNS species( thio broth only) ID, sensitivity pending.       -S/p Exploration of fusion, revision laminectomy L2-3, L5-S1 on 6/2/2021   Patient reports delayed wound healing, wound VAC placement, frequent disruption of wound VAC system       -E. coli UTI   Urine culture-11/6-.coli 50,000 colonies   ( R to Amp, quinolones, aminoglycosides, cefoxitin I       - H/o recurrent UTI, urinary incontinence       -Poorly controlled diabetes A1c 11.7       - S/p MERCEDES creatinine 0.65       -Morbid obesity BMI 44.5           PLAN:    -Continue ceftriaxone IV, add vancomycin pending final cultures   -Await final cultures.    -DC RLQ drain if output<20 cc   -Lumbar wound dressing change per Ortho

## 2021-12-20 NOTE — PROGRESS NOTES
Physician Progress Note      Taj Erazo  [facility] #:                  [id number]  :                       1950  ADMIT DATE:       2021 10:25 AM  DISCH DATE:        2021 7:30 PM  RESPONDING  PROVIDER #:        Wil Acosta MD          QUERY TEXT:    Dr Joselyn Kilgore:    Patient admitted with a R Psoas abscess, posterior lumbar wound. Per Op note dated   documentation of debridement. To accurately reflect the procedure performed please document if debridement was excisional or non excisional and the deepest depth of tissue removed as down to and including: The medical record reflects the following:  Risk Factors: s/p thoracolumbar fusion, R psoas abscess, possible posterior lumbar wound infection  Clinical Indicators: psoas abscess; fluid collections in the dorsal paraspinal muscles and soft tissues. PROCEDURE:  1) Back was prepped and draped sterilely. At which time, we then did a midline incision. 2) The one area that was draining, was followed down and there was a small superficial collection in the subcutaneous tissue with some mild purulence. This was debrided and irrigated. We sharply debrided the cavity and the track as well. 3) Beginning the right psoas abscess, we did dissect down and did a paraspinal fascial split over the hardware and dissected down to the hardware roughly in the mid lumbar region. No obvious purulent material was noted. We irrigated the wound with Irrisept irrigation, followed by pulse irrigation as well. Treatment: Incision and drainage, superficial and deep of the lumbar wound.     ?Thank you,  Britni Wagner RN, CDI  Options provided:  -- Nonexcisional debridement of skin  -- Excisional debridement of skin  -- Nonexcisional debridement of subcutaneous tissue  -- Excisional debridement of subcutaneous tissue  -- Nonexcisional debridement of fascia  -- Excisional debridement of fascia  -- Nonexcisional debridement of muscle  -- Excisional debridement of muscle  -- Nonexcisional debridement of bone  -- Excisional debridement of bone  -- Other - I will add my own diagnosis  -- Disagree - Not applicable / Not valid  -- Disagree - Clinically unable to determine / Unknown  -- Refer to Clinical Documentation Reviewer    PROVIDER RESPONSE TEXT:    Provider is clinically unable to determine a response to this query.     Query created by: Priscilla Diaz on 12/20/2021 11:33 AM      Electronically signed by:  Ching Reese MD 12/20/2021 2:48 PM

## 2021-12-21 ENCOUNTER — OFFICE VISIT (OUTPATIENT)
Dept: ORTHOPEDIC SURGERY | Age: 71
End: 2021-12-21

## 2021-12-21 VITALS — HEIGHT: 64 IN | BODY MASS INDEX: 42.34 KG/M2 | WEIGHT: 248 LBS

## 2021-12-21 DIAGNOSIS — Z98.1 S/P LUMBAR FUSION: Primary | ICD-10-CM

## 2021-12-21 DIAGNOSIS — T81.49XA SURGICAL WOUND INFECTION: ICD-10-CM

## 2021-12-21 PROCEDURE — 99024 POSTOP FOLLOW-UP VISIT: CPT | Performed by: PHYSICIAN ASSISTANT

## 2021-12-21 NOTE — PROGRESS NOTES
Rip Lo (: 1950) is a 66 y.o. female patient here for evaluation of the following chief complaint(s):  Back Pain and Surgical Follow-up ((PO#1) I&D 2021, Sx Lami/Fusion 2021 (6mo PO))         ASSESSMENT/PLAN:  Below is the assessment and plan developed based on review of pertinent history, physical exam, labs, studies, and medications. 1. S/P lumbar fusion  -     XR SPINE LUMB 2 OR 3 V; Future  2. Surgical wound infection      The patient's radiologic findings have been reviewed with her in detail today. She is doing quite well approximately 2 weeks out from her recent lumbar incision and drainage. Only approximately 3 of her sutures have been able to be removed today, she continues with a moderate amount of serous appearing drainage from the inferior pole of her incision. I have asked her to continue with twice daily dressing changes and I have recommended that nursing add on once daily Betadine washes to her incision. She will continue with PT at rehab, and will continue with IV antibiotics as instructed per of infectious disease. She will return for a follow-up visit in 1 week for wound check and hopeful suture removal.    Return in about 1 week (around 2021) for wound check. SUBJECTIVE/OBJECTIVE:  Rip Lo (: 1950) is a 66 y.o. female who presents today for the following:  Chief Complaint   Patient presents with    Back Pain    Surgical Follow-up     (PO#1) I&D 2021, Sx Lami/Fusion 2021 (6mo PO)        HPI   Ms. Hitesh Santana presents today for a postoperative wound check. She is approximately 2 weeks out from her recent lumbar incision and drainage. She is 6 months postop from her recent posterior lumbar fusion. She continues with IV vancomycin which will continue until  per her report. She remains at [facility]. She continues with wound drainage intermittently, and continues with twice daily dressing changes.   Her sutures remain intact. She continues 90 fevers or chills. Her pain has been well managed. IMAGING:  XR Results (most recent):  Results from Appointment encounter on 12/21/21    XR SPINE LUMB 2 OR 3 V    Narrative  AP and lateral films of the lumbar spine reveal a stable posterior lumbar fusion from L2-S1 with stable instrumentation. MRI Results (most recent):  Results from [place] encounter on 11/06/21    MRI LUMB SPINE W WO CONT    Addendum 11/23/2021  9:03 AM  Addendum: Addendum to  IMPRESSION:  1.  Rim-enhancing paraspinal soft tissue collections may reflect abscesses as  well, correlate clinically. 2.  Partially visualized abscess inferior to the right kidney again seen, but  incompletely evaluated on this exam.    I discussed these findings with Dr. Valentín Briceño at 0900 hours on 11/23/2021. Narrative  EXAM: MRI LUMB SPINE W WO CONT    INDICATION: assess for residual abscess. Needs conscious sedation    COMPARISON: CT lumbar spine 11/9/2021, MRI lumbar spine 7/8/2021    TECHNIQUE: MR imaging of the lumbar spine was performed using the following  sequences: sagittal T1, T2, STIR;  axial T1, T2 prior to and following contrast  administration. CONTRAST: 20 mL of ProHance. FINDINGS:    L2-S1 posterior spinal instrumentation and laminectomies. Several rim-enhancing  fluid collections seen as follows: 4.7 cm x 2.1 cm x 2.1 cm collection in the  left dorsal paraspinal musculature at the L3 level, a 3.2 cm x 2.4 cm x 0.8 cm  collection in the right dorsal paraspinal musculature at the L5 level, a 4.0 cm  x 1.8 cm x 3.3 cm partially visualized collection in the left dorsal paraspinal  soft tissues at the L5 level. Grade 1 anterolisthesis of L4 on L5. Vertebral  body heights are maintained. Marrow signal is normal. Multilevel degenerative  endplate osteophytes    The conus medullaris terminates at L1-L2. Signal and caliber of the distal  spinal cord are within normal limits.  There is no pathologic intrathecal  enhancement. Nonenhancing left upper pole renal cyst.    Lower thoracic spine: No herniation or stenosis. L1-L2: Minimal disc bulge. No stenosis    L2-L3: Disc bulge with right foraminal extension. No spinal stenosis. Mild right  foraminal stenosis    L3-L4: No spinal stenosis. Moderate to severe left and moderate right foraminal  stenosis    L4-L5: Anterolisthesis. Disc pseudobulge. No spinal stenosis. Severe bilateral  foraminal stenosis    L5-S1: Disc bulge. Ligament of flavum thickening. Mild spinal stenosis. Severe  right and moderate to severe left foraminal stenosis    Impression  1. L2-S1 posterior spinal instrumentation and laminectomies. Several enhancing  fluid collections in the dorsal paraspinal muscles and soft tissues, compatible  with seromas. 2.  Multilevel degenerative changes and disc disease. L5-S1 mild spinal  stenosis. Multilevel varying degrees of foraminal stenosis above. Allergies   Allergen Reactions    Adhesive Tape-Silicones Rash    Aloe Vera Rash    Chlorhexidine Rash    Tussin [Dextromethorphan Hbr] Palpitations    Readyprep Chg [Chlorhexidine Gluconate] Rash       Current Outpatient Medications   Medication Sig    nystatin (MYCOSTATIN) 100,000 unit/gram ointment Apply  to affected area three (3) times daily.  insulin glargine (LANTUS) 100 unit/mL injection 20 Units by SubCUTAneous route nightly for 30 days.  apixaban (ELIQUIS) 5 mg tablet Take 1 Tablet by mouth every twelve (12) hours for 30 days.  vancomycin/0.9 % sod chloride (vancomycin in 0.9% Sodium Cl) 1.25 gram/250 mL soln 250 mL by IntraVENous route every twelve (12) hours every twelve (12) hours for 49 days.  ALPRAZolam (Xanax) 0.5 mg tablet Take 1 Tablet by mouth two (2) times daily as needed for Anxiety. Max Daily Amount: 1 mg.  acetaminophen (TYLENOL) 325 mg tablet Take 2 Tablets by mouth every six (6) hours as needed for Pain.    L.acid,para-B. bifidum-S.therm (RISAQUAD) 8 billion cell cap cap Take 1 Capsule by mouth daily.  magnesium hydroxide (MILK OF MAGNESIA) 400 mg/5 mL suspension Take 30 mL by mouth daily.  miconazole (MICOTIN) 2 % topical powder Apply  to affected area two (2) times a day.  polyethylene glycol (MIRALAX) 17 gram packet Take 1 Packet by mouth daily.  senna (SENOKOT) 8.6 mg tablet Take 2 Tablets by mouth daily.  heparin sodium,porcine (heparin, porcine,) 5,000 unit/mL injection 1 mL by SubCUTAneous route every eight (8) hours.  naloxone (NARCAN) 4 mg/actuation nasal spray Use 1 spray intranasally, then discard. Repeat with new spray every 2 min as needed for opioid overdose symptoms, alternating nostrils.  amitriptyline (ELAVIL) 50 mg tablet Take 50 mg by mouth nightly.  atorvastatin (LIPITOR) 40 mg tablet Take 40 mg by mouth nightly.  metFORMIN (GLUCOPHAGE) 500 mg tablet Take 500 mg by mouth two (2) times daily (with meals).  butalb/acetaminophen/caffeine (FIORICET PO) Take 1 Tablet by mouth as needed.  aspirin delayed-release 81 mg tablet Take 81 mg by mouth daily.  levothyroxine (SYNTHROID) 75 mcg tablet Take 112 mcg by mouth Daily (before breakfast).  PARoxetine (PAXIL) 40 mg tablet Take 40 mg by mouth every morning.  metoprolol tartrate (LOPRESSOR) 25 mg tablet Take 12.5 mg by mouth nightly.  metoprolol (LOPRESSOR) 25 mg tablet Take 25 mg by mouth every morning.  omeprazole (PRILOSEC) 40 mg capsule Take 40 mg by mouth every morning. No current facility-administered medications for this visit. Past Medical History:   Diagnosis Date    Adverse effect of anesthesia     O2 DROPS WITH ANESTHESIA    Arthritis     KNEES    Cancer ([facility] [address] 03/13/2015    SQUAMOUS CELL CARCINOMA; tonsils and lymph nodes.   Removed 3-2015 with radiation    GERD (gastroesophageal reflux disease)     Hypertension     NO LONGER ON MEDICATION    Hypothyroid     HYPOTHYROIDISM    Migraine     Nausea & vomiting     Obesity     Other ill-defined conditions(799.89)     chronic back pain    Psychiatric disorder     anxiety    Psychiatric disorder     panic ATTACKS    SVT (supraventricular tachycardia) ([facility] [address] 05/24/2014    Ulcerative colitis         Past Surgical History:   Procedure Laterality Date    COLONOSCOPY,DIAGNOSTIC  11/19/2015         HX GI      colonscopy    HX HEENT  03/2015    tonsillectomy (CANCER) AND NECK LYMPH NODES    HX HEENT  2008    PARATHYROID REMOVAL    HX HEENT Left 2002    EYE LASER    HX HEMORRHOIDECTOMY  2001, 2016     X2    HX HYSTERECTOMY  1985    HX KNEE ARTHROSCOPY  1995    left knee surgery, TOTAL LT  and Right KNEE 2013    HX KNEE REPLACEMENT Bilateral 2013, 2014    HX LAP CHOLECYSTECTOMY  2002    HX LUMBAR FUSION  2011    SPINAL FUSION with hardware L4-L5    HX ORTHOPAEDIC  1990    CYST REMOVED RIGHT WRIST    HX ORTHOPAEDIC  05/12/2021    L2-3 & L5-21 LUMBAR LAMINECTOMY WITH ANDREWS OSTEOTOMY    HX OTHER SURGICAL      cyst removal right wrist    HX UROLOGICAL  2010    bladder surgery(interstim device)    IR INJ FORAMIN EPID LUMB ANES/STER SNGL  8/19/2019    NC EGD TRANSORAL BIOPSY SINGLE/MULTIPLE  5/20/2013            Family History   Problem Relation Age of Onset    Cancer Mother         ovarian ca?     Heart Disease Father         MI    Heart Attack Father     Cancer Father         MULTIPLE MYELOMA    Liver Disease Father         FROM MEDICATIONS FOR MULTIPLE MYELOMA    OSTEOARTHRITIS Sister     OSTEOARTHRITIS Brother     Cancer Paternal Grandmother 79        breast ca    Breast Cancer Paternal Grandmother 79    Breast Cancer Maternal Aunt 55    Breast Cancer Maternal Aunt 61    Breast Cancer Paternal Aunt 43    Breast Cancer Paternal Aunt         52's    Emphysema Paternal Grandfather     No Known Problems Sister     No Known Problems Brother     No Known Problems Brother     Anesth Problems Neg Hx         Social History     Tobacco Use    Smoking status: Never Smoker  Smokeless tobacco: Never Used   Substance Use Topics    Alcohol use: No        Review of Systems   Constitutional: Negative. Respiratory: Negative. Cardiovascular: Negative. Gastrointestinal: Negative. Endocrine: Negative. Genitourinary: Negative. Musculoskeletal: Positive for back pain. Skin: Negative. Allergic/Immunologic: Negative. Hematological: Negative. Psychiatric/Behavioral: Negative. All other systems reviewed and are negative. No flowsheet data found. Vitals:  Ht 5' 4\" (1.626 m)   Wt 248 lb (112.5 kg)   BMI 42.57 kg/m²    Body mass index is 42.57 kg/m². Physical Exam    Integumentary  Assessment of Surgical Incision - healing. Nylon sutures remain intact. There is a minimal amount of spontaneous drainage emanating from the inferior pole of the incision. There is a moderate amount of serous drainage noted on the bandage emanating from the inferior pole. There is beefy granulomatous tissue noted in the inferior portion of the incision. No erythema or fluctuance in the incision. Neurologic  Sensory  Light Touch - Intact - Globally. Overall Assessment of Muscle Strength and Tone reveals  Lower Extremities - Right Iliopsoas - 5/5. Left Iliopsoas - 5/5. Right Tibialis Anterior - 5/5. Left Tibialis Anterior - 5/5. Right Gastroc-Soleus - 5/5. Left Gastroc-Soleus - 5/5. Right EHL - 5/5. Left EHL - 5/5. General Assessment of Reflexes  Right Ankle - Clonus is not present. Left Ankle - Clonus is not present. Reflexes (Dermatomes)  2/2 Normal - Left Achilles (L5-S2), Left Knee (L2-4), Right Achilles (L5-S2) and Right Knee (L2-4). Musculoskeletal  Global Assessment  Examination of related systems reveals - well-developed, well-nourished, in no acute distress, alert and oriented x 3 and normal coordination. Spine/Ribs/Pelvis  Lumbosacral Spine - Examination of the lumbosacral spine reveals - no known fractures or deformities.  Inspection and Palpation - Tenderness - moderate. Assessment of pain reveals the following findings - The pain is characterized as - moderate. Location - pain refers to lower back bilaterally. Dr. Tammie Willams was available for immediate consult during this encounter. An electronic signature was used to authenticate this note.   -- MAIKEL Irwin

## 2021-12-21 NOTE — PROGRESS NOTES
Physician Progress Note      Sveta Hernandez  CSN #:                  [id number]  :                       1950  ADMIT DATE:       2021 10:25 AM  DISCH DATE:        2021 7:30 PM  RESPONDING  PROVIDER #:        Lilliam Herrmann MD          QUERY TEXT:    Dr Geremias Marcos:    Patient admitted with a R Psoas abscess, posterior lumbar wound. Per Op note dated   documentation of debridement. To accurately reflect the procedure performed please document if debridement was excisional or non excisional and the deepest depth of tissue removed as down to and including: The medical record reflects the following:  Risk Factors: s/p thoracolumbar fusion, R psoas abscess, possible posterior lumbar wound infection  Clinical Indicators: psoas abscess; fluid collections in the dorsal paraspinal muscles and soft tissues. PROCEDURE:  1) Back was prepped and draped sterilely. At which time, we then did a midline incision. 2) The one area that was draining, was followed down and there was a small superficial collection in the subcutaneous tissue with some mild purulence. This was debrided and irrigated. We sharply debrided the cavity and the track as well. 3) Beginning the right psoas abscess, we did dissect down and did a paraspinal fascial split over the hardware and dissected down to the hardware roughly in the mid lumbar region. No obvious purulent material was noted. We irrigated the wound with Irrisept irrigation, followed by pulse irrigation as well. Treatment: Incision and drainage, superficial and deep of the lumbar wound.     ?Thank you,  Radha Vera RN, CDI  Options provided:  -- Nonexcisional debridement of skin  -- Excisional debridement of skin  -- Nonexcisional debridement of subcutaneous tissue  -- Excisional debridement of subcutaneous tissue  -- Nonexcisional debridement of fascia  -- Excisional debridement of fascia  -- Nonexcisional debridement of muscle  -- Excisional debridement of muscle  -- Nonexcisional debridement of bone  -- Excisional debridement of bone  -- Other - I will add my own diagnosis  -- Disagree - Not applicable / Not valid  -- Disagree - Clinically unable to determine / Unknown  -- Refer to Clinical Documentation Reviewer    PROVIDER RESPONSE TEXT:    Excisional debridement of fascia of Posterior Lumbar wound was performed during procedure on 12/2.     Query created by: Mahsa Lanza on 12/21/2021 8:17 AM      Electronically signed by:  Kenneth Dawson MD 12/21/2021 8:22 AM

## 2021-12-22 ENCOUNTER — TELEPHONE (OUTPATIENT)
Dept: INFECTIOUS DISEASES | Age: 71
End: 2021-12-22

## 2021-12-22 NOTE — TELEPHONE ENCOUNTER
----- Message from Marely Oshea MD sent at 12/22/2021  9:53 AM EST -----  We haven't received her labs in the 2 weeks she has been discharged from the hospital. Please have them fax it to us today - I see her in follow up in clinic today. Thanks.       Marely Oshea MD  Infectious Diseases

## 2021-12-22 NOTE — TELEPHONE ENCOUNTER
Spoke w/ Fatou at [facility]; two patient identifiers confirmed. Informed that no lab results have been received on this patient since her discharge from the hospital.    Vani Banda states that she tried to fax lab results this morning, but will attempt again.     Jama Martin, SARA

## 2021-12-22 NOTE — TELEPHONE ENCOUNTER
Lab results from collection date of 12/21/2021 received via fax; given to Dr. Flor Yeboah for review.     Fco Abbasi LPN

## 2021-12-22 NOTE — TELEPHONE ENCOUNTER
Thanks. I also need her vancomycin trough to be faxed to us each time. IT was not on the labs faxed to us. Please look into this.        Thank you,     Tyler Duncan MD  Infectious Diseases

## 2021-12-23 NOTE — TELEPHONE ENCOUNTER
Spoke w/ Obey Goldstein at [facility]; two patient identifiers confirmed. Informed that additional lab result needed of vancomycin trough. Obey Goldstein states that vancomycin trough was high at last draw so they are holding this morning's dose and another vancomycin trough will be drawn by end of day. Obey Goldstein states she will fax most recent trough results to our office.     Niki Gao LPN

## 2021-12-23 NOTE — TELEPHONE ENCOUNTER
Lab results for vancomycin trough dated 12/21/2021 and 12/22/2021 received via fax; given to Dr. Hudson Dasilva for review.     Vanessa Hercules LPN

## 2021-12-27 ENCOUNTER — APPOINTMENT (OUTPATIENT)
Dept: CT IMAGING | Age: 71
DRG: 862 | End: 2021-12-27
Attending: EMERGENCY MEDICINE
Payer: MEDICARE

## 2021-12-27 ENCOUNTER — HOSPITAL ENCOUNTER (INPATIENT)
Age: 71
LOS: 8 days | Discharge: NURSING, REHAB OR LONG-TERM CARE | DRG: 862 | End: 2022-01-04
Attending: EMERGENCY MEDICINE | Admitting: INTERNAL MEDICINE
Payer: MEDICARE

## 2021-12-27 DIAGNOSIS — N28.9 ACUTE RENAL INSUFFICIENCY: ICD-10-CM

## 2021-12-27 DIAGNOSIS — E11.69 TYPE 2 DIABETES MELLITUS WITH OTHER SPECIFIED COMPLICATION, WITHOUT LONG-TERM CURRENT USE OF INSULIN (HCC): ICD-10-CM

## 2021-12-27 DIAGNOSIS — R10.31 RIGHT LOWER QUADRANT ABDOMINAL PAIN: ICD-10-CM

## 2021-12-27 DIAGNOSIS — E87.6 HYPOKALEMIA: ICD-10-CM

## 2021-12-27 DIAGNOSIS — K65.1 INTRA-ABDOMINAL ABSCESS (HCC): Primary | ICD-10-CM

## 2021-12-27 DIAGNOSIS — E66.01 MORBID OBESITY (HCC): Chronic | ICD-10-CM

## 2021-12-27 PROBLEM — R10.9 ABDOMINAL PAIN: Status: ACTIVE | Noted: 2021-12-27

## 2021-12-27 PROBLEM — T81.43XA POSTPROCEDURAL INTRAABDOMINAL ABSCESS: Status: ACTIVE | Noted: 2021-12-27

## 2021-12-27 LAB
ACID FAST STN SPEC: NEGATIVE
ALBUMIN SERPL-MCNC: 2.3 G/DL (ref 3.5–5)
ALBUMIN/GLOB SERPL: 0.5 {RATIO} (ref 1.1–2.2)
ALP SERPL-CCNC: 149 U/L (ref 45–117)
ALT SERPL-CCNC: 28 U/L (ref 12–78)
ANION GAP SERPL CALC-SCNC: 5 MMOL/L (ref 5–15)
AST SERPL-CCNC: 24 U/L (ref 15–37)
ATRIAL RATE: 79 BPM
BASOPHILS # BLD: 0.1 K/UL (ref 0–0.1)
BASOPHILS NFR BLD: 1 % (ref 0–1)
BILIRUB SERPL-MCNC: 0.4 MG/DL (ref 0.2–1)
BUN SERPL-MCNC: 7 MG/DL (ref 6–20)
BUN/CREAT SERPL: 6 (ref 12–20)
CALCIUM SERPL-MCNC: 8.3 MG/DL (ref 8.5–10.1)
CALCULATED P AXIS, ECG09: 27 DEGREES
CALCULATED R AXIS, ECG10: -3 DEGREES
CALCULATED T AXIS, ECG11: 17 DEGREES
CHLORIDE SERPL-SCNC: 104 MMOL/L (ref 97–108)
CO2 SERPL-SCNC: 31 MMOL/L (ref 21–32)
COMMENT, HOLDF: NORMAL
CREAT SERPL-MCNC: 1.15 MG/DL (ref 0.55–1.02)
DIAGNOSIS, 93000: NORMAL
DIFFERENTIAL METHOD BLD: ABNORMAL
EOSINOPHIL # BLD: 0.6 K/UL (ref 0–0.4)
EOSINOPHIL NFR BLD: 6 % (ref 0–7)
ERYTHROCYTE [DISTWIDTH] IN BLOOD BY AUTOMATED COUNT: 16.5 % (ref 11.5–14.5)
EST. AVERAGE GLUCOSE BLD GHB EST-MCNC: 166 MG/DL
GLOBULIN SER CALC-MCNC: 4.5 G/DL (ref 2–4)
GLUCOSE BLD STRIP.AUTO-MCNC: 114 MG/DL (ref 65–117)
GLUCOSE BLD STRIP.AUTO-MCNC: 75 MG/DL (ref 65–117)
GLUCOSE SERPL-MCNC: 114 MG/DL (ref 65–100)
HBA1C MFR BLD: 7.4 % (ref 4–5.6)
HCT VFR BLD AUTO: 31.1 % (ref 35–47)
HGB BLD-MCNC: 9.2 G/DL (ref 11.5–16)
IMM GRANULOCYTES # BLD AUTO: 0.1 K/UL (ref 0–0.04)
IMM GRANULOCYTES NFR BLD AUTO: 1 % (ref 0–0.5)
LYMPHOCYTES # BLD: 1.7 K/UL (ref 0.8–3.5)
LYMPHOCYTES NFR BLD: 17 % (ref 12–49)
MAGNESIUM SERPL-MCNC: 1.2 MG/DL (ref 1.6–2.4)
MCH RBC QN AUTO: 24 PG (ref 26–34)
MCHC RBC AUTO-ENTMCNC: 29.6 G/DL (ref 30–36.5)
MCV RBC AUTO: 81.2 FL (ref 80–99)
MONOCYTES # BLD: 1 K/UL (ref 0–1)
MONOCYTES NFR BLD: 10 % (ref 5–13)
MYCOBACTERIUM SPEC QL CULT: NEGATIVE
NEUTS SEG # BLD: 6.8 K/UL (ref 1.8–8)
NEUTS SEG NFR BLD: 65 % (ref 32–75)
NRBC # BLD: 0 K/UL (ref 0–0.01)
NRBC BLD-RTO: 0 PER 100 WBC
P-R INTERVAL, ECG05: 174 MS
PLATELET # BLD AUTO: 512 K/UL (ref 150–400)
PMV BLD AUTO: 9.2 FL (ref 8.9–12.9)
POTASSIUM SERPL-SCNC: 2.8 MMOL/L (ref 3.5–5.1)
PROT SERPL-MCNC: 6.8 G/DL (ref 6.4–8.2)
Q-T INTERVAL, ECG07: 404 MS
QRS DURATION, ECG06: 68 MS
QTC CALCULATION (BEZET), ECG08: 463 MS
RBC # BLD AUTO: 3.83 M/UL (ref 3.8–5.2)
SAMPLES BEING HELD,HOLD: NORMAL
SERVICE CMNT-IMP: NORMAL
SERVICE CMNT-IMP: NORMAL
SODIUM SERPL-SCNC: 140 MMOL/L (ref 136–145)
SPECIMEN PREPARATION: NORMAL
SPECIMEN SOURCE: NORMAL
VANCOMYCIN SERPL-MCNC: 23.7 UG/ML
VENTRICULAR RATE, ECG03: 79 BPM
WBC # BLD AUTO: 10.2 K/UL (ref 3.6–11)

## 2021-12-27 PROCEDURE — 74011250636 HC RX REV CODE- 250/636: Performed by: INTERNAL MEDICINE

## 2021-12-27 PROCEDURE — 87040 BLOOD CULTURE FOR BACTERIA: CPT

## 2021-12-27 PROCEDURE — 74011250636 HC RX REV CODE- 250/636: Performed by: EMERGENCY MEDICINE

## 2021-12-27 PROCEDURE — 36415 COLL VENOUS BLD VENIPUNCTURE: CPT

## 2021-12-27 PROCEDURE — 65660000000 HC RM CCU STEPDOWN

## 2021-12-27 PROCEDURE — 74011000250 HC RX REV CODE- 250: Performed by: INTERNAL MEDICINE

## 2021-12-27 PROCEDURE — 96374 THER/PROPH/DIAG INJ IV PUSH: CPT

## 2021-12-27 PROCEDURE — 85025 COMPLETE CBC W/AUTO DIFF WBC: CPT

## 2021-12-27 PROCEDURE — 83036 HEMOGLOBIN GLYCOSYLATED A1C: CPT

## 2021-12-27 PROCEDURE — 93005 ELECTROCARDIOGRAM TRACING: CPT

## 2021-12-27 PROCEDURE — 74011250637 HC RX REV CODE- 250/637: Performed by: INTERNAL MEDICINE

## 2021-12-27 PROCEDURE — 74011250637 HC RX REV CODE- 250/637: Performed by: EMERGENCY MEDICINE

## 2021-12-27 PROCEDURE — 80053 COMPREHEN METABOLIC PANEL: CPT

## 2021-12-27 PROCEDURE — 94761 N-INVAS EAR/PLS OXIMETRY MLT: CPT

## 2021-12-27 PROCEDURE — 74176 CT ABD & PELVIS W/O CONTRAST: CPT

## 2021-12-27 PROCEDURE — 82962 GLUCOSE BLOOD TEST: CPT

## 2021-12-27 PROCEDURE — 99285 EMERGENCY DEPT VISIT HI MDM: CPT

## 2021-12-27 PROCEDURE — 83735 ASSAY OF MAGNESIUM: CPT

## 2021-12-27 PROCEDURE — 80202 ASSAY OF VANCOMYCIN: CPT

## 2021-12-27 RX ORDER — POTASSIUM CHLORIDE 750 MG/1
40 TABLET, FILM COATED, EXTENDED RELEASE ORAL EVERY 4 HOURS
Status: COMPLETED | OUTPATIENT
Start: 2021-12-27 | End: 2021-12-27

## 2021-12-27 RX ORDER — POTASSIUM CHLORIDE 7.45 MG/ML
10 INJECTION INTRAVENOUS
Status: COMPLETED | OUTPATIENT
Start: 2021-12-27 | End: 2021-12-27

## 2021-12-27 RX ORDER — MICONAZOLE NITRATE 2 %
POWDER (GRAM) TOPICAL 2 TIMES DAILY
Status: DISCONTINUED | OUTPATIENT
Start: 2021-12-27 | End: 2022-01-04 | Stop reason: HOSPADM

## 2021-12-27 RX ORDER — ALPRAZOLAM 0.5 MG/1
0.5 TABLET ORAL
Status: DISCONTINUED | OUTPATIENT
Start: 2021-12-27 | End: 2022-01-04 | Stop reason: HOSPADM

## 2021-12-27 RX ORDER — SENNOSIDES 8.6 MG/1
2 TABLET ORAL DAILY
Status: DISCONTINUED | OUTPATIENT
Start: 2021-12-28 | End: 2022-01-04 | Stop reason: HOSPADM

## 2021-12-27 RX ORDER — AMITRIPTYLINE HYDROCHLORIDE 50 MG/1
50 TABLET, FILM COATED ORAL
Status: DISCONTINUED | OUTPATIENT
Start: 2021-12-27 | End: 2022-01-04 | Stop reason: HOSPADM

## 2021-12-27 RX ORDER — MAGNESIUM SULFATE 100 %
4 CRYSTALS MISCELLANEOUS AS NEEDED
Status: DISCONTINUED | OUTPATIENT
Start: 2021-12-27 | End: 2022-01-04 | Stop reason: HOSPADM

## 2021-12-27 RX ORDER — PAROXETINE HYDROCHLORIDE 20 MG/1
40 TABLET, FILM COATED ORAL
Status: DISCONTINUED | OUTPATIENT
Start: 2021-12-28 | End: 2022-01-04 | Stop reason: HOSPADM

## 2021-12-27 RX ORDER — ONDANSETRON 2 MG/ML
4 INJECTION INTRAMUSCULAR; INTRAVENOUS
Status: DISCONTINUED | OUTPATIENT
Start: 2021-12-27 | End: 2022-01-04 | Stop reason: HOSPADM

## 2021-12-27 RX ORDER — LEVOTHYROXINE SODIUM 112 UG/1
112 TABLET ORAL
Status: DISCONTINUED | OUTPATIENT
Start: 2021-12-28 | End: 2022-01-04 | Stop reason: HOSPADM

## 2021-12-27 RX ORDER — PANTOPRAZOLE SODIUM 40 MG/1
40 TABLET, DELAYED RELEASE ORAL
Status: DISCONTINUED | OUTPATIENT
Start: 2021-12-28 | End: 2022-01-04 | Stop reason: HOSPADM

## 2021-12-27 RX ORDER — ASPIRIN 81 MG/1
81 TABLET ORAL DAILY
Status: DISCONTINUED | OUTPATIENT
Start: 2021-12-28 | End: 2022-01-04 | Stop reason: HOSPADM

## 2021-12-27 RX ORDER — INSULIN LISPRO 100 [IU]/ML
INJECTION, SOLUTION INTRAVENOUS; SUBCUTANEOUS
Status: DISCONTINUED | OUTPATIENT
Start: 2021-12-27 | End: 2022-01-04 | Stop reason: HOSPADM

## 2021-12-27 RX ORDER — POLYETHYLENE GLYCOL 3350 17 G/17G
17 POWDER, FOR SOLUTION ORAL DAILY PRN
Status: DISCONTINUED | OUTPATIENT
Start: 2021-12-27 | End: 2022-01-04 | Stop reason: HOSPADM

## 2021-12-27 RX ORDER — SODIUM CHLORIDE 0.9 % (FLUSH) 0.9 %
5-40 SYRINGE (ML) INJECTION EVERY 8 HOURS
Status: DISCONTINUED | OUTPATIENT
Start: 2021-12-27 | End: 2022-01-04 | Stop reason: HOSPADM

## 2021-12-27 RX ORDER — ONDANSETRON 4 MG/1
4 TABLET, ORALLY DISINTEGRATING ORAL
Status: DISCONTINUED | OUTPATIENT
Start: 2021-12-27 | End: 2022-01-04 | Stop reason: HOSPADM

## 2021-12-27 RX ORDER — ADHESIVE BANDAGE
30 BANDAGE TOPICAL DAILY
Status: DISCONTINUED | OUTPATIENT
Start: 2021-12-28 | End: 2022-01-01

## 2021-12-27 RX ORDER — ACETAMINOPHEN 325 MG/1
650 TABLET ORAL
Status: DISCONTINUED | OUTPATIENT
Start: 2021-12-27 | End: 2022-01-04 | Stop reason: HOSPADM

## 2021-12-27 RX ORDER — ATORVASTATIN CALCIUM 40 MG/1
40 TABLET, FILM COATED ORAL
Status: DISCONTINUED | OUTPATIENT
Start: 2021-12-27 | End: 2022-01-04 | Stop reason: HOSPADM

## 2021-12-27 RX ORDER — POTASSIUM CHLORIDE 750 MG/1
40 TABLET, FILM COATED, EXTENDED RELEASE ORAL
Status: COMPLETED | OUTPATIENT
Start: 2021-12-27 | End: 2021-12-27

## 2021-12-27 RX ORDER — SODIUM CHLORIDE 9 MG/ML
100 INJECTION, SOLUTION INTRAVENOUS CONTINUOUS
Status: DISCONTINUED | OUTPATIENT
Start: 2021-12-27 | End: 2021-12-30

## 2021-12-27 RX ORDER — METOPROLOL TARTRATE 25 MG/1
25 TABLET, FILM COATED ORAL
Status: DISCONTINUED | OUTPATIENT
Start: 2021-12-28 | End: 2022-01-04 | Stop reason: HOSPADM

## 2021-12-27 RX ORDER — METOPROLOL TARTRATE 25 MG/1
12.5 TABLET, FILM COATED ORAL
Status: DISCONTINUED | OUTPATIENT
Start: 2021-12-27 | End: 2022-01-04 | Stop reason: HOSPADM

## 2021-12-27 RX ORDER — DEXTROSE 50 % IN WATER (D50W) INTRAVENOUS SYRINGE
25-50 AS NEEDED
Status: DISCONTINUED | OUTPATIENT
Start: 2021-12-27 | End: 2022-01-04 | Stop reason: HOSPADM

## 2021-12-27 RX ORDER — POLYETHYLENE GLYCOL 3350 17 G/17G
17 POWDER, FOR SOLUTION ORAL DAILY
Status: DISCONTINUED | OUTPATIENT
Start: 2021-12-28 | End: 2022-01-04 | Stop reason: HOSPADM

## 2021-12-27 RX ORDER — SODIUM CHLORIDE 0.9 % (FLUSH) 0.9 %
5-40 SYRINGE (ML) INJECTION AS NEEDED
Status: DISCONTINUED | OUTPATIENT
Start: 2021-12-27 | End: 2022-01-04 | Stop reason: HOSPADM

## 2021-12-27 RX ORDER — MORPHINE SULFATE 4 MG/ML
4 INJECTION INTRAVENOUS
Status: COMPLETED | OUTPATIENT
Start: 2021-12-27 | End: 2021-12-27

## 2021-12-27 RX ORDER — ACETAMINOPHEN 650 MG/1
650 SUPPOSITORY RECTAL
Status: DISCONTINUED | OUTPATIENT
Start: 2021-12-27 | End: 2022-01-04 | Stop reason: HOSPADM

## 2021-12-27 RX ADMIN — AMITRIPTYLINE HYDROCHLORIDE 50 MG: 50 TABLET, FILM COATED ORAL at 23:22

## 2021-12-27 RX ADMIN — MORPHINE SULFATE 4 MG: 4 INJECTION INTRAVENOUS at 11:48

## 2021-12-27 RX ADMIN — METOPROLOL TARTRATE 12.5 MG: 25 TABLET, FILM COATED ORAL at 21:29

## 2021-12-27 RX ADMIN — POTASSIUM CHLORIDE 10 MEQ: 7.46 INJECTION, SOLUTION INTRAVENOUS at 16:43

## 2021-12-27 RX ADMIN — SODIUM CHLORIDE 100 ML/HR: 9 INJECTION, SOLUTION INTRAVENOUS at 21:43

## 2021-12-27 RX ADMIN — POTASSIUM CHLORIDE 10 MEQ: 7.46 INJECTION, SOLUTION INTRAVENOUS at 18:44

## 2021-12-27 RX ADMIN — CEFEPIME 2 G: 2 INJECTION, POWDER, FOR SOLUTION INTRAVENOUS at 15:13

## 2021-12-27 RX ADMIN — POTASSIUM CHLORIDE 40 MEQ: 750 TABLET, FILM COATED, EXTENDED RELEASE ORAL at 13:25

## 2021-12-27 RX ADMIN — POTASSIUM CHLORIDE 10 MEQ: 7.46 INJECTION, SOLUTION INTRAVENOUS at 15:27

## 2021-12-27 RX ADMIN — ATORVASTATIN CALCIUM 40 MG: 40 TABLET, FILM COATED ORAL at 23:22

## 2021-12-27 RX ADMIN — POTASSIUM CHLORIDE 40 MEQ: 750 TABLET, FILM COATED, EXTENDED RELEASE ORAL at 15:24

## 2021-12-27 RX ADMIN — APIXABAN 5 MG: 5 TABLET, FILM COATED ORAL at 21:30

## 2021-12-27 RX ADMIN — POTASSIUM CHLORIDE 40 MEQ: 750 TABLET, FILM COATED, EXTENDED RELEASE ORAL at 20:00

## 2021-12-27 RX ADMIN — POTASSIUM CHLORIDE 10 MEQ: 7.46 INJECTION, SOLUTION INTRAVENOUS at 20:10

## 2021-12-27 RX ADMIN — ACETAMINOPHEN 650 MG: 325 TABLET ORAL at 20:17

## 2021-12-27 NOTE — ED NOTES
Pt arrives to ED via EMS with c/o of abdominal pain x1week. Pt is coming from 's. Increasing pain yesterday and today. Pt has hx of being treated for sepsis at [facility] in June from her back surgery and had FAUZIA drains placed on the side that were removed a few weeks ago. Pain is in right lower quadrant.

## 2021-12-27 NOTE — PROGRESS NOTES
Pharmacist Note - Vancomycin Dosing    Consult provided for this 66 y.o. female for indication of Streptococcus anginosus epidural/right iliopsoas abscess   Antibiotic regimen(s): Vanc + Cefepime  Patient on vancomycin PTA? YES   -continue from PTA from [facility] - 1.25G IV q12h, last dose  @   Recent Labs     21  1124   WBC 10.2   CREA 1.15*   BUN 7     Frequency of BMP: daily x 3  Height: 162.6 cm  Weight: 117.9 kg  Est CrCl: 55 ml/min; UO: -- ml/kg/hr  Temp (24hrs), Av °F (36.7 °C), Min:98 °F (36.7 °C), Max:98 °F (36.7 °C)    Cultures:   blood - pending    MRSA Swab ordered (if applicable)? NO    The plan below is expected to result in a target range of Trough 10-15 mcg/mL    Random level on admission = 23.7 mcg/mL @ 1124 (15 hours post-dose)    Will repeat level with AM labs. Pharmacy to follow patient daily and order levels / make dose adjustments as appropriate.

## 2021-12-27 NOTE — ED NOTES
Spoke to Rn at [facility] to give update and inform that pt is being admitted, number [phone]. Pt will return to [facility] upon discharge.

## 2021-12-27 NOTE — H&P
[facility] Adult  Hospitalist Group  History and Physical    Primary Care Provider: Stone Lewis MD  Date of Service:  12/27/2021    Chief Complaint: Abdominal pain and distention and right lower quadrant    Subjective:     Nora Alexander is a 66 y.o. female with complex medical history of back surgery in June 2021, followed by infectionmanaged by primary orthopedic Dr. Fantasma Nash for the same, with hospitalization at [facility] [address] for about a month early November through early December for right psoas abscess with IR guided abscess drainage, who was discharged to [facility] with PICC line for prolonged antibiotics with IV vancomycin through 1/27/2022 per ID based on the culture results. For about a week, patient has noticed bulge and discomfort in right lower quadrant. Yesterday she showed that to nursing team and subsequent to that patient was sent for CT abdomen pelvis assessment here at [facility]. Patient was noted to have considerable CT findings with persistent unchanged right psoas abscess, right perinephric stranding along with hydronephrosis, hypokalemia, mild early MERCEDES with elevated creatinine at 1.15 compared to previous baseline of 0.50.6. So hospitalist team has been consulted to admit the patient for further management. Patient denied any fever or chills. Patient denied any chest pain, shortness of breath, palpitation. Patient denied any leg swelling worsening. Patient denied any urinary trouble or diarrhea or loose stools. No constipation. Patient denied any focal weakness, tingling, numbness. Denied any worsening of back pain or drainage from the back. Review of Systems:    A comprehensive review of systems was negative except for that written in the History of Present Illness. Past Medical History:   Diagnosis Date    Adverse effect of anesthesia     O2 DROPS WITH ANESTHESIA    Arthritis     KNEES    Cancer ([facility] [address] 03/13/2015    SQUAMOUS CELL CARCINOMA; tonsils and lymph nodes. Removed 3-2015 with radiation    GERD (gastroesophageal reflux disease)     Hypertension     NO LONGER ON MEDICATION    Hypothyroid     HYPOTHYROIDISM    Migraine     Nausea & vomiting     Obesity     Other ill-defined conditions(799.89)     chronic back pain    Psychiatric disorder     anxiety    Psychiatric disorder     panic ATTACKS    SVT (supraventricular tachycardia) ([facility] [address] 05/24/2014    Ulcerative colitis       Past Surgical History:   Procedure Laterality Date    COLONOSCOPY,DIAGNOSTIC  11/19/2015         HX GI      colonscopy    HX HEENT  03/2015    tonsillectomy (CANCER) AND NECK LYMPH NODES    HX HEENT  2008    PARATHYROID REMOVAL    HX HEENT Left 2002    EYE LASER    HX HEMORRHOIDECTOMY  2001, 2016     X2    HX HYSTERECTOMY  1985    HX KNEE ARTHROSCOPY  1995    left knee surgery, TOTAL LT  and Right KNEE 2013    HX KNEE REPLACEMENT Bilateral 2013, 2014    HX LAP CHOLECYSTECTOMY  2002    HX LUMBAR FUSION  2011    SPINAL FUSION with hardware L4-L5    HX ORTHOPAEDIC  1990    CYST REMOVED RIGHT WRIST    HX ORTHOPAEDIC  05/12/2021    L2-3 & L5-21 LUMBAR LAMINECTOMY WITH ANDREWS OSTEOTOMY    HX OTHER SURGICAL      cyst removal right wrist    HX UROLOGICAL  2010    bladder surgery(interstim device)    IR INJ FORAMIN EPID LUMB ANES/STER SNGL  8/19/2019    [facility] E[facility] TRANSORAL BIOPSY SINGLE/MULTIPLE  5/20/2013          Prior to Admission medications    Medication Sig Start Date End Date Taking? Authorizing Provider   nystatin (MYCOSTATIN) 100,000 unit/gram ointment Apply  to affected area three (3) times daily. 12/9/21   Marivel Parrish MD   insulin glargine (LANTUS) 100 unit/mL injection 20 Units by SubCUTAneous route nightly for 30 days. 12/9/21 1/8/22  Marivel Parrish MD   apixaban (ELIQUIS) 5 mg tablet Take 1 Tablet by mouth every twelve (12) hours for 30 days.  12/9/21 1/8/22  Marivel Parrish MD   vancomycin/0.9 % sod chloride (vancomycin in 0.9% Sodium Cl) 1.25 gram/250 mL soln 250 mL by IntraVENous route every twelve (12) hours every twelve (12) hours for 49 days. 12/9/21 1/27/22  Columba Castillo MD   ALPRAZolam (Xanax) 0.5 mg tablet Take 1 Tablet by mouth two (2) times daily as needed for Anxiety. Max Daily Amount: 1 mg. 12/9/21   Columba Castillo MD   acetaminophen (TYLENOL) 325 mg tablet Take 2 Tablets by mouth every six (6) hours as needed for Pain. 7/20/21   Pratik Fu NP   L.acid,para-B. bifidum-S.therm (RISAQUAD) 8 billion cell cap cap Take 1 Capsule by mouth daily. 7/21/21   Pratik Fu NP   magnesium hydroxide (MILK OF MAGNESIA) 400 mg/5 mL suspension Take 30 mL by mouth daily. 7/21/21   Pratik Fu NP   miconazole (MICOTIN) 2 % topical powder Apply  to affected area two (2) times a day. 7/20/21   Pratik Fu NP   polyethylene glycol (MIRALAX) 17 gram packet Take 1 Packet by mouth daily. 7/20/21   Pratik Fu NP   senna (SENOKOT) 8.6 mg tablet Take 2 Tablets by mouth daily. 7/21/21   Pratik Fu NP   heparin sodium,porcine (heparin, porcine,) 5,000 unit/mL injection 1 mL by SubCUTAneous route every eight (8) hours. 7/20/21   Pratik Fu NP   naloxone [facility]) 4 mg/actuation nasal spray Use 1 spray intranasally, then discard. Repeat with new spray every 2 min as needed for opioid overdose symptoms, alternating nostrils. 6/3/21   MAIKEL York   amitriptyline (ELAVIL) 50 mg tablet Take 50 mg by mouth nightly. Provider, Historical   atorvastatin (LIPITOR) 40 mg tablet Take 40 mg by mouth nightly. Provider, Historical   metFORMIN (GLUCOPHAGE) 500 mg tablet Take 500 mg by mouth two (2) times daily (with meals). Provider, Historical   butalb/acetaminophen/caffeine (FIORICET PO) Take 1 Tablet by mouth as needed. Provider, Historical   aspirin delayed-release 81 mg tablet Take 81 mg by mouth daily. Provider, Historical   levothyroxine (SYNTHROID) 75 mcg tablet Take 112 mcg by mouth Daily (before breakfast).     Provider, Historical   PARoxetine (PAXIL) 40 mg tablet Take 40 mg by mouth every morning. Provider, Historical   metoprolol tartrate (LOPRESSOR) 25 mg tablet Take 12.5 mg by mouth nightly. Provider, Historical   metoprolol (LOPRESSOR) 25 mg tablet Take 25 mg by mouth every morning. Provider, Historical   omeprazole (PRILOSEC) 40 mg capsule Take 40 mg by mouth every morning. Provider, Historical     Allergies   Allergen Reactions    Adhesive Tape-Silicones Rash    Aloe Vera Rash    Chlorhexidine Rash    Tussin [Dextromethorphan Hbr] Palpitations    Readyprep Chg [Chlorhexidine Gluconate] Rash      Family History   Problem Relation Age of Onset    Cancer Mother         ovarian ca?  Heart Disease Father         MI    Heart Attack Father     Cancer Father         MULTIPLE MYELOMA    Liver Disease Father         FROM MEDICATIONS FOR MULTIPLE MYELOMA    OSTEOARTHRITIS Sister     OSTEOARTHRITIS Brother     Cancer Paternal Grandmother 79        breast ca    Breast Cancer Paternal Grandmother 79    Breast Cancer Maternal Aunt 55    Breast Cancer Maternal Aunt 61    Breast Cancer Paternal Aunt 43    Breast Cancer Paternal Aunt         52's    Emphysema Paternal Grandfather     No Known Problems Sister     No Known Problems Brother     No Known Problems Brother     Anesth Problems Neg Hx         SOCIAL HISTORY:  Patient resides at [facility]. Patient ambulates with assistancedevice. Smoking history: Denied  Alcohol history: Denied        Objective:     Visit Vitals  BP (!) 130/99 (BP 1 Location: Right upper arm)   Pulse 73   Temp 98 °F (36.7 °C)   Resp 24   Ht 5' 4\" (1.626 m)   Wt 117.9 kg (260 lb)   SpO2 94%   BMI 44.63 kg/m²       Physical Exam:   General:          Alert, cooperative, no distress, appears stated age.      HEENT:           Atraumatic, anicteric sclerae, pink conjunctivae                          No oral ulcers, mucosa dry, throat clear, dentition fair  Neck:               Supple, symmetrical  Lungs: Clear to auscultation bilaterally. No Wheezing or Rhonchi. No rales. Chest wall:      No tenderness  No Accessory muscle use. Heart:              Regular  rhythm,  No  murmur   No edema  Abdomen:        Soft, somewhat distended on right mid to lower, RLQ tenderness + with mild guarding, BS + feeble. No CVA tenderness. Liver lumbar midline incision intact with few sutures in place. Extremities:     No cyanosis. No clubbing,   minimal to +1 periankle puffiness. Skin turgor normal, Capillary refill normal  Skin:                Not pale. Not Jaundiced  No rashes   Psych:             Not anxious or agitated. Neurologic:      Alert, moves all extremities, answers questions appropriately and responds to commands     Cap refill: Brisk, less than 3 seconds  Pulses: 2+, symmetric in all extremities  Skin: Warm, dry, without rashes or lesions    ECG: NSR, no acute ST-T changes    Data Review: All diagnostic labs and studies have been reviewed. Laboratory data:  Recent Results (from the past 24 hour(s))   CBC WITH AUTOMATED DIFF    Collection Time: 12/27/21 11:24 AM   Result Value Ref Range    WBC 10.2 3.6 - 11.0 K/uL    RBC 3.83 3.80 - 5.20 M/uL    HGB 9.2 (L) 11.5 - 16.0 g/dL    HCT 31.1 (L) 35.0 - 47.0 %    MCV 81.2 80.0 - 99.0 FL    MCH 24.0 (L) 26.0 - 34.0 PG    MCHC 29.6 (L) 30.0 - 36.5 g/dL    RDW 16.5 (H) 11.5 - 14.5 %    PLATELET 676 (H) 250 - 400 K/uL    MPV 9.2 8.9 - 12.9 FL    NRBC 0.0 0  WBC    ABSOLUTE NRBC 0.00 0.00 - 0.01 K/uL    NEUTROPHILS 65 32 - 75 %    LYMPHOCYTES 17 12 - 49 %    MONOCYTES 10 5 - 13 %    EOSINOPHILS 6 0 - 7 %    BASOPHILS 1 0 - 1 %    IMMATURE GRANULOCYTES 1 (H) 0.0 - 0.5 %    ABS. NEUTROPHILS 6.8 1.8 - 8.0 K/UL    ABS. LYMPHOCYTES 1.7 0.8 - 3.5 K/UL    ABS. MONOCYTES 1.0 0.0 - 1.0 K/UL    ABS. EOSINOPHILS 0.6 (H) 0.0 - 0.4 K/UL    ABS. BASOPHILS 0.1 0.0 - 0.1 K/UL    ABS. IMM.  GRANS. 0.1 (H) 0.00 - 0.04 K/UL    DF AUTOMATED     METABOLIC PANEL, COMPREHENSIVE    Collection Time: 12/27/21 11:24 AM   Result Value Ref Range    Sodium 140 136 - 145 mmol/L    Potassium 2.8 (L) 3.5 - 5.1 mmol/L    Chloride 104 97 - 108 mmol/L    CO2 31 21 - 32 mmol/L    Anion gap 5 5 - 15 mmol/L    Glucose 114 (H) 65 - 100 mg/dL    BUN 7 6 - 20 MG/DL    Creatinine 1.15 (H) 0.55 - 1.02 MG/DL    BUN/Creatinine ratio 6 (L) 12 - 20      GFR est AA 56 (L) >60 ml/min/1.73m2    GFR est non-AA 46 (L) >60 ml/min/1.73m2    Calcium 8.3 (L) 8.5 - 10.1 MG/DL    Bilirubin, total 0.4 0.2 - 1.0 MG/DL    ALT (SGPT) 28 12 - 78 U/L    AST (SGOT) 24 15 - 37 U/L    Alk. phosphatase 149 (H) 45 - 117 U/L    Protein, total 6.8 6.4 - 8.2 g/dL    Albumin 2.3 (L) 3.5 - 5.0 g/dL    Globulin 4.5 (H) 2.0 - 4.0 g/dL    A-G Ratio 0.5 (L) 1.1 - 2.2     SAMPLES BEING HELD    Collection Time: 12/27/21 11:24 AM   Result Value Ref Range    SAMPLES BEING HELD 1RED 1BLU 1BC TONIE     COMMENT        Add-on orders for these samples will be processed based on acceptable specimen integrity and analyte stability, which may vary by analyte. EKG, 12 LEAD, INITIAL    Collection Time: 12/27/21  3:16 PM   Result Value Ref Range    Ventricular Rate 79 BPM    Atrial Rate 79 BPM    P-R Interval 174 ms    QRS Duration 68 ms    Q-T Interval 404 ms    QTC Calculation (Bezet) 463 ms    Calculated P Axis 27 degrees    Calculated R Axis -3 degrees    Calculated T Axis 17 degrees    Diagnosis       Normal sinus rhythm  Septal infarct (cited on or before 27-DEC-2021)  Abnormal ECG  When compared with ECG of 06-NOV-2021 23:06,  Nonspecific T wave abnormality, improved in Inferior leads  Nonspecific T wave abnormality no longer evident in Lateral leads       Radiology:  CT ABD PELV WO CONT    Result Date: 12/27/2021  1. Very small right pleural effusion. 2. Right perinephric stranding and proximal right hydronephrosis. 3. Interval removal of right lower quadrant percutaneous catheter.  Persistent inflammatory stranding and 3 cm x 1.6 cm right lower quadrant fluid collection, unchanged. Assessment:     Active Problems:    MERCEDES (acute kidney injury) ([facility] [address] (11/6/2021)      Abdominal pain (12/27/2021)      Postprocedural intraabdominal abscess (12/27/2021)      #Right lower quadrant abdominal pain, with distention and patient with recent right psoas abscess needing extensive drainage, with ongoing IV vancomycin treatment based on the cultures per ID. #Right lower quadrant fluid collection, unchanged on CT scan compared to previous. 3 cm X1.6 cm, likely persistent abscess  #Right perinephric stranding with right hydronephrosis  #Mild early MERCEDES with more than doubling of the creatinine, baseline creatinine around 0.50.6, on admission 1.15  #Severe hypokalemia, K2.8 on admission  #Hx of Streptococcus anginosus epidural/iliopsoas abscess on recent hospitalization 1112/2021, on IV vancomycin to be continued per ID until 1/27/2022  #Recent right paraspinal pain at L3-4 along with iliac crest pain along with Hx of paraspinal abscess. Recent back surgery in 6/21 with Dr. Tiffanie Jamison  #Recent celiac artery thrombosis with splenic infarcts, on Eliquis, diagnosed on 11/9/2021 on CT A/P. No vascular surgical intervention needed at the time. To continue Eliquis for 6 months. #DM2  #Bilateral DM related polyneuropathybilateral feet  #Hx of SVT  #HTN  #HLD  #Hypothyroidism  #Depression/anxiety    Plan:     -Admit to inpatient, telemetry  -IVF: NS at 100 mL/h  -Continue IV vancomycin, pharmacy consult  -Initiate IV cefepime  -Follow blood cultures, urine culture  -ID consult: Dr. Miguel Angel Boateng consult: Dr. Tiffanie Jamison  -Urology consult: Right-sided hydronephrosis  -CBC, CMP monitoring  -Sliding scale insulin with serial Accu-Chek monitoring  -If warranted and safe, resume Lantus at a lower dose with gradual titration as tolerated by patient.   Per patient, her blood sugars were running low and not taking Lantus lately at SNF.  -Resume home medications including Eliquis and other as ordered  -Would need PT/OT once better plan from surgery teams  -PICC line care per nursing team    CODE STATUS: Full code  DVT prophylaxis: Eliquis      FUNCTIONAL STATUS PRIOR TO HOSPITALIZATION Ambulatory with Use of Assistive Devices (including history of recent falls)       Signed By: Denny Awad MD     December 27, 2021

## 2021-12-27 NOTE — ED PROVIDER NOTES
69-year-old female presents from [facility] with a complaint of abdominal pain. She states symptoms started about a week ago when she noticed the right side of her abdomen was becoming distended and hard. She subsequently had pain over the past couple of days. She denies any fever, vomiting, diarrhea. No change in urination. Patient adds that she recently had back surgery and had 2 FAUZIA drains in the right side of her abdomen that were removed. Denies any cough, shortness of breath. On review of records, patient had a prolonged admission to [facility] from 11/6/ to 12/9. 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 Past Medical History:   Diagnosis Date    Adverse effect of anesthesia     O2 DROPS WITH ANESTHESIA    Arthritis     KNEES    Cancer ([facility] [address] 03/13/2015    SQUAMOUS CELL CARCINOMA; tonsils and lymph nodes.   Removed 3-2015 with radiation    GERD (gastroesophageal reflux disease)     Hypertension     NO LONGER ON MEDICATION    Hypothyroid     HYPOTHYROIDISM    Migraine     Nausea & vomiting     Obesity     Other ill-defined conditions(799.89)     chronic back pain    Psychiatric disorder     anxiety    Psychiatric disorder     panic ATTACKS    SVT (supraventricular tachycardia) ([facility] [address] 05/24/2014    Ulcerative colitis        Past Surgical History:   Procedure Laterality Date    COLONOSCOPY,DIAGNOSTIC  11/19/2015         HX GI      colonscopy    HX HEENT  03/2015    tonsillectomy (CANCER) AND NECK LYMPH NODES    HX HEENT  2008    PARATHYROID REMOVAL    HX HEENT Left 2002    EYE LASER    HX HEMORRHOIDECTOMY  2001, 2016     X2    HX HYSTERECTOMY  1985    HX KNEE ARTHROSCOPY  1995    left knee surgery, TOTAL LT  and Right KNEE 2013    HX KNEE REPLACEMENT Bilateral 2013, 2014    HX LAP CHOLECYSTECTOMY  2002    HX LUMBAR FUSION  2011    SPINAL FUSION with hardware L4-L5    HX ORTHOPAEDIC  1990    CYST REMOVED RIGHT WRIST    HX ORTHOPAEDIC  05/12/2021    L2-3 & L5-21 LUMBAR LAMINECTOMY WITH ANDREWS OSTEOTOMY    HX OTHER SURGICAL      cyst removal right wrist    HX UROLOGICAL  2010    bladder surgery(interstim device)    IR INJ FORAMIN EPID LUMB ANES/STER SNGL  8/19/2019    [facility] EGD TRANSORAL BIOPSY SINGLE/MULTIPLE  5/20/2013              Family History:   Problem Relation Age of Onset    Cancer Mother         ovarian ca?     Heart Disease Father         MI    Heart Attack Father     Cancer Father         MULTIPLE MYELOMA    Liver Disease Father         FROM MEDICATIONS FOR MULTIPLE MYELOMA    OSTEOARTHRITIS Sister     OSTEOARTHRITIS Brother     Cancer Paternal Grandmother 79        breast ca    Breast Cancer Paternal Grandmother 79    Breast Cancer Maternal Aunt 55    Breast Cancer Maternal Aunt 61    Breast Cancer Paternal Aunt 43    Breast Cancer Paternal Aunt         52's    Emphysema Paternal Grandfather     No Known Problems Sister     No Known Problems Brother     No Known Problems Brother     Anesth Problems Neg Hx        Social History     Socioeconomic History    Marital status:      Spouse name: Not on file    Number of children: Not on file    Years of education: Not on file    Highest education level: Not on file   Occupational History    Not on file   Tobacco Use    Smoking status: Never Smoker    Smokeless tobacco: Never Used   Vaping Use    Vaping Use: Never used   Substance and Sexual Activity    Alcohol use: No    Drug use: No     Types: Prescription    Sexual activity: Not Currently   Other Topics Concern     Service Not Asked    Blood Transfusions Not Asked    Caffeine Concern Not Asked    Occupational Exposure Not Asked    Hobby Hazards Not Asked    Sleep Concern Not Asked    Stress Concern Not Asked    Weight Concern Not Asked    Special Diet Not Asked    Back Care Not Asked    Exercise Not Asked   [employer] Helmet Not Asked   [address] Not Asked    Self-Exams Not Asked   Social History Narrative    Not on file     Social Determinants of Health     Financial Resource Strain:     Difficulty of Paying Living Expenses: Not on file   Food Insecurity:     Worried About Running Out of Food in the Last Year: Not on file    Eliezer of Food in the Last Year: Not on file   Transportation Needs:     Lack of Transportation (Medical): Not on file    Lack of Transportation (Non-Medical): Not on file   Physical Activity:     Days of Exercise per Week: Not on file    Minutes of Exercise per Session: Not on file   Stress:     Feeling of Stress : Not on file   Social Connections:     Frequency of Communication with Friends and Family: Not on file    Frequency of Social Gatherings with Friends and Family: Not on file    Attends Hoahaoism Services: Not on file    Active Member of [address] [place] [address] Monitor My Meds or Organizations: Not on file    Attends Club or Organization Meetings: Not on file    Marital Status: Not on file   Intimate Partner Violence:     Fear of Current or Ex-Partner: Not on file    Emotionally Abused: Not on file    Physically Abused: Not on file    Sexually Abused: Not on file   Housing Stability:     Unable to Pay for Housing in the Last Year: Not on file    Number of [place] in the Last Year: Not on file    Unstable Housing in the Last Year: Not on file         ALLERGIES: Adhesive tape-silicones, Aloe vera, Chlorhexidine, Tussin [dextromethorphan hbr], and Readyprep chg [chlorhexidine gluconate]    Review of Systems   Constitutional: Negative for fever. HENT: Negative for facial swelling. Eyes: Negative for visual disturbance. Respiratory: Negative for chest tightness. Cardiovascular: Negative for chest pain. Gastrointestinal: Positive for abdominal distention and abdominal pain. Genitourinary: Negative for difficulty urinating and dysuria. Musculoskeletal: Negative for arthralgias.    Skin: Negative for rash. Neurological: Negative for headaches. Hematological: Negative for adenopathy. Psychiatric/Behavioral: Negative for suicidal ideas. Vitals:    12/27/21 1111   BP: (!) 130/99   Pulse: 73   Resp: 24   Temp: 98 °F (36.7 °C)   SpO2: 94%   Weight: 117.9 kg (260 lb)   Height: 5' 4\" (1.626 m)            Physical Exam  Vitals and nursing note reviewed. Constitutional:       General: She is not in acute distress. Appearance: She is well-developed. HENT:      Head: Normocephalic and atraumatic. Eyes:      General: No scleral icterus. Conjunctiva/sclera: Conjunctivae normal.      Pupils: Pupils are equal, round, and reactive to light. Cardiovascular:      Rate and Rhythm: Normal rate. Heart sounds: No murmur heard. Pulmonary:      Effort: Pulmonary effort is normal. No respiratory distress. Abdominal:      General: There is distension. Comments: And so they extubated her and she is been doing conservative asymmetric distention of the abdomen, right side greater than left. She has well-healed wounds from previous FAUZIA drains. Generally painful but not focally tender. Musculoskeletal:         General: Normal range of motion. Cervical back: Normal range of motion and neck supple. Skin:     General: Skin is warm and dry. Findings: No rash. Neurological:      Mental Status: She is alert and oriented to person, place, and time. MDM  Number of Diagnoses or Management Options     Amount and/or Complexity of Data Reviewed  Clinical lab tests: reviewed  Tests in the radiology section of CPT®: reviewed         Perfect Serve Consult for Admission  1:25 PM    ED Room Number: ER17/17  Patient Name and age:  Sallie Jones 66 y.o.  female  Working Diagnosis:   1. Intra-abdominal abscess ([address]    2. Acute renal insufficiency    3.  Hypokalemia        COVID-19 Suspicion:  no  Sepsis present:  no  Reassessment needed: no  Code Status:  Full Code  Readmission: yes  Isolation Requirements:  no  Recommended Level of Care:  telemetry  Department:  [facility] Adult ED - [phone]    Other:  D/c'd from [facility] on 12/9. Presenting from rehab with re-accumulation of RLQ abscess, pain. WBC stable. No fever. 3cm abscess present on CT scan. Total critical care time spent exclusive of procedures:  35 minutes.     Procedures

## 2021-12-28 LAB
ALBUMIN SERPL-MCNC: 2.2 G/DL (ref 3.5–5)
ALBUMIN/GLOB SERPL: 0.5 {RATIO} (ref 1.1–2.2)
ALP SERPL-CCNC: 138 U/L (ref 45–117)
ALT SERPL-CCNC: 27 U/L (ref 12–78)
ANION GAP SERPL CALC-SCNC: 4 MMOL/L (ref 5–15)
APPEARANCE UR: CLEAR
AST SERPL-CCNC: 25 U/L (ref 15–37)
BACTERIA URNS QL MICRO: NEGATIVE /HPF
BASOPHILS # BLD: 0.1 K/UL (ref 0–0.1)
BASOPHILS NFR BLD: 1 % (ref 0–1)
BILIRUB SERPL-MCNC: 0.4 MG/DL (ref 0.2–1)
BILIRUB UR QL: NEGATIVE
BUN SERPL-MCNC: 9 MG/DL (ref 6–20)
BUN/CREAT SERPL: 8 (ref 12–20)
CALCIUM SERPL-MCNC: 8.1 MG/DL (ref 8.5–10.1)
CHLORIDE SERPL-SCNC: 109 MMOL/L (ref 97–108)
CO2 SERPL-SCNC: 27 MMOL/L (ref 21–32)
COLOR UR: ABNORMAL
CREAT SERPL-MCNC: 1.08 MG/DL (ref 0.55–1.02)
DIFFERENTIAL METHOD BLD: ABNORMAL
EOSINOPHIL # BLD: 0.8 K/UL (ref 0–0.4)
EOSINOPHIL NFR BLD: 8 % (ref 0–7)
EPITH CASTS URNS QL MICRO: ABNORMAL /LPF
ERYTHROCYTE [DISTWIDTH] IN BLOOD BY AUTOMATED COUNT: 16.7 % (ref 11.5–14.5)
GLOBULIN SER CALC-MCNC: 4.1 G/DL (ref 2–4)
GLUCOSE BLD STRIP.AUTO-MCNC: 105 MG/DL (ref 65–117)
GLUCOSE BLD STRIP.AUTO-MCNC: 130 MG/DL (ref 65–117)
GLUCOSE BLD STRIP.AUTO-MCNC: 138 MG/DL (ref 65–117)
GLUCOSE BLD STRIP.AUTO-MCNC: 91 MG/DL (ref 65–117)
GLUCOSE SERPL-MCNC: 96 MG/DL (ref 65–100)
GLUCOSE UR STRIP.AUTO-MCNC: NEGATIVE MG/DL
HCT VFR BLD AUTO: 29.5 % (ref 35–47)
HGB BLD-MCNC: 9.1 G/DL (ref 11.5–16)
HGB UR QL STRIP: ABNORMAL
HYALINE CASTS URNS QL MICRO: ABNORMAL /LPF (ref 0–5)
IMM GRANULOCYTES # BLD AUTO: 0.1 K/UL (ref 0–0.04)
IMM GRANULOCYTES NFR BLD AUTO: 1 % (ref 0–0.5)
INR PPP: 1.2 (ref 0.9–1.1)
KETONES UR QL STRIP.AUTO: NEGATIVE MG/DL
LACTATE SERPL-SCNC: 1 MMOL/L (ref 0.4–2)
LEUKOCYTE ESTERASE UR QL STRIP.AUTO: NEGATIVE
LYMPHOCYTES # BLD: 1.7 K/UL (ref 0.8–3.5)
LYMPHOCYTES NFR BLD: 18 % (ref 12–49)
MCH RBC QN AUTO: 24.9 PG (ref 26–34)
MCHC RBC AUTO-ENTMCNC: 30.8 G/DL (ref 30–36.5)
MCV RBC AUTO: 80.6 FL (ref 80–99)
MONOCYTES # BLD: 1.1 K/UL (ref 0–1)
MONOCYTES NFR BLD: 11 % (ref 5–13)
NEUTS SEG # BLD: 6 K/UL (ref 1.8–8)
NEUTS SEG NFR BLD: 61 % (ref 32–75)
NITRITE UR QL STRIP.AUTO: NEGATIVE
NRBC # BLD: 0 K/UL (ref 0–0.01)
NRBC BLD-RTO: 0 PER 100 WBC
PH UR STRIP: 7.5 [PH] (ref 5–8)
PLATELET # BLD AUTO: 505 K/UL (ref 150–400)
PMV BLD AUTO: 9.3 FL (ref 8.9–12.9)
POTASSIUM SERPL-SCNC: 3.8 MMOL/L (ref 3.5–5.1)
PROT SERPL-MCNC: 6.3 G/DL (ref 6.4–8.2)
PROT UR STRIP-MCNC: NEGATIVE MG/DL
PROTHROMBIN TIME: 12.6 SEC (ref 9–11.1)
RBC # BLD AUTO: 3.66 M/UL (ref 3.8–5.2)
RBC #/AREA URNS HPF: ABNORMAL /HPF (ref 0–5)
SERVICE CMNT-IMP: ABNORMAL
SERVICE CMNT-IMP: ABNORMAL
SERVICE CMNT-IMP: NORMAL
SERVICE CMNT-IMP: NORMAL
SODIUM SERPL-SCNC: 140 MMOL/L (ref 136–145)
SP GR UR REFRACTOMETRY: 1.01 (ref 1–1.03)
UA: UC IF INDICATED,UAUC: ABNORMAL
UROBILINOGEN UR QL STRIP.AUTO: 0.2 EU/DL (ref 0.2–1)
VANCOMYCIN SERPL-MCNC: 18.2 UG/ML
WBC # BLD AUTO: 9.7 K/UL (ref 3.6–11)
WBC URNS QL MICRO: ABNORMAL /HPF (ref 0–4)

## 2021-12-28 PROCEDURE — 74011250636 HC RX REV CODE- 250/636: Performed by: INTERNAL MEDICINE

## 2021-12-28 PROCEDURE — 99223 1ST HOSP IP/OBS HIGH 75: CPT | Performed by: INTERNAL MEDICINE

## 2021-12-28 PROCEDURE — 74011250637 HC RX REV CODE- 250/637: Performed by: INTERNAL MEDICINE

## 2021-12-28 PROCEDURE — 82962 GLUCOSE BLOOD TEST: CPT

## 2021-12-28 PROCEDURE — 74011000250 HC RX REV CODE- 250: Performed by: INTERNAL MEDICINE

## 2021-12-28 PROCEDURE — 81001 URINALYSIS AUTO W/SCOPE: CPT

## 2021-12-28 PROCEDURE — 97530 THERAPEUTIC ACTIVITIES: CPT

## 2021-12-28 PROCEDURE — 77030008771 HC TU NG SALEM SUMP -A

## 2021-12-28 PROCEDURE — 36415 COLL VENOUS BLD VENIPUNCTURE: CPT

## 2021-12-28 PROCEDURE — 77030038269 HC DRN EXT URIN PURWCK BARD -A

## 2021-12-28 PROCEDURE — 97161 PT EVAL LOW COMPLEX 20 MIN: CPT

## 2021-12-28 PROCEDURE — 80202 ASSAY OF VANCOMYCIN: CPT

## 2021-12-28 PROCEDURE — 99233 SBSQ HOSP IP/OBS HIGH 50: CPT | Performed by: CLINICAL NURSE SPECIALIST

## 2021-12-28 PROCEDURE — 87086 URINE CULTURE/COLONY COUNT: CPT

## 2021-12-28 PROCEDURE — 99221 1ST HOSP IP/OBS SF/LOW 40: CPT | Performed by: ORTHOPAEDIC SURGERY

## 2021-12-28 PROCEDURE — 83605 ASSAY OF LACTIC ACID: CPT

## 2021-12-28 PROCEDURE — 80053 COMPREHEN METABOLIC PANEL: CPT

## 2021-12-28 PROCEDURE — 85025 COMPLETE CBC W/AUTO DIFF WBC: CPT

## 2021-12-28 PROCEDURE — 65210000002 HC RM PRIVATE GYN

## 2021-12-28 PROCEDURE — 85610 PROTHROMBIN TIME: CPT

## 2021-12-28 RX ORDER — MAGNESIUM SULFATE HEPTAHYDRATE 40 MG/ML
2 INJECTION, SOLUTION INTRAVENOUS ONCE
Status: COMPLETED | OUTPATIENT
Start: 2021-12-28 | End: 2021-12-28

## 2021-12-28 RX ADMIN — SODIUM CHLORIDE, PRESERVATIVE FREE 10 ML: 5 INJECTION INTRAVENOUS at 22:27

## 2021-12-28 RX ADMIN — SODIUM CHLORIDE, PRESERVATIVE FREE 10 ML: 5 INJECTION INTRAVENOUS at 07:21

## 2021-12-28 RX ADMIN — SODIUM CHLORIDE 100 ML/HR: 9 INJECTION, SOLUTION INTRAVENOUS at 11:18

## 2021-12-28 RX ADMIN — ATORVASTATIN CALCIUM 40 MG: 40 TABLET, FILM COATED ORAL at 22:24

## 2021-12-28 RX ADMIN — MAGNESIUM SULFATE HEPTAHYDRATE 2 G: 2 INJECTION, SOLUTION INTRAVENOUS at 15:34

## 2021-12-28 RX ADMIN — ASPIRIN 81 MG: 81 TABLET, COATED ORAL at 08:45

## 2021-12-28 RX ADMIN — CEFEPIME 2 G: 2 INJECTION, POWDER, FOR SOLUTION INTRAVENOUS at 15:33

## 2021-12-28 RX ADMIN — METOPROLOL TARTRATE 12.5 MG: 25 TABLET, FILM COATED ORAL at 22:24

## 2021-12-28 RX ADMIN — APIXABAN 5 MG: 5 TABLET, FILM COATED ORAL at 22:24

## 2021-12-28 RX ADMIN — CEFEPIME 2 G: 2 INJECTION, POWDER, FOR SOLUTION INTRAVENOUS at 03:20

## 2021-12-28 RX ADMIN — LEVOTHYROXINE SODIUM 112 MCG: 0.11 TABLET ORAL at 07:18

## 2021-12-28 RX ADMIN — METOPROLOL TARTRATE 25 MG: 25 TABLET, FILM COATED ORAL at 07:18

## 2021-12-28 RX ADMIN — SODIUM CHLORIDE, PRESERVATIVE FREE 10 ML: 5 INJECTION INTRAVENOUS at 01:04

## 2021-12-28 RX ADMIN — PAROXETINE HYDROCHLORIDE 40 MG: 20 TABLET, FILM COATED ORAL at 07:18

## 2021-12-28 RX ADMIN — Medication 1 CAPSULE: at 08:45

## 2021-12-28 RX ADMIN — VANCOMYCIN HYDROCHLORIDE 1250 MG: 1.25 INJECTION, POWDER, LYOPHILIZED, FOR SOLUTION INTRAVENOUS at 11:17

## 2021-12-28 RX ADMIN — PANTOPRAZOLE SODIUM 40 MG: 40 TABLET, DELAYED RELEASE ORAL at 07:18

## 2021-12-28 RX ADMIN — SODIUM CHLORIDE, PRESERVATIVE FREE 10 ML: 5 INJECTION INTRAVENOUS at 01:03

## 2021-12-28 RX ADMIN — SODIUM CHLORIDE, PRESERVATIVE FREE 10 ML: 5 INJECTION INTRAVENOUS at 15:33

## 2021-12-28 RX ADMIN — APIXABAN 5 MG: 5 TABLET, FILM COATED ORAL at 08:45

## 2021-12-28 RX ADMIN — ALPRAZOLAM 0.5 MG: 0.5 TABLET ORAL at 01:03

## 2021-12-28 RX ADMIN — MICONAZOLE NITRATE 2 % TOPICAL POWDER: at 18:36

## 2021-12-28 NOTE — PROGRESS NOTES
Vancomycin - Continued on Admission (Per ID through 22)    Indication:  Streptococcus anginosus epidural/right iliopsoas abscess   Current regimen: TBD (1250mg q12h PTA)  Abx regimen:  vanc + cefepime  ID Following ?: Consulted  Concomitant nephrotoxic drugs (requires more frequent monitoring): None  Frequency of BMP?: daily thru     Recent Labs     21  0323 21  1124   WBC 9.7 10.2   CREA 1.08* 1.15*   BUN 9 7     Est CrCl: 58 ml/min; UO: -- ml/kg/hr  Temp (24hrs), Av.9 °F (36.6 °C), Min:97.6 °F (36.4 °C), Max:98 °F (36.7 °C)    Cultures:    drainage: light strep anginosis   blood x2: NGTD - prelim      MRSA Swab ordered (if applicable)? N/A    Goal target range Trough 10-15 mcg/mL    Recent level history:  Date/Time Dose & Interval Measured Level (mcg/mL) Associated AUC/GRISELDA Dose Adjustment     @ 1124 1250mg q12h  23.7 ~15 hours post dose     @ 0323  -- 18.2 ~31 hours post dose 1250mg x1                                    Plan: Will order 1250mg x1 as patient likely within goal range at this point. Plan to clarify trough goal with ID - goal trough of 15-20 mcg/mL per last ID note from previous admission at [facility]. Pharmacy will continue to follow and make adjustments.

## 2021-12-28 NOTE — ED NOTES
Bedside and Verbal shift change report given to Delicia Camargo RN  (oncoming nurse) by Neda Gilford, RN (offgoing nurse). Report included the following information SBAR, Kardex, ED Summary, Procedure Summary and Intake/Output.

## 2021-12-28 NOTE — ED NOTES
Bedside and Verbal shift change report given to [facility] - STAN RN (oncoming nurse) by Luis E Post RN (offgoing nurse).  Report included the following information SBAR, ED Summary and MAR. ]

## 2021-12-28 NOTE — PROGRESS NOTES
Bedside and Verbal shift change report given to Luis Mars (oncoming nurse) by Francis Colvin (offgoing nurse). Report included the following information SBAR, Kardex, ED Summary, Procedure Summary, Intake/Output, MAR, Accordion, Recent Results, Med Rec Status and Cardiac Rhythm NSR.

## 2021-12-28 NOTE — PROGRESS NOTES
Problem: Mobility Impaired (Adult and Pediatric)  Goal: *Acute Goals and Plan of Care (Insert Text)  Description: FUNCTIONAL STATUS PRIOR TO ADMISSION: Patient was independent and active without use of DME prior to November of this year and hospitalization. Since then she has been using a rolling walker. Was admitted from [facility] where she was for rehab and IV antibiotics via PICC line. When asked she endorsed 5 falls in the last 12 months. Leanna Hernandez HOME SUPPORT PRIOR TO ADMISSION: The patient lived with her  prior to hospitalization last month. She reports that her  used be be able to assist her but that is no longer the case (she will need to be mod I). Leanna Hernandez Physical Therapy Goals  Initiated 12/28/2021    1. Patient will move from supine to sit and sit to supine , scoot up and down, and roll side to side in bed with independence within 7 days. 2. Patient will perform sit to stand with modified independence within 7 days. 3. Patient will ambulate with modified independence for 150 feet with the least restrictive device within 7 days. 4. Patient will verbalize and demonstrate understanding of spinal precautions (No bending, lifting greater than 5 lbs, or twisting; log-roll technique; frequent repositioning as instructed) within 7 days. Outcome: Progressing Towards Goal   PHYSICAL THERAPY EVALUATION  Patient: Guerline Keenan (61 y.o. female)  Date: 12/28/2021  Primary Diagnosis: MERCEDES (acute kidney injury) ([facility] [address] [N17.9]  Abdominal pain [R10.9]  Postprocedural intraabdominal abscess [T81.43XA]        Precautions:   Back,Fall (PICC line LUE)    ASSESSMENT  Based on the objective data described below, the patient presents with reports of pain well controlled and agreeable to eval. All things considered she is moving well and was able to take some steps along the EOB. Offered up to the chair post session but she politely declined.  She was admitted from a SNF where she was for rehab and IV antibiotics following admission to [facility] for SIRS and underwent and incision and drainage for a lumbar wound on 12/02/2021. Did review back precautions and pt adhered to them with verbal and tactile cues, moving well. Anticipate slow steady gains with mobility and return to the SNF to complete the program there. Pt remains far from reported baseline of independent and she reported need to be mod I.      Vitals:      12/28/21 1500 12/28/21 1512   BP:   (!) 148/82 (!) 158/82   BP 1 Location:   Right upper arm Right upper arm   BP Patient Position:   Semi fowlers Sitting   Pulse:   79 82   Temp:       Resp:       Height:       Weight:       SpO2:            Current Level of Function Impacting Discharge (mobility/balance): good with walker support    Functional Outcome Measure: The patient scored 0 (did not complete) on the TUG outcome measure which is indicative of increased fall risk. .      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 from 11/6/2021-12/09/2021). She had undergone an L2-3 AND L5-S1  LUMBAR LAMINECTOMY WITH ANDREWS OSTEOTOMY L2-3 AND L3-4 WITH PLF L2-S on June 2, 2021, fall history       Patient will benefit from skilled therapy intervention to address the above noted impairments. PLAN :  Recommendations and Planned Interventions: bed mobility training, transfer training, gait training, therapeutic exercises, edema management/control, patient and family training/education, and therapeutic activities      Frequency/Duration: Patient will be followed by physical therapy:  3 times a week to address goals.     Recommendation for discharge: (in order for the patient to meet his/her long term goals)  Therapy up to 5 days/week in SNF setting    This discharge recommendation:  A follow-up discussion with the attending provider and/or case management is planned    IF patient discharges home will need the following DME: to be determined (TBD)         SUBJECTIVE:   Patient stated It's really not hurting right now.     OBJECTIVE DATA SUMMARY:   Consult received, chart reviewed, pt cleared by nursing and Dr. Cal Matos  HISTORY:    Past Medical History:   Diagnosis Date    Adverse effect of anesthesia     O2 DROPS WITH ANESTHESIA    Arthritis     KNEES    Cancer ([facility] [address] 03/13/2015    SQUAMOUS CELL CARCINOMA; tonsils and lymph nodes.   Removed 3-2015 with radiation    GERD (gastroesophageal reflux disease)     Hypertension     NO LONGER ON MEDICATION    Hypothyroid     HYPOTHYROIDISM    Migraine     Nausea & vomiting     Obesity     Other ill-defined conditions(799.89)     chronic back pain    Psychiatric disorder     anxiety    Psychiatric disorder     panic ATTACKS    SVT (supraventricular tachycardia) ([facility] [address] 05/24/2014    Ulcerative colitis      Past Surgical History:   Procedure Laterality Date    COLONOSCOPY,DIAGNOSTIC  11/19/2015         HX GI      colonscopy    HX HEENT  03/2015    tonsillectomy (CANCER) AND NECK LYMPH NODES    HX HEENT  2008    PARATHYROID REMOVAL    HX HEENT Left 2002    EYE LASER    HX HEMORRHOIDECTOMY  2001, 2016     X2    HX HYSTERECTOMY  1985    HX KNEE ARTHROSCOPY  1995    left knee surgery, TOTAL LT  and Right KNEE 2013    HX KNEE REPLACEMENT Bilateral 2013, 2014    HX LAP CHOLECYSTECTOMY  2002    HX LUMBAR FUSION  2011    SPINAL FUSION with hardware L4-L5    HX ORTHOPAEDIC  1990    CYST REMOVED RIGHT WRIST    HX ORTHOPAEDIC  05/12/2021    L2-3 & L5-21 LUMBAR LAMINECTOMY WITH ANDREWS OSTEOTOMY    HX OTHER SURGICAL      cyst removal right wrist    HX UROLOGICAL  2010    bladder surgery(interstim device)    IR INJ FORAMIN EPID LUMB ANES/STER SNGL  8/19/2019    [place] EGD TRANSORAL BIOPSY SINGLE/MULTIPLE  5/20/2013            Personal factors and/or comorbidities impacting plan of care: paraspinal abscess, iliopsoas abscess in the right leg, celiac artery thrombosis, septic shock (admitted at [facility] from 11/6/2021-12/09/2021). She had undergone an L2-3 AND L5-S1  LUMBAR LAMINECTOMY WITH ANDREWS OSTEOTOMY L2-3 AND L3-4 WITH PLF L2-S on June 2, 2021, fall history    Home Situation  Home Environment: Private residence  Wheelchair Ramp: Yes  One/Two Story Residence: One story  Living Alone: No  Support Systems: Spouse/Significant Other,Other Family Member(s)  Patient Expects to be Discharged to[de-identified] Unknown  Current DME Used/Available at Home: Clide [place]ore, rolling,Transfer bench,Wheelchair,Commode, bedside,Raised toilet seat (reacher and toilet tongs and long handled shoe horn)    EXAMINATION/PRESENTATION/DECISION MAKING:   Critical Behavior:  Neurologic State: Alert  Orientation Level: Oriented X4        Hearing: Auditory  Auditory Impairment: None  Skin:  refer to MD and nurses notes  Edema: slight edema bilateral feet R>L  Range Of Motion:  AROM: Within functional limits                       Strength:    Strength: Within functional limits                    Tone & Sensation:                  Sensation: Impaired (feet for about 5 months prior to admission)               Coordination:     Vision:      Functional Mobility:  Bed Mobility:  Rolling: Stand-by assistance  Supine to Sit: Minimum assistance  Sit to Supine: Minimum assistance     Transfers:  Sit to Stand: Contact guard assistance  Stand to Sit: Contact guard assistance                       Balance:   Sitting: Intact; Without support  Standing: Impaired  Standing - Static: Constant support;Good  Standing - Dynamic : Constant support;Good  Ambulation/Gait Training:  Distance (ft): 3 Feet (ft)  Assistive Device: Gait belt;Walker, rolling  Ambulation - Level of Assistance: Contact guard assistance        Gait Abnormalities: Decreased step clearance        Base of Support: Widened     Speed/Catherine: Slow;Pace decreased (<100 feet/min)  Step Length: Left shortened;Right shortened                     Stairs:               Therapeutic Exercises:       Functional Measure:  Timed up and go:    Timed Get Up And Go Test: 0 (unable to complete)       < than 10 seconds=Normal  Greater then 13.5 seconds (in elderly)=Increased fall risk   Bandar Tanner [place] M. Predicting the probability for falls in community dwelling older adults using the Timed Up and Go Test. Phys Ther. 2000;80:[id number]. Physical Therapy Evaluation Charge Determination   History Examination Presentation Decision-Making   HIGH Complexity :3+ comorbidities / personal factors will impact the outcome/ POC  LOW Complexity : 1-2 Standardized tests and measures addressing body structure, function, activity limitation and / or participation in recreation  LOW Complexity : Stable, uncomplicated  LOW Complexity : FOTO score of       Based on the above components, the patient evaluation is determined to be of the following complexity level: LOW     Pain Rating: Only pain reported was right elbow (previous IV site), pain a this site was not rated    Activity Tolerance:   Good    After treatment patient left in no apparent distress:   Supine in bed, Patient positioned in partial right sidelying for pressure relief, Call bell within reach, and Side rails x 3    COMMUNICATION/EDUCATION:   The patients plan of care was discussed with: Registered nurse. Fall prevention education was provided and the patient/caregiver indicated understanding., Patient/family have participated as able in goal setting and plan of care. , and Patient/family agree to work toward stated goals and plan of care.     Thank you for this referral.  Roslyn Snyder   Time Calculation: 37 mins

## 2021-12-28 NOTE — CONSULTS
Urology Consult    Patient: Sallie Jones MRN: [id number]  SSN: [id number]    YOB: 1950  Age: 66 y.o. Sex: female          Date of Consultation:  December 28, 2021  Requesting Physician: Yeny Self MD  Reason for Consultation:  R hydronephrosis          History of Present Illness:  Sallie Jones is a 66 y.o. female admitted 12/27/2021 to the hospital for MERCEDES (acute kidney injury) ([facility] [address] [N17.9]  Abdominal pain [R10.9]  Postprocedural intraabdominal abscess [T81.43XA]. She was previously admitted in  early November through early December for right psoas abscess with IR guided abscess drainage, who was discharged to [facility] with PICC line for prolonged antibiotics with IV vancomycin through 1/27/2022 per ID based on the culture results. For about a week, patient has noticed bulge and discomfort in right lower quadrant. Yesterday she showed that to nursing team and subsequent to that patient was sent for CT abdomen pelvis assessment here at [facility]. Patient was noted to have considerable CT findings with persistent unchanged right psoas abscess, right perinephric stranding along with hydronephrosis, hypokalemia, mild early MERCEDES with elevated creatinine at 1.15 compared to previous baseline of 0.50.6. So hospitalist team has been consulted to admit the patient for further management. Patient denied any fever or chills. Patient denied any chest pain, shortness of breath, palpitation. Patient denied any leg swelling worsening. Patient denied any urinary trouble or diarrhea or loose stools. No constipation. Patient denied any focal weakness, tingling, numbness. Denied any worsening of back pain. Urology consulted  For hydronephrosis. CT showed Persistent inflammatory stranding and 3 cm x 1.6 cm right lower quadrant fluid collection,unchanged. Pt known to [facility] followed by Dr. Kasia Galan for urinary incontinence last seen in 2019 .  Pt seen n bed states pain and tenderness in RLQ , site of previous perc drain . Denies issues with voiding , or constipation . Discussed CT findings with pt  Including R hydronephrosis . Explained to pt may be related to retroperitoneal process and will most likely need fluid drained again . Explained to pt if that does not improve hydronephrosis , she will eventualyl need a ureteral stent placed to drain R kidney     Past Medical History:   Diagnosis Date    Adverse effect of anesthesia     O2 DROPS WITH ANESTHESIA    Arthritis     KNEES    Cancer ([facility] [address] 03/13/2015    SQUAMOUS CELL CARCINOMA; tonsils and lymph nodes.   Removed 3-2015 with radiation    GERD (gastroesophageal reflux disease)     Hypertension     NO LONGER ON MEDICATION    Hypothyroid     HYPOTHYROIDISM    Migraine     Nausea & vomiting     Obesity     Other ill-defined conditions(799.89)     chronic back pain    Psychiatric disorder     anxiety    Psychiatric disorder     panic ATTACKS    SVT (supraventricular tachycardia) ([facility] [address] 05/24/2014    Ulcerative colitis         Past Surgical History:   Procedure Laterality Date    COLONOSCOPY,DIAGNOSTIC  11/19/2015         HX GI      colonscopy    HX HEENT  03/2015    tonsillectomy (CANCER) AND NECK LYMPH NODES    HX HEENT  2008    PARATHYROID REMOVAL    HX HEENT Left 2002    EYE LASER    HX HEMORRHOIDECTOMY  2001, 2016     X2    HX HYSTERECTOMY  1985    HX KNEE ARTHROSCOPY  1995    left knee surgery, TOTAL LT  and Right KNEE 2013    HX KNEE REPLACEMENT Bilateral 2013, 2014    HX LAP CHOLECYSTECTOMY  2002    HX LUMBAR FUSION  2011    SPINAL FUSION with hardware L4-L5    HX ORTHOPAEDIC  1990    CYST REMOVED RIGHT WRIST    HX ORTHOPAEDIC  05/12/2021    L2-3 & L5-21 LUMBAR LAMINECTOMY WITH ANDREWS OSTEOTOMY    HX OTHER SURGICAL      cyst removal right wrist    HX UROLOGICAL  2010    bladder surgery(interstim device)    IR INJ FORAMIN EPID LUMB ANES/STER SNGL  8/19/2019    CO EGD TRANSORAL BIOPSY SINGLE/MULTIPLE 5/20/2013            Allergies   Allergen Reactions    Adhesive Tape-Silicones Rash    Aloe Vera Rash    Chlorhexidine Rash    Tussin [Dextromethorphan Hbr] Palpitations    Readyprep Chg [Chlorhexidine Gluconate] Rash        Prior to Admission medications    Medication Sig Start Date End Date Taking? Authorizing Provider   nystatin (MYCOSTATIN) 100,000 unit/gram ointment Apply  to affected area three (3) times daily. 12/9/21   Columba Castillo MD   insulin glargine (LANTUS) 100 unit/mL injection 20 Units by SubCUTAneous route nightly for 30 days. 12/9/21 1/8/22  Columba Castillo MD   apixaban (ELIQUIS) 5 mg tablet Take 1 Tablet by mouth every twelve (12) hours for 30 days. 12/9/21 1/8/22  Columba Castillo MD   vancomycin/0.9 % sod chloride (vancomycin in 0.9% Sodium Cl) 1.25 gram/250 mL soln 250 mL by IntraVENous route every twelve (12) hours every twelve (12) hours for 49 days. 12/9/21 1/27/22  Columba Castillo MD   ALPRAZolam (Xanax) 0.5 mg tablet Take 1 Tablet by mouth two (2) times daily as needed for Anxiety. Max Daily Amount: 1 mg. 12/9/21   Columba Castillo MD   acetaminophen (TYLENOL) 325 mg tablet Take 2 Tablets by mouth every six (6) hours as needed for Pain. 7/20/21   Adanmetrula Fu NP   L.acid,para-B. bifidum-S.therm (RISAQUAD) 8 billion cell cap cap Take 1 Capsule by mouth daily. 7/21/21   Clemetine ANDREW Fu   magnesium hydroxide (MILK OF MAGNESIA) 400 mg/5 mL suspension Take 30 mL by mouth daily. 7/21/21   Pratik Fu NP   miconazole (MICOTIN) 2 % topical powder Apply  to affected area two (2) times a day. 7/20/21   Pratik Fu NP   polyethylene glycol (MIRALAX) 17 gram packet Take 1 Packet by mouth daily. 7/20/21   Pratik Fu NP   senna (SENOKOT) 8.6 mg tablet Take 2 Tablets by mouth daily. 7/21/21   Adanmetrula Fu NP   heparin sodium,porcine (heparin, porcine,) 5,000 unit/mL injection 1 mL by SubCUTAneous route every eight (8) hours.  7/20/21   Pratik Fu NP   naloxone [facility]) 4 mg/actuation nasal spray Use 1 spray intranasally, then discard. Repeat with new spray every 2 min as needed for opioid overdose symptoms, alternating nostrils. 6/3/21   MAIKEL Prieto   amitriptyline (ELAVIL) 50 mg tablet Take 50 mg by mouth nightly. Provider, Historical   atorvastatin (LIPITOR) 40 mg tablet Take 40 mg by mouth nightly. Provider, Historical   metFORMIN (GLUCOPHAGE) 500 mg tablet Take 500 mg by mouth two (2) times daily (with meals). Provider, Historical   butalb/acetaminophen/caffeine (FIORICET PO) Take 1 Tablet by mouth as needed. Provider, Historical   aspirin delayed-release 81 mg tablet Take 81 mg by mouth daily. Provider, Historical   levothyroxine (SYNTHROID) 75 mcg tablet Take 112 mcg by mouth Daily (before breakfast). Provider, Historical   PARoxetine (PAXIL) 40 mg tablet Take 40 mg by mouth every morning. Provider, Historical   metoprolol tartrate (LOPRESSOR) 25 mg tablet Take 12.5 mg by mouth nightly. Provider, Historical   metoprolol (LOPRESSOR) 25 mg tablet Take 25 mg by mouth every morning. Provider, Historical   omeprazole (PRILOSEC) 40 mg capsule Take 40 mg by mouth every morning. Provider, Historical       Social History     Tobacco Use    Smoking status: Never Smoker    Smokeless tobacco: Never Used   Substance Use Topics    Alcohol use: No        Family History   Problem Relation Age of Onset    Cancer Mother         ovarian ca?     Heart Disease Father         MI    Heart Attack Father     Cancer Father         MULTIPLE MYELOMA    Liver Disease Father         FROM MEDICATIONS FOR MULTIPLE MYELOMA    OSTEOARTHRITIS Sister     OSTEOARTHRITIS Brother     Cancer Paternal Grandmother 79        breast ca    Breast Cancer Paternal Grandmother 79    Breast Cancer Maternal Aunt 55    Breast Cancer Maternal Aunt 61    Breast Cancer Paternal Aunt 43    Breast Cancer Paternal Aunt         52's    Emphysema Paternal Grandfather     No Known Problems Sister     No Known Problems Brother     No Known Problems Brother     Anesth Problems Neg Hx         ROS:  Admission ROS by Mal Hanson MD from 2021 was reviewed and changes (other than per HPI) include: none. Physical Exam:    Vital Signs:  Temp (24hrs), Av.9 °F (36.6 °C), Min:97.6 °F (36.4 °C), Max:98 °F (36.7 °C)   Blood pressure (!) 165/96, pulse 73, temperature 97.7 °F (36.5 °C), resp. rate 18, height 5' 4\" (1.626 m), weight 117.8 kg (259 lb 11.2 oz), SpO2 93 %. I&O's:  No intake/output data recorded. Appearance: well-developed NAD , obese   Conjunctiva/Lids: conjunctivae and lids normal   Pupil/Iris: pupils equal, round   External Ears/Nose: normal no lesions or deformities   Hearing: grossly intact   Neck: supple   Thyroid: no palpable enlargement   Respiratory Effort: breathing easily   Abd.  Aorta: no palpable enlargement   Lower Extremity: no edema   Abdomen/Flank: soft mildly tender soft tissue bulging   Liver/Spleen: no organomegaly   Hernia: no ventral hernia   Lymph: no adenopathy   Gait/Station: normal   Digits/Nails: no clubbing cyanosis petechiae   Skin Inspection: warm and dry   Skin Palpation: no SQ nodularity   Sensation: no sensory deficits   Judgment/Insight: intact   Mood/Affect: normal      Lab Review:     CBC   Recent Labs     21  0323 21  1124   WBC 9.7 10.2   HGB 9.1* 9.2*   HCT 29.5* 31.1*   * 512*     CMP   Recent Labs     21  0323 21  1124    140   K 3.8 2.8*   * 104   CO2 27 31   GLU 96 114*   BUN 9 7   CREA 1.08* 1.15*   CA 8.1* 8.3*   MG  --  1.2*   ALB 2.2* 2.3*   TBILI 0.4 0.4   ALT  28       Other   Recent Labs     21   INR 1.2*       UA:   Lab Results   Component Value Date/Time    Color YELLOW/STRAW 2021 12:45 PM    Appearance CLEAR 2021 12:45 PM    Specific gravity 1.025 2021 12:26 PM    Specific gravity 1.010 2011 10:20 AM    pH (UA) 5.0 2021 12:45 PM Protein Negative 11/06/2021 12:45 PM    Glucose >1,000 (A) 11/06/2021 12:45 PM    Ketone TRACE (A) 11/06/2021 12:45 PM    Bilirubin Negative 11/06/2021 12:45 PM    Urobilinogen 0.2 11/06/2021 12:45 PM    Nitrites Negative 11/06/2021 12:45 PM    Leukocyte Esterase Negative 11/06/2021 12:45 PM    Epithelial cells FEW 11/06/2021 12:45 PM    Bacteria Negative 11/06/2021 12:45 PM    WBC 20-50 11/06/2021 12:45 PM    RBC 0-5 11/06/2021 12:45 PM       Cultures:  NA    Imaging:      CT: Results for orders placed during the hospital encounter of 12/27/21    CT ABD PELV WO CONT    Narrative  INDICATION: Right lower quadrant abdominal pain. Exam: Noncontrast CT of the abdomen and pelvis is performed with 5 mm  collimation. Sagittal and coronal reformatted images were also performed. CT dose reduction was achieved through the use of a standardized protocol  tailored for this examination and automatic exposure control for dose  modulation. Adaptive statistical iterative reconstruction (ASIR) was utilized. Direct comparison is made to prior CT dated December 4, 2021. FINDINGS:    There is a very small right pleural effusion. There is very mild bibasilar  atelectasis. Abdomen:    Liver: The liver is normal on noncontrast images. Spleen: There are persistent wedge-shaped hypodensities within the spleen  consistent with infarcts. The spleen is not enlarged. Adrenals: The adrenals are normal on noncontrast images. Pancreas: The pancreas is normal on noncontrast images. Gallbladder: The gallbladder is surgically absent. Kidneys: There is bilateral perinephric stranding. There is right hydronephrosis  and proximal right hydroureter. Bowel: No thickened or dilated loop of large or small bowel seen. Appendix: The appendix is not visualized.     Pelvis: Urinary bladder is partially filled and grossly normal.    Miscellaneous: Right lower quadrant percutaneous catheter has been removed;  there is persistent inflammatory stranding within the right lower quadrant of  the abdomen. Within the right lower quadrant abdomen, there is a very small 3 cm  x 1.6 cm focal fluid collection, unchanged compared to prior CT dated December 4, 2021. There is no free intraperitoneal gas or fluid. The uterus is absent. Impression  1. Very small right pleural effusion. 2. Right perinephric stranding and proximal right hydronephrosis. 3. Interval removal of right lower quadrant percutaneous catheter. Persistent  inflammatory stranding and 3 cm x 1.6 cm right lower quadrant fluid collection,  unchanged. Assessment:     Active Problems:    MERCEDES (acute kidney injury) ([facility] [address] (11/6/2021)      Abdominal pain (12/27/2021)      Postprocedural intraabdominal abscess (12/27/2021)          Plan:     1. Psaos abscess - recurrent fluid collection  Consider consult to  IR to drain fluid collection . CT scan reviewed with Dr. Barb Long   2 Hydronephrosis - new finding on CT  possibly related to retroperitoneal process . Consult IR to consider  draining fluid collection , will re image to assess hydro after drainage , however if pt develops pyelonephritis will need ureteral stent sooner . For now will observe since renal function reserved  Will check UA / Ucx      Supervising Md : Dr. Barb Long   Signed By: Shruthi Greer.  Concepcion Ac NP  - December 28, 2021

## 2021-12-28 NOTE — PROGRESS NOTES
Consult received, chart reviewed and per chart review: pt was admitted yesterday. from [facility] yesterday with c/o of abdominal pain x1week. Pt was at [facility] for rehab after a prolonged admission to [facility] from 11/6/ to 12/9. 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 She had undergone an L2-3 AND L5-S1  LUMBAR LAMINECTOMY WITH ANDREWS OSTEOTOMY L2-3 AND L3-4 WITH PLF L2-S on June 2, 2021. Note that pt has an ortho consult. PT will hold, follow and see as able and appropriate pending ortho consult and recommendations.  Thank you, Vikas Ibrahim, PT

## 2021-12-28 NOTE — ROUTINE PROCESS
TRANSFER - OUT REPORT:    Verbal report given to JANIS Arce(name) on Josie Nicolas  being transferred to [facility](unit) for routine progression of care       Report consisted of patients Situation, Background, Assessment and   Recommendations(SBAR). Information from the following report(s) SBAR, Kardex, ED Summary, Procedure Summary, Intake/Output, MAR, Accordion, Recent Results, Med Rec Status, Cardiac Rhythm NSR and Quality Measures was reviewed with the receiving nurse. Lines:   PICC Double Lumen 12/23/21 Left (Active)   Site Assessment Clean, dry, & intact 12/27/21 1115   Phlebitis Assessment 0 12/27/21 1115   Infiltration Assessment 0 12/27/21 1115   Dressing Status Clean, dry, & intact 12/27/21 1115       Peripheral IV 12/27/21 Right Antecubital (Active)   Site Assessment Clean, dry, & intact 12/27/21 1127   Phlebitis Assessment 0 12/27/21 1127   Infiltration Assessment 0 12/27/21 1127   Dressing Status Clean, dry, & intact 12/27/21 1127   Hub Color/Line Status Pink 12/27/21 1127   Action Taken Blood drawn 12/27/21 1127        Opportunity for questions and clarification was provided.       Patient transported with:   O2 @ 2 liters   Tech

## 2021-12-28 NOTE — DIABETES MGMT
[facility]    CLINICAL NURSE SPECIALIST CONSULT     Initial Presentation   Tanner Cordova is a 66 y.o. female admitted from SNF  With persistent non healing psoas abscess s/p back surgery in June 2021. Was recently hospitalized for same infection @ [address]  During the month of November. She was discharged to SNF with PICC for longterm abx treatment. . Per CT @ [facility] noted new hydronephrosis with abscess. HX:   Past Medical History:   Diagnosis Date    Adverse effect of anesthesia     O2 DROPS WITH ANESTHESIA    Arthritis     KNEES    Cancer ([facility] [address] 03/13/2015    SQUAMOUS CELL CARCINOMA; tonsils and lymph nodes. Removed 3-2015 with radiation    GERD (gastroesophageal reflux disease)     Hypertension     NO LONGER ON MEDICATION    Hypothyroid     HYPOTHYROIDISM    Migraine     Nausea & vomiting     Obesity     Other ill-defined conditions(799.89)     chronic back pain    Psychiatric disorder     anxiety    Psychiatric disorder     panic ATTACKS    SVT (supraventricular tachycardia) ([facility] [address] 05/24/2014    Ulcerative colitis     DM2    INITIAL DX:   MERCEDES (acute kidney injury) ([facility] [address] [N17.9]  Abdominal pain [R10.9]  Postprocedural intraabdominal abscess [T81.43XA]     Current Treatment     TX: Vancomycin/ Blood cultures/ ID and ortho consults/urology    Consulted by Provider for advanced diabetes nursing assessment and care for:   [x] Inpatient management strategy  [x] Home management assessment      Hospital Course   Clinical progress has been complicated by persistent intra-abdominal abscess and hydronephrosis per CT . Diabetes History   DM2- A1C 7.4% -which is improved from previous A1C in Nov. 2021 11.7% (which could be inaccurate given previous A1Cs were below 7%?     Diabetes-related Medical History  Acute complications  NONE  Neurological complications  Peripheral neuropathy  Microvascular disease  NONE  Macrovascular disease  NONE  Other associated conditions     Hypothyroid/obesity/anxiety    Diabetes Medication History  Key Antihyperglycemic Medications             insulin glargine (LANTUS) 100 unit/mL injection 20 Units by SubCUTAneous route nightly for 30 days. metFORMIN (GLUCOPHAGE) 500 mg tablet Take 500 mg by mouth two (2) times daily (with meals).            Diabetes self-management practices: patient sleeping at time of visit  Eating pattern- most recently per SNF     Physical activity pattern-very limited due to complications from back surgery     Monitoring pattern-per SNF, H&P notes she has not been on Lantus at SNF due to lows     Taking medications pattern  [x] Consistent administration  [x] Affordable    Overall evaluation:    [x] Achieving A1c target with drug therapy & self-care practices    Subjective   Patient sleeping at time of visit      Objective   Physical exam  General Obese female  Neuro  Not assessed   Vital Signs   Visit Vitals  BP (!) 165/96   Pulse 74   Temp 97.7 °F (36.5 °C)   Resp 18   Ht 5' 4\" (1.626 m)   Wt 117.8 kg (259 lb 11.2 oz)   SpO2 93%   BMI 44.58 kg/m²        Laboratory  Recent Labs     12/28/21  0323 12/27/21  1124   GLU 96 114*   AGAP 4* 5   WBC 9.7 10.2   CREA 1.08* 1.15*   GFRNA 50* 46*   AST 25 24   ALT 27 28       Factors impacting BG management  Factor Dose Comments   Nutrition:  Standard meals     60 grams/meal      Drugs:  Blood transfusion(s)     NONE     A1cs inaccurate     Pain Right lower quadrant back    Infection psoas abscess     Other:   Kidney function  Liver function     MERCEDES on admission, GFR 46-->50      Blood glucose pattern      Significant diabetes-related events over the past 24-72 hours  BG trends have been in goal since admission without need for insulin   A1C 7.4%    Assessment and Plan   Nursing Diagnosis Risk for unstable blood glucose pattern   Nursing Intervention Domain 9338 Decision-making Support   Nursing Interventions Examined current inpatient diabetes/blood glucose control Explored factors facilitating and impeding inpatient management  Explored corrective strategies with patient and responsible inpatient provider   Informed patient of rational for insulin strategy while hospitalized     Nursing Diagnosis 36238 Ineffective Health Management   Nursing Intervention Domain 12 Decision-makingSupport   Nursing Interventions Identified diabetes self-management practices impeding diabetes control  Discussed diabetes survival skills related to  1. Healthy Plate eating plan; given handouts  2. Role of physical activity in improving insulin sensitivity and action  3. Procedure for blood glucose monitoring & options for ow-cost products available from [facility]   4. Medications plan at discharge     Evaluation   This  female, with controlled Type 2 diabetes, did  achieve diabetes control prior to admission, as evidenced by A1c of 7.4 (slightly above goal). Currently admitted with complications from recent back surgery June 2021. Noted recent month long admission in Nov. For right psoas abscess with IR guided abscess drainage, and was sent to SNF with PICC for lonterm abx treatment . At this admission CT revealed continued abscess and right hydronephrosis. Urology consulted - noted hydronephrosis likely due to abscess. Restarted on Vancomycin -.  BG trends since admission have been within goal and has not required insulin. Per H &P MD stated patient had stopped getting Lantus at SNF due to low BG. Would continue with Correctional insulin for now. IF BG consistently >200, then would consider initiating low dose basal insulin @0.1u/kg dosing. Would not re-start Metformin while inpatient. Recommendations   1. [] Use of Subcutaneous Insulin Order set (1935)  Insulin Dosing Specific recommendation   CONTINUE Corrective                                       (Useful in adjusting insulin dosing) [x] Normal sensitivity        2.   IF BG consistently >200, then would consider initiating low dose basal insulin @0.1u/kg dosing. Diabetes Management Team to sign off at this point as patient's blood glucose remains stable. Please re-consult us if patient needs change. Thank you for including us in their care. Billing Code(s)   94232    Before making these care recommendations, I personally reviewed the hospitalization record, including notes, laboratory & diagnostic data and current medications, and examined the patient at the bedside (circumstances permitting) before making care recommendations.      Total minutes: [address] [facility]  Diabetes Clinical Nurse Specialist  [facility]  Access via [employer]

## 2021-12-28 NOTE — PROGRESS NOTES
Occupational Therapy:     Orders received and chart reviewed in preparation for OT evaluation. Noted pt awaiting consult from orthopedic surgery. Will hold and follow up for OT evaluation and intervention as able and appropriate following consult/plan from ortho standpoint.      Thank you,   JA Torres, OTR/L

## 2021-12-28 NOTE — CONSULTS
Consult Date: 12/28/2021    IP CONSULT TO ORTHOPEDIC SURGERY  Consult performed by: Sydnee Freitas MD  Consult ordered by: Rayray Roldan MD            Subjective   She has been readmitted for possible abdominal abscess. She has mostly right flank pain. She has no back issues. She was just seen in the office last week and have her sutures removed from her lumbar incision. She has no fevers or chills. PLAN:    In terms of her lower back she is stable. We will do a wound check later this week and possibly remove more sutures. Will defer IV antibiotics to factious disease. Would recommend PT if able to tolerate. Past Medical History:   Diagnosis Date    Adverse effect of anesthesia     O2 DROPS WITH ANESTHESIA    Arthritis     KNEES    Cancer ([facility] [address] 03/13/2015    SQUAMOUS CELL CARCINOMA; tonsils and lymph nodes.   Removed 3-2015 with radiation    GERD (gastroesophageal reflux disease)     Hypertension     NO LONGER ON MEDICATION    Hypothyroid     HYPOTHYROIDISM    Migraine     Nausea & vomiting     Obesity     Other ill-defined conditions(799.89)     chronic back pain    Psychiatric disorder     anxiety    Psychiatric disorder     panic ATTACKS    SVT (supraventricular tachycardia) ([facility] [address] 05/24/2014    Ulcerative colitis       Past Surgical History:   Procedure Laterality Date    COLONOSCOPY,DIAGNOSTIC  11/19/2015         HX GI      colonscopy    HX HEENT  03/2015    tonsillectomy (CANCER) AND NECK LYMPH NODES    HX HEENT  2008    PARATHYROID REMOVAL    HX HEENT Left 2002    EYE LASER    HX HEMORRHOIDECTOMY  2001, 2016     X2    HX HYSTERECTOMY  1985    HX KNEE ARTHROSCOPY  1995    left knee surgery, TOTAL LT  and Right KNEE 2013    HX KNEE REPLACEMENT Bilateral 2013, 2014    HX LAP CHOLECYSTECTOMY  2002    HX LUMBAR FUSION  2011    SPINAL FUSION with hardware L4-L5    HX ORTHOPAEDIC  1990    CYST REMOVED RIGHT WRIST    HX ORTHOPAEDIC  05/12/2021    L2-3 & L5-21 LUMBAR LAMINECTOMY WITH ANDREWS OSTEOTOMY    HX OTHER SURGICAL      cyst removal right wrist    HX UROLOGICAL  2010    bladder surgery(interstim device)    IR INJ FORAMIN EPID LUMB ANES/STER SNGL  8/19/2019    MA EGD TRANSORAL BIOPSY SINGLE/MULTIPLE  5/20/2013          Family History   Problem Relation Age of Onset    Cancer Mother         ovarian ca?     Heart Disease Father         MI    Heart Attack Father     Cancer Father         MULTIPLE MYELOMA    Liver Disease Father         FROM MEDICATIONS FOR MULTIPLE MYELOMA    OSTEOARTHRITIS Sister     OSTEOARTHRITIS Brother     Cancer Paternal Grandmother 79        breast ca    Breast Cancer Paternal Grandmother 79    Breast Cancer Maternal Aunt 55    Breast Cancer Maternal Aunt 61    Breast Cancer Paternal Aunt 43    Breast Cancer Paternal Aunt         52's    Emphysema Paternal Grandfather     No Known Problems Sister     No Known Problems Brother     No Known Problems Brother     Anesth Problems Neg Hx       Social History     Tobacco Use    Smoking status: Never Smoker    Smokeless tobacco: Never Used   Substance Use Topics    Alcohol use: No       Current Facility-Administered Medications   Medication Dose Route Frequency Provider Last Rate Last Admin    influenza vaccine 2021-22 (6 mos+)(PF) (FLUARIX/FLULAVAL/FLUZONE QUAD) injection 0.5 mL  1 Each IntraMUSCular PRIOR TO DISCHARGE David Trujillo MD        sodium chloride (NS) flush 5-40 mL  5-40 mL IntraVENous Q8H Sathish Anand MD   10 mL at 12/28/21 0721    sodium chloride (NS) flush 5-40 mL  5-40 mL IntraVENous PRN Sathish Anand MD        acetaminophen (TYLENOL) tablet 650 mg  650 mg Oral Q6H PRN Sathish Anand MD   650 mg at 12/27/21 2017    Or    acetaminophen (TYLENOL) suppository 650 mg  650 mg Rectal Q6H PRN Sathish Anand MD        polyethylene glycol (MIRALAX) packet 17 g  17 g Oral DAILY PRN Sathish Anand MD        ondansetron (ZOFRAN ODT) tablet 4 mg  4 mg Oral Q8H PRN Khoi Noe MD        Or    [facility]ron [facility]) injection 4 mg  4 mg IntraVENous Q6H PRN Khoi Noe MD        cefepime (MAXIPIME) 2 g in sterile water (preservative free) 10 mL IV syringe  2 g IntraVENous Q12H Khoi Noe  mL/hr at 12/28/21 0320 2 g at 12/28/21 0320    Vancomycin - pharmacy to dose   Other Rx Dosing/Monitoring Khoi Noe MD        0.9% sodium chloride infusion  100 mL/hr IntraVENous CONTINUOUS Khoi Noe  mL/hr at 12/28/21 1118 100 mL/hr at 12/28/21 1118    glucose chewable tablet 16 g  4 Tablet Oral PRN Khoi Noe MD        dextrose (D50W) injection syrg 12.5-25 g  25-50 mL IntraVENous PRN Khoi Noe MD        glucagon (GLUCAGEN) injection 1 mg  1 mg IntraMUSCular PRN Khoi Noe MD        insulin lispro (HUMALOG) injection   SubCUTAneous AC&HS Khoi Noe MD        ALPRAZolam Jesse Paci) tablet 0.5 mg  0.5 mg Oral BID PRN Khoi Noe MD   0.5 mg at 12/28/21 0103    amitriptyline (ELAVIL) tablet 50 mg  50 mg Oral QHS Khoi Noe MD   50 mg at 12/27/21 2322    apixaban (ELIQUIS) tablet 5 mg  5 mg Oral Q12H David Trujillo MD   5 mg at 12/28/21 0845    aspirin delayed-release tablet 81 mg  81 mg Oral DAILY Khoi Noe MD   81 mg at 12/28/21 0845    atorvastatin (LIPITOR) tablet 40 mg  40 mg Oral QHS David Trujillo MD   40 mg at 12/27/21 2322    levothyroxine (SYNTHROID) tablet 112 mcg  112 mcg Oral ACB David Trujillo MD   112 mcg at 12/28/21 0718    L.acidophilus-paracasei-S.thermophil-bifidobacter (RISAQUAD) 8 billion cell capsule  1 Capsule Oral DAILY Khoi Noe MD   1 Capsule at 12/28/21 0845    magnesium hydroxide (MILK OF MAGNESIA) 400 mg/5 mL oral suspension 30 mL  30 mL Oral DAILY David Trujillo MD        metoprolol tartrate (LOPRESSOR) tablet 25 mg  25 mg Oral 7am David Trujillo MD   25 mg at 12/28/21 0718    metoprolol tartrate (LOPRESSOR) tablet 12.5 mg  12.5 mg Oral QHS David Trujillo MD   12.5 mg at 12/27/21 2129    miconazole (MICOTIN) 2 % powder   Topical BID Artis Escobar MD        pantoprazole (PROTONIX) tablet 40 mg  40 mg Oral ACB David Trujillo MD   40 mg at 12/28/21 0718    PARoxetine (PAXIL) tablet 40 mg  40 mg Oral 7am Artis Escobar MD   40 mg at 12/28/21 0718    polyethylene glycol (MIRALAX) packet 17 g  17 g Oral DAILY Artis Escobar MD        senna (SENOKOT) tablet 17.2 mg  2 Tablet Oral DAILY Artis Escobar MD            Review of Systems    Objective     Vital signs for last 24 hours:  Visit Vitals  BP (!) 163/86 (BP 1 Location: Right upper arm, BP Patient Position: At rest;Lying right side)   Pulse 74   Temp 97.7 °F (36.5 °C)   Resp 20   Ht 5' 4\" (1.626 m)   Wt 259 lb 11.2 oz (117.8 kg)   SpO2 94%   BMI 44.58 kg/m²       Intake/Output this shift:  Current Shift: 12/28 0701 - 12/28 1900  In: 1600 [P.O.:1600]  Out: 1300 [Urine:1300]  Last 3 Shifts: 12/26 1901 - 12/28 0700  In: 110 [I.V.:110]  Out: -     Data Review:   Recent Results (from the past 24 hour(s))   CULTURE, BLOOD    Collection Time: 12/27/21  3:12 PM    Specimen: Blood   Result Value Ref Range    Special Requests: NO SPECIAL REQUESTS      Culture result: NO GROWTH AFTER 14 HOURS     EKG, 12 LEAD, INITIAL    Collection Time: 12/27/21  3:16 PM   Result Value Ref Range    Ventricular Rate 79 BPM    Atrial Rate 79 BPM    P-R Interval 174 ms    QRS Duration 68 ms    Q-T Interval 404 ms    QTC Calculation (Bezet) 463 ms    Calculated P Axis 27 degrees    Calculated R Axis -3 degrees    Calculated T Axis 17 degrees    Diagnosis       Normal sinus rhythm  When compared with ECG of 06-NOV-2021 23:06,  Nonspecific T wave abnormality, improved in Inferior leads  Nonspecific T wave abnormality no longer evident in Lateral leads  Confirmed by Veronica Eddy M.D., Robert Joy ([id number]) on 12/27/2021 3:48:08 PM     GLUCOSE, POC    Collection Time: 12/27/21  4:35 PM   Result Value Ref Range    Glucose (POC) 75 65 - 117 mg/dL    Performed by [employer] Luis    GLUCOSE, POC    Collection Time: 12/27/21  9:33 PM   Result Value Ref Range    Glucose (POC) 114 65 - 117 mg/dL    Performed by Cortney Machado    LACTIC ACID    Collection Time: 12/28/21  3:23 AM   Result Value Ref Range    Lactic acid 1.0 0.4 - 2.0 MMOL/L   METABOLIC PANEL, COMPREHENSIVE    Collection Time: 12/28/21  3:23 AM   Result Value Ref Range    Sodium 140 136 - 145 mmol/L    Potassium 3.8 3.5 - 5.1 mmol/L    Chloride 109 (H) 97 - 108 mmol/L    CO2 27 21 - 32 mmol/L    Anion gap 4 (L) 5 - 15 mmol/L    Glucose 96 65 - 100 mg/dL    BUN 9 6 - 20 MG/DL    Creatinine 1.08 (H) 0.55 - 1.02 MG/DL    BUN/Creatinine ratio 8 (L) 12 - 20      GFR est AA >60 >60 ml/min/1.73m2    GFR est non-AA 50 (L) >60 ml/min/1.73m2    Calcium 8.1 (L) 8.5 - 10.1 MG/DL    Bilirubin, total 0.4 0.2 - 1.0 MG/DL    ALT (SGPT) 27 12 - 78 U/L    AST (SGOT) 25 15 - 37 U/L    Alk. phosphatase 138 (H) 45 - 117 U/L    Protein, total 6.3 (L) 6.4 - 8.2 g/dL    Albumin 2.2 (L) 3.5 - 5.0 g/dL    Globulin 4.1 (H) 2.0 - 4.0 g/dL    A-G Ratio 0.5 (L) 1.1 - 2.2     CBC WITH AUTOMATED DIFF    Collection Time: 12/28/21  3:23 AM   Result Value Ref Range    WBC 9.7 3.6 - 11.0 K/uL    RBC 3.66 (L) 3.80 - 5.20 M/uL    HGB 9.1 (L) 11.5 - 16.0 g/dL    HCT 29.5 (L) 35.0 - 47.0 %    MCV 80.6 80.0 - 99.0 FL    MCH 24.9 (L) 26.0 - 34.0 PG    MCHC 30.8 30.0 - 36.5 g/dL    RDW 16.7 (H) 11.5 - 14.5 %    PLATELET 685 (H) 461 - 400 K/uL    MPV 9.3 8.9 - 12.9 FL    NRBC 0.0 0  WBC    ABSOLUTE NRBC 0.00 0.00 - 0.01 K/uL    NEUTROPHILS 61 32 - 75 %    LYMPHOCYTES 18 12 - 49 %    MONOCYTES 11 5 - 13 %    EOSINOPHILS 8 (H) 0 - 7 %    BASOPHILS 1 0 - 1 %    IMMATURE GRANULOCYTES 1 (H) 0.0 - 0.5 %    ABS. NEUTROPHILS 6.0 1.8 - 8.0 K/UL    ABS. LYMPHOCYTES 1.7 0.8 - 3.5 K/UL    ABS. MONOCYTES 1.1 (H) 0.0 - 1.0 K/UL    ABS. EOSINOPHILS 0.8 (H) 0.0 - 0.4 K/UL    ABS. BASOPHILS 0.1 0.0 - 0.1 K/UL    ABS. IMM.  GRANS. 0.1 (H) 0.00 - 0.04 K/UL    DF AUTOMATED PROTHROMBIN TIME + INR    Collection Time: 12/28/21  3:23 AM   Result Value Ref Range    INR 1.2 (H) 0.9 - 1.1      Prothrombin time 12.6 (H) 9.0 - 11.1 sec   VANCOMYCIN, RANDOM    Collection Time: 12/28/21  3:23 AM   Result Value Ref Range    Vancomycin, random 18.2 UG/ML   GLUCOSE, POC    Collection Time: 12/28/21  7:23 AM   Result Value Ref Range    Glucose (POC) 91 65 - 117 mg/dL    Performed by Charlene Rodriguez   PCT    GLUCOSE, POC    Collection Time: 12/28/21 12:39 PM   Result Value Ref Range    Glucose (POC) 105 65 - 117 mg/dL    Performed by Rice Gilford        Physical Exam    Integumentary  Assessment of Surgical Incision -dressing intact. Neurologic  Sensory  Light Touch - Intact - Globally. Overall Assessment of Muscle Strength and Tone reveals  Lower Extremities - Right Iliopsoas - 5/5. Left Iliopsoas - 5/5. Right Tibialis Anterior - 5/5. Left Tibialis Anterior - 5/5. Right Gastroc-Soleus - 5/5. Left Gastroc-Soleus - 5/5. Right EHL - 5/5. Left EHL - 5/5. General Assessment of Reflexes  Right Ankle - Clonus is not present. Left Ankle - Clonus is not present. Reflexes (Dermatomes)  2/2 Normal - Left Achilles (L5-S2), Left Knee (L2-4), Right Achilles (L5-S2) and Right Knee (L2-4). Musculoskeletal  Global Assessment  Examination of related systems reveals - well-developed, well-nourished, in no acute distress, alert and oriented x 3 and normal coordination. Spine/Ribs/Pelvis  Lumbosacral Spine - Examination of the lumbosacral spine reveals - no known fractures or deformities. Inspection and Palpation - Tenderness - moderate. Assessment of pain reveals the following findings - The pain is characterized as - moderate. Location - pain refers to lower back bilaterally.

## 2021-12-28 NOTE — CONSULTS
Infectious Disease Consult Note    Reason for Consult: Hx of retroperitoneal abscess/lumbar wound, Psoas abscess   Date of Consultation: December 28, 2021  Date of Admission: 12/27/2021  Referring Physician: Dr Diana Gudino      HPI: Patient is a poor historian most history is obtained from chart review        Ms Claire Randhawa is a 66-year-old lady with a history of diabetes, obesity, hypothyroidism, squamous cell carcinoma of tonsils removed and is status post radiation per chart in 2015, status post lumbar fusion with revision laminectomy in April 2017, revision laminectomy in June 2021. She was being managed by the infectious disease team at [facility] [address] for retroperitoneal abscess with likely involvement of the lumbar spine and a nonhealing lumbar wound since July 2021  per previous infectious disease notes. Per ID notes her lumbar wound eventually healed up but there is still concerns for infection. She has had several CT scans and CT-guided aspiration of abdominal abscess on 11/12/21 with 200 cc of purulent fluid that was removed and cultures positive for Streptococcus anginosis. She subsequently had drains that was removed and another CT-guided aspiration of posterior paraspinal abscess on 11/26/2021 with cultures positive for alpha Streptococcus. On 12/2/2021 she underwent I&D of a superficial and deep lumbar wound with cultures positive for MRSA from the thio broth. Patient was recommended to have IV vancomycin with a planned end date of 1/27/2022. Patient was discharged to a skilled nursing facility ([facility]) and presented to [facility] with concerns for having a more distended abdomen and right lower abdominal pain. She denies any dysuria urinary symptoms. She denies any fevers chills nausea vomiting or diarrhea. She does admit to have not having a great appetite. She denies any other localized pain. During this admission she is afebrile with a normal white count, anemia and thrombocytosis on labs. Her creatinine is 1.08 with a BUN of 9. LFTs are normal along with normal bilirubin. Lactic acid is 1. Blood cultures were sent and pending. CT scan dated 12/27/2021 was read as very small right pleural effusion, right perinephric stranding and proximal right hydronephrosis, interval removal of the right lower quadrant percutaneous catheter. Persistent inflammatory stranding in 3 cm x 1.6 cm right lower quadrant fluid collection, unchanged from December 4, 2021 CT. Alanna London She was started on vancomycin and cefepime IV            Past Medical History:  Past Medical History:   Diagnosis Date    Adverse effect of anesthesia     O2 DROPS WITH ANESTHESIA    Arthritis     KNEES    Cancer ([facility] [address] 03/13/2015    SQUAMOUS CELL CARCINOMA; tonsils and lymph nodes.   Removed 3-2015 with radiation    GERD (gastroesophageal reflux disease)     Hypertension     NO LONGER ON MEDICATION    Hypothyroid     HYPOTHYROIDISM    Migraine     Nausea & vomiting     Obesity     Other ill-defined conditions(799.89)     chronic back pain    Psychiatric disorder     anxiety    Psychiatric disorder     panic ATTACKS    SVT (supraventricular tachycardia) ([facility] [address] 05/24/2014    Ulcerative colitis          Surgical History:  Past Surgical History:   Procedure Laterality Date    COLONOSCOPY,DIAGNOSTIC  11/19/2015         HX GI      colonscopy    HX HEENT  03/2015    tonsillectomy (CANCER) AND NECK LYMPH NODES    HX HEENT  2008    PARATHYROID REMOVAL    HX HEENT Left 2002    EYE LASER    HX HEMORRHOIDECTOMY  2001, 2016     X2    HX HYSTERECTOMY  1985    HX KNEE ARTHROSCOPY  1995    left knee surgery, TOTAL LT  and Right KNEE 2013    HX KNEE REPLACEMENT Bilateral 2013, 2014    HX LAP CHOLECYSTECTOMY  2002    HX LUMBAR FUSION  2011    SPINAL FUSION with hardware L4-L5    HX ORTHOPAEDIC  1990    CYST REMOVED RIGHT WRIST    HX ORTHOPAEDIC  05/12/2021    L2-3 & L5-21 LUMBAR LAMINECTOMY WITH ANDREWS OSTEOTOMY    HX OTHER SURGICAL cyst removal right wrist    HX UROLOGICAL  2010    bladder surgery(interstim device)    IR INJ FORAMIN EPID LUMB ANES/STER SNGL  8/19/2019    NE EGD TRANSORAL BIOPSY SINGLE/MULTIPLE  5/20/2013              Family History:   Family History   Problem Relation Age of Onset    Cancer Mother         ovarian ca?  Heart Disease Father         MI    Heart Attack Father     Cancer Father         MULTIPLE MYELOMA    Liver Disease Father         FROM MEDICATIONS FOR MULTIPLE MYELOMA    OSTEOARTHRITIS Sister     OSTEOARTHRITIS Brother     Cancer Paternal Grandmother 79        breast ca    Breast Cancer Paternal Grandmother 79    Breast Cancer Maternal Aunt 55    Breast Cancer Maternal Aunt 61    Breast Cancer Paternal Aunt 43    Breast Cancer Paternal Aunt         52's    Emphysema Paternal Grandfather     No Known Problems Sister     No Known Problems Brother     No Known Problems Brother     Anesth Problems Neg Hx          Social History:     · Living Situation: from SNF  · Tobacco:denied  · Alcohol:denied   · Illicit Drugs:denied  · Travel History denied     Allergies: Allergies   Allergen Reactions    Adhesive Tape-Silicones Rash    Aloe Vera Rash    Chlorhexidine Rash    Tussin [Dextromethorphan Hbr] Palpitations    Readyprep Chg [Chlorhexidine Gluconate] Rash         Review of Systems:     10 point ROS per HPI,  all others negative     Medications:  No current facility-administered medications on file prior to encounter. Current Outpatient Medications on File Prior to Encounter   Medication Sig Dispense Refill    nystatin (MYCOSTATIN) 100,000 unit/gram ointment Apply  to affected area three (3) times daily. 15 g 0    insulin glargine (LANTUS) 100 unit/mL injection 20 Units by SubCUTAneous route nightly for 30 days. 6 mL 0    apixaban (ELIQUIS) 5 mg tablet Take 1 Tablet by mouth every twelve (12) hours for 30 days.  60 Tablet 0    vancomycin/0.9 % sod chloride (vancomycin in 0.9% Sodium Cl) 1.25 gram/250 mL soln 250 mL by IntraVENous route every twelve (12) hours every twelve (12) hours for 49 days. 55759 mL 0    ALPRAZolam (Xanax) 0.5 mg tablet Take 1 Tablet by mouth two (2) times daily as needed for Anxiety. Max Daily Amount: 1 mg. 10 Tablet 0    acetaminophen (TYLENOL) 325 mg tablet Take 2 Tablets by mouth every six (6) hours as needed for Pain. 30 Tablet 0    L.acid,para-B. bifidum-S.therm (RISAQUAD) 8 billion cell cap cap Take 1 Capsule by mouth daily. 30 Capsule 0    magnesium hydroxide (MILK OF MAGNESIA) 400 mg/5 mL suspension Take 30 mL by mouth daily. 1 Bottle 0    miconazole (MICOTIN) 2 % topical powder Apply  to affected area two (2) times a day. 1 Bottle 0    polyethylene glycol (MIRALAX) 17 gram packet Take 1 Packet by mouth daily. 30 Packet 0    senna (SENOKOT) 8.6 mg tablet Take 2 Tablets by mouth daily. 60 Tablet 0    heparin sodium,porcine (heparin, porcine,) 5,000 unit/mL injection 1 mL by SubCUTAneous route every eight (8) hours. 90 Syringe 0    naloxone (NARCAN) 4 mg/actuation nasal spray Use 1 spray intranasally, then discard. Repeat with new spray every 2 min as needed for opioid overdose symptoms, alternating nostrils. 1 Each 0    amitriptyline (ELAVIL) 50 mg tablet Take 50 mg by mouth nightly.  atorvastatin (LIPITOR) 40 mg tablet Take 40 mg by mouth nightly.  metFORMIN (GLUCOPHAGE) 500 mg tablet Take 500 mg by mouth two (2) times daily (with meals).  butalb/acetaminophen/caffeine (FIORICET PO) Take 1 Tablet by mouth as needed.  aspirin delayed-release 81 mg tablet Take 81 mg by mouth daily.  levothyroxine (SYNTHROID) 75 mcg tablet Take 112 mcg by mouth Daily (before breakfast).  PARoxetine (PAXIL) 40 mg tablet Take 40 mg by mouth every morning.  metoprolol tartrate (LOPRESSOR) 25 mg tablet Take 12.5 mg by mouth nightly.  metoprolol (LOPRESSOR) 25 mg tablet Take 25 mg by mouth every morning.       omeprazole (PRILOSEC) 40 mg capsule Take 40 mg by mouth every morning.              Physical Exam:    Vitals:   Patient Vitals for the past 24 hrs:   Temp Pulse Resp BP SpO2   12/28/21 1407  76      12/28/21 1240 97.7 °F (36.5 °C) 74 20 (!) 163/86 94 %   12/28/21 1215  74      12/28/21 1026  74      12/28/21 0847  73  (!) 165/96    12/28/21 0718 97.7 °F (36.5 °C) 75  (!) 135/45 93 %   12/28/21 0549  68      12/28/21 0351  70      12/28/21 0319 98 °F (36.7 °C) 71 18 138/78 92 %   12/28/21 0157  71      12/28/21 0107 97.6 °F (36.4 °C) 72 20 132/86 97 %   12/28/21 0043  73 23 132/86 96 %   12/27/21 2355 98 °F (36.7 °C) 73 15 115/63 96 %   12/27/21 2316 98 °F (36.7 °C) 73 16 139/71 96 %   12/27/21 2254     96 %   12/27/21 2236  72 19 (!) 151/79 96 %   12/27/21 2221  72 22  96 %   12/27/21 2206  73 26 (!) 131/99 96 %   12/27/21 2151  74 16  97 %   12/27/21 2136  78 21 (!) 141/79    12/27/21 2129  76  (!) 141/79    12/27/21 1938  80 14 (!) 148/79 94 %   ·   · GEN: NAD  · HEENT:  no scleral icterus,   no thrush  · CV: S1, S2 heard regularly,  · Lungs: Clear to auscultation bilaterally  · Abdomen: soft, obese, tender RLQ, no guarding, no erythema of abdominal wall   · Extremities: no edema  · Neuro: Alert, oriented to self, place and situation, moves all extremities to commands, verbal   · Skin: no rash  · Lines PICC LUE       Labs:   Recent Results (from the past 24 hour(s))   CULTURE, BLOOD    Collection Time: 12/27/21  3:12 PM    Specimen: Blood   Result Value Ref Range    Special Requests: NO SPECIAL REQUESTS      Culture result: NO GROWTH AFTER 14 HOURS     EKG, 12 LEAD, INITIAL    Collection Time: 12/27/21  3:16 PM   Result Value Ref Range    Ventricular Rate 79 BPM    Atrial Rate 79 BPM    P-R Interval 174 ms    QRS Duration 68 ms    Q-T Interval 404 ms    QTC Calculation (Bezet) 463 ms    Calculated P Axis 27 degrees    Calculated R Axis -3 degrees    Calculated T Axis 17 degrees    Diagnosis Normal sinus rhythm  When compared with ECG of 06-NOV-2021 23:06,  Nonspecific T wave abnormality, improved in Inferior leads  Nonspecific T wave abnormality no longer evident in Lateral leads  Confirmed by Eda Galloway M.D., Barbara Salgado ([id number]) on 12/27/2021 3:48:08 PM     GLUCOSE, POC    Collection Time: 12/27/21  4:35 PM   Result Value Ref Range    Glucose (POC) 75 65 - 117 mg/dL    Performed by [address] [place] [address] Ne, POC    Collection Time: 12/27/21  9:33 PM   Result Value Ref Range    Glucose (POC) 114 65 - 117 mg/dL    Performed by Kale Ignacio    LACTIC ACID    Collection Time: 12/28/21  3:23 AM   Result Value Ref Range    Lactic acid 1.0 0.4 - 2.0 MMOL/L   METABOLIC PANEL, COMPREHENSIVE    Collection Time: 12/28/21  3:23 AM   Result Value Ref Range    Sodium 140 136 - 145 mmol/L    Potassium 3.8 3.5 - 5.1 mmol/L    Chloride 109 (H) 97 - 108 mmol/L    CO2 27 21 - 32 mmol/L    Anion gap 4 (L) 5 - 15 mmol/L    Glucose 96 65 - 100 mg/dL    BUN 9 6 - 20 MG/DL    Creatinine 1.08 (H) 0.55 - 1.02 MG/DL    BUN/Creatinine ratio 8 (L) 12 - 20      GFR est AA >60 >60 ml/min/1.73m2    GFR est non-AA 50 (L) >60 ml/min/1.73m2    Calcium 8.1 (L) 8.5 - 10.1 MG/DL    Bilirubin, total 0.4 0.2 - 1.0 MG/DL    ALT (SGPT) 27 12 - 78 U/L    AST (SGOT) 25 15 - 37 U/L    Alk.  phosphatase 138 (H) 45 - 117 U/L    Protein, total 6.3 (L) 6.4 - 8.2 g/dL    Albumin 2.2 (L) 3.5 - 5.0 g/dL    Globulin 4.1 (H) 2.0 - 4.0 g/dL    A-G Ratio 0.5 (L) 1.1 - 2.2     CBC WITH AUTOMATED DIFF    Collection Time: 12/28/21  3:23 AM   Result Value Ref Range    WBC 9.7 3.6 - 11.0 K/uL    RBC 3.66 (L) 3.80 - 5.20 M/uL    HGB 9.1 (L) 11.5 - 16.0 g/dL    HCT 29.5 (L) 35.0 - 47.0 %    MCV 80.6 80.0 - 99.0 FL    MCH 24.9 (L) 26.0 - 34.0 PG    MCHC 30.8 30.0 - 36.5 g/dL    RDW 16.7 (H) 11.5 - 14.5 %    PLATELET 322 (H) 029 - 400 K/uL    MPV 9.3 8.9 - 12.9 FL    NRBC 0.0 0  WBC    ABSOLUTE NRBC 0.00 0.00 - 0.01 K/uL    NEUTROPHILS 61 32 - 75 %    LYMPHOCYTES 18 12 - 49 %    MONOCYTES 11 5 - 13 %    EOSINOPHILS 8 (H) 0 - 7 %    BASOPHILS 1 0 - 1 %    IMMATURE GRANULOCYTES 1 (H) 0.0 - 0.5 %    ABS. NEUTROPHILS 6.0 1.8 - 8.0 K/UL    ABS. LYMPHOCYTES 1.7 0.8 - 3.5 K/UL    ABS. MONOCYTES 1.1 (H) 0.0 - 1.0 K/UL    ABS. EOSINOPHILS 0.8 (H) 0.0 - 0.4 K/UL    ABS. BASOPHILS 0.1 0.0 - 0.1 K/UL    ABS. IMM. GRANS. 0.1 (H) 0.00 - 0.04 K/UL    DF AUTOMATED     PROTHROMBIN TIME + INR    Collection Time: 12/28/21  3:23 AM   Result Value Ref Range    INR 1.2 (H) 0.9 - 1.1      Prothrombin time 12.6 (H) 9.0 - 11.1 sec   VANCOMYCIN, RANDOM    Collection Time: 12/28/21  3:23 AM   Result Value Ref Range    Vancomycin, random 18.2 UG/ML   GLUCOSE, POC    Collection Time: 12/28/21  7:23 AM   Result Value Ref Range    Glucose (POC) 91 65 - 117 mg/dL    Performed by Rina Roy   PCT    GLUCOSE, POC    Collection Time: 12/28/21 12:39 PM   Result Value Ref Range    Glucose (POC) 105 65 - 117 mg/dL    Performed by Gt Brady        Microbiology Data:       Blood:12/27/21 pending      12/2/21 Wound   Staphylococcus epidermidis    Antibiotic Interpretation Value  Method Comment   Clindamycin ($) Resistant >=4 ug/mL GRISELDA    Ciprofloxacin ($) Resistant >=8 ug/mL GRISELDA    Daptomycin ($$$$$) Susceptible 0.5 ug/mL GRISELDA    Erythromycin ($$$$) Resistant >=8 ug/mL GRISELDA    Gentamicin ($) Resistant >=16 ug/mL GRISELDA    Levofloxacin ($) Resistant >=8 ug/mL GRISELDA    Linezolid ($$$$$) Susceptible 2 ug/mL GRISELDA    Oxacillin Resistant >=4 ug/mL GRISELDA    Rifampin ($$$$) Susceptible <=0.5 ug/mL GRISELDA Rifampin is not to be used for mono-therapy. Tetracycline Susceptible 2 ug/mL GRISELDA    Trimeth/Sulfa Resistant 80 ug/mL GRISELDA    Vancomycin ($) Susceptible 1 ug/mL GRISELDA    Moxifloxacin ($$$$) Intermediate 4 ug/mL GRISELDA    Inducible Clindamycin Susceptible Negative ug/mL GRISELDA      11/26/21   Specimen Information: Abdominal Fluid;  Body Fluid         0 Result Notes     Ref Range & Units 11/26/21 1357 Resulting Agency   Special Requests:   NO SPECIAL REQUESTS  [facility]   GRAM STAIN   RARE WBCS SEEN  [facility]   Culture result:   RARE ALPHA STREPTOCOCCUS Abnormal           11/12/21 Drainage  Streptococcus anginosus    Antibiotic Interpretation Value  Method Comment   Penicillin G ($$) Susceptible <=0.06 ug/mL GRISELDA    Ceftriaxone ($) Susceptible <=0.12 ug/mL GRISELDA    Vancomycin ($) Susceptible 0.5 ug/mL GRISELDA    Ampicillin ($) Susceptible <=0.25 ug/mL GRISELDA    Cefotaxime Susceptible <=0.12 ug/mL GRISELDA    Levofloxacin ($) Susceptible <=0.25 ug/mL GRISELDA    Clindamycin ($) Resistant >=1 ug/mL GRISELDA              Imaging:   CT A/P WO contrast 12/27/21  INDICATION: Right lower quadrant abdominal pain.     Exam: Noncontrast CT of the abdomen and pelvis is performed with 5 mm  collimation. Sagittal and coronal reformatted images were also performed.     CT dose reduction was achieved through the use of a standardized protocol  tailored for this examination and automatic exposure control for dose  modulation. Adaptive statistical iterative reconstruction (ASIR) was utilized.     Direct comparison is made to prior CT dated December 4, 2021.     FINDINGS:     There is a very small right pleural effusion. There is very mild bibasilar  atelectasis.     Abdomen:      Liver: The liver is normal on noncontrast images.      Spleen: There are persistent wedge-shaped hypodensities within the spleen  consistent with infarcts. The spleen is not enlarged.     Adrenals: The adrenals are normal on noncontrast images.      Pancreas: The pancreas is normal on noncontrast images.      Gallbladder: The gallbladder is surgically absent.     Kidneys: There is bilateral perinephric stranding.  There is right hydronephrosis  and proximal right hydroureter.     Bowel: No thickened or dilated loop of large or small bowel seen.      Appendix: The appendix is not visualized.     Pelvis: Urinary bladder is partially filled and grossly normal.     Miscellaneous: Right lower quadrant percutaneous catheter has been removed;  there is persistent inflammatory stranding within the right lower quadrant of  the abdomen. Within the right lower quadrant abdomen, there is a very small 3 cm  x 1.6 cm focal fluid collection, unchanged compared to prior CT dated December 4, 2021. There is no free intraperitoneal gas or fluid. The uterus is absent.     IMPRESSION  1. Very small right pleural effusion. 2. Right perinephric stranding and proximal right hydronephrosis. 3. Interval removal of right lower quadrant percutaneous catheter. Persistent  inflammatory stranding and 3 cm x 1.6 cm right lower quadrant fluid collection,  Unchanged. CT A/P W contrast 12/4/21  FINDINGS:   LOWER THORAX: Minimal bilateral dependent atelectasis. Moderate coronary artery  calcifications. LIVER: No mass. BILIARY TREE: Gallbladder is surgically absent. CBD is not dilated. 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 PANCREAS: No mass or ductal dilatation. ADRENALS: Unremarkable. KIDNEYS: No mass, calculus, or hydronephrosis. Unchanged small left renal cyst;  no follow-up required. STOMACH: Unremarkable. SMALL BOWEL: No dilatation or wall thickening. COLON: No dilatation or wall thickening. APPENDIX: Not visualized. PERITONEUM: No ascites or pneumoperitoneum. Right lower quadrant drainage  catheter is present, with no residual associated fluid collection. RETROPERITONEUM: No lymphadenopathy or aortic aneurysm. REPRODUCTIVE ORGANS: Status post hysterectomy. URINARY BLADDER: No mass or calculus. BONES: No destructive bone lesion. Fusion hardware is present in the lumbosacral  spine. ABDOMINAL WALL: No mass or hernia. ADDITIONAL COMMENTS: N/A     IMPRESSION  No evidence of residual abscess in the right lower quadrant following  percutaneous drainage.     Procedures:     12/2/21  Incision and drainage, superficial and deep of the lumbar wound.      11/26/21     IMPRESSION  1. Successful removal of indwelling right lower quadrant drainage catheter. 2. Successful placement of a new 12 Irish right lower quadrant drain into  abscess. 3. Successful aspiration of posterior paraspinal abscess. 11/12/21  IMPRESSION     CT-guided drainage of right lower abdominal abscess with placement of a 10  Irish pigtail drainage catheter. Approximately 200 cc of purulent fluid was  removed. A sample was sent for the requested analysis. There were no immediate  complications. 6/2/21 PROCEDURES PERFORMED:  Exploration of fusion, revision laminectomy L2-3, L5-S1 with a Lydia osteotomy at L2-3. Posterior revision fusion L2 to S1 with segmental instrumentation. 4/4/17 PROCEDURES PERFORMED    1. Exploration of fusion with revision laminectomy, L2-L3 and L3-L4. 2. Posterior lumbar fusion revision, L3-L5. 3. Posterior segmentation L3-L5. 4. Transforaminal interbody fusion, L3-L4. 5. Allograft and autograft. 6. Posterior Lydia osteotomy at L3-L4. MRI lumbar spine 11/22/21  Addendum: Addendum to   IMPRESSION:  1.  Rim-enhancing paraspinal soft tissue collections may reflect abscesses as  well, correlate clinically. 2.  Partially visualized abscess inferior to the right kidney again seen, but  incompletely evaluated on this exam.     I discussed these findings with Dr. Dona Carreno at 0900 hours on 11/23/2021.      Addended by Tita Vitale MD on 11/23/2021  9:03 AM       Study Result    Narrative & Impression   EXAM: MRI LUMB SPINE W WO CONT     INDICATION: assess for residual abscess.  Needs conscious sedation     COMPARISON: CT lumbar spine 11/9/2021, MRI lumbar spine 7/8/2021     TECHNIQUE: MR imaging of the lumbar spine was performed using the following  sequences: sagittal T1, T2, STIR;  axial T1, T2 prior to and following contrast  administration.      CONTRAST: 20 mL of ProHance.     FINDINGS:     L2-S1 posterior spinal instrumentation and laminectomies. Several rim-enhancing  fluid collections seen as follows: 4.7 cm x 2.1 cm x 2.1 cm collection in the  left dorsal paraspinal musculature at the L3 level, a 3.2 cm x 2.4 cm x 0.8 cm  collection in the right dorsal paraspinal musculature at the L5 level, a 4.0 cm  x 1.8 cm x 3.3 cm partially visualized collection in the left dorsal paraspinal  soft tissues at the L5 level. Grade 1 anterolisthesis of L4 on L5. Vertebral  body heights are maintained. Marrow signal is normal. Multilevel degenerative  endplate osteophytes     The conus medullaris terminates at L1-L2. Signal and caliber of the distal  spinal cord are within normal limits. There is no pathologic intrathecal  enhancement.     Nonenhancing left upper pole renal cyst.     Lower thoracic spine: No herniation or stenosis.     L1-L2: Minimal disc bulge. No stenosis     L2-L3: Disc bulge with right foraminal extension. No spinal stenosis. Mild right  foraminal stenosis     L3-L4: No spinal stenosis. Moderate to severe left and moderate right foraminal  stenosis     L4-L5: Anterolisthesis. Disc pseudobulge. No spinal stenosis. Severe bilateral  foraminal stenosis     L5-S1: Disc bulge. Ligament of flavum thickening. Mild spinal stenosis. Severe  right and moderate to severe left foraminal stenosis     IMPRESSION  1. L2-S1 posterior spinal instrumentation and laminectomies. Several enhancing  fluid collections in the dorsal paraspinal muscles and soft tissues, compatible  with seromas. 2.  Multilevel degenerative changes and disc disease. L5-S1 mild spinal  stenosis. Multilevel varying degrees of foraminal stenosis above.          Assessment / Plan:       Ms Jaydon Whittaker is a 75-year-old lady with a history of diabetes, obesity, hypothyroidism, squamous cell carcinoma of tonsils removed and is status post radiation per chart in 2015, status post lumbar fusion with revision laminectomy in April 2017, revision laminectomy in June 2021, hx of retroperitoneal abscess with likely involvement of the lumbar spine . She underwent CT-guided aspiration of abdominal abscess on 11/12/21 with 200 cc of purulent fluid that was removed and cultures positive for Streptococcus anginosis. She subsequently had drains that was removed and another CT-guided aspiration of posterior paraspinal abscess on 11/26/2021 with cultures positive for alpha Streptococcus. On 12/2/2021 she underwent I&D of a superficial and deep lumbar wound with cultures positive for MRSA from the thio broth. Patient was recommended to have IV vancomycin with a planned end date of 1/27/2022 by [facility]. She presented to [address] [place] [address] from rehab wt concerns for RLQ abd pain and distention  CT on 12/27/21 with persistent stranding 3 by 1.6 cm RLQ fluid collection, unchanged from 12/4/21. CT also with R perinephric stranding and R hydronephrosis on 12/27/21 but patinet denied any dysuria, back pain symptoms     1) Hx of Abdominal abscess, retroperitoneal abscess , lumbar wound /paraspinal abscess /Psoas abscess   · 11/12/21 CT-guided aspiration of abdominal abscess on 11/12/21 with 200 cc of purulent fluid that was removed and cultures positive for Streptococcus anginosis. · 11/26/21 CT-guided aspiration of posterior paraspinal abscess with cultures positive for alpha Streptococcus. · 12/2/2021 she underwent I&D of a superficial and deep lumbar wound with cultures positive for MRSA from the thio broth. Plan:  On Vancomycin with plans per [facility] till 1/27/211  CT on 12/27/21 with persistent stranding 3 by 1.6 cm RLQ fluid collection, unchanged from 12/4/21. Afebrile, wbc normal   Continue vancomycin as planned  ? ?  If any drain able lesion given small size of 3 cm -- Discussed with Dr Karmen Dunn [facility] ID patient follows with), will continue IV antibiotics and  Needs repeat scan and follow up  On discharge   Monitor renal function closely    2) Hydronephrosis -- appears new finding compared to last scan ? Related to her retroperitoneal abscess  On Vancomycin and Cefepime empirically   Pending urine culture /analysis   Noted urology recommendations for psoas abscess/IR consult for drainage     3) DM    4) hx of lumbar fusion   Seen by ortho  Plans for follow up later this week for more suture removal     5) Obesity     Thank for the opportunity to participate in the care of this patient. Please contact with questions or concerns.        Antonia Caba,   3:33 PM

## 2021-12-28 NOTE — PROGRESS NOTES
Reason for Readmission:     Abdominal pain  *Admitted to ED [facility] 11/6/21-12/9/21 and DC to [address] Score/Risk Level:     27% High/Level One  PCP: First and Last name:  Dr. Dinah Cosme   Name of Practice: [facility]   Are you a current patient: Yes/No: Yes   Approximate date of last visit: July 2020   Can you participate in a virtual visit with your PCP: NA    Is a Care Conference indicated:  No      Did you attend your follow up appointment (s): If not, why not: No, DC to SNF         Resources/supports as identified by patient/family:   Patient identified her sister Alex Snell [phone]       Top Challenges facing patient (as identified by patient/family and CM): Finances/Medication cost?     [employer] Medicare-[place]/Uses [employer] at [place]     May need S   Support system or lack thereof?  and Sister  Living arrangements? Lives with her  whom she is the primary care giver to due to dementia. Self-care/ADLs/Cognition? A&O x 4, Independent with self-care and ADLs. No DME used. Current Advanced Directive/Advance Care Plan:  NA           Plan for utilizing home health:   TBD-was opened to [facility] prior to admission to ED [facility] in November. Transition of Care Plan:    Based on readmission, the patient's previous Plan of Care   has been evaluated and/or modified. The current Transition of Care Plan is:           Patient will likely return to [facility] as she will need to continue with IV ABX. Patient's  has been staying with his son and daughter in-law. CM to monitor.      Albino Swanson, MS

## 2021-12-29 LAB
ANION GAP SERPL CALC-SCNC: 9 MMOL/L (ref 5–15)
BUN SERPL-MCNC: 9 MG/DL (ref 6–20)
BUN/CREAT SERPL: 9 (ref 12–20)
CALCIUM SERPL-MCNC: 8.7 MG/DL (ref 8.5–10.1)
CHLORIDE SERPL-SCNC: 106 MMOL/L (ref 97–108)
CO2 SERPL-SCNC: 25 MMOL/L (ref 21–32)
COMMENT, HOLDF: NORMAL
CREAT SERPL-MCNC: 1.05 MG/DL (ref 0.55–1.02)
GLUCOSE BLD STRIP.AUTO-MCNC: 103 MG/DL (ref 65–117)
GLUCOSE BLD STRIP.AUTO-MCNC: 114 MG/DL (ref 65–117)
GLUCOSE BLD STRIP.AUTO-MCNC: 128 MG/DL (ref 65–117)
GLUCOSE BLD STRIP.AUTO-MCNC: 137 MG/DL (ref 65–117)
GLUCOSE SERPL-MCNC: 114 MG/DL (ref 65–100)
POTASSIUM SERPL-SCNC: 3.3 MMOL/L (ref 3.5–5.1)
SAMPLES BEING HELD,HOLD: NORMAL
SERVICE CMNT-IMP: ABNORMAL
SERVICE CMNT-IMP: ABNORMAL
SERVICE CMNT-IMP: NORMAL
SERVICE CMNT-IMP: NORMAL
SODIUM SERPL-SCNC: 140 MMOL/L (ref 136–145)
VANCOMYCIN SERPL-MCNC: 14.7 UG/ML

## 2021-12-29 PROCEDURE — 74011000250 HC RX REV CODE- 250: Performed by: INTERNAL MEDICINE

## 2021-12-29 PROCEDURE — 74011250637 HC RX REV CODE- 250/637: Performed by: INTERNAL MEDICINE

## 2021-12-29 PROCEDURE — 74011250636 HC RX REV CODE- 250/636: Performed by: INTERNAL MEDICINE

## 2021-12-29 PROCEDURE — 97535 SELF CARE MNGMENT TRAINING: CPT

## 2021-12-29 PROCEDURE — 65210000002 HC RM PRIVATE GYN

## 2021-12-29 PROCEDURE — 36415 COLL VENOUS BLD VENIPUNCTURE: CPT

## 2021-12-29 PROCEDURE — 97165 OT EVAL LOW COMPLEX 30 MIN: CPT

## 2021-12-29 PROCEDURE — 80202 ASSAY OF VANCOMYCIN: CPT

## 2021-12-29 PROCEDURE — 94760 N-INVAS EAR/PLS OXIMETRY 1: CPT

## 2021-12-29 PROCEDURE — 82962 GLUCOSE BLOOD TEST: CPT

## 2021-12-29 PROCEDURE — 99232 SBSQ HOSP IP/OBS MODERATE 35: CPT | Performed by: INTERNAL MEDICINE

## 2021-12-29 PROCEDURE — 80048 BASIC METABOLIC PNL TOTAL CA: CPT

## 2021-12-29 RX ORDER — POTASSIUM CHLORIDE 750 MG/1
40 TABLET, FILM COATED, EXTENDED RELEASE ORAL
Status: COMPLETED | OUTPATIENT
Start: 2021-12-29 | End: 2021-12-29

## 2021-12-29 RX ORDER — OXYCODONE HYDROCHLORIDE 5 MG/1
5 TABLET ORAL ONCE
Status: COMPLETED | OUTPATIENT
Start: 2021-12-29 | End: 2021-12-29

## 2021-12-29 RX ORDER — AMLODIPINE BESYLATE 5 MG/1
10 TABLET ORAL DAILY
Status: DISCONTINUED | OUTPATIENT
Start: 2021-12-29 | End: 2022-01-04 | Stop reason: HOSPADM

## 2021-12-29 RX ORDER — HYDRALAZINE HYDROCHLORIDE 20 MG/ML
10 INJECTION INTRAMUSCULAR; INTRAVENOUS
Status: DISCONTINUED | OUTPATIENT
Start: 2021-12-29 | End: 2022-01-04 | Stop reason: HOSPADM

## 2021-12-29 RX ADMIN — Medication 1 CAPSULE: at 09:04

## 2021-12-29 RX ADMIN — POTASSIUM CHLORIDE 40 MEQ: 750 TABLET, FILM COATED, EXTENDED RELEASE ORAL at 09:03

## 2021-12-29 RX ADMIN — SODIUM CHLORIDE, PRESERVATIVE FREE 10 ML: 5 INJECTION INTRAVENOUS at 17:27

## 2021-12-29 RX ADMIN — ASPIRIN 81 MG: 81 TABLET, COATED ORAL at 09:04

## 2021-12-29 RX ADMIN — WATER 1 MG: 1 INJECTION INTRAMUSCULAR; INTRAVENOUS; SUBCUTANEOUS at 13:25

## 2021-12-29 RX ADMIN — VANCOMYCIN HYDROCHLORIDE 1250 MG: 1.25 INJECTION, POWDER, LYOPHILIZED, FOR SOLUTION INTRAVENOUS at 18:00

## 2021-12-29 RX ADMIN — APIXABAN 5 MG: 5 TABLET, FILM COATED ORAL at 09:04

## 2021-12-29 RX ADMIN — ATORVASTATIN CALCIUM 40 MG: 40 TABLET, FILM COATED ORAL at 21:38

## 2021-12-29 RX ADMIN — METOPROLOL TARTRATE 12.5 MG: 25 TABLET, FILM COATED ORAL at 21:37

## 2021-12-29 RX ADMIN — APIXABAN 5 MG: 5 TABLET, FILM COATED ORAL at 21:37

## 2021-12-29 RX ADMIN — AMLODIPINE BESYLATE 10 MG: 5 TABLET ORAL at 11:58

## 2021-12-29 RX ADMIN — MICONAZOLE NITRATE 2 % TOPICAL POWDER: at 18:41

## 2021-12-29 RX ADMIN — MICONAZOLE NITRATE 2 % TOPICAL POWDER: at 11:56

## 2021-12-29 RX ADMIN — LEVOTHYROXINE SODIUM 112 MCG: 0.11 TABLET ORAL at 06:55

## 2021-12-29 RX ADMIN — SODIUM CHLORIDE, PRESERVATIVE FREE 10 ML: 5 INJECTION INTRAVENOUS at 21:38

## 2021-12-29 RX ADMIN — AMITRIPTYLINE HYDROCHLORIDE 50 MG: 50 TABLET, FILM COATED ORAL at 22:28

## 2021-12-29 RX ADMIN — SODIUM CHLORIDE, PRESERVATIVE FREE 10 ML: 5 INJECTION INTRAVENOUS at 06:56

## 2021-12-29 RX ADMIN — CEFEPIME 2 G: 2 INJECTION, POWDER, FOR SOLUTION INTRAVENOUS at 03:14

## 2021-12-29 RX ADMIN — PANTOPRAZOLE SODIUM 40 MG: 40 TABLET, DELAYED RELEASE ORAL at 06:55

## 2021-12-29 RX ADMIN — METOPROLOL TARTRATE 25 MG: 25 TABLET, FILM COATED ORAL at 06:55

## 2021-12-29 RX ADMIN — ALPRAZOLAM 0.5 MG: 0.5 TABLET ORAL at 21:37

## 2021-12-29 RX ADMIN — OXYCODONE 5 MG: 5 TABLET ORAL at 21:38

## 2021-12-29 RX ADMIN — PAROXETINE HYDROCHLORIDE 40 MG: 20 TABLET, FILM COATED ORAL at 06:55

## 2021-12-29 RX ADMIN — CEFEPIME 2 G: 2 INJECTION, POWDER, FOR SOLUTION INTRAVENOUS at 17:27

## 2021-12-29 NOTE — PROGRESS NOTES
[facility] Adult  Hospitalist Group                                                                                          Hospitalist Progress Note  Paula Morgan MD  Answering service: [phone] from in house phone        Date of Service:  2021  NAME:  Dimas Forte  :  1950  MRN:  [id number]      Admission Summary:   Dimas Forte is a 66 y.o. female with complex medical history of back surgery in 2021, followed by infectionmanaged by primary orthopedic Dr. Ousmane Washington for the same, with hospitalization at [facility] [address] for about a month early November through early December for right psoas abscess with IR guided abscess drainage, who was discharged to [facility] with PICC line for prolonged antibiotics with IV vancomycin through 2022 per ID based on the culture results. For about a week, patient has noticed bulge and discomfort in right lower quadrant. Yesterday she showed that to nursing team and subsequent to that patient was sent for CT abdomen pelvis assessment here at [facility]. Patient was noted to have considerable CT findings with persistent unchanged right psoas abscess, right perinephric stranding along with hydronephrosis, hypokalemia, mild early MERCEDES with elevated creatinine at 1.15 compared to previous baseline of 0.50.6. So hospitalist team has been consulted to admit the patient for further management. Patient denied any fever or chills. Patient denied any chest pain, shortness of breath, palpitation. Patient denied any leg swelling worsening. Patient denied any urinary trouble or diarrhea or loose stools. No constipation. Patient denied any focal weakness, tingling, numbness. Denied any worsening of back pain or drainage from the back. Interval history / Subjective:   Chief Complaint: Abdominal pain and distention and right lower quadrant  Follow up for above. Patient seen and examined at the bedside.  Labs, images and notes reviewed  Discussed with nursing staff, orders reviewed. Plan discussed with patient/Family  Pt is feeling ok, little better. D/w ID. Recommendations of Ortho and Uro noted. D/w Radiology team as well for CT guided drainage. Updated about their d/w Uro team. Suggested to consider this if pt's creatinine does not improve with IVF or worsening of WBC or renal functions. No other concerns or overnight events. D/w pt about the updates as well. Assessment & Plan:     #Right lower quadrant abdominal pain, with distention and patient with recent right psoas abscess needing extensive drainage at [address], with ongoing IV vancomycin treatment based on the cultures per ID until 1/27/22. ID on board  #Right lower quadrant fluid collection, unchanged on CT scan compared to previous. 3 cm X1.6 cm, likely persistent abscess. Not easily drainable collection at present per Radiology team  #Right perinephric stranding with right hydronephrosis. Likely driven by retroperitoneal infection/ inflammation from above. Uro on board  #Mild early MERCEDES with more than doubling of the creatinine, baseline creatinine around 0.50.6, on admission 1. 15. Very subtle improvement in the right direction with IVF, creatinine 1.08 on 12/28  #Severe hypokalemia, K2.8 on admission, repleted at 3.8 on 12/28  #Hx of Streptococcus anginosus epidural/iliopsoas abscess on recent hospitalization 1112/2021, on IV vancomycin to be continued per ID until 1/27/2022  #Recent right paraspinal pain at L3-4 along with iliac crest pain along with Hx of paraspinal abscess. Recent back surgery in 6/21 with Dr. Viktor Mcneill On board. Appreciate recommendations  #Recent celiac artery thrombosis with splenic infarcts, on Eliquis, diagnosed on 11/9/2021 on CT A/P. No vascular surgical intervention needed at the time. To continue Eliquis for 6 months.   #DM2  #Bilateral DM related polyneuropathybilateral feet  #Hx of SVT  #HTN  #HLD  #Hypothyroidism  #Depression/anxiety     Plan:      -Admitted to inpatient, telemetry  -IVF: NS at 100 mL/h. Continue for now for renal recovery  -CT-guided retroperitoneal abscess drainage consideration if worsening leukocytosis, fever or worsening/not improving renal functionsdiscussed and suggested by radiology team.  Neurologyradiology team also has discussed and recommended the plan per radiology team 12/28.  -Continue IV vancomycin, pharmacy consult  -Initiate IV cefepime on admission. Continue empirically for now until further details of urine culture/blood culture is available. Follow ID recommendations for final antibiotic plans.  -Follow blood cultures, urine culture  -ID consult: Dr. Cheryle Banco.  D/W ID personally. Appreciate recommendations. Will need repeat scan and ID follow-up with Dr. Mateo Raphael on discharge  -Ortho consult: Dr. Renate Waggoner. Appreciate plans for follow-up later this week and for suture removal as appropriate.  -Urology consult: Right-sided hydronephrosis. Appreciate recommendations. Monitor renal functions closely. May need repeat CT for reassessment of retroperitoneum and psoas abscess along with hydronephrosis  -Called IR 12/28 per urology recommendation. Recommended CT-guided drainage for retroperitoneum. D/W radiology team in CT reading room. Details as noted above. -CBC, CMP monitoring  -Sliding scale insulin with serial Accu-Chek monitoring  -If warranted and safe, resume Lantus at a lower dose with gradual titration as tolerated by patient. Per patient, her blood sugars were running low and not taking Lantus lately at SNF.  -Resume home medications including Eliquis and other as ordered  -Would need PT/OT once better plan from surgery teams  -PICC line care per nursing team    Code status: Full code  DVT prophylaxis: Eliquis. May require coordination with radiology team if CT-guided R retroperitoneal/psoas abscess drainage warranted later on.     Care Plan discussed with: Patient/Family, Nurse and   Anticipated Disposition: SNF/LTC -likely return to [facility]F  Anticipated Discharge: Greater than 48 hours     Hospital Problems  Date Reviewed: 12/21/2021          Codes Class Noted POA    Abdominal pain ICD-10-CM: R10.9  ICD-9-CM: 789.00  12/27/2021 Unknown        Postprocedural intraabdominal abscess ICD-10-CM: T81.43XA  ICD-9-CM: 998.59  12/27/2021 Unknown        MERCEDES (acute kidney injury) [facility]) ICD-10-CM: N17.9  ICD-9-CM: 584.9  11/6/2021 Unknown                Review of Systems:   A comprehensive review of systems was negative except for that written in the HPI. Vital Signs:    Last 24hrs VS reviewed since prior progress note. Most recent are:  Visit Vitals  BP (!) 160/86 (BP 1 Location: Right upper arm, BP Patient Position: At rest)   Pulse 83   Temp 98.3 °F (36.8 °C)   Resp 20   Ht 5' 4\" (1.626 m)   Wt 117.8 kg (259 lb 11.2 oz)   SpO2 97%   BMI 44.58 kg/m²         Intake/Output Summary (Last 24 hours) at 12/28/2021 2129  Last data filed at 12/28/2021 1500  Gross per 24 hour   Intake 2500 ml   Output 2300 ml   Net 200 ml        Physical Examination:     I had a face to face encounter with this patient and independently examined them on 12/28/2021 as outlined below:          Constitutional:  No acute distress, cooperative, pleasant    ENT:  Oral mucosa moist, oropharynx benign. Resp:  CTA bilaterally. No wheezing/rhonchi/rales. No accessory muscle use   CV:  Regular rhythm, normal rate, no murmurs, gallops, rubs    GI:  Soft, non distended, non tender. normoactive bowel sounds, no hepatosplenomegaly     Musculoskeletal:  No edema, warm, 2+ pulses throughout    Neurologic:  Moves all extremities. AAOx3, CN II-XII reviewed            Data Review:    Review and/or order of clinical lab test  Review and/or order of tests in the radiology section of CPT  Review and/or order of tests in the medicine section of [facility]    Radiology  CT ABD PELV WO CONT    Result Date: 12/27/2021  1. Very small right pleural effusion.  2. Right perinephric stranding and proximal right hydronephrosis. 3. Interval removal of right lower quadrant percutaneous catheter. Persistent inflammatory stranding and 3 cm x 1.6 cm right lower quadrant fluid collection, unchanged. Labs:     Recent Labs     12/28/21  0323 12/27/21  1124   WBC 9.7 10.2   HGB 9.1* 9.2*   HCT 29.5* 31.1*   * 512*     Recent Labs     12/28/21  0323 12/27/21  1124    140   K 3.8 2.8*   * 104   CO2 27 31   BUN 9 7   CREA 1.08* 1.15*   GLU 96 114*   CA 8.1* 8.3*   MG  --  1.2*     Recent Labs     12/28/21  0323 12/27/21  1124   ALT 27 28   * 149*   TBILI 0.4 0.4   TP 6.3* 6.8   ALB 2.2* 2.3*   GLOB 4.1* 4.5*     Recent Labs     12/28/21  0323   INR 1.2*   PTP 12.6*      No results for input(s): FE, TIBC, PSAT, FERR in the last 72 hours. Lab Results   Component Value Date/Time    Folate 17.4 11/29/2021 02:49 AM      No results for input(s): PH, PCO2, PO2 in the last 72 hours. No results for input(s): CPK, CKNDX, TROIQ in the last 72 hours.     No lab exists for component: CPKMB  Lab Results   Component Value Date/Time    Cholesterol, total 232 (H) 07/13/2010 12:05 PM    HDL Cholesterol 38 07/13/2010 12:05 PM    LDL, calculated 164 (H) 07/13/2010 12:05 PM    Triglyceride 150 (H) 07/13/2010 12:05 PM    CHOL/HDL Ratio 6.1 (H) 07/13/2010 12:05 PM     Lab Results   Component Value Date/Time    Glucose (POC) 138 (H) 12/28/2021 04:17 PM    Glucose (POC) 105 12/28/2021 12:39 PM    Glucose (POC) 91 12/28/2021 07:23 AM    Glucose (POC) 114 12/27/2021 09:33 PM    Glucose (POC) 75 12/27/2021 04:35 PM     Lab Results   Component Value Date/Time    Color YELLOW/STRAW 12/28/2021 04:07 PM    Appearance CLEAR 12/28/2021 04:07 PM    Specific gravity 1.009 12/28/2021 04:07 PM    Specific gravity 1.010 04/06/2011 10:20 AM    pH (UA) 7.5 12/28/2021 04:07 PM    Protein Negative 12/28/2021 04:07 PM    Glucose Negative 12/28/2021 04:07 PM    Ketone Negative 12/28/2021 04:07 PM Bilirubin Negative 12/28/2021 04:07 PM    Urobilinogen 0.2 12/28/2021 04:07 PM    Nitrites Negative 12/28/2021 04:07 PM    Leukocyte Esterase Negative 12/28/2021 04:07 PM    Epithelial cells FEW 12/28/2021 04:07 PM    Bacteria Negative 12/28/2021 04:07 PM    WBC 0-4 12/28/2021 04:07 PM    RBC 20-50 12/28/2021 04:07 PM         Medications Reviewed:     Current Facility-Administered Medications   Medication Dose Route Frequency    influenza vaccine 2021-22 (6 mos+)(PF) (FLUARIX/FLULAVAL/FLUZONE QUAD) injection 0.5 mL  1 Each IntraMUSCular PRIOR TO DISCHARGE    [START ON 12/29/2021] Vancomycin - random level due 12/29 @ 1100. RN please draw. Thanks!    Other ONCE    sodium chloride (NS) flush 5-40 mL  5-40 mL IntraVENous Q8H    sodium chloride (NS) flush 5-40 mL  5-40 mL IntraVENous PRN    acetaminophen (TYLENOL) tablet 650 mg  650 mg Oral Q6H PRN    Or    acetaminophen (TYLENOL) suppository 650 mg  650 mg Rectal Q6H PRN    polyethylene glycol (MIRALAX) packet 17 g  17 g Oral DAILY PRN    ondansetron (ZOFRAN ODT) tablet 4 mg  4 mg Oral Q8H PRN    Or    ondansetron (ZOFRAN) injection 4 mg  4 mg IntraVENous Q6H PRN    cefepime (MAXIPIME) 2 g in sterile water (preservative free) 10 mL IV syringe  2 g IntraVENous Q12H    Vancomycin - pharmacy to dose   Other Rx Dosing/Monitoring    0.9% sodium chloride infusion  100 mL/hr IntraVENous CONTINUOUS    glucose chewable tablet 16 g  4 Tablet Oral PRN    dextrose (D50W) injection syrg 12.5-25 g  25-50 mL IntraVENous PRN    glucagon (GLUCAGEN) injection 1 mg  1 mg IntraMUSCular PRN    insulin lispro (HUMALOG) injection   SubCUTAneous AC&HS    ALPRAZolam (XANAX) tablet 0.5 mg  0.5 mg Oral BID PRN    amitriptyline (ELAVIL) tablet 50 mg  50 mg Oral QHS    apixaban (ELIQUIS) tablet 5 mg  5 mg Oral Q12H    aspirin delayed-release tablet 81 mg  81 mg Oral DAILY    atorvastatin (LIPITOR) tablet 40 mg  40 mg Oral QHS    levothyroxine (SYNTHROID) tablet 112 mcg  112 mcg Oral ACB    L.acidophilus-paracasei-S.thermophil-bifidobacter (RISAQUAD) 8 billion cell capsule  1 Capsule Oral DAILY    magnesium hydroxide (MILK OF MAGNESIA) 400 mg/5 mL oral suspension 30 mL  30 mL Oral DAILY    metoprolol tartrate (LOPRESSOR) tablet 25 mg  25 mg Oral 7am    metoprolol tartrate (LOPRESSOR) tablet 12.5 mg  12.5 mg Oral QHS    miconazole (MICOTIN) 2 % powder   Topical BID    pantoprazole (PROTONIX) tablet 40 mg  40 mg Oral ACB    PARoxetine (PAXIL) tablet 40 mg  40 mg Oral 7am    polyethylene glycol (MIRALAX) packet 17 g  17 g Oral DAILY    senna (SENOKOT) tablet 17.2 mg  2 Tablet Oral DAILY     ______________________________________________________________________  EXPECTED LENGTH OF STAY: - - -  ACTUAL LENGTH OF STAY:          1                 Zina Kat MD

## 2021-12-29 NOTE — PROGRESS NOTES
Patient: Alexandria Diaz MRN: [id number]  SSN: [id number]    YOB: 1950  Age: 66 y.o. Sex: female        ADMITTED: 2021 to [facility] MD by April Lemus MD for MERCEDES (acute kidney injury) [facility]) [N17.9]  Abdominal pain [R10.9]  Postprocedural intraabdominal abscess [T81.43XA]  POD# * No surgery found *     Alexandria Diaz is doing well still c/o RLQ pain . Discussed with pt discussion with IR in relation to fluid collection . Explained to pt Urology will continue to observe renal function and determine timing of stent placement if indicated. Pt in agreement     Vitals: Temp (24hrs), Av.2 °F (36.8 °C), Min:97.7 °F (36.5 °C), Max:98.7 °F (37.1 °C)    Blood pressure (!) 163/96, pulse 69, temperature 98.7 °F (37.1 °C), resp. rate 20, height 5' 4\" (1.626 m), weight 121.8 kg (268 lb 8.3 oz), SpO2 98 %. Intake and Output:   1901 -  0700  In: 5106 [P.O.:2500; I.V.:2606]  Out: 5900 [Urine:5900]  No intake/output data recorded. FAUZIA Output lats 24 hrs: No data found. FAUZIA Output last 8 hrs: No data found. BM over last 24 hrs: No data found.     Exam:  EXAMINATION:     Appearance: well-developed NAD, obese    Conjunctiva/Lids: conjunctivae and lids normal   External Ears/Nose: normal no lesions or deformities   Neck: trachea midline   Respiratory Effort: breathing easily on room air    Lower Extremity: no edema   Abdomen/Flank: soft non-tender without masses; no CVA tenderness   Liver/Spleen: no organomegaly   Hernia: no ventral hernia    Gait/Station: normal   Skin Inspection: warm and dry   Mood/Affect: normal         Labs:  CBC:   Lab Results   Component Value Date/Time    WBC 9.7 2021 03:23 AM    HCT 29.5 (L) 2021 03:23 AM    PLATELET 380 (H)  03:23 AM     BMP:   Lab Results   Component Value Date/Time    Glucose 114 (H) 2021 03:24 AM    Sodium 140 2021 03:24 AM    Potassium 3.3 (L) 2021 03:24 AM    Chloride 106 2021 03:24 AM    CO2 25 12/29/2021 03:24 AM    BUN 9 12/29/2021 03:24 AM    Creatinine 1.05 (H) 12/29/2021 03:24 AM    Calcium 8.7 12/29/2021 03:24 AM     Cultures: N/A    Imaging: N/A  Assessment/Plan:     1. L hydronephrosis 2/2 retroperitoneal process - renal fx  continues to improve  No need for surgical intervention at this time however if renal fucntion worsens or hydro worsens  then will consider ureteral stent placement     Supervising MD: Dr. Cristian Paiz By: Ramila Quezada.  Donita Kussmaul, NP - December 29, 2021

## 2021-12-29 NOTE — PROGRESS NOTES
[address] [facility] Adult  Hospitalist Group                                                                                          Hospitalist Progress Note  Tyron Whitten MD  Answering service: [phone] from in house phone        Date of Service:  2021  NAME:  Clarke Gatica  :  1950  MRN:  [id number]      Admission Summary:   Clarke Gatica is a 66 y.o. female with complex medical history of back surgery in 2021, followed by infectionmanaged by primary orthopedic Dr. Jadyn George for the same, with hospitalization at [facility] for about a month early November through early December for right psoas abscess with IR guided abscess drainage, who was discharged to [facility] with PICC line for prolonged antibiotics with IV vancomycin through 2022 per ID based on the culture results. For about a week, patient has noticed bulge and discomfort in right lower quadrant. Yesterday she showed that to nursing team and subsequent to that patient was sent for CT abdomen pelvis assessment here at [facility]. Patient was noted to have considerable CT findings with persistent unchanged right psoas abscess, right perinephric stranding along with hydronephrosis, hypokalemia, mild early MERCEDES with elevated creatinine at 1.15 compared to previous baseline of 0.50.6. So hospitalist team has been consulted to admit the patient for further management. Patient denied any fever or chills. Patient denied any chest pain, shortness of breath, palpitation. Patient denied any leg swelling worsening. Patient denied any urinary trouble or diarrhea or loose stools. No constipation. Patient denied any focal weakness, tingling, numbness. Denied any worsening of back pain or drainage from the back. Interval history / Subjective:   Chief Complaint: Abdominal pain and distention and right lower quadrant  Follow up for above. Patient seen and examined at the bedside.  Labs, images and notes reviewed  Discussed with nursing staff, orders reviewed. Plan discussed with patient/Family  Patient is feeling okay. Still has some abdominal pain. Creatinine not much improved. Denies any fever or chills. BP somewhat elevated. Nervous about next steps. No other concerns or overnight events. Assessment & Plan:     #Right lower quadrant abdominal pain, with distention and patient with recent right psoas abscess needing extensive drainage at [facility], with ongoing IV vancomycin treatment based on the cultures per ID until 1/27/22. ID on board  #Right lower quadrant fluid collection, unchanged on CT scan compared to previous. 3 cm X1.6 cm, likely persistent abscess. Not easily drainable collection at present per Radiology team  #Right perinephric stranding with right hydronephrosis. Likely driven by retroperitoneal infection/ inflammation from above. Uro on board  #Mild early MERCEDES with more than doubling of the creatinine, baseline creatinine around 0.50.6, on admission 1. 15. Very subtle improvement in the right direction with IVF, creatinine 1.05 on 12/28  #Severe hypokalemia, K2.8 on admission, repleted at 3.8 on 12/28  #Hx of Streptococcus anginosus epidural/iliopsoas abscess on recent hospitalization 1112/2021, on IV vancomycin to be continued per ID until 1/27/2022  #Recent right paraspinal pain at L3-4 along with iliac crest pain along with Hx of paraspinal abscess. Recent back surgery in 6/21 with Dr. Dolores Bland. On board. Appreciate recommendations  #Recent celiac artery thrombosis with splenic infarcts, on Eliquis, diagnosed on 11/9/2021 on CT A/P. No vascular surgical intervention needed at the time. To continue Eliquis for 6 months. #DM2  #Bilateral DM related polyneuropathybilateral feet  #Hx of SVT  #HTN  #HLD  #Hypothyroidism  #Depression/anxiety     Plan:      -Admitted to inpatient, telemetry  -IVF: NS at 100 mL/h. Continue for now for renal recovery, however very subtle change, suggesting?   Likely need of retroperitoneal decompression  -CT-guided retroperitoneal abscess drainage consideration if worsening leukocytosis, fever or worsening/not improving renal functionsdiscussed and suggested by radiology team.  Neurologyradiology team also has discussed and recommended the plan per radiology team 12/28. Monitor CBC, BMP closely along with clinicals.  -Continue IV vancomycin, pharmacy consult  -Initiate IV cefepime on admission. Continue empirically for now until further details of urine culture/blood culture is available. Follow ID recommendations for final antibiotic plans.  -Follow blood cultures, urine culture  -ID consult: Dr. Marleni Altamirano.  D/W ID personally. Appreciate recommendations. Will need repeat scan and ID follow-up with Dr. Lucie Ramirez on discharge  -Ortho consult: Dr. Margareth Clancy. Appreciate plans for follow-up later this week and for suture removal as appropriate.  -Urology consult: Right-sided hydronephrosis. Appreciate recommendations. Monitor renal functions closely. May need repeat CT for reassessment of retroperitoneum and psoas abscess along with hydronephrosis  -Called IR 12/28 per urology recommendation. Recommended CT-guided drainage for retroperitoneum. D/W radiology team in CT reading room. Details as noted above. -CBC, CMP monitoring  -Sliding scale insulin with serial Accu-Chek monitoring  -If warranted and safe, resume Lantus at a lower dose with gradual titration as tolerated by patient. Per patient, her blood sugars were running low and not taking Lantus lately at SNF.  -Resume home medications including Eliquis and other as ordered  -Would need PT/OT once better plan from surgery teams  -PICC line care per nursing team    Code status: Full code  DVT prophylaxis: Eliquis. May require coordination with radiology team if CT-guided R retroperitoneal/psoas abscess drainage warranted later on.     Care Plan discussed with: Patient/Family, Nurse and   Anticipated Disposition: SNF/LTC -likely return to [facility]  Anticipated Discharge: Greater than 48 hours     Hospital Problems  Date Reviewed: 12/21/2021          Codes Class Noted POA    Abdominal pain ICD-10-CM: R10.9  ICD-9-CM: 789.00  12/27/2021 Unknown        Postprocedural intraabdominal abscess ICD-10-CM: T81.43XA  ICD-9-CM: 998.59  12/27/2021 Unknown        MERCEDES (acute kidney injury) [facility]) ICD-10-CM: N17.9  ICD-9-CM: 584.9  11/6/2021 Unknown                Review of Systems:   A comprehensive review of systems was negative except for that written in the HPI. Vital Signs:    Last 24hrs VS reviewed since prior progress note. Most recent are:  Visit Vitals  BP (!) 163/96   Pulse 72   Temp 98.7 °F (37.1 °C)   Resp 20   Ht 5' 4\" (1.626 m)   Wt 121.8 kg (268 lb 8.3 oz)   SpO2 98%   BMI 46.09 kg/m²         Intake/Output Summary (Last 24 hours) at 12/29/2021 1011  Last data filed at 12/29/2021 7516  Gross per 24 hour   Intake 4006 ml   Output 5000 ml   Net -994 ml        Physical Examination:     I had a face to face encounter with this patient and independently examined them on 12/29/2021 as outlined below:          Constitutional:  No acute distress, cooperative, pleasant    ENT:  Oral mucosa moist, oropharynx benign. Resp:  CTA bilaterally. No wheezing/rhonchi/rales. No accessory muscle use   CV:  Regular rhythm, normal rate, no murmurs, gallops, rubs    GI:  Soft, non distended, mild right lower quadrant discomfort/mild tenderness +. . normoactive bowel sounds, no hepatosplenomegaly     Musculoskeletal:  No edema, warm, 2+ pulses throughout    Neurologic:  Moves all extremities. AAOx3, CN II-XII reviewed            Data Review:    Review and/or order of clinical lab test  Review and/or order of tests in the radiology section of [facility]  Review and/or order of tests in the medicine section of [facility]    Radiology  CT ABD PELV WO CONT    Result Date: 12/27/2021  1. Very small right pleural effusion.  2. Right perinephric stranding and proximal right hydronephrosis. 3. Interval removal of right lower quadrant percutaneous catheter. Persistent inflammatory stranding and 3 cm x 1.6 cm right lower quadrant fluid collection, unchanged. Labs:     Recent Labs     12/28/21  0323 12/27/21  1124   WBC 9.7 10.2   HGB 9.1* 9.2*   HCT 29.5* 31.1*   * 512*     Recent Labs     12/29/21  0324 12/28/21  0323 12/27/21  1124    140 140   K 3.3* 3.8 2.8*    109* 104   CO2 25 27 31   BUN 9 9 7   CREA 1.05* 1.08* 1.15*   * 96 114*   CA 8.7 8.1* 8.3*   MG  --   --  1.2*     Recent Labs     12/28/21  0323 12/27/21  1124   ALT 27 28   * 149*   TBILI 0.4 0.4   TP 6.3* 6.8   ALB 2.2* 2.3*   GLOB 4.1* 4.5*     Recent Labs     12/28/21 0323   INR 1.2*   PTP 12.6*      No results for input(s): FE, TIBC, PSAT, FERR in the last 72 hours. Lab Results   Component Value Date/Time    Folate 17.4 11/29/2021 02:49 AM      No results for input(s): PH, PCO2, PO2 in the last 72 hours. No results for input(s): CPK, CKNDX, TROIQ in the last 72 hours.     No lab exists for component: CPKMB  Lab Results   Component Value Date/Time    Cholesterol, total 232 (H) 07/13/2010 12:05 PM    HDL Cholesterol 38 07/13/2010 12:05 PM    LDL, calculated 164 (H) 07/13/2010 12:05 PM    Triglyceride 150 (H) 07/13/2010 12:05 PM    CHOL/HDL Ratio 6.1 (H) 07/13/2010 12:05 PM     Lab Results   Component Value Date/Time    Glucose (POC) 103 12/29/2021 07:37 AM    Glucose (POC) 130 (H) 12/28/2021 09:35 PM    Glucose (POC) 138 (H) 12/28/2021 04:17 PM    Glucose (POC) 105 12/28/2021 12:39 PM    Glucose (POC) 91 12/28/2021 07:23 AM     Lab Results   Component Value Date/Time    Color YELLOW/STRAW 12/28/2021 04:07 PM    Appearance CLEAR 12/28/2021 04:07 PM    Specific gravity 1.009 12/28/2021 04:07 PM    Specific gravity 1.010 04/06/2011 10:20 AM    pH (UA) 7.5 12/28/2021 04:07 PM    Protein Negative 12/28/2021 04:07 PM    Glucose Negative 12/28/2021 04:07 PM    Ketone Negative 12/28/2021 04:07 PM    Bilirubin Negative 12/28/2021 04:07 PM    Urobilinogen 0.2 12/28/2021 04:07 PM    Nitrites Negative 12/28/2021 04:07 PM    Leukocyte Esterase Negative 12/28/2021 04:07 PM    Epithelial cells FEW 12/28/2021 04:07 PM    Bacteria Negative 12/28/2021 04:07 PM    WBC 0-4 12/28/2021 04:07 PM    RBC 20-50 12/28/2021 04:07 PM         Medications Reviewed:     Current Facility-Administered Medications   Medication Dose Route Frequency    hydrALAZINE (APRESOLINE) 20 mg/mL injection 10 mg  10 mg IntraVENous Q6H PRN    influenza vaccine 2021-22 (6 mos+)(PF) (FLUARIX/FLULAVAL/FLUZONE QUAD) injection 0.5 mL  1 Each IntraMUSCular PRIOR TO DISCHARGE    Vancomycin - random level due 12/29 @ 1100. RN please draw. Thanks!    Other ONCE    sodium chloride (NS) flush 5-40 mL  5-40 mL IntraVENous Q8H    sodium chloride (NS) flush 5-40 mL  5-40 mL IntraVENous PRN    acetaminophen (TYLENOL) tablet 650 mg  650 mg Oral Q6H PRN    Or    acetaminophen (TYLENOL) suppository 650 mg  650 mg Rectal Q6H PRN    polyethylene glycol (MIRALAX) packet 17 g  17 g Oral DAILY PRN    ondansetron (ZOFRAN ODT) tablet 4 mg  4 mg Oral Q8H PRN    Or    ondansetron (ZOFRAN) injection 4 mg  4 mg IntraVENous Q6H PRN    cefepime (MAXIPIME) 2 g in sterile water (preservative free) 10 mL IV syringe  2 g IntraVENous Q12H    Vancomycin - pharmacy to dose   Other Rx Dosing/Monitoring    0.9% sodium chloride infusion  100 mL/hr IntraVENous CONTINUOUS    glucose chewable tablet 16 g  4 Tablet Oral PRN    dextrose (D50W) injection syrg 12.5-25 g  25-50 mL IntraVENous PRN    glucagon (GLUCAGEN) injection 1 mg  1 mg IntraMUSCular PRN    insulin lispro (HUMALOG) injection   SubCUTAneous AC&HS    ALPRAZolam (XANAX) tablet 0.5 mg  0.5 mg Oral BID PRN    amitriptyline (ELAVIL) tablet 50 mg  50 mg Oral QHS    apixaban (ELIQUIS) tablet 5 mg  5 mg Oral Q12H    aspirin delayed-release tablet 81 mg  81 mg Oral DAILY    atorvastatin (LIPITOR) tablet 40 mg  40 mg Oral QHS    levothyroxine (SYNTHROID) tablet 112 mcg  112 mcg Oral ACB    L.acidophilus-paracasei-S.thermophil-bifidobacter (RISAQUAD) 8 billion cell capsule  1 Capsule Oral DAILY    magnesium hydroxide (MILK OF MAGNESIA) 400 mg/5 mL oral suspension 30 mL  30 mL Oral DAILY    metoprolol tartrate (LOPRESSOR) tablet 25 mg  25 mg Oral 7am    metoprolol tartrate (LOPRESSOR) tablet 12.5 mg  12.5 mg Oral QHS    miconazole (MICOTIN) 2 % powder   Topical BID    pantoprazole (PROTONIX) tablet 40 mg  40 mg Oral ACB    PARoxetine (PAXIL) tablet 40 mg  40 mg Oral 7am    polyethylene glycol (MIRALAX) packet 17 g  17 g Oral DAILY    senna (SENOKOT) tablet 17.2 mg  2 Tablet Oral DAILY     ______________________________________________________________________  EXPECTED LENGTH OF STAY: - - -  ACTUAL LENGTH OF STAY:          2                 Alberteen Kehr, MD

## 2021-12-29 NOTE — PROGRESS NOTES
Problem: Self Care Deficits Care Plan (Adult)  Goal: *Acute Goals and Plan of Care (Insert Text)  Description: FUNCTIONAL STATUS PRIOR TO ADMISSION: Patient was independent and active without use of DME prior to recent hospitalizations. Since recent admissions, pt has been using a RW for functional mobility. Pt  admitted from [facility]. HOME SUPPORT: The patient lived with her  who assisted with LB dressing at baseline, however per pt report he is no longer able to provide assistance. Occupational Therapy Goals  Initiated 12/29/2021  1. Patient will perform lower body dressing with supervision/set-up, using AE PRN, within 7 day(s). 2.  Patient will perform lower body bathing  with supervision/set-up, using AE PRN,  within 7 day(s). 3.  Patient will perform standing grooming task with modified independence within 7 day(s). 4.  Patient will perform toilet transfers with modified independence within 7 day(s). 5.  Patient will perform all aspects of toileting with modified independence within 7 day(s). 6. Patient will integrate 3/3 back precautions into ADL routine with minimal verbal cues within 7 days. 7.  Patient will utilize energy conservation techniques during functional activities with verbal cues within 7 day(s). Outcome: Not Met     OCCUPATIONAL THERAPY EVALUATION  Patient: Marlon Manrique (24 y.o. female)  Date: 12/29/2021  Primary Diagnosis: MERCEDES (acute kidney injury) ([facility] [address] [N17.9]  Abdominal pain [R10.9]  Postprocedural intraabdominal abscess [T81.43XA]        Precautions:   Back,Fall (PICC line LUE)    ASSESSMENT  Based on the objective data described below, the patient presents with impaired standing balance, decreased activity tolerance/endurance, impaired LB reach, and generalized fatigue s/p admission for abdominal pain. Pt admitted from [facility] where she was receiving rehab services following hospitalization for SIRS and drainage of lumbar wound on 12/02/2021.  Pt with good knowledge of back precautions, however requires VC/prompting to implement into ADL routines. Current Level of Function Impacting Discharge (ADLs/self-care): up to max A LB ADLs    Functional Outcome Measure: The patient scored Total: 60/100 on the Barthel Index outcome measure which is indicative of being minimally independent in basic self-care. Other factors to consider for discharge: multiple admissions,  unable to assist, spinal precuations     Patient will benefit from skilled therapy intervention to address the above noted impairments. PLAN :  Recommendations and Planned Interventions: self care training, functional mobility training, therapeutic exercise, balance training, therapeutic activities, endurance activities, patient education and home safety training  3 times a weekFrequency/Duration: Patient will be followed by occupational therapy 3 times a week to address goals. Recommendation for discharge: (in order for the patient to meet his/her long term goals)  Therapy up to 5 days/week in SNF setting    This discharge recommendation:  Has been made in collaboration with the attending provider and/or case management    IF patient discharges home will need the following DME: TBD       SUBJECTIVE:   Patient stated I have to get better to be with my , I do everything for him now.     OBJECTIVE DATA SUMMARY:   HISTORY:   Past Medical History:   Diagnosis Date    Adverse effect of anesthesia     O2 DROPS WITH ANESTHESIA    Arthritis     KNEES    Cancer ([facility] [address] 03/13/2015    SQUAMOUS CELL CARCINOMA; tonsils and lymph nodes.   Removed 3-2015 with radiation    GERD (gastroesophageal reflux disease)     Hypertension     NO LONGER ON MEDICATION    Hypothyroid     HYPOTHYROIDISM    Migraine     Nausea & vomiting     Obesity     Other ill-defined conditions(799.89)     chronic back pain    Psychiatric disorder     anxiety    Psychiatric disorder     panic ATTACKS    SVT (supraventricular tachycardia) ([facility] [address] 05/24/2014    Ulcerative colitis      Past Surgical History:   Procedure Laterality Date    COLONOSCOPY,DIAGNOSTIC  11/19/2015         HX GI      colonscopy    HX HEENT  03/2015    tonsillectomy (CANCER) AND NECK LYMPH NODES    HX HEENT  2008    PARATHYROID REMOVAL    HX HEENT Left 2002    EYE LASER    HX HEMORRHOIDECTOMY  2001, 2016     X2    HX HYSTERECTOMY  1985    HX KNEE ARTHROSCOPY  1995    left knee surgery, TOTAL LT  and Right KNEE 2013    HX KNEE REPLACEMENT Bilateral 2013, 2014    HX LAP CHOLECYSTECTOMY  2002    HX LUMBAR FUSION  2011    SPINAL FUSION with hardware L4-L5    HX ORTHOPAEDIC  1990    CYST REMOVED RIGHT WRIST    HX ORTHOPAEDIC  05/12/2021    L2-3 & L5-21 LUMBAR LAMINECTOMY WITH ANDREWS OSTEOTOMY    HX OTHER SURGICAL      cyst removal right wrist    HX UROLOGICAL  2010    bladder surgery(interstim device)    IR INJ FORAMIN EPID LUMB ANES/STER SNGL  8/19/2019    [place] EGD TRANSORAL BIOPSY SINGLE/MULTIPLE  5/20/2013            Expanded or extensive additional review of patient history:     Home Situation  Home Environment: Private residence  Wheelchair Ramp: Yes  One/Two Story Residence: One story  Living Alone: No  Support Systems: Spouse/Significant Other,Other Family Member(s)  Patient Expects to be Discharged to[de-identified] Unknown  Current DME Used/Available at Home: Walker, rolling,Transfer bench,Wheelchair,Commode, bedside,Raised toilet seat (reacher and toilet tongs and long handled shoe horn)  Tub or Shower Type: Tub/Shower combination    Hand dominance: Right    EXAMINATION OF PERFORMANCE DEFICITS:  Cognitive/Behavioral Status:  Neurologic State: Alert  Orientation Level: Oriented X4  Cognition: Appropriate decision making  Perception: Appears intact  Perseveration: No perseveration noted  Safety/Judgement: Awareness of environment    Skin: visually intact    Edema: none noted    Hearing:   Auditory  Auditory Impairment: None    Vision/Perceptual: Acuity: Within Defined Limits    Corrective Lenses: Reading glasses    Range of Motion:    AROM: Within functional limits, impaired BLE                         Strength:    Strength: Within functional limits                Coordination:     Fine Motor Skills-Upper: Left Intact; Right Intact    Gross Motor Skills-Upper: Left Intact; Right Intact    Tone & Sensation:       Sensation: Impaired (baseline neuropathy, RLE)                      Balance:  Sitting: Intact  Standing: Impaired; With support  Standing - Static: Good;Constant support  Standing - Dynamic : Good;Fair;Constant support    Functional Mobility and Transfers for ADLs:  Bed Mobility:  Rolling: Stand-by assistance  Supine to Sit: Contact guard assistance  Sit to Supine:  (pt remained in bedside chair)  Scooting: Contact guard assistance    Transfers:  Sit to Stand: Contact guard assistance  Stand to Sit: Contact guard assistance  Bed to Chair: Contact guard assistance  Toilet Transfer : Contact guard assistance;Minimum assistance (inferred)    ADL Assessment:  Feeding: Modified independent    Oral Facial Hygiene/Grooming: Setup (seated, inferred)    Bathing: Maximum assistance (for LB bathing)    Upper Body Dressing: Setup    Lower Body Dressing: Maximum assistance; Additional time    Toileting: Total assistance (purewick in place, pt reports urine incontinence )                ADL Intervention and task modifications:  Feeding  Feeding Assistance: Modified independent                   Upper [address]: Set-up (assist for line mgt)    Lower Body Dressing Assistance  Socks: Maximum assistance  Position Performed: Seated edge of bed  Cues: Physical assistance;Verbal cues provided  Adaptive Equipment Used:  (pt declining use of AE)    Toileting  Toileting Assistance:  Total assistance(dependent) (purewick)    Cognitive Retraining  Safety/Judgement: Awareness of environment      Functional Measure:    Barthel Index:  Bathin  Bladder: 5  Bowels: 5  Groomin  Dressin  Feeding: 10  Mobility: 10  Stairs: 5  Toilet Use: 5  Transfer (Bed to Chair and Back): 10  Total: 60/100      The Barthel ADL Index: Guidelines  1. The index should be used as a record of what a patient does, not as a record of what a patient could do. 2. The main aim is to establish degree of independence from any help, physical or verbal, however minor and for whatever reason. 3. The need for supervision renders the patient not independent. 4. A patient's performance should be established using the best available evidence. Asking the patient, friends/relatives and nurses are the usual sources, but direct observation and common sense are also important. However direct testing is not needed. 5. Usually the patient's performance over the preceding 24-48 hours is important, but occasionally longer periods will be relevant. 6. Middle categories imply that the patient supplies over 50 per cent of the effort. 7. Use of aids to be independent is allowed. Score Interpretation (from [address] [address])    Independent   60-79 Minimally independent   40-59 Partially dependent   20-39 Very dependent   <20 Totally dependent     -Faith Reilly., Barthel, D.W. (1965). Functional evaluation: the Barthel Index. [address] [facility] ([address] (1997). The Barthel activities of daily living index: self-reporting versus actual performance in the old (> or = 75 years). Journal of [address] [place] [address] 45(7), [address] [facility], J.J.M.F, Abbe Chew., Raulito Ochoa. (1999). Measuring the change in disability after inpatient rehabilitation; comparison of the responsiveness of the Barthel Index and Functional [place] Measure. Journal of Neurology, Neurosurgery, and Psychiatry, 66(4), 718-383.   -Hudson Forman, N.KIT.A, KATIE Hunter, & Jorden Homans, MKasandraA. (2004) Assessment of post-stroke quality of life in cost-effectiveness studies: The usefulness of the Barthel Index and the EuroQoL-5D. Quality of Life Research, 15, 148-80     Occupational Therapy Evaluation Charge Determination   History Examination Decision-Making   LOW Complexity : Brief history review  LOW Complexity : 1-3 performance deficits relating to physical, cognitive , or psychosocial skils that result in activity limitations and / or participation restrictions  MEDIUM Complexity : Patient may present with comorbidities that affect occupational performnce. Miniml to moderate modification of tasks or assistance (eg, physical or verbal ) with assesment(s) is necessary to enable patient to complete evaluation       Based on the above components, the patient evaluation is determined to be of the following complexity level: LOW   Pain Rating:  None reported    Activity Tolerance:   Good, slight SOB with movement/transfer to bedside chair    After treatment patient left in no apparent distress:    Sitting in chair and Call bell within reach    COMMUNICATION/EDUCATION:   The patients plan of care was discussed with: Registered nurse and Physician. Patient/family agree to work toward stated goals and plan of care. This patients plan of care is appropriate for delegation to [facility].     Thank you for this referral.  Shawnee Puentes OT  Time Calculation: 27 mins

## 2021-12-29 NOTE — PROGRESS NOTES
Infectious Disease Consult Note        HPI:      Ms Raymond Navarro seen  She denied any back pain, or flank pain  Her pain is mainly in RLQ area   Denied nausea vomiting diarrhea  Denied fever chills  Denied dysuria       Physical Exam:    Vitals:   Patient Vitals for the past 24 hrs:   Temp Pulse Resp BP SpO2   12/29/21 1402  75      12/29/21 1211  71      12/29/21 1119 97.4 °F (36.3 °C) 72 20 (!) 152/87 94 %   12/29/21 1020  69      12/29/21 0858 98.7 °F (37.1 °C) 72 20 (!) 163/96 98 %   12/29/21 0655  69  (!) 153/83    12/29/21 0600  74      12/29/21 0400  72      12/29/21 0340 98.2 °F (36.8 °C) 75 17 (!) 154/86 91 %   12/29/21 0200  74      12/29/21 0022 98.3 °F (36.8 °C) 73  (!) 161/88 91 %   12/28/21 2224  81  (!) 167/93    12/28/21 2200  80      12/28/21 2045 98.3 °F (36.8 °C) 83 18 (!) 160/86 97 %   12/28/21 2000  80      12/28/21 1828  78      12/28/21 1650 98.1 °F (36.7 °C) 78 20 (!) 156/87 95 %   12/28/21 1512  82  (!) 158/82    12/28/21 1500  79  (!) 148/82      · GEN: NAD  · HEENT:  no scleral icterus,   no thrush  · CV: S1, S2 heard regularly,  · Lungs: Clear to auscultation bilaterally  · Abdomen: soft, obese, tender RLQ, no guarding, no erythema of abdominal wall , no flank tenderness b/l  · MSK  Suture in lower back noted, no erythema no drainage, non tender   · Extremities: no edema  · Neuro: Alert, oriented to self, place and situation, moves all extremities to commands, verbal   · Skin: no rash  · Lines PICC LUE       Labs:   Recent Results (from the past 24 hour(s))   URINALYSIS W/ REFLEX CULTURE    Collection Time: 12/28/21  4:07 PM    Specimen: Urine   Result Value Ref Range    Color YELLOW/STRAW      Appearance CLEAR CLEAR      Specific gravity 1.009 1.003 - 1.030      pH (UA) 7.5 5.0 - 8.0      Protein Negative NEG mg/dL    Glucose Negative NEG mg/dL    Ketone Negative NEG mg/dL    Bilirubin Negative NEG      Blood MODERATE (A) NEG      Urobilinogen 0.2 0.2 - 1.0 EU/dL    Nitrites Negative NEG      Leukocyte Esterase Negative NEG      UA:UC IF INDICATED CULTURE NOT INDICATED BY UA RESULT CNI      WBC 0-4 0 - 4 /hpf    RBC 20-50 0 - 5 /hpf    Epithelial cells FEW FEW /lpf    Bacteria Negative NEG /hpf    Hyaline cast 2-5 0 - 5 /lpf   GLUCOSE, POC    Collection Time: 12/28/21  4:17 PM   Result Value Ref Range    Glucose (POC) 138 (H) 65 - 117 mg/dL    Performed by Leela Jaquez    GLUCOSE, POC    Collection Time: 12/28/21  9:35 PM   Result Value Ref Range    Glucose (POC) 130 (H) 65 - 117 mg/dL    Performed by Jl Sol    METABOLIC PANEL, BASIC    Collection Time: 12/29/21  3:24 AM   Result Value Ref Range    Sodium 140 136 - 145 mmol/L    Potassium 3.3 (L) 3.5 - 5.1 mmol/L    Chloride 106 97 - 108 mmol/L    CO2 25 21 - 32 mmol/L    Anion gap 9 5 - 15 mmol/L    Glucose 114 (H) 65 - 100 mg/dL    BUN 9 6 - 20 MG/DL    Creatinine 1.05 (H) 0.55 - 1.02 MG/DL    BUN/Creatinine ratio 9 (L) 12 - 20      GFR est AA >60 >60 ml/min/1.73m2    GFR est non-AA 51 (L) >60 ml/min/1.73m2    Calcium 8.7 8.5 - 10.1 MG/DL   SAMPLES BEING HELD    Collection Time: 12/29/21  3:24 AM   Result Value Ref Range    SAMPLES BEING HELD 1LAV     COMMENT        Add-on orders for these samples will be processed based on acceptable specimen integrity and analyte stability, which may vary by analyte.    GLUCOSE, POC    Collection Time: 12/29/21  7:37 AM   Result Value Ref Range    Glucose (POC) 103 65 - 117 mg/dL    Performed by Miproto  [facility]    GLUCOSE, POC    Collection Time: 12/29/21 11:25 AM   Result Value Ref Range    Glucose (POC) 137 (H) 65 - 117 mg/dL    Performed by Miproto  [facility]        Microbiology Data:       Blood:12/27/21 pending      12/2/21 Wound   Staphylococcus epidermidis    Antibiotic Interpretation Value  Method Comment   Clindamycin ($) Resistant >=4 ug/mL GRISELDA    Ciprofloxacin ($) Resistant >=8 ug/mL GRISELDA    Daptomycin ($$$$$) Susceptible 0.5 ug/mL GRISELDA Erythromycin ($$$$) Resistant >=8 ug/mL GRISELDA    Gentamicin ($) Resistant >=16 ug/mL GRISELDA    Levofloxacin ($) Resistant >=8 ug/mL GRISELDA    Linezolid ($$$$$) Susceptible 2 ug/mL GRISELDA    Oxacillin Resistant >=4 ug/mL GRISELDA    Rifampin ($$$$) Susceptible <=0.5 ug/mL GRISELDA Rifampin is not to be used for mono-therapy. Tetracycline Susceptible 2 ug/mL GRISELDA    Trimeth/Sulfa Resistant 80 ug/mL GRISELDA    Vancomycin ($) Susceptible 1 ug/mL GRISELDA    Moxifloxacin ($$$$) Intermediate 4 ug/mL GRISELDA    Inducible Clindamycin Susceptible Negative ug/mL GRISELDA      11/26/21   Specimen Information: Abdominal Fluid; Body Fluid         0 Result Notes     Ref Range & Units 11/26/21 1353 Resulting Agency   Special Requests:   NO SPECIAL REQUESTS  [facility]   GRAM STAIN   RARE WBCS SEEN  [address]   NO ORGANISMS SEEN  [facility] [place]   Culture result:   RARE ALPHA STREPTOCOCCUS Abnormal           11/12/21 Drainage  Streptococcus anginosus    Antibiotic Interpretation Value  Method Comment   Penicillin G ($$) Susceptible <=0.06 ug/mL GRISELDA    Ceftriaxone ($) Susceptible <=0.12 ug/mL GRISELDA    Vancomycin ($) Susceptible 0.5 ug/mL GRISELDA    Ampicillin ($) Susceptible <=0.25 ug/mL GRISELDA    Cefotaxime Susceptible <=0.12 ug/mL GRISELDA    Levofloxacin ($) Susceptible <=0.25 ug/mL GRISELDA    Clindamycin ($) Resistant >=1 ug/mL GRISELDA              Imaging:   CT A/P WO contrast 12/27/21  INDICATION: Right lower quadrant abdominal pain.     Exam: Noncontrast CT of the abdomen and pelvis is performed with 5 mm  collimation. Sagittal and coronal reformatted images were also performed.     CT dose reduction was achieved through the use of a standardized protocol  tailored for this examination and automatic exposure control for dose  modulation. Adaptive statistical iterative reconstruction (ASIR) was utilized.     Direct comparison is made to prior CT dated December 4, 2021.     FINDINGS:     There is a very small right pleural effusion.  There is very mild bibasilar  atelectasis.     Abdomen:      Liver: The liver is normal on noncontrast images.      Spleen: There are persistent wedge-shaped hypodensities within the spleen  consistent with infarcts. The spleen is not enlarged.     Adrenals: The adrenals are normal on noncontrast images.      Pancreas: The pancreas is normal on noncontrast images.      Gallbladder: The gallbladder is surgically absent.     Kidneys: There is bilateral perinephric stranding. There is right hydronephrosis  and proximal right hydroureter.     Bowel: No thickened or dilated loop of large or small bowel seen.      Appendix: The appendix is not visualized.     Pelvis: Urinary bladder is partially filled and grossly normal.     Miscellaneous: Right lower quadrant percutaneous catheter has been removed;  there is persistent inflammatory stranding within the right lower quadrant of  the abdomen. Within the right lower quadrant abdomen, there is a very small 3 cm  x 1.6 cm focal fluid collection, unchanged compared to prior CT dated December 4, 2021. There is no free intraperitoneal gas or fluid. The uterus is absent.     IMPRESSION  1. Very small right pleural effusion. 2. Right perinephric stranding and proximal right hydronephrosis. 3. Interval removal of right lower quadrant percutaneous catheter. Persistent  inflammatory stranding and 3 cm x 1.6 cm right lower quadrant fluid collection,  Unchanged. CT A/P W contrast 12/4/21  FINDINGS:   LOWER THORAX: Minimal bilateral dependent atelectasis. Moderate coronary artery  calcifications. LIVER: No mass. BILIARY TREE: Gallbladder is surgically absent. CBD is not dilated. 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 PANCREAS: No mass or ductal dilatation. ADRENALS: Unremarkable. KIDNEYS: No mass, calculus, or hydronephrosis. Unchanged small left renal cyst;  no follow-up required. STOMACH: Unremarkable. SMALL BOWEL: No dilatation or wall thickening.   COLON: No dilatation or wall thickening. APPENDIX: Not visualized. PERITONEUM: No ascites or pneumoperitoneum. Right lower quadrant drainage  catheter is present, with no residual associated fluid collection. RETROPERITONEUM: No lymphadenopathy or aortic aneurysm. REPRODUCTIVE ORGANS: Status post hysterectomy. URINARY BLADDER: No mass or calculus. BONES: No destructive bone lesion. Fusion hardware is present in the lumbosacral  spine. ABDOMINAL WALL: No mass or hernia. ADDITIONAL COMMENTS: N/A     IMPRESSION  No evidence of residual abscess in the right lower quadrant following  percutaneous drainage. Procedures:     12/2/21  Incision and drainage, superficial and deep of the lumbar wound. 11/26/21     IMPRESSION  1. Successful removal of indwelling right lower quadrant drainage catheter. 2. Successful placement of a new 12 Chinese right lower quadrant drain into  abscess. 3. Successful aspiration of posterior paraspinal abscess. 11/12/21  IMPRESSION     CT-guided drainage of right lower abdominal abscess with placement of a 10  Chinese pigtail drainage catheter. Approximately 200 cc of purulent fluid was  removed. A sample was sent for the requested analysis. There were no immediate  complications. 6/2/21 PROCEDURES PERFORMED:  Exploration of fusion, revision laminectomy L2-3, L5-S1 with a Lydia osteotomy at L2-3. Posterior revision fusion L2 to S1 with segmental instrumentation. 4/4/17 PROCEDURES PERFORMED    1. Exploration of fusion with revision laminectomy, L2-L3 and L3-L4. 2. Posterior lumbar fusion revision, L3-L5. 3. Posterior segmentation L3-L5. 4. Transforaminal interbody fusion, L3-L4. 5. Allograft and autograft. 6. Posterior Lydia osteotomy at L3-L4. MRI lumbar spine 11/22/21  Addendum: Addendum to   IMPRESSION:  1.  Rim-enhancing paraspinal soft tissue collections may reflect abscesses as  well, correlate clinically.   2.  Partially visualized abscess inferior to the right kidney again seen, but  incompletely evaluated on this exam.     I discussed these findings with Dr. Jeovanny Suarez at 0900 hours on 11/23/2021.      Addended by Yash Alberto MD on 11/23/2021  9:03 AM       Study Result    Narrative & Impression   EXAM: MRI LUMB SPINE W WO CONT     INDICATION: assess for residual abscess. Needs conscious sedation     COMPARISON: CT lumbar spine 11/9/2021, MRI lumbar spine 7/8/2021     TECHNIQUE: MR imaging of the lumbar spine was performed using the following  sequences: sagittal T1, T2, STIR;  axial T1, T2 prior to and following contrast  administration.      CONTRAST: 20 mL of ProHance.     FINDINGS:     L2-S1 posterior spinal instrumentation and laminectomies. Several rim-enhancing  fluid collections seen as follows: 4.7 cm x 2.1 cm x 2.1 cm collection in the  left dorsal paraspinal musculature at the L3 level, a 3.2 cm x 2.4 cm x 0.8 cm  collection in the right dorsal paraspinal musculature at the L5 level, a 4.0 cm  x 1.8 cm x 3.3 cm partially visualized collection in the left dorsal paraspinal  soft tissues at the L5 level. Grade 1 anterolisthesis of L4 on L5. Vertebral  body heights are maintained. Marrow signal is normal. Multilevel degenerative  endplate osteophytes     The conus medullaris terminates at L1-L2. Signal and caliber of the distal  spinal cord are within normal limits. There is no pathologic intrathecal  enhancement.     Nonenhancing left upper pole renal cyst.     Lower thoracic spine: No herniation or stenosis.     L1-L2: Minimal disc bulge. No stenosis     L2-L3: Disc bulge with right foraminal extension. No spinal stenosis. Mild right  foraminal stenosis     L3-L4: No spinal stenosis. Moderate to severe left and moderate right foraminal  stenosis     L4-L5: Anterolisthesis. Disc pseudobulge. No spinal stenosis. Severe bilateral  foraminal stenosis     L5-S1: Disc bulge. Ligament of flavum thickening. Mild spinal stenosis.  Severe  right and moderate to severe left foraminal stenosis     IMPRESSION  1. L2-S1 posterior spinal instrumentation and laminectomies. Several enhancing  fluid collections in the dorsal paraspinal muscles and soft tissues, compatible  with seromas. 2.  Multilevel degenerative changes and disc disease. L5-S1 mild spinal  stenosis. Multilevel varying degrees of foraminal stenosis above. Assessment / Plan:       Ms Vishal Amaya is a 17-year-old lady with a history of diabetes, obesity, hypothyroidism, squamous cell carcinoma of tonsils removed and is status post radiation per chart in 2015, status post lumbar fusion with revision laminectomy in April 2017, revision laminectomy in June 2021, hx of retroperitoneal abscess with likely involvement of the lumbar spine . She underwent CT-guided aspiration of abdominal abscess on 11/12/21 with 200 cc of purulent fluid that was removed and cultures positive for Streptococcus anginosis. She subsequently had drains that was removed and another CT-guided aspiration of posterior paraspinal abscess on 11/26/2021 with cultures positive for alpha Streptococcus. On 12/2/2021 she underwent I&D of a superficial and deep lumbar wound with cultures positive for MRSA from the thio broth. Patient was recommended to have IV vancomycin with a planned end date of 1/27/2022 by [facility] ID. She presented to [facility] [place] from rehab wt concerns for RLQ abd pain and distention  CT on 12/27/21 with persistent stranding 3 by 1.6 cm RLQ fluid collection, unchanged from 12/4/21. CT also with R perinephric stranding and R hydronephrosis on 12/27/21 but patinet denied any dysuria, back pain symptoms     1) Hx of Abdominal abscess, retroperitoneal abscess , lumbar wound /paraspinal abscess /Psoas abscess   · 11/12/21 CT-guided aspiration of abdominal abscess on 11/12/21 with 200 cc of purulent fluid that was removed and cultures positive for Streptococcus anginosis.     · 11/26/21 CT-guided aspiration of posterior paraspinal abscess with cultures positive for alpha Streptococcus. · 12/2/2021 she underwent I&D of a superficial and deep lumbar wound with cultures positive for MRSA from the thio broth. Plan:  On Vancomycin with plans per ED [facility] ID till 1/27/211  CT on 12/27/21 with persistent stranding 3 by 1.6 cm RLQ fluid collection, unchanged from 12/4/21. Not easily drainable per notes  F/U wt Dr Lela Castro on DC ( and repeat CT wt ID to track response to antibiotics on discharge and decide final course based on response )   Monitor renal function closely     2) Hydronephrosis -- appears new finding compared to last scan   ? Related to her retroperitoneal abscess  On Vancomycin and Cefepime empirically   UA is benign, urine cx pending  On exam, patient has no flank tenderness/complains of dysuria   Will DC Cefepime pending  Urine culture   Cr improving -- urology following and if renal function, hydro worsens, plans for stenting       3) DM    4) hx of lumbar fusion   Seen by ortho  Plans for follow up later this week for more suture removal     5) Obesity     Thank for the opportunity to participate in the care of this patient. Please contact with questions or concerns.        Rick Found, DO  2:56 PM

## 2021-12-29 NOTE — PROGRESS NOTES
2244: Redressed pt PICC line and flushed lines. No blood return in either lumen. Notified Zeina Almeida NP that pt's PICC placement has not been verified yet this admission and that both lumens have no blood return. Asked NP if it is okay to use without Xray. ANDREW Tripp replied \"no need to xray, okay to use\". 0030: Pt's BP high running in 160s/80-90s all night. Notified Zeina Almeida NP. Orders received for PRN hydralazine for MAP of >120.

## 2021-12-29 NOTE — SENIOR SERVICES NOTE
After hours senior services consult received and appreciated. Patient remains to be admitted and is being followed by the case management team to assist with disposition efforts.       ANDREW Mark  10:43 AM

## 2021-12-30 ENCOUNTER — TELEPHONE (OUTPATIENT)
Dept: INFECTIOUS DISEASES | Age: 71
End: 2021-12-30

## 2021-12-30 LAB
ANION GAP SERPL CALC-SCNC: 4 MMOL/L (ref 5–15)
BACTERIA SPEC CULT: NORMAL
BUN SERPL-MCNC: 9 MG/DL (ref 6–20)
BUN/CREAT SERPL: 9 (ref 12–20)
CALCIUM SERPL-MCNC: 8.2 MG/DL (ref 8.5–10.1)
CHLORIDE SERPL-SCNC: 108 MMOL/L (ref 97–108)
CO2 SERPL-SCNC: 28 MMOL/L (ref 21–32)
CREAT SERPL-MCNC: 0.98 MG/DL (ref 0.55–1.02)
ERYTHROCYTE [DISTWIDTH] IN BLOOD BY AUTOMATED COUNT: 16.8 % (ref 11.5–14.5)
GLUCOSE BLD STRIP.AUTO-MCNC: 124 MG/DL (ref 65–117)
GLUCOSE BLD STRIP.AUTO-MCNC: 140 MG/DL (ref 65–117)
GLUCOSE BLD STRIP.AUTO-MCNC: 154 MG/DL (ref 65–117)
GLUCOSE BLD STRIP.AUTO-MCNC: 94 MG/DL (ref 65–117)
GLUCOSE SERPL-MCNC: 121 MG/DL (ref 65–100)
HCT VFR BLD AUTO: 28.4 % (ref 35–47)
HGB BLD-MCNC: 8.5 G/DL (ref 11.5–16)
MAGNESIUM SERPL-MCNC: 1.6 MG/DL (ref 1.6–2.4)
MCH RBC QN AUTO: 24.1 PG (ref 26–34)
MCHC RBC AUTO-ENTMCNC: 29.9 G/DL (ref 30–36.5)
MCV RBC AUTO: 80.7 FL (ref 80–99)
NRBC # BLD: 0 K/UL (ref 0–0.01)
NRBC BLD-RTO: 0 PER 100 WBC
PLATELET # BLD AUTO: 547 K/UL (ref 150–400)
PMV BLD AUTO: 9.5 FL (ref 8.9–12.9)
POTASSIUM SERPL-SCNC: 3.3 MMOL/L (ref 3.5–5.1)
RBC # BLD AUTO: 3.52 M/UL (ref 3.8–5.2)
SERVICE CMNT-IMP: ABNORMAL
SERVICE CMNT-IMP: NORMAL
SERVICE CMNT-IMP: NORMAL
SODIUM SERPL-SCNC: 140 MMOL/L (ref 136–145)
VANCOMYCIN SERPL-MCNC: 15.4 UG/ML
WBC # BLD AUTO: 9.2 K/UL (ref 3.6–11)

## 2021-12-30 PROCEDURE — 36415 COLL VENOUS BLD VENIPUNCTURE: CPT

## 2021-12-30 PROCEDURE — 80048 BASIC METABOLIC PNL TOTAL CA: CPT

## 2021-12-30 PROCEDURE — 80202 ASSAY OF VANCOMYCIN: CPT

## 2021-12-30 PROCEDURE — 74011250637 HC RX REV CODE- 250/637: Performed by: INTERNAL MEDICINE

## 2021-12-30 PROCEDURE — 85027 COMPLETE CBC AUTOMATED: CPT

## 2021-12-30 PROCEDURE — 74011250636 HC RX REV CODE- 250/636: Performed by: INTERNAL MEDICINE

## 2021-12-30 PROCEDURE — 74011000250 HC RX REV CODE- 250: Performed by: INTERNAL MEDICINE

## 2021-12-30 PROCEDURE — 74011636637 HC RX REV CODE- 636/637: Performed by: INTERNAL MEDICINE

## 2021-12-30 PROCEDURE — 83735 ASSAY OF MAGNESIUM: CPT

## 2021-12-30 PROCEDURE — 65210000002 HC RM PRIVATE GYN

## 2021-12-30 PROCEDURE — 82962 GLUCOSE BLOOD TEST: CPT

## 2021-12-30 PROCEDURE — 99232 SBSQ HOSP IP/OBS MODERATE 35: CPT | Performed by: INTERNAL MEDICINE

## 2021-12-30 RX ORDER — POTASSIUM CHLORIDE 750 MG/1
40 TABLET, FILM COATED, EXTENDED RELEASE ORAL
Status: COMPLETED | OUTPATIENT
Start: 2021-12-30 | End: 2021-12-30

## 2021-12-30 RX ORDER — OXYCODONE HYDROCHLORIDE 5 MG/1
5 TABLET ORAL ONCE
Status: COMPLETED | OUTPATIENT
Start: 2021-12-30 | End: 2021-12-30

## 2021-12-30 RX ADMIN — SODIUM CHLORIDE, PRESERVATIVE FREE 10 ML: 5 INJECTION INTRAVENOUS at 13:40

## 2021-12-30 RX ADMIN — SODIUM CHLORIDE, PRESERVATIVE FREE 10 ML: 5 INJECTION INTRAVENOUS at 21:37

## 2021-12-30 RX ADMIN — SODIUM CHLORIDE 100 ML/HR: 9 INJECTION, SOLUTION INTRAVENOUS at 01:49

## 2021-12-30 RX ADMIN — VANCOMYCIN HYDROCHLORIDE 1250 MG: 1.25 INJECTION, POWDER, LYOPHILIZED, FOR SOLUTION INTRAVENOUS at 21:31

## 2021-12-30 RX ADMIN — AMITRIPTYLINE HYDROCHLORIDE 50 MG: 50 TABLET, FILM COATED ORAL at 21:58

## 2021-12-30 RX ADMIN — OXYCODONE 5 MG: 5 TABLET ORAL at 17:59

## 2021-12-30 RX ADMIN — CEFEPIME 2 G: 2 INJECTION, POWDER, FOR SOLUTION INTRAVENOUS at 04:04

## 2021-12-30 RX ADMIN — ASPIRIN 81 MG: 81 TABLET, COATED ORAL at 09:13

## 2021-12-30 RX ADMIN — METOPROLOL TARTRATE 12.5 MG: 25 TABLET, FILM COATED ORAL at 21:31

## 2021-12-30 RX ADMIN — APIXABAN 5 MG: 5 TABLET, FILM COATED ORAL at 09:13

## 2021-12-30 RX ADMIN — LEVOTHYROXINE SODIUM 112 MCG: 0.11 TABLET ORAL at 06:37

## 2021-12-30 RX ADMIN — POTASSIUM CHLORIDE 40 MEQ: 750 TABLET, FILM COATED, EXTENDED RELEASE ORAL at 09:13

## 2021-12-30 RX ADMIN — ATORVASTATIN CALCIUM 40 MG: 40 TABLET, FILM COATED ORAL at 21:31

## 2021-12-30 RX ADMIN — SODIUM CHLORIDE, PRESERVATIVE FREE 10 ML: 5 INJECTION INTRAVENOUS at 06:38

## 2021-12-30 RX ADMIN — METOPROLOL TARTRATE 25 MG: 25 TABLET, FILM COATED ORAL at 06:37

## 2021-12-30 RX ADMIN — MICONAZOLE NITRATE 2 % TOPICAL POWDER: at 11:51

## 2021-12-30 RX ADMIN — Medication 1 CAPSULE: at 09:13

## 2021-12-30 RX ADMIN — PANTOPRAZOLE SODIUM 40 MG: 40 TABLET, DELAYED RELEASE ORAL at 06:37

## 2021-12-30 RX ADMIN — AMLODIPINE BESYLATE 10 MG: 5 TABLET ORAL at 09:13

## 2021-12-30 RX ADMIN — ACETAMINOPHEN 650 MG: 325 TABLET ORAL at 17:18

## 2021-12-30 RX ADMIN — Medication 2 UNITS: at 11:50

## 2021-12-30 RX ADMIN — APIXABAN 5 MG: 5 TABLET, FILM COATED ORAL at 21:31

## 2021-12-30 RX ADMIN — SODIUM CHLORIDE 100 ML/HR: 9 INJECTION, SOLUTION INTRAVENOUS at 11:51

## 2021-12-30 RX ADMIN — PAROXETINE HYDROCHLORIDE 40 MG: 20 TABLET, FILM COATED ORAL at 06:37

## 2021-12-30 NOTE — PROGRESS NOTES
Bedside and Verbal shift change report given to [address] [place] [address] (oncoming nurse) by Magi Cai RN (offgoing nurse). Report included the following information SBAR, Kardex, Intake/Output, MAR, Accordion and Recent Results.

## 2021-12-30 NOTE — PROGRESS NOTES
[address] [facility] Adult  Hospitalist Group                                                                                          Hospitalist Progress Note  Charis Hartley DO  Answering service: [phone] from in house phone        Date of Service:  2021  NAME:  Tanesha Escobar  :  1950  MRN:  [id number]      Admission Summary:   66 y. o. female with complex medical history of back surgery in 2021, followed by infectionmanaged by primary orthopedic Dr. Lucas Morales for the same, with hospitalization at [facility] for about a month early November through early December for right psoas abscess with IR guided abscess drainage, who was discharged to [facility] with PICC line for prolonged antibiotics with IV vancomycin through 2022 per ID based on the culture results. For about a week, patient has noticed bulge and discomfort in right lower quadrant.  Yesterday she showed that to nursing team and subsequent to that patient was sent for CT abdomen pelvis assessment here at [facility].  Patient was noted to have considerable CT findings with persistent unchanged right psoas abscess, right perinephric stranding along with hydronephrosis, hypokalemia, mild early MERCEDES with elevated creatinine at 1.15 compared to previous baseline of 0.50.6. [facility] hospitalist team has been consulted to admit the patient for further management. Patient denied any fever or chills.  Patient denied any chest pain, shortness of breath, palpitation.  Patient denied any leg swelling worsening.  Patient denied any urinary trouble or diarrhea or loose stools.  No constipation.  Patient denied any focal weakness, tingling, numbness.  Denied any worsening of back pain or drainage from the back. Interval history / Subjective: Follow up abscess. Patient seen and examined earlier today. Denies complaints including pain. Stated that she slept most of the morning and did not remember consultants coming in the room.       Assessment & Plan:     History Right psoas abscess needing extensive drainage at ED [facility]  Hx of Streptococcus anginosus epidural/iliopsoas abscess on recent hospitalization 1112/2021, on IV vancomycin to be continued per ID until 1/27/2022  -CT on admission with right perinephric stranding and proximal right hydronephrosis, persistent inflammatory stranding 3 cm x 1.6 cm right lower quadrant fluid collection, unchanged  -persistent collection not able to be drained  -ID consulted. Plans to continue vancomycin   -tylenol for pain     Right perinephric stranding with right hydronephrosis:  -urology consulted. Could consider stenting if renal function declines  -consider repeat imagining  -urology signed off, can be reconsulted as needed    Acute renal insufficiency: improved      Recent celiac artery thrombosis with splenic infarcts, on Eliquis, diagnosed on 11/9/2021 on CT A/P.    -No vascular surgical intervention needed at the time.    -continue Eliquis for 6 months    #DM2- SSI  #Bilateral DM related polyneuropathybilateral feet  #Hx of SVT  #HTN- metoprolol, amlodipine,   #HLD  #Hypothyroidism- home levothyroxine  #Depression/anxiety- home paroxetine    Code status: full   DVT prophylaxis: [address] discussed with: Patient/Family  Anticipated Disposition: SNF/LTC  Anticipated Discharge: 24 hours to 50 hours     Hospital Problems  Date Reviewed: 12/21/2021          Codes Class Noted POA    Abdominal pain ICD-10-CM: R10.9  ICD-9-CM: 789.00  12/27/2021 Unknown        Postprocedural intraabdominal abscess ICD-10-CM: T81.43XA  ICD-9-CM: 998.59  12/27/2021 Unknown        MERCEDES (acute kidney injury) ([facility] [address] ICD-10-CM: N17.9  ICD-9-CM: 584.9  11/6/2021 Unknown                Review of Systems:   Negative unless stated above      Vital Signs:    Last 24hrs VS reviewed since prior progress note.  Most recent are:  Visit Vitals  BP (!) 145/79 (BP 1 Location: Right upper arm)   Pulse 69   Temp 98.2 °F (36.8 °C)   Resp 20   Ht 5' 4\" (1.626 m)   Wt 120.2 kg (264 lb 15.9 oz)   SpO2 95%   BMI 45.49 kg/m²         Intake/Output Summary (Last 24 hours) at 12/30/2021 1747  Last data filed at 12/30/2021 1655  Gross per 24 hour   Intake 3411 ml   Output 4250 ml   Net -839 ml        Physical Examination:     I had a face to face encounter with this patient and independently examined them on 12/30/2021 as outlined below:          Constitutional:  No acute distress, cooperative, pleasant    ENT:  Oral mucosa moist, oropharynx benign. Resp:  CTA bilaterally. No wheezing/rhonchi/rales. No accessory muscle use   CV:  Regular rhythm, normal rate, no murmurs, gallops, rubs    GI:  Soft, non distended, non tender. normoactive bowel sounds, no hepatosplenomegaly     Musculoskeletal:  lower lumbar/sacral region with intact stables, no erythema     Neurologic:  Moves all extremities            Data Review:    Review and/or order of clinical lab test  Review and/or order of tests in the radiology section of CPT  Review and/or order of tests in the medicine section of CPT      Labs:     Recent Labs     12/30/21 0347 12/28/21 0323   WBC 9.2 9.7   HGB 8.5* 9.1*   HCT 28.4* 29.5*   * 505*     Recent Labs     12/30/21 0347 12/29/21 0324 12/28/21 0323    140 140   K 3.3* 3.3* 3.8    106 109*   CO2 28 25 27   BUN 9 9 9   CREA 0.98 1.05* 1.08*   * 114* 96   CA 8.2* 8.7 8.1*   MG 1.6  --   --      Recent Labs     12/28/21 0323   ALT 27   *   TBILI 0.4   TP 6.3*   ALB 2.2*   GLOB 4.1*     Recent Labs     12/28/21 0323   INR 1.2*   PTP 12.6*      No results for input(s): FE, TIBC, PSAT, FERR in the last 72 hours. Lab Results   Component Value Date/Time    Folate 17.4 11/29/2021 02:49 AM      No results for input(s): PH, PCO2, PO2 in the last 72 hours. No results for input(s): CPK, CKNDX, TROIQ in the last 72 hours.     No lab exists for component: CPKMB  Lab Results   Component Value Date/Time    Cholesterol, total 232 (H) 07/13/2010 12:05 PM    HDL Cholesterol 38 07/13/2010 12:05 PM    LDL, calculated 164 (H) 07/13/2010 12:05 PM    Triglyceride 150 (H) 07/13/2010 12:05 PM    CHOL/HDL Ratio 6.1 (H) 07/13/2010 12:05 PM     Lab Results   Component Value Date/Time    Glucose (POC) 94 12/30/2021 04:36 PM    Glucose (POC) 154 (H) 12/30/2021 11:06 AM    Glucose (POC) 124 (H) 12/30/2021 08:58 AM    Glucose (POC) 114 12/29/2021 09:20 PM    Glucose (POC) 128 (H) 12/29/2021 04:48 PM     Lab Results   Component Value Date/Time    Color YELLOW/STRAW 12/28/2021 04:07 PM    Appearance CLEAR 12/28/2021 04:07 PM    Specific gravity 1.009 12/28/2021 04:07 PM    Specific gravity 1.010 04/06/2011 10:20 AM    pH (UA) 7.5 12/28/2021 04:07 PM    Protein Negative 12/28/2021 04:07 PM    Glucose Negative 12/28/2021 04:07 PM    Ketone Negative 12/28/2021 04:07 PM    Bilirubin Negative 12/28/2021 04:07 PM    Urobilinogen 0.2 12/28/2021 04:07 PM    Nitrites Negative 12/28/2021 04:07 PM    Leukocyte Esterase Negative 12/28/2021 04:07 PM    Epithelial cells FEW 12/28/2021 04:07 PM    Bacteria Negative 12/28/2021 04:07 PM    WBC 0-4 12/28/2021 04:07 PM    RBC 20-50 12/28/2021 04:07 PM         Medications Reviewed:     Current Facility-Administered Medications   Medication Dose Route Frequency    Vanc random level due today 12/30 @ 18:00   Other ONCE    oxyCODONE IR (ROXICODONE) tablet 5 mg  5 mg Oral ONCE    hydrALAZINE (APRESOLINE) 20 mg/mL injection 10 mg  10 mg IntraVENous Q6H PRN    amLODIPine (NORVASC) tablet 10 mg  10 mg Oral DAILY    influenza vaccine 2021-22 (6 mos+)(PF) (FLUARIX/FLULAVAL/FLUZONE QUAD) injection 0.5 mL  1 Each IntraMUSCular PRIOR TO DISCHARGE    sodium chloride (NS) flush 5-40 mL  5-40 mL IntraVENous Q8H    sodium chloride (NS) flush 5-40 mL  5-40 mL IntraVENous PRN    acetaminophen (TYLENOL) tablet 650 mg  650 mg Oral Q6H PRN    Or    acetaminophen (TYLENOL) suppository 650 mg  650 mg Rectal Q6H PRN    polyethylene glycol (MIRALAX) packet 17 g  17 g Oral DAILY PRN    ondansetron (ZOFRAN ODT) tablet 4 mg  4 mg Oral Q8H PRN    Or    ondansetron (ZOFRAN) injection 4 mg  4 mg IntraVENous Q6H PRN    Vancomycin - pharmacy to dose   Other Rx Dosing/Monitoring    0.9% sodium chloride infusion  100 mL/hr IntraVENous CONTINUOUS    glucose chewable tablet 16 g  4 Tablet Oral PRN    dextrose (D50W) injection syrg 12.5-25 g  25-50 mL IntraVENous PRN    glucagon (GLUCAGEN) injection 1 mg  1 mg IntraMUSCular PRN    insulin lispro (HUMALOG) injection   SubCUTAneous AC&HS    ALPRAZolam (XANAX) tablet 0.5 mg  0.5 mg Oral BID PRN    amitriptyline (ELAVIL) tablet 50 mg  50 mg Oral QHS    apixaban (ELIQUIS) tablet 5 mg  5 mg Oral Q12H    aspirin delayed-release tablet 81 mg  81 mg Oral DAILY    atorvastatin (LIPITOR) tablet 40 mg  40 mg Oral QHS    levothyroxine (SYNTHROID) tablet 112 mcg  112 mcg Oral ACB    L.acidophilus-paracasei-S.thermophil-bifidobacter (RISAQUAD) 8 billion cell capsule  1 Capsule Oral DAILY    magnesium hydroxide (MILK OF MAGNESIA) 400 mg/5 mL oral suspension 30 mL  30 mL Oral DAILY    metoprolol tartrate (LOPRESSOR) tablet 25 mg  25 mg Oral 7am    metoprolol tartrate (LOPRESSOR) tablet 12.5 mg  12.5 mg Oral QHS    miconazole (MICOTIN) 2 % powder   Topical BID    pantoprazole (PROTONIX) tablet 40 mg  40 mg Oral ACB    PARoxetine (PAXIL) tablet 40 mg  40 mg Oral 7am    polyethylene glycol (MIRALAX) packet 17 g  17 g Oral DAILY    senna (SENOKOT) tablet 17.2 mg  2 Tablet Oral DAILY     ______________________________________________________________________  EXPECTED LENGTH OF STAY: 5d 0h  ACTUAL LENGTH OF STAY:          [address] [place] [address],

## 2021-12-30 NOTE — PROGRESS NOTES
Infectious Disease Consult Note        HPI:      Ms Valorie Lopez seen  She denied any back pain, or flank pain  Her pain is mainly in RLQ area which is also improved today  Denied nausea vomiting diarrhea  Denied fever chills  Denied dysuria       Physical Exam:    Vitals:   Patient Vitals for the past 24 hrs:   Temp Pulse Resp BP SpO2   12/30/21 1209  67      12/30/21 1145 97.7 °F (36.5 °C) 70 20 (!) 149/83 94 %   12/30/21 1010  68      12/30/21 0837 97.6 °F (36.4 °C) 67 20 (!) 148/83 95 %   12/30/21 0637  68  136/83    12/30/21 0600  66      12/30/21 0400  65      12/30/21 0340 97.8 °F (36.6 °C) 66 20 130/71 93 %   12/30/21 0200  70      12/30/21 0002  76      12/29/21 2341 98.2 °F (36.8 °C) 76 20 (!) 146/88 94 %   12/29/21 2204  78      12/29/21 2137  75  (!) 152/86    12/29/21 2015  75      12/29/21 1952 98.3 °F (36.8 °C) 76 20 139/84 95 %   12/29/21 1828  83      12/29/21 1610 97.7 °F (36.5 °C) 72 (!) 2 (!) 125/100 93 %   12/29/21 1402  76        · GEN: NAD  · HEENT:  no scleral icterus,   no thrush  · CV: S1, S2 heard regularly,  · Lungs: Clear to auscultation bilaterally  · Abdomen: soft, obese, tender RLQ, no guarding, no erythema of abdominal wall , no flank tenderness b/l  · MSK  Suture in lower back noted, no erythema no drainage, non tender   · Extremities: no edema  · Neuro: Alert, oriented to self, place and situation, moves all extremities to commands, verbal   · Skin: no rash  · Lines PICC LUE       Labs:   Recent Results (from the past 24 hour(s))   VANCOMYCIN, RANDOM    Collection Time: 12/29/21  2:38 PM   Result Value Ref Range    Vancomycin, random 14.7 UG/ML   GLUCOSE, POC    Collection Time: 12/29/21  4:48 PM   Result Value Ref Range    Glucose (POC) 128 (H) 65 - 117 mg/dL    Performed by Gurdeep Escamilla  PCT    GLUCOSE, POC    Collection Time: 12/29/21  9:20 PM   Result Value Ref Range    Glucose (POC) 114 65 - 117 mg/dL    Performed by Eva Lemus (PCT) MAGNESIUM    Collection Time: 12/30/21  3:47 AM   Result Value Ref Range    Magnesium 1.6 1.6 - 2.4 mg/dL   METABOLIC PANEL, BASIC    Collection Time: 12/30/21  3:47 AM   Result Value Ref Range    Sodium 140 136 - 145 mmol/L    Potassium 3.3 (L) 3.5 - 5.1 mmol/L    Chloride 108 97 - 108 mmol/L    CO2 28 21 - 32 mmol/L    Anion gap 4 (L) 5 - 15 mmol/L    Glucose 121 (H) 65 - 100 mg/dL    BUN 9 6 - 20 MG/DL    Creatinine 0.98 0.55 - 1.02 MG/DL    BUN/Creatinine ratio 9 (L) 12 - 20      GFR est AA >60 >60 ml/min/1.73m2    GFR est non-AA 56 (L) >60 ml/min/1.73m2    Calcium 8.2 (L) 8.5 - 10.1 MG/DL   CBC W/O DIFF    Collection Time: 12/30/21  3:47 AM   Result Value Ref Range    WBC 9.2 3.6 - 11.0 K/uL    RBC 3.52 (L) 3.80 - 5.20 M/uL    HGB 8.5 (L) 11.5 - 16.0 g/dL    HCT 28.4 (L) 35.0 - 47.0 %    MCV 80.7 80.0 - 99.0 FL    MCH 24.1 (L) 26.0 - 34.0 PG    MCHC 29.9 (L) 30.0 - 36.5 g/dL    RDW 16.8 (H) 11.5 - 14.5 %    PLATELET 781 (H) 835 - 400 K/uL    MPV 9.5 8.9 - 12.9 FL    NRBC 0.0 0  WBC    ABSOLUTE NRBC 0.00 0.00 - 0.01 K/uL   GLUCOSE, POC    Collection Time: 12/30/21  8:58 AM   Result Value Ref Range    Glucose (POC) 124 (H) 65 - 117 mg/dL    Performed by Kee Ruelas, POC    Collection Time: 12/30/21 11:06 AM   Result Value Ref Range    Glucose (POC) 154 (H) 65 - 117 mg/dL    Performed by Barbra Erickson        Microbiology Data:       Blood:12/27/21 pending      12/2/21 Wound   Staphylococcus epidermidis    Antibiotic Interpretation Value  Method Comment   Clindamycin ($) Resistant >=4 ug/mL GRISELDA    Ciprofloxacin ($) Resistant >=8 ug/mL GRISELDA    Daptomycin ($$$$$) Susceptible 0.5 ug/mL GRISELDA    Erythromycin ($$$$) Resistant >=8 ug/mL GRISELDA    Gentamicin ($) Resistant >=16 ug/mL GRISELDA    Levofloxacin ($) Resistant >=8 ug/mL GRISELDA    Linezolid ($$$$$) Susceptible 2 ug/mL GRISELDA    Oxacillin Resistant >=4 ug/mL GRISELDA    Rifampin ($$$$) Susceptible <=0.5 ug/mL GRISELDA Rifampin is not to be used for mono-therapy. Tetracycline Susceptible 2 ug/mL GRISELDA    Trimeth/Sulfa Resistant 80 ug/mL GRISELDA    Vancomycin ($) Susceptible 1 ug/mL GRISELDA    Moxifloxacin ($$$$) Intermediate 4 ug/mL GRISELDA    Inducible Clindamycin Susceptible Negative ug/mL GRISELDA      11/26/21   Specimen Information: Abdominal Fluid; Body Fluid         0 Result Notes     Ref Range & Units 11/26/21 1353 Resulting Agency   Special Requests:   NO SPECIAL REQUESTS  [facility]   GRAM STAIN   RARE WBCS SEEN  [address]   NO ORGANISMS SEEN  [facility]    Culture result:   RARE ALPHA STREPTOCOCCUS Abnormal           11/12/21 Drainage  Streptococcus anginosus    Antibiotic Interpretation Value  Method Comment   Penicillin G ($$) Susceptible <=0.06 ug/mL GRISELDA    Ceftriaxone ($) Susceptible <=0.12 ug/mL GRISELDA    Vancomycin ($) Susceptible 0.5 ug/mL GRISELDA    Ampicillin ($) Susceptible <=0.25 ug/mL GRISELDA    Cefotaxime Susceptible <=0.12 ug/mL GRISELDA    Levofloxacin ($) Susceptible <=0.25 ug/mL GRISELDA    Clindamycin ($) Resistant >=1 ug/mL GRISELDA              Imaging:   CT A/P WO contrast 12/27/21  INDICATION: Right lower quadrant abdominal pain.     Exam: Noncontrast CT of the abdomen and pelvis is performed with 5 mm  collimation. Sagittal and coronal reformatted images were also performed.     CT dose reduction was achieved through the use of a standardized protocol  tailored for this examination and automatic exposure control for dose  modulation. Adaptive statistical iterative reconstruction (ASIR) was utilized.     Direct comparison is made to prior CT dated December 4, 2021.     FINDINGS:     There is a very small right pleural effusion. There is very mild bibasilar  atelectasis.     Abdomen:      Liver: The liver is normal on noncontrast images.      Spleen: There are persistent wedge-shaped hypodensities within the spleen  consistent with infarcts. The spleen is not enlarged.     Adrenals: The adrenals are normal on noncontrast images.      Pancreas:  The pancreas is normal on noncontrast images.      Gallbladder: The gallbladder is surgically absent.     Kidneys: There is bilateral perinephric stranding. There is right hydronephrosis  and proximal right hydroureter.     Bowel: No thickened or dilated loop of large or small bowel seen.      Appendix: The appendix is not visualized.     Pelvis: Urinary bladder is partially filled and grossly normal.     Miscellaneous: Right lower quadrant percutaneous catheter has been removed;  there is persistent inflammatory stranding within the right lower quadrant of  the abdomen. Within the right lower quadrant abdomen, there is a very small 3 cm  x 1.6 cm focal fluid collection, unchanged compared to prior CT dated December 4, 2021. There is no free intraperitoneal gas or fluid. The uterus is absent.     IMPRESSION  1. Very small right pleural effusion. 2. Right perinephric stranding and proximal right hydronephrosis. 3. Interval removal of right lower quadrant percutaneous catheter. Persistent  inflammatory stranding and 3 cm x 1.6 cm right lower quadrant fluid collection,  Unchanged. CT A/P W contrast 12/4/21  FINDINGS:   LOWER THORAX: Minimal bilateral dependent atelectasis. Moderate coronary artery  calcifications. LIVER: No mass. BILIARY TREE: Gallbladder is surgically absent. CBD is not dilated. 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 PANCREAS: No mass or ductal dilatation. ADRENALS: Unremarkable. KIDNEYS: No mass, calculus, or hydronephrosis. Unchanged small left renal cyst;  no follow-up required. STOMACH: Unremarkable. SMALL BOWEL: No dilatation or wall thickening. COLON: No dilatation or wall thickening. APPENDIX: Not visualized. PERITONEUM: No ascites or pneumoperitoneum. Right lower quadrant drainage  catheter is present, with no residual associated fluid collection. RETROPERITONEUM: No lymphadenopathy or aortic aneurysm. REPRODUCTIVE ORGANS: Status post hysterectomy.   URINARY BLADDER: No mass or calculus. BONES: No destructive bone lesion. Fusion hardware is present in the lumbosacral  spine. ABDOMINAL WALL: No mass or hernia. ADDITIONAL COMMENTS: N/A     IMPRESSION  No evidence of residual abscess in the right lower quadrant following  percutaneous drainage. Procedures:     12/2/21  Incision and drainage, superficial and deep of the lumbar wound. 11/26/21     IMPRESSION  1. Successful removal of indwelling right lower quadrant drainage catheter. 2. Successful placement of a new 12 [place] right lower quadrant drain into  abscess. 3. Successful aspiration of posterior paraspinal abscess. 11/12/21  IMPRESSION     CT-guided drainage of right lower abdominal abscess with placement of a 10  [place] pigtail drainage catheter. Approximately 200 cc of purulent fluid was  removed. A sample was sent for the requested analysis. There were no immediate  complications. 6/2/21 PROCEDURES PERFORMED:  Exploration of fusion, revision laminectomy L2-3, L5-S1 with a Lydia osteotomy at L2-3. Posterior revision fusion L2 to S1 with segmental instrumentation. 4/4/17 PROCEDURES PERFORMED    1. Exploration of fusion with revision laminectomy, L2-L3 and L3-L4. 2. Posterior lumbar fusion revision, L3-L5. 3. Posterior segmentation L3-L5. 4. Transforaminal interbody fusion, L3-L4. 5. Allograft and autograft. 6. Posterior Lydia osteotomy at L3-L4. MRI lumbar spine 11/22/21  Addendum: Addendum to   IMPRESSION:  1.  Rim-enhancing paraspinal soft tissue collections may reflect abscesses as  well, correlate clinically. 2.  Partially visualized abscess inferior to the right kidney again seen, but  incompletely evaluated on this exam.     I discussed these findings with Dr. Xuan Gaspar at 0900 hours on 11/23/2021.      Addended by Fortino Atkinson MD on 11/23/2021  9:03 AM       Study Result    Narrative & Impression   EXAM: MRI LUMB SPINE W WO CONT     INDICATION: assess for residual abscess.  Needs conscious sedation     COMPARISON: CT lumbar spine 11/9/2021, MRI lumbar spine 7/8/2021     TECHNIQUE: MR imaging of the lumbar spine was performed using the following  sequences: sagittal T1, T2, STIR;  axial T1, T2 prior to and following contrast  administration.      CONTRAST: 20 mL of ProHance.     FINDINGS:     L2-S1 posterior spinal instrumentation and laminectomies. Several rim-enhancing  fluid collections seen as follows: 4.7 cm x 2.1 cm x 2.1 cm collection in the  left dorsal paraspinal musculature at the L3 level, a 3.2 cm x 2.4 cm x 0.8 cm  collection in the right dorsal paraspinal musculature at the L5 level, a 4.0 cm  x 1.8 cm x 3.3 cm partially visualized collection in the left dorsal paraspinal  soft tissues at the L5 level. Grade 1 anterolisthesis of L4 on L5. Vertebral  body heights are maintained. Marrow signal is normal. Multilevel degenerative  endplate osteophytes     The conus medullaris terminates at L1-L2. Signal and caliber of the distal  spinal cord are within normal limits. There is no pathologic intrathecal  enhancement.     Nonenhancing left upper pole renal cyst.     Lower thoracic spine: No herniation or stenosis.     L1-L2: Minimal disc bulge. No stenosis     L2-L3: Disc bulge with right foraminal extension. No spinal stenosis. Mild right  foraminal stenosis     L3-L4: No spinal stenosis. Moderate to severe left and moderate right foraminal  stenosis     L4-L5: Anterolisthesis. Disc pseudobulge. No spinal stenosis. Severe bilateral  foraminal stenosis     L5-S1: Disc bulge. Ligament of flavum thickening. Mild spinal stenosis. Severe  right and moderate to severe left foraminal stenosis     IMPRESSION  1. L2-S1 posterior spinal instrumentation and laminectomies. Several enhancing  fluid collections in the dorsal paraspinal muscles and soft tissues, compatible  with seromas. 2.  Multilevel degenerative changes and disc disease. L5-S1 mild spinal  stenosis.  Multilevel varying degrees of foraminal stenosis above. Assessment / Plan:       Ms Bandar Handley is a 77-year-old lady with a history of diabetes, obesity, hypothyroidism, squamous cell carcinoma of tonsils removed and is status post radiation per chart in 2015, status post lumbar fusion with revision laminectomy in April 2017, revision laminectomy in June 2021, hx of retroperitoneal abscess with likely involvement of the lumbar spine . She underwent CT-guided aspiration of abdominal abscess on 11/12/21 with 200 cc of purulent fluid that was removed and cultures positive for Streptococcus anginosis. She subsequently had drains that was removed and another CT-guided aspiration of posterior paraspinal abscess on 11/26/2021 with cultures positive for alpha Streptococcus. On 12/2/2021 she underwent I&D of a superficial and deep lumbar wound with cultures positive for MRSA from the thio broth. Patient was recommended to have IV vancomycin with a planned end date of 1/27/2022 by [facility] ID. She presented to [facility] from rehab wt concerns for RLQ abd pain and distention  CT on 12/27/21 with persistent stranding 3 by 1.6 cm RLQ fluid collection, unchanged from 12/4/21. CT also with R perinephric stranding and R hydronephrosis on 12/27/21 but patinet denied any dysuria, back pain symptoms     1) Hx of Abdominal abscess, retroperitoneal abscess , lumbar wound /paraspinal abscess /Psoas abscess   · 11/12/21 CT-guided aspiration of abdominal abscess on 11/12/21 with 200 cc of purulent fluid that was removed and cultures positive for Streptococcus anginosis. · 11/26/21 CT-guided aspiration of posterior paraspinal abscess with cultures positive for alpha Streptococcus. · 12/2/2021 she underwent I&D of a superficial and deep lumbar wound with cultures positive for MRSA from the thio broth. Plan:  On Vancomycin with plans per [facility] ID till 1/27/22  CT on 12/27/21 with persistent stranding 3 by 1.6 cm RLQ fluid collection, unchanged from 12/4/21.  Not easily drainable per notes  F/U wt Dr Brandee Cabrera on DC on 1/12/22 at 330 pm  (  repeat CT to track response to antibiotics and and decide final course  After discharge per Dr Brandee Cabrera )   Monitor renal function closely and vancomycin dosing needs adjusted based on levels  Spoke to pharmacist today, pt was supra therapeutic and mild MERCEDES on presentation and Vanc dose planned now for  1250 g Q 24 hours per discussion with pharmacist     If plans to be discharged to SNF, continue Vancmycin IV. Needs dosing adjusted based on levels at discharging facility as well  Please send Weekly labs cbc wt diff, cmp, vancomycin trough, esr, crp to Dr Brandee Cabrera ( [facility] ID , ph , fax [phone])  Routine picc care and removal once it is determined when she completes IV antibiotics     IF plan for discharge to home, please contact ID back to adjust antibiotics to  and labs as Dr Brandee Cabrera prefers Daptomycin if patient being discharge home instead of SNF. D/W Dr Brandee Cabrera today   Will sign off   Contact if questions     2) Hydronephrosis -- appears new finding compared to last scan   ? Related to her retroperitoneal abscess  Was on cefepime empirically   UA is benign, urine cx < 1000 colonies. Stop Cefepime 12/30/21  On exam, patient has no flank tenderness/complains of dysuria   Cr improved  -- urology following and repeat imaging for hydro per urology, primary teams      3) DM    4) hx of lumbar fusion   Seen by ortho  Plans for follow up later this week for more suture removal     5) Obesity     Thank for the opportunity to participate in the care of this patient. Please contact with questions or concerns.        Addison Cabezas,   12:54 PM

## 2021-12-30 NOTE — PROGRESS NOTES
Transition of Care  1. RUR 27% High  2. Disposition Return to [facility]  3. DME None  4. Transportation BLS  5. Follow Up PCP, Specialist      CM received IV ABX order. CM attempted phone call to [facility] admissions to notify, left voice message as no one was available. Patient is planned for DC next week. CM to monitor.      Frankie Ibarra MS

## 2021-12-30 NOTE — PROGRESS NOTES
Patient: Alexandria Diaz MRN: [id number]  SSN: [id number]    YOB: 1950  Age: 66 y.o. Sex: female        ADMITTED: 2021 to Miriam Mims DO by April Lemus MD for MERCEDES (acute kidney injury) [facility]) [N17.9]  Abdominal pain [R10.9]  Postprocedural intraabdominal abscess [T81.43XA]  POD# * No surgery found *     Alexandria Diaz is doing well states pain better , Reviewed renal function with pt . She is interested in seeing Dr. Aurelio Araujo for urinary incontinence . explained  to pt to wait till current medical condition improved . Vitals: Temp (24hrs), Av °F (36.7 °C), Min:97.4 °F (36.3 °C), Max:98.7 °F (37.1 °C)    Blood pressure 136/83, pulse 68, temperature 97.8 °F (36.6 °C), resp. rate 20, height 5' 4\" (1.626 m), weight 120.2 kg (264 lb 15.9 oz), SpO2 93 %. Intake and Output:   1901 -  0700  In: 6871 [I.V.:5107]  Out: 7000 [Urine:7000]  No intake/output data recorded. FAUZIA Output lats 24 hrs: No data found. FAUZIA Output last 8 hrs: No data found. BM over last 24 hrs: No data found.     Exam:   EXAMINATION:     Appearance: well-developed NAD, obese   Conjunctiva/Lids: conjunctivae and lids normal   External Ears/Nose: normal no lesions or deformities   Neck: trachea midline   Respiratory Effort: breathing easily   Lower Extremity: no edema   Abdomen/Flank: soft mild RLQ tender without masses; no CVA tenderness   Liver/Spleen: no organomegaly   Hernia: no ventral hernia    Gait/Station: normal   Skin Inspection: warm and dry   Mood/Affect: normal         Labs:  CBC:   Lab Results   Component Value Date/Time    WBC 9.2 2021 03:47 AM    HCT 28.4 (L) 2021 03:47 AM    PLATELET 929 (H) 81/15/92 03:47 AM     BMP:   Lab Results   Component Value Date/Time    Glucose 121 (H) 2021 03:47 AM    Sodium 140 2021 03:47 AM    Potassium 3.3 (L) 2021 03:47 AM    Chloride 108 2021 03:47 AM    CO2 28 2021 03:47 AM    BUN 9 2021 03:47 AM    Creatinine 0.98 12/30/2021 03:47 AM    Calcium 8.2 (L) 12/30/2021 03:47 AM     Cultures: Ucx NGTD                  Blood cx NGTD    Imaging: N/A  Assessment/Plan:     1. R Hydronephrosis associated with retroperitoneal process and MERCEDES on admission    Cr normalized now   - can re image for resolution of hydronephrosis  - urology will sign off for now please call for worsening renal function - may need ureteral stent at that time     Signed By: Irma Ruth.  Darleen Perez NP - December 30, 2021

## 2021-12-30 NOTE — TELEPHONE ENCOUNTER
----- Message from Tyler Duncan MD sent at 12/30/2021 12:35 PM EST -----  Schedule for hospital follow up for 1/12/22 at 3.30 PM. Current she is at [facility] and dr. Isai Barr seeing her. But I will follow her in clinic. Please msg once done.        Thanks,       Tyler Duncan MD  Infectious Diseases

## 2021-12-30 NOTE — PROGRESS NOTES
Pharmacist Note - Vancomycin Dosing  Continued on Admission (Per ID through 1/27/22)  Indication:  Streptococcus anginosus epidural/right iliopsoas abscess   Current regimen: TBD (1250mg q12h PTA)    Recent Labs     12/29/21  0324 12/28/21  0323 12/27/21  1124   WBC  --  9.7 10.2   CREA 1.05* 1.08* 1.15*   BUN 9 9 7       A random vancomycin level of 14.7 mcg/mL was obtained at 14:38    Goal target range Trough 10-15 mcg/mL      Plan: Vancomycin 1250 mg x 1 dose at 18:00  Continue to dose by levels   Pharmacy will continue to monitor this patient daily for changes in clinical status and renal function.

## 2021-12-31 LAB
ANION GAP SERPL CALC-SCNC: 6 MMOL/L (ref 5–15)
BUN SERPL-MCNC: 9 MG/DL (ref 6–20)
BUN/CREAT SERPL: 9 (ref 12–20)
CALCIUM SERPL-MCNC: 8.9 MG/DL (ref 8.5–10.1)
CHLORIDE SERPL-SCNC: 108 MMOL/L (ref 97–108)
CO2 SERPL-SCNC: 26 MMOL/L (ref 21–32)
CREAT SERPL-MCNC: 0.96 MG/DL (ref 0.55–1.02)
ERYTHROCYTE [DISTWIDTH] IN BLOOD BY AUTOMATED COUNT: 16.8 % (ref 11.5–14.5)
GLUCOSE BLD STRIP.AUTO-MCNC: 107 MG/DL (ref 65–117)
GLUCOSE BLD STRIP.AUTO-MCNC: 111 MG/DL (ref 65–117)
GLUCOSE BLD STRIP.AUTO-MCNC: 120 MG/DL (ref 65–117)
GLUCOSE BLD STRIP.AUTO-MCNC: 123 MG/DL (ref 65–117)
GLUCOSE SERPL-MCNC: 98 MG/DL (ref 65–100)
HCT VFR BLD AUTO: 28.6 % (ref 35–47)
HGB BLD-MCNC: 8.8 G/DL (ref 11.5–16)
MCH RBC QN AUTO: 24.5 PG (ref 26–34)
MCHC RBC AUTO-ENTMCNC: 30.8 G/DL (ref 30–36.5)
MCV RBC AUTO: 79.7 FL (ref 80–99)
NRBC # BLD: 0 K/UL (ref 0–0.01)
NRBC BLD-RTO: 0 PER 100 WBC
PLATELET # BLD AUTO: 565 K/UL (ref 150–400)
PMV BLD AUTO: 9.3 FL (ref 8.9–12.9)
POTASSIUM SERPL-SCNC: 3.4 MMOL/L (ref 3.5–5.1)
RBC # BLD AUTO: 3.59 M/UL (ref 3.8–5.2)
SERVICE CMNT-IMP: ABNORMAL
SERVICE CMNT-IMP: ABNORMAL
SERVICE CMNT-IMP: NORMAL
SERVICE CMNT-IMP: NORMAL
SODIUM SERPL-SCNC: 140 MMOL/L (ref 136–145)
WBC # BLD AUTO: 8.6 K/UL (ref 3.6–11)

## 2021-12-31 PROCEDURE — 36415 COLL VENOUS BLD VENIPUNCTURE: CPT

## 2021-12-31 PROCEDURE — 82962 GLUCOSE BLOOD TEST: CPT

## 2021-12-31 PROCEDURE — 85027 COMPLETE CBC AUTOMATED: CPT

## 2021-12-31 PROCEDURE — 74011250637 HC RX REV CODE- 250/637: Performed by: INTERNAL MEDICINE

## 2021-12-31 PROCEDURE — 99232 SBSQ HOSP IP/OBS MODERATE 35: CPT | Performed by: ORTHOPAEDIC SURGERY

## 2021-12-31 PROCEDURE — 65270000032 HC RM SEMIPRIVATE

## 2021-12-31 PROCEDURE — 80048 BASIC METABOLIC PNL TOTAL CA: CPT

## 2021-12-31 RX ORDER — OXYCODONE HYDROCHLORIDE 5 MG/1
5 TABLET ORAL
Status: DISCONTINUED | OUTPATIENT
Start: 2021-12-31 | End: 2022-01-04 | Stop reason: HOSPADM

## 2021-12-31 RX ADMIN — SODIUM CHLORIDE, PRESERVATIVE FREE 10 ML: 5 INJECTION INTRAVENOUS at 06:44

## 2021-12-31 RX ADMIN — Medication 1 CAPSULE: at 09:55

## 2021-12-31 RX ADMIN — LEVOTHYROXINE SODIUM 112 MCG: 0.11 TABLET ORAL at 06:44

## 2021-12-31 RX ADMIN — MICONAZOLE NITRATE 2 % TOPICAL POWDER: at 18:29

## 2021-12-31 RX ADMIN — AMLODIPINE BESYLATE 10 MG: 5 TABLET ORAL at 09:56

## 2021-12-31 RX ADMIN — PANTOPRAZOLE SODIUM 40 MG: 40 TABLET, DELAYED RELEASE ORAL at 06:44

## 2021-12-31 RX ADMIN — SODIUM CHLORIDE, PRESERVATIVE FREE 10 ML: 5 INJECTION INTRAVENOUS at 18:30

## 2021-12-31 RX ADMIN — ASPIRIN 81 MG: 81 TABLET, COATED ORAL at 09:56

## 2021-12-31 RX ADMIN — APIXABAN 5 MG: 5 TABLET, FILM COATED ORAL at 09:56

## 2021-12-31 RX ADMIN — PAROXETINE HYDROCHLORIDE 40 MG: 20 TABLET, FILM COATED ORAL at 06:44

## 2021-12-31 RX ADMIN — METOPROLOL TARTRATE 25 MG: 25 TABLET, FILM COATED ORAL at 06:44

## 2021-12-31 RX ADMIN — MICONAZOLE NITRATE 2 % TOPICAL POWDER: at 09:55

## 2021-12-31 NOTE — PROGRESS NOTES
Pharmacist Note - Vancomycin Dosing  Continued on admission (Per ID through 1/27/22)  Indication:  Streptococcus anginosus epidural/right iliopsoas abscess   Current regimen: TBD (1250mg q12h PTA)    Recent Labs     12/30/21  0347 12/29/21  0324 12/28/21  0323   WBC 9.2  --  9.7   CREA 0.98 1.05* 1.08*   BUN 9 9 9       A random vancomycin level of 15.4 mcg/mL was obtained  @18:24 today (~24 hours post-dose)  Goal target ~15 mcg/ml      Plan: Vancomycin 1250 mg x 1 dose now (Poor fit on bayesian software)  Pharmacy will continue to monitor this patient daily for changes in clinical status and renal function.

## 2021-12-31 NOTE — PROGRESS NOTES
Transition of Care  1. RUR  25 % High  2. Disposition Return to [address] [facility] [address]  3. DME None  4. Transportation BLS  5. Follow Up PCP, Specialist      CM placed call to [address] [place] [address] Street admissions [phone] regarding plan for DC today. Sarath Mcallister [place] was started on yesterday but office is closed today and [place] is pending review on Monday. CM updated attending.      Ashley Villalobos MS

## 2021-12-31 NOTE — PROGRESS NOTES
Orthopedic Spine Progress Note  Post Op day: * No surgery found *    2021 8:36 AM   Admit Date: 2021  Procedure: * No surgery found *    Subjective:     Lord Wells has no complaints. Overall appear better. Tolerating diet. No N/V. Pain Control:   Pain Assessment  Pain Scale 1: Numeric (0 - 10)  Pain Intensity 1: 0  Pain Onset 1: 1 week   Pain Location 1: Back  Pain Orientation 1: Right,Lower  Pain Description 1: Constant,Aching  Pain Intervention(s) 1: Medication (see MAR)    Objective:          Physical Exam:  General:  Alert and oriented. No acute distress. Heart:  Respirations unlabored. Abdomen:   Extremities: Soft, non-tender. No evidence of cyanosis. Pulses palpable in both upper and lower extremities. Neurologic:  Musculoskeletal:  No new motor deficits. Neurovascular exam within normal limits. Sensation stable. Motor: unchanged C5-T1 and L2-S1. Neema's sign negative in bilateral lower extremities. Calves soft, nontender upon palpation and with passive twitch. Moves both upper and lower extremities. Incision: clean, dry, and intact. No significant erythema or swelling. No active drainage noted. Vital Signs:    Blood pressure (!) 143/78, pulse 69, temperature 97.6 °F (36.4 °C), resp. rate 18, height 5' 4\" (1.626 m), weight 264 lb 15.9 oz (120.2 kg), SpO2 93 %. Temp (24hrs), Av.8 °F (36.6 °C), Min:97.6 °F (36.4 °C), Max:98.2 °F (36.8 °C)      LAB:    Recent Labs     21  0420   HCT 28.6*   HGB 8.8*   *     Lab Results   Component Value Date/Time    Sodium 140 2021 04:20 AM    Potassium 3.4 (L) 2021 04:20 AM    Chloride 108 2021 04:20 AM    CO2 26 2021 04:20 AM    Glucose 98 2021 04:20 AM    BUN 9 2021 04:20 AM    Creatinine 0.96 2021 04:20 AM    Calcium 8.9 2021 04:20 AM       Intake/Output:No intake/output data recorded. 1901 -  0700  In: 3781 [P.O.:120;  I.V.:3661]  Out: 9430 [Urine:5950]    PT/OT:   Gait:  Gait  Base of Support: Widened  Speed/Catherine: Slow,Pace decreased (<100 feet/min)  Step Length: Left shortened,Right shortened  Gait Abnormalities: Decreased step clearance  Ambulation - Level of Assistance: Contact guard assistance  Distance (ft): 3 Feet (ft)  Assistive Device: Gait belt,Walker, rolling                 Assessment:   Patient is lumbar I and D and psoas abscess. Plan:     1. Continue PT/OT  2. Continue established methods of pain control  3. VTE Prophylaxes - TEDS &/or SCDs   4. Antibiotics per ID  5. Stable Orthopaedically. Will remove final sutures next week.          Signed By: Miley Dove MD

## 2022-01-01 LAB
ANION GAP SERPL CALC-SCNC: 5 MMOL/L (ref 5–15)
BACTERIA SPEC CULT: NORMAL
BACTERIA SPEC CULT: NORMAL
BUN SERPL-MCNC: 11 MG/DL (ref 6–20)
BUN/CREAT SERPL: 11 (ref 12–20)
CALCIUM SERPL-MCNC: 8.9 MG/DL (ref 8.5–10.1)
CHLORIDE SERPL-SCNC: 105 MMOL/L (ref 97–108)
CO2 SERPL-SCNC: 29 MMOL/L (ref 21–32)
CREAT SERPL-MCNC: 1.03 MG/DL (ref 0.55–1.02)
ERYTHROCYTE [DISTWIDTH] IN BLOOD BY AUTOMATED COUNT: 16.7 % (ref 11.5–14.5)
GLUCOSE BLD STRIP.AUTO-MCNC: 108 MG/DL (ref 65–117)
GLUCOSE BLD STRIP.AUTO-MCNC: 115 MG/DL (ref 65–117)
GLUCOSE BLD STRIP.AUTO-MCNC: 126 MG/DL (ref 65–117)
GLUCOSE BLD STRIP.AUTO-MCNC: 129 MG/DL (ref 65–117)
GLUCOSE SERPL-MCNC: 103 MG/DL (ref 65–100)
HCT VFR BLD AUTO: 32.3 % (ref 35–47)
HGB BLD-MCNC: 10 G/DL (ref 11.5–16)
MAGNESIUM SERPL-MCNC: 1.6 MG/DL (ref 1.6–2.4)
MCH RBC QN AUTO: 24.3 PG (ref 26–34)
MCHC RBC AUTO-ENTMCNC: 31 G/DL (ref 30–36.5)
MCV RBC AUTO: 78.4 FL (ref 80–99)
NRBC # BLD: 0 K/UL (ref 0–0.01)
NRBC BLD-RTO: 0 PER 100 WBC
PHOSPHATE SERPL-MCNC: 2.6 MG/DL (ref 2.6–4.7)
PLATELET # BLD AUTO: 618 K/UL (ref 150–400)
PMV BLD AUTO: 9.1 FL (ref 8.9–12.9)
POTASSIUM SERPL-SCNC: 3.1 MMOL/L (ref 3.5–5.1)
RBC # BLD AUTO: 4.12 M/UL (ref 3.8–5.2)
SERVICE CMNT-IMP: ABNORMAL
SERVICE CMNT-IMP: ABNORMAL
SERVICE CMNT-IMP: NORMAL
SODIUM SERPL-SCNC: 139 MMOL/L (ref 136–145)
VANCOMYCIN SERPL-MCNC: 15.1 UG/ML
WBC # BLD AUTO: 9.7 K/UL (ref 3.6–11)

## 2022-01-01 PROCEDURE — 65270000032 HC RM SEMIPRIVATE

## 2022-01-01 PROCEDURE — 82962 GLUCOSE BLOOD TEST: CPT

## 2022-01-01 PROCEDURE — 80202 ASSAY OF VANCOMYCIN: CPT

## 2022-01-01 PROCEDURE — 84100 ASSAY OF PHOSPHORUS: CPT

## 2022-01-01 PROCEDURE — 74011250637 HC RX REV CODE- 250/637: Performed by: INTERNAL MEDICINE

## 2022-01-01 PROCEDURE — 83735 ASSAY OF MAGNESIUM: CPT

## 2022-01-01 PROCEDURE — 80048 BASIC METABOLIC PNL TOTAL CA: CPT

## 2022-01-01 PROCEDURE — 85027 COMPLETE CBC AUTOMATED: CPT

## 2022-01-01 PROCEDURE — 74011250636 HC RX REV CODE- 250/636: Performed by: INTERNAL MEDICINE

## 2022-01-01 PROCEDURE — 36415 COLL VENOUS BLD VENIPUNCTURE: CPT

## 2022-01-01 RX ADMIN — APIXABAN 5 MG: 5 TABLET, FILM COATED ORAL at 09:06

## 2022-01-01 RX ADMIN — ATORVASTATIN CALCIUM 40 MG: 40 TABLET, FILM COATED ORAL at 00:07

## 2022-01-01 RX ADMIN — AMITRIPTYLINE HYDROCHLORIDE 50 MG: 50 TABLET, FILM COATED ORAL at 22:32

## 2022-01-01 RX ADMIN — APIXABAN 5 MG: 5 TABLET, FILM COATED ORAL at 00:07

## 2022-01-01 RX ADMIN — SODIUM CHLORIDE, PRESERVATIVE FREE 10 ML: 5 INJECTION INTRAVENOUS at 00:07

## 2022-01-01 RX ADMIN — OXYCODONE 5 MG: 5 TABLET ORAL at 09:11

## 2022-01-01 RX ADMIN — MICONAZOLE NITRATE 2 % TOPICAL POWDER: at 16:51

## 2022-01-01 RX ADMIN — PANTOPRAZOLE SODIUM 40 MG: 40 TABLET, DELAYED RELEASE ORAL at 06:52

## 2022-01-01 RX ADMIN — METOPROLOL TARTRATE 12.5 MG: 25 TABLET, FILM COATED ORAL at 22:32

## 2022-01-01 RX ADMIN — PAROXETINE HYDROCHLORIDE 40 MG: 20 TABLET, FILM COATED ORAL at 06:52

## 2022-01-01 RX ADMIN — METOPROLOL TARTRATE 25 MG: 25 TABLET, FILM COATED ORAL at 06:52

## 2022-01-01 RX ADMIN — ASPIRIN 81 MG: 81 TABLET, COATED ORAL at 09:06

## 2022-01-01 RX ADMIN — AMLODIPINE BESYLATE 10 MG: 5 TABLET ORAL at 09:07

## 2022-01-01 RX ADMIN — VANCOMYCIN HYDROCHLORIDE 1250 MG: 1.25 INJECTION, POWDER, LYOPHILIZED, FOR SOLUTION INTRAVENOUS at 04:51

## 2022-01-01 RX ADMIN — APIXABAN 5 MG: 5 TABLET, FILM COATED ORAL at 22:33

## 2022-01-01 RX ADMIN — AMITRIPTYLINE HYDROCHLORIDE 50 MG: 50 TABLET, FILM COATED ORAL at 00:07

## 2022-01-01 RX ADMIN — METOPROLOL TARTRATE 12.5 MG: 25 TABLET, FILM COATED ORAL at 00:07

## 2022-01-01 RX ADMIN — Medication 1 CAPSULE: at 09:06

## 2022-01-01 RX ADMIN — LEVOTHYROXINE SODIUM 112 MCG: 0.11 TABLET ORAL at 06:52

## 2022-01-01 RX ADMIN — ATORVASTATIN CALCIUM 40 MG: 40 TABLET, FILM COATED ORAL at 22:32

## 2022-01-01 NOTE — PROGRESS NOTES
Pharmacist Note - Vancomycin Dosing  Therapy day:  Continued on admission  Indication: Streptococcus anginosus epidural / right iliopsoas abscess  Current regimen: Dosing by levels. Recent Labs     01/01/22  0006 12/31/21  0420 12/30/21  0347   WBC 9.7 8.6 9.2   CREA 1.03* 0.96 0.98   BUN 11 9 9       A random vancomycin level of 15.1 mcg/mL was obtained ~26.5 hours post the most recent dose of 1250mg. Goal target range ~15mcg/mL (note:  patient's current pharmacokinetic profile is a poor fit for bayesian kinetic predictions. Dosing by levels)      Plan: Continue current regimen of dosing by levels at this time. Patient may be able to tolerate a q24 hour regimen but SCr is back up slightly. Will order 1250mg IV x1 at this time. Pharmacy will continue to monitor this patient daily for changes in clinical status and renal function.

## 2022-01-01 NOTE — PROGRESS NOTES
Pharmacy Consult: Vancomycin (Continued on Admission) - Dosing Update  Indication:  Streptococcus anginosus epidural/right iliopsoas abscess   Current regimen: Based on levels, last dose: 1250 mg IV on  @ 0451  Abx regimen:  Monotherapy  ID Following ?: Yes  Concomitant nephrotoxic drugs (requires more frequent monitoring): None  Frequency of BMP?: Not ordered, last done:     Recent Labs     22  0006 21  0420 21  0347   WBC 9.7 8.6 9.2   CREA 1.03* 0.96 0.98   BUN 11 9 9     Est CrCl: ~60-65 ml/min; UO: -- ml/kg/hr  Temp (24hrs), Av.2 °F (36.8 °C), Min:97.8 °F (36.6 °C), Max:98.5 °F (36.9 °C)    Cultures:    drainage: light strep anginosis - final   wound drainage: MRSE - final   blood x2: NGTD  - prelim   Urine: NG - final    Goal target trough: ~15 mcg/ml or AUC/GRISELDA of 400-600    Recent level history:  Date/Time Dose & Interval Measured Level (mcg/mL) Associated AUC/GRISELDA Dose Adjustment     @ 1124 1250mg q12h  23.7 ~ 15 hrs p dose -- Hold    @ 0323   18.2 ~ 31 hrs p dose -- 1250 mg x1 @ 11:17    @ 1438  14.7 ~27 hrs p dose -- 1250 mg x 1 @ 18:00    @ 1825  15.4 ~ 24.5 hrs p dose -- 1250 mg x1 @ 2131    @ 0006 Based on levels 15.1 (~26.5 hrs p dose) - 1.25 gm IV x 1 @ 0451            Plan: The AUC/GRISELDA dosing software is having a hard time finding a good model fit, but based on the previous levels and current Scr, it seems like a dose of 1250 mg IV Q 36 hr would be appropriate. Rather than check levels every day, will start a maintenance dose and plan to order a random level in the next couple of days. Pharmacy will continue to monitor patient daily and will make dosage adjustments based upon changing renal function.

## 2022-01-01 NOTE — PROGRESS NOTES
[facility]  Hospitalist Group                                                                                          Hospitalist Progress Note  [address] [facility],   Answering service: [phone] from in house phone        Date of Service:  2022  NAME:  Trina Maddox  :  1950  MRN:  [id number]      Admission Summary:   66 y. o. female with complex medical history of back surgery in 2021, followed by infectionmanaged by primary orthopedic Dr. Lukasz Valentino for the same, with hospitalization at [facility] for about a month early November through early December for right psoas abscess with IR guided abscess drainage, who was discharged to [facility] with PICC line for prolonged antibiotics with IV vancomycin through 2022 per ID based on the culture results. For about a week, patient has noticed bulge and discomfort in right lower quadrant.  Yesterday she showed that to nursing team and subsequent to that patient was sent for CT abdomen pelvis assessment here at [facility].  Patient was noted to have considerable CT findings with persistent unchanged right psoas abscess, right perinephric stranding along with hydronephrosis, hypokalemia, mild early MERCEDES with elevated creatinine at 1.15 compared to previous baseline of 0.50.6. [facility] hospitalist team has been consulted to admit the patient for further management. Patient denied any fever or chills.  Patient denied any chest pain, shortness of breath, palpitation.  Patient denied any leg swelling worsening.  Patient denied any urinary trouble or diarrhea or loose stools.  No constipation.  Patient denied any focal weakness, tingling, numbness.  Denied any worsening of back pain or drainage from the back. Interval history / Subjective: Follow up abscess. Patient seen and examined earlier today. No acute events. Denies complaints. Cr slightly increased 1.03.       Assessment & Plan:     History Right psoas abscess needing extensive drainage at ED [facility]  Hx of Streptococcus anginosus epidural/iliopsoas abscess on recent hospitalization 1112/2021, on IV vancomycin to be continued per ID until 1/27/2022  -CT on admission with right perinephric stranding and proximal right hydronephrosis, persistent inflammatory stranding 3 cm x 1.6 cm right lower quadrant fluid collection, unchanged  -persistent collection not able to be drained  -ID consulted. Plans to continue vancomycin   -tylenol for pain. Okay for low dose oxycodone    Right perinephric stranding with right hydronephrosis:  -urology consulted. Could consider stenting if renal function declines  -consider repeat imagining if clinically needed   -urology signed off, can be reconsulted as needed    Acute renal insufficiency: monitor closely     Recent celiac artery thrombosis with splenic infarcts, on Eliquis, diagnosed on 11/9/2021 on CT A/P.    -No vascular surgical intervention needed at the time.    -continue Eliquis for 6 months    #DM2- SSI  #Bilateral DM related polyneuropathybilateral feet  #Hx of SVT  #HTN- metoprolol, amlodipine   #HLD  #Hypothyroidism- home levothyroxine  #Depression/anxiety- home paroxetine    Code status: full   DVT prophylaxis: [address] discussed with: Patient/Family  Anticipated Disposition: SNF/LTC  Anticipated Discharge: 24 hours to 48 hours     Hospital Problems  Date Reviewed: 12/21/2021          Codes Class Noted POA    Abdominal pain ICD-10-CM: R10.9  ICD-9-CM: 789.00  12/27/2021 Unknown        Postprocedural intraabdominal abscess ICD-10-CM: T81.43XA  ICD-9-CM: 998.59  12/27/2021 Unknown        MERCEDES (acute kidney injury) ([facility] [address] ICD-10-CM: N17.9  ICD-9-CM: 584.9  11/6/2021 Unknown                Review of Systems:   Negative unless stated above      Vital Signs:    Last 24hrs VS reviewed since prior progress note.  Most recent are:  Visit Vitals  /75 (BP 1 Location: Right arm, BP Patient Position: At rest;Lying)   Pulse 69   Temp 98.1 °F (36.7 °C)   Resp 16    5' 4\" (1.626 m)   Wt 120.2 kg (264 lb 15.9 oz)   SpO2 94%   BMI 45.49 kg/m²       No intake or output data in the 24 hours ending 01/01/22 1407     Physical Examination:     I had a face to face encounter with this patient and independently examined them on 1/1/2022 as outlined below:          Constitutional:  No acute distress, cooperative, pleasant    ENT:  Oral mucosa moist, oropharynx benign. Resp:  CTA bilaterally. No wheezing/rhonchi/rales. No accessory muscle use   CV:  Regular rhythm, normal rate, no murmurs, gallops, rubs    GI:  Soft, right lower quadrant with tenderness to palpation, non distended, normoactive bowel sounds, no hepatosplenomegaly     Musculoskeletal:  lower lumbar/sacral region with intact stables, no erythema     Neurologic:  Moves all extremities            Data Review:    Review and/or order of clinical lab test  Review and/or order of tests in the radiology section of CPT  Review and/or order of tests in the medicine section of CPT      Labs:     Recent Labs     01/01/22  0006 12/31/21  0420   WBC 9.7 8.6   HGB 10.0* 8.8*   HCT 32.3* 28.6*   * 565*     Recent Labs     01/01/22 0006 12/31/21  0420 12/30/21  0347    140 140   K 3.1* 3.4* 3.3*    108 108   CO2 29 26 28   BUN 11 9 9   CREA 1.03* 0.96 0.98   * 98 121*   CA 8.9 8.9 8.2*   MG 1.6  --  1.6   PHOS 2.6  --   --      No results for input(s): ALT, AP, TBIL, TBILI, TP, ALB, GLOB, GGT, AML, LPSE in the last 72 hours. No lab exists for component: SGOT, GPT, AMYP, HLPSE  No results for input(s): INR, PTP, APTT, INREXT, INREXT in the last 72 hours. No results for input(s): FE, TIBC, PSAT, FERR in the last 72 hours. Lab Results   Component Value Date/Time    Folate 17.4 11/29/2021 02:49 AM      No results for input(s): PH, PCO2, PO2 in the last 72 hours. No results for input(s): CPK, CKNDX, TROIQ in the last 72 hours.     No lab exists for component: CPKMB  Lab Results   Component Value Date/Time    Cholesterol, total 232 (H) 07/13/2010 12:05 PM    HDL Cholesterol 38 07/13/2010 12:05 PM    LDL, calculated 164 (H) 07/13/2010 12:05 PM    Triglyceride 150 (H) 07/13/2010 12:05 PM    CHOL/HDL Ratio 6.1 (H) 07/13/2010 12:05 PM     Lab Results   Component Value Date/Time    Glucose (POC) 115 01/01/2022 11:54 AM    Glucose (POC) 108 01/01/2022 07:22 AM    Glucose (POC) 123 (H) 12/31/2021 11:10 PM    Glucose (POC) 111 12/31/2021 06:25 PM    Glucose (POC) 120 (H) 12/31/2021 11:47 AM     Lab Results   Component Value Date/Time    Color YELLOW/STRAW 12/28/2021 04:07 PM    Appearance CLEAR 12/28/2021 04:07 PM    Specific gravity 1.009 12/28/2021 04:07 PM    Specific gravity 1.010 04/06/2011 10:20 AM    pH (UA) 7.5 12/28/2021 04:07 PM    Protein Negative 12/28/2021 04:07 PM    Glucose Negative 12/28/2021 04:07 PM    Ketone Negative 12/28/2021 04:07 PM    Bilirubin Negative 12/28/2021 04:07 PM    Urobilinogen 0.2 12/28/2021 04:07 PM    Nitrites Negative 12/28/2021 04:07 PM    Leukocyte Esterase Negative 12/28/2021 04:07 PM    Epithelial cells FEW 12/28/2021 04:07 PM    Bacteria Negative 12/28/2021 04:07 PM    WBC 0-4 12/28/2021 04:07 PM    RBC 20-50 12/28/2021 04:07 PM         Medications Reviewed:     Current Facility-Administered Medications   Medication Dose Route Frequency    [START ON 1/2/2022] vancomycin (VANCOCIN) 1,250 mg in 0.9% sodium chloride 250 mL (VIAL-MATE)  1,250 mg IntraVENous Q36H    oxyCODONE IR (ROXICODONE) tablet 5 mg  5 mg Oral BID PRN    hydrALAZINE (APRESOLINE) 20 mg/mL injection 10 mg  10 mg IntraVENous Q6H PRN    amLODIPine (NORVASC) tablet 10 mg  10 mg Oral DAILY    influenza vaccine 2021-22 (6 mos+)(PF) (FLUARIX/FLULAVAL/FLUZONE QUAD) injection 0.5 mL  1 Each IntraMUSCular PRIOR TO DISCHARGE    sodium chloride (NS) flush 5-40 mL  5-40 mL IntraVENous Q8H    sodium chloride (NS) flush 5-40 mL  5-40 mL IntraVENous PRN    acetaminophen (TYLENOL) tablet 650 mg  650 mg Oral Q6H PRN Or    acetaminophen (TYLENOL) suppository 650 mg  650 mg Rectal Q6H PRN    polyethylene glycol (MIRALAX) packet 17 g  17 g Oral DAILY PRN    ondansetron (ZOFRAN ODT) tablet 4 mg  4 mg Oral Q8H PRN    Or    ondansetron (ZOFRAN) injection 4 mg  4 mg IntraVENous Q6H PRN    Vancomycin - pharmacy to dose   Other Rx Dosing/Monitoring    glucose chewable tablet 16 g  4 Tablet Oral PRN    dextrose (D50W) injection syrg 12.5-25 g  25-50 mL IntraVENous PRN    glucagon (GLUCAGEN) injection 1 mg  1 mg IntraMUSCular PRN    insulin lispro (HUMALOG) injection   SubCUTAneous AC&HS    ALPRAZolam (XANAX) tablet 0.5 mg  0.5 mg Oral BID PRN    amitriptyline (ELAVIL) tablet 50 mg  50 mg Oral QHS    apixaban (ELIQUIS) tablet 5 mg  5 mg Oral Q12H    aspirin delayed-release tablet 81 mg  81 mg Oral DAILY    atorvastatin (LIPITOR) tablet 40 mg  40 mg Oral QHS    levothyroxine (SYNTHROID) tablet 112 mcg  112 mcg Oral ACB    L.acidophilus-paracasei-S.thermophil-bifidobacter (RISAQUAD) 8 billion cell capsule  1 Capsule Oral DAILY    magnesium hydroxide (MILK OF MAGNESIA) 400 mg/5 mL oral suspension 30 mL  30 mL Oral DAILY    metoprolol tartrate (LOPRESSOR) tablet 25 mg  25 mg Oral 7am    metoprolol tartrate (LOPRESSOR) tablet 12.5 mg  12.5 mg Oral QHS    miconazole (MICOTIN) 2 % powder   Topical BID    pantoprazole (PROTONIX) tablet 40 mg  40 mg Oral ACB    PARoxetine (PAXIL) tablet 40 mg  40 mg Oral 7am    polyethylene glycol (MIRALAX) packet 17 g  17 g Oral DAILY    senna (SENOKOT) tablet 17.2 mg  2 Tablet Oral DAILY     ______________________________________________________________________  EXPECTED LENGTH OF STAY: 5d 0h  ACTUAL LENGTH OF STAY:          [address] [facility],

## 2022-01-01 NOTE — PROGRESS NOTES
Problem: Pressure Injury - Risk of  Goal: *Prevention of pressure injury  Description: Document Neal Scale and appropriate interventions in the flowsheet. Outcome: Progressing Towards Goal  Note: Pressure Injury Interventions:       Moisture Interventions: Absorbent underpads,Apply protective barrier, creams and emollients,Check for incontinence Q2 hours and as needed,Internal/External urinary devices,Minimize layers    Activity Interventions: Increase time out of bed,Pressure redistribution bed/mattress(bed type),PT/OT evaluation    Mobility Interventions: Assess need for specialty bed,HOB 30 degrees or less,Pressure redistribution bed/mattress (bed type)    Nutrition Interventions: Document food/fluid/supplement intake    Friction and Shear Interventions: Apply protective barrier, creams and emollients,HOB 30 degrees or less,Minimize layers                Problem: Falls - Risk of  Goal: *Absence of Falls  Description: Document Rolf Fall Risk and appropriate interventions in the flowsheet.   Outcome: Progressing Towards Goal  Note: Fall Risk Interventions:  Mobility Interventions: Patient to call before getting OOB         Medication Interventions: Patient to call before getting OOB    Elimination Interventions: Call light in reach    History of Falls Interventions: Bed/chair exit alarm,Vital signs minimum Q4HRs X 24 hrs (comment for end date),Door open when patient unattended,Evaluate medications/consider consulting pharmacy

## 2022-01-02 LAB
ANION GAP SERPL CALC-SCNC: 5 MMOL/L (ref 5–15)
BUN SERPL-MCNC: 12 MG/DL (ref 6–20)
BUN/CREAT SERPL: 13 (ref 12–20)
CALCIUM SERPL-MCNC: 9 MG/DL (ref 8.5–10.1)
CHLORIDE SERPL-SCNC: 105 MMOL/L (ref 97–108)
CO2 SERPL-SCNC: 29 MMOL/L (ref 21–32)
CREAT SERPL-MCNC: 0.94 MG/DL (ref 0.55–1.02)
GLUCOSE BLD STRIP.AUTO-MCNC: 121 MG/DL (ref 65–117)
GLUCOSE BLD STRIP.AUTO-MCNC: 124 MG/DL (ref 65–117)
GLUCOSE BLD STRIP.AUTO-MCNC: 183 MG/DL (ref 65–117)
GLUCOSE BLD STRIP.AUTO-MCNC: 91 MG/DL (ref 65–117)
GLUCOSE SERPL-MCNC: 117 MG/DL (ref 65–100)
MAGNESIUM SERPL-MCNC: 1.6 MG/DL (ref 1.6–2.4)
PHOSPHATE SERPL-MCNC: 3.2 MG/DL (ref 2.6–4.7)
POTASSIUM SERPL-SCNC: 3.4 MMOL/L (ref 3.5–5.1)
SERVICE CMNT-IMP: ABNORMAL
SERVICE CMNT-IMP: NORMAL
SODIUM SERPL-SCNC: 139 MMOL/L (ref 136–145)

## 2022-01-02 PROCEDURE — 83735 ASSAY OF MAGNESIUM: CPT

## 2022-01-02 PROCEDURE — 74011250637 HC RX REV CODE- 250/637: Performed by: INTERNAL MEDICINE

## 2022-01-02 PROCEDURE — 80048 BASIC METABOLIC PNL TOTAL CA: CPT

## 2022-01-02 PROCEDURE — 84100 ASSAY OF PHOSPHORUS: CPT

## 2022-01-02 PROCEDURE — 36415 COLL VENOUS BLD VENIPUNCTURE: CPT

## 2022-01-02 PROCEDURE — 65270000032 HC RM SEMIPRIVATE

## 2022-01-02 PROCEDURE — 82962 GLUCOSE BLOOD TEST: CPT

## 2022-01-02 PROCEDURE — 74011250636 HC RX REV CODE- 250/636: Performed by: INTERNAL MEDICINE

## 2022-01-02 RX ORDER — POTASSIUM CHLORIDE 750 MG/1
40 TABLET, FILM COATED, EXTENDED RELEASE ORAL
Status: COMPLETED | OUTPATIENT
Start: 2022-01-02 | End: 2022-01-02

## 2022-01-02 RX ORDER — LANOLIN ALCOHOL/MO/W.PET/CERES
400 CREAM (GRAM) TOPICAL DAILY
Status: DISCONTINUED | OUTPATIENT
Start: 2022-01-02 | End: 2022-01-04 | Stop reason: HOSPADM

## 2022-01-02 RX ADMIN — AMLODIPINE BESYLATE 10 MG: 5 TABLET ORAL at 11:26

## 2022-01-02 RX ADMIN — POTASSIUM CHLORIDE 40 MEQ: 750 TABLET, FILM COATED, EXTENDED RELEASE ORAL at 11:23

## 2022-01-02 RX ADMIN — VANCOMYCIN HYDROCHLORIDE 1250 MG: 1.25 INJECTION, POWDER, LYOPHILIZED, FOR SOLUTION INTRAVENOUS at 18:33

## 2022-01-02 RX ADMIN — Medication 1 CAPSULE: at 11:23

## 2022-01-02 RX ADMIN — APIXABAN 5 MG: 5 TABLET, FILM COATED ORAL at 11:24

## 2022-01-02 RX ADMIN — ASPIRIN 81 MG: 81 TABLET, COATED ORAL at 11:23

## 2022-01-02 RX ADMIN — Medication 400 MG: at 11:23

## 2022-01-02 RX ADMIN — LEVOTHYROXINE SODIUM 112 MCG: 0.11 TABLET ORAL at 06:26

## 2022-01-02 RX ADMIN — PAROXETINE HYDROCHLORIDE 40 MG: 20 TABLET, FILM COATED ORAL at 06:19

## 2022-01-02 RX ADMIN — SODIUM CHLORIDE, PRESERVATIVE FREE 10 ML: 5 INJECTION INTRAVENOUS at 06:19

## 2022-01-02 RX ADMIN — PANTOPRAZOLE SODIUM 40 MG: 40 TABLET, DELAYED RELEASE ORAL at 06:25

## 2022-01-02 RX ADMIN — METOPROLOL TARTRATE 25 MG: 25 TABLET, FILM COATED ORAL at 06:20

## 2022-01-02 NOTE — PROGRESS NOTES
[address] [facility]  Hospitalist Group                                                                                          Hospitalist Progress Note  [address] [facility],   Answering service: [phone] from in house phone        Date of Service:  2022  NAME:  Sallie Jones  :  1950  MRN:  [id number]      Admission Summary:   66 y. o. female with complex medical history of back surgery in 2021, followed by infectionmanaged by primary orthopedic Dr. Tiffanie Jamison for the same, with hospitalization at [facility] for about a month early November through early December for right psoas abscess with IR guided abscess drainage, who was discharged to [facility] with PICC line for prolonged antibiotics with IV vancomycin through 2022 per ID based on the culture results. For about a week, patient has noticed bulge and discomfort in right lower quadrant.  Yesterday she showed that to nursing team and subsequent to that patient was sent for CT abdomen pelvis assessment here at [facility].  Patient was noted to have considerable CT findings with persistent unchanged right psoas abscess, right perinephric stranding along with hydronephrosis, hypokalemia, mild early MERCEDES with elevated creatinine at 1.15 compared to previous baseline of 0.50.6. [facility] hospitalist team has been consulted to admit the patient for further management. Patient denied any fever or chills.  Patient denied any chest pain, shortness of breath, palpitation.  Patient denied any leg swelling worsening.  Patient denied any urinary trouble or diarrhea or loose stools.  No constipation.  Patient denied any focal weakness, tingling, numbness.  Denied any worsening of back pain or drainage from the back. Interval history / Subjective: Follow up abscess. Patient seen and examined this morning. No acute events. Denies pain in abdomen. Cr stable.       Assessment & Plan:     History Right psoas abscess needing extensive drainage at [facility]  Hx of Streptococcus anginosus epidural/iliopsoas abscess on recent hospitalization 1112/2021, on IV vancomycin to be continued per ID until 1/27/2022  -CT on admission with right perinephric stranding and proximal right hydronephrosis, persistent inflammatory stranding 3 cm x 1.6 cm right lower quadrant fluid collection, unchanged  -persistent collection not able to be drained  -ID consulted. Plans to continue vancomycin   -tylenol for pain. Okay for low dose oxycodone    Right perinephric stranding with right hydronephrosis:  -urology consulted. Could consider stenting if renal function declines  -consider repeat imagining if clinically needed   -urology signed off, can be reconsulted as needed    Acute renal insufficiency: resolved    Recent celiac artery thrombosis with splenic infarcts, on Eliquis, diagnosed on 11/9/2021 on CT A/P.    -No vascular surgical intervention needed at the time.    -continue Eliquis for 6 months    #DM2- SSI  #Bilateral DM related polyneuropathybilateral feet  #Hx of SVT  #HTN- metoprolol, amlodipine   #HLD  #Hypothyroidism- home levothyroxine  #Depression/anxiety- home paroxetine    Code status: full   DVT prophylaxis: [address] discussed with: Patient/Family  Anticipated Disposition: SNF/LTC  Anticipated Discharge: 24 hours to 48 hours     Hospital Problems  Date Reviewed: 12/21/2021          Codes Class Noted POA    Abdominal pain ICD-10-CM: R10.9  ICD-9-CM: 789.00  12/27/2021 Unknown        Postprocedural intraabdominal abscess ICD-10-CM: T81.43XA  ICD-9-CM: 998.59  12/27/2021 Unknown        MERCEDES (acute kidney injury) ([facility] [address] ICD-10-CM: N17.9  ICD-9-CM: 584.9  11/6/2021 Unknown                Review of Systems:   Negative unless stated above      Vital Signs:    Last 24hrs VS reviewed since prior progress note.  Most recent are:  Visit Vitals  BP (!) 170/78   Pulse 65   Temp 97.8 °F (36.6 °C)   Resp 18   Ht 5' 4\" (1.626 m)   Wt 120.2 kg (264 lb 15.9 oz)   SpO2 94%   BMI 45.49 kg/m²       No intake or output data in the 24 hours ending 01/02/22 1048     Physical Examination:     I had a face to face encounter with this patient and independently examined them on 1/2/2022 as outlined below:          Constitutional:  No acute distress, cooperative, pleasant    ENT:  Oral mucosa moist, oropharynx benign. Resp:  CTA bilaterally. No wheezing/rhonchi/rales. No accessory muscle use   CV:  Regular rhythm, normal rate, no murmurs, gallops, rubs    GI:  Soft, right lower quadrant non tender to palpation, non distended, normoactive bowel sounds, no hepatosplenomegaly     Musculoskeletal:  lower lumbar/sacral region with intact stables, no erythema     Neurologic:  Moves all extremities            Data Review:    Review and/or order of clinical lab test  Review and/or order of tests in the radiology section of CPT  Review and/or order of tests in the medicine section of CPT      Labs:     Recent Labs     01/01/22  0006 12/31/21  0420   WBC 9.7 8.6   HGB 10.0* 8.8*   HCT 32.3* 28.6*   * 565*     Recent Labs     01/02/22  0625 01/01/22  0006 12/31/21  0420    139 140   K 3.4* 3.1* 3.4*    105 108   CO2 29 29 26   BUN 12 11 9   CREA 0.94 1.03* 0.96   * 103* 98   CA 9.0 8.9 8.9   MG 1.6 1.6  --    PHOS 3.2 2.6  --      No results for input(s): ALT, AP, TBIL, TBILI, TP, ALB, GLOB, GGT, AML, LPSE in the last 72 hours. No lab exists for component: SGOT, GPT, AMYP, HLPSE  No results for input(s): INR, PTP, APTT, INREXT, INREXT in the last 72 hours. No results for input(s): FE, TIBC, PSAT, FERR in the last 72 hours. Lab Results   Component Value Date/Time    Folate 17.4 11/29/2021 02:49 AM      No results for input(s): PH, PCO2, PO2 in the last 72 hours. No results for input(s): CPK, CKNDX, TROIQ in the last 72 hours.     No lab exists for component: CPKMB  Lab Results   Component Value Date/Time    Cholesterol, total 232 (H) 07/13/2010 12:05 PM    HDL Cholesterol 38 07/13/2010 12:05 PM    LDL, calculated 164 (H) 07/13/2010 12:05 PM    Triglyceride 150 (H) 07/13/2010 12:05 PM    CHOL/HDL Ratio 6.1 (H) 07/13/2010 12:05 PM     Lab Results   Component Value Date/Time    Glucose (POC) 124 (H) 01/02/2022 06:43 AM    Glucose (POC) 129 (H) 01/01/2022 09:54 PM    Glucose (POC) 126 (H) 01/01/2022 05:42 PM    Glucose (POC) 115 01/01/2022 11:54 AM    Glucose (POC) 108 01/01/2022 07:22 AM     Lab Results   Component Value Date/Time    Color YELLOW/STRAW 12/28/2021 04:07 PM    Appearance CLEAR 12/28/2021 04:07 PM    Specific gravity 1.009 12/28/2021 04:07 PM    Specific gravity 1.010 04/06/2011 10:20 AM    pH (UA) 7.5 12/28/2021 04:07 PM    Protein Negative 12/28/2021 04:07 PM    Glucose Negative 12/28/2021 04:07 PM    Ketone Negative 12/28/2021 04:07 PM    Bilirubin Negative 12/28/2021 04:07 PM    Urobilinogen 0.2 12/28/2021 04:07 PM    Nitrites Negative 12/28/2021 04:07 PM    Leukocyte Esterase Negative 12/28/2021 04:07 PM    Epithelial cells FEW 12/28/2021 04:07 PM    Bacteria Negative 12/28/2021 04:07 PM    WBC 0-4 12/28/2021 04:07 PM    RBC 20-50 12/28/2021 04:07 PM         Medications Reviewed:     Current Facility-Administered Medications   Medication Dose Route Frequency    potassium chloride SR (KLOR-CON 10) tablet 40 mEq  40 mEq Oral NOW    magnesium oxide (MAG-OX) tablet 400 mg  400 mg Oral DAILY    vancomycin (VANCOCIN) 1,250 mg in 0.9% sodium chloride 250 mL (VIAL-MATE)  1,250 mg IntraVENous Q36H    oxyCODONE IR (ROXICODONE) tablet 5 mg  5 mg Oral BID PRN    hydrALAZINE (APRESOLINE) 20 mg/mL injection 10 mg  10 mg IntraVENous Q6H PRN    amLODIPine (NORVASC) tablet 10 mg  10 mg Oral DAILY    influenza vaccine 2021-22 (6 mos+)(PF) (FLUARIX/FLULAVAL/FLUZONE QUAD) injection 0.5 mL  1 Each IntraMUSCular PRIOR TO DISCHARGE    sodium chloride (NS) flush 5-40 mL  5-40 mL IntraVENous Q8H    sodium chloride (NS) flush 5-40 mL  5-40 mL IntraVENous PRN    acetaminophen (TYLENOL) tablet 650 mg  650 mg Oral Q6H PRN    Or    acetaminophen (TYLENOL) suppository 650 mg  650 mg Rectal Q6H PRN    polyethylene glycol (MIRALAX) packet 17 g  17 g Oral DAILY PRN    ondansetron (ZOFRAN ODT) tablet 4 mg  4 mg Oral Q8H PRN    Or    ondansetron (ZOFRAN) injection 4 mg  4 mg IntraVENous Q6H PRN    Vancomycin - pharmacy to dose   Other Rx Dosing/Monitoring    glucose chewable tablet 16 g  4 Tablet Oral PRN    dextrose (D50W) injection syrg 12.5-25 g  25-50 mL IntraVENous PRN    glucagon (GLUCAGEN) injection 1 mg  1 mg IntraMUSCular PRN    insulin lispro (HUMALOG) injection   SubCUTAneous AC&HS    ALPRAZolam (XANAX) tablet 0.5 mg  0.5 mg Oral BID PRN    amitriptyline (ELAVIL) tablet 50 mg  50 mg Oral QHS    apixaban (ELIQUIS) tablet 5 mg  5 mg Oral Q12H    aspirin delayed-release tablet 81 mg  81 mg Oral DAILY    atorvastatin (LIPITOR) tablet 40 mg  40 mg Oral QHS    levothyroxine (SYNTHROID) tablet 112 mcg  112 mcg Oral ACB    L.acidophilus-paracasei-S.thermophil-bifidobacter (RISAQUAD) 8 billion cell capsule  1 Capsule Oral DAILY    metoprolol tartrate (LOPRESSOR) tablet 25 mg  25 mg Oral 7am    metoprolol tartrate (LOPRESSOR) tablet 12.5 mg  12.5 mg Oral QHS    miconazole (MICOTIN) 2 % powder   Topical BID    pantoprazole (PROTONIX) tablet 40 mg  40 mg Oral ACB    PARoxetine (PAXIL) tablet 40 mg  40 mg Oral 7am    polyethylene glycol (MIRALAX) packet 17 g  17 g Oral DAILY    senna (SENOKOT) tablet 17.2 mg  2 Tablet Oral DAILY     ______________________________________________________________________  EXPECTED LENGTH OF STAY: 5d 0h  ACTUAL LENGTH OF STAY:          [address] [facility],

## 2022-01-02 NOTE — PROGRESS NOTES
Problem: Diabetes Self-Management  Goal: *Incorporating nutritional management into lifestyle  Description: Describe effect of type, amount and timing of food on blood glucose; list 3 methods for planning meals. Outcome: Progressing Towards Goal     Problem: Pressure Injury - Risk of  Goal: *Prevention of pressure injury  Description: Document Neal Scale and appropriate interventions in the flowsheet. Outcome: Progressing Towards Goal  Note: Pressure Injury Interventions:       Moisture Interventions: Absorbent underpads,Apply protective barrier, creams and emollients,Check for incontinence Q2 hours and as needed,Internal/External urinary devices,Minimize layers    Activity Interventions: PT/OT evaluation    Mobility Interventions: PT/OT evaluation    Nutrition Interventions: Offer support with meals,snacks and hydration    Friction and Shear Interventions: Apply protective barrier, creams and emollients,HOB 30 degrees or less,Minimize layers                Problem: Falls - Risk of  Goal: *Absence of Falls  Description: Document Rolf Fall Risk and appropriate interventions in the flowsheet.   Outcome: Progressing Towards Goal  Note: Fall Risk Interventions:  Mobility Interventions: Patient to call before getting OOB         Medication Interventions: Patient to call before getting OOB    Elimination Interventions: Call light in reach    History of Falls Interventions: Bed/chair exit alarm

## 2022-01-03 LAB
ALBUMIN SERPL-MCNC: 2.5 G/DL (ref 3.5–5)
ALBUMIN/GLOB SERPL: 0.7 {RATIO} (ref 1.1–2.2)
ALP SERPL-CCNC: 131 U/L (ref 45–117)
ALT SERPL-CCNC: 23 U/L (ref 12–78)
ANION GAP SERPL CALC-SCNC: 4 MMOL/L (ref 5–15)
AST SERPL-CCNC: 24 U/L (ref 15–37)
BACTERIA SPEC CULT: NORMAL
BASOPHILS # BLD: 0.1 K/UL (ref 0–0.1)
BASOPHILS NFR BLD: 1 % (ref 0–1)
BILIRUB SERPL-MCNC: 0.4 MG/DL (ref 0.2–1)
BUN SERPL-MCNC: 14 MG/DL (ref 6–20)
BUN/CREAT SERPL: 14 (ref 12–20)
CALCIUM SERPL-MCNC: 9.2 MG/DL (ref 8.5–10.1)
CHLORIDE SERPL-SCNC: 105 MMOL/L (ref 97–108)
CO2 SERPL-SCNC: 29 MMOL/L (ref 21–32)
COMMENT, HOLDF: NORMAL
CREAT SERPL-MCNC: 0.98 MG/DL (ref 0.55–1.02)
CRP SERPL-MCNC: 1.5 MG/DL (ref 0–0.6)
DIFFERENTIAL METHOD BLD: ABNORMAL
EOSINOPHIL # BLD: 0.6 K/UL (ref 0–0.4)
EOSINOPHIL NFR BLD: 6 % (ref 0–7)
ERYTHROCYTE [DISTWIDTH] IN BLOOD BY AUTOMATED COUNT: 16.8 % (ref 11.5–14.5)
ERYTHROCYTE [SEDIMENTATION RATE] IN BLOOD: 70 MM/HR (ref 0–30)
GLOBULIN SER CALC-MCNC: 3.8 G/DL (ref 2–4)
GLUCOSE BLD STRIP.AUTO-MCNC: 101 MG/DL (ref 65–117)
GLUCOSE BLD STRIP.AUTO-MCNC: 116 MG/DL (ref 65–117)
GLUCOSE BLD STRIP.AUTO-MCNC: 127 MG/DL (ref 65–117)
GLUCOSE BLD STRIP.AUTO-MCNC: 166 MG/DL (ref 65–117)
GLUCOSE SERPL-MCNC: 125 MG/DL (ref 65–100)
HCT VFR BLD AUTO: 31.4 % (ref 35–47)
HGB BLD-MCNC: 9.5 G/DL (ref 11.5–16)
IMM GRANULOCYTES # BLD AUTO: 0.1 K/UL (ref 0–0.04)
IMM GRANULOCYTES NFR BLD AUTO: 1 % (ref 0–0.5)
LYMPHOCYTES # BLD: 2.8 K/UL (ref 0.8–3.5)
LYMPHOCYTES NFR BLD: 26 % (ref 12–49)
MCH RBC QN AUTO: 23.9 PG (ref 26–34)
MCHC RBC AUTO-ENTMCNC: 30.3 G/DL (ref 30–36.5)
MCV RBC AUTO: 79.1 FL (ref 80–99)
MONOCYTES # BLD: 1.3 K/UL (ref 0–1)
MONOCYTES NFR BLD: 12 % (ref 5–13)
NEUTS SEG # BLD: 6.1 K/UL (ref 1.8–8)
NEUTS SEG NFR BLD: 54 % (ref 32–75)
NRBC # BLD: 0 K/UL (ref 0–0.01)
NRBC BLD-RTO: 0 PER 100 WBC
PLATELET # BLD AUTO: 598 K/UL (ref 150–400)
PMV BLD AUTO: 9.3 FL (ref 8.9–12.9)
POTASSIUM SERPL-SCNC: 4 MMOL/L (ref 3.5–5.1)
PROT SERPL-MCNC: 6.3 G/DL (ref 6.4–8.2)
RBC # BLD AUTO: 3.97 M/UL (ref 3.8–5.2)
SAMPLES BEING HELD,HOLD: NORMAL
SERVICE CMNT-IMP: ABNORMAL
SERVICE CMNT-IMP: ABNORMAL
SERVICE CMNT-IMP: NORMAL
SODIUM SERPL-SCNC: 138 MMOL/L (ref 136–145)
WBC # BLD AUTO: 10.9 K/UL (ref 3.6–11)

## 2022-01-03 PROCEDURE — 36415 COLL VENOUS BLD VENIPUNCTURE: CPT

## 2022-01-03 PROCEDURE — 86140 C-REACTIVE PROTEIN: CPT

## 2022-01-03 PROCEDURE — 85652 RBC SED RATE AUTOMATED: CPT

## 2022-01-03 PROCEDURE — 85025 COMPLETE CBC W/AUTO DIFF WBC: CPT

## 2022-01-03 PROCEDURE — 65270000032 HC RM SEMIPRIVATE

## 2022-01-03 PROCEDURE — 82962 GLUCOSE BLOOD TEST: CPT

## 2022-01-03 PROCEDURE — 74011000250 HC RX REV CODE- 250: Performed by: INTERNAL MEDICINE

## 2022-01-03 PROCEDURE — 80053 COMPREHEN METABOLIC PANEL: CPT

## 2022-01-03 PROCEDURE — 97116 GAIT TRAINING THERAPY: CPT

## 2022-01-03 PROCEDURE — 94760 N-INVAS EAR/PLS OXIMETRY 1: CPT

## 2022-01-03 PROCEDURE — 74011250637 HC RX REV CODE- 250/637: Performed by: INTERNAL MEDICINE

## 2022-01-03 PROCEDURE — 97535 SELF CARE MNGMENT TRAINING: CPT

## 2022-01-03 PROCEDURE — 97530 THERAPEUTIC ACTIVITIES: CPT

## 2022-01-03 RX ADMIN — METOPROLOL TARTRATE 12.5 MG: 25 TABLET, FILM COATED ORAL at 21:37

## 2022-01-03 RX ADMIN — METOPROLOL TARTRATE 12.5 MG: 25 TABLET, FILM COATED ORAL at 00:05

## 2022-01-03 RX ADMIN — ATORVASTATIN CALCIUM 40 MG: 40 TABLET, FILM COATED ORAL at 00:05

## 2022-01-03 RX ADMIN — PANTOPRAZOLE SODIUM 40 MG: 40 TABLET, DELAYED RELEASE ORAL at 08:19

## 2022-01-03 RX ADMIN — MICONAZOLE NITRATE 2 % TOPICAL POWDER: at 10:05

## 2022-01-03 RX ADMIN — APIXABAN 5 MG: 5 TABLET, FILM COATED ORAL at 21:38

## 2022-01-03 RX ADMIN — APIXABAN 5 MG: 5 TABLET, FILM COATED ORAL at 00:05

## 2022-01-03 RX ADMIN — SODIUM CHLORIDE, PRESERVATIVE FREE 20 ML: 5 INJECTION INTRAVENOUS at 04:10

## 2022-01-03 RX ADMIN — AMITRIPTYLINE HYDROCHLORIDE 50 MG: 50 TABLET, FILM COATED ORAL at 00:05

## 2022-01-03 RX ADMIN — LEVOTHYROXINE SODIUM 112 MCG: 0.11 TABLET ORAL at 08:19

## 2022-01-03 RX ADMIN — Medication 1 CAPSULE: at 09:29

## 2022-01-03 RX ADMIN — PAROXETINE HYDROCHLORIDE 40 MG: 20 TABLET, FILM COATED ORAL at 08:19

## 2022-01-03 RX ADMIN — SODIUM CHLORIDE, PRESERVATIVE FREE 10 ML: 5 INJECTION INTRAVENOUS at 21:38

## 2022-01-03 RX ADMIN — SODIUM CHLORIDE, PRESERVATIVE FREE 10 ML: 5 INJECTION INTRAVENOUS at 14:23

## 2022-01-03 RX ADMIN — AMITRIPTYLINE HYDROCHLORIDE 50 MG: 50 TABLET, FILM COATED ORAL at 21:38

## 2022-01-03 RX ADMIN — AMLODIPINE BESYLATE 10 MG: 5 TABLET ORAL at 10:05

## 2022-01-03 RX ADMIN — Medication 400 MG: at 09:29

## 2022-01-03 RX ADMIN — APIXABAN 5 MG: 5 TABLET, FILM COATED ORAL at 09:29

## 2022-01-03 RX ADMIN — ATORVASTATIN CALCIUM 40 MG: 40 TABLET, FILM COATED ORAL at 21:38

## 2022-01-03 RX ADMIN — ASPIRIN 81 MG: 81 TABLET, COATED ORAL at 10:31

## 2022-01-03 RX ADMIN — METOPROLOL TARTRATE 25 MG: 25 TABLET, FILM COATED ORAL at 08:19

## 2022-01-03 NOTE — PROGRESS NOTES
Problem: Self Care Deficits Care Plan (Adult)  Goal: *Acute Goals and Plan of Care (Insert Text)  Description: FUNCTIONAL STATUS PRIOR TO ADMISSION: Patient was independent and active without use of DME prior to recent hospitalizations. Since recent admissions, pt has been using a RW for functional mobility. Pt  admitted from [facility]. HOME SUPPORT: The patient lived with her  who assisted with LB dressing at baseline, however per pt report he is no longer able to provide assistance. Occupational Therapy Goals  Initiated 12/29/2021  1. Patient will perform lower body dressing with supervision/set-up, using AE PRN, within 7 day(s). 2.  Patient will perform lower body bathing  with supervision/set-up, using AE PRN,  within 7 day(s). 3.  Patient will perform standing grooming task with modified independence within 7 day(s). 4.  Patient will perform toilet transfers with modified independence within 7 day(s). 5.  Patient will perform all aspects of toileting with modified independence within 7 day(s). 6. Patient will integrate 3/3 back precautions into ADL routine with minimal verbal cues within 7 days. 7.  Patient will utilize energy conservation techniques during functional activities with verbal cues within 7 day(s). Outcome: Progressing Towards Goal    OCCUPATIONAL THERAPY TREATMENT  Patient: Luiz Alpers (54 y.o. female)  Date: 1/3/2022  Diagnosis: MERCEDES (acute kidney injury) ([facility] [address] [N17.9]  Abdominal pain [R10.9]  Postprocedural intraabdominal abscess [T81.43XA] <principal problem not specified>       Precautions: Back,Fall (PICC line LUE)  Chart, occupational therapy assessment, plan of care, and goals were reviewed. ASSESSMENT  Patient continues with skilled OT services and is progressing towards goals.   Pt progressing with mobility/balance, functionally evidenced by requiring no physical assist for transfers to/from EOB, as well as, completing RW level standing grooming with CGA, no LOB and single verbal cue for symmetrical reaching to prevent twisting. Pt continues to benefit from skilled OT to address functional deficits during acute hospitalization, with reporting therapist believing pt will benefit from return to SNF upon discharge. Current Level of Function Impacting Discharge (ADLs):   CGA    Other factors to consider for discharge: IV antibiotics         PLAN :  Patient continues to benefit from skilled intervention to address the above impairments. Continue treatment per established plan of care to address goals. Recommend with staff: OOB meals, Active ADL engagement, CGA RW toileting    Recommend next OT session: POC progression    Recommendation for discharge: (in order for the patient to meet his/her long term goals)  Therapy up to 5 days/week in SNF setting    This discharge recommendation:  Has been made in collaboration with the attending provider and/or case management    IF patient discharges home will need the following DME: TBD       SUBJECTIVE:   Patient stated I feel pretty good today.     OBJECTIVE DATA SUMMARY:   Cognitive/Behavioral Status:  Neurologic State: Alert  Orientation Level: Oriented X4  Cognition: Follows commands  Perception: Appears intact  Perseveration: No perseveration noted  Safety/Judgement: Awareness of environment;Home safety; Insight into deficits    Functional Mobility and Transfers for ADLs:  Bed Mobility:  Supine to Sit: Independent  Sit to Supine: Stand-by assistance  Scooting: Independent    Transfers:  Sit to Stand: Contact guard assistance;Assist x1  Functional Transfers  Bathroom Mobility: Contact guard assistance       Balance:  Sitting: Intact  Standing: Impaired  Standing - Static: Constant support;Good  Standing - Dynamic : Constant support;Good    ADL Intervention:       Grooming  Grooming Assistance: Contact guard assistance  Position Performed: Standing (RW at sink)  Washing Hands: Contact guard assistance  Cues: Verbal cues provided (single verbal cue for symmetrical reaching to prevent twist)                   Lower Body Dressing Assistance  Slip on Shoes Without Back: Set-up  Position Performed: Seated edge of bed         Cognitive Retraining  Safety/Judgement: Awareness of environment;Home safety; Insight into deficits    Pain:  No c/o pain    Activity Tolerance:   Good and Fair    After treatment patient left in no apparent distress:   Supine in bed, Call bell within reach, and Side rails x 3    COMMUNICATION/COLLABORATION:   The patients plan of care was discussed with: Physical therapist, Registered nurse, and Case management.      Colby Mcclure OT  Time Calculation: 23 mins

## 2022-01-03 NOTE — PROGRESS NOTES
Transition of Care Plan   RUR- 25%    DISPOSITION: The disposition plan is [facility]; Accepted; Insurance AUTh initated 12/30   - Per conversation with the admissions liaison; she reported that updated PT/OT notes are needed for Glenn Lopez;  This cm updated therapy     F/U with PCP/Specialist     Transport: [facility]; [facility]      CM: [address]. MSJOSE,   [phone]

## 2022-01-03 NOTE — PROGRESS NOTES
Problem: Mobility Impaired (Adult and Pediatric)  Goal: *Acute Goals and Plan of Care (Insert Text)  Description: FUNCTIONAL STATUS PRIOR TO ADMISSION: Patient was independent and active without use of DME prior to November of this year and hospitalization. Since then she has been using a rolling walker. Was admitted from [facility] where she was for rehab and IV antibiotics via PICC line. When asked she endorsed 5 falls in the last 12 months. Karen Jones HOME SUPPORT PRIOR TO ADMISSION: The patient lived with her  prior to hospitalization last month. She reports that her  used be be able to assist her but that is no longer the case (she will need to be mod I). Karen Jones Physical Therapy Goals  Initiated 12/28/2021    1. Patient will move from supine to sit and sit to supine , scoot up and down, and roll side to side in bed with independence within 7 days. 2. Patient will perform sit to stand with modified independence within 7 days. 3. Patient will ambulate with modified independence for 150 feet with the least restrictive device within 7 days. 4. Patient will verbalize and demonstrate understanding of spinal precautions (No bending, lifting greater than 5 lbs, or twisting; log-roll technique; frequent repositioning as instructed) within 7 days. Outcome: Progressing Towards Goal   PHYSICAL THERAPY TREATMENT  Patient: Tanesha Escobar (45 y.o. female)  Date: 1/3/2022  Diagnosis: MERCEDES (acute kidney injury) ([facility] [address] [N17.9]  Abdominal pain [R10.9]  Postprocedural intraabdominal abscess [T81.43XA] <principal problem not specified>       Precautions: Back,Fall (PICC line LUE)  Chart, physical therapy assessment, plan of care and goals were reviewed. ASSESSMENT  Patient continues with skilled PT services and is slowly progressing towards goals. Pt received supine however sitting EOB after returning to room with linens.   Pt stood with CGA and ambulated with rolling walker for a short distance within the room with slow christina and mildly flexed posture. Pt complains of BLE weakness. Pt agreeable to remain up in chair at session end. Current Level of Function Impacting Discharge (mobility/balance): bed mobility independent, transfers and ambulation with rolling walker  x 30 feet with CGA    Other factors to consider for discharge: fall history, lives with spouse however he is unable to provide physical assistance to pt, PMH         PLAN :  Patient continues to benefit from skilled intervention to address the above impairments. Continue treatment per established plan of care. to address goals. Recommendation for discharge: (in order for the patient to meet his/her long term goals)  Therapy up to 5 days/week in SNF setting    This discharge recommendation:  A follow-up discussion with the attending provider and/or case management is planned    IF patient discharges home will need the following DME: patient owns DME required for discharge       SUBJECTIVE:   Patient stated My legs feel so weak.     OBJECTIVE DATA SUMMARY:   Critical Behavior:  Neurologic State: Alert  Orientation Level: Oriented X4  Cognition: Appropriate decision making,Appropriate for age attention/concentration,Appropriate safety awareness,Follows commands  Safety/Judgement: Awareness of environment  Functional Mobility Training:  Bed Mobility:     Supine to Sit: Independent     Scooting: Independent        Transfers:  Sit to Stand: Contact guard assistance;Assist x1  Stand to Sit: Contact guard assistance;Assist x1                             Balance:  Sitting: Intact  Standing: Impaired  Standing - Static: Constant support;Good  Standing - Dynamic : Constant support;Good  Ambulation/Gait Training:  Distance (ft): 30 Feet (ft)  Assistive Device: Walker, rolling;Gait belt  Ambulation - Level of Assistance: Contact guard assistance;Assist x1        Gait Abnormalities: Decreased step clearance        Base of Support: Widened Speed/Catherine: Slow  Step Length: Right shortened;Left shortened             Pain Rating:  Verbal: denies pain    Activity Tolerance:   Fair and limited due to weakness, fatigue    After treatment patient left in no apparent distress:   Sitting in chair and Call bell within reach    COMMUNICATION/COLLABORATION:   The patients plan of care was discussed with: Registered nurse.      Gris Singh   Time Calculation: 15 mins

## 2022-01-03 NOTE — PROGRESS NOTES
Problem: Diabetes Self-Management  Goal: *Disease process and treatment process  Description: Define diabetes and identify own type of diabetes; list 3 options for treating diabetes. Outcome: Progressing Towards Goal  Goal: *Incorporating nutritional management into lifestyle  Description: Describe effect of type, amount and timing of food on blood glucose; list 3 methods for planning meals. Outcome: Progressing Towards Goal  Goal: *Incorporating physical activity into lifestyle  Description: State effect of exercise on blood glucose levels. Outcome: Progressing Towards Goal  Goal: *Developing strategies to promote health/change behavior  Description: Define the ABC's of diabetes; identify appropriate screenings, schedule and personal plan for screenings. Outcome: Progressing Towards Goal  Goal: *Using medications safely  Description: State effect of diabetes medications on diabetes; name diabetes medication taking, action and side effects. Outcome: Progressing Towards Goal  Goal: *Monitoring blood glucose, interpreting and using results  Description: Identify recommended blood glucose targets  and personal targets. Outcome: Progressing Towards Goal  Goal: *Prevention, detection, treatment of acute complications  Description: List symptoms of hyper- and hypoglycemia; describe how to treat low blood sugar and actions for lowering  high blood glucose level. Outcome: Progressing Towards Goal  Goal: *Prevention, detection and treatment of chronic complications  Description: Define the natural course of diabetes and describe the relationship of blood glucose levels to long term complications of diabetes.   Outcome: Progressing Towards Goal  Goal: *Developing strategies to address psychosocial issues  Description: Describe feelings about living with diabetes; identify support needed and support network  Outcome: Progressing Towards Goal  Goal: *Insulin pump training  Outcome: Progressing Towards Goal  Goal: *Sick day guidelines  Outcome: Progressing Towards Goal  Goal: *Patient Specific Goal (EDIT GOAL, INSERT TEXT)  Outcome: Progressing Towards Goal     Problem: Patient Education: Go to Patient Education Activity  Goal: Patient/Family Education  Outcome: Progressing Towards Goal     Problem: Pressure Injury - Risk of  Goal: *Prevention of pressure injury  Description: Document Neal Scale and appropriate interventions in the flowsheet. Outcome: Progressing Towards Goal  Note: Pressure Injury Interventions:       Moisture Interventions: Absorbent underpads,Apply protective barrier, creams and emollients,Check for incontinence Q2 hours and as needed,Internal/External urinary devices,Minimize layers    Activity Interventions: PT/OT evaluation,Increase time out of bed    Mobility Interventions: PT/OT evaluation,HOB 30 degrees or less,Turn and reposition approx. every two hours(pillow and wedges)    Nutrition Interventions: Document food/fluid/supplement intake    Friction and Shear Interventions: Apply protective barrier, creams and emollients,HOB 30 degrees or less,Minimize layers                Problem: Patient Education: Go to Patient Education Activity  Goal: Patient/Family Education  Outcome: Progressing Towards Goal     Problem: Falls - Risk of  Goal: *Absence of Falls  Description: Document Rolf Fall Risk and appropriate interventions in the flowsheet.   Outcome: Progressing Towards Goal  Note: Fall Risk Interventions:  Mobility Interventions: OT consult for ADLs,Patient to call before getting OOB,PT Consult for mobility concerns,PT Consult for assist device competence         Medication Interventions: Teach patient to arise slowly,Patient to call before getting OOB    Elimination Interventions: Stay With Me (per policy),Patient to call for help with toileting needs    History of Falls Interventions: Room close to nurse's station,Utilize gait belt for transfer/ambulation,Door open when patient unattended Problem: Patient Education: Go to Patient Education Activity  Goal: Patient/Family Education  Outcome: Progressing Towards Goal     Problem: Patient Education: Go to Patient Education Activity  Goal: Patient/Family Education  Outcome: Progressing Towards Goal     Problem: Patient Education: Go to Patient Education Activity  Goal: Patient/Family Education  Outcome: Progressing Towards Goal

## 2022-01-03 NOTE — PROGRESS NOTES
Problem: Pressure Injury - Risk of  Goal: *Prevention of pressure injury  Description: Document Neal Scale and appropriate interventions in the flowsheet. Outcome: Progressing Towards Goal  Note: Pressure Injury Interventions:       Moisture Interventions: Absorbent underpads,Apply protective barrier, creams and emollients,Check for incontinence Q2 hours and as needed,Internal/External urinary devices,Minimize layers    Activity Interventions: PT/OT evaluation    Mobility Interventions: PT/OT evaluation    Nutrition Interventions: Offer support with meals,snacks and hydration    Friction and Shear Interventions: Apply protective barrier, creams and emollients,HOB 30 degrees or less,Minimize layers                Problem: Falls - Risk of  Goal: *Absence of Falls  Description: Document Rolf Fall Risk and appropriate interventions in the flowsheet.   Outcome: Progressing Towards Goal  Note: Fall Risk Interventions:  Mobility Interventions: Communicate number of staff needed for ambulation/transfer         Medication Interventions: Patient to call before getting OOB    Elimination Interventions: Call light in reach    History of Falls Interventions: Bed/chair exit alarm

## 2022-01-03 NOTE — PROGRESS NOTES
[address] [facility] Adult  Hospitalist Group                                                                                          Hospitalist Progress Note  Elidia Guerra DO  Answering service: [phone] from in house phone        Date of Service:  1/3/2022  NAME:  Trina Maddox  :  1950  MRN:  [id number]      Admission Summary:   66 y. o. female with complex medical history of back surgery in 2021, followed by infectionmanaged by primary orthopedic Dr. Lukasz Valentino for the same, with hospitalization at [facility] for about a month early November through early December for right psoas abscess with IR guided abscess drainage, who was discharged to [facility] with PICC line for prolonged antibiotics with IV vancomycin through 2022 per ID based on the culture results. For about a week, patient has noticed bulge and discomfort in right lower quadrant.  Yesterday she showed that to nursing team and subsequent to that patient was sent for CT abdomen pelvis assessment here at [facility].  Patient was noted to have considerable CT findings with persistent unchanged right psoas abscess, right perinephric stranding along with hydronephrosis, hypokalemia, mild early MERCEDES with elevated creatinine at 1.15 compared to previous baseline of 0.50.6. [facility] hospitalist team has been consulted to admit the patient for further management. Patient denied any fever or chills.  Patient denied any chest pain, shortness of breath, palpitation.  Patient denied any leg swelling worsening.  Patient denied any urinary trouble or diarrhea or loose stools.  No constipation.  Patient denied any focal weakness, tingling, numbness.  Denied any worsening of back pain or drainage from the back. Interval history / Subjective: Follow up abscess. Patient seen and examined. No acute events. In good spirits. Denies pain.       Assessment & Plan:     History Right psoas abscess needing extensive drainage at [facility]  Hx of Streptococcus anginosus epidural/iliopsoas abscess on recent hospitalization 1112/2021, on IV vancomycin to be continued per ID until 1/27/2022  -CT on admission with right perinephric stranding and proximal right hydronephrosis, persistent inflammatory stranding 3 cm x 1.6 cm right lower quadrant fluid collection, unchanged  -persistent collection not able to be drained  -ID consulted. Plans to continue vancomycin   -tylenol for pain. Okay for low dose oxycodone    Right perinephric stranding with right hydronephrosis:  -urology consulted. Could consider stenting if renal function declines  -consider repeat imagining if clinically needed   -urology signed off, can be reconsulted as needed    Acute renal insufficiency: resolved    Recent celiac artery thrombosis with splenic infarcts, on Eliquis, diagnosed on 11/9/2021 on CT A/P.    -No vascular surgical intervention needed at the time.    -continue Eliquis for 6 months    #DM2- SSI  #Bilateral DM related polyneuropathybilateral feet  #Hx of SVT  #HTN- metoprolol, amlodipine   #HLD  #Hypothyroidism- home levothyroxine  #Depression/anxiety- home paroxetine    Code status: full   DVT prophylaxis: [address] discussed with: Patient/Family  Anticipated Disposition: SNF/LTC  Anticipated Discharge: 1-2 days     Hospital Problems  Date Reviewed: 12/21/2021          Codes Class Noted POA    Abdominal pain ICD-10-CM: R10.9  ICD-9-CM: 789.00  12/27/2021 Unknown        Postprocedural intraabdominal abscess ICD-10-CM: T81.43XA  ICD-9-CM: 998.59  12/27/2021 Unknown        MERCEDES (acute kidney injury) ([facility] ICD-10-CM: N17.9  ICD-9-CM: 584.9  11/6/2021 Unknown                Review of Systems:   Negative unless stated above      Vital Signs:    Last 24hrs VS reviewed since prior progress note.  Most recent are:  Visit Vitals  /79 (BP 1 Location: Right arm, BP Patient Position: At rest;Lying)   Pulse 79   Temp 97.6 °F (36.4 °C)   Resp 16   Ht 5' 4\" (1.626 m)   Wt 120.2 kg (264 lb 15.9 oz)   SpO2 93%   BMI 45.49 kg/m²         Intake/Output Summary (Last 24 hours) at 1/3/2022 1236  Last data filed at 1/3/2022 0083  Gross per 24 hour   Intake    Output 1200 ml   Net -1200 ml        Physical Examination:     I had a face to face encounter with this patient and independently examined them on 1/3/2022 as outlined below:          Constitutional:  No acute distress, cooperative, pleasant    ENT:  Oral mucosa moist, oropharynx benign. Resp:  CTA bilaterally. No wheezing/rhonchi/rales. No accessory muscle use   CV:  Regular rhythm, normal rate, no murmurs, gallops, rubs    GI:  Soft, right lower quadrant non tender to palpation, non distended, normoactive bowel sounds, no hepatosplenomegaly     Musculoskeletal:  lower lumbar/sacral region with intact stables, no erythema     Neurologic:  Moves all extremities            Data Review:    Review and/or order of clinical lab test  Review and/or order of tests in the radiology section of CPT  Review and/or order of tests in the medicine section of CPT      Labs:     Recent Labs     01/03/22 0427 01/01/22  0006   WBC 10.9 9.7   HGB 9.5* 10.0*   HCT 31.4* 32.3*   * 618*     Recent Labs     01/03/22 0427 01/02/22  0625 01/01/22  0006    139 139   K 4.0 3.4* 3.1*    105 105   CO2 29 29 29   BUN 14 12 11   CREA 0.98 0.94 1.03*   * 117* 103*   CA 9.2 9.0 8.9   MG  --  1.6 1.6   PHOS  --  3.2 2.6     Recent Labs     01/03/22 0427   ALT 23   *   TBILI 0.4   TP 6.3*   ALB 2.5*   GLOB 3.8     No results for input(s): INR, PTP, APTT, INREXT, INREXT in the last 72 hours. No results for input(s): FE, TIBC, PSAT, FERR in the last 72 hours. Lab Results   Component Value Date/Time    Folate 17.4 11/29/2021 02:49 AM      No results for input(s): PH, PCO2, PO2 in the last 72 hours. No results for input(s): CPK, CKNDX, TROIQ in the last 72 hours.     No lab exists for component: CPKMB  Lab Results   Component Value Date/Time Cholesterol, total 232 (H) 07/13/2010 12:05 PM    HDL Cholesterol 38 07/13/2010 12:05 PM    LDL, calculated 164 (H) 07/13/2010 12:05 PM    Triglyceride 150 (H) 07/13/2010 12:05 PM    CHOL/HDL Ratio 6.1 (H) 07/13/2010 12:05 PM     Lab Results   Component Value Date/Time    Glucose (POC) 127 (H) 01/03/2022 11:37 AM    Glucose (POC) 116 01/03/2022 06:57 AM    Glucose (POC) 183 (H) 01/02/2022 10:23 PM    Glucose (POC) 91 01/02/2022 05:06 PM    Glucose (POC) 121 (H) 01/02/2022 12:15 PM     Lab Results   Component Value Date/Time    Color YELLOW/STRAW 12/28/2021 04:07 PM    Appearance CLEAR 12/28/2021 04:07 PM    Specific gravity 1.009 12/28/2021 04:07 PM    Specific gravity 1.010 04/06/2011 10:20 AM    pH (UA) 7.5 12/28/2021 04:07 PM    Protein Negative 12/28/2021 04:07 PM    Glucose Negative 12/28/2021 04:07 PM    Ketone Negative 12/28/2021 04:07 PM    Bilirubin Negative 12/28/2021 04:07 PM    Urobilinogen 0.2 12/28/2021 04:07 PM    Nitrites Negative 12/28/2021 04:07 PM    Leukocyte Esterase Negative 12/28/2021 04:07 PM    Epithelial cells FEW 12/28/2021 04:07 PM    Bacteria Negative 12/28/2021 04:07 PM    WBC 0-4 12/28/2021 04:07 PM    RBC 20-50 12/28/2021 04:07 PM         Medications Reviewed:     Current Facility-Administered Medications   Medication Dose Route Frequency    magnesium oxide (MAG-OX) tablet 400 mg  400 mg Oral DAILY    vancomycin (VANCOCIN) 1,250 mg in 0.9% sodium chloride 250 mL (VIAL-MATE)  1,250 mg IntraVENous Q36H    oxyCODONE IR (ROXICODONE) tablet 5 mg  5 mg Oral BID PRN    hydrALAZINE (APRESOLINE) 20 mg/mL injection 10 mg  10 mg IntraVENous Q6H PRN    amLODIPine (NORVASC) tablet 10 mg  10 mg Oral DAILY    influenza vaccine 2021-22 (6 mos+)(PF) (FLUARIX/FLULAVAL/FLUZONE QUAD) injection 0.5 mL  1 Each IntraMUSCular PRIOR TO DISCHARGE    sodium chloride (NS) flush 5-40 mL  5-40 mL IntraVENous Q8H    sodium chloride (NS) flush 5-40 mL  5-40 mL IntraVENous PRN    acetaminophen (TYLENOL) tablet 650 mg  650 mg Oral Q6H PRN    Or    acetaminophen (TYLENOL) suppository 650 mg  650 mg Rectal Q6H PRN    polyethylene glycol (MIRALAX) packet 17 g  17 g Oral DAILY PRN    ondansetron (ZOFRAN ODT) tablet 4 mg  4 mg Oral Q8H PRN    Or    ondansetron (ZOFRAN) injection 4 mg  4 mg IntraVENous Q6H PRN    Vancomycin - pharmacy to dose   Other Rx Dosing/Monitoring    glucose chewable tablet 16 g  4 Tablet Oral PRN    dextrose (D50W) injection syrg 12.5-25 g  25-50 mL IntraVENous PRN    glucagon (GLUCAGEN) injection 1 mg  1 mg IntraMUSCular PRN    insulin lispro (HUMALOG) injection   SubCUTAneous AC&HS    ALPRAZolam (XANAX) tablet 0.5 mg  0.5 mg Oral BID PRN    amitriptyline (ELAVIL) tablet 50 mg  50 mg Oral QHS    apixaban (ELIQUIS) tablet 5 mg  5 mg Oral Q12H    aspirin delayed-release tablet 81 mg  81 mg Oral DAILY    atorvastatin (LIPITOR) tablet 40 mg  40 mg Oral QHS    levothyroxine (SYNTHROID) tablet 112 mcg  112 mcg Oral ACB    L.acidophilus-paracasei-S.thermophil-bifidobacter (RISAQUAD) 8 billion cell capsule  1 Capsule Oral DAILY    metoprolol tartrate (LOPRESSOR) tablet 25 mg  25 mg Oral 7am    metoprolol tartrate (LOPRESSOR) tablet 12.5 mg  12.5 mg Oral QHS    miconazole (MICOTIN) 2 % powder   Topical BID    pantoprazole (PROTONIX) tablet 40 mg  40 mg Oral ACB    PARoxetine (PAXIL) tablet 40 mg  40 mg Oral 7am    polyethylene glycol (MIRALAX) packet 17 g  17 g Oral DAILY    senna (SENOKOT) tablet 17.2 mg  2 Tablet Oral DAILY     ______________________________________________________________________  EXPECTED LENGTH OF STAY: 5d 0h  ACTUAL LENGTH OF STAY:          [address] [facility],

## 2022-01-04 VITALS
RESPIRATION RATE: 16 BRPM | DIASTOLIC BLOOD PRESSURE: 54 MMHG | OXYGEN SATURATION: 96 % | WEIGHT: 264.99 LBS | TEMPERATURE: 97.9 F | HEART RATE: 71 BPM | HEIGHT: 64 IN | SYSTOLIC BLOOD PRESSURE: 134 MMHG | BODY MASS INDEX: 45.24 KG/M2

## 2022-01-04 LAB
COVID-19 RAPID TEST, COVR: NOT DETECTED
DATE LAST DOSE: ABNORMAL
GLUCOSE BLD STRIP.AUTO-MCNC: 121 MG/DL (ref 65–117)
GLUCOSE BLD STRIP.AUTO-MCNC: 162 MG/DL (ref 65–117)
REPORTED DOSE,DOSE: ABNORMAL UNITS
REPORTED DOSE/TIME,TMG: ABNORMAL
SERVICE CMNT-IMP: ABNORMAL
SERVICE CMNT-IMP: ABNORMAL
SOURCE, COVRS: NORMAL
VANCOMYCIN TROUGH SERPL-MCNC: 11.4 UG/ML (ref 5–10)

## 2022-01-04 PROCEDURE — 36415 COLL VENOUS BLD VENIPUNCTURE: CPT

## 2022-01-04 PROCEDURE — 74011000250 HC RX REV CODE- 250: Performed by: INTERNAL MEDICINE

## 2022-01-04 PROCEDURE — 80202 ASSAY OF VANCOMYCIN: CPT

## 2022-01-04 PROCEDURE — 82962 GLUCOSE BLOOD TEST: CPT

## 2022-01-04 PROCEDURE — 74011250637 HC RX REV CODE- 250/637: Performed by: INTERNAL MEDICINE

## 2022-01-04 PROCEDURE — 90471 IMMUNIZATION ADMIN: CPT

## 2022-01-04 PROCEDURE — 90686 IIV4 VACC NO PRSV 0.5 ML IM: CPT | Performed by: INTERNAL MEDICINE

## 2022-01-04 PROCEDURE — 74011250636 HC RX REV CODE- 250/636: Performed by: INTERNAL MEDICINE

## 2022-01-04 PROCEDURE — 87635 SARS-COV-2 COVID-19 AMP PRB: CPT

## 2022-01-04 RX ORDER — VANCOMYCIN/0.9 % SOD CHLORIDE 1.5G/250ML
1500 PLASTIC BAG, INJECTION (ML) INTRAVENOUS
Status: DISCONTINUED | OUTPATIENT
Start: 2022-01-05 | End: 2022-01-04 | Stop reason: HOSPADM

## 2022-01-04 RX ORDER — AMLODIPINE BESYLATE 10 MG/1
10 TABLET ORAL DAILY
Qty: 30 TABLET | Refills: 0 | Status: SHIPPED
Start: 2022-01-05

## 2022-01-04 RX ORDER — ACETAMINOPHEN 325 MG/1
650 TABLET ORAL
Qty: 30 TABLET | Refills: 0 | Status: SHIPPED
Start: 2022-01-04 | End: 2022-01-09

## 2022-01-04 RX ORDER — INSULIN LISPRO 100 [IU]/ML
INJECTION, SOLUTION INTRAVENOUS; SUBCUTANEOUS
Qty: 1 EACH | Refills: 0 | Status: SHIPPED
Start: 2022-01-04 | End: 2022-07-06 | Stop reason: ALTCHOICE

## 2022-01-04 RX ADMIN — INFLUENZA VIRUS VACCINE 0.5 ML: 15; 15; 15; 15 SUSPENSION INTRAMUSCULAR at 12:28

## 2022-01-04 RX ADMIN — Medication 400 MG: at 09:07

## 2022-01-04 RX ADMIN — ASPIRIN 81 MG: 81 TABLET, COATED ORAL at 09:07

## 2022-01-04 RX ADMIN — SODIUM CHLORIDE, PRESERVATIVE FREE 10 ML: 5 INJECTION INTRAVENOUS at 05:07

## 2022-01-04 RX ADMIN — LEVOTHYROXINE SODIUM 112 MCG: 0.11 TABLET ORAL at 07:21

## 2022-01-04 RX ADMIN — PAROXETINE HYDROCHLORIDE 40 MG: 20 TABLET, FILM COATED ORAL at 07:21

## 2022-01-04 RX ADMIN — MICONAZOLE NITRATE 2 % TOPICAL POWDER: at 09:12

## 2022-01-04 RX ADMIN — Medication 1 CAPSULE: at 09:07

## 2022-01-04 RX ADMIN — PANTOPRAZOLE SODIUM 40 MG: 40 TABLET, DELAYED RELEASE ORAL at 07:21

## 2022-01-04 RX ADMIN — VANCOMYCIN HYDROCHLORIDE 1250 MG: 1.25 INJECTION, POWDER, LYOPHILIZED, FOR SOLUTION INTRAVENOUS at 05:07

## 2022-01-04 RX ADMIN — METOPROLOL TARTRATE 25 MG: 25 TABLET, FILM COATED ORAL at 07:21

## 2022-01-04 RX ADMIN — APIXABAN 5 MG: 5 TABLET, FILM COATED ORAL at 09:07

## 2022-01-04 RX ADMIN — AMLODIPINE BESYLATE 10 MG: 5 TABLET ORAL at 09:07

## 2022-01-04 NOTE — DISCHARGE INSTRUCTIONS
Discharge Instructions       PATIENT ID: aMgali Stanton  MRN: [id number]   YOB: 1950    DATE OF ADMISSION: 12/27/2021 11:06 AM    DATE OF DISCHARGE: 1/4/2022    PRIMARY CARE PROVIDER: Darin Ingram MD     ATTENDING PHYSICIAN: Olga Gutierrez DO  DISCHARGING PROVIDER: Odalys Betancourt DO    To contact this individual call [phone] and ask the  to page. If unavailable ask to be transferred the Adult Hospitalist Department. DISCHARGE DIAGNOSES     Psoas abscess     CONSULTATIONS: IP CONSULT TO HOSPITALIST  IP CONSULT TO INFECTIOUS DISEASES  IP CONSULT TO UROLOGY  ED CONSULT TO SENIOR SERVICES NURSE PRACTITIONER  IP CONSULT TO ORTHOPEDIC SURGERY    PROCEDURES/SURGERIES: * No surgery found *    PENDING TEST RESULTS:   At the time of discharge the following test results are still pending: none    FOLLOW UP APPOINTMENTS:   Follow-up Information     Follow up With Specialties Details Why Evelio WEAVER [facility] [address]   [address] [facility] [address] [phone]           ADDITIONAL CARE RECOMMENDATIONS:     Please follow medication list    DIET: Diabetic Diet    ACTIVITY: Activity as tolerated and See surgical instructions    WOUND CARE: PICC line care    EQUIPMENT needed: PICC     Antibiotics: Next dose 1/5/2021 1700  Vancomycin 1250 mg q36 hours. Needs dosing adjusted based on levels at discharging facility as well  Please send Weekly labs cbc wt diff, cmp, vancomycin trough, esr, crp to Dr Derik Anaya ( [id number] [place] ,  , fax [phone])      DISCHARGE MEDICATIONS:   See Medication Reconciliation Form    · It is important that you take the medication exactly as they are prescribed. · Keep your medication in the bottles provided by the pharmacist and keep a list of the medication names, dosages, and times to be taken in your wallet. · Do not take other medications without consulting your doctor.        NOTIFY YOUR PHYSICIAN FOR ANY OF THE FOLLOWING:   Fever over 101 degrees for 24 hours. Chest pain, shortness of breath, fever, chills, nausea, vomiting, diarrhea, change in mentation, falling, weakness, bleeding. Severe pain or pain not relieved by medications. Or, any other signs or symptoms that you may have questions about.         Signed:   Julianna Craft DO  1/4/2022  10:59 AM

## 2022-01-04 NOTE — PROGRESS NOTES
Sutures removed. Incision is C/D/I. Almost fully healed. Can be open to air. Follow up with Dr. Laz Mcfarlane when d/c'd from [facility].

## 2022-01-04 NOTE — DISCHARGE SUMMARY
Discharge Summary       PATIENT ID: Clarke Gatica  MRN: [id number]   YOB: 1950    DATE OF ADMISSION: 12/27/2021 11:06 AM    DATE OF DISCHARGE: 1/4/2021  PRIMARY CARE PROVIDER: Fabien Garnica MD     ATTENDING PHYSICIAN: Jayesh Hernández DO   DISCHARGING PROVIDER: Jayesh Hernández DO    To contact this individual call [phone] and ask the  to page. If unavailable ask to be transferred the Adult Hospitalist Department. CONSULTATIONS: IP CONSULT TO HOSPITALIST  IP CONSULT TO INFECTIOUS DISEASES  IP CONSULT TO UROLOGY  ED CONSULT TO SENIOR SERVICES NURSE PRACTITIONER  IP CONSULT TO ORTHOPEDIC SURGERY    PROCEDURES/SURGERIES: * No surgery found *    ADMITTING DIAGNOSES & HOSPITAL COURSE:   Admission History:  \"67 y. o. female with complex medical history of back surgery in June 2021, followed by infectionmanaged by primary orthopedic Dr. Jadyn George for the same, with hospitalization at [facility] for about a month early November through early December for right psoas abscess with IR guided abscess drainage, who was discharged to [facility] with PICC line for prolonged antibiotics with IV vancomycin through 1/27/2022 per ID based on the culture results. For about a week, patient has noticed bulge and discomfort in right lower quadrant.  Yesterday she showed that to nursing team and subsequent to that patient was sent for CT abdomen pelvis assessment here at [address] [place] [address].  Patient was noted to have considerable CT findings with persistent unchanged right psoas abscess, right perinephric stranding along with hydronephrosis, hypokalemia, mild early MERCEDES with elevated creatinine at 1.15 compared to previous baseline of 0.50.6. [facility] hospitalist team has been consulted to admit the patient for further management.  Patient denied any fever or chills.  Patient denied any chest pain, shortness of breath, palpitation.  Patient denied any leg swelling worsening.  Patient denied any urinary trouble or diarrhea or loose stools.  No constipation.  Patient denied any focal weakness, tingling, numbness.  Denied any worsening of back pain or drainage from the back. \"    CT abd/pelvis without contrast:  IMPRESSION  1. Very small right pleural effusion. 2. Right perinephric stranding and proximal right hydronephrosis. 3. Interval removal of right lower quadrant percutaneous catheter. Persistent  inflammatory stranding and 3 cm x 1.6 cm right lower quadrant fluid collection,  unchanged. DISCHARGE DIAGNOSES / PLAN:      History Right psoas abscess needing extensive drainage at [facility]  Hx of Streptococcus anginosus epidural/iliopsoas abscess on recent hospitalization 1112/2021, on IV vancomycin to be continued per ID until 1/27/2022  -CT on admission with right perinephric stranding and proximal right hydronephrosis, persistent inflammatory stranding 3 cm x 1.6 cm right lower quadrant fluid collection, unchanged  -persistent collection not able to be drained  -ID consulted. Plans to continue vancomycin   -tylenol for pain. Denies pain on discharge       Right perinephric stranding with right hydronephrosis:  -urology consulted. Could consider stenting if renal function declines  -consider repeat imagining if clinically needed   -urology signed off, can be reconsulted as needed     Acute renal insufficiency: resolved     Recent celiac artery thrombosis with splenic infarcts, on Eliquis, diagnosed on 11/9/2021 on CT A/P.    -No vascular surgical intervention needed at the time.    -continue Eliquis for 6 months     #DM2- SSI.  Did not require long acting insulin during hospitalization   #Bilateral DM related polyneuropathybilateral feet  #Hx of SVT  #HTN- metoprolol, amlodipine   #HLD  #Hypothyroidism- home levothyroxine  #Depression/anxiety- home paroxetine    Suture removal prior to discharge        ADDITIONAL CARE RECOMMENDATIONS:     Please follow medication list    DIET: Diabetic Diet    ACTIVITY: Activity as tolerated and See surgical instructions    WOUND CARE: PICC line care    EQUIPMENT needed: PICC     Antibiotics: Next dose 1/5/2021 1700  Vancomycin 1250 mg q36 hours. Needs dosing adjusted based on levels at discharging facility as well  Please send Weekly labs cbc wt diff, cmp, vancomycin trough, esr, crp to Dr Gallito Hartley ( [address] [place] , ph , fax [phone])        PENDING TEST RESULTS:   At the time of discharge the following test results are still pending: none    FOLLOW UP APPOINTMENTS:    Follow-up Information     Follow up With Specialties Details Why Evelio WEAVER [facility]   [address] [place] [address] [address]   [address] [id number] [address] [facility], [address] [place] [address] [phone]             DISCHARGE MEDICATIONS:  Current Discharge Medication List      START taking these medications    Details   !! acetaminophen (TYLENOL) 325 mg tablet Take 2 Tablets by mouth every six (6) hours as needed for Pain for up to 5 days. Qty: 30 Tablet, Refills: 0  Start date: 1/4/2022, End date: 1/9/2022      amLODIPine (NORVASC) 10 mg tablet Take 1 Tablet by mouth daily. Qty: 30 Tablet, Refills: 0  Start date: 1/5/2022      insulin lispro (HUMALOG) 100 unit/mL injection AC (before meals), Q6H, and Q4H CORRECTIONAL SCALE only For Blood Sugar (mg/dl) of :             180-199=2 units            200-249=3 units  250-299=5 units  300-349=7 units  350 or greater = Call MD  Give in addition to basal medications. Do Not Hold for NPO    BEDTIME CORRECTIONAL sliding scale when scheduled:  200-249=2 units  250-299=3 units   300-349=4 units  350 or greater = Call MD  Give in addition to basal medications. Do Not Hold for NPO Fast Acting - Administer Immediately - or within 15 minutes of start of meal, if mealtime coverage. Qty: 1 Each, Refills: 0  Start date: 1/4/2022       !! - Potential duplicate medications found.  Please discuss with provider. CONTINUE these medications which have NOT CHANGED    Details   nystatin (MYCOSTATIN) 100,000 unit/gram ointment Apply  to affected area three (3) times daily. Qty: 15 g, Refills: 0      apixaban (ELIQUIS) 5 mg tablet Take 1 Tablet by mouth every twelve (12) hours for 30 days. Qty: 60 Tablet, Refills: 0      ALPRAZolam (Xanax) 0.5 mg tablet Take 1 Tablet by mouth two (2) times daily as needed for Anxiety. Max Daily Amount: 1 mg. Qty: 10 Tablet, Refills: 0    Associated Diagnoses: Anxiety      !! acetaminophen (TYLENOL) 325 mg tablet Take 2 Tablets by mouth every six (6) hours as needed for Pain. Qty: 30 Tablet, Refills: 0      L.acid,para-B. bifidum-S.therm (RISAQUAD) 8 billion cell cap cap Take 1 Capsule by mouth daily. Qty: 30 Capsule, Refills: 0      magnesium hydroxide (MILK OF MAGNESIA) 400 mg/5 mL suspension Take 30 mL by mouth daily. Qty: 1 Bottle, Refills: 0      miconazole (MICOTIN) 2 % topical powder Apply  to affected area two (2) times a day. Qty: 1 Bottle, Refills: 0      polyethylene glycol (MIRALAX) 17 gram packet Take 1 Packet by mouth daily. Qty: 30 Packet, Refills: 0      senna (SENOKOT) 8.6 mg tablet Take 2 Tablets by mouth daily. Qty: 60 Tablet, Refills: 0      amitriptyline (ELAVIL) 50 mg tablet Take 50 mg by mouth nightly. atorvastatin (LIPITOR) 40 mg tablet Take 40 mg by mouth nightly. metFORMIN (GLUCOPHAGE) 500 mg tablet Take 500 mg by mouth two (2) times daily (with meals). aspirin delayed-release 81 mg tablet Take 81 mg by mouth daily. levothyroxine (SYNTHROID) 75 mcg tablet Take 112 mcg by mouth Daily (before breakfast). PARoxetine (PAXIL) 40 mg tablet Take 40 mg by mouth every morning. !! metoprolol tartrate (LOPRESSOR) 25 mg tablet Take 12.5 mg by mouth nightly. !! metoprolol (LOPRESSOR) 25 mg tablet Take 25 mg by mouth every morning. omeprazole (PRILOSEC) 40 mg capsule Take 40 mg by mouth every morning.       naloxone [facility]) 4 mg/actuation nasal spray Use 1 spray intranasally, then discard. Repeat with new spray every 2 min as needed for opioid overdose symptoms, alternating nostrils. Qty: 1 Each, Refills: 0      butalb/acetaminophen/caffeine (FIORICET PO) Take 1 Tablet by mouth as needed. !! - Potential duplicate medications found. Please discuss with provider. STOP taking these medications       insulin glargine (LANTUS) 100 unit/mL injection Comments:   Reason for Stopping:         vancomycin/0.9 % sod chloride (vancomycin in 0.9% Sodium Cl) 1.25 gram/250 mL soln Comments:   Reason for Stopping:         heparin sodium,porcine (heparin, porcine,) 5,000 unit/mL injection Comments:   Reason for Stopping:                 NOTIFY YOUR PHYSICIAN FOR ANY OF THE FOLLOWING:   Fever over 101 degrees for 24 hours. Chest pain, shortness of breath, fever, chills, nausea, vomiting, diarrhea, change in mentation, falling, weakness, bleeding. Severe pain or pain not relieved by medications. Or, any other signs or symptoms that you may have questions about. DISPOSITION:  x  Home With:   OT  PT  HH  RN       Long term SNF/Inpatient Rehab    Independent/assisted living    Hospice    Other:       PATIENT CONDITION AT DISCHARGE:     Functional status    Poor    x Deconditioned     Independent      Cognition   xx  Lucid     Forgetful     Dementia      Catheters/lines (plus indication)    Rea    x PICC     PEG     None      Code status   x  Full code     DNR      PHYSICAL EXAMINATION AT DISCHARGE:  Constitutional:  No acute distress, cooperative, pleasant    ENT:  Oral mucosa moist, oropharynx benign. Resp:  CTA bilaterally. No wheezing/rhonchi/rales.  No accessory muscle use   CV:  Regular rhythm, normal rate, no murmurs, gallops, rubs    GI:  Soft, right lower quadrant non tender to palpation, non distended, normoactive bowel sounds, no hepatosplenomegaly     Musculoskeletal:  lower lumbar/sacral region with intact stables, no erythema     Neurologic:  Moves all extremities                                   CHRONIC MEDICAL DIAGNOSES:  Problem List as of 1/4/2022 Date Reviewed: 12/21/2021          Codes Class Noted - Resolved    Abdominal pain ICD-10-CM: R10.9  ICD-9-CM: 789.00  12/27/2021 - Present        Postprocedural intraabdominal abscess ICD-10-CM: T81.43XA  ICD-9-CM: 998.59  12/27/2021 - Present        Leukocytosis ICD-10-CM: D72.829  ICD-9-CM: 288.60  11/6/2021 - Present        Candida vaginitis ICD-10-CM: B37.3  ICD-9-CM: 112.1  11/6/2021 - Present        Hyperglycemia ICD-10-CM: R73.9  ICD-9-CM: 790.29  11/6/2021 - Present        SIRS (systemic inflammatory response syndrome) ([facility] ICD-10-CM: R65.10  ICD-9-CM: 995.90  11/6/2021 - Present        MERCEDES (acute kidney injury) ([facility] ICD-10-CM: N17.9  ICD-9-CM: 584.9  11/6/2021 - Present        Severe sepsis ([facility] ICD-10-CM: A41.9, R65.20  ICD-9-CM: 038.9, 995.92  11/6/2021 - Present        Right hip pain ICD-10-CM: M25.551  ICD-9-CM: 719.45  7/8/2021 - Present        Lumbar stenosis with neurogenic claudication ICD-10-CM: M48.062  ICD-9-CM: 724.03  4/3/2017 - Present        Rectal polyp ICD-10-CM: K62.1  ICD-9-CM: 569.0  4/6/2016 - Present        Morbid obesity ([facility] (Chronic) ICD-10-CM: E66.01  ICD-9-CM: 278.01  2/12/2014 - Present        Right knee DJD ICD-10-CM: M17.11  ICD-9-CM: 715.96  2/11/2014 - Present        Left knee DJD (Chronic) ICD-10-CM: M17.12  ICD-9-CM: 715.96  9/3/2013 - Present              Greater than 30 minutes were spent with the patient on counseling and coordination of care    Signed:   [address] [facility]   1/4/2022  11:07 AM

## 2022-01-04 NOTE — PROGRESS NOTES
Pharmacist Note - Vancomycin Dosing    Indication: Streptococcus anginosus epidural/right iliopsoas abcess  Current regimen: 1.25 gm q36h    Recent Labs     01/03/22  0427 01/02/22  0625   WBC 10.9  --    CREA 0.98 0.94   BUN 14 12       A trough vancomycin level of 11.4 mcg/mL was obtained 36 hrs after the previous dose      Goal target range ~15 mcg/mL for corresponding AUC/GRISELDA 400-600      Plan: Change to 1500 mg q36h . Pharmacy will continue to monitor this patient daily for changes in clinical status and renal function.     Jermaine Heredia, PharmD

## 2022-01-04 NOTE — PROGRESS NOTES
Report was called to Alfredo at [facility] and [address] [place] [address]. Time for questions and clarification was give. Review of SBAR and MAR was done. Patient waiting to be picked up by [facility] ambulance.

## 2022-01-04 NOTE — PROGRESS NOTES
Transition of Care Plan   RUR- High 24%     DISPOSITION: The disposition plan is  [facility]   o Call report: [phone]   F/U with PCP/Specialist     Transport: [facility] 1:30pm     Transition of Care Plan to SNF/Rehab    SNF/Rehab Transition:  Patient has been accepted to  and meets criteria for admission. Patient will transported by [facility] and expected to leave at 1:30pm.    Communication to Patient/Family:  Met with patient they are agreeable to the transition plan. Communication to SNF/Rehab:  Bedside RN, Debra Leonardo, has been notified to update the transition plan to the facility and call report (phone number *[phone]. Discharge information has been updated on the AVS.    Nursing Please include all hard scripts for controlled substances, med rec and dc summary, and AVS in packet. Reviewed and confirmed with facility,, can manage the patient care needs for the following:     Beka Cm with (X) only those applicable:    Medication:  [x]  Medications will be available at the facility  []  IV Antibiotics   []  Controlled Substance - hard copy to be sent with patient   []  Weekly Labs   Documents:  [x] Hard RX  [x] MAR  [x] Kardex  [x] AVS  [x]Transfer Summary  [x]Discharge   Equipment:  []  CPAP/BiPAP  []  Wound Vacuum  []  Rea or Urinary Device  []  PICC/Central Line  []  Nebulizer  []  Ventilator   Treatment:  []Isolation (for MRSA, VRE, etc.)  []Surgical Drain Management  []Tracheostomy Care  []Dressing Changes  []Dialysis with transportation and chair time   []PEG Care  []Oxygen  []Daily Weights for Heart Failure   Dietary:  []Any diet limitations  []Tube Feedings   []Total Parenteral Management (TPN)   Eligible for Medicaid Long Term Services and Supports  Yes:  [] Eligible for medical assistance or will become eligible within 180 days and UAI completed. [] Provider/Patient and/or support system has requested screening.   [] UAI copy provided to patient or responsible party,  [] UAI unavailable at discharge will send once processed to SNF provider. [] UAI unavailable at discharged mailed to patient  No:   [x] Private pay and is not financially eligible for Medicaid within the next 180 days. [] Reside out-of-state. [] A residents of a state owned/operated facility that is licensed  by [address] [facility] or [facility]  [] Enrollment in [facility] hospice services  [] [address] [place] [address]  [] Patient /Family declines to have screening completed or provide financial information for screening     Financial Resources:  Medicaid    [] Initiated and application pending   [] Full coverage     Advanced Care Plan:  []Surrogate Decision Maker of Care  []POA  [x]Communicated Code Status  ( \"Full\")    Other     Medicare pt has received, reviewed, and signed 2nd IM letter informing them of their right to appeal the discharge. Signed copy has been placed on pt bedside chart.     CM: [address]. MSW,   [phone]

## 2022-01-04 NOTE — PROGRESS NOTES
[address] [facility] Adult  Hospitalist Group                                                                                          Hospitalist Progress Note  [address] [facility],   Answering service: [phone] from in house phone        Date of Service:  2022  NAME:  Josie Nicolas  :  1950  MRN:  [id number]      Admission Summary:   66 y. o. female with complex medical history of back surgery in 2021, followed by infectionmanaged by primary orthopedic Dr. Kaylan Reees for the same, with hospitalization at [facility] for about a month early November through early December for right psoas abscess with IR guided abscess drainage, who was discharged to [facility] with PICC line for prolonged antibiotics with IV vancomycin through 2022 per ID based on the culture results. For about a week, patient has noticed bulge and discomfort in right lower quadrant.  Yesterday she showed that to nursing team and subsequent to that patient was sent for CT abdomen pelvis assessment here at [facility].  Patient was noted to have considerable CT findings with persistent unchanged right psoas abscess, right perinephric stranding along with hydronephrosis, hypokalemia, mild early MERCEDES with elevated creatinine at 1.15 compared to previous baseline of 0.50.6. [facility] hospitalist team has been consulted to admit the patient for further management. Patient denied any fever or chills.  Patient denied any chest pain, shortness of breath, palpitation.  Patient denied any leg swelling worsening.  Patient denied any urinary trouble or diarrhea or loose stools.  No constipation.  Patient denied any focal weakness, tingling, numbness.  Denied any worsening of back pain or drainage from the back. Interval history / Subjective: Follow up abscess. Patient seen and examined. No acute events. In good spirits.  Inquiring about suture removal.      Assessment & Plan:     History Right psoas abscess needing extensive drainage at [facility]  Hx of Streptococcus anginosus epidural/iliopsoas abscess on recent hospitalization 1112/2021, on IV vancomycin to be continued per ID until 1/27/2022  -CT on admission with right perinephric stranding and proximal right hydronephrosis, persistent inflammatory stranding 3 cm x 1.6 cm right lower quadrant fluid collection, unchanged  -persistent collection not able to be drained  -ID consulted. Plans to continue vancomycin   -tylenol for pain. Okay for low dose oxycodone    Right perinephric stranding with right hydronephrosis:  -urology consulted. Could consider stenting if renal function declines  -consider repeat imagining if clinically needed   -urology signed off, can be reconsulted as needed    Acute renal insufficiency: resolved    Recent celiac artery thrombosis with splenic infarcts, on Eliquis, diagnosed on 11/9/2021 on CT A/P.    -No vascular surgical intervention needed at the time.    -continue Eliquis for 6 months    #DM2- SSI  #Bilateral DM related polyneuropathybilateral feet  #Hx of SVT  #HTN- metoprolol, amlodipine   #HLD  #Hypothyroidism- home levothyroxine  #Depression/anxiety- home paroxetine    Code status: full   DVT prophylaxis: [address] discussed with: Patient/Family  Anticipated Disposition: SNF/LTC  Anticipated Discharge:  Medically stable pending rehab placement      Hospital Problems  Date Reviewed: 12/21/2021          Codes Class Noted POA    Abdominal pain ICD-10-CM: R10.9  ICD-9-CM: 789.00  12/27/2021 Unknown        Postprocedural intraabdominal abscess ICD-10-CM: T81.43XA  ICD-9-CM: 998.59  12/27/2021 Unknown        MERCEDES (acute kidney injury) ([facility] [address] ICD-10-CM: N17.9  ICD-9-CM: 584.9  11/6/2021 Unknown                Review of Systems:   Negative unless stated above      Vital Signs:    Last 24hrs VS reviewed since prior progress note.  Most recent are:  Visit Vitals  BP (!) 134/54 (BP 1 Location: Right arm, BP Patient Position: At rest;Lying)   Pulse 71   Temp 97.9 °F (36.6 °C) Resp 16   Ht 5' 4\" (1.626 m)   Wt 120.2 kg (264 lb 15.9 oz)   SpO2 96%   BMI 45.49 kg/m²       No intake or output data in the 24 hours ending 01/04/22 1023     Physical Examination:     I had a face to face encounter with this patient and independently examined them on 1/4/2022 as outlined below:          Constitutional:  No acute distress, cooperative, pleasant    ENT:  Oral mucosa moist, oropharynx benign. Resp:  CTA bilaterally. No wheezing/rhonchi/rales. No accessory muscle use   CV:  Regular rhythm, normal rate, no murmurs, gallops, rubs    GI:  Soft, right lower quadrant non tender to palpation, non distended, normoactive bowel sounds, no hepatosplenomegaly     Musculoskeletal:  lower lumbar/sacral region with intact stables, no erythema     Neurologic:  Moves all extremities            Data Review:    Review and/or order of clinical lab test  Review and/or order of tests in the radiology section of CPT  Review and/or order of tests in the medicine section of CPT      Labs:     Recent Labs     01/03/22 0427   WBC 10.9   HGB 9.5*   HCT 31.4*   *     Recent Labs     01/03/22  0427 01/02/22  0625    139   K 4.0 3.4*    105   CO2 29 29   BUN 14 12   CREA 0.98 0.94   * 117*   CA 9.2 9.0   MG  --  1.6   PHOS  --  3.2     Recent Labs     01/03/22  0427   ALT 23   *   TBILI 0.4   TP 6.3*   ALB 2.5*   GLOB 3.8     No results for input(s): INR, PTP, APTT, INREXT, INREXT in the last 72 hours. No results for input(s): FE, TIBC, PSAT, FERR in the last 72 hours. Lab Results   Component Value Date/Time    Folate 17.4 11/29/2021 02:49 AM      No results for input(s): PH, PCO2, PO2 in the last 72 hours. No results for input(s): CPK, CKNDX, TROIQ in the last 72 hours.     No lab exists for component: CPKMB  Lab Results   Component Value Date/Time    Cholesterol, total 232 (H) 07/13/2010 12:05 PM    HDL Cholesterol 38 07/13/2010 12:05 PM    LDL, calculated 164 (H) 07/13/2010 12:05 PM Triglyceride 150 (H) 07/13/2010 12:05 PM    CHOL/HDL Ratio 6.1 (H) 07/13/2010 12:05 PM     Lab Results   Component Value Date/Time    Glucose (POC) 121 (H) 01/04/2022 06:34 AM    Glucose (POC) 166 (H) 01/03/2022 09:48 PM    Glucose (POC) 101 01/03/2022 05:53 PM    Glucose (POC) 127 (H) 01/03/2022 11:37 AM    Glucose (POC) 116 01/03/2022 06:57 AM     Lab Results   Component Value Date/Time    Color YELLOW/STRAW 12/28/2021 04:07 PM    Appearance CLEAR 12/28/2021 04:07 PM    Specific gravity 1.009 12/28/2021 04:07 PM    Specific gravity 1.010 04/06/2011 10:20 AM    pH (UA) 7.5 12/28/2021 04:07 PM    Protein Negative 12/28/2021 04:07 PM    Glucose Negative 12/28/2021 04:07 PM    Ketone Negative 12/28/2021 04:07 PM    Bilirubin Negative 12/28/2021 04:07 PM    Urobilinogen 0.2 12/28/2021 04:07 PM    Nitrites Negative 12/28/2021 04:07 PM    Leukocyte Esterase Negative 12/28/2021 04:07 PM    Epithelial cells FEW 12/28/2021 04:07 PM    Bacteria Negative 12/28/2021 04:07 PM    WBC 0-4 12/28/2021 04:07 PM    RBC 20-50 12/28/2021 04:07 PM         Medications Reviewed:     Current Facility-Administered Medications   Medication Dose Route Frequency    magnesium oxide (MAG-OX) tablet 400 mg  400 mg Oral DAILY    vancomycin (VANCOCIN) 1,250 mg in 0.9% sodium chloride 250 mL (VIAL-MATE)  1,250 mg IntraVENous Q36H    oxyCODONE IR (ROXICODONE) tablet 5 mg  5 mg Oral BID PRN    hydrALAZINE (APRESOLINE) 20 mg/mL injection 10 mg  10 mg IntraVENous Q6H PRN    amLODIPine (NORVASC) tablet 10 mg  10 mg Oral DAILY    influenza vaccine 2021-22 (6 mos+)(PF) (FLUARIX/FLULAVAL/FLUZONE QUAD) injection 0.5 mL  1 Each IntraMUSCular PRIOR TO DISCHARGE    sodium chloride (NS) flush 5-40 mL  5-40 mL IntraVENous Q8H    sodium chloride (NS) flush 5-40 mL  5-40 mL IntraVENous PRN    acetaminophen (TYLENOL) tablet 650 mg  650 mg Oral Q6H PRN    Or    acetaminophen (TYLENOL) suppository 650 mg  650 mg Rectal Q6H PRN    polyethylene glycol (MIRALAX) packet 17 g  17 g Oral DAILY PRN    ondansetron (ZOFRAN ODT) tablet 4 mg  4 mg Oral Q8H PRN    Or    ondansetron (ZOFRAN) injection 4 mg  4 mg IntraVENous Q6H PRN    Vancomycin - pharmacy to dose   Other Rx Dosing/Monitoring    glucose chewable tablet 16 g  4 Tablet Oral PRN    dextrose (D50W) injection syrg 12.5-25 g  25-50 mL IntraVENous PRN    glucagon (GLUCAGEN) injection 1 mg  1 mg IntraMUSCular PRN    insulin lispro (HUMALOG) injection   SubCUTAneous AC&HS    ALPRAZolam (XANAX) tablet 0.5 mg  0.5 mg Oral BID PRN    amitriptyline (ELAVIL) tablet 50 mg  50 mg Oral QHS    apixaban (ELIQUIS) tablet 5 mg  5 mg Oral Q12H    aspirin delayed-release tablet 81 mg  81 mg Oral DAILY    atorvastatin (LIPITOR) tablet 40 mg  40 mg Oral QHS    levothyroxine (SYNTHROID) tablet 112 mcg  112 mcg Oral ACB    L.acidophilus-paracasei-S.thermophil-bifidobacter (RISAQUAD) 8 billion cell capsule  1 Capsule Oral DAILY    metoprolol tartrate (LOPRESSOR) tablet 25 mg  25 mg Oral 7am    metoprolol tartrate (LOPRESSOR) tablet 12.5 mg  12.5 mg Oral QHS    miconazole (MICOTIN) 2 % powder   Topical BID    pantoprazole (PROTONIX) tablet 40 mg  40 mg Oral ACB    PARoxetine (PAXIL) tablet 40 mg  40 mg Oral 7am    polyethylene glycol (MIRALAX) packet 17 g  17 g Oral DAILY    senna (SENOKOT) tablet 17.2 mg  2 Tablet Oral DAILY     ______________________________________________________________________  EXPECTED LENGTH OF STAY: 5d 0h  ACTUAL LENGTH OF STAY:          [address] ELIDA Alfaro DO

## 2022-01-11 DIAGNOSIS — R10.9 ACUTE ABDOMINAL PAIN: Primary | ICD-10-CM

## 2022-01-11 DIAGNOSIS — T81.43XS: ICD-10-CM

## 2022-01-11 NOTE — PROGRESS NOTES
Resident is being followed by [facility] after recent hospitilization for post procedural abscess. She subsequently complained of abdominal pain and was sent to ER and readmitted to [facility] until 01/04/2022. She returned to [facility], initially reporting improved abdominal pain , but over last 2 days 1/10/2022 and today - she reports worsening. No protrusion, there is tenderness over right lower quadrant. No associated diarrhea, fever, constipation, mild tenderness with palpation. Patient very anxious. Reviewed current vanc trough and symptoms with infectious disease. Will order STAT CT Abdomen Pelvis with contrast. Has las pending from this mornings draw at [facility]. Have educated patient about orders. Has follow up with ID tomorrow 01/12/2022.

## 2022-01-12 ENCOUNTER — OFFICE VISIT (OUTPATIENT)
Dept: INFECTIOUS DISEASES | Age: 72
End: 2022-01-12
Payer: MEDICARE

## 2022-01-12 VITALS
SYSTOLIC BLOOD PRESSURE: 90 MMHG | RESPIRATION RATE: 18 BRPM | BODY MASS INDEX: 42.2 KG/M2 | HEIGHT: 64 IN | WEIGHT: 247.2 LBS | TEMPERATURE: 97.2 F | DIASTOLIC BLOOD PRESSURE: 62 MMHG

## 2022-01-12 DIAGNOSIS — M46.26 INFECTION OF LUMBAR SPINE (HCC): ICD-10-CM

## 2022-01-12 DIAGNOSIS — K68.12 PSOAS ABSCESS (HCC): Primary | ICD-10-CM

## 2022-01-12 PROCEDURE — 99213 OFFICE O/P EST LOW 20 MIN: CPT | Performed by: STUDENT IN AN ORGANIZED HEALTH CARE EDUCATION/TRAINING PROGRAM

## 2022-01-12 PROCEDURE — 1111F DSCHRG MED/CURRENT MED MERGE: CPT | Performed by: STUDENT IN AN ORGANIZED HEALTH CARE EDUCATION/TRAINING PROGRAM

## 2022-01-12 PROCEDURE — 1090F PRES/ABSN URINE INCON ASSESS: CPT | Performed by: STUDENT IN AN ORGANIZED HEALTH CARE EDUCATION/TRAINING PROGRAM

## 2022-01-12 NOTE — PROGRESS NOTES
Chief Complaint   Patient presents with   [facility] Follow Up       1. Have you been to the ER, urgent care clinic since your last visit? Hospitalized since your last visit? Yes When: [facility] 2 weeks ago     2. Have you seen or consulted any other health care providers outside of the [address] [place] [address] since your last visit? Include any pap smears or colon screening.  No    Visit Vitals  BP 90/62 (BP 1 Location: Right arm, BP Patient Position: Sitting)   Temp 97.2 °F (36.2 °C) (Oral)   Resp 18   Ht 5' 4\" (1.626 m)   Wt 247 lb 3.2 oz (112.1 kg)   BMI 42.43 kg/m²

## 2022-01-13 ENCOUNTER — HOSPITAL ENCOUNTER (OUTPATIENT)
Dept: CT IMAGING | Age: 72
Discharge: HOME | End: 2022-01-13
Attending: NURSE PRACTITIONER
Payer: MEDICARE

## 2022-01-13 DIAGNOSIS — T81.43XS: ICD-10-CM

## 2022-01-13 DIAGNOSIS — R10.9 ACUTE ABDOMINAL PAIN: ICD-10-CM

## 2022-01-13 PROCEDURE — 74177 CT ABD & PELVIS W/CONTRAST: CPT

## 2022-01-13 PROCEDURE — 74011000636 HC RX REV CODE- 636: Performed by: NURSE PRACTITIONER

## 2022-01-13 RX ORDER — BARIUM SULFATE 20 MG/ML
900 SUSPENSION ORAL
Status: DISCONTINUED | OUTPATIENT
Start: 2022-01-13 | End: 2022-01-13 | Stop reason: SDUPTHER

## 2022-01-13 RX ADMIN — IOPAMIDOL 100 ML: 755 INJECTION, SOLUTION INTRAVENOUS at 09:57

## 2022-01-13 RX ADMIN — IOHEXOL 50 ML: 240 INJECTION, SOLUTION INTRATHECAL; INTRAVASCULAR; INTRAVENOUS; ORAL at 09:58

## 2022-01-13 NOTE — PROGRESS NOTES
Reviewed by me and also reviewed results with infectious disease Dr. Sonido Conde. Okay to continue vancomycin as planned - current stop date of 01/27/2022. Will schedule appointment with urology for hydronphrosis/ureter. Educated patient about results and recommendations. She is agreeable to plan of care.

## 2022-01-26 NOTE — PROGRESS NOTES
Infectious Disease Clinic Note       Reason for visit: Follow up for  Retroperitoneal abscess, history of lumbar fusion and currently presumed lumbar infection in the setting of prolonged lumbar wound. HPI:  Hospital admission:      68-year-old lady with a history of diabetes, obesity, hypothyroidism, squamous cell carcinoma of tonsils removed and is status post radiation per chart in 2015, status post lumbar fusion with revision laminectomy in April 2017, revision laminectomy in June 2021, hx of retroperitoneal abscess with likely involvement of the lumbar spine . She underwent CT-guided aspiration of abdominal abscess on 11/12/21 with 200 cc of purulent fluid that was removed and cultures positive for Streptococcus anginosis. She subsequently had drains that was removed and another CT-guided aspiration of posterior paraspinal abscess on 11/26/2021 with cultures positive for alpha Streptococcus. On 12/2/2021 she underwent I&D of a superficial and deep lumbar wound with cultures positive for MRSE from the thio broth. Patient was recommended to have IV vancomycin with a planned end date of 1/27/2022. She presented to [address] [place] [address] from rehab wt concerns for RLQ abd pain and distention. CT on 12/27/21 with persistent stranding 3 by 1.6 cm RLQ fluid collection, unchanged from 12/4/21. CT also with R perinephric stranding and R hydronephrosis on 12/27/21 but patinet denied any dysuria, back pain symptoms. Since no major change (worsening) seen in psoas abscess size, vancomycin was recommended to continue until the prior planned end date of 1/27/22. Events since discharge:  No acute events. Subjective:   Patient seen in clinic today. Patient is currently at [facility] rehab for her IV abx. Patient reports she is doing much better overall. She is reporting some ongoing RLQ abdominal pain, which is improved from her [address] [place] includes the [facility] admission, however, is ongoing. Not worsening, not constant, no n/v.  No fevers at the rehab. Objective:    Vitals:         Physical Exam:  Gen: No apparent distress  HEENT:  Normocephalic, atraumatic, no scleral icterus  CV: HDS  Lungs: room air   Abdomen: soft,  non distended, sore in RLQ, but no fluctuance/mass felt  Genitourinary:   no mittal catheter   Skin: no rash   Psych: good affect, good eye contact, non tearful  Neuro: alert, oriented to time,  place, and situation, moves all extremities to commands, verbal  Musculoskeletal:  No joint edema, erythema or tenderness noted   Lines: PICC        Labs:  Reviewed in paper records. Will be scanned into system. Assessment:      66-year-old lady with a history of diabetes, obesity, hypothyroidism, squamous cell carcinoma of tonsils removed and is status post radiation per chart in 2015, status post lumbar fusion with revision laminectomy in April 2017, revision laminectomy in June 2021, hx of retroperitoneal abscess with likely involvement of the lumbar spine . She underwent CT-guided aspiration of abdominal abscess on 11/12/21 with 200 cc of purulent fluid that was removed and cultures positive for Streptococcus anginosis. She subsequently had drains that was removed and another CT-guided aspiration of posterior paraspinal abscess on 11/26/2021 with cultures positive for alpha Streptococcus. On 12/2/2021 she underwent I&D of a superficial and deep lumbar wound with cultures positive for MRSE from the thio broth. Patient was recommended to have IV vancomycin with a planned end date of 1/27/2022. She presented to [facility] from rehab wt concerns for RLQ abd pain and distention. CT on 12/27/21 with persistent stranding 3 by 1.6 cm RLQ fluid collection, unchanged from 12/4/21. CT also with R perinephric stranding and R hydronephrosis on 12/27/21 but patinet denied any dysuria, back pain symptoms.      Since no major change (worsening) seen in psoas abscess size, vancomycin was recommended to continue until the prior planned end date of 1/27/22. Recommendations:  - Patient is doing much better from ID-standpoint. Will plan to repeat CT abdomen pelvis 1/13/22.   - CT abdomen-pelvis 1/13/22 showed no significant residual fluid collection in the RLQ, but showed persistent right-sided hydronephrosis and ureter, and bladder wall thickening. I have communicated to MsKasandra Alyssa Mahogany personally that vancomycin can stop per plan on 1/27/22. For the hydronephrosis, patient is recommended to have urology evaluation.   - Labs reviewed and well. - Follow up with us in 1 month. Will plan to start patient on doxycycline oral, for suppression for a few months given MRSE from near-spine abscess. Patient in agreement with above. RETURN TO OFFICE: 1 month      Thank you for the opportunity to participate in the care of this patient. Please contact with questions or concerns.       Barbara Bragg MD  Infectious Diseases

## 2022-01-27 DIAGNOSIS — R52 ACUTE PAIN: ICD-10-CM

## 2022-01-27 DIAGNOSIS — F41.1 GENERALIZED ANXIETY DISORDER: Primary | ICD-10-CM

## 2022-01-27 DIAGNOSIS — K68.12 PSOAS ABSCESS, RIGHT (HCC): Primary | ICD-10-CM

## 2022-01-27 DIAGNOSIS — F41.1 GENERALIZED ANXIETY DISORDER: ICD-10-CM

## 2022-01-27 DIAGNOSIS — R10.11 RIGHT UPPER QUADRANT ABDOMINAL PAIN: ICD-10-CM

## 2022-01-27 RX ORDER — CLONAZEPAM 0.5 MG/1
0.5 TABLET ORAL 2 TIMES DAILY
Qty: 60 TABLET | Refills: 0 | Status: SHIPPED | OUTPATIENT
Start: 2022-01-27 | End: 2022-01-27 | Stop reason: DRUGHIGH

## 2022-01-27 RX ORDER — CLONAZEPAM 0.5 MG/1
0.25 TABLET ORAL 2 TIMES DAILY
Qty: 30 TABLET | Refills: 0 | Status: SHIPPED | OUTPATIENT
Start: 2022-01-27

## 2022-01-27 RX ORDER — NALOXONE HYDROCHLORIDE 4 MG/.1ML
SPRAY NASAL
Qty: 1 EACH | Refills: 0 | Status: SHIPPED | OUTPATIENT
Start: 2022-01-27 | End: 2022-07-06 | Stop reason: ALTCHOICE

## 2022-01-27 RX ORDER — OXYCODONE HYDROCHLORIDE 5 MG/1
5 TABLET ORAL
Qty: 20 TABLET | Refills: 0 | Status: SHIPPED | OUTPATIENT
Start: 2022-01-27 | End: 2022-02-01

## 2022-01-27 NOTE — PROGRESS NOTES
Resident followed by [address] [place] [address] Street at [facility] after hospitalization for right psoas abscess and abdominal pain. She was seen by in house psychiatry who scheduled her on klonipin twice daily due to severe anxiety. She understands she will need to follow up with PCP for refills. Also utilizing intermittently oxycodone for abdominal pain, but has greatly declined in use. Educated about interaction of medications. Narcan prescription will be provided. Discharge summary can be seen in [facility] system of point click care.

## 2022-01-27 NOTE — PROGRESS NOTES
Had just sent klonipin 0.5mg tablet PO BID to [facility] pharmacy. This was wrong dose. I spoke with pharmacist to have that prescription cancelled on01/27/2022 at 12:33 pm. Will be escribing the correct dosage klonipin 0.5mg tablet; give 1/2 tablet (0.25mg) PO BID. Updated in system.

## 2022-01-30 RX ORDER — DOXYCYCLINE 100 MG/1
100 TABLET ORAL 2 TIMES DAILY
Qty: 60 TABLET | Refills: 2 | Status: SHIPPED | OUTPATIENT
Start: 2022-01-30 | End: 2022-03-01

## 2022-01-30 NOTE — PATIENT INSTRUCTIONS
Hi,     Please call the patient and inform her that I have sent a script for doxycycline 100mg BID to her [facility] pharmacy. It is for suppression for her infection in the spine she just had. Please ask her to start taking it once the script is filled up. Please advise her to take with meals.      Thank you,     Vivian Croft MD  Infectious Diseases

## 2022-03-18 PROBLEM — D72.829 LEUKOCYTOSIS: Status: ACTIVE | Noted: 2021-11-06

## 2022-03-18 PROBLEM — R73.9 HYPERGLYCEMIA: Status: ACTIVE | Noted: 2021-11-06

## 2022-03-18 PROBLEM — A41.9 SEVERE SEPSIS (HCC): Status: ACTIVE | Noted: 2021-11-06

## 2022-03-18 PROBLEM — R65.10 SIRS (SYSTEMIC INFLAMMATORY RESPONSE SYNDROME) (HCC): Status: ACTIVE | Noted: 2021-11-06

## 2022-03-18 PROBLEM — R65.20 SEVERE SEPSIS (HCC): Status: ACTIVE | Noted: 2021-11-06

## 2022-03-19 PROBLEM — M25.551 RIGHT HIP PAIN: Status: ACTIVE | Noted: 2021-07-08

## 2022-03-19 PROBLEM — T81.43XA POSTPROCEDURAL INTRAABDOMINAL ABSCESS: Status: ACTIVE | Noted: 2021-12-27

## 2022-03-19 PROBLEM — N17.9 AKI (ACUTE KIDNEY INJURY) (HCC): Status: ACTIVE | Noted: 2021-11-06

## 2022-03-19 PROBLEM — M48.062 LUMBAR STENOSIS WITH NEUROGENIC CLAUDICATION: Status: ACTIVE | Noted: 2017-04-03

## 2022-03-19 PROBLEM — R10.9 ABDOMINAL PAIN: Status: ACTIVE | Noted: 2021-12-27

## 2022-03-19 PROBLEM — B37.31 CANDIDA VAGINITIS: Status: ACTIVE | Noted: 2021-11-06

## 2022-03-19 PROBLEM — K65.1 POSTPROCEDURAL INTRAABDOMINAL ABSCESS (HCC): Status: ACTIVE | Noted: 2021-12-27

## 2022-03-21 ENCOUNTER — TELEPHONE (OUTPATIENT)
Dept: FAMILY MEDICINE CLINIC | Age: 72
End: 2022-03-21

## 2022-03-22 NOTE — TELEPHONE ENCOUNTER
Pt is asking for a CT order for kidney and spine         Best number to reach her is [phone]         Documentation

## 2022-04-01 ENCOUNTER — TELEPHONE (OUTPATIENT)
Dept: FAMILY MEDICINE CLINIC | Age: 72
End: 2022-04-01

## 2022-04-01 NOTE — TELEPHONE ENCOUNTER
Patient called, stating she received a call in reference to changing appointment from [facility]    Patient is aware she may not get a return call until Tuesday    Please call mobile # [phone]

## 2022-04-06 ENCOUNTER — VIRTUAL VISIT (OUTPATIENT)
Dept: INFECTIOUS DISEASES | Age: 72
End: 2022-04-06
Payer: MEDICARE

## 2022-04-06 DIAGNOSIS — K68.12 PSOAS ABSCESS (HCC): Primary | ICD-10-CM

## 2022-04-06 DIAGNOSIS — M46.26 INFECTION OF LUMBAR SPINE (HCC): ICD-10-CM

## 2022-04-06 PROCEDURE — G8400 PT W/DXA NO RESULTS DOC: HCPCS | Performed by: STUDENT IN AN ORGANIZED HEALTH CARE EDUCATION/TRAINING PROGRAM

## 2022-04-06 PROCEDURE — 3017F COLORECTAL CA SCREEN DOC REV: CPT | Performed by: STUDENT IN AN ORGANIZED HEALTH CARE EDUCATION/TRAINING PROGRAM

## 2022-04-06 PROCEDURE — 1090F PRES/ABSN URINE INCON ASSESS: CPT | Performed by: STUDENT IN AN ORGANIZED HEALTH CARE EDUCATION/TRAINING PROGRAM

## 2022-04-06 PROCEDURE — G8536 NO DOC ELDER MAL SCRN: HCPCS | Performed by: STUDENT IN AN ORGANIZED HEALTH CARE EDUCATION/TRAINING PROGRAM

## 2022-04-06 PROCEDURE — G8427 DOCREV CUR MEDS BY ELIG CLIN: HCPCS | Performed by: STUDENT IN AN ORGANIZED HEALTH CARE EDUCATION/TRAINING PROGRAM

## 2022-04-06 PROCEDURE — G8417 CALC BMI ABV UP PARAM F/U: HCPCS | Performed by: STUDENT IN AN ORGANIZED HEALTH CARE EDUCATION/TRAINING PROGRAM

## 2022-04-06 PROCEDURE — 1101F PT FALLS ASSESS-DOCD LE1/YR: CPT | Performed by: STUDENT IN AN ORGANIZED HEALTH CARE EDUCATION/TRAINING PROGRAM

## 2022-04-06 PROCEDURE — G8510 SCR DEP NEG, NO PLAN REQD: HCPCS | Performed by: STUDENT IN AN ORGANIZED HEALTH CARE EDUCATION/TRAINING PROGRAM

## 2022-04-06 PROCEDURE — 99214 OFFICE O/P EST MOD 30 MIN: CPT | Performed by: STUDENT IN AN ORGANIZED HEALTH CARE EDUCATION/TRAINING PROGRAM

## 2022-04-06 NOTE — PROGRESS NOTES
Wilman Encarnacion is a 66 y.o. female  Chief Complaint   Patient presents with    Follow-up     2nd visit and wants to know if another CT scan of abdomen   1. Have you been to the ER,  no urgent care clinic since your last visit? no Hospitalized since your last visit?no    2. Have you seen or consulted any other health care providers outside of the [address] [place] [address] since your last visit? no  Include any pap smears or colon screening.  no

## 2022-04-14 ENCOUNTER — TELEPHONE (OUTPATIENT)
Dept: INFECTIOUS DISEASES | Age: 72
End: 2022-04-14

## 2022-04-14 RX ORDER — DOXYCYCLINE 100 MG/1
100 TABLET ORAL 2 TIMES DAILY
Qty: 60 TABLET | Refills: 2 | Status: SHIPPED | OUTPATIENT
Start: 2022-04-14 | End: 2022-05-14

## 2022-04-14 NOTE — PROGRESS NOTES
Infectious Disease Clinic Note       Reason for visit: Follow up for  Retroperitoneal abscess, history of lumbar fusion and currently presumed lumbar infection in the setting of prolonged lumbar wound. HPI:  Hospital admission:      57-year-old lady with a history of diabetes, obesity, hypothyroidism, squamous cell carcinoma of tonsils removed and is status post radiation per chart in 2015, status post lumbar fusion with revision laminectomy in April 2017, revision laminectomy in June 2021, hx of retroperitoneal abscess with likely involvement of the lumbar spine . She underwent CT-guided aspiration of abdominal abscess on 11/12/21 with 200 cc of purulent fluid that was removed and cultures positive for Streptococcus anginosis. She subsequently had drains that was removed and another CT-guided aspiration of posterior paraspinal abscess on 11/26/2021 with cultures positive for alpha Streptococcus. On 12/2/2021 she underwent I&D of a superficial and deep lumbar wound with cultures positive for MRSE from the thio broth. Patient was recommended to have IV vancomycin with a planned end date of 1/27/2022. She presented to [facility] from rehab wt concerns for RLQ abd pain and distention. CT on 12/27/21 with persistent stranding 3 by 1.6 cm RLQ fluid collection, unchanged from 12/4/21. CT also with R perinephric stranding and R hydronephrosis on 12/27/21 but patinet denied any dysuria, back pain symptoms. Since no major change (worsening) seen in psoas abscess size, vancomycin was recommended to continue until the prior planned end date of 1/27/22. Events since discharge:  No acute events. Subjective:   Spoke to patient on the phone today. She is home and doing much better now. No significant pain in back or abdomen. No f/c. Reports she noticed some mild bleeding from her lumbar wound the other day - not deep and no other drainage. It stopped on it's own. No pain. Objective:    Vitals:   Telephone visit      Physical Exam:  Telephone visit        Labs:  None needed since last exam.        Assessment:  68-year-old lady with a history of diabetes, obesity, hypothyroidism, squamous cell carcinoma of tonsils removed and is status post radiation per chart in 2015, status post lumbar fusion with revision laminectomy in April 2017, revision laminectomy in June 2021, hx of retroperitoneal abscess with likely involvement of the lumbar spine . She underwent CT-guided aspiration of abdominal abscess on 11/12/21 with 200 cc of purulent fluid that was removed and cultures positive for Streptococcus anginosis. She subsequently had drains that was removed and another CT-guided aspiration of posterior paraspinal abscess on 11/26/2021 with cultures positive for alpha Streptococcus. On 12/2/2021 she underwent I&D of a superficial and deep lumbar wound with cultures positive for MRSE from the thio broth. Patient was recommended to have IV vancomycin with a planned end date of 1/27/2022. She presented to [facility] from rehab wt concerns for RLQ abd pain and distention. CT on 12/27/21 with persistent stranding 3 by 1.6 cm RLQ fluid collection, unchanged from 12/4/21. CT also with R perinephric stranding and R hydronephrosis on 12/27/21 but patinet denied any dysuria, back pain symptoms. Since no major change (worsening) seen in psoas abscess size, vancomycin was recommended to continue until the prior planned end date of 1/27/22. Repeat CT 1/13/22 showed the RUQ abscess to be resolved. Patient has finished her ~8-10 weeks of IV abx course by us on 1/27/22. The CT had shown persistent right-sided hydronephrosis and ureter, and we had recommended Urology referral.       Recommendations:  - Patient is doing well overall and from ID standpoint.   - She saw Urology for the persistent right-sided hydronephrosis and ureterocele on the CT on 1/30/2022.   Patient was requesting if I needed to order a repeat CT scan that was needed by her urologist, however I directed her to call her urologist office and have them order the CAT scan so that there is a follow-up maintained in providers. If patient is unable to obtain the CT scan, I explained to her that we will be happy to order this for her. Patient was in agreement to the above. 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 Was evaluated by Dr. Maximilian Saldivar during the admission course, and a 6-month follow-up was recommended at that time. I have messaged the vascular surgery to have follow-up arranged with her by next month. The message has been acknowledged by the vascular surgery team.  -I also recommended the patient to call and make follow-up appointments with her orthopedic surgeon Dr. Lisa Velasquez to ensure follow up. - Patient is doing much better from ID-standpoint. Will plan to repeat CT abdomen pelvis 1/13/22.   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 This was started in February 2022. For now I plan to continue this for 6 to 8 months if she tolerates it.  -Continue diabetes control. I spoke in detail with the patient about each of the points above and repeated them multiple times. She has expressed understanding and agreement to above. RETURN TO OFFICE: Few months. Thank you for the opportunity to participate in the care of this patient. Please contact with questions or concerns.       Danelle Echols MD  Infectious Diseases

## 2022-04-14 NOTE — TELEPHONE ENCOUNTER
Message was left for patient to contact Dr. Pimentel Prime office to schedule an appointment in July for follow up.

## 2022-04-18 ENCOUNTER — TRANSCRIBE ORDER (OUTPATIENT)
Dept: SCHEDULING | Age: 72
End: 2022-04-18

## 2022-04-18 DIAGNOSIS — N13.30 HYDRONEPHROSIS: Primary | ICD-10-CM

## 2022-05-05 ENCOUNTER — HOSPITAL ENCOUNTER (OUTPATIENT)
Dept: CT IMAGING | Age: 72
Discharge: HOME | End: 2022-05-05
Attending: UROLOGY
Payer: MEDICARE

## 2022-05-05 DIAGNOSIS — N13.30 HYDRONEPHROSIS: ICD-10-CM

## 2022-05-05 LAB — CREAT BLD-MCNC: 1 MG/DL (ref 0.6–1.3)

## 2022-05-05 PROCEDURE — 82565 ASSAY OF CREATININE: CPT

## 2022-05-05 PROCEDURE — 74011000636 HC RX REV CODE- 636: Performed by: UROLOGY

## 2022-05-05 PROCEDURE — 74177 CT ABD & PELVIS W/CONTRAST: CPT

## 2022-05-05 PROCEDURE — 74011000250 HC RX REV CODE- 250: Performed by: UROLOGY

## 2022-05-05 RX ORDER — BARIUM SULFATE 20 MG/ML
900 SUSPENSION ORAL
Status: COMPLETED | OUTPATIENT
Start: 2022-05-05 | End: 2022-05-05

## 2022-05-05 RX ADMIN — IOPAMIDOL 100 ML: 755 INJECTION, SOLUTION INTRAVENOUS at 10:06

## 2022-05-05 RX ADMIN — BARIUM SULFATE 900 ML: 21 SUSPENSION ORAL at 10:06

## 2022-07-06 ENCOUNTER — VIRTUAL VISIT (OUTPATIENT)
Dept: INFECTIOUS DISEASES | Age: 72
End: 2022-07-06
Payer: MEDICARE

## 2022-07-06 DIAGNOSIS — K68.12 PSOAS ABSCESS (HCC): Primary | ICD-10-CM

## 2022-07-06 DIAGNOSIS — M46.26 INFECTION OF LUMBAR SPINE (HCC): ICD-10-CM

## 2022-07-06 PROCEDURE — 99214 OFFICE O/P EST MOD 30 MIN: CPT | Performed by: STUDENT IN AN ORGANIZED HEALTH CARE EDUCATION/TRAINING PROGRAM

## 2022-07-06 PROCEDURE — 1090F PRES/ABSN URINE INCON ASSESS: CPT | Performed by: STUDENT IN AN ORGANIZED HEALTH CARE EDUCATION/TRAINING PROGRAM

## 2022-07-06 PROCEDURE — G8432 DEP SCR NOT DOC, RNG: HCPCS | Performed by: STUDENT IN AN ORGANIZED HEALTH CARE EDUCATION/TRAINING PROGRAM

## 2022-07-06 PROCEDURE — 3017F COLORECTAL CA SCREEN DOC REV: CPT | Performed by: STUDENT IN AN ORGANIZED HEALTH CARE EDUCATION/TRAINING PROGRAM

## 2022-07-06 PROCEDURE — G8428 CUR MEDS NOT DOCUMENT: HCPCS | Performed by: STUDENT IN AN ORGANIZED HEALTH CARE EDUCATION/TRAINING PROGRAM

## 2022-07-06 PROCEDURE — G8400 PT W/DXA NO RESULTS DOC: HCPCS | Performed by: STUDENT IN AN ORGANIZED HEALTH CARE EDUCATION/TRAINING PROGRAM

## 2022-07-06 PROCEDURE — G8417 CALC BMI ABV UP PARAM F/U: HCPCS | Performed by: STUDENT IN AN ORGANIZED HEALTH CARE EDUCATION/TRAINING PROGRAM

## 2022-07-06 PROCEDURE — 1123F ACP DISCUSS/DSCN MKR DOCD: CPT | Performed by: STUDENT IN AN ORGANIZED HEALTH CARE EDUCATION/TRAINING PROGRAM

## 2022-07-06 PROCEDURE — 1101F PT FALLS ASSESS-DOCD LE1/YR: CPT | Performed by: STUDENT IN AN ORGANIZED HEALTH CARE EDUCATION/TRAINING PROGRAM

## 2022-07-06 PROCEDURE — G8536 NO DOC ELDER MAL SCRN: HCPCS | Performed by: STUDENT IN AN ORGANIZED HEALTH CARE EDUCATION/TRAINING PROGRAM

## 2022-07-06 RX ORDER — LEVOTHYROXINE SODIUM 137 UG/1
137 TABLET ORAL DAILY
COMMUNITY
Start: 2022-05-10

## 2022-07-11 RX ORDER — DOXYCYCLINE 100 MG/1
100 TABLET ORAL 2 TIMES DAILY
Qty: 180 TABLET | Refills: 1 | Status: SHIPPED | OUTPATIENT
Start: 2022-07-11 | End: 2022-10-09

## 2022-07-11 NOTE — PROGRESS NOTES
Infectious Disease Clinic Note       Reason for visit: Follow up for  Retroperitoneal abscess, history of lumbar fusion and currently presumed lumbar infection in the setting of prolonged lumbar wound. HPI:  Hospital admission:      70-year-old lady with a history of diabetes, obesity, hypothyroidism, squamous cell carcinoma of tonsils removed and is status post radiation per chart in 2015, status post lumbar fusion with revision laminectomy in April 2017, revision laminectomy in June 2021, hx of retroperitoneal abscess with likely involvement of the lumbar spine . She underwent CT-guided aspiration of abdominal abscess on 11/12/21 with 200 cc of purulent fluid that was removed and cultures positive for Streptococcus anginosis. She subsequently had drains that was removed and another CT-guided aspiration of posterior paraspinal abscess on 11/26/2021 with cultures positive for alpha Streptococcus. On 12/2/2021 she underwent I&D of a superficial and deep lumbar wound with cultures positive for MRSE from the thio broth. Patient was recommended to have IV vancomycin with a planned end date of 1/27/2022. She presented to [facility] from rehab wt concerns for RLQ abd pain and distention. CT on 12/27/21 with persistent stranding 3 by 1.6 cm RLQ fluid collection, unchanged from 12/4/21. CT also with R perinephric stranding and R hydronephrosis on 12/27/21 but patinet denied any dysuria, back pain symptoms. Since no major change (worsening) seen in psoas abscess size, vancomycin was recommended to continue until the prior planned end date of 1/27/22. Patient was started on doxycycline 100 mg twice daily for chronic suppression in February 2022. Events since discharge:  No acute events. Subjective:   Spoke to patient on the phone today. She is reporting to be doing really well. She has no acute concerns. No back pain or abdominal pain.       Objective:    Vitals:   Telephone visit      Physical Exam:  Telephone visit        Labs:  None needed since last exam.        Assessment:  64-year-old lady with a history of diabetes, obesity, hypothyroidism, squamous cell carcinoma of tonsils removed and is status post radiation per chart in 2015, status post lumbar fusion with revision laminectomy in April 2017, revision laminectomy in June 2021, hx of retroperitoneal abscess with likely involvement of the lumbar spine . She underwent CT-guided aspiration of abdominal abscess on 11/12/21 with 200 cc of purulent fluid that was removed and cultures positive for Streptococcus anginosis. She subsequently had drains that was removed and another CT-guided aspiration of posterior paraspinal abscess on 11/26/2021 with cultures positive for alpha Streptococcus. On 12/2/2021 she underwent I&D of a superficial and deep lumbar wound with cultures positive for MRSE from the thio broth. Patient was recommended to have IV vancomycin with a planned end date of 1/27/2022. She presented to [facility] from rehab wt concerns for RLQ abd pain and distention. CT on 12/27/21 with persistent stranding 3 by 1.6 cm RLQ fluid collection, unchanged from 12/4/21. CT also with R perinephric stranding and R hydronephrosis on 12/27/21 but patinet denied any dysuria, back pain symptoms. Since no major change (worsening) seen in psoas abscess size, vancomycin was recommended to continue until the prior planned end date of 1/27/22. Repeat CT 1/13/22 showed the RUQ abscess to be resolved. Patient has finished her ~8-10 weeks of IV abx course by us on 1/27/22. The CT had shown persistent right-sided hydronephrosis and ureter, and we had recommended Urology referral.       Recommendations:  - Patient is doing well overall and from ID standpoint.   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 This was started in February 2022. For now I plan to continue this for at least 1 to 2 years.  Script renewed on 7/11/22.  -Continue diabetes control. RETURN TO OFFICE: Jan/Feb 2022. Call with questions or concerns in the interim. .      Thank you for the opportunity to participate in the care of this patient. Please contact with questions or concerns.       Velma Way MD  Infectious Diseases

## 2022-10-31 ENCOUNTER — TRANSCRIBE ORDER (OUTPATIENT)
Dept: SCHEDULING | Age: 72
End: 2022-10-31

## 2022-10-31 DIAGNOSIS — H53.2 DIPLOPIA: ICD-10-CM

## 2022-10-31 DIAGNOSIS — H50.05: Primary | ICD-10-CM

## 2022-11-28 ENCOUNTER — HOSPITAL ENCOUNTER (OUTPATIENT)
Dept: MRI IMAGING | Age: 72
End: 2022-11-28
Attending: OPHTHALMOLOGY

## 2023-01-09 ENCOUNTER — APPOINTMENT (OUTPATIENT)
Dept: CT IMAGING | Age: 73
DRG: 065 | End: 2023-01-09
Attending: STUDENT IN AN ORGANIZED HEALTH CARE EDUCATION/TRAINING PROGRAM
Payer: MEDICARE

## 2023-01-09 ENCOUNTER — HOSPITAL ENCOUNTER (INPATIENT)
Age: 73
LOS: 2 days | Discharge: HOME HEALTH | DRG: 065 | End: 2023-01-11
Attending: STUDENT IN AN ORGANIZED HEALTH CARE EDUCATION/TRAINING PROGRAM | Admitting: INTERNAL MEDICINE
Payer: MEDICARE

## 2023-01-09 DIAGNOSIS — I65.23 BILATERAL CAROTID ARTERY STENOSIS: ICD-10-CM

## 2023-01-09 DIAGNOSIS — I63.332 CEREBROVASCULAR ACCIDENT (CVA) DUE TO THROMBOSIS OF LEFT POSTERIOR CEREBRAL ARTERY (HCC): ICD-10-CM

## 2023-01-09 DIAGNOSIS — H53.9 VISION CHANGES: ICD-10-CM

## 2023-01-09 DIAGNOSIS — E11.42 DIABETIC POLYNEUROPATHY ASSOCIATED WITH TYPE 2 DIABETES MELLITUS (HCC): ICD-10-CM

## 2023-01-09 DIAGNOSIS — R73.9 HYPERGLYCEMIA: Primary | ICD-10-CM

## 2023-01-09 LAB
ALBUMIN SERPL-MCNC: 3.5 G/DL (ref 3.5–5)
ALBUMIN/GLOB SERPL: 1 (ref 1.1–2.2)
ALP SERPL-CCNC: 175 U/L (ref 45–117)
ALT SERPL-CCNC: 34 U/L (ref 12–78)
ANION GAP SERPL CALC-SCNC: 10 MMOL/L (ref 5–15)
ANION GAP SERPL CALC-SCNC: 8 MMOL/L (ref 5–15)
AST SERPL-CCNC: 24 U/L (ref 15–37)
BASE DEFICIT BLD-SCNC: 1.4 MMOL/L
BASOPHILS # BLD: 0 K/UL (ref 0–0.1)
BASOPHILS NFR BLD: 0 % (ref 0–1)
BILIRUB SERPL-MCNC: 0.7 MG/DL (ref 0.2–1)
BUN SERPL-MCNC: 23 MG/DL (ref 6–20)
BUN SERPL-MCNC: 23 MG/DL (ref 6–20)
BUN/CREAT SERPL: 16 (ref 12–20)
BUN/CREAT SERPL: 19 (ref 12–20)
CA-I BLD-MCNC: 1.16 MMOL/L (ref 1.12–1.32)
CALCIUM SERPL-MCNC: 9 MG/DL (ref 8.5–10.1)
CALCIUM SERPL-MCNC: 9.7 MG/DL (ref 8.5–10.1)
CHLORIDE BLD-SCNC: 94 MMOL/L (ref 100–108)
CHLORIDE SERPL-SCNC: 88 MMOL/L (ref 97–108)
CHLORIDE SERPL-SCNC: 99 MMOL/L (ref 97–108)
CO2 BLD-SCNC: 21 MMOL/L (ref 19–24)
CO2 SERPL-SCNC: 26 MMOL/L (ref 21–32)
CO2 SERPL-SCNC: 27 MMOL/L (ref 21–32)
CREAT SERPL-MCNC: 1.19 MG/DL (ref 0.55–1.02)
CREAT SERPL-MCNC: 1.46 MG/DL (ref 0.55–1.02)
CREAT UR-MCNC: 0.9 MG/DL (ref 0.6–1.3)
DIFFERENTIAL METHOD BLD: ABNORMAL
EOSINOPHIL # BLD: 0.1 K/UL (ref 0–0.4)
EOSINOPHIL NFR BLD: 1 % (ref 0–7)
ERYTHROCYTE [DISTWIDTH] IN BLOOD BY AUTOMATED COUNT: 16.4 % (ref 11.5–14.5)
GLOBULIN SER CALC-MCNC: 3.6 G/DL (ref 2–4)
GLUCOSE BLD STRIP.AUTO-MCNC: 311 MG/DL (ref 65–117)
GLUCOSE BLD STRIP.AUTO-MCNC: 460 MG/DL (ref 65–117)
GLUCOSE BLD STRIP.AUTO-MCNC: 682 MG/DL (ref 74–106)
GLUCOSE SERPL-MCNC: 222 MG/DL (ref 65–100)
GLUCOSE SERPL-MCNC: 659 MG/DL (ref 65–100)
HCO3 BLDA-SCNC: 22 MMOL/L
HCT VFR BLD AUTO: 46.8 % (ref 35–47)
HGB BLD-MCNC: 15.1 G/DL (ref 11.5–16)
IMM GRANULOCYTES # BLD AUTO: 0.1 K/UL (ref 0–0.04)
IMM GRANULOCYTES NFR BLD AUTO: 1 % (ref 0–0.5)
LACTATE BLD-SCNC: 2.16 MMOL/L (ref 0.4–2)
LYMPHOCYTES # BLD: 2.4 K/UL (ref 0.8–3.5)
LYMPHOCYTES NFR BLD: 21 % (ref 12–49)
MCH RBC QN AUTO: 26 PG (ref 26–34)
MCHC RBC AUTO-ENTMCNC: 32.3 G/DL (ref 30–36.5)
MCV RBC AUTO: 80.6 FL (ref 80–99)
MONOCYTES # BLD: 0.9 K/UL (ref 0–1)
MONOCYTES NFR BLD: 8 % (ref 5–13)
NEUTS SEG # BLD: 7.7 K/UL (ref 1.8–8)
NEUTS SEG NFR BLD: 69 % (ref 32–75)
NRBC # BLD: 0 K/UL (ref 0–0.01)
NRBC BLD-RTO: 0 PER 100 WBC
PCO2 BLDV: 34.9 MMHG (ref 41–51)
PH BLDV: 7.42 (ref 7.32–7.42)
PLATELET # BLD AUTO: 355 K/UL (ref 150–400)
PMV BLD AUTO: 11.6 FL (ref 8.9–12.9)
PO2 BLDV: 54 MMHG (ref 25–40)
POTASSIUM BLD-SCNC: 4.6 MMOL/L (ref 3.5–5.5)
POTASSIUM SERPL-SCNC: 3.6 MMOL/L (ref 3.5–5.1)
POTASSIUM SERPL-SCNC: 4.7 MMOL/L (ref 3.5–5.1)
PROT SERPL-MCNC: 7.1 G/DL (ref 6.4–8.2)
RBC # BLD AUTO: 5.81 M/UL (ref 3.8–5.2)
SERVICE CMNT-IMP: ABNORMAL
SODIUM BLD-SCNC: 127 MMOL/L (ref 136–145)
SODIUM SERPL-SCNC: 124 MMOL/L (ref 136–145)
SODIUM SERPL-SCNC: 134 MMOL/L (ref 136–145)
SPECIMEN SITE: ABNORMAL
WBC # BLD AUTO: 11.1 K/UL (ref 3.6–11)

## 2023-01-09 PROCEDURE — 74011636637 HC RX REV CODE- 636/637: Performed by: STUDENT IN AN ORGANIZED HEALTH CARE EDUCATION/TRAINING PROGRAM

## 2023-01-09 PROCEDURE — 70450 CT HEAD/BRAIN W/O DYE: CPT

## 2023-01-09 PROCEDURE — 82962 GLUCOSE BLOOD TEST: CPT

## 2023-01-09 PROCEDURE — 82947 ASSAY GLUCOSE BLOOD QUANT: CPT

## 2023-01-09 PROCEDURE — 74011250636 HC RX REV CODE- 250/636: Performed by: STUDENT IN AN ORGANIZED HEALTH CARE EDUCATION/TRAINING PROGRAM

## 2023-01-09 PROCEDURE — 74011250636 HC RX REV CODE- 250/636: Performed by: INTERNAL MEDICINE

## 2023-01-09 PROCEDURE — 85025 COMPLETE CBC W/AUTO DIFF WBC: CPT

## 2023-01-09 PROCEDURE — 74011250637 HC RX REV CODE- 250/637: Performed by: INTERNAL MEDICINE

## 2023-01-09 PROCEDURE — 80053 COMPREHEN METABOLIC PANEL: CPT

## 2023-01-09 PROCEDURE — 65270000046 HC RM TELEMETRY

## 2023-01-09 PROCEDURE — 99285 EMERGENCY DEPT VISIT HI MDM: CPT

## 2023-01-09 PROCEDURE — 74011636637 HC RX REV CODE- 636/637: Performed by: INTERNAL MEDICINE

## 2023-01-09 PROCEDURE — 36415 COLL VENOUS BLD VENIPUNCTURE: CPT

## 2023-01-09 PROCEDURE — 81001 URINALYSIS AUTO W/SCOPE: CPT

## 2023-01-09 RX ORDER — INSULIN LISPRO 100 [IU]/ML
8 INJECTION, SOLUTION INTRAVENOUS; SUBCUTANEOUS
Status: DISCONTINUED | OUTPATIENT
Start: 2023-01-09 | End: 2023-01-11

## 2023-01-09 RX ORDER — POLYETHYLENE GLYCOL 3350 17 G/17G
17 POWDER, FOR SOLUTION ORAL DAILY PRN
Status: DISCONTINUED | OUTPATIENT
Start: 2023-01-09 | End: 2023-01-11 | Stop reason: HOSPADM

## 2023-01-09 RX ORDER — AMLODIPINE BESYLATE 5 MG/1
10 TABLET ORAL DAILY
Status: DISCONTINUED | OUTPATIENT
Start: 2023-01-10 | End: 2023-01-11 | Stop reason: HOSPADM

## 2023-01-09 RX ORDER — METOPROLOL TARTRATE 25 MG/1
25 TABLET, FILM COATED ORAL
Status: DISCONTINUED | OUTPATIENT
Start: 2023-01-10 | End: 2023-01-11 | Stop reason: HOSPADM

## 2023-01-09 RX ORDER — SODIUM CHLORIDE 0.9 % (FLUSH) 0.9 %
5-40 SYRINGE (ML) INJECTION AS NEEDED
Status: DISCONTINUED | OUTPATIENT
Start: 2023-01-09 | End: 2023-01-11 | Stop reason: HOSPADM

## 2023-01-09 RX ORDER — ONDANSETRON 4 MG/1
4 TABLET, ORALLY DISINTEGRATING ORAL
Status: DISCONTINUED | OUTPATIENT
Start: 2023-01-09 | End: 2023-01-11 | Stop reason: HOSPADM

## 2023-01-09 RX ORDER — SODIUM CHLORIDE 0.9 % (FLUSH) 0.9 %
5-40 SYRINGE (ML) INJECTION EVERY 8 HOURS
Status: DISCONTINUED | OUTPATIENT
Start: 2023-01-09 | End: 2023-01-11 | Stop reason: HOSPADM

## 2023-01-09 RX ORDER — AMITRIPTYLINE HYDROCHLORIDE 50 MG/1
50 TABLET, FILM COATED ORAL
Status: DISCONTINUED | OUTPATIENT
Start: 2023-01-09 | End: 2023-01-11 | Stop reason: HOSPADM

## 2023-01-09 RX ORDER — ENOXAPARIN SODIUM 100 MG/ML
30 INJECTION SUBCUTANEOUS EVERY 12 HOURS
Status: DISCONTINUED | OUTPATIENT
Start: 2023-01-09 | End: 2023-01-11 | Stop reason: HOSPADM

## 2023-01-09 RX ORDER — CLONAZEPAM 0.5 MG/1
0.25 TABLET ORAL 2 TIMES DAILY
Status: DISCONTINUED | OUTPATIENT
Start: 2023-01-09 | End: 2023-01-11 | Stop reason: HOSPADM

## 2023-01-09 RX ORDER — IBUPROFEN 200 MG
4 TABLET ORAL AS NEEDED
Status: DISCONTINUED | OUTPATIENT
Start: 2023-01-09 | End: 2023-01-11 | Stop reason: HOSPADM

## 2023-01-09 RX ORDER — ONDANSETRON 2 MG/ML
4 INJECTION INTRAMUSCULAR; INTRAVENOUS
Status: DISCONTINUED | OUTPATIENT
Start: 2023-01-09 | End: 2023-01-11 | Stop reason: HOSPADM

## 2023-01-09 RX ORDER — ACETAMINOPHEN 325 MG/1
650 TABLET ORAL
Status: DISCONTINUED | OUTPATIENT
Start: 2023-01-09 | End: 2023-01-11 | Stop reason: HOSPADM

## 2023-01-09 RX ORDER — INSULIN GLARGINE 100 [IU]/ML
35 INJECTION, SOLUTION SUBCUTANEOUS
Status: DISCONTINUED | OUTPATIENT
Start: 2023-01-09 | End: 2023-01-11

## 2023-01-09 RX ORDER — SODIUM CHLORIDE 9 MG/ML
125 INJECTION, SOLUTION INTRAVENOUS CONTINUOUS
Status: DISCONTINUED | OUTPATIENT
Start: 2023-01-09 | End: 2023-01-11

## 2023-01-09 RX ORDER — ACETAMINOPHEN 650 MG/1
650 SUPPOSITORY RECTAL
Status: DISCONTINUED | OUTPATIENT
Start: 2023-01-09 | End: 2023-01-11 | Stop reason: HOSPADM

## 2023-01-09 RX ORDER — INSULIN LISPRO 100 [IU]/ML
INJECTION, SOLUTION INTRAVENOUS; SUBCUTANEOUS
Status: DISCONTINUED | OUTPATIENT
Start: 2023-01-09 | End: 2023-01-11 | Stop reason: HOSPADM

## 2023-01-09 RX ORDER — PAROXETINE HYDROCHLORIDE 20 MG/1
40 TABLET, FILM COATED ORAL
Status: DISCONTINUED | OUTPATIENT
Start: 2023-01-10 | End: 2023-01-11 | Stop reason: HOSPADM

## 2023-01-09 RX ORDER — DEXTROSE MONOHYDRATE 100 MG/ML
0-250 INJECTION, SOLUTION INTRAVENOUS AS NEEDED
Status: DISCONTINUED | OUTPATIENT
Start: 2023-01-09 | End: 2023-01-11 | Stop reason: HOSPADM

## 2023-01-09 RX ORDER — ASPIRIN 81 MG/1
81 TABLET ORAL DAILY
Status: DISCONTINUED | OUTPATIENT
Start: 2023-01-10 | End: 2023-01-11 | Stop reason: HOSPADM

## 2023-01-09 RX ORDER — PANTOPRAZOLE SODIUM 40 MG/1
40 TABLET, DELAYED RELEASE ORAL
Status: DISCONTINUED | OUTPATIENT
Start: 2023-01-10 | End: 2023-01-11 | Stop reason: HOSPADM

## 2023-01-09 RX ORDER — ATORVASTATIN CALCIUM 40 MG/1
40 TABLET, FILM COATED ORAL
Status: DISCONTINUED | OUTPATIENT
Start: 2023-01-09 | End: 2023-01-11 | Stop reason: HOSPADM

## 2023-01-09 RX ADMIN — Medication 8 UNITS: at 19:31

## 2023-01-09 RX ADMIN — SODIUM CHLORIDE 1000 ML: 9 INJECTION, SOLUTION INTRAVENOUS at 18:40

## 2023-01-09 RX ADMIN — SODIUM CHLORIDE 125 ML/HR: 9 INJECTION, SOLUTION INTRAVENOUS at 19:31

## 2023-01-09 RX ADMIN — ENOXAPARIN SODIUM 30 MG: 100 INJECTION SUBCUTANEOUS at 19:30

## 2023-01-09 RX ADMIN — SODIUM CHLORIDE 1000 ML: 9 INJECTION, SOLUTION INTRAVENOUS at 20:53

## 2023-01-09 RX ADMIN — CLONAZEPAM 0.25 MG: 0.5 TABLET ORAL at 19:36

## 2023-01-09 RX ADMIN — Medication 10 UNITS: at 18:40

## 2023-01-09 RX ADMIN — INSULIN GLARGINE 35 UNITS: 100 INJECTION, SOLUTION SUBCUTANEOUS at 19:31

## 2023-01-09 RX ADMIN — Medication 5 UNITS: at 21:09

## 2023-01-09 RX ADMIN — ATORVASTATIN CALCIUM 40 MG: 40 TABLET, FILM COATED ORAL at 21:12

## 2023-01-09 NOTE — ED PROVIDER NOTES
EMERGENCY DEPARTMENT HISTORY AND PHYSICAL EXAM      Date: 1/9/2023  Patient Name: Justin Tyson    History of Presenting Illness     Chief Complaint   Patient presents with    Visual Problems     Patient to triage w c/o of not being able to see. She reports her MD referred her for a MRI. She states he thinks its something going on w her brain. HPI: History From: Patient and , History limited by: none  Justin Tyson, 74 y.o. female presents to the ED with cc of vision changes. At this has been an ongoing issue with a history of double vision for which she sees her eye doctor. This acutely worsened 2 weeks ago, she describes this as a \"glow ball\" throughout her visual field bilaterally. She says that what ever she looks at looks like an explosion of colors. She also is seeing things that are not there such as little dogs in her vision. She denies any eye pain. Does report a history of migraines, and has migraines on and off, however no headache right now. She spoke with her eye doctor, who was not concerned that this was an ocular problem, and referred here for potential imaging. She denies any new weakness numbness or tingling in the arms or legs other than some chronic numbness of the feet. No difficulty speaking or swallowing. No recent fevers. There are no other complaints, changes, or physical findings at this time. PCP: Becka Padilla MD    No current facility-administered medications on file prior to encounter. Current Outpatient Medications on File Prior to Encounter   Medication Sig Dispense Refill    levothyroxine (SYNTHROID) 137 mcg tablet Take 137 mcg by mouth daily. clonazePAM (KlonoPIN) 0.5 mg tablet Take 0.5 Tablets by mouth two (2) times a day. Max Daily Amount: 0.5 mg. 30 Tablet 0    amLODIPine (NORVASC) 10 mg tablet Take 1 Tablet by mouth daily. 30 Tablet 0    nystatin (MYCOSTATIN) 100,000 unit/gram ointment Apply  to affected area three (3) times daily. 15 g 0    acetaminophen (TYLENOL) 325 mg tablet Take 2 Tablets by mouth every six (6) hours as needed for Pain. 30 Tablet 0    L.acid,para-B. bifidum-S.therm (RISAQUAD) 8 billion cell cap cap Take 1 Capsule by mouth daily. (Patient not taking: Reported on 7/6/2022) 30 Capsule 0    miconazole (MICOTIN) 2 % topical powder Apply  to affected area two (2) times a day. 1 Bottle 0    amitriptyline (ELAVIL) 50 mg tablet Take 50 mg by mouth nightly. atorvastatin (LIPITOR) 40 mg tablet Take 40 mg by mouth nightly. butalb/acetaminophen/caffeine (FIORICET PO) Take 1 Tablet by mouth as needed. aspirin delayed-release 81 mg tablet Take 81 mg by mouth daily. PARoxetine (PAXIL) 40 mg tablet Take 40 mg by mouth every morning. metoprolol tartrate (LOPRESSOR) 25 mg tablet Take 12.5 mg by mouth nightly. metoprolol (LOPRESSOR) 25 mg tablet Take 25 mg by mouth every morning. omeprazole (PRILOSEC) 40 mg capsule Take 40 mg by mouth every morning. Past History     Past Medical History:  Past Medical History:   Diagnosis Date    Adverse effect of anesthesia     O2 DROPS WITH ANESTHESIA    Arthritis     KNEES    Cancer ([facility] [address] 03/13/2015    SQUAMOUS CELL CARCINOMA; tonsils and lymph nodes.   Removed 3-2015 with radiation    GERD (gastroesophageal reflux disease)     Hypertension     NO LONGER ON MEDICATION    Hypothyroid     HYPOTHYROIDISM    Migraine     Nausea & vomiting     Obesity     Other ill-defined conditions(799.89)     chronic back pain    Psychiatric disorder     anxiety    Psychiatric disorder     panic ATTACKS    SVT (supraventricular tachycardia) ([facility] [address] 05/24/2014    Ulcerative colitis        Past Surgical History:  Past Surgical History:   Procedure Laterality Date    COLONOSCOPY,DIAGNOSTIC  11/19/2015         HX GI      colonscopy    HX HEENT  03/2015    tonsillectomy (CANCER) AND NECK LYMPH NODES    HX HEENT  2008    PARATHYROID REMOVAL    HX HEENT Left 2002    EYE LASER    HX HEMORRHOIDECTOMY  2001, 2016     X2    HX HYSTERECTOMY  1985    HX KNEE ARTHROSCOPY  1995    left knee surgery, TOTAL LT  and Right KNEE 2013    HX KNEE REPLACEMENT Bilateral 2013, 2014    HX LAP CHOLECYSTECTOMY  2002    HX LUMBAR FUSION  2011    SPINAL FUSION with hardware L4-L5    HX ORTHOPAEDIC  1990    CYST REMOVED RIGHT WRIST    HX ORTHOPAEDIC  05/12/2021    L2-3 & L5-21 LUMBAR LAMINECTOMY WITH ANDREWS OSTEOTOMY    HX OTHER SURGICAL      cyst removal right wrist    HX UROLOGICAL  2010    bladder surgery(interstim device)    IR INJ FORAMIN EPID LUMB ANES/STER SNGL  8/19/2019    [place] EGD TRANSORAL BIOPSY SINGLE/MULTIPLE  5/20/2013            Family History:  Family History   Problem Relation Age of Onset    Cancer Mother         ovarian ca? Heart Disease Father         MI    Heart Attack Father     Cancer Father         MULTIPLE MYELOMA    Liver Disease Father         FROM MEDICATIONS FOR MULTIPLE MYELOMA    OSTEOARTHRITIS Sister     OSTEOARTHRITIS Brother     Cancer Paternal Grandmother 79        breast ca    Breast Cancer Paternal Grandmother 79    Breast Cancer Maternal Aunt 55    Breast Cancer Maternal Aunt 60    Breast Cancer Paternal Aunt 43    Breast Cancer Paternal Aunt         52's    Emphysema Paternal Grandfather     No Known Problems Sister     No Known Problems Brother     No Known Problems Brother     Anesth Problems Neg Hx        Social History:  Social History     Tobacco Use    Smoking status: Never    Smokeless tobacco: Never   Vaping Use    Vaping Use: Never used   Substance Use Topics    Alcohol use: No    Drug use: No     Types: Prescription       Allergies: Allergies   Allergen Reactions    Adhesive Tape-Silicones Rash    Aloe Vera Rash    Chlorhexidine Rash    Tussin [Dextromethorphan Hbr] Palpitations    Readyprep Chg [Chlorhexidine Gluconate] Rash         Physical Exam   Physical Exam  Constitutional:       General: She is not in acute distress. Appearance: She is not toxic-appearing. HENT:      Head: Normocephalic. Eyes:      Extraocular Movements: Extraocular movements intact. Conjunctiva/sclera: Conjunctivae normal.      Pupils: Pupils are equal, round, and reactive to light. Comments: Visual acuity 20/100 on the left, 20/70 on the right   Cardiovascular:      Rate and Rhythm: Normal rate and regular rhythm. Pulmonary:      Effort: Pulmonary effort is normal.      Breath sounds: Normal breath sounds. Abdominal:      Palpations: Abdomen is soft. Tenderness: There is no abdominal tenderness. Skin:     General: Skin is warm and dry. Neurological:      Mental Status: She is alert and oriented to person, place, and time. Cranial Nerves: No cranial nerve deficit. Comments: 5/5 strength with bicep flexion and extension bilaterally, 5/5 strength with hip flexion bilaterally. Sensation to light touch intact over upper and lower extremities bilaterally. Speech is clear and coherent, symmetric smile, midline tongue protrusion, sensation to light touch intact over face bilaterally. Psychiatric:         Mood and Affect: Mood normal.       Diagnostic Study Results     Labs -     Recent Results (from the past 24 hour(s))   CBC WITH AUTOMATED DIFF    Collection Time: 01/09/23  3:24 PM   Result Value Ref Range    WBC 11.1 (H) 3.6 - 11.0 K/uL    RBC 5.81 (H) 3.80 - 5.20 M/uL    HGB 15.1 11.5 - 16.0 g/dL    HCT 46.8 35.0 - 47.0 %    MCV 80.6 80.0 - 99.0 FL    MCH 26.0 26.0 - 34.0 PG    MCHC 32.3 30.0 - 36.5 g/dL    RDW 16.4 (H) 11.5 - 14.5 %    PLATELET 393 462 - 892 K/uL    MPV 11.6 8.9 - 12.9 FL    NRBC 0.0 0  WBC    ABSOLUTE NRBC 0.00 0.00 - 0.01 K/uL    NEUTROPHILS 69 32 - 75 %    LYMPHOCYTES 21 12 - 49 %    MONOCYTES 8 5 - 13 %    EOSINOPHILS 1 0 - 7 %    BASOPHILS 0 0 - 1 %    IMMATURE GRANULOCYTES 1 (H) 0.0 - 0.5 %    ABS. NEUTROPHILS 7.7 1.8 - 8.0 K/UL    ABS. LYMPHOCYTES 2.4 0.8 - 3.5 K/UL    ABS. MONOCYTES 0.9 0.0 - 1.0 K/UL    ABS.  EOSINOPHILS 0.1 0.0 - 0.4 K/UL    ABS. BASOPHILS 0.0 0.0 - 0.1 K/UL    ABS. IMM. GRANS. 0.1 (H) 0.00 - 0.04 K/UL    DF AUTOMATED     METABOLIC PANEL, COMPREHENSIVE    Collection Time: 01/09/23  3:24 PM   Result Value Ref Range    Sodium 124 (L) 136 - 145 mmol/L    Potassium 4.7 3.5 - 5.1 mmol/L    Chloride 88 (L) 97 - 108 mmol/L    CO2 26 21 - 32 mmol/L    Anion gap 10 5 - 15 mmol/L    Glucose 659 (HH) 65 - 100 mg/dL    BUN 23 (H) 6 - 20 MG/DL    Creatinine 1.46 (H) 0.55 - 1.02 MG/DL    BUN/Creatinine ratio 16 12 - 20      eGFR 38 (L) >60 ml/min/1.73m2    Calcium 9.7 8.5 - 10.1 MG/DL    Bilirubin, total 0.7 0.2 - 1.0 MG/DL    ALT (SGPT) 34 12 - 78 U/L    AST (SGOT) 24 15 - 37 U/L    Alk. phosphatase 175 (H) 45 - 117 U/L    Protein, total 7.1 6.4 - 8.2 g/dL    Albumin 3.5 3.5 - 5.0 g/dL    Globulin 3.6 2.0 - 4.0 g/dL    A-G Ratio 1.0 (L) 1.1 - 2.2         Radiologic Studies -   CT HEAD WO CONT   Final Result   No acute intracranial process. Imaging findings consistent with mild chronic microvascular ischemic change. There is a minimal degree of cerebral atrophy. CT Results  (Last 48 hours)                 01/09/23 1605  CT HEAD WO CONT Final result    Impression:  No acute intracranial process. Imaging findings consistent with mild chronic microvascular ischemic change. There is a minimal degree of cerebral atrophy. Narrative:  CLINICAL HISTORY: vision changes   INDICATION: vision changes   COMPARISON: 2021. CT dose reduction was achieved through use of a standardized protocol tailored   for this examination and automatic exposure control for dose modulation. TECHNIQUE: Serial axial images with a collimation of 5 mm were obtained from the   skull base through the vertex     FINDINGS:    There is sulcal and ventricular prominence. Confluent periventricular and   scattered foci of hypodensity in the cerebral white matter.  There is no evidence   of an acute infarction, hemorrhage, or mass-effect. There is no evidence of   midline shift or hydrocephalus. Posterior fossa structures are unremarkable. No   extra-axial collections are seen. Mastoid air cells are well pneumatized and clear. There is no evidence of depressed skull fractures of soft tissue swelling. CXR Results  (Last 48 hours)      None              Medical Decision Making   I am the first provider for this patient. I reviewed the vital signs, available nursing notes, past medical history, past surgical history, family history and social history. Vital Signs-Reviewed the patient's vital signs. Patient Vitals for the past 24 hrs:   Temp Pulse Resp BP SpO2   01/09/23 1441 97.3 °F (36.3 °C) 74 18 130/83 100 %         Provider Notes (Medical Decision Making):   77-year-old female presenting with vision changes. Diffuse bilateral vision changes concerning for possible systemic pathology, will assess for electrolyte or metabolic abnormalities. Her neurologic exam is otherwise nonfocal, no hemianopia, this is less concerning for CVA, and symptoms have been going on for 2 weeks. Differential also includes atypical migraine. She is afebrile and nontoxic-appearing with normal vitals, unlikely infectious cause. She denies any eye pain, as above, the visual symptoms are diffuse bilaterally throughout all visual fields, unlikely acute ocular emergency. ED Course:     Initial assessment performed. The patients presenting problems have been discussed, and they are in agreement with the care plan formulated and outlined with them. I have encouraged them to ask questions as they arise throughout their visit.     Medications   sodium chloride 0.9 % bolus infusion 1,000 mL (has no administration in time range)   insulin regular (NOVOLIN R, HUMULIN R) injection 10 Units (has no administration in time range)   sodium chloride 0.9 % bolus infusion 1,000 mL (has no administration in time range)              CBC with leukocytosis of 11, basic metabolic panel with elevated BUN/creatinine 23/1.46, prior creatinine was normal, likely mild MERCEDES in setting of glucose of 659. CT head shows no acute intracranial process. Patient denies    She states that she does not take any medication for diabetes. However, chart reviewed, hospitalization in 2021, reviewed discharge summary from 12/9/2021, she was treated for hyperosmolar nonketotic hyperglycemia, and it looks like she was to resume metformin on discharge. Explained to patient that her visual symptoms are less concerning for acute intracranial emergency given likely in setting of hyperglycemia, we will begin by treating her hyperglycemia, and admit to the hospital for this to improve, and if her visual symptoms persist, will prompt for potential further imaging. Critical Care Time:         Disposition:  Treasure Escudero's  results have been reviewed with her. She has been counseled regarding her diagnosis, treatment, and plan. She verbally conveys understanding and agreement of the signs, symptoms, diagnosis, treatment and prognosis and additionally agrees to follow up as discussed. She also agrees with the care-plan and conveys that all of her questions have been answered. I have also provided discharge instructions for her that include: educational information regarding their diagnosis and treatment, and list of reasons why they would want to return to the ED prior to their follow-up appointment, should her condition change. PLAN:  1. Current Discharge Medication List        2.    Follow-up Information    None       Return to ED if worse     Diagnosis     Clinical Impression: Acute hyperglycemia, acute visual changes

## 2023-01-09 NOTE — ED NOTES
TRANSITION OF CARE - SBAR OUT    Patient is being transferred to [address], Room# 2214. Report GIVEN TO Justin Mak RN on [facility] for routine progression of care. Report is consisted of the following information SBAR. Patient transferred to receiving unit by: Christine Ribera (RN or Tech Name)     [facility] consults: No    Collected routine labs: Yes     All current orders reviewed with accepting nurse: Yes    The following personal items will be sent with the patient during transfer to the floor: All valuables:                          CARDIAC MONITORING ORDERED: Yes     The following CURRENT information were reported to the receiving RN:    CODE STATUS: Full Code    NIH SCORE:    RANJIT SCREENING:      NEURO ASSESSMENT:        RESTRAINTS IN USE: No      IS DOCUMENTATION COMPLETE: Yes      Vital Signs  Temp: 97.3 °F (36.3 °C) (01/09/23 1441)  Pulse (Heart Rate): 74 (01/09/23 1441)  Resp Rate: 18 (01/09/23 1441)  BP: 130/83 (01/09/23 1441)  MAP (Monitor): 99 (01/09/23 1441)  MAP (Calculated): 99 (01/09/23 1441)  Pain 1  Pain Scale 1: Numeric (0 - 10) (01/09/23 1441)      OXYGEN:      KINDER FALL ASSESSMENT:  No data recorded    WOUNDS: No      URINARY CATHETER: voiding    LINE ACCESS:   PICC Double Lumen 12/23/21 Left (Active)        Opportunity for questions and clarification were provided.   Francine Sands RN

## 2023-01-10 ENCOUNTER — APPOINTMENT (OUTPATIENT)
Dept: MRI IMAGING | Age: 73
DRG: 065 | End: 2023-01-10
Attending: INTERNAL MEDICINE
Payer: MEDICARE

## 2023-01-10 ENCOUNTER — APPOINTMENT (OUTPATIENT)
Dept: CT IMAGING | Age: 73
DRG: 065 | End: 2023-01-10
Attending: PSYCHIATRY & NEUROLOGY
Payer: MEDICARE

## 2023-01-10 LAB
ANION GAP SERPL CALC-SCNC: 3 MMOL/L (ref 5–15)
APPEARANCE UR: ABNORMAL
BACTERIA URNS QL MICRO: ABNORMAL /HPF
BILIRUB UR QL: NEGATIVE
BUN SERPL-MCNC: 19 MG/DL (ref 6–20)
BUN/CREAT SERPL: 19 (ref 12–20)
CALCIUM SERPL-MCNC: 8.9 MG/DL (ref 8.5–10.1)
CHLORIDE SERPL-SCNC: 103 MMOL/L (ref 97–108)
CO2 SERPL-SCNC: 29 MMOL/L (ref 21–32)
COLOR UR: ABNORMAL
CREAT SERPL-MCNC: 1.02 MG/DL (ref 0.55–1.02)
EPITH CASTS URNS QL MICRO: ABNORMAL /LPF
ERYTHROCYTE [DISTWIDTH] IN BLOOD BY AUTOMATED COUNT: 16.6 % (ref 11.5–14.5)
EST. AVERAGE GLUCOSE BLD GHB EST-MCNC: 341 MG/DL
GLUCOSE BLD STRIP.AUTO-MCNC: 128 MG/DL (ref 65–117)
GLUCOSE BLD STRIP.AUTO-MCNC: 138 MG/DL (ref 65–117)
GLUCOSE BLD STRIP.AUTO-MCNC: 194 MG/DL (ref 65–117)
GLUCOSE BLD STRIP.AUTO-MCNC: 199 MG/DL (ref 65–117)
GLUCOSE BLD STRIP.AUTO-MCNC: 206 MG/DL (ref 65–117)
GLUCOSE BLD STRIP.AUTO-MCNC: 210 MG/DL (ref 65–117)
GLUCOSE SERPL-MCNC: 235 MG/DL (ref 65–100)
GLUCOSE UR STRIP.AUTO-MCNC: >1000 MG/DL
HBA1C MFR BLD: 13.5 % (ref 4–5.6)
HCT VFR BLD AUTO: 41.6 % (ref 35–47)
HGB BLD-MCNC: 13.2 G/DL (ref 11.5–16)
HGB UR QL STRIP: NEGATIVE
KETONES UR QL STRIP.AUTO: NEGATIVE MG/DL
LACTATE SERPL-SCNC: 1.2 MMOL/L (ref 0.4–2)
LEUKOCYTE ESTERASE UR QL STRIP.AUTO: ABNORMAL
MCH RBC QN AUTO: 25.9 PG (ref 26–34)
MCHC RBC AUTO-ENTMCNC: 31.7 G/DL (ref 30–36.5)
MCV RBC AUTO: 81.7 FL (ref 80–99)
NITRITE UR QL STRIP.AUTO: NEGATIVE
NRBC # BLD: 0 K/UL (ref 0–0.01)
NRBC BLD-RTO: 0 PER 100 WBC
PH UR STRIP: 5.5 (ref 5–8)
PLATELET # BLD AUTO: 304 K/UL (ref 150–400)
PMV BLD AUTO: 11.3 FL (ref 8.9–12.9)
POTASSIUM SERPL-SCNC: 3.7 MMOL/L (ref 3.5–5.1)
PROT UR STRIP-MCNC: NEGATIVE MG/DL
RBC # BLD AUTO: 5.09 M/UL (ref 3.8–5.2)
RBC #/AREA URNS HPF: ABNORMAL /HPF (ref 0–5)
SERVICE CMNT-IMP: ABNORMAL
SODIUM SERPL-SCNC: 135 MMOL/L (ref 136–145)
SP GR UR REFRACTOMETRY: 1.01 (ref 1–1.03)
UA: UC IF INDICATED,UAUC: ABNORMAL
UROBILINOGEN UR QL STRIP.AUTO: 0.2 EU/DL (ref 0.2–1)
WBC # BLD AUTO: 8.9 K/UL (ref 3.6–11)
WBC URNS QL MICRO: ABNORMAL /HPF (ref 0–4)
YEAST URNS QL MICRO: PRESENT

## 2023-01-10 PROCEDURE — 83605 ASSAY OF LACTIC ACID: CPT

## 2023-01-10 PROCEDURE — 99223 1ST HOSP IP/OBS HIGH 75: CPT | Performed by: PSYCHIATRY & NEUROLOGY

## 2023-01-10 PROCEDURE — 80048 BASIC METABOLIC PNL TOTAL CA: CPT

## 2023-01-10 PROCEDURE — 82962 GLUCOSE BLOOD TEST: CPT

## 2023-01-10 PROCEDURE — 74011000636 HC RX REV CODE- 636: Performed by: INTERNAL MEDICINE

## 2023-01-10 PROCEDURE — 85027 COMPLETE CBC AUTOMATED: CPT

## 2023-01-10 PROCEDURE — 36415 COLL VENOUS BLD VENIPUNCTURE: CPT

## 2023-01-10 PROCEDURE — 74011250636 HC RX REV CODE- 250/636: Performed by: INTERNAL MEDICINE

## 2023-01-10 PROCEDURE — 70551 MRI BRAIN STEM W/O DYE: CPT

## 2023-01-10 PROCEDURE — 65270000046 HC RM TELEMETRY

## 2023-01-10 PROCEDURE — 74011250637 HC RX REV CODE- 250/637: Performed by: INTERNAL MEDICINE

## 2023-01-10 PROCEDURE — 70496 CT ANGIOGRAPHY HEAD: CPT

## 2023-01-10 PROCEDURE — 74011000250 HC RX REV CODE- 250: Performed by: INTERNAL MEDICINE

## 2023-01-10 PROCEDURE — 83036 HEMOGLOBIN GLYCOSYLATED A1C: CPT

## 2023-01-10 PROCEDURE — 74011636637 HC RX REV CODE- 636/637: Performed by: INTERNAL MEDICINE

## 2023-01-10 RX ORDER — ENOXAPARIN SODIUM 100 MG/ML
40 INJECTION SUBCUTANEOUS EVERY 24 HOURS
Status: DISCONTINUED | OUTPATIENT
Start: 2023-01-10 | End: 2023-01-10

## 2023-01-10 RX ORDER — BUTALBITAL, ACETAMINOPHEN AND CAFFEINE 50; 325; 40 MG/1; MG/1; MG/1
1 TABLET ORAL
Status: DISCONTINUED | OUTPATIENT
Start: 2023-01-10 | End: 2023-01-11 | Stop reason: HOSPADM

## 2023-01-10 RX ORDER — DOXYCYCLINE HYCLATE 100 MG
100 TABLET ORAL EVERY 12 HOURS
Status: DISCONTINUED | OUTPATIENT
Start: 2023-01-10 | End: 2023-01-11 | Stop reason: HOSPADM

## 2023-01-10 RX ORDER — DIAZEPAM 10 MG/2ML
5 INJECTION INTRAMUSCULAR ONCE
Status: COMPLETED | OUTPATIENT
Start: 2023-01-10 | End: 2023-01-10

## 2023-01-10 RX ADMIN — IOPAMIDOL 100 ML: 755 INJECTION, SOLUTION INTRAVENOUS at 18:37

## 2023-01-10 RX ADMIN — SODIUM CHLORIDE, PRESERVATIVE FREE 10 ML: 5 INJECTION INTRAVENOUS at 06:31

## 2023-01-10 RX ADMIN — ENOXAPARIN SODIUM 30 MG: 100 INJECTION SUBCUTANEOUS at 09:03

## 2023-01-10 RX ADMIN — PAROXETINE HYDROCHLORIDE 40 MG: 20 TABLET, FILM COATED ORAL at 09:02

## 2023-01-10 RX ADMIN — Medication 4 UNITS: at 11:40

## 2023-01-10 RX ADMIN — LEVOTHYROXINE SODIUM 137 MCG: 0.11 TABLET ORAL at 09:01

## 2023-01-10 RX ADMIN — PANTOPRAZOLE SODIUM 40 MG: 40 TABLET, DELAYED RELEASE ORAL at 09:02

## 2023-01-10 RX ADMIN — SODIUM CHLORIDE, PRESERVATIVE FREE 10 ML: 5 INJECTION INTRAVENOUS at 21:20

## 2023-01-10 RX ADMIN — Medication 4 UNITS: at 18:05

## 2023-01-10 RX ADMIN — CLONAZEPAM 0.25 MG: 0.5 TABLET ORAL at 18:05

## 2023-01-10 RX ADMIN — SODIUM CHLORIDE 125 ML/HR: 9 INJECTION, SOLUTION INTRAVENOUS at 09:00

## 2023-01-10 RX ADMIN — DIAZEPAM 5 MG: 5 INJECTION, SOLUTION INTRAMUSCULAR; INTRAVENOUS at 12:46

## 2023-01-10 RX ADMIN — DOXYCYCLINE HYCLATE 100 MG: 100 TABLET, COATED ORAL at 21:12

## 2023-01-10 RX ADMIN — ENOXAPARIN SODIUM 30 MG: 100 INJECTION SUBCUTANEOUS at 18:04

## 2023-01-10 RX ADMIN — ASPIRIN 81 MG: 81 TABLET, COATED ORAL at 09:01

## 2023-01-10 RX ADMIN — Medication 8 UNITS: at 09:02

## 2023-01-10 RX ADMIN — INSULIN GLARGINE 35 UNITS: 100 INJECTION, SOLUTION SUBCUTANEOUS at 21:12

## 2023-01-10 RX ADMIN — Medication 8 UNITS: at 11:40

## 2023-01-10 RX ADMIN — Medication 8 UNITS: at 18:04

## 2023-01-10 RX ADMIN — SODIUM CHLORIDE, PRESERVATIVE FREE 10 ML: 5 INJECTION INTRAVENOUS at 18:05

## 2023-01-10 RX ADMIN — DOXYCYCLINE HYCLATE 100 MG: 100 TABLET, COATED ORAL at 11:40

## 2023-01-10 RX ADMIN — Medication 3 UNITS: at 09:10

## 2023-01-10 RX ADMIN — CLONAZEPAM 0.25 MG: 0.5 TABLET ORAL at 09:02

## 2023-01-10 RX ADMIN — ATORVASTATIN CALCIUM 40 MG: 40 TABLET, FILM COATED ORAL at 21:12

## 2023-01-10 NOTE — PROGRESS NOTES
Problem: Falls - Risk of  Goal: *Absence of Falls  Description: Document Yasir Antoine Fall Risk and appropriate interventions in the flowsheet. Outcome: Progressing Towards Goal  Note: Fall Risk Interventions:       Problem: Diabetes Self-Management  Goal: *Disease process and treatment process  Description: Define diabetes and identify own type of diabetes; list 3 options for treating diabetes. Outcome: Progressing Towards Goal  Goal: *Incorporating nutritional management into lifestyle  Description: Describe effect of type, amount and timing of food on blood glucose; list 3 methods for planning meals. Outcome: Progressing Towards Goal  Goal: *Incorporating physical activity into lifestyle  Description: State effect of exercise on blood glucose levels. Outcome: Progressing Towards Goal  Goal: *Developing strategies to promote health/change behavior  Description: Define the ABC's of diabetes; identify appropriate screenings, schedule and personal plan for screenings. Outcome: Progressing Towards Goal  Goal: *Using medications safely  Description: State effect of diabetes medications on diabetes; name diabetes medication taking, action and side effects. Outcome: Progressing Towards Goal  Goal: *Monitoring blood glucose, interpreting and using results  Description: Identify recommended blood glucose targets  and personal targets. Outcome: Progressing Towards Goal  Goal: *Prevention, detection, treatment of acute complications  Description: List symptoms of hyper- and hypoglycemia; describe how to treat low blood sugar and actions for lowering  high blood glucose level. Outcome: Progressing Towards Goal  Goal: *Prevention, detection and treatment of chronic complications  Description: Define the natural course of diabetes and describe the relationship of blood glucose levels to long term complications of diabetes.   Outcome: Progressing Towards Goal  Goal: *Developing strategies to address psychosocial issues  Description: Describe feelings about living with diabetes; identify support needed and support network  Outcome: Progressing Towards Goal  Goal: *Insulin pump training  Outcome: Progressing Towards Goal  Goal: *Sick day guidelines  Outcome: Progressing Towards Goal

## 2023-01-10 NOTE — PROGRESS NOTES
Spoke with Dr. Puja Beasley who stated to get the BMP at midnight and to talk with the night admitting Hospitalist if the blood glucose is greater than 400 it get an insulin drip.

## 2023-01-10 NOTE — PROGRESS NOTES
End of Shift Note    Bedside shift change report given to aBrbie Daugherty RN (oncoming nurse) by Kristen Dumas (offgoing nurse).   Report included the following information SBAR and Cardiac Rhythm NSR    Shift worked:  9503-2748     Shift summary and any significant changes:          Concerns for physician to address:       Zone phone for oncoming shift:          Activity:     Number times ambulated in hallways past shift: 0  Number of times OOB to chair past shift: 0    Cardiac:   Cardiac Monitoring: Yes      Cardiac Rhythm: Sinus Rhythm    Access:  Current line(s): PIV     Genitourinary:   Urinary status: voiding    Respiratory:      Chronic home O2 use?: N/A  Incentive spirometer at bedside: N/A       GI:     Current diet:  ADULT DIET Regular; 4 carb choices (60 gm/meal)  Passing flatus: YES  Tolerating current diet: YES       Pain Management:   Patient states pain is manageable on current regimen: N/A    Skin:     Interventions: float heels and increase time out of bed    Patient Safety:  Fall Score:    Interventions: bed/chair alarm, gripper socks, and pt to call before getting OOB       Length of Stay:  Expected LOS: - - -  Actual LOS: 0      Kristen Dumas

## 2023-01-10 NOTE — PROGRESS NOTES
1005: Received VORB from Dr. Sangeetha Del Toro for 5mg of valium before MRI and IP Diabetes management consult. End of Shift Note    Bedside shift change report given to Jacinda Serrano RN (oncoming nurse) by Monique Byers RN (offgoing nurse). Report included the following information SBAR, Kardex, Intake/Output, MAR, Recent Results, and Cardiac Rhythm sinus rhythm    Shift worked:  [id number]     Shift summary and any significant changes:    MRI complete  CT complete   Patient provided stroke education packet     Concerns for physician to address:       Zone phone for oncoming shift:          Activity:  Activity Level:  Up with Assistance  Number times ambulated in hallways past shift: 0  Number of times OOB to chair past shift: 1    Cardiac:   Cardiac Monitoring: Yes      Cardiac Rhythm: Sinus Rhythm    Access:  Current line(s): PIV     Genitourinary:   Urinary status: voiding    Respiratory:   O2 Device: None (Room air)  Chronic home O2 use?: NO  Incentive spirometer at bedside: YES       GI:  Last Bowel Movement Date: 01/10/23  Current diet:  ADULT DIET Regular; 3 carb choices (45 gm/meal)  Passing flatus: YES  Tolerating current diet: YES       Pain Management:   Patient states pain is manageable on current regimen: YES    Skin:  Neal Score: 19  Interventions: float heels, increase time out of bed, PT/OT consult, and internal/external urinary devices    Patient Safety:  Fall Score:    Interventions: bed/chair alarm, assistive device (walker, cane, etc), gripper socks, pt to call before getting OOB, and stay with me (per policy)       Length of Stay:  Expected LOS: 2d 21h  Actual LOS: 1      Monique Byers RN

## 2023-01-10 NOTE — CONSULTS
Neurology Note    Patient ID:  Alexandria Diaz  [id number]  4 y.o.  1950      Date of Consultation:  January 10, 2023    Referring Physician: Dr. Loreto Hunter    Reason for Consultation:  stroke      Assessment and Plan:    The patient is a pleasant 74year old female admitted with vision abnormalities and hyperglycemia. Neuroimaging revealed an acute occipital stroke. Acute ischemic stroke  risk factors for stroke include: Hypertension, diabetes, dyslipidemia  Brain MRI reveals acute stroke in the left occipital lobe. Antiplatelets: The patient should be on 81 mg asprin  Hypertension: non aggressive blood pressure control for the first 24 hours then Aggressive control with goal  blood pressure less than 140/90  Dyslipidemia: please obtain a lipid panel. Start intensive statin therapy. Goal LDL is less than 70. Diabetes: hgba1c was 13.5 needs aggressive glycemic control and education  Carotid Doppler study ordered. I have ordered a CTA to look at the posterior circulation as her stroke was in the occipital lobe. Echo ordered  The patient will need physical therapy, Occupational Therapy consultation. I provided stroke education today in regards to risk factors for stroke and lifestyle modifications to help minimize the risk of future stroke. This included medication compliance, regular follow up with primary care physician,  and healthy lifestyle habits (nutrition/exercise)    Peripheral neuropathy:  This is most likely related to the patient's history of diabetes. This can be further evaluated in the outpatient setting with an EMG/nerve conduction study. Stressed the importance of good tight glycemic control.     Neurology will continue to follow along closely in this patient with acute stroke and risk for decompensation           Subjective: my vision     History of Present Illness:   Alexandria Diaz is a 74 y.o. female with a history of diabetes and hypertension who has noted intermittent visual disturbances over the past month. She did have a recent COVID infection a couple weeks ago. She states that she has been seeing floaters in her eyes. She has also had blurred vision and a sometimes double vision    She does have chronic numbness and tingling in her bilateral lower extremities. She reported that she had been previously diagnosed with diabetes but that it had improved and she was not taking any medications for it. She stated that she used to take an aspirin but has not taken 1 over the past couple months. She denies any focal weakness. Mild balance difficulties. Upon admission her  Glucose level was greater than 600. Due to the visual symptoms, the primary team ordered a brain MRI which did reveal evidence of an acute stroke in the occipital lobe    Past Medical History:   Diagnosis Date    Adverse effect of anesthesia     O2 DROPS WITH ANESTHESIA    Arthritis     KNEES    Cancer ([facility], [place] [address] 03/13/2015    SQUAMOUS CELL CARCINOMA; tonsils and lymph nodes.   Removed 3-2015 with radiation    GERD (gastroesophageal reflux disease)     Hypertension     NO LONGER ON MEDICATION    Hypothyroid     HYPOTHYROIDISM    Migraine     Nausea & vomiting     Obesity     Other ill-defined conditions(799.89)     chronic back pain    Psychiatric disorder     anxiety    Psychiatric disorder     panic ATTACKS    SVT (supraventricular tachycardia) ([facility] [place] [address] 05/24/2014    Ulcerative colitis         Past Surgical History:   Procedure Laterality Date    COLONOSCOPY,DIAGNOSTIC  11/19/2015         HX GI      colonscopy    HX HEENT  03/2015    tonsillectomy (CANCER) AND NECK LYMPH NODES    HX HEENT  2008    PARATHYROID REMOVAL    HX HEENT Left 2002    EYE LASER    HX HEMORRHOIDECTOMY  2001, 2016     X2    HX HYSTERECTOMY  1985    HX KNEE ARTHROSCOPY  1995    left knee surgery, TOTAL LT  and Right KNEE 2013    HX KNEE REPLACEMENT Bilateral 2013, 2014    HX LAP CHOLECYSTECTOMY  2002    HX LUMBAR FUSION  2011    SPINAL FUSION with hardware L4-L5    HX ORTHOPAEDIC  1990    CYST REMOVED RIGHT WRIST    HX ORTHOPAEDIC  05/12/2021    L2-3 & L5-21 LUMBAR LAMINECTOMY WITH ANDREWS OSTEOTOMY    HX OTHER SURGICAL      cyst removal right wrist    HX UROLOGICAL  2010    bladder surgery(interstim device)    IR INJ FORAMIN EPID LUMB ANES/STER SNGL  8/19/2019    [place] EGD TRANSORAL BIOPSY SINGLE/MULTIPLE  5/20/2013             Family History   Problem Relation Age of Onset    Cancer Mother         ovarian ca? Heart Disease Father         MI    Heart Attack Father     Cancer Father         MULTIPLE MYELOMA    Liver Disease Father         FROM MEDICATIONS FOR MULTIPLE MYELOMA    OSTEOARTHRITIS Sister     OSTEOARTHRITIS Brother     Cancer Paternal Grandmother 79        breast ca    Breast Cancer Paternal Grandmother 79    Breast Cancer Maternal Aunt 55    Breast Cancer Maternal Aunt 60    Breast Cancer Paternal Aunt 43    Breast Cancer Paternal Aunt         52's    Emphysema Paternal Grandfather     No Known Problems Sister     No Known Problems Brother     No Known Problems Brother     Anesth Problems Neg Hx         Social History     Tobacco Use    Smoking status: Never    Smokeless tobacco: Never   Substance Use Topics    Alcohol use: No        Allergies   Allergen Reactions    Adhesive Tape-Silicones Rash    Aloe Vera Rash    Chlorhexidine Rash    Tussin [Dextromethorphan Hbr] Palpitations    Readyprep Chg [Chlorhexidine Gluconate] Rash        Prior to Admission medications    Medication Sig Start Date End Date Taking? Authorizing Provider   levothyroxine (SYNTHROID) 137 mcg tablet Take 137 mcg by mouth daily. 5/10/22  Yes Provider, Historical   clonazePAM (KlonoPIN) 0.5 mg tablet Take 0.5 Tablets by mouth two (2) times a day. Max Daily Amount: 0.5 mg. 1/27/22  Yes Lucila Upton NP   acetaminophen (TYLENOL) 325 mg tablet Take 2 Tablets by mouth every six (6) hours as needed for Pain.  7/20/21  Yes Arbie Libman, NP   atorvastatin (LIPITOR) 40 mg tablet Take 40 mg by mouth nightly. Yes Provider, Historical   butalb/acetaminophen/caffeine (FIORICET PO) Take 1 Tablet by mouth as needed. Yes Provider, Historical   PARoxetine (PAXIL) 40 mg tablet Take 40 mg by mouth every morning. Yes Provider, Historical   metoprolol tartrate (LOPRESSOR) 25 mg tablet Take 12.5 mg by mouth nightly. Yes Provider, Historical   metoprolol (LOPRESSOR) 25 mg tablet Take 25 mg by mouth every morning. Yes Provider, Historical   omeprazole (PRILOSEC) 40 mg capsule Take 40 mg by mouth every morning. Yes Provider, Historical   amLODIPine (NORVASC) 10 mg tablet Take 1 Tablet by mouth daily. Patient not taking: Reported on 1/9/2023 1/5/22   Domenico COFFEY DO   nystatin (MYCOSTATIN) 100,000 unit/gram ointment Apply  to affected area three (3) times daily. Patient not taking: Reported on 1/9/2023 12/9/21   Zoë Finney MD   L.acid,para-B. bifidum-S.therm (RISAQUAD) 8 billion cell cap cap Take 1 Capsule by mouth daily. Patient not taking: No sig reported 7/21/21   Gauri Dunn NP   miconazole (MICOTIN) 2 % topical powder Apply  to affected area two (2) times a day. Patient not taking: Reported on 1/9/2023 7/20/21   Gauri Dunn NP   amitriptyline (ELAVIL) 50 mg tablet Take 50 mg by mouth nightly. Patient not taking: Reported on 1/9/2023    Provider, Historical   aspirin delayed-release 81 mg tablet Take 81 mg by mouth daily.   Patient not taking: Reported on 1/9/2023    Provider, Historical     Current Facility-Administered Medications   Medication Dose Route Frequency    doxycycline (VIBRA-TABS) tablet 100 mg  100 mg Oral Q12H    amitriptyline (ELAVIL) tablet 50 mg  50 mg Oral QHS    amLODIPine (NORVASC) tablet 10 mg  10 mg Oral DAILY    aspirin delayed-release tablet 81 mg  81 mg Oral DAILY    atorvastatin (LIPITOR) tablet 40 mg  40 mg Oral QHS    clonazePAM (KlonoPIN) tablet 0.25 mg  0.25 mg Oral BID    levothyroxine (SYNTHROID) tablet 137 mcg  137 mcg Oral DAILY metoprolol tartrate (LOPRESSOR) tablet 25 mg  25 mg Oral 7am    pantoprazole (PROTONIX) tablet 40 mg  40 mg Oral ACB    PARoxetine (PAXIL) tablet 40 mg  40 mg Oral 7am    sodium chloride (NS) flush 5-40 mL  5-40 mL IntraVENous Q8H    sodium chloride (NS) flush 5-40 mL  5-40 mL IntraVENous PRN    acetaminophen (TYLENOL) tablet 650 mg  650 mg Oral Q6H PRN    Or    acetaminophen (TYLENOL) suppository 650 mg  650 mg Rectal Q6H PRN    polyethylene glycol (MIRALAX) packet 17 g  17 g Oral DAILY PRN    ondansetron (ZOFRAN ODT) tablet 4 mg  4 mg Oral Q8H PRN    Or    ondansetron (ZOFRAN) injection 4 mg  4 mg IntraVENous Q6H PRN    enoxaparin (LOVENOX) injection 30 mg  30 mg SubCUTAneous Q12H    0.9% sodium chloride infusion  125 mL/hr IntraVENous CONTINUOUS    insulin glargine (LANTUS) injection 35 Units  35 Units SubCUTAneous QHS    insulin lispro (HUMALOG) injection 8 Units  8 Units SubCUTAneous TIDAC    insulin lispro (HUMALOG) injection   SubCUTAneous AC&HS    glucose chewable tablet 16 g  4 Tablet Oral PRN    glucagon (GLUCAGEN) injection 1 mg  1 mg IntraMUSCular PRN    dextrose 10% infusion 0-250 mL  0-250 mL IntraVENous PRN       Review of Systems:    General, constitutional: negative  Eyes, vision: negative  Ears, nose, throat: negative  Cardiovascular, heart: negative  Respiratory: negative  Gastrointestinal: negative  Genitourinary: negative  Musculoskeletal: negative  Skin and integumentary: negative  Psychiatric: negative  Endocrine: negative  Neurological: negative, except for HPI  Hematologic/lymphatic: negative  Allergy/immunology: negative    Objective:     Visit Vitals  /66   Pulse 69   Temp 97.8 °F (36.6 °C)   Resp 10   Ht 5' 3.39\" (1.61 m)   Wt 257 lb 11.5 oz (116.9 kg)   SpO2 98%   BMI 45.10 kg/m²       Physical Exam:      General:  appears well nourished in no acute distress  Neck: no carotid bruits  Lungs: clear to auscultation  Heart:  no murmurs, regular rate  Lower extremity: peripheral pulses palpable and no edema  Skin: intact    Neurological exam:    Awake, alert, oriented to person, place and time  Recent and remote memory were normal  Attention and concentration were intact  Language was intact. There was no aphasia  Speech: no dysarthria  Fund of knowledge was preserved    Cranial nerves:   II-XII were tested    Perrrla  Fundoscopic examination revealed venous pulsations and no clear abnormalities  Visual fields were full  Eomi, no evidence of nystagmus  Facial sensation:  normal and symmetric  Facial motor: normal and symmetric  Hearing intact  SCM strength intact  Tongue: midline without fasciculations    Motor: Tone normal  Pronator drift was absent  No evidence of fasciculations    Strength testing:   deltoid triceps biceps Wrist ext. Wrist flex. intrinsics Hip flex. Hip ext. Knee ext. Knee flex Dorsi flex Plantar flex   Right 5 5 5 5 5 5 5 5 5 5 5 5   Left 5 5 5 5 5 5 5 5 5 5 5 5         Sensory:  Upper extremity: intact to pp, light touch, and vibration > 10 seconds  Lower extremity: vibration was absent in her toes. Is present for 3 seconds at her ankles. Pinprick is decreased to mid shin bilaterally    Reflexes:    Right Left  Biceps  1 1  Triceps             1 1  Brachiorad.  1 1  Patella  1 1  Achilles - -    Plantar response:  flexor bilaterally    Cerebellar testing:  no tremor apparent, finger/nose and tracey were intact    Gait: not assessed due to current medical condition, receiving iv therapy  Labs:     Lab Results   Component Value Date/Time    Hemoglobin A1c 13.5 (H) 01/10/2023 03:51 AM    Sodium 135 (L) 01/10/2023 03:51 AM    Potassium 3.7 01/10/2023 03:51 AM    Chloride 103 01/10/2023 03:51 AM    Glucose 235 (H) 01/10/2023 03:51 AM    BUN 19 01/10/2023 03:51 AM    Creatinine 1.02 01/10/2023 03:51 AM    Calcium 8.9 01/10/2023 03:51 AM    WBC 8.9 01/10/2023 03:51 AM    HCT 41.6 01/10/2023 03:51 AM    HGB 13.2 01/10/2023 03:51 AM    PLATELET 421 24/66/7176 03:51 AM Imaging:    Results from [place] encounter on 01/09/23    MRI BRAIN WO CONT    Narrative  EXAM: MRI BRAIN WO CONT    INDICATION: visual difficutly    COMPARISON: CT head 1/9/2023. CONTRAST: None. TECHNIQUE:  Multiplanar multisequence acquisition without contrast of the brain. FINDINGS:  Few tiny acute infarcts in the left occipital lobe. The ventricles are normal in size and position. Scattered periventricular and  deep white matter T2/FLAIR hyperintensities, and patchy T2/FLAIR hyperintensity  in the emiliano, consistent with mild chronic microvascular ischemic disease. There  is no acute hemorrhage, extra-axial fluid collection, or mass effect. There is  no cerebellar tonsillar herniation. Expected arterial flow-voids are present. Mild mucosal thickening in the right maxillary sinus and bilateral posterior  ethmoidal air cells. The mastoid air cells and middle ears are clear. The  orbital contents are within normal limits with bilateral lens implants. No  significant osseous or scalp lesions are identified. Severe facet arthropathy in  the upper cervical spine. Impression  1. Few tiny acute infarcts in the left occipital lobe. 2. Mild chronic microvascular ischemic disease. Results from [place] encounter on 01/09/23    CT HEAD WO CONT    Narrative  CLINICAL HISTORY: vision changes  INDICATION: vision changes  COMPARISON: 2021. CT dose reduction was achieved through use of a standardized protocol tailored  for this examination and automatic exposure control for dose modulation. TECHNIQUE: Serial axial images with a collimation of 5 mm were obtained from the  skull base through the vertex  FINDINGS:  There is sulcal and ventricular prominence. Confluent periventricular and  scattered foci of hypodensity in the cerebral white matter. There is no evidence  of an acute infarction, hemorrhage, or mass-effect. There is no evidence of  midline shift or hydrocephalus. Posterior fossa structures are unremarkable. No  extra-axial collections are seen. Mastoid air cells are well pneumatized and clear. There is no evidence of depressed skull fractures of soft tissue swelling. Impression  No acute intracranial process. Imaging findings consistent with mild chronic microvascular ischemic change. There is a minimal degree of cerebral atrophy. I did independently reviewed the head ct performed on 1/9/2023. There was no acute abnormalities. Mild chronic microvascular ischemic disease was noted. I did independently review the brain mri from 1/10/2023. There was an acute stroke in the left occipital lobe. Mild chronic microvascular ischemic disease            Complex decision making was necessary due to the patient's current medical condition and other medical co-morbidities.        Active Problems:    Hyperglycemia (11/6/2021)                   Signed By:  Marilyn Yoon DO FAAN    January 10, 2023

## 2023-01-10 NOTE — H&P
Hospitalist Admission Note    NAME: Tanesha Escobar   :  1950   MRN:  [id number]     Date/Time:  2023 10:36 PM    Patient PCP: Rosendo Wharton MD  ______________________________________________________________________  Given the patient's current clinical presentation, I have a high level of concern for decompensation if discharged from the emergency department. Complex decision making was performed, which includes reviewing the patient's available past medical records, laboratory results, and x-ray films. My assessment of this patient's clinical condition and my plan of care is as follows. Assessment / Plan:  Hyperglycemia  History of diabetes mellitus  -Patient reports that she was given of diabetes and has not been taking any medication since last 1 year or so  -We will check hemoglobin A1c  -She is currently not reporting any abdominal pain, nausea or vomiting  -It is elevated more at 650  -She received 10 units of NovoLog insulin IV in the emergency department  -We will start insulin 35 units at bedtime and give a dose now. Start insulin lispro 8 units 3 times daily and also insulin sliding scale  -Start diabetic diet  -She does not meet criteria for DKA or HHS  -We will repeat a BMP around 1130 and if blood sugar is still more than 400, will consider starting on insulin drip  -Consult diabetic team  -Further insulin adjustments based on hemoglobin A1c and also blood sugars    Blurred vision  -Patient reports visual difficulties  involving blurred vision, occasional floaters and also occasional double vision  -She does have chronic bilateral tingling of both legs which is chronic  -She does not report any other associated symptoms  -We will check MRI of the brain  -She has seen a ophthalmologist today who mentioned that there is no ocular problem    Lactic acid  -likley dehydration from severe hypoglycemia  -S/p bolus IV fluids in the ED. Continue IV fluids.   Recheck lactic acid in 6 hours        Hypertension  Neuropathy  Dyslipidemia  Hypothyroidism  GERD  Depression  -Continue Norvasc, Elavil, Lipitor, levothyroxine, PPI, Paxil        Code Status: Full code  Surrogate Decision Maker:    DVT Prophylaxis: Lovenox  GI Prophylaxis: not indicated    Baseline: From home, independent of ADLs      Subjective:   CHIEF COMPLAINT: Visual problems    HISTORY OF PRESENT ILLNESS:     Charlene Weinstein is a 74 y.o.  female who presents with past medical history of diabetes mellitus, hypertension is coming the hospital chief complaints of vision difficulty since last at least 1 month. Patient reports being short of breath and labored a month ago when she started noticing some floaters in her vision along with some blurred vision and also sometimes double vision. Symptoms really got worse in the last 2 weeks or so. She went to see an ophthalmologist who advised her to go to the emergency department for further evaluation. She also reports chronic bilateral tingling and numbness over both her feet. She reports having a history of diabetes mellitus but that was cured about 1 year ago and since that she has not been taking any medications. On arrival today, vital signs are within normal limits. Labs noted to have WBC of 11, hemoglobin of 15 and platelet count of 522. BMP shows a glucose of 659, chloride of 88, sodium of 124, platelets of 224. CT head shows chronic microvascular changes    We were asked to admit for work up and evaluation of the above problems. Past Medical History:   Diagnosis Date    Adverse effect of anesthesia     O2 DROPS WITH ANESTHESIA    Arthritis     KNEES    Cancer ([facility] [address] 03/13/2015    SQUAMOUS CELL CARCINOMA; tonsils and lymph nodes.   Removed 3-2015 with radiation    GERD (gastroesophageal reflux disease)     Hypertension     NO LONGER ON MEDICATION    Hypothyroid     HYPOTHYROIDISM    Migraine     Nausea & vomiting     Obesity     Other ill-defined conditions(799.89) chronic back pain    Psychiatric disorder     anxiety    Psychiatric disorder     panic ATTACKS    SVT (supraventricular tachycardia) (HCC) 05/24/2014    Ulcerative colitis         Past Surgical History:   Procedure Laterality Date    COLONOSCOPY,DIAGNOSTIC  11/19/2015         HX GI      colonscopy    HX HEENT  03/2015    tonsillectomy (CANCER) AND NECK LYMPH NODES    HX HEENT  2008    PARATHYROID REMOVAL    HX HEENT Left 2002    EYE LASER    HX HEMORRHOIDECTOMY  2001, 2016     X2    HX HYSTERECTOMY  1985    HX KNEE ARTHROSCOPY  1995    left knee surgery, TOTAL LT  and Right KNEE 2013    HX KNEE REPLACEMENT Bilateral 2013, 2014    HX LAP CHOLECYSTECTOMY  2002    HX LUMBAR FUSION  2011    SPINAL FUSION with hardware L4-L5    HX ORTHOPAEDIC  1990    CYST REMOVED RIGHT WRIST    HX ORTHOPAEDIC  05/12/2021    L2-3 & L5-21 LUMBAR LAMINECTOMY WITH ANDREWS OSTEOTOMY    HX OTHER SURGICAL      cyst removal right wrist    HX UROLOGICAL  2010    bladder surgery(interstim device)    IR INJ FORAMIN EPID LUMB ANES/STER SNGL  8/19/2019    [place] EGD TRANSORAL BIOPSY SINGLE/MULTIPLE  5/20/2013            Social History     Tobacco Use    Smoking status: Never    Smokeless tobacco: Never   Substance Use Topics    Alcohol use: No        Family History   Problem Relation Age of Onset    Cancer Mother         ovarian ca?     Heart Disease Father         MI    Heart Attack Father     Cancer Father         MULTIPLE MYELOMA    Liver Disease Father         FROM MEDICATIONS FOR MULTIPLE MYELOMA    OSTEOARTHRITIS Sister     OSTEOARTHRITIS Brother     Cancer Paternal Grandmother 79        breast ca    Breast Cancer Paternal Grandmother 79    Breast Cancer Maternal Aunt 55    Breast Cancer Maternal Aunt 61    Breast Cancer Paternal Aunt 37    Breast Cancer Paternal Aunt         52's    Emphysema Paternal Grandfather     No Known Problems Sister     No Known Problems Brother     No Known Problems Brother     Anesth Problems Neg Hx      Allergies Allergen Reactions    Adhesive Tape-Silicones Rash    Aloe Vera Rash    Chlorhexidine Rash    Tussin [Dextromethorphan Hbr] Palpitations    Readyprep Chg [Chlorhexidine Gluconate] Rash        Prior to Admission medications    Medication Sig Start Date End Date Taking? Authorizing Provider   levothyroxine (SYNTHROID) 137 mcg tablet Take 137 mcg by mouth daily. 5/10/22  Yes Provider, Historical   clonazePAM (KlonoPIN) 0.5 mg tablet Take 0.5 Tablets by mouth two (2) times a day. Max Daily Amount: 0.5 mg. 1/27/22  Yes Nelly Fernandez NP   acetaminophen (TYLENOL) 325 mg tablet Take 2 Tablets by mouth every six (6) hours as needed for Pain. 7/20/21  Yes Neel Chavez NP   atorvastatin (LIPITOR) 40 mg tablet Take 40 mg by mouth nightly. Yes Provider, Historical   butalb/acetaminophen/caffeine (FIORICET PO) Take 1 Tablet by mouth as needed. Yes Provider, Historical   PARoxetine (PAXIL) 40 mg tablet Take 40 mg by mouth every morning. Yes Provider, Historical   metoprolol tartrate (LOPRESSOR) 25 mg tablet Take 12.5 mg by mouth nightly. Yes Provider, Historical   metoprolol (LOPRESSOR) 25 mg tablet Take 25 mg by mouth every morning. Yes Provider, Historical   omeprazole (PRILOSEC) 40 mg capsule Take 40 mg by mouth every morning. Yes Provider, Historical   amLODIPine (NORVASC) 10 mg tablet Take 1 Tablet by mouth daily. Patient not taking: Reported on 1/9/2023 1/5/22   Igor COFFEY DO   nystatin (MYCOSTATIN) 100,000 unit/gram ointment Apply  to affected area three (3) times daily. Patient not taking: Reported on 1/9/2023 12/9/21   Rita Dejesus MD   LKasandraacid,para-B. bifidum-S.therm (RISAQUAD) 8 billion cell cap cap Take 1 Capsule by mouth daily. Patient not taking: No sig reported 7/21/21   Neel Chavez NP   miconazole (MICOTIN) 2 % topical powder Apply  to affected area two (2) times a day.   Patient not taking: Reported on 1/9/2023 7/20/21   Neel Chavez NP   amitriptyline (ELAVIL) 50 mg tablet Take 50 mg by mouth nightly. Patient not taking: Reported on 1/9/2023    Provider, Historical   aspirin delayed-release 81 mg tablet Take 81 mg by mouth daily. Patient not taking: Reported on 1/9/2023    Provider, Historical       REVIEW OF SYSTEMS:     I am not able to complete the review of systems because: The patient is intubated and sedated    The patient has altered mental status due to his acute medical problems    The patient has baseline aphasia from prior stroke(s)    The patient has baseline dementia and is not reliable historian    The patient is in acute medical distress and unable to provide information           Total of 12 systems reviewed as follows:       POSITIVE= underlined text  Negative = text not underlined  General:  fever, chills, sweats, generalized weakness, weight loss/gain,      loss of appetite   Eyes:    blurred vision, eye pain, loss of vision, double vision  ENT:    rhinorrhea, pharyngitis   Respiratory:   cough, sputum production, SOB, AVALOS, wheezing, pleuritic pain   Cardiology:   chest pain, palpitations, orthopnea, PND, edema, syncope   Gastrointestinal:  abdominal pain , N/V, diarrhea, dysphagia, constipation, bleeding   Genitourinary:  frequency, urgency, dysuria, hematuria, incontinence   Muskuloskeletal :  arthralgia, myalgia, back pain  Hematology:  easy bruising, nose or gum bleeding, lymphadenopathy   Dermatological: rash, ulceration, pruritis, color change / jaundice  Endocrine:   hot flashes or polydipsia   Neurological:  headache, dizziness, confusion, focal weakness, paresthesia,     Speech difficulties, memory loss, gait difficulty  Psychological: Feelings of anxiety, depression, agitation    Objective:   VITALS:    Visit Vitals  /88   Pulse 74   Temp 97.9 °F (36.6 °C)   Resp 20   Ht 5' 4\" (1.626 m)   Wt 113.4 kg (250 lb)   SpO2 100%   BMI 42.91 kg/m²       PHYSICAL EXAM:    General:    Alert, cooperative, no distress, appears stated age. HEENT: Atraumatic, anicteric sclerae, pink conjunctivae     No oral ulcers, mucosa moist, throat clear, dentition fair  Neck:  Supple, symmetrical,  thyroid: non tender  Lungs:   Clear to auscultation bilaterally. No Wheezing or Rhonchi. No rales. Chest wall:  No tenderness  No Accessory muscle use. Heart:   Regular  rhythm,  No  murmur   No edema  Abdomen:   Soft, non-tender. Not distended. Bowel sounds normal  Extremities: No cyanosis. No clubbing,      Skin turgor normal, Capillary refill normal, Radial dial pulse 2+  Skin:     Not pale. Not Jaundiced  No rashes   Psych:  Good insight. Not depressed. Not anxious or agitated. Neurologic: No aphasia or slurred speech. Symmetrical strength, Sensation grossly intact. Alert and oriented X 4.     _______________________________________________________________________  Care Plan discussed with:    Comments   Patient y    Family      RN y    Care Manager                    Consultant:      _______________________________________________________________________  Expected  Disposition:   Home with Family    HH/PT/OT/RN    SNF/LTC    ANANTH    ________________________________________________________________________  TOTAL TIME:  61 Minutes    Critical Care Provided     Minutes non procedure based      Comments    y Reviewed previous records   >50% of visit spent in counseling and coordination of care y Discussion with patient and/or family and questions answered       ________________________________________________________________________  Signed: Azul Sexton MD    Procedures: see electronic medical records for all procedures/Xrays and details which were not copied into this note but were reviewed prior to creation of Plan.     LAB DATA REVIEWED:    Recent Results (from the past 24 hour(s))   CBC WITH AUTOMATED DIFF    Collection Time: 01/09/23  3:24 PM   Result Value Ref Range    WBC 11.1 (H) 3.6 - 11.0 K/uL    RBC 5.81 (H) 3.80 - 5.20 M/uL    HGB 15.1 11.5 - 16.0 g/dL    HCT 46.8 35.0 - 47.0 %    MCV 80.6 80.0 - 99.0 FL    MCH 26.0 26.0 - 34.0 PG    MCHC 32.3 30.0 - 36.5 g/dL    RDW 16.4 (H) 11.5 - 14.5 %    PLATELET 501 296 - 936 K/uL    MPV 11.6 8.9 - 12.9 FL    NRBC 0.0 0  WBC    ABSOLUTE NRBC 0.00 0.00 - 0.01 K/uL    NEUTROPHILS 69 32 - 75 %    LYMPHOCYTES 21 12 - 49 %    MONOCYTES 8 5 - 13 %    EOSINOPHILS 1 0 - 7 %    BASOPHILS 0 0 - 1 %    IMMATURE GRANULOCYTES 1 (H) 0.0 - 0.5 %    ABS. NEUTROPHILS 7.7 1.8 - 8.0 K/UL    ABS. LYMPHOCYTES 2.4 0.8 - 3.5 K/UL    ABS. MONOCYTES 0.9 0.0 - 1.0 K/UL    ABS. EOSINOPHILS 0.1 0.0 - 0.4 K/UL    ABS. BASOPHILS 0.0 0.0 - 0.1 K/UL    ABS. IMM. GRANS. 0.1 (H) 0.00 - 0.04 K/UL    DF AUTOMATED     METABOLIC PANEL, COMPREHENSIVE    Collection Time: 01/09/23  3:24 PM   Result Value Ref Range    Sodium 124 (L) 136 - 145 mmol/L    Potassium 4.7 3.5 - 5.1 mmol/L    Chloride 88 (L) 97 - 108 mmol/L    CO2 26 21 - 32 mmol/L    Anion gap 10 5 - 15 mmol/L    Glucose 659 (HH) 65 - 100 mg/dL    BUN 23 (H) 6 - 20 MG/DL    Creatinine 1.46 (H) 0.55 - 1.02 MG/DL    BUN/Creatinine ratio 16 12 - 20      eGFR 38 (L) >60 ml/min/1.73m2    Calcium 9.7 8.5 - 10.1 MG/DL    Bilirubin, total 0.7 0.2 - 1.0 MG/DL    ALT (SGPT) 34 12 - 78 U/L    AST (SGOT) 24 15 - 37 U/L    Alk.  phosphatase 175 (H) 45 - 117 U/L    Protein, total 7.1 6.4 - 8.2 g/dL    Albumin 3.5 3.5 - 5.0 g/dL    Globulin 3.6 2.0 - 4.0 g/dL    A-G Ratio 1.0 (L) 1.1 - 2.2     BLOOD GAS,CHEM8,LACTIC ACID POC    Collection Time: 01/09/23  6:46 PM   Result Value Ref Range    Calcium, ionized (POC) 1.16 1.12 - 1.32 mmol/L    BICARBONATE 22 mmol/L    Base deficit (POC) 1.4 mmol/L    Sample source VENOUS BLOOD      CO2, POC 21 19 - 24 MMOL/L    Sodium,  (L) 136 - 145 MMOL/L    Potassium, POC 4.6 3.5 - 5.5 MMOL/L    Chloride, POC 94 (L) 100 - 108 MMOL/L    Glucose,  (HH) 74 - 106 MG/DL    Creatinine, POC 0.9 0.6 - 1.3 MG/DL    Lactic Acid (POC) 2.16 (HH) 0.40 - 2.00 mmol/L Critical value read back MARYJANE GOMES     pH, venous (POC) 7.42 7.32 - 7.42      pCO2, venous (POC) 34.9 (L) 41 - 51 MMHG    pO2, venous (POC) 54 (H) 25 - 40 mmHg   GLUCOSE, POC    Collection Time: 01/09/23  7:02 PM   Result Value Ref Range    Glucose (POC) 460 (H) 65 - 117 mg/dL    Performed by Rozanna Shone \"Yue\" NAZIA (CON)    GLUCOSE, POC    Collection Time: 01/09/23  9:05 PM   Result Value Ref Range    Glucose (POC) 311 (H) 65 - 117 mg/dL    Performed by Angela CASON

## 2023-01-10 NOTE — PROGRESS NOTES
Hospitalist Progress Note    NAME: Magali Stanton   :  1950   MRN:  [id number]       Assessment / Plan:  Hyperglycemia  History of diabetes mellitus  -Patient reports that she was given of diabetes and has not been taking any medication since last 1 year or so  HBa1c 13.5%   -She is currently not reporting any abdominal pain, nausea or vomiting  -It is elevated more at 650  -She received 10 units of NovoLog insulin IV in the emergency department  C/w nsulin 35 units at bedtime ,Start insulin lispro 8 units 3 times daily and also insulin sliding scale  -Start diabetic diet  -She does not meet criteria for DKA or HHS  --Consult diabetic team      Blurred vision  Occipitalstroke   -Patient reports visual difficulties  involving blurred vision, occasional floaters and also occasional double vision  -She does have chronic bilateral tingling of both legs which is chronic  -She does not report any other associated symptoms  -She has seen a ophthalmologist today who mentioned that there is no ocular problem  MRI of the brain showed  1. Few tiny acute infarcts in the left occipital lobe. 2. Mild chronic microvascular ischemic disease. Will initiate stroke work-up, out of the permissive hypertension as her symptom has been going on for few days. We will check venous Doppler of the carotids, 2D echo, lipid profile and consult neurology  Continue with baby aspirin, most likely  will need addition of Plavix, will defer to neurology  Patient is already on statin  Lactic acid  -likley dehydration from severe hypoglycemia  -S/p bolus IV fluids in the ED. Continue IV fluids. Repeat lactic acid is within normal limit    ?  H/o back infection   Patient reported history of back infection, was told to remain on doxycycline until  by ID Dr. Derik Anaya   Will continue with home dose doxycycline 100 mg twice daily    Hypertension  Neuropathy  Dyslipidemia  Hypothyroidism  GERD  Depression  -Continue Norvasc, Elavil, Lipitor, levothyroxine, PPI, Paxil           Code Status: Full code  Surrogate Decision Maker:     DVT Prophylaxis: Lovenox  GI Prophylaxis: not indicated     Baseline: From home, independent of ADLs    40 or above Morbid obesity / Body mass index is 42.91 kg/m². Estimated discharge date: January 11  Barriers: Stroke work-up to be completed, good control of blood sugar and diabetic education    Code status: Full  Prophylaxis: Lovenox  Recommended Disposition:  PT, OT, RN     Subjective:     Chief Complaint / Reason for Physician Visit  Follow-up hyper glycemia, blurry vision discussed with RN events overnight. Patient complain of persistent blurry vision  Review of Systems:  Symptom Y/N Comments  Symptom Y/N Comments   Fever/Chills n   Chest Pain n    Poor Appetite    Edema     Cough    Abdominal Pain n    Sputum    Joint Pain     SOB/AVALOS n   Pruritis/Rash     Nausea/vomit    Tolerating PT/OT     Diarrhea    Tolerating Diet     Constipation    Other       Could NOT obtain due to:      Objective:     VITALS:   Last 24hrs VS reviewed since prior progress note. Most recent are:  Patient Vitals for the past 24 hrs:   Temp Pulse Resp BP SpO2   01/10/23 1443 97.8 °F (36.6 °C) 69 10 118/66 --   01/10/23 1143 98.2 °F (36.8 °C) 67 15 109/60 --   01/10/23 0901 -- 65 -- 103/70 --   01/10/23 0727 98.1 °F (36.7 °C) 66 20 (!) 151/89 98 %   01/10/23 0328 98.2 °F (36.8 °C) 67 20 123/73 99 %   01/09/23 2310 97.8 °F (36.6 °C) 66 18 105/71 100 %   01/09/23 2000 97.9 °F (36.6 °C) 74 20 122/88 100 %       Intake/Output Summary (Last 24 hours) at 1/10/2023 1533  Last data filed at 1/10/2023 4263  Gross per 24 hour   Intake 1000 ml   Output 650 ml   Net 350 ml        I had a face to face encounter and independently examined this patient on 1/10/2023, as outlined below:  PHYSICAL EXAM:  General: WD, WN. Alert, cooperative, no acute distress    EENT:  EOMI. Anicteric sclerae. MMM  Resp:  CTA bilaterally, no wheezing or rales.   No accessory muscle use  CV:  Regular  rhythm,  No edema  GI:  Soft, Non distended, Non tender. +Bowel sounds  Neurologic:  Alert and oriented X 3, normal speech,   Psych:   Good insight. Not anxious nor agitated  Skin:  No rashes. No jaundice    Reviewed most current lab test results and cultures  YES  Reviewed most current radiology test results   YES  Review and summation of old records today    NO  Reviewed patient's current orders and MAR    YES  PMH/SH reviewed - no change compared to H&P  ________________________________________________________________________  Care Plan discussed with:    Comments   Patient x    Family      RN x    Care Manager     Consultant                        Multidiciplinary team rounds were held today with , nursing, pharmacist and clinical coordinator. Patient's plan of care was discussed; medications were reviewed and discharge planning was addressed. ________________________________________________________________________  Total NON critical care TIME:  35   Minutes    Total CRITICAL CARE TIME Spent:   Minutes non procedure based      Comments   >50% of visit spent in counseling and coordination of care     ________________________________________________________________________  Felix Marmolejo MD     Procedures: see electronic medical records for all procedures/Xrays and details which were not copied into this note but were reviewed prior to creation of Plan. LABS:  I reviewed today's most current labs and imaging studies.   Pertinent labs include:  Recent Labs     01/10/23  0351 01/09/23  1524   WBC 8.9 11.1*   HGB 13.2 15.1   HCT 41.6 46.8    355     Recent Labs     01/10/23  0351 01/09/23  2231 01/09/23  1524   * 134* 124*   K 3.7 3.6 4.7    99 88*   CO2 29 27 26   * 222* 659*   BUN 19 23* 23*   CREA 1.02 1.19* 1.46*   CA 8.9 9.0 9.7   ALB  --   --  3.5   TBILI  --   --  0.7   ALT  --   --  34       Signed: Sin Sullivan, MD

## 2023-01-10 NOTE — PROGRESS NOTES
TRANSFER - IN REPORT:    Verbal report received from Medardo Weber RN (name) on Joaquina Ly  being received from ED (unit) for routine progression of care      Report consisted of patients Situation, Background, Assessment and   Recommendations(SBAR). Information from the following report(s) SBAR and Cardiac Rhythm NSR  was reviewed with the receiving nurse. Opportunity for questions and clarification was provided. Assessment completed upon patients arrival to unit and care assumed.

## 2023-01-10 NOTE — PROGRESS NOTES
Physical Therapy Screening:    An [facility] screening referral was triggered for physical therapy based on results obtained during the nursing admission assessment. The patients chart was reviewed and the patient is appropriate for a skilled therapy evaluation if there is a decline in functional mobility from baseline. Please order a consult for physical therapy if you are in agreement and would like an evaluation to be completed. Thank you.     Neva Bowens, PT, DPT

## 2023-01-10 NOTE — PROGRESS NOTES
Problem: Falls - Risk of  Goal: *Absence of Falls  Description: Document Margaret Chen Fall Risk and appropriate interventions in the flowsheet. Outcome: Progressing Towards Goal  Note: Fall Risk Interventions:                                Problem: Patient Education: Go to Patient Education Activity  Goal: Patient/Family Education  Outcome: Progressing Towards Goal     Problem: Diabetes Self-Management  Goal: *Disease process and treatment process  Description: Define diabetes and identify own type of diabetes; list 3 options for treating diabetes. Outcome: Progressing Towards Goal  Goal: *Incorporating nutritional management into lifestyle  Description: Describe effect of type, amount and timing of food on blood glucose; list 3 methods for planning meals. Outcome: Progressing Towards Goal  Goal: *Developing strategies to promote health/change behavior  Description: Define the ABC's of diabetes; identify appropriate screenings, schedule and personal plan for screenings.   Outcome: Progressing Towards Goal     Problem: Patient Education: Go to Patient Education Activity  Goal: Patient/Family Education  Outcome: Progressing Towards Goal

## 2023-01-10 NOTE — PROGRESS NOTES
Transition of Care Plan:    RUR:8& low  Disposition:home  If SNF or IPR: Date FOC offered:na  Date FOC received:na  Date authorization started with reference number:na  Date authorization received and expires:na  Accepting facility:na  Follow up appointments:PCP and Specialist  DME needed:patient has wheelchair, rolling walker  Transportation at [address] or means to access home: family       IM Medicare Letter:2nd Medicare letter to be signed at [facility] [place]  Is patient a [place] and connected with the [facility]? If yes, was [employer] transfer form completed and [facility] notified? Caregiver Contact:Boo Escudero (Spouse)   [phone] (    Discharge Caregiver contacted prior to discharge? If needed  Care Conference needed?:  no    Reason for Admission:  hyperglyemia                     RUR Score:    9%                 Plan for utilizing home health:    refused  PCP: First and Last name:  Jose Molina MD     Name of Practice:    Are you a current patient: Yes/No: yes   Approximate date of last visit:    Can you participate in a virtual visit with your PCP:                     Current Advanced Directive/Advance Care Plan: Full Code      Healthcare Decision Maker:   Click here to complete [address] including selection of the Healthcare Decision Maker Relationship (ie \"Primary\")                             Transition of Care Plan:    home     CM met with patient, explained role and offered assistance. She refused home health. She complains of altered vision but says she has been independent at home. She says her balance is off. She says she uses a wheelchair most of the time at home because of her altered balance and fear of falling. She lives with her  in a one story home with a ramp. She met with DTC to go over DM education . CM will follow up on continued discharge planning. Notified Dr. Jamie Buitrago in [address] [place] [address] of patient's refusal of PT at home.  Pt has asked for consult if it is needed from doctor. Consult received from doctor for dc planning.      Care Management Interventions  Support Systems: Spouse/Significant Other  Discharge Location  Patient Expects to be Discharged to[de-identified] Home with home health    [address] [place] [address]   [phone]

## 2023-01-11 ENCOUNTER — APPOINTMENT (OUTPATIENT)
Dept: VASCULAR SURGERY | Age: 73
DRG: 065 | End: 2023-01-11
Attending: INTERNAL MEDICINE
Payer: MEDICARE

## 2023-01-11 ENCOUNTER — APPOINTMENT (OUTPATIENT)
Dept: NON INVASIVE DIAGNOSTICS | Age: 73
DRG: 065 | End: 2023-01-11
Attending: INTERNAL MEDICINE
Payer: MEDICARE

## 2023-01-11 VITALS
HEIGHT: 63 IN | BODY MASS INDEX: 45.66 KG/M2 | HEART RATE: 87 BPM | DIASTOLIC BLOOD PRESSURE: 76 MMHG | RESPIRATION RATE: 18 BRPM | SYSTOLIC BLOOD PRESSURE: 151 MMHG | WEIGHT: 257.72 LBS | OXYGEN SATURATION: 98 % | TEMPERATURE: 98.5 F

## 2023-01-11 LAB
ANION GAP SERPL CALC-SCNC: 5 MMOL/L (ref 5–15)
BUN SERPL-MCNC: 13 MG/DL (ref 6–20)
BUN/CREAT SERPL: 15 (ref 12–20)
CALCIUM SERPL-MCNC: 8.5 MG/DL (ref 8.5–10.1)
CHLORIDE SERPL-SCNC: 108 MMOL/L (ref 97–108)
CHOLEST SERPL-MCNC: 118 MG/DL
CO2 SERPL-SCNC: 25 MMOL/L (ref 21–32)
CREAT SERPL-MCNC: 0.86 MG/DL (ref 0.55–1.02)
ECHO AV AREA PEAK VELOCITY: 2.3 CM2
ECHO AV AREA VTI: 2.4 CM2
ECHO AV AREA/BSA PEAK VELOCITY: 1.1 CM2/M2
ECHO AV AREA/BSA VTI: 1.1 CM2/M2
ECHO AV MEAN GRADIENT: 4 MMHG
ECHO AV MEAN VELOCITY: 0.9 M/S
ECHO AV PEAK GRADIENT: 5 MMHG
ECHO AV PEAK VELOCITY: 1.2 M/S
ECHO AV VELOCITY RATIO: 0.75
ECHO AV VTI: 23.5 CM
ECHO LV FRACTIONAL SHORTENING: 33 % (ref 28–44)
ECHO LV INTERNAL DIMENSION DIASTOLE INDEX: 1.67 CM/M2
ECHO LV INTERNAL DIMENSION DIASTOLIC: 3.6 CM (ref 3.9–5.3)
ECHO LV INTERNAL DIMENSION SYSTOLIC INDEX: 1.11 CM/M2
ECHO LV INTERNAL DIMENSION SYSTOLIC: 2.4 CM
ECHO LV IVSD: 1.5 CM (ref 0.6–0.9)
ECHO LV MASS 2D: 212 G (ref 67–162)
ECHO LV MASS INDEX 2D: 98.2 G/M2 (ref 43–95)
ECHO LV POSTERIOR WALL DIASTOLIC: 1.6 CM (ref 0.6–0.9)
ECHO LV RELATIVE WALL THICKNESS RATIO: 0.89
ECHO LVOT AREA: 2.8 CM2
ECHO LVOT AV VTI INDEX: 0.81
ECHO LVOT DIAM: 1.9 CM
ECHO LVOT MEAN GRADIENT: 2 MMHG
ECHO LVOT PEAK GRADIENT: 3 MMHG
ECHO LVOT PEAK VELOCITY: 0.9 M/S
ECHO LVOT STROKE VOLUME INDEX: 25.1 ML/M2
ECHO LVOT SV: 54.1 ML
ECHO LVOT VTI: 19.1 CM
ERYTHROCYTE [DISTWIDTH] IN BLOOD BY AUTOMATED COUNT: 17.1 % (ref 11.5–14.5)
GLUCOSE BLD STRIP.AUTO-MCNC: 152 MG/DL (ref 65–117)
GLUCOSE BLD STRIP.AUTO-MCNC: 179 MG/DL (ref 65–117)
GLUCOSE BLD STRIP.AUTO-MCNC: 208 MG/DL (ref 65–117)
GLUCOSE SERPL-MCNC: 221 MG/DL (ref 65–100)
HCT VFR BLD AUTO: 39.9 % (ref 35–47)
HDLC SERPL-MCNC: 37 MG/DL
HDLC SERPL: 3.2 (ref 0–5)
HGB BLD-MCNC: 12.6 G/DL (ref 11.5–16)
LDLC SERPL CALC-MCNC: 52 MG/DL (ref 0–100)
LEFT CCA DIST DIAS: 27.2 CM/S
LEFT CCA DIST SYS: 80.2 CM/S
LEFT CCA PROX DIAS: 25.4 CM/S
LEFT CCA PROX SYS: 78.4 CM/S
LEFT ECA DIAS: 25.5 CM/S
LEFT ECA SYS: 235.6 CM/S
LEFT ICA DIST DIAS: 22.2 CM/S
LEFT ICA DIST SYS: 55.1 CM/S
LEFT ICA MID DIAS: 18.1 CM/S
LEFT ICA MID SYS: 71 CM/S
LEFT ICA PROX DIAS: 16.3 CM/S
LEFT ICA PROX SYS: 51 CM/S
LEFT ICA/CCA SYS: 0.9 NO UNITS
LEFT VERTEBRAL DIAS: 19.5 CM/S
LEFT VERTEBRAL SYS: 50.2 CM/S
MCH RBC QN AUTO: 26.1 PG (ref 26–34)
MCHC RBC AUTO-ENTMCNC: 31.6 G/DL (ref 30–36.5)
MCV RBC AUTO: 82.8 FL (ref 80–99)
NRBC # BLD: 0 K/UL (ref 0–0.01)
NRBC BLD-RTO: 0 PER 100 WBC
PLATELET # BLD AUTO: 278 K/UL (ref 150–400)
PMV BLD AUTO: 11.2 FL (ref 8.9–12.9)
POTASSIUM SERPL-SCNC: 3.8 MMOL/L (ref 3.5–5.1)
RBC # BLD AUTO: 4.82 M/UL (ref 3.8–5.2)
RIGHT CCA DIST DIAS: 20.7 CM/S
RIGHT CCA DIST SYS: 67.7 CM/S
RIGHT CCA PROX DIAS: 15.5 CM/S
RIGHT CCA PROX SYS: 59.9 CM/S
RIGHT ECA DIAS: 19.9 CM/S
RIGHT ECA SYS: 116.7 CM/S
RIGHT ICA DIST DIAS: 47.3 CM/S
RIGHT ICA DIST SYS: 120.4 CM/S
RIGHT ICA MID DIAS: 23.3 CM/S
RIGHT ICA MID SYS: 82 CM/S
RIGHT ICA PROX DIAS: 14.2 CM/S
RIGHT ICA PROX SYS: 72.9 CM/S
RIGHT ICA/CCA SYS: 1.8 NO UNITS
RIGHT VERTEBRAL DIAS: 0 CM/S
RIGHT VERTEBRAL SYS: 0 CM/S
SERVICE CMNT-IMP: ABNORMAL
SODIUM SERPL-SCNC: 138 MMOL/L (ref 136–145)
TRIGL SERPL-MCNC: 145 MG/DL (ref ?–150)
TSH SERPL DL<=0.05 MIU/L-ACNC: 2.2 UIU/ML (ref 0.36–3.74)
VAS LEFT SUBCLAVIAN PROX EDV: 0 CM/S
VAS LEFT SUBCLAVIAN PROX PSV: 67.4 CM/S
VAS RIGHT SUBCLAVIAN PROX EDV: 0 CM/S
VAS RIGHT SUBCLAVIAN PROX PSV: 88.6 CM/S
VLDLC SERPL CALC-MCNC: 29 MG/DL
WBC # BLD AUTO: 8 K/UL (ref 3.6–11)

## 2023-01-11 PROCEDURE — 97162 PT EVAL MOD COMPLEX 30 MIN: CPT

## 2023-01-11 PROCEDURE — 36415 COLL VENOUS BLD VENIPUNCTURE: CPT

## 2023-01-11 PROCEDURE — 93880 EXTRACRANIAL BILAT STUDY: CPT

## 2023-01-11 PROCEDURE — 97165 OT EVAL LOW COMPLEX 30 MIN: CPT

## 2023-01-11 PROCEDURE — 74011000250 HC RX REV CODE- 250: Performed by: INTERNAL MEDICINE

## 2023-01-11 PROCEDURE — 84443 ASSAY THYROID STIM HORMONE: CPT

## 2023-01-11 PROCEDURE — 97535 SELF CARE MNGMENT TRAINING: CPT

## 2023-01-11 PROCEDURE — 97530 THERAPEUTIC ACTIVITIES: CPT

## 2023-01-11 PROCEDURE — 85027 COMPLETE CBC AUTOMATED: CPT

## 2023-01-11 PROCEDURE — 74011636637 HC RX REV CODE- 636/637: Performed by: INTERNAL MEDICINE

## 2023-01-11 PROCEDURE — 99232 SBSQ HOSP IP/OBS MODERATE 35: CPT

## 2023-01-11 PROCEDURE — 97116 GAIT TRAINING THERAPY: CPT

## 2023-01-11 PROCEDURE — 99233 SBSQ HOSP IP/OBS HIGH 50: CPT | Performed by: PSYCHIATRY & NEUROLOGY

## 2023-01-11 PROCEDURE — 74011250637 HC RX REV CODE- 250/637: Performed by: PSYCHIATRY & NEUROLOGY

## 2023-01-11 PROCEDURE — 74011250637 HC RX REV CODE- 250/637: Performed by: INTERNAL MEDICINE

## 2023-01-11 PROCEDURE — 80061 LIPID PANEL: CPT

## 2023-01-11 PROCEDURE — 93308 TTE F-UP OR LMTD: CPT

## 2023-01-11 PROCEDURE — 82962 GLUCOSE BLOOD TEST: CPT

## 2023-01-11 PROCEDURE — 74011250636 HC RX REV CODE- 250/636: Performed by: INTERNAL MEDICINE

## 2023-01-11 PROCEDURE — 80048 BASIC METABOLIC PNL TOTAL CA: CPT

## 2023-01-11 PROCEDURE — 93880 EXTRACRANIAL BILAT STUDY: CPT | Performed by: PSYCHIATRY & NEUROLOGY

## 2023-01-11 RX ORDER — INSULIN LISPRO 100 [IU]/ML
10 INJECTION, SOLUTION INTRAVENOUS; SUBCUTANEOUS
Qty: 27 ML | Refills: 1 | Status: SHIPPED
Start: 2023-01-11 | End: 2023-01-13 | Stop reason: SDUPTHER

## 2023-01-11 RX ORDER — INSULIN GLARGINE 100 [IU]/ML
40 INJECTION, SOLUTION SUBCUTANEOUS
Status: DISCONTINUED | OUTPATIENT
Start: 2023-01-11 | End: 2023-01-11 | Stop reason: HOSPADM

## 2023-01-11 RX ORDER — ASPIRIN 81 MG/1
81 TABLET ORAL DAILY
Qty: 30 TABLET | Refills: 5 | Status: SHIPPED | OUTPATIENT
Start: 2023-01-12 | End: 2023-07-11

## 2023-01-11 RX ORDER — AMLODIPINE BESYLATE 10 MG/1
10 TABLET ORAL DAILY
Qty: 30 TABLET | Refills: 2 | Status: SHIPPED | OUTPATIENT
Start: 2023-01-12 | End: 2023-04-12

## 2023-01-11 RX ORDER — INSULIN GLARGINE 100 [IU]/ML
40 INJECTION, SOLUTION SUBCUTANEOUS
Qty: 36 ML | Refills: 1 | Status: SHIPPED | OUTPATIENT
Start: 2023-01-11 | End: 2023-07-10

## 2023-01-11 RX ORDER — PEN NEEDLE, DIABETIC 30 GX3/16"
NEEDLE, DISPOSABLE MISCELLANEOUS
Qty: 1 EACH | Refills: 11 | Status: SHIPPED | OUTPATIENT
Start: 2023-01-11

## 2023-01-11 RX ORDER — CLOPIDOGREL BISULFATE 75 MG/1
75 TABLET ORAL DAILY
Qty: 21 TABLET | Refills: 0 | Status: SHIPPED | OUTPATIENT
Start: 2023-01-12 | End: 2023-02-02

## 2023-01-11 RX ORDER — CLOPIDOGREL BISULFATE 75 MG/1
75 TABLET ORAL DAILY
Status: DISCONTINUED | OUTPATIENT
Start: 2023-01-11 | End: 2023-01-11 | Stop reason: HOSPADM

## 2023-01-11 RX ORDER — INSULIN PUMP SYRINGE, 3 ML
EACH MISCELLANEOUS
Qty: 1 KIT | Refills: 0 | Status: SHIPPED | OUTPATIENT
Start: 2023-01-11

## 2023-01-11 RX ORDER — INSULIN LISPRO 100 [IU]/ML
10 INJECTION, SOLUTION INTRAVENOUS; SUBCUTANEOUS
Status: DISCONTINUED | OUTPATIENT
Start: 2023-01-11 | End: 2023-01-11 | Stop reason: HOSPADM

## 2023-01-11 RX ADMIN — ENOXAPARIN SODIUM 30 MG: 100 INJECTION SUBCUTANEOUS at 09:07

## 2023-01-11 RX ADMIN — Medication 4 UNITS: at 11:55

## 2023-01-11 RX ADMIN — SODIUM CHLORIDE, PRESERVATIVE FREE 10 ML: 5 INJECTION INTRAVENOUS at 05:03

## 2023-01-11 RX ADMIN — PAROXETINE HYDROCHLORIDE 40 MG: 20 TABLET, FILM COATED ORAL at 09:04

## 2023-01-11 RX ADMIN — Medication 3 UNITS: at 09:08

## 2023-01-11 RX ADMIN — Medication 8 UNITS: at 11:54

## 2023-01-11 RX ADMIN — PANTOPRAZOLE SODIUM 40 MG: 40 TABLET, DELAYED RELEASE ORAL at 09:04

## 2023-01-11 RX ADMIN — ASPIRIN 81 MG: 81 TABLET, COATED ORAL at 09:06

## 2023-01-11 RX ADMIN — SODIUM CHLORIDE 125 ML/HR: 9 INJECTION, SOLUTION INTRAVENOUS at 05:02

## 2023-01-11 RX ADMIN — METOPROLOL TARTRATE 25 MG: 25 TABLET, FILM COATED ORAL at 09:06

## 2023-01-11 RX ADMIN — DOXYCYCLINE HYCLATE 100 MG: 100 TABLET, COATED ORAL at 09:05

## 2023-01-11 RX ADMIN — Medication 8 UNITS: at 09:05

## 2023-01-11 RX ADMIN — AMLODIPINE BESYLATE 10 MG: 5 TABLET ORAL at 09:04

## 2023-01-11 RX ADMIN — CLONAZEPAM 0.25 MG: 0.5 TABLET ORAL at 09:05

## 2023-01-11 RX ADMIN — CLOPIDOGREL BISULFATE 75 MG: 75 TABLET ORAL at 09:45

## 2023-01-11 RX ADMIN — LEVOTHYROXINE SODIUM 137 MCG: 0.11 TABLET ORAL at 09:04

## 2023-01-11 RX ADMIN — SODIUM CHLORIDE, PRESERVATIVE FREE 10 ML: 5 INJECTION INTRAVENOUS at 13:09

## 2023-01-11 NOTE — PROGRESS NOTES
Problem: Mobility Impaired (Adult and Pediatric)  Goal: *Acute Goals and Plan of Care (Insert Text)  Description:   FUNCTIONAL STATUS PRIOR TO ADMISSION: Patient reports she mainly used w/c in home environment for her comfort, she is afraid of falling and has back pain that limits her ability to tolerate standing for long periods. She reports she does walk short distances without AD in home but furniture walks/holds onto wall. Uses RW when leaving home, reports she hasn't left her house since Shan due to feeling so poorly. Has been using w/c since about July 2021. HOME SUPPORT PRIOR TO ADMISSION: The patient lived with spouse, she reports he isn't well himself but that he is able to assist her with getting things that she needs. Physical Therapy Goals  Initiated 1/11/2023  1. Patient will move from supine to sit and sit to supine , scoot up and down, and roll side to side in bed with independence within 7 day(s). 2.  Patient will transfer from bed to chair and chair to bed with modified independence using the least restrictive device within 7 day(s). 3.  Patient will perform sit to stand with modified independence within 7 day(s). 4.  Patient will ambulate with modified independence for 75 feet with the least restrictive device within 7 day(s). 5.  Patient will improve Quezada Balance score by 7 points within 7 days. Outcome: Not Met   PHYSICAL THERAPY EVALUATION- NEURO POPULATION  Patient: Juanita Rivera (20 y.o. female)  Date: 1/11/2023  Primary Diagnosis: Hyperglycemia [R73.9]       Precautions:  Fall    ASSESSMENT  Based on the objective data described below, the patient presents with visual disturbances, anxiety, impaired standing tolerance, impaired standing balance with increased fall risk, and decreased overall independence following admission for hyperglycemia and blurred vision. Mri (+) for acute L occipital stroke.  Patient describes hallucinations, seeing dogs, water dripping, and hair that is not actually there. Patient appears to have a great fear of falling that impacts her mobility, she is more capable of mobilizing than what she describes. Completes bed mobility with complete indep. Completes several sit<>stand with supervision, initial cuing for hand placement. Ambulation x45ft with RW with SBA, during ambulation bout patient stops at sink to wash hands performing static standing. Patient c/o feeling weak and that her legs were going to give out, she performed a controlled mini squat and came to standing without assist. She was able to return to bedside chair without difficulty. Completed Quezada Balance Assessment, patient having to take frequent breaks during assessment due to increasing RR and anxiety with patient reporting low back fatigue. HR up to 130bpm during balance assessment with therapist instructing patient on controlled breathing. Patient would benefit from skilled PT to address impairments and improve overall mobility status. Recommend HH therapy at discharge, spent a great deal of time educating patient on the roll of [facility] services on managing and improving her ability as she initially would not consider accepting [facility]. Patient verbalizes understanding though still seems reluctant to accept [facility]. Current Level of Function Impacting Discharge (mobility/balance): Supervision     Functional Outcome Measure: The patient scored Total: 24/56 on the Quezada Balance Assessment which is indicative of moderate fall risk. Other factors to consider for discharge: spouse cannot provide assist      Patient will benefit from skilled therapy intervention to address the above noted impairments.        PLAN :  Recommendations and Planned Interventions: bed mobility training, transfer training, gait training, therapeutic exercises, neuromuscular re-education, patient and family training/education, and therapeutic activities      Frequency/Duration: Patient will be followed by physical therapy:  5 times a week to address goals. Recommendation for discharge: (in order for the patient to meet his/her long term goals)  Physical therapy at least 2 days/week in the home AND ensure assist and/or supervision for safety with showering    This discharge recommendation:  Has been made in collaboration with the attending provider and/or case management    IF patient discharges home will need the following DME: patient owns DME required for discharge     SUBJECTIVE:   Patient stated I see dogs on the floor that aren't there.     OBJECTIVE DATA SUMMARY:   HISTORY:    Past Medical History:   Diagnosis Date    Adverse effect of anesthesia     O2 DROPS WITH ANESTHESIA    Arthritis     KNEES    Cancer ([facility] [address] 03/13/2015    SQUAMOUS CELL CARCINOMA; tonsils and lymph nodes.   Removed 3-2015 with radiation    GERD (gastroesophageal reflux disease)     Hypertension     NO LONGER ON MEDICATION    Hypothyroid     HYPOTHYROIDISM    Migraine     Nausea & vomiting     Obesity     Other ill-defined conditions(799.89)     chronic back pain    Psychiatric disorder     anxiety    Psychiatric disorder     panic ATTACKS    SVT (supraventricular tachycardia) ([facility] [address] 05/24/2014    Ulcerative colitis      Past Surgical History:   Procedure Laterality Date    COLONOSCOPY,DIAGNOSTIC  11/19/2015         HX GI      colonscopy    HX HEENT  03/2015    tonsillectomy (CANCER) AND NECK LYMPH NODES    HX HEENT  2008    PARATHYROID REMOVAL    HX HEENT Left 2002    EYE LASER    HX HEMORRHOIDECTOMY  2001, 2016     X2    HX HYSTERECTOMY  1985    HX KNEE ARTHROSCOPY  1995    left knee surgery, TOTAL LT  and Right KNEE 2013    HX KNEE REPLACEMENT Bilateral 2013, 2014    HX LAP CHOLECYSTECTOMY  2002    HX LUMBAR FUSION  2011    SPINAL FUSION with hardware L4-L5    HX ORTHOPAEDIC  1990    CYST REMOVED RIGHT WRIST    HX ORTHOPAEDIC  05/12/2021    L2-3 & L5-21 LUMBAR LAMINECTOMY WITH ANDREWS OSTEOTOMY    HX OTHER SURGICAL      cyst removal right wrist    HX UROLOGICAL  2010    bladder surgery(interstim device)    IR INJ FORAMIN EPID LUMB ANES/STER SNGL  8/19/2019    MO EGD TRANSORAL BIOPSY SINGLE/MULTIPLE  5/20/2013          Personal factors and/or comorbidities impacting plan of care: DM, anxiety with panic attacks, Fall July 2021    Home Situation  Home Environment: Private residence  # Steps to Enter: 0  Wheelchair Ramp: Yes  One/Two Story Residence: One story  Living Alone: No  Support Systems: Spouse/Significant Other  Patient Expects to be Discharged to[de-identified] Home with home health  Current DME Used/Available at Home: Tub transfer bench, Walker, rollator, Walker, rolling, Wheelchair  Tub or Shower Type: Tub/Shower combination    EXAMINATION/PRESENTATION/DECISION MAKING:   Critical Behavior:  Neurologic State: Alert  Orientation Level: Oriented X4  Cognition: Appropriate decision making, Appropriate for age attention/concentration, Appropriate safety awareness, Follows commands     Hearing:   Auditory  Auditory Impairment: None    Range Of Motion:  AROM: Within functional limits  Strength:    Strength: Generally decreased, functional  Tone & Sensation:   Tone: Normal  Sensation: Impaired  Coordination:  Coordination: Within functional limits    Functional Mobility:  Bed Mobility:  Rolling: Independent  Sit to Supine: Independent  Scooting: Independent  Transfers:  Sit to Stand: Supervision  Stand to Sit: Supervision  Bed to Chair: Supervision  Balance:   Sitting: Intact  Standing: Impaired  Standing - Static: Good  Standing - Dynamic : Fair  Ambulation/Gait Training:  Distance (ft): 45 Feet (ft)  Assistive Device: Walker, rolling;Gait belt  Ambulation - Level of Assistance: Stand-by assistance  Gait Abnormalities: Decreased step clearance  Base of Support: Widened  Speed/Catherine: Pace decreased (<100 feet/min)    Functional Measure  Quezada Balance Test:    Sitting to Standing: 3  Standing Unsupported: 2  Sitting with Back Unsupported: 4  Standing to Sitting: 3  Transfers: 2  Standing Unsupported with Eyes Closed: 3  Standing Unsupported with Feet Together: 1  Reach Forward with Outstretched Arm: 1   Object: 1  Turn to Look Over Shoulders: 1  Turn 360 Degrees: 1  Alternate Foot on Step/Stool: 1  Standing Unsupported One Foot in Front: 1  Stand on One Le  Total:          56=Maximum possible score;   0-20=High fall risk  21-40=Moderate fall risk   41-56=Low fall risk        Physical Therapy Evaluation Charge Determination   History Examination Presentation Decision-Making   HIGH Complexity :3+ comorbidities / personal factors will impact the outcome/ POC  MEDIUM Complexity : 3 Standardized tests and measures addressing body structure, function, activity limitation and / or participation in recreation  MEDIUM Complexity : Evolving with changing characteristics  Other outcome measures Quezada Balance   MEDIUM      Based on the above components, the patient evaluation is determined to be of the following complexity level: LOW     Activity Tolerance:   Fair, SpO2 stable on RA, requires frequent rest breaks, and observed SOB with activity      After treatment patient left in no apparent distress:   Sitting in chair, Call bell within reach, and Bed / chair alarm activated    COMMUNICATION/EDUCATION:   The patients plan of care was discussed with: Occupational therapist and Registered nurse. Patient was educated regarding her deficit(s) of balance as this relates to her diagnosis of L occipital stroke. She demonstrated Good understanding as evidenced by acknowledging information. Patient and/or family was verbally educated on the BE FAST acronym for signs/symptoms of CVA and TIA. BE FAST was written on patient's communication board  for visual education and reinforcement. All questions answered with patient indicating good understanding.        Fall prevention education was provided and the patient/caregiver indicated understanding., Patient/family have participated as able in goal setting and plan of care. , and Patient/family agree to work toward stated goals and plan of care.     Thank you for this referral.  Scarlet Shah, PT   Time Calculation: 35 mins

## 2023-01-11 NOTE — PROGRESS NOTES
Bedside shift change report given to Allie Talavera RN by Becka Mak. Report included the following information SBAR, Kardex, ED Summary, Intake/Output, MAR, Recent Results, and Cardiac Rhythm NSR .     0930 Per Dr. Otilio Gray, discontinue continuous IVF.

## 2023-01-11 NOTE — PROGRESS NOTES
Problem: Falls - Risk of  Goal: *Absence of Falls  Description: Document Dennie Baldemar Fall Risk and appropriate interventions in the flowsheet.   Outcome: Progressing Towards Goal  Note: Fall Risk Interventions:

## 2023-01-11 NOTE — DIABETES MGMT
[address] NURSE SPECIALIST CONSULT     Initial Presentation   Carlos John is a 74 y.o. female admitted 1/9/2023 after experiencing visual impairment and headache. The patient also reports increased thirst, urination and numbness and tingling in feet. Hyperglycemia  LAB: Glucose 659. Creatine 1.46. GFR 38. A1c 13.5%  CXR:  CT/MRI:Study Result    Narrative & Impression   CLINICAL HISTORY: vision changes  INDICATION: vision changes  COMPARISON: 2021. CT dose reduction was achieved through use of a standardized protocol tailored  for this examination and automatic exposure control for dose modulation. TECHNIQUE: Serial axial images with a collimation of 5 mm were obtained from the  skull base through the vertex    FINDINGS:   There is sulcal and ventricular prominence. Confluent periventricular and  scattered foci of hypodensity in the cerebral white matter. There is no evidence  of an acute infarction, hemorrhage, or mass-effect. There is no evidence of  midline shift or hydrocephalus. Posterior fossa structures are unremarkable. No  extra-axial collections are seen. Mastoid air cells are well pneumatized and clear. There is no evidence of depressed skull fractures of soft tissue swelling. IMPRESSION  No acute intracranial process. Imaging findings consistent with mild chronic microvascular ischemic change. There is a minimal degree of cerebral atrophy. Study Result    Narrative & Impression   EXAM: MRI BRAIN WO CONT     INDICATION: visual difficutly     COMPARISON: CT head 1/9/2023. CONTRAST: None. TECHNIQUE:    Multiplanar multisequence acquisition without contrast of the brain. FINDINGS:  Few tiny acute infarcts in the left occipital lobe. The ventricles are normal in size and position.  Scattered periventricular and  deep white matter T2/FLAIR hyperintensities, and patchy T2/FLAIR hyperintensity  in the emiliano, consistent with mild chronic microvascular ischemic disease. There  is no acute hemorrhage, extra-axial fluid collection, or mass effect. There is  no cerebellar tonsillar herniation. Expected arterial flow-voids are present. Mild mucosal thickening in the right maxillary sinus and bilateral posterior  ethmoidal air cells. The mastoid air cells and middle ears are clear. The  orbital contents are within normal limits with bilateral lens implants. No  significant osseous or scalp lesions are identified. Severe facet arthropathy in  the upper cervical spine. IMPRESSION     1. Few tiny acute infarcts in the left occipital lobe. 2. Mild chronic microvascular ischemic disease. HX:   Past Medical History:   Diagnosis Date    Adverse effect of anesthesia     O2 DROPS WITH ANESTHESIA    Arthritis     KNEES    Cancer ([facility] [address] 03/13/2015    SQUAMOUS CELL CARCINOMA; tonsils and lymph nodes. Removed 3-2015 with radiation    GERD (gastroesophageal reflux disease)     Hypertension     NO LONGER ON MEDICATION    Hypothyroid     HYPOTHYROIDISM    Migraine     Nausea & vomiting     Obesity     Other ill-defined conditions(799.89)     chronic back pain    Psychiatric disorder     anxiety    Psychiatric disorder     panic ATTACKS    SVT (supraventricular tachycardia) ([facility] [address] 05/24/2014    Ulcerative colitis         INITIAL DX:   Hyperglycemia [R73.9]     Current Treatment     TX: Elavil. Norvasc. ASA. Lipitor. Clonazepam. Plavix. Doxycycline. Insulin. Synthroid. BP management. Protonix. Paxil. Consulted by Provider for advanced diabetes nursing assessment and care for:   [] Transitioning off Julsarah Radon   [x] Inpatient management strategy  [x] Home management assessment  [] Survival skill education    Hospital Course   Clinical progress has been uncomplicated . Diabetes History   The patient reports a diabetes hx since late 2021.  She reportedly was started on insulin but it was discontinued by her endocrinologist. The patient reports she was told she no longer needed insulin and her diabetes was in remission. Diabetes-related Medical History  Acute complications  hyperglycemia  Neurological complications  Peripheral neuropathy  Other associated conditions     Depression    Diabetes Medication History  Key Antihyperglycemic Medications       Patient is on no antihyperglycemic meds. Diabetes self-management practices:   Eating pattern   [x] Eating a carbohydrate-controlled mealplan    Physical activity pattern   [x] Not employing a physical activity program to control BG    Monitoring pattern   [x] Not testing BGs sufficiently to inform self-management adjustments      Social determinants of health impacting diabetes self-management practices   Struggling with anxiety and/or depression and Concerned that you need to know more about how to stay healthy with diabetes  Overall evaluation:    [x] Not achieving A1c target with drug therapy & self-care practices    Subjective   I'm eating what I can.      Objective   Physical exam  General Obese female in no acute distress.  Conversant and cooperative  Neuro  Alert, oriented   Vital Signs Visit Vitals  /63   Pulse 77   Temp 98.6 °F (37 °C)   Resp 18   Ht 5' 3.39\" (1.61 m)   Wt 116.9 kg (257 lb 11.5 oz)   SpO2 98%   BMI 45.10 kg/m²         Laboratory  Recent Labs     01/11/23  0102 01/10/23  0351 01/09/23  2231 01/09/23  1524   * 235* 222* 659*   AGAP 5 3* 8 10   TRIGL 145  --   --   --    WBC 8.0 8.9  --  11.1*   CREA 0.86 1.02 1.19* 1.46*   AST  --   --   --  24   ALT  --   --   --  34       Factors impacting BG management  Factor Dose Comments   Nutrition:  Standard meals     60 grams/meal      Pain PRN acetaminophen    Infection doxycycline The patient reports being on long term Abx for spinal infection   Other:   Kidney function  Liver function     Normal  Normal      Blood glucose pattern      Significant diabetes-related events over the past 24-72 hours      Assessment and Plan   Nursing Diagnosis Risk for unstable blood glucose pattern   Nursing Intervention Domain 5250 Decision-making Support   Nursing Interventions Examined current inpatient diabetes/blood glucose control   Explored factors facilitating and impeding inpatient management  Explored corrective strategies with patient and responsible inpatient provider   Informed patient of rational for insulin strategy while hospitalized     Nursing Diagnosis 39449 Ineffective Health Management   Nursing Intervention Domain 5250 Decision-makingSupport   Nursing Interventions Identified diabetes self-management practices impeding diabetes control  Discussed diabetes survival skills related to  Healthy Plate eating plan; given handouts  Role of physical activity in improving insulin sensitivity and action  Procedure for blood glucose monitoring & options for low-cost products available from [facility]   Medications plan at discharge     Evaluation   This 74year old patient with Type 2 diabetes did not maintain BG control prior to admission as evidenced by an A1c of 13.5%. The patient reports that she was taken off insulin many months ago by her endocrinologist. The patient reported that BG's were consistently under 100 at that time. The patient requires insulin at this time. The Lantus dose has been increased to 40 units nightly. I agree with this strategy. Discussed insulin usage and types with the patient. The patient reports that she is proficient with insulin pen administration. She has taken [place] and Humalog in the past. A1c results and lowering strategies also discussed with the patient.       Orders/plan   Use 40 units Lantus nightly  Use 10 units Humalog with each meal      Billing Code(s)   [] 97429 IP subsequent hospital care - 50 minutes   [x] 76880 IP subsequent hospital care - 35 minutes   [] 43308 IP subsequent hospital care - 25 minutes     Before making these care recommendations, I personally reviewed the hospitalization record, including notes, laboratory & diagnostic data and current medications, and examined the patient at the bedside (circumstances permitting) before making care recommendations. More than fifty (50) percent of the time was spent in patient counseling and/or care coordination.   Total minutes: 35 minutes    EVELINE Hoffmann  Diabetes Clinical Nurse Specialist  [facility]  Access via "[employer]

## 2023-01-11 NOTE — PROGRESS NOTES
Transition of Care Plan:   11%  Disposition:home with HH  If SNF or IPR: Date FOC offered:na  Date FOC received:na  Date authorization started with reference number:na  Date authorization received and expires:na  Accepting facility:na  Follow up appointments:PCP and Specialist  DME needed:patient has wheelchair, rolling walker  Transportation at [address] or means to access home: family       IM Medicare Letter:2nd Medicare letter to be signed at [facility]  Is patient a [place] and connected with the [place]? If yes, was [employer] transfer form completed and [facility] notified? Caregiver Contact:Boo Escudero (Spouse)   [phone]     Update: 12:34 on 1/12/23 Patient to be seen by [address] They have already seen patient on 1/12/23 I talked with the patient at home also. CM will call patient at home to designate Home Health referral. Patient can go home today. She will follow up with PCP and with Neuro clinic.  Her  is her ride    [address]  [address]

## 2023-01-11 NOTE — PROGRESS NOTES
PCP hospital follow-up transitional care appointment has been scheduled with Dr. Maris Waters on 1/13/23 at 1300. Pending patient discharge.   Oliva Calix, Care Management Assistant

## 2023-01-11 NOTE — PROGRESS NOTES
Neurology Note    Patient ID:  Tanner Cordova  [id number]  32 y.o.  1950      Date of Consultation:  January 11, 2023    Assessment and Plan:    The patient is a pleasant 74year old female admitted with vision abnormalities and hyperglycemia. Neuroimaging revealed an acute occipital stroke. Acute ischemic stroke:  risk factors for stroke include: Hypertension, diabetes, dyslipidemia  Brain MRI revealed acute stroke in the left occipital lobe. Hypertension:  goal  blood pressure less than 140/90  Dyslipidemia: updated LDL was 52. Goal LDL is less than 70. Continue current statin therapy   Diabetes: hgba1c was 13. 5.  needs aggressive glycemic control and education  CTA  reveals less than 50% carotid  stenosis. Significant intracranial posterior circulation atherosclerosis  Continue 81 mg aspirin. Due to significant intracranial disease, the patient should be on dual anti-platelet therapy for 21 days and then return to aspirin daily. Order placed. Side effects and risks of the medication were discussed with the patient today. Echo completed  The patient will need physical therapy, Occupational Therapy consultation. If the patient's neurological examination worsens, patient will need urgent neuroimaging    I provided stroke education again today in regards to risk factors for stroke and lifestyle modifications to help minimize the risk of future stroke. This included medication compliance, regular follow up with primary care physician,  and healthy lifestyle habits (nutrition/exercise)    Peripheral neuropathy:  This is most likely related to the patient's history of diabetes. This can be further evaluated in the outpatient setting with an EMG/nerve conduction study. Stressed the importance of good tight glycemic control. Anxiety: on paxil  Hypothyroidism  Migraine headaches      There is no other additional neurology recommendations at this time.   If questions arise, please do not hesitate to contact me and I will return to see the patient. The patient should follow-up in clinic in approximately 4-8 weeks time after discharge. Subjective: my vision is still not right     History of Present Illness:   Joaquina Ly is a 74 y.o. female with a history of diabetes and hypertension who has noted intermittent visual disturbances over the past month. She did have a recent COVID infection a couple weeks ago. She states that she has been seeing floaters in her eyes. She has also had blurred vision and a sometimes double vision. She also does have chronic numbness and tingling in her bilateral lower extremities. Since admission, there has been no new numbness, tingling, or weakness. She is a bit anxious and overwhelmed this morning. She did have her CTA performed overnight and her echocardiogram this morning. Past Medical History:   Diagnosis Date    Adverse effect of anesthesia     O2 DROPS WITH ANESTHESIA    Arthritis     KNEES    Cancer ([facility] [address] 03/13/2015    SQUAMOUS CELL CARCINOMA; tonsils and lymph nodes.   Removed 3-2015 with radiation    GERD (gastroesophageal reflux disease)     Hypertension     NO LONGER ON MEDICATION    Hypothyroid     HYPOTHYROIDISM    Migraine     Nausea & vomiting     Obesity     Other ill-defined conditions(799.89)     chronic back pain    Psychiatric disorder     anxiety    Psychiatric disorder     panic ATTACKS    SVT (supraventricular tachycardia) ([facility] [address] 05/24/2014    Ulcerative colitis         Past Surgical History:   Procedure Laterality Date    COLONOSCOPY,DIAGNOSTIC  11/19/2015         HX GI      colonscopy    HX HEENT  03/2015    tonsillectomy (CANCER) AND NECK LYMPH NODES    HX HEENT  2008    PARATHYROID REMOVAL    HX HEENT Left 2002    EYE LASER    HX HEMORRHOIDECTOMY  2001, 2016     X2    HX HYSTERECTOMY  1985    HX KNEE ARTHROSCOPY  1995    left knee surgery, TOTAL LT  and Right KNEE 2013    HX KNEE REPLACEMENT Bilateral 2013, 2014    HX LAP CHOLECYSTECTOMY  2002    HX LUMBAR FUSION  2011    SPINAL FUSION with hardware L4-L5    HX ORTHOPAEDIC  1990    CYST REMOVED RIGHT WRIST    HX ORTHOPAEDIC  05/12/2021    L2-3 & L5-21 LUMBAR LAMINECTOMY WITH ANDREWS OSTEOTOMY    HX OTHER SURGICAL      cyst removal right wrist    HX UROLOGICAL  2010    bladder surgery(interstim device)    IR INJ FORAMIN EPID LUMB ANES/STER SNGL  8/19/2019    NM EGD TRANSORAL BIOPSY SINGLE/MULTIPLE  5/20/2013             Family History   Problem Relation Age of Onset    Cancer Mother         ovarian ca?     Heart Disease Father         MI    Heart Attack Father     Cancer Father         MULTIPLE MYELOMA    Liver Disease Father         FROM MEDICATIONS FOR MULTIPLE MYELOMA    OSTEOARTHRITIS Sister     OSTEOARTHRITIS Brother     Cancer Paternal Grandmother 79        breast ca    Breast Cancer Paternal Grandmother 79    Breast Cancer Maternal Aunt 55    Breast Cancer Maternal Aunt 60    Breast Cancer Paternal Aunt 43    Breast Cancer Paternal Aunt         52's    Emphysema Paternal Grandfather     No Known Problems Sister     No Known Problems Brother     No Known Problems Brother     Anesth Problems Neg Hx         Social History     Tobacco Use    Smoking status: Never    Smokeless tobacco: Never   Substance Use Topics    Alcohol use: No        Allergies   Allergen Reactions    Adhesive Tape-Silicones Rash    Aloe Vera Rash    Chlorhexidine Rash    Tussin [Dextromethorphan Hbr] Palpitations    Readyprep Chg [Chlorhexidine Gluconate] Rash          Current Facility-Administered Medications   Medication Dose Route Frequency    doxycycline (VIBRA-TABS) tablet 100 mg  100 mg Oral Q12H    butalbital-acetaminophen-caffeine (FIORICET, ESGIC) -40 mg per tablet 1 Tablet  1 Tablet Oral Q6H PRN    amitriptyline (ELAVIL) tablet 50 mg  50 mg Oral QHS    amLODIPine (NORVASC) tablet 10 mg  10 mg Oral DAILY    aspirin delayed-release tablet 81 mg  81 mg Oral DAILY    atorvastatin (LIPITOR) tablet 40 mg  40 mg Oral QHS    clonazePAM (KlonoPIN) tablet 0.25 mg  0.25 mg Oral BID    levothyroxine (SYNTHROID) tablet 137 mcg  137 mcg Oral DAILY    metoprolol tartrate (LOPRESSOR) tablet 25 mg  25 mg Oral 7am    pantoprazole (PROTONIX) tablet 40 mg  40 mg Oral ACB    PARoxetine (PAXIL) tablet 40 mg  40 mg Oral 7am    sodium chloride (NS) flush 5-40 mL  5-40 mL IntraVENous Q8H    sodium chloride (NS) flush 5-40 mL  5-40 mL IntraVENous PRN    acetaminophen (TYLENOL) tablet 650 mg  650 mg Oral Q6H PRN    Or    acetaminophen (TYLENOL) suppository 650 mg  650 mg Rectal Q6H PRN    polyethylene glycol (MIRALAX) packet 17 g  17 g Oral DAILY PRN    ondansetron (ZOFRAN ODT) tablet 4 mg  4 mg Oral Q8H PRN    Or    ondansetron (ZOFRAN) injection 4 mg  4 mg IntraVENous Q6H PRN    enoxaparin (LOVENOX) injection 30 mg  30 mg SubCUTAneous Q12H    0.9% sodium chloride infusion  125 mL/hr IntraVENous CONTINUOUS    insulin glargine (LANTUS) injection 35 Units  35 Units SubCUTAneous QHS    insulin lispro (HUMALOG) injection 8 Units  8 Units SubCUTAneous TIDAC    insulin lispro (HUMALOG) injection   SubCUTAneous AC&HS    glucose chewable tablet 16 g  4 Tablet Oral PRN    glucagon (GLUCAGEN) injection 1 mg  1 mg IntraMUSCular PRN    dextrose 10% infusion 0-250 mL  0-250 mL IntraVENous PRN       Review of Systems:    General, constitutional: negative  Eyes, vision: negative  Ears, nose, throat: negative  Cardiovascular, heart: negative  Respiratory: negative  Gastrointestinal: negative  Genitourinary: negative  Musculoskeletal: negative  Skin and integumentary: negative  Psychiatric: negative  Endocrine: negative  Neurological: negative, except for HPI  Hematologic/lymphatic: negative  Allergy/immunology: negative    Objective:     Visit Vitals  /75   Pulse 79   Temp 98.5 °F (36.9 °C)   Resp 18   Ht 5' 3.39\" (1.61 m)   Wt 257 lb 11.5 oz (116.9 kg)   SpO2 97%   BMI 45.10 kg/m²       Physical Exam:      General:  appears well nourished in no acute distress. Slightly anxious today  Neck: no carotid bruits  Lungs: clear to auscultation  Heart:  no murmurs, regular rate  Lower extremity: peripheral pulses palpable and no edema  Skin: intact    Neurological exam:    Awake, alert, oriented to person, place and time  Recent and remote memory were normal  Attention and concentration were intact  Language was intact. There was no aphasia  Speech: no dysarthria  Fund of knowledge was preserved    Cranial nerves:   II-XII were tested    Perrrla  Visual fields were full  Eomi, no evidence of nystagmus  Facial sensation:  normal and symmetric  Facial motor: normal and symmetric  Hearing intact  SCM strength intact  Tongue: midline without fasciculations    Motor: Tone normal  Pronator drift was absent  No evidence of fasciculations    Strength testing:   deltoid triceps biceps Wrist ext. Wrist flex. intrinsics Hip flex. Hip ext. Knee ext. Knee flex Dorsi flex Plantar flex   Right 5 5 5 5 5 5 5 5 5 5 5 5   Left 5 5 5 5 5 5 5 5 5 5 5 5         Sensory:  Upper extremity: intact to pp, light touch, and vibration > 10 seconds  Lower extremity: vibration was absent in her toes. It is present for 3 seconds at her ankles. Pinprick is decreased to mid shin bilaterally. This is unchanged since yesterday    Reflexes:    Right Left  Biceps  1 1  Triceps             1 1  Brachiorad.  1 1  Patella  1 1  Achilles - -    Plantar response:  flexor bilaterally    Cerebellar testing:  no tremor apparent, finger/nose and tracey were intact    Labs:     Lab Results   Component Value Date/Time    Hemoglobin A1c 13.5 (H) 01/10/2023 03:51 AM    Sodium 138 01/11/2023 01:02 AM    Potassium 3.8 01/11/2023 01:02 AM    Chloride 108 01/11/2023 01:02 AM    Glucose 221 (H) 01/11/2023 01:02 AM    BUN 13 01/11/2023 01:02 AM    Creatinine 0.86 01/11/2023 01:02 AM    Calcium 8.5 01/11/2023 01:02 AM    WBC 8.0 01/11/2023 01:02 AM    HCT 39.9 01/11/2023 01:02 AM    HGB 12.6 01/11/2023 01:02 AM PLATELET 769 18/50/3690 01:02 AM       Imaging:    Results from Hospital Encounter encounter on 01/09/23    MRI BRAIN WO CONT    Narrative  EXAM: MRI BRAIN WO CONT    INDICATION: visual difficutly    COMPARISON: CT head 1/9/2023. CONTRAST: None. TECHNIQUE:  Multiplanar multisequence acquisition without contrast of the brain. FINDINGS:  Few tiny acute infarcts in the left occipital lobe. The ventricles are normal in size and position. Scattered periventricular and  deep white matter T2/FLAIR hyperintensities, and patchy T2/FLAIR hyperintensity  in the emiliano, consistent with mild chronic microvascular ischemic disease. There  is no acute hemorrhage, extra-axial fluid collection, or mass effect. There is  no cerebellar tonsillar herniation. Expected arterial flow-voids are present. Mild mucosal thickening in the right maxillary sinus and bilateral posterior  ethmoidal air cells. The mastoid air cells and middle ears are clear. The  orbital contents are within normal limits with bilateral lens implants. No  significant osseous or scalp lesions are identified. Severe facet arthropathy in  the upper cervical spine. Impression  1. Few tiny acute infarcts in the left occipital lobe. 2. Mild chronic microvascular ischemic disease. Results from [place] encounter on 01/09/23    CTA HEAD NECK W CONT    Narrative  CLINICAL HISTORY: Code stroke    EXAMINATION:  CT ANGIOGRAPHY HEAD AND NECK    COMPARISON: CT head 1/9/2023, MR brain 1/10/2023    TECHNIQUE:  Following the uneventful administration of iodinated contrast  material, axial CT angiography of the head and neck was performed. Delayed axial  images through the head were also obtained. Coronal and sagittal reconstructions  were obtained. Manual postprocessing of images was performed. 3-D  Sagittal  maximal intensity projection images were obtained. 3-D Coronal maximal  intensity projections were obtained.   CT dose reduction was achieved through use  of a standardized protocol tailored for this examination and automatic exposure  control for dose modulation. FINDINGS:    Delayed contrast-enhanced head CT:    The ventricles are midline without hydrocephalus. There is no acute intra or  extra-axial hemorrhage. Mild to moderate bilateral subcortical and  periventricular areas of patchy low attenuation is nonspecific but likely  related to changes of chronic small vessel ischemic disease. The basal cisterns  are clear. The paranasal sinuses are clear. CTA NECK:    Great vessels: Patent origins with mild to moderate atherosclerosis    Right subclavian artery: Mild atherosclerosis    Left subclavian artery: Mild atherosclerosis    Right common carotid artery: Mild atherosclerosis    Left common carotid artery: Mild atherosclerosis    Cervical right internal carotid artery: Patent with proximal atherosclerosis  causing less than 50% stenosis by NASCET criteria. Cervical left internal carotid artery: Patent with proximal atherosclerosis  causing no significant stenosis by NASCET criteria. Right vertebral artery: Diminutive in caliber with multifocal areas of occlusion  in the V2 segment. Severe ostial stenosis.  Reconstitution in the distal V2 and  V3 segments which demonstrated mild to moderate atherosclerosis    Left vertebral artery: Patent and dominant with mild atherosclerosis    CTA HEAD:    Right cavernous internal carotid artery: Mild atherosclerosis    Left cavernous internal carotid artery: Mild atherosclerosis    Anterior cerebral arteries: Mild atherosclerosis    Anterior communicating artery: Patent    Right middle cerebral artery: Mild atherosclerosis    Left middle cerebral artery: Mild atherosclerosis    Posterior communicating arteries: Hypoplastic    Posterior cerebral arteries: Mild atherosclerosis    Basilar artery: Mild proximal atherosclerosis    Distal vertebral arteries: Mild to moderate bilateral atherosclerosis    Moderate to severe cervical spondylosis    Measurements use NASCET criteria. Impression  No evidence for acute large vessel arterial intracranial occlusion. Severe  atherosclerotic changes in the cervical right vertebral artery with multifocal  areas of occlusion in the V2 segment and severe ostial stenosis. Other  atherosclerotic changes as described above.   head ct performed on 1/9/2023. There was no acute abnormalities. Mild chronic microvascular ischemic disease was noted. brain mri from 1/10/2023. There was an acute stroke in the left occipital lobe. Mild chronic microvascular ischemic disease    I did independently review the CTA performed on January 10, 2023. There is no evidence of large vessel intracranial stenosis. There is severe atherosclerosis of the cervical right vertebral artery and multifocal stenosis in the V2 branch. There was mild atherosclerosis seen in multiple other vessels        Complex decision making was necessary due to the patient's current medical condition and other medical co-morbidities.      Active Problems:    Hyperglycemia (11/6/2021)                 Signed By:  Kirti Hoyos DO FAAN    January 11, 2023

## 2023-01-11 NOTE — PROGRESS NOTES
Speech path  Patient was referred for an evaluation due to her stroke. It is occipital and is affecting her functional vision intermittently. We will sign off as she does not have dysarthria, aphasia or dysphagia.    Kentrell Corrigan, SLP

## 2023-01-11 NOTE — PROGRESS NOTES
Received notification from bedside RN about patient with regards to: migraine headache, requesting PTA Fioricet regimen  VS: BP 98/78, HR 76, RR 12, O2 sat 98% on RA    Intervention given: Fioricet PO PRN ordered

## 2023-01-11 NOTE — PROGRESS NOTES
Problem: Falls - Risk of  Goal: *Absence of Falls  Description: Document Sachi Gamma Fall Risk and appropriate interventions in the flowsheet. Outcome: Resolved/Met  Note: Fall Risk Interventions:                                Problem: Patient Education: Go to Patient Education Activity  Goal: Patient/Family Education  Outcome: Resolved/Met     Problem: Diabetes Self-Management  Goal: *Disease process and treatment process  Description: Define diabetes and identify own type of diabetes; list 3 options for treating diabetes. Outcome: Resolved/Met  Goal: *Incorporating nutritional management into lifestyle  Description: Describe effect of type, amount and timing of food on blood glucose; list 3 methods for planning meals. Outcome: Resolved/Met  Goal: *Incorporating physical activity into lifestyle  Description: State effect of exercise on blood glucose levels. Outcome: Resolved/Met  Goal: *Developing strategies to promote health/change behavior  Description: Define the ABC's of diabetes; identify appropriate screenings, schedule and personal plan for screenings. Outcome: Resolved/Met  Goal: *Using medications safely  Description: State effect of diabetes medications on diabetes; name diabetes medication taking, action and side effects. Outcome: Resolved/Met  Goal: *Monitoring blood glucose, interpreting and using results  Description: Identify recommended blood glucose targets  and personal targets. Outcome: Resolved/Met  Goal: *Prevention, detection, treatment of acute complications  Description: List symptoms of hyper- and hypoglycemia; describe how to treat low blood sugar and actions for lowering  high blood glucose level. Outcome: Resolved/Met  Goal: *Prevention, detection and treatment of chronic complications  Description: Define the natural course of diabetes and describe the relationship of blood glucose levels to long term complications of diabetes.   Outcome: Resolved/Met  Goal: *Developing strategies to address psychosocial issues  Description: Describe feelings about living with diabetes; identify support needed and support network  Outcome: Resolved/Met  Goal: *Insulin pump training  Outcome: Resolved/Met  Goal: *Sick day guidelines  Outcome: Resolved/Met  Goal: *Patient Specific Goal (EDIT GOAL, INSERT TEXT)  Outcome: Resolved/Met     Problem: Patient Education: Go to Patient Education Activity  Goal: Patient/Family Education  Outcome: Resolved/Met     Problem: Patient Education: Go to Patient Education Activity  Goal: Patient/Family Education  Outcome: Resolved/Met     Problem: TIA/CVA Stroke: 0-24 hours  Goal: Off Pathway (Use only if patient is Off Pathway)  Outcome: Resolved/Met  Goal: Activity/Safety  Outcome: Resolved/Met  Goal: Consults, if ordered  Outcome: Resolved/Met  Goal: Diagnostic Test/Procedures  Outcome: Resolved/Met  Goal: Nutrition/Diet  Outcome: Resolved/Met  Goal: Discharge Planning  Outcome: Resolved/Met  Goal: Medications  Outcome: Resolved/Met  Goal: Respiratory  Outcome: Resolved/Met  Goal: Treatments/Interventions/Procedures  Outcome: Resolved/Met  Goal: Minimize risk of bleeding post-thrombolytic infusion  Outcome: Resolved/Met  Goal: Monitor for complications post-thrombolytic infusion  Outcome: Resolved/Met  Goal: Psychosocial  Outcome: Resolved/Met  Goal: *Hemodynamically stable  Outcome: Resolved/Met  Goal: *Neurologically stable  Description: Absence of additional neurological deficits    Outcome: Resolved/Met  Goal: *Verbalizes anxiety and depression are reduced or absent  Outcome: Resolved/Met  Goal: *Absence of Signs of Aspiration on Current Diet  Outcome: Resolved/Met  Goal: *Absence of deep venous thrombosis signs and symptoms(Stroke Metric)  Outcome: Resolved/Met  Goal: *Ability to perform ADLs and demonstrates progressive mobility and function  Outcome: Resolved/Met  Goal: *Stroke education started(Stroke Metric)  Outcome: Resolved/Met  Goal: *Dysphagia screen performed(Stroke Metric)  Outcome: Resolved/Met  Goal: *Rehab consulted(Stroke Metric)  Outcome: Resolved/Met     Problem: TIA/CVA Stroke: Day 2 Until Discharge  Goal: Off Pathway (Use only if patient is Off Pathway)  Outcome: Resolved/Met  Goal: Activity/Safety  Outcome: Resolved/Met  Goal: Diagnostic Test/Procedures  Outcome: Resolved/Met  Goal: Nutrition/Diet  Outcome: Resolved/Met  Goal: Discharge Planning  Outcome: Resolved/Met  Goal: Medications  Outcome: Resolved/Met  Goal: Respiratory  Outcome: Resolved/Met  Goal: Treatments/Interventions/Procedures  Outcome: Resolved/Met  Goal: Psychosocial  Outcome: Resolved/Met  Goal: *Verbalizes anxiety and depression are reduced or absent  Outcome: Resolved/Met  Goal: *Absence of aspiration  Outcome: Resolved/Met  Goal: *Absence of deep venous thrombosis signs and symptoms(Stroke Metric)  Outcome: Resolved/Met  Goal: *Optimal pain control at patient's stated goal  Outcome: Resolved/Met  Goal: *Tolerating diet  Outcome: Resolved/Met  Goal: *Ability to perform ADLs and demonstrates progressive mobility and function  Outcome: Resolved/Met  Goal: *Stroke education continued(Stroke Metric)  Outcome: Resolved/Met     Problem: Ischemic Stroke: Discharge Outcomes  Goal: *Verbalizes anxiety and depression are reduced or absent  Outcome: Resolved/Met  Goal: *Verbalize understanding of risk factor modification(Stroke Metric)  Outcome: Resolved/Met  Goal: *Hemodynamically stable  Outcome: Resolved/Met  Goal: *Absence of aspiration pneumonia  Outcome: Resolved/Met  Goal: *Aware of needed dietary changes  Outcome: Resolved/Met  Goal: *Verbalize understanding of prescribed medications including anti-coagulants, anti-lipid, and/or anti-platelets(Stroke Metric)  Outcome: Resolved/Met  Goal: *Tolerating diet  Outcome: Resolved/Met  Goal: *Aware of follow-up diagnostics related to anticoagulants  Outcome: Resolved/Met  Goal: *Ability to perform ADLs and demonstrates progressive mobility and function  Outcome: Resolved/Met  Goal: *Absence of DVT(Stroke Metric)  Outcome: Resolved/Met  Goal: *Absence of aspiration  Outcome: Resolved/Met  Goal: *Optimal pain control at patient's stated goal  Outcome: Resolved/Met  Goal: *Home safety concerns addressed  Outcome: Resolved/Met  Goal: *Describes available resources and support systems  Outcome: Resolved/Met  Goal: *Verbalizes understanding of activation of EMS(911) for stroke symptoms(Stroke Metric)  Outcome: Resolved/Met  Goal: *Understands and describes signs and symptoms to report to providers(Stroke Metric)  Outcome: Resolved/Met  Goal: *Neurolgocially stable (absence of additional neurological deficits)  Outcome: Resolved/Met  Goal: *Verbalizes importance of follow-up with primary care physician(Stroke Metric)  Outcome: Resolved/Met  Goal: *Smoking cessation discussed,if applicable(Stroke Metric)  Outcome: Resolved/Met  Goal: *Depression screening completed(Stroke Metric)  Outcome: Resolved/Met

## 2023-01-11 NOTE — PROGRESS NOTES
End of Shift Note    Bedside shift change report given to Pao Perez (oncoming nurse) by Nguyễn Vallejo RN (offgoing nurse). Report included the following information SBAR, Kardex, ED Summary, Intake/Output, MAR, and Recent Results    Shift worked:  night     Shift summary and any significant changes:     Pt rested comfortably, VSS, neurologically intact     CTA head preliminary results, awaiting echo and doppler studies today     Discharge today?      Concerns for physician to address:  See above     Zone phone for oncoming shift:   xxx       Nguyễn Vallejo RN

## 2023-01-11 NOTE — DISCHARGE INSTRUCTIONS
DISCHARGE DIAGNOSIS:  Hyperglycemia  History of diabetes mellitus  Blurred vision  Occipitalstroke   -? H/o back infection   Hypertension  Neuropathy  Dyslipidemia  Hypothyroidism  GERD  Depression    MEDICATIONS:  It is important that you take the medication exactly as they are prescribed. Keep your medication in the bottles provided by the pharmacist and keep a list of the medication names, dosages, and times to be taken in your wallet. Do not take other medications without consulting your doctor. Pain Management: per above medications    What to do at Home    Recommended diet:  Cardiac Diet and Diabetic Diet    Recommended activity: Activity as tolerated    If you have questions regarding the hospital related prescriptions or hospital related issues please call [facility] at . You can always direct your questions to your primary care doctor if you are unable to reach your hospital physician; your PCP works as an extension of your hospital doctor just like your hospital doctor is an extension of your PCP for your time at the hospital [facility], [facility]).     If you experience any of the following symptoms then please call your primary care physician or return to the emergency room if you cannot get hold of your doctor:  Fever, chills, nausea, vomiting, diarrhea, change in mentation, falling, bleeding, shortness of breath

## 2023-01-11 NOTE — PROGRESS NOTES
End of Shift Note    Bedside shift change report given to Dayanara Centeno RN by Betzy Medrano RN (offgoing nurse). Report included the following information SBAR, Kardex, ED Summary, Intake/Output, MAR, Recent Results, and Cardiac Rhythm NSR    Shift worked:  [id number]     Shift summary and any significant changes:          Concerns for physician to address:  none     Zone phone for oncoming shift:   1961       Activity:  Activity Level:  Up with Assistance  Number times ambulated in hallways past shift: 0  Number of times OOB to chair past shift: 1    Cardiac:   Cardiac Monitoring: Yes      Cardiac Rhythm: Sinus Rhythm    Access:  Current line(s): PIV     Genitourinary:   Urinary status: voiding    Respiratory:   O2 Device: None (Room air)  Chronic home O2 use?: NO  Incentive spirometer at bedside: NO       GI:  Last Bowel Movement Date: 01/10/23  Current diet:  ADULT DIET Regular; 3 carb choices (45 gm/meal)  Passing flatus: YES  Tolerating current diet: YES       Pain Management:   Patient states pain is manageable on current regimen: YES    Skin:  Neal Score: 18  Interventions: increase time out of bed and PT/OT consult    Patient Safety:  Fall Score:    Interventions: bed/chair alarm, assistive device (walker, cane, etc), gripper socks, and pt to call before getting OOB       Length of Stay:  Expected LOS: 2d 21h  Actual LOS: Tracy Barrera RN

## 2023-01-11 NOTE — PROGRESS NOTES
Problem: Self Care Deficits Care Plan (Adult)  Goal: *Acute Goals and Plan of Care (Insert Text)  Description: FUNCTIONAL STATUS PRIOR TO ADMISSION: Pt reports she is Mod Independent using W/C within home, with RW to transfer to/from, as well as, reporting she uses Rollator in community. HOME SUPPORT: The patient lived with  who is able to provide light IADL assist.    Occupational Therapy Goals  Initiated 1/11/2023  1. Patient will perform RW grooming with modified independence within 7 day(s). 2.  Patient will perform RW lower body dressing with modified independence within 7 day(s). 3.  Patient will perform RW toilet transfers with modified independence within 7 day(s). 4.  Patient will perform all aspects of RW toileting with modified independence within 7 day(s). Outcome: Not Met    OCCUPATIONAL THERAPY EVALUATION  Patient: Pepe Paez (46 y.o. female)  Date: 1/11/2023  Primary Diagnosis: Hyperglycemia [R73.9]       Precautions:  Fall    ASSESSMENT  Based on the objective data described below, the patient presents with high anxiety, decreased higher level mobility/balance/activity tolerance and c/o fluctuating vision (reports transient cloudy globe like vision, transient flashes of bright colors, images of water on floor and hair blowing in the wind and reports of a small dog in the corner of the room), all of which limit pt's safe functional independence. Pt's primary concern is her vision changes; however, pt noted with good functional vision during evaluation/treatment, as well as, ability to read clock hanging on wall across the room. Currently, pt benefits from Supervision for RW mobility/balance challenges, as well as, cues for self-calming. Pt benefits from skilled OT to address functional deficits during acute hospitalization, with reporting therapist believing pt would benefit from [facility] upon discharge.     Of note, no BUE strength deficits noted, with good finger to nose and B equal  strength. Current Level of Function Impacting Discharge (ADLs/self-care): Supervision at [facility]    Functional Outcome Measure: The patient scored 55/100 on the Barthel Index outcome measure. Other factors to consider for discharge: anxiety     Patient will benefit from skilled therapy intervention to address the above noted impairments. PLAN :  Recommendations and Planned Interventions: self care training, functional mobility training, therapeutic exercise, balance training, therapeutic activities, endurance activities, and patient education    Frequency/Duration: Patient will be followed by occupational therapy 3 times a week to address goals. Recommendation for discharge: (in order for the patient to meet his/her long term goals)  Occupational therapy at least 2 days/week in the home     This discharge recommendation:  Has not yet been discussed the attending provider and/or case management    IF patient discharges home will need the following DME: patient owns DME required for discharge       SUBJECTIVE:   Patient stated I see a small dog sometimes also, when I close my eyes and open then again, it's gone.     OBJECTIVE DATA SUMMARY:   HISTORY:   Past Medical History:   Diagnosis Date    Adverse effect of anesthesia     O2 DROPS WITH ANESTHESIA    Arthritis     KNEES    [facility]    SQUAMOUS CELL CARCINOMA; tonsils and lymph nodes.   Removed 3-2015 with radiation    GERD (gastroesophageal reflux disease)     Hypertension     NO LONGER ON MEDICATION    Hypothyroid     HYPOTHYROIDISM    Migraine     Nausea & vomiting     Obesity     Other ill-defined conditions(799.89)     chronic back pain    Psychiatric disorder     anxiety    Psychiatric disorder     panic ATTACKS    SVT (supraventricular tachycardia) [facility] 05/24/2014    Ulcerative colitis      Past Surgical History:   Procedure Laterality Date    COLONOSCOPY,DIAGNOSTIC  11/19/2015         HX GI      colonscopy    HX HEENT 03/2015    tonsillectomy (CANCER) AND NECK LYMPH NODES    HX HEENT  2008    PARATHYROID REMOVAL    HX HEENT Left 2002    EYE LASER    HX HEMORRHOIDECTOMY  2001, 2016     [address] [place]    HX KNEE ARTHROSCOPY  1995    left knee surgery, TOTAL LT  and Right KNEE 2013    HX KNEE REPLACEMENT Bilateral 2013, 2014    HX LAP CHOLECYSTECTOMY  2002    HX LUMBAR FUSION  2011    SPINAL FUSION with hardware L4-L5    HX ORTHOPAEDIC  1990    CYST REMOVED RIGHT WRIST    HX ORTHOPAEDIC  05/12/2021    L2-3 & L5-21 LUMBAR LAMINECTOMY WITH ANDREWS OSTEOTOMY    HX OTHER SURGICAL      cyst removal right wrist    HX UROLOGICAL  2010    bladder surgery(interstim device)    IR INJ FORAMIN EPID LUMB ANES/STER SNGL  8/19/2019    KS EGD TRANSORAL BIOPSY SINGLE/MULTIPLE  5/20/2013            Expanded or extensive additional review of patient history:     Home Situation  Home Environment: Private residence  # Steps to Enter: 0  Wheelchair Ramp: Yes  One/Two Story Residence: One story  Living Alone: No  Support Systems: Spouse/Significant Other  Patient Expects to be Discharged to[de-identified] Home with home health  Current DME Used/Available at Home: Tub transfer bench, Walker, rollator, Walker, rolling, Wheelchair  Tub or Shower Type: Tub/Shower combination    Hand dominance: Right    EXAMINATION OF PERFORMANCE DEFICITS:  Cognitive/Behavioral Status:  Neurologic State: Alert  Orientation Level: Oriented X4  Cognition: Appropriate decision making; Appropriate for age attention/concentration; Appropriate safety awareness; Follows commands  Perception: Appears intact  Perseveration: Perseverates during ADLS;Perseverates during conversation;Perseverates during mobility (on various visual deficits)  Safety/Judgement: Decreased insight into deficits; Decreased awareness of need for safety    Hearing:   Auditory  Auditory Impairment: None    Vision/Perceptual:    Visual Fields:  (reports transient cloudy globe like vision, flashes of bright colors, a small dog, water and hair flapping in the wind)      Range of Motion:  AROM: Within functional limits                         Strength:  Strength: Generally decreased, functional                Coordination:  Coordination: Within functional limits  Fine Motor Skills-Upper: Left Intact; Right Intact    Gross Motor Skills-Upper: Left Intact; Right Intact    Tone & Sensation:  Tone: Normal  Sensation: Impaired                      Balance:  Sitting: Intact  Standing: Impaired  Standing - Static: Good  Standing - Dynamic : Fair    Functional Mobility and Transfers for ADLs:  Bed Mobility:  Rolling: Independent  Sit to Supine: Independent  Scooting: Independent    Transfers:  Sit to Stand: Supervision  Stand to Sit: Supervision  Bed to Chair: Supervision    ADL Assessment:  Feeding: Independent    Oral Facial Hygiene/Grooming: Supervision    Bathing: Setup;Supervision    Type of Bath: Basin/Soap/Water;Full    Upper Body Dressing: Setup    Lower Body Dressing: Setup;Supervision    Toileting: Supervision    ADL Intervention and task modifications:  Patient instructed and indicated understanding the benefits of maintaining activity tolerance, functional mobility, and independence with self care tasks during acute stay  to ensure safe return home and to baseline. Encouraged patient to increase frequency and duration OOB, be out of bed for all meals, perform daily ADLs (as approved by RN/MD regarding bathing etc), and performing functional mobility to/from bathroom. Pt educated on safe transfer techniques, with specific emphasis on proper hand placement to push up from seated surface rather than attempt to pull self up, fully positioning self in-front of desired seated location, feeling chair on back of legs and reaching back with 1-2 UE to slowly lower self to seated position. Provided verbal cuing for education for breathing techniques including pursed lip breathing techniques (PLB) and circulatory breathing. Additionally, patient was educated on energy conservation techniques, including how they specifically apply to functional activities; This includes pacing, rest breaks, and planning. Patient with good verbal understanding however needing additional cuing through out tasks to use techniques. Additional time needed during tasks. Cognitive Retraining  Safety/Judgement: Decreased insight into deficits; Decreased awareness of need for safety    Functional Measure:    Barthel Index:  Bathin  Bladder: 10  Bowels: 10  Groomin  Dressin  Feeding: 10  Mobility: 0  Stairs: 0  Toilet Use: 5  Transfer (Bed to Chair and Back): 10  Total: 55/100      The Barthel ADL Index: Guidelines  1. The index should be used as a record of what a patient does, not as a record of what a patient could do. 2. The main aim is to establish degree of independence from any help, physical or verbal, however minor and for whatever reason. 3. The need for supervision renders the patient not independent. 4. A patient's performance should be established using the best available evidence. Asking the patient, friends/relatives and nurses are the usual sources, but direct observation and common sense are also important. However direct testing is not needed. 5. Usually the patient's performance over the preceding 24-48 hours is important, but occasionally longer periods will be relevant. 6. Middle categories imply that the patient supplies over 50 per cent of the effort. 7. Use of aids to be independent is allowed. Score Interpretation (from [address])    Independent   60-79 Minimally independent   40-59 Partially dependent   20-39 Very dependent   <20 Totally dependent     -Faith Reilly., Barthel, D.W. (1965). Functional evaluation: the Barthel Index. [address] ([address] (1997). The Barthel activities of daily living index: self-reporting versus actual performance in the old (> or = 75 years).  Journal of American Geriatric Society 45(6), [address] [facility], TAI, Wayne Paulino.Inderjit (1999). Measuring the change in disability after inpatient rehabilitation; comparison of the responsiveness of the Barthel Index and Functional [place] Measure. Journal of Neurology, Neurosurgery, and Psychiatry, 66(4), 390-299. JEFF Fraire, KATIE Hunter, & Edgar Gordon M.A. (2004) Assessment of post-stroke quality of life in cost-effectiveness studies: The usefulness of the Barthel Index and the EuroQoL-5D. Quality of Life Research, 15, 884-73        Occupational Therapy Evaluation Charge Determination   History Examination Decision-Making   LOW Complexity : Brief history review  MEDIUM Complexity : 3-5 performance deficits relating to physical, cognitive , or psychosocial skils that result in activity limitations and / or participation restrictions LOW Complexity : No comorbidities that affect functional and no verbal or physical assistance needed to complete eval tasks       Based on the above components, the patient evaluation is determined to be of the following complexity level: LOW   Pain Rating:  No c/o pain    Activity Tolerance:   Fair and requires rest breaks    After treatment patient left in no apparent distress:    Sitting in chair, Call bell within reach, and PT present    COMMUNICATION/EDUCATION:   The patients plan of care was discussed with: Physical therapist and Registered nurse. Home safety education was provided and the patient/caregiver indicated understanding., Patient/family have participated as able in goal setting and plan of care. , and Patient/family agree to work toward stated goals and plan of care. This patients plan of care is appropriate for delegation to [facility].     Thank you for this referral.  Mil Gray OT  Time Calculation: 21 mins

## 2023-01-11 NOTE — PROGRESS NOTES
Went over discharge paperwork with patient; patient verbalized understanding. Removed IV Patient stated she wanted to eat dinner at home and to not cover her with insulin.

## 2023-01-11 NOTE — DISCHARGE SUMMARY
Hospitalist Discharge Summary     Patient ID:  Jermain Aguilar  [id number]  60 y.o.  1950 1/9/2023    PCP on record: Washington Pritchett MD    Admit date: 1/9/2023  Discharge date and time: 1/11/2023    DISCHARGE DIAGNOSIS:  Hyperglycemia  History of diabetes mellitus  Blurred vision  Occipitalstroke   -? H/o back infection   Hypertension  Neuropathy  Dyslipidemia  Hypothyroidism  GERD  Depression      CONSULTATIONS:  IP CONSULT TO HOSPITALIST  IP CONSULT TO NEUROLOGY    Excerpted HPI from H&P of Colin Gomez MD:  CHIEF COMPLAINT: Visual problems     HISTORY OF PRESENT ILLNESS:     Sergei Hunter is a 74 y.o.  female who presents with past medical history of diabetes mellitus, hypertension is coming the hospital chief complaints of vision difficulty since last at least 1 month. Patient reports being short of breath and labored a month ago when she started noticing some floaters in her vision along with some blurred vision and also sometimes double vision. Symptoms really got worse in the last 2 weeks or so. She went to see an ophthalmologist who advised her to go to the emergency department for further evaluation. She also reports chronic bilateral tingling and numbness over both her feet. She reports having a history of diabetes mellitus but that was cured about 1 year ago and since that she has not been taking any medications. On arrival today, vital signs are within normal limits. Labs noted to have WBC of 11, hemoglobin of 15 and platelet count of 357. BMP shows a glucose of 659, chloride of 88, sodium of 124, platelets of 906. CT head shows chronic microvascular changes     We were asked to admit for work up and evaluation of the above problems. ______________________________________________________________________  DISCHARGE SUMMARY/HOSPITAL COURSE:  for full details see H&P, daily progress notes, labs, consult notes.    Hyperglycemia  History of diabetes mellitus  -Patient reports that she was given of diabetes and has not been taking any medication since last 1 year or so  HBa1c 13.5%   -She is currently not reporting any abdominal pain, nausea or vomiting  -It is elevated more at 650  -She received 10 units of NovoLog insulin IV in the emergency department  C/w nsulin 35 units at bedtime ,Start insulin lispro 8 units 3 times daily and also insulin sliding scale. Regimen adjusted based on blood sugars  Continue with diabetic diet  -She does not meet criteria for DKA or HHS  --Consult diabetic team      Blurred vision  Occipitalstroke   -Patient reports visual difficulties  involving blurred vision, occasional floaters and also occasional double vision  -She does have chronic bilateral tingling of both legs which is chronic  -She does not report any other associated symptoms  -She has seen a ophthalmologist today who mentioned that there is no ocular problem  MRI of the brain showed  1. Few tiny acute infarcts in the left occipital lobe. 2. Mild chronic microvascular ischemic disease. Will initiate stroke work-up, out of the permissive hypertension as her symptom has been going on for few days. venous Doppler of the carotids, 2D echo did not show any significant abnormality, Patient is already on statin. Seen by neurology who advised aspirin and Plavix  Lactic acid  -likley dehydration from severe hypoglycemia  -S/p bolus IV fluids in the ED. Continue IV fluids. Repeat lactic acid is within normal limit     ? H/o back infection   Patient reported history of back infection, was told to remain on doxycycline until June by ID Dr. Mateo Raphael   Will continue with home dose doxycycline 100 mg twice daily    Hypertension  Neuropathy  Dyslipidemia  Hypothyroidism  GERD  Depression  -Continue Norvasc, Elavil, Lipitor, levothyroxine, PPI, Paxil          _______________________________________________________________________  Patient seen and examined by me on discharge day.   Pertinent Findings:  Gen:    Not in distress  Chest: Clear lungs  CVS:   Regular rhythm. No edema  Abd:  Soft, not distended, not tender  Neuro:  Alert, oriented x3  _______________________________________________________________________  DISCHARGE MEDICATIONS:   Current Discharge Medication List        START taking these medications    Details   clopidogreL (PLAVIX) 75 mg tab Take 1 Tablet by mouth daily for 21 days. Qty: 21 Tablet, Refills: 0  Start date: 1/12/2023, End date: 2/2/2023      insulin glargine (LANTUS) 100 unit/mL injection 40 Units by SubCUTAneous route nightly for 180 days. Qty: 36 mL, Refills: 1  Start date: 1/11/2023, End date: 7/10/2023      insulin lispro (HUMALOG) 100 unit/mL injection 10 Units by SubCUTAneous route Before breakfast, lunch, and dinner for 180 days. Qty: 27 mL, Refills: 1  Start date: 1/11/2023, End date: 7/10/2023      Blood-Glucose Meter monitoring kit 1kit  Qty: 1 Kit, Refills: 0  Start date: 1/11/2023      lancets-blood glucose strips 30 gauge cmpk Use as indicated, Check blood sugars 4times a day  Qty: 420 Dose Pack, Refills: 5  Start date: 1/11/2023      Insulin Needles, Disposable, 31 gauge x 5/16\" ndle Use as indicated  Qty: 1 Each, Refills: 11  Start date: 1/11/2023           CONTINUE these medications which have CHANGED    Details   aspirin delayed-release 81 mg tablet Take 1 Tablet by mouth daily for 180 days. Qty: 30 Tablet, Refills: 5  Start date: 1/12/2023, End date: 7/11/2023      amLODIPine (NORVASC) 10 mg tablet Take 1 Tablet by mouth daily for 90 days. Qty: 30 Tablet, Refills: 2  Start date: 1/12/2023, End date: 4/12/2023           CONTINUE these medications which have NOT CHANGED    Details   levothyroxine (SYNTHROID) 137 mcg tablet Take 137 mcg by mouth daily. clonazePAM (KlonoPIN) 0.5 mg tablet Take 0.5 Tablets by mouth two (2) times a day.  Max Daily Amount: 0.5 mg.  Qty: 30 Tablet, Refills: 0    Associated Diagnoses: Generalized anxiety disorder acetaminophen (TYLENOL) 325 mg tablet Take 2 Tablets by mouth every six (6) hours as needed for Pain. Qty: 30 Tablet, Refills: 0      atorvastatin (LIPITOR) 40 mg tablet Take 40 mg by mouth nightly. butalb/acetaminophen/caffeine (FIORICET PO) Take 1 Tablet by mouth as needed. PARoxetine (PAXIL) 40 mg tablet Take 40 mg by mouth every morning. !! metoprolol tartrate (LOPRESSOR) 25 mg tablet Take 12.5 mg by mouth nightly. !! metoprolol (LOPRESSOR) 25 mg tablet Take 25 mg by mouth every morning. omeprazole (PRILOSEC) 40 mg capsule Take 40 mg by mouth every morning. amitriptyline (ELAVIL) 50 mg tablet Take 50 mg by mouth nightly. !! - Potential duplicate medications found. Please discuss with provider. STOP taking these medications       nystatin (MYCOSTATIN) 100,000 unit/gram ointment Comments:   Reason for Stopping:         L.acid,para-B. bifidum-S.therm (RISAQUAD) 8 billion cell cap cap Comments:   Reason for Stopping:         miconazole (MICOTIN) 2 % topical powder Comments:   Reason for Stopping:                 Patient Follow Up Instructions:    Activity: Activity as tolerated  Diet: Diabetic Diet  Wound Care: As directed      Follow-up Information       Follow up With Specialties Details Why Contact Seng Banegas, [address] [place] [address]  [phone]            ________________________________________________________________    Risk of deterioration: High    Condition at Discharge:  Stable  __________________________________________________________________    Disposition  Home with family, no needs    ____________________________________________________________________    Code Status: Full Code  ___________________________________________________________________      Total time in minutes spent coordinating this discharge (includes going over instructions, follow-up, prescriptions, and preparing report for sign off to her PCP) :  35 minutes    Signed:  Natanael Stahl MD

## 2023-01-11 NOTE — PROGRESS NOTES
Problem: Falls - Risk of  Goal: *Absence of Falls  Description: Document Fariha Goodson Fall Risk and appropriate interventions in the flowsheet. Outcome: Progressing Towards Goal  Note: Fall Risk Interventions:     Problem: Diabetes Self-Management  Goal: *Disease process and treatment process  Description: Define diabetes and identify own type of diabetes; list 3 options for treating diabetes. Outcome: Progressing Towards Goal  Goal: *Incorporating nutritional management into lifestyle  Description: Describe effect of type, amount and timing of food on blood glucose; list 3 methods for planning meals. Outcome: Progressing Towards Goal  Goal: *Incorporating physical activity into lifestyle  Description: State effect of exercise on blood glucose levels. Outcome: Progressing Towards Goal  Goal: *Developing strategies to promote health/change behavior  Description: Define the ABC's of diabetes; identify appropriate screenings, schedule and personal plan for screenings. Outcome: Progressing Towards Goal  Goal: *Using medications safely  Description: State effect of diabetes medications on diabetes; name diabetes medication taking, action and side effects. Outcome: Progressing Towards Goal  Goal: *Monitoring blood glucose, interpreting and using results  Description: Identify recommended blood glucose targets  and personal targets. Outcome: Progressing Towards Goal  Goal: *Prevention, detection, treatment of acute complications  Description: List symptoms of hyper- and hypoglycemia; describe how to treat low blood sugar and actions for lowering  high blood glucose level. Outcome: Progressing Towards Goal  Goal: *Prevention, detection and treatment of chronic complications  Description: Define the natural course of diabetes and describe the relationship of blood glucose levels to long term complications of diabetes.   Outcome: Progressing Towards Goal  Goal: *Developing strategies to address psychosocial issues  Description: Describe feelings about living with diabetes; identify support needed and support network  Outcome: Progressing Towards Goal  Goal: *Insulin pump training  Outcome: Progressing Towards Goal  Goal: *Sick day guidelines  Outcome: Progressing Towards Goal     Problem: TIA/CVA Stroke: Day 2 Until Discharge  Goal: Activity/Safety  Outcome: Progressing Towards Goal  Goal: Diagnostic Test/Procedures  Outcome: Progressing Towards Goal  Goal: Nutrition/Diet  Outcome: Progressing Towards Goal  Goal: Discharge Planning  Outcome: Progressing Towards Goal  Goal: Medications  Outcome: Progressing Towards Goal  Goal: Respiratory  Outcome: Progressing Towards Goal  Goal: Treatments/Interventions/Procedures  Outcome: Progressing Towards Goal  Goal: Psychosocial  Outcome: Progressing Towards Goal  Goal: *Verbalizes anxiety and depression are reduced or absent  Outcome: Progressing Towards Goal  Goal: *Absence of aspiration  Outcome: Progressing Towards Goal  Goal: *Absence of deep venous thrombosis signs and symptoms(Stroke Metric)  Outcome: Progressing Towards Goal  Goal: *Optimal pain control at patient's stated goal  Outcome: Progressing Towards Goal  Goal: *Tolerating diet  Outcome: Progressing Towards Goal  Goal: *Ability to perform ADLs and demonstrates progressive mobility and function  Outcome: Progressing Towards Goal  Goal: *Stroke education continued(Stroke Metric)  Outcome: Progressing Towards Goal

## 2023-01-13 RX ORDER — INSULIN LISPRO 100 [IU]/ML
10 INJECTION, SOLUTION INTRAVENOUS; SUBCUTANEOUS
Qty: 27 ML | Refills: 1 | Status: SHIPPED | OUTPATIENT
Start: 2023-01-13 | End: 2023-07-12

## 2023-02-13 RX ORDER — CLOPIDOGREL BISULFATE 75 MG/1
75 TABLET ORAL DAILY
Qty: 21 TABLET | Refills: 0 | Status: CANCELLED | OUTPATIENT
Start: 2023-02-13 | End: 2023-03-06

## 2023-02-13 RX ORDER — CLOPIDOGREL BISULFATE 75 MG/1
75 TABLET ORAL DAILY
Qty: 30 TABLET | Refills: 0 | OUTPATIENT
Start: 2023-02-13

## 2023-02-13 RX ORDER — CLOPIDOGREL BISULFATE 75 MG/1
75 TABLET ORAL DAILY
COMMUNITY
End: 2023-02-15 | Stop reason: ALTCHOICE

## 2023-02-13 NOTE — TELEPHONE ENCOUNTER
Requested Prescriptions     Pending Prescriptions Disp Refills    clopidogreL (Plavix) 75 mg tab 30 Tablet 0     Sig: Take 1 Tablet by mouth daily.     Last fill 1/11/23   Consult Dr Mitcheal Goldberg 1/1023    Next visit 3/8/23   Patient requesting fill till seen in office

## 2023-02-13 NOTE — TELEPHONE ENCOUNTER
Pt was prescribed Plavix in the hospital by Dr. Carolee Griffin. Needs refill to get her to hosp fu appt in March. Requested Prescriptions     Pending Prescriptions Disp Refills    clopidogreL (PLAVIX) 75 mg tab 21 Tablet 0     Sig: Take 1 Tablet by mouth daily for 21 days.      Please send to [facility] [phone]    [facility] 03/08/23 - Cami Rodriguez

## 2023-02-15 NOTE — TELEPHONE ENCOUNTER
Patient informed this refill request for Plavix 75 mg was denied per ANDREW Zuluaga and she should only be taking ASA 81 mg for blood thinner at this time.

## 2023-02-15 NOTE — TELEPHONE ENCOUNTER
Patient called stating that she is completely out of her Clopidogrel and needs it refilled ASAP.  [phone]

## 2023-03-02 ENCOUNTER — TELEPHONE (OUTPATIENT)
Dept: NEUROLOGY | Age: 73
End: 2023-03-02

## 2023-03-02 NOTE — TELEPHONE ENCOUNTER
Pt states she was seeing jagged edges on 2/27. Lasted 2 hours. Pt's pcp seems to think she needs to go back on clopidogrel. Pt knows she was only supposed to be on for 21 days.  Please call to discuss [phone]

## 2023-03-02 NOTE — TELEPHONE ENCOUNTER
Returned call,verified pt with two pt identifiers, pt advised she did see her PCP and told him about an episode she had on 2/27/23 seeing jagged lines at whatever she looked at. It was not like before when she saw the colored lights and went to ER for the stroke. Pt had no other symptoms with the episode. Pt did have a headache after that episode. She does have Hx of migraines and stated this felt like a migraine. She did not take anything for it. Just laid down and closed her eyes and it eventually passed. The PCP told her that she should be back on the Plavix since she had that episode. Pt is taking ASA 81 daily. Pt has appt on 3/8/23 with Frederick Verma. Advised it sounds like she had a migraine episode. Advised if she has symptoms like she had before she went to ER for stroke to go to ER again. Advised I will send this message to Frederick Verma and let him know what is going on and call back with any suggestions. Pt verbalized understanding.

## 2023-03-03 NOTE — TELEPHONE ENCOUNTER
Message  Received: Zoila Calderon NP sent to Latanya Cho LPN  Caller: Unspecified (Yesterday, 10:26 AM)  Without seeing her I would not make any major changes. She should continue the ASA only, I would not resume the plavix. If she has another episode I would recommend further evaluation in he ER. EN           Called pt,verified pt with two pt identifiers, advised pt I sent that message to Mary Thomas to advise on. She advised w/o seeing her she would not want to change anything at this time. She wants her to continue her ASA daily, would not resume Plavix at this time. Advised if she has any further episodes like before she went to ER then she should go back to ER for evaluation. Verified appt 3/8/23 with Mary Thomas. Pt verbalized understanding.

## 2023-03-08 ENCOUNTER — OFFICE VISIT (OUTPATIENT)
Dept: NEUROLOGY | Age: 73
End: 2023-03-08
Payer: MEDICARE

## 2023-03-08 VITALS
WEIGHT: 262.6 LBS | DIASTOLIC BLOOD PRESSURE: 70 MMHG | SYSTOLIC BLOOD PRESSURE: 90 MMHG | TEMPERATURE: 97.7 F | OXYGEN SATURATION: 96 % | HEIGHT: 63 IN | BODY MASS INDEX: 46.53 KG/M2 | RESPIRATION RATE: 16 BRPM | HEART RATE: 72 BPM

## 2023-03-08 DIAGNOSIS — G62.9 PERIPHERAL POLYNEUROPATHY: Primary | ICD-10-CM

## 2023-03-08 DIAGNOSIS — I63.9 OCCIPITAL STROKE (HCC): ICD-10-CM

## 2023-03-08 DIAGNOSIS — E11.65 TYPE 2 DIABETES MELLITUS WITH HYPERGLYCEMIA, WITH LONG-TERM CURRENT USE OF INSULIN (HCC): ICD-10-CM

## 2023-03-08 DIAGNOSIS — Z79.4 TYPE 2 DIABETES MELLITUS WITH HYPERGLYCEMIA, WITH LONG-TERM CURRENT USE OF INSULIN (HCC): ICD-10-CM

## 2023-03-08 PROCEDURE — 3017F COLORECTAL CA SCREEN DOC REV: CPT | Performed by: NURSE PRACTITIONER

## 2023-03-08 PROCEDURE — G8417 CALC BMI ABV UP PARAM F/U: HCPCS | Performed by: NURSE PRACTITIONER

## 2023-03-08 PROCEDURE — 1123F ACP DISCUSS/DSCN MKR DOCD: CPT | Performed by: NURSE PRACTITIONER

## 2023-03-08 PROCEDURE — 99214 OFFICE O/P EST MOD 30 MIN: CPT | Performed by: NURSE PRACTITIONER

## 2023-03-08 PROCEDURE — G8400 PT W/DXA NO RESULTS DOC: HCPCS | Performed by: NURSE PRACTITIONER

## 2023-03-08 PROCEDURE — G8536 NO DOC ELDER MAL SCRN: HCPCS | Performed by: NURSE PRACTITIONER

## 2023-03-08 PROCEDURE — 2022F DILAT RTA XM EVC RTNOPTHY: CPT | Performed by: NURSE PRACTITIONER

## 2023-03-08 PROCEDURE — 3046F HEMOGLOBIN A1C LEVEL >9.0%: CPT | Performed by: NURSE PRACTITIONER

## 2023-03-08 PROCEDURE — G8427 DOCREV CUR MEDS BY ELIG CLIN: HCPCS | Performed by: NURSE PRACTITIONER

## 2023-03-08 PROCEDURE — G8432 DEP SCR NOT DOC, RNG: HCPCS | Performed by: NURSE PRACTITIONER

## 2023-03-08 PROCEDURE — 1090F PRES/ABSN URINE INCON ASSESS: CPT | Performed by: NURSE PRACTITIONER

## 2023-03-08 PROCEDURE — 1101F PT FALLS ASSESS-DOCD LE1/YR: CPT | Performed by: NURSE PRACTITIONER

## 2023-03-08 RX ORDER — LOSARTAN POTASSIUM 25 MG/1
TABLET ORAL
COMMUNITY
Start: 2023-01-26

## 2023-03-08 RX ORDER — AMLODIPINE BESYLATE 5 MG/1
5 TABLET ORAL
COMMUNITY
Start: 2023-01-26

## 2023-03-08 RX ORDER — SODIUM FLUORIDE 6 MG/ML
PASTE, DENTIFRICE DENTAL
COMMUNITY
Start: 2023-02-14

## 2023-03-08 NOTE — PROGRESS NOTES
Chief Complaint   Patient presents with    Concepcion Knee     Dr Alexandra Longo consult 1/10/23 vision abnormalities and hyperglycemia. Neuroimaging revealed an acute occipital stroke. Patient C/O visual disturbances (waves of water) 2/27/23 seen by PCP & opthalmology. Has one more session with home health.

## 2023-03-08 NOTE — PROGRESS NOTES
[facility]  [facility] [address] [place] [address]  Roderick Duncan  Tel: [phone]  Fax: [phone]      Date:  23     Name:  Victorina Rich  :  1950  MRN:  [id number]     PCP:  Alvaro Cartagena MD    Chief Complaint   Patient presents with    Kelsey Villarreal consult 1/10/23 vision abnormalities and hyperglycemia. Neuroimaging revealed an acute occipital stroke. HISTORY OF PRESENT ILLNESS:  Patient presents today for hospital follow up from 23-23. For the most part she is doing well, she notes that on 23, the patient had a spell where her vision was affected, it looked like clear  water waves and oceans in her vision that lasted for about  2 hours, saw PCP the following day. Nothing acute was discovered. However no imaging was completed. Ophthalmology: everything is fine per the patient. No DM around the eyes. Checks BS regularly, taking the insulin as rx'ed. she notes her numbers are doing quite well. Diet: working on diet. Fluids: drinks a lot of water, glucerna  Finished with therapy, 1 more visit with homehealth nurse. Plans to do exercises on her own. No Falls. Pt feels off balance, uses the wheelchair and a walker if she needs it. Takes the 81mg ASA daily. No smoking, no ETOH, no marijuana. Endocrinology Dr Nilson Smith 2023       Neuro consult 2023 with Dr Bárbara Villarreal: The patient is a pleasant 74year old female admitted with vision abnormalities and hyperglycemia. Neuroimaging revealed an acute occipital stroke. Acute ischemic stroke:  risk factors for stroke include: Hypertension, diabetes, dyslipidemia  Brain MRI revealed acute stroke in the left occipital lobe. Hypertension:  goal  blood pressure less than 140/90  Dyslipidemia: updated LDL was 52. Goal LDL is less than 70. Continue current statin therapy   Diabetes: hgba1c was 13. 5.  needs aggressive glycemic control and education  CTA  reveals less than 50% carotid  stenosis. Significant intracranial posterior circulation atherosclerosis  Continue 81 mg aspirin. Due to significant intracranial disease, the patient should be on dual anti-platelet therapy for 21 days and then return to aspirin daily. Order placed. Side effects and risks of the medication were discussed with the patient today. Echo completed  The patient will need physical therapy, Occupational Therapy consultation. If the patient's neurological examination worsens, patient will need urgent neuroimaging     I provided stroke education again today in regards to risk factors for stroke and lifestyle modifications to help minimize the risk of future stroke. This included medication compliance, regular follow up with primary care physician,  and healthy lifestyle habits (nutrition/exercise)     Peripheral neuropathy:  This is most likely related to the patient's history of diabetes. This can be further evaluated in the outpatient setting with an EMG/nerve conduction study. Stressed the importance of good tight glycemic control. Anxiety: on paxil  Hypothyroidism  Migraine headaches      REVIEW OF SYSTEMS:     Review of Systems   Eyes: Negative. Musculoskeletal:  Negative for falls. Neurological:  Positive for dizziness (at times), tingling (B feet), sensory change (B feet) and headaches (no recent migraines). Negative for seizures. Psychiatric/Behavioral:  Insomnia: trouble sleeping b/c of issues with her , naps during the day time. All other systems reviewed and are negative. Current Outpatient Medications   Medication Sig    amLODIPine (NORVASC) 5 mg tablet 5 mg. insulin lispro (HUMALOG) 100 unit/mL injection 10 Units by SubCUTAneous route Before breakfast, lunch, and dinner for 180 days. aspirin delayed-release 81 mg tablet Take 1 Tablet by mouth daily for 180 days. insulin glargine (LANTUS) 100 unit/mL injection 40 Units by SubCUTAneous route nightly for 180 days. Blood-Glucose Meter monitoring kit 1kit    lancets-blood glucose strips 30 gauge cmpk Use as indicated, Check blood sugars 4times a day    Insulin Needles, Disposable, 31 gauge x 5/16\" ndle Use as indicated    levothyroxine (SYNTHROID) 137 mcg tablet Take 137 mcg by mouth daily. clonazePAM (KlonoPIN) 0.5 mg tablet Take 0.5 Tablets by mouth two (2) times a day. Max Daily Amount: 0.5 mg.    atorvastatin (LIPITOR) 40 mg tablet Take 40 mg by mouth nightly. butalb/acetaminophen/caffeine (FIORICET PO) Take 1 Tablet by mouth as needed. PARoxetine (PAXIL) 40 mg tablet Take 40 mg by mouth every morning. metoprolol tartrate (LOPRESSOR) 25 mg tablet Take 12.5 mg by mouth nightly. metoprolol (LOPRESSOR) 25 mg tablet Take 25 mg by mouth every morning. 25 mg am & 12.5 in PM    omeprazole (PRILOSEC) 40 mg capsule Take 40 mg by mouth every morning. fluoride, sodium, 1.1 % pste USE EXCLUSIVELY AT LEAST TWO TIMES A DAY FOR 2 MINUTES EACH TIME    losartan (COZAAR) 25 mg tablet     acetaminophen (TYLENOL) 325 mg tablet Take 2 Tablets by mouth every six (6) hours as needed for Pain. (Patient not taking: Reported on 3/8/2023)    amitriptyline (ELAVIL) 50 mg tablet Take 50 mg by mouth nightly. (Patient not taking: No sig reported)     No current facility-administered medications for this visit. Allergies   Allergen Reactions    Adhesive Tape-Silicones Rash    Aloe Vera Rash    Chlorhexidine Rash    Tussin [Dextromethorphan Hbr] Palpitations    Readyprep Chg [Chlorhexidine Gluconate] Rash     Past Medical History:   Diagnosis Date    Adverse effect of anesthesia     O2 DROPS WITH ANESTHESIA    Arthritis     KNEES    Cancer ([facility] [address] 03/13/2015    SQUAMOUS CELL CARCINOMA; tonsils and lymph nodes.   Removed 3-2015 with radiation    GERD (gastroesophageal reflux disease)     Hypertension     NO LONGER ON MEDICATION    Hypothyroid     HYPOTHYROIDISM    Migraine     Nausea & vomiting     Obesity     Occipital stroke ([facility] [address] 01/10/2023    Other ill-defined conditions(799.89)     chronic back pain    Psychiatric disorder     anxiety    Psychiatric disorder     panic ATTACKS    SVT (supraventricular tachycardia) ([facility] [address] 05/24/2014    Ulcerative colitis      Past Surgical History:   Procedure Laterality Date    COLONOSCOPY,DIAGNOSTIC  11/19/2015         HX GI      colonscopy    HX HEENT  03/2015    tonsillectomy (CANCER) AND NECK LYMPH NODES    HX HEENT  2008    PARATHYROID REMOVAL    HX HEENT Left 2002    EYE LASER    HX HEMORRHOIDECTOMY  2001, 2016     X2    HX HYSTERECTOMY  1985    HX KNEE ARTHROSCOPY  1995    left knee surgery, TOTAL LT  and Right KNEE 2013    HX KNEE REPLACEMENT Bilateral 2013, 2014    HX LAP CHOLECYSTECTOMY  2002    HX LUMBAR FUSION  2011    SPINAL FUSION with hardware L4-L5    HX ORTHOPAEDIC  1990    CYST REMOVED RIGHT WRIST    HX ORTHOPAEDIC  05/12/2021    L2-3 & L5-21 LUMBAR LAMINECTOMY WITH ANDREWS OSTEOTOMY    HX OTHER SURGICAL      cyst removal right wrist    HX UROLOGICAL  2010    bladder surgery(interstim device)    IR INJ FORAMIN EPID LUMB ANES/STER SNGL  8/19/2019    [place] EGD TRANSORAL BIOPSY SINGLE/MULTIPLE  5/20/2013          Social History     Socioeconomic History    Marital status:      Spouse name: Not on file    Number of children: Not on file    Years of education: Not on file    Highest education level: Not on file   Occupational History    Not on file   Tobacco Use    Smoking status: Never    Smokeless tobacco: Never   Vaping Use    Vaping Use: Never used   Substance and Sexual Activity    Alcohol use: No    Drug use: No     Types: Prescription    Sexual activity: Not Currently   Other Topics Concern     Service Not Asked    Blood Transfusions Not Asked    Caffeine Concern Not Asked    Occupational Exposure Not Asked    Hobby Hazards Not Asked    Sleep Concern Not Asked    Stress Concern Not Asked    Weight Concern Not Asked    Special Diet Not Asked    Back Care Not Asked    Exercise Not Asked    Bike Helmet Not Asked    Seat Belt Not Asked    Self-Exams Not Asked   Social History Narrative    Not on file     Social Determinants of Health     Financial Resource Strain: Not on file   Food Insecurity: Not on file   Transportation Needs: Not on file   Physical Activity: Not on file   Stress: Not on file   Social Connections: Not on file   Intimate Partner Violence: Not on file   Housing Stability: Not on file     Family History   Problem Relation Age of Onset    Cancer Mother         ovarian ca? Heart Disease Father         MI    Heart Attack Father     Cancer Father         MULTIPLE MYELOMA    Liver Disease Father         FROM MEDICATIONS FOR MULTIPLE MYELOMA    OSTEOARTHRITIS Sister     OSTEOARTHRITIS Brother     Cancer Paternal Grandmother 79        breast ca    Breast Cancer Paternal Grandmother 79    Breast Cancer Maternal Aunt 55    Breast Cancer Maternal Aunt 60    Breast Cancer Paternal Aunt 43    Breast Cancer Paternal Aunt         52's    Emphysema Paternal Grandfather     No Known Problems Sister     No Known Problems Brother     No Known Problems Brother     Anesth Problems Neg Hx          PHYSICAL EXAMINATION:    Visit Vitals  BP 90/70 (BP 1 Location: Left upper arm, BP Patient Position: Sitting, BP Cuff Size: Large adult)   Pulse 72   Temp 97.7 °F (36.5 °C) (Temporal)   Resp 16   Ht 5' 3\" (1.6 m)   Wt 262 lb 9.6 oz (119.1 kg)   SpO2 96%   BMI 46.52 kg/m²       General:  Well defined, nourished, and well groomed individual in no acute distress. Neck: Supple, nontender, normal range of motion. Musculoskeletal:  Extremities revealed no edema and had full range of motion of joints. Psych:  Good mood and bright affect. NEUROLOGICAL EXAMINATION:     Mental Status:   Alert and oriented to person, place, and time with recent and remote memory intact. Attention span and concentration are normal. Clear speech. Fund of knowledge preserved. Cranial Nerves:   PERRLA.   Visual fields were full  EOM: no evidence of nystagmus  Facial sensation:  normal and symmetric  Facial motor: normal and symmetric, no facial droop noted. Hearing intact  SCM strength intact  Tongue: midline without fasciculations    Motor Examination: Normal tone. 5/5 muscle strength in bilateral upper and lower extremities. No muscle wasting, no twitching or fasciculation noted. Sensory exam:  Normal throughout to light touch in bilateral upper and lower extremities, normal sensation to sharp touch in bilateral upper and lower extremities, decreased vibration sense in B toes and B ankles. Coordination:   Finger to nose and rapid arm movement testing was normal.  No resting or intention tremor. Negative Romberg, negative pronator drift. Gait and Station:  Steady, did okay with walking on toes, heels, but had difficulty  with tandem walking. Normal arm swing. Reflexes:  DTRs 2+ in bilateral biceps, brachioradialis, patella. ASSESSMENT AND PLAN      ICD-10-CM ICD-9-CM    1. Peripheral polyneuropathy  G62.9 356.9 REFERRAL TO NEUROLOGY      2. Occipital stroke ([facility])  I63.9 434.91       3. Type 2 diabetes mellitus with hyperglycemia, with long-term current use of insulin ([facility])  E11.65 250.00     Z79.4 790.29      V58.67         1. Peripheral polyneuropathy: Discussed the importance of continuing to manage and monitor her diabetes very closely, she is modifying her diet, she is taking medications as prescribed, she does have an upcoming appointment with endocrinology in April. We will proceed with ordering an EMG/nerve conduction test due to complaints of numbness and tingling in her feet as well as balance difficulty, intervene based upon results.   Patient has previously taken gabapentin she would like to avoid resuming, she does not feel like it was helpful, she notes she is on amitriptyline as well she did not feel like this was helpful, defer any new prescriptions at this time.  -     REFERRAL TO NEUROLOGY  2. Occipital stroke [facility]): Stroke risk factors reviewed with patient, continue 81 mg aspirin, continue close monitoring and follow-up with primary care for ongoing monitoring and maintenance of blood pressure, cholesterol, as well as diabetes. Patient has completed all recommended therapies encouraged her to continue doing home exercises on her own. If patient does have increase in any symptoms or any visual disturbances encouraged her to be evaluated the emergency room. 3. Type 2 diabetes mellitus with hyperglycemia, with long-term current use of insulin [facility]): Healthy lifestyle encouraged, continue close monitoring and management of her diabetes, follow-up with endocrinology as scheduled. Patient and/or family verbalized understand of all instructions and all questions/concerns were addressed. Safety/side effects of medications discussed. Patient remains a complex patient secondary to polypharmacy, significant comorbid conditions, and use of high-risk medications which complicate the decision making process related to patient's neurologic diagnosis. We will see the patient back in approximately 6 months, sooner if needed.     JOSIAH Khalil-BC

## 2023-04-27 ENCOUNTER — OFFICE VISIT (OUTPATIENT)
Dept: NEUROLOGY | Age: 73
End: 2023-04-27
Payer: MEDICARE

## 2023-04-27 DIAGNOSIS — M54.16 LUMBAR RADICULOPATHY: Primary | ICD-10-CM

## 2023-04-27 DIAGNOSIS — M79.10 MYALGIA: ICD-10-CM

## 2023-04-27 PROCEDURE — 95886 MUSC TEST DONE W/N TEST COMP: CPT | Performed by: PSYCHIATRY & NEUROLOGY

## 2023-04-27 PROCEDURE — 95911 NRV CNDJ TEST 9-10 STUDIES: CPT | Performed by: PSYCHIATRY & NEUROLOGY

## 2023-04-27 NOTE — PROCEDURES
ELECTRODIANOSTIC REPORT  Test Date:  2023    Patient: Alex Hobbs : 1950 Physician: Dr. Alexandre Hu,   Sex: Female Tech: Laureen Madison, EMG Technician  Ref Phys: Alyssia Paiz NP     CHIEF COMPLAINTS:  Patient is a 74year-old female who presents with sensory disturbance and pain in her bilateral lower extremity. She does have a wide-based gait. Strength is 5 out of 5 throughout. Pain does limit her ability sustain a contraction. She does have distal decreased to pinprick below her ankles. EMG & NCV Findings:    Nerve conduction studies as listed below in the bilateral lower extremities were normal for the bilateral superficial fibular sensory and sural sensory. The bilateral fibular motor nerve conduction studies when recording from the EDB and the tibialis anterior revealed a reduced amplitude throughout. The bilateral tibial motor nerve conduction studies were normal.    Disposable concentric needle examination of the muscles listed below in the bilateral lower extremities revealed no evidence of increased insertional activity. There was chronic neurogenic appearing motor units in the L4-S1 innervated muscles. Impression: This study was abnormal.  There was electrodiagnostic evidence upon todays examination suggesting:    A chronic bilateral lower lumbar radiculopathy with no evidence of active denervation. Please correlate these findings with lumbar spine neuroimaging    2. There was no evidence of a focal or generalized neuropathy or myopathy. __________________  Denisse Steele D.O.  FAAN        Nerve Conduction Studies  Anti Sensory Summary Table     Stim Site NR Peak (ms) Norm Peak (ms) O-P Amp (µV) Norm O-P Amp Site1 Site2 Dist (cm)   Left Sup Fibular Anti Sensory (Lat ankle)  32.1 °C   Lower leg    2.8 <4.6 8.6 >4 Lower leg Lat ankle 10.0   Right Sup Fibular Anti Sensory (Lat ankle)  32.1 °C   Lower leg    2.5 <4.6 7.0 >4 Lower leg Lat ankle 10.0   Left Sural Anti Sensory (Lat Mall)  32.1 °C   Calf    2.8 <4.4 8.5 >6 Calf Lat Mall 14.0   Right Sural Anti Sensory (Lat Mall)  32.1 °C   Calf    2.4 <4.4 6.6 >6 Calf Lat Mall 14.0     Motor Summary Table     Stim Site NR Onset (ms) Norm Onset (ms) O-P* Amp (mV) Norm O-P Amp P-T Amp (mV) Site1 Site2 Dist (cm) Romeo (m/s)   Left Fibular Motor (Ext Dig Brev)  32.1 °C   Ankle    6.3 <6.5 0.6 >1.1  Ankle Ext Dig Brev 8.0    B Fib    14.2  0.5   B Fib Ankle 33.0 42   Poplt    16.2  0.5   Poplt B Fib 10.0 50   Right Fibular Motor (Ext Dig Brev)  32.1 °C   Ankle    6.9 <6.5 0.4 >1.1  Ankle Ext Dig Brev 8.0    B Fib    13.0  0.3   B Fib Ankle 31.0 55   Poplt    15.2  0.3   Poplt B Fib 10.0 45   Left Fibular TA Motor (Tib Ant)  32.1 °C   Fib Head    2.6 <4.5 2.1 >3.0  Fib Head Tib Ant 10.0    Poplit    4.3 <5.7 2.0   Poplit Fib Head 10.0 59   Right Fibular TA Motor (Tib Ant)  32.1 °C   Fib Head    3.7 <4.5 1.3 >3.0  Fib Head Tib Ant 10.0    Poplit    6.5 <5.7 1.0   Poplit Fib Head 10.0 36   Left Tibial Motor (Abd Wei Brev)  32.1 °C   Ankle    4.3 <6.1 5.5 >1.1  Ankle Abd Wei Brev 8.0    Knee    13.0  4.2   Knee Ankle 35.0 40   Right Tibial Motor (Abd Wei Brev)  32.1 °C   Ankle    3.4 <6.1 5.0 >1.1  Ankle Abd Wei Brev 8.0    Knee    12.2  4.0   Knee Ankle 39.0 44     EMG     Side Muscle Nerve Root Ins Act Fibs Psw Recrt Duration Amp Poly Comment   Left AntTibialis Dp Br Peron L4-5 Nml Nml Nml Reduced Nml Incr Nml    Left MedGastroc Tibial S1-2 Nml Nml Nml Reduced Nml Incr Nml    Left Peroneus Long   Nml Nml Nml Nml Nml Incr 2+    Left VastusLat Femoral L2-4 Nml Nml Nml Nml Nml Nml Nml    Left TensorFascLat SupGluteal L4-5, S1 Nml Nml Nml Reduced Nml Incr Nml    Right AntTibialis Dp Br Peron L4-5 Nml Nml Nml Reduced Nml Incr Nml    Right MedGastroc Tibial S1-2 Nml Nml Nml Reduced Nml Incr Nml    Right Peroneus Long   Nml Nml Nml Reduced Nml Incr Nml    Right VastusLat Femoral L2-4 Nml Nml Nml Nml Nml Nml Nml    Right Semitendinosus Sciatic L5-S2 Nml Nml Nml Nml Nml Incr Nml          Waveforms:

## 2023-05-18 ENCOUNTER — OFFICE VISIT (OUTPATIENT)
Age: 73
End: 2023-05-18
Payer: MEDICARE

## 2023-05-18 VITALS
DIASTOLIC BLOOD PRESSURE: 58 MMHG | SYSTOLIC BLOOD PRESSURE: 98 MMHG | HEIGHT: 63 IN | BODY MASS INDEX: 47.36 KG/M2 | HEART RATE: 68 BPM | WEIGHT: 267.3 LBS

## 2023-05-18 DIAGNOSIS — E66.01 MORBID OBESITY (HCC): ICD-10-CM

## 2023-05-18 DIAGNOSIS — E78.2 MIXED HYPERLIPIDEMIA: ICD-10-CM

## 2023-05-18 DIAGNOSIS — E11.65 TYPE 2 DIABETES MELLITUS WITH HYPERGLYCEMIA, WITH LONG-TERM CURRENT USE OF INSULIN (HCC): Primary | ICD-10-CM

## 2023-05-18 DIAGNOSIS — Z79.4 TYPE 2 DIABETES MELLITUS WITH HYPERGLYCEMIA, WITH LONG-TERM CURRENT USE OF INSULIN (HCC): Primary | ICD-10-CM

## 2023-05-18 PROCEDURE — 1123F ACP DISCUSS/DSCN MKR DOCD: CPT | Performed by: GENERAL ACUTE CARE HOSPITAL

## 2023-05-18 PROCEDURE — 3046F HEMOGLOBIN A1C LEVEL >9.0%: CPT | Performed by: GENERAL ACUTE CARE HOSPITAL

## 2023-05-18 PROCEDURE — 99204 OFFICE O/P NEW MOD 45 MIN: CPT | Performed by: GENERAL ACUTE CARE HOSPITAL

## 2023-05-18 RX ORDER — BLOOD-GLUCOSE,RECEIVER,CONT
EACH MISCELLANEOUS
Qty: 1 EACH | Refills: 0 | Status: SHIPPED | OUTPATIENT
Start: 2023-05-18

## 2023-05-18 RX ORDER — PEN NEEDLE, DIABETIC 32GX 5/32"
1 NEEDLE, DISPOSABLE MISCELLANEOUS DAILY
Qty: 100 EACH | Refills: 3 | Status: SHIPPED | OUTPATIENT
Start: 2023-05-18

## 2023-05-18 RX ORDER — BLOOD-GLUCOSE SENSOR
EACH MISCELLANEOUS
Qty: 3 EACH | Refills: 3 | Status: SHIPPED | OUTPATIENT
Start: 2023-05-18

## 2023-05-18 RX ORDER — INSULIN GLARGINE 100 [IU]/ML
40 INJECTION, SOLUTION SUBCUTANEOUS NIGHTLY
Qty: 5 ADJUSTABLE DOSE PRE-FILLED PEN SYRINGE | Refills: 5 | Status: SHIPPED | OUTPATIENT
Start: 2023-05-18

## 2023-05-18 RX ORDER — METFORMIN HYDROCHLORIDE 500 MG/1
500 TABLET, EXTENDED RELEASE ORAL
Qty: 90 TABLET | Refills: 1 | Status: SHIPPED | OUTPATIENT
Start: 2023-05-18

## 2023-05-18 RX ORDER — SEMAGLUTIDE 0.68 MG/ML
INJECTION, SOLUTION SUBCUTANEOUS
Qty: 3 ML | Refills: 3 | Status: SHIPPED | OUTPATIENT
Start: 2023-05-18

## 2023-07-05 ENCOUNTER — TELEPHONE (OUTPATIENT)
Age: 73
End: 2023-07-05

## 2023-07-05 NOTE — TELEPHONE ENCOUNTER
Returned pt call, Pt states she is a pt of Dr. Meryl Barton, and has been on Doxycycline for over a year. Pt states she is coming to the end of her therapy and would like to know what is next ? Pt states it is unclear if she should just end therapy, or if a follow up is needed, or any labs or blood cultures are needed. Dr. Meryl Barton recommendation note below. Recommendations:  - Patient is doing well overall and from ID standpoint.   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 This was started in February 2022. For now I plan to continue this for at least 1 to 2 years. Script renewed on 7/11/22.  -Continue diabetes control.

## 2023-07-05 NOTE — TELEPHONE ENCOUNTER
Pt reports finishing antibiotic soon      Wants to know what is next since Dr Lauren Rice is gone       Best number to reach her is [phone]

## 2023-07-11 ENCOUNTER — TELEPHONE (OUTPATIENT)
Age: 73
End: 2023-07-11

## 2023-07-13 DIAGNOSIS — E11.65 TYPE 2 DIABETES MELLITUS WITH HYPERGLYCEMIA, WITH LONG-TERM CURRENT USE OF INSULIN (HCC): ICD-10-CM

## 2023-07-13 DIAGNOSIS — Z79.4 TYPE 2 DIABETES MELLITUS WITH HYPERGLYCEMIA, WITH LONG-TERM CURRENT USE OF INSULIN (HCC): ICD-10-CM

## 2023-08-21 DIAGNOSIS — E11.65 TYPE 2 DIABETES MELLITUS WITH HYPERGLYCEMIA, WITH LONG-TERM CURRENT USE OF INSULIN (HCC): ICD-10-CM

## 2023-08-21 DIAGNOSIS — Z79.4 TYPE 2 DIABETES MELLITUS WITH HYPERGLYCEMIA, WITH LONG-TERM CURRENT USE OF INSULIN (HCC): ICD-10-CM

## 2023-08-22 ENCOUNTER — TRANSCRIBE ORDERS (OUTPATIENT)
Facility: HOSPITAL | Age: 73
End: 2023-08-22

## 2023-08-22 DIAGNOSIS — N13.30 HYDRONEPHROSIS, UNSPECIFIED HYDRONEPHROSIS TYPE: Primary | ICD-10-CM

## 2023-08-22 DIAGNOSIS — R10.11 RIGHT UPPER QUADRANT PAIN: ICD-10-CM

## 2023-08-22 LAB
ALBUMIN SERPL-MCNC: 3.3 G/DL (ref 3.5–5)
ALBUMIN/GLOB SERPL: 1 (ref 1.1–2.2)
ALP SERPL-CCNC: 132 U/L (ref 45–117)
ALT SERPL-CCNC: 23 U/L (ref 12–78)
ANION GAP SERPL CALC-SCNC: 5 MMOL/L (ref 5–15)
AST SERPL-CCNC: 19 U/L (ref 15–37)
BILIRUB SERPL-MCNC: 0.4 MG/DL (ref 0.2–1)
BUN SERPL-MCNC: 24 MG/DL (ref 6–20)
BUN/CREAT SERPL: 23 (ref 12–20)
CALCIUM SERPL-MCNC: 9.5 MG/DL (ref 8.5–10.1)
CHLORIDE SERPL-SCNC: 107 MMOL/L (ref 97–108)
CO2 SERPL-SCNC: 28 MMOL/L (ref 21–32)
CREAT SERPL-MCNC: 1.06 MG/DL (ref 0.55–1.02)
CREAT UR-MCNC: 208 MG/DL
GLOBULIN SER CALC-MCNC: 3.4 G/DL (ref 2–4)
GLUCOSE SERPL-MCNC: 93 MG/DL (ref 65–100)
MICROALBUMIN UR-MCNC: 16.8 MG/DL
MICROALBUMIN/CREAT UR-RTO: 81 MG/G (ref 0–30)
POTASSIUM SERPL-SCNC: 4.5 MMOL/L (ref 3.5–5.1)
PROT SERPL-MCNC: 6.7 G/DL (ref 6.4–8.2)
SODIUM SERPL-SCNC: 140 MMOL/L (ref 136–145)

## 2023-08-23 LAB
EST. AVERAGE GLUCOSE BLD GHB EST-MCNC: 134 MG/DL
HBA1C MFR BLD: 6.3 % (ref 4–5.6)

## 2023-08-24 ENCOUNTER — TELEPHONE (OUTPATIENT)
Age: 73
End: 2023-08-24

## 2023-08-25 NOTE — TELEPHONE ENCOUNTER
Patient called and left a voice message stating that she missed her last appt due to she had covid. She stated that she had her labs drawn and would like to know the results?

## 2023-08-28 NOTE — TELEPHONE ENCOUNTER
Her hemoglobin A1c is 6.3% now, excellent work, please have her contact the  for follow up appointment

## 2023-08-28 NOTE — TELEPHONE ENCOUNTER
Patient notified of the message noted per Dr. Darin Apley and voiced understanding. Patient offered no more questions at this time.

## 2023-08-29 NOTE — PROGRESS NOTES
[address] [facility]  Hospitalist Group                                                                                          Hospitalist Progress Note  Jenni Padgett DO  Answering service: [phone] from in house phone        Date of Service:  2021  NAME:  Pepe Paez  :  1950  MRN:  [id number]      Admission Summary:   66 y. o. female with complex medical history of back surgery in 2021, followed by infectionmanaged by primary orthopedic Dr. Ankur Csatrejon for the same, with [facility] [address] [facility] about a month early November through early December for right psoas abscess with IR guided abscess drainage, who was discharged to [facility] with PICC line for prolonged antibiotics with IV vancomycin through 2022 per ID based on the culture results. For about a week, patient has noticed bulge and discomfort in right lower quadrant.  Yesterday she showed that to nursing team and subsequent to that patient was sent for CT abdomen pelvis assessment here at [facility].  Patient was noted to have considerable CT findings with persistent unchanged right psoas abscess, right perinephric stranding along with hydronephrosis, hypokalemia, mild early MERCEDES with elevated creatinine at 1.15 compared to previous baseline of 0.50.6. [facility] hospitalist team has been consulted to admit the patient for further management. Patient denied any fever or chills.  Patient denied any chest pain, shortness of breath, palpitation.  Patient denied any leg swelling worsening.  Patient denied any urinary trouble or diarrhea or loose stools.  No constipation.  Patient denied any focal weakness, tingling, numbness.  Denied any worsening of back pain or drainage from the back. Interval history / Subjective: Follow up abscess. Patient seen and examined earlier today. Overall doing well. Has intermittent right lower quadrant pain.   None during my visit     Assessment & Plan:     History Right psoas abscess needing extensive drainage at ED [facility]  Hx of Streptococcus anginosus epidural/iliopsoas abscess on recent hospitalization 1112/2021, on IV vancomycin to be continued per ID until 1/27/2022  -CT on admission with right perinephric stranding and proximal right hydronephrosis, persistent inflammatory stranding 3 cm x 1.6 cm right lower quadrant fluid collection, unchanged  -persistent collection not able to be drained  -ID consulted. Plans to continue vancomycin   -tylenol for pain. Okay for low dose oxycodone    Right perinephric stranding with right hydronephrosis:  -urology consulted. Could consider stenting if renal function declines  -consider repeat imagining if clinically needed   -urology signed off, can be reconsulted as needed    Acute renal insufficiency: improved      Recent celiac artery thrombosis with splenic infarcts, on Eliquis, diagnosed on 11/9/2021 on CT A/P.    -No vascular surgical intervention needed at the time.    -continue Eliquis for 6 months    #DM2- SSI  #Bilateral DM related polyneuropathybilateral feet  #Hx of SVT  #HTN- metoprolol, amlodipine   #HLD  #Hypothyroidism- home levothyroxine  #Depression/anxiety- home paroxetine    Code status: full   DVT prophylaxis: [address] discussed with: Patient/Family  Anticipated Disposition: SNF/LTC  Anticipated Discharge: 24 hours to 48 hours     Hospital Problems  Date Reviewed: 12/21/2021          Codes Class Noted POA    Abdominal pain ICD-10-CM: R10.9  ICD-9-CM: 789.00  12/27/2021 Unknown        Postprocedural intraabdominal abscess ICD-10-CM: T81.43XA  ICD-9-CM: 998.59  12/27/2021 Unknown        MERCEDES (acute kidney injury) ([facility] [address] ICD-10-CM: N17.9  ICD-9-CM: 584.9  11/6/2021 Unknown                Review of Systems:   Negative unless stated above      Vital Signs:    Last 24hrs VS reviewed since prior progress note.  Most recent are:  Visit Vitals  BP (!) 142/101 (BP 1 Location: Right lower arm, BP Patient Position: At rest)   Pulse 77 Temp 98.3 °F (36.8 °C)   Resp 18   Ht 5' 4\" (1.626 m)   Wt 120.2 kg (264 lb 15.9 oz)   SpO2 93%   BMI 45.49 kg/m²         Intake/Output Summary (Last 24 hours) at 12/31/2021 1611  Last data filed at 12/31/2021 2157  Gross per 24 hour   Intake 370 ml   Output 2550 ml   Net -2180 ml        Physical Examination:     I had a face to face encounter with this patient and independently examined them on 12/31/2021 as outlined below:          Constitutional:  No acute distress, cooperative, pleasant    ENT:  Oral mucosa moist, oropharynx benign. Resp:  CTA bilaterally. No wheezing/rhonchi/rales. No accessory muscle use   CV:  Regular rhythm, normal rate, no murmurs, gallops, rubs    GI:  Soft, right lower quadrant with tenderness to palpation, non distended, normoactive bowel sounds, no hepatosplenomegaly     Musculoskeletal:  lower lumbar/sacral region with intact stables, no erythema     Neurologic:  Moves all extremities            Data Review:    Review and/or order of clinical lab test  Review and/or order of tests in the radiology section of CPT  Review and/or order of tests in the medicine section of CPT      Labs:     Recent Labs     12/31/21  0420 12/30/21  0347   WBC 8.6 9.2   HGB 8.8* 8.5*   HCT 28.6* 28.4*   * 547*     Recent Labs     12/31/21  0420 12/30/21  0347 12/29/21  0324    140 140   K 3.4* 3.3* 3.3*    108 106   CO2 26 28 25   BUN 9 9 9   CREA 0.96 0.98 1.05*   GLU 98 121* 114*   CA 8.9 8.2* 8.7   MG  --  1.6  --      No results for input(s): ALT, AP, TBIL, TBILI, TP, ALB, GLOB, GGT, AML, LPSE in the last 72 hours. No lab exists for component: SGOT, GPT, AMYP, HLPSE  No results for input(s): INR, PTP, APTT, INREXT, INREXT in the last 72 hours. No results for input(s): FE, TIBC, PSAT, FERR in the last 72 hours. Lab Results   Component Value Date/Time    Folate 17.4 11/29/2021 02:49 AM      No results for input(s): PH, PCO2, PO2 in the last 72 hours.   No results for input(s): CPK, CKNDX, TROIQ in the last 72 hours.     No lab exists for component: CPKMB  Lab Results   Component Value Date/Time    Cholesterol, total 232 (H) 07/13/2010 12:05 PM    HDL Cholesterol 38 07/13/2010 12:05 PM    LDL, calculated 164 (H) 07/13/2010 12:05 PM    Triglyceride 150 (H) 07/13/2010 12:05 PM    CHOL/HDL Ratio 6.1 (H) 07/13/2010 12:05 PM     Lab Results   Component Value Date/Time    Glucose (POC) 120 (H) 12/31/2021 11:47 AM    Glucose (POC) 107 12/31/2021 07:38 AM    Glucose (POC) 140 (H) 12/30/2021 09:17 PM    Glucose (POC) 94 12/30/2021 04:36 PM    Glucose (POC) 154 (H) 12/30/2021 11:06 AM     Lab Results   Component Value Date/Time    Color YELLOW/STRAW 12/28/2021 04:07 PM    Appearance CLEAR 12/28/2021 04:07 PM    Specific gravity 1.009 12/28/2021 04:07 PM    Specific gravity 1.010 04/06/2011 10:20 AM    pH (UA) 7.5 12/28/2021 04:07 PM    Protein Negative 12/28/2021 04:07 PM    Glucose Negative 12/28/2021 04:07 PM    Ketone Negative 12/28/2021 04:07 PM    Bilirubin Negative 12/28/2021 04:07 PM    Urobilinogen 0.2 12/28/2021 04:07 PM    Nitrites Negative 12/28/2021 04:07 PM    Leukocyte Esterase Negative 12/28/2021 04:07 PM    Epithelial cells FEW 12/28/2021 04:07 PM    Bacteria Negative 12/28/2021 04:07 PM    WBC 0-4 12/28/2021 04:07 PM    RBC 20-50 12/28/2021 04:07 PM         Medications Reviewed:     Current Facility-Administered Medications   Medication Dose Route Frequency    Vancomycin random level at 2200 today   Other ONCE    oxyCODONE IR (ROXICODONE) tablet 5 mg  5 mg Oral BID PRN    hydrALAZINE (APRESOLINE) 20 mg/mL injection 10 mg  10 mg IntraVENous Q6H PRN    amLODIPine (NORVASC) tablet 10 mg  10 mg Oral DAILY    influenza vaccine 2021-22 (6 mos+)(PF) (FLUARIX/FLULAVAL/FLUZONE QUAD) injection 0.5 mL  1 Each IntraMUSCular PRIOR TO DISCHARGE    sodium chloride (NS) flush 5-40 mL  5-40 mL IntraVENous Q8H    sodium chloride (NS) flush 5-40 mL  5-40 mL IntraVENous PRN    acetaminophen (TYLENOL) tablet 650 mg  650 mg Oral Q6H PRN    Or    acetaminophen (TYLENOL) suppository 650 mg  650 mg Rectal Q6H PRN    polyethylene glycol (MIRALAX) packet 17 g  17 g Oral DAILY PRN    ondansetron (ZOFRAN ODT) tablet 4 mg  4 mg Oral Q8H PRN    Or    ondansetron (ZOFRAN) injection 4 mg  4 mg IntraVENous Q6H PRN    Vancomycin - pharmacy to dose   Other Rx Dosing/Monitoring    glucose chewable tablet 16 g  4 Tablet Oral PRN    dextrose (D50W) injection syrg 12.5-25 g  25-50 mL IntraVENous PRN    glucagon (GLUCAGEN) injection 1 mg  1 mg IntraMUSCular PRN    insulin lispro (HUMALOG) injection   SubCUTAneous AC&HS    ALPRAZolam (XANAX) tablet 0.5 mg  0.5 mg Oral BID PRN    amitriptyline (ELAVIL) tablet 50 mg  50 mg Oral QHS    apixaban (ELIQUIS) tablet 5 mg  5 mg Oral Q12H    aspirin delayed-release tablet 81 mg  81 mg Oral DAILY    atorvastatin (LIPITOR) tablet 40 mg  40 mg Oral QHS    levothyroxine (SYNTHROID) tablet 112 mcg  112 mcg Oral ACB    L.acidophilus-paracasei-S.thermophil-bifidobacter (RISAQUAD) 8 billion cell capsule  1 Capsule Oral DAILY    magnesium hydroxide (MILK OF MAGNESIA) 400 mg/5 mL oral suspension 30 mL  30 mL Oral DAILY    metoprolol tartrate (LOPRESSOR) tablet 25 mg  25 mg Oral 7am    metoprolol tartrate (LOPRESSOR) tablet 12.5 mg  12.5 mg Oral QHS    miconazole (MICOTIN) 2 % powder   Topical BID    pantoprazole (PROTONIX) tablet 40 mg  40 mg Oral ACB    PARoxetine (PAXIL) tablet 40 mg  40 mg Oral 7am    polyethylene glycol (MIRALAX) packet 17 g  17 g Oral DAILY    senna (SENOKOT) tablet 17.2 mg  2 Tablet Oral DAILY     ______________________________________________________________________  EXPECTED LENGTH OF STAY: 5d 0h  ACTUAL LENGTH OF STAY:          [facility], DO Report given to pao kruse RN,pts history, current condition and reason for admission discussed, safety concerns addressed and reviewed, pt currently in stable condition, IV flushes for patency and site shows no signs or symptoms of infiltrate, dressing is clean dry and intact, pt is aware of plan of care. Pt education deemed successful at time of report after patient demonstrates successful teach back for proficiency.

## 2023-08-31 ENCOUNTER — HOSPITAL ENCOUNTER (OUTPATIENT)
Facility: HOSPITAL | Age: 73
Discharge: HOME | End: 2023-08-31
Attending: UROLOGY
Payer: MEDICARE

## 2023-08-31 DIAGNOSIS — N13.30 HYDRONEPHROSIS, UNSPECIFIED HYDRONEPHROSIS TYPE: ICD-10-CM

## 2023-08-31 DIAGNOSIS — R10.11 RIGHT UPPER QUADRANT PAIN: ICD-10-CM

## 2023-08-31 PROCEDURE — 74178 CT ABD&PLV WO CNTR FLWD CNTR: CPT

## 2023-08-31 PROCEDURE — 6360000004 HC RX CONTRAST MEDICATION: Performed by: UROLOGY

## 2023-08-31 RX ADMIN — IOPAMIDOL 100 ML: 755 INJECTION, SOLUTION INTRAVENOUS at 09:11

## 2023-09-20 ENCOUNTER — OFFICE VISIT (OUTPATIENT)
Age: 73
End: 2023-09-20
Payer: MEDICARE

## 2023-09-20 VITALS
TEMPERATURE: 97 F | OXYGEN SATURATION: 95 % | RESPIRATION RATE: 18 BRPM | HEART RATE: 57 BPM | HEIGHT: 64 IN | SYSTOLIC BLOOD PRESSURE: 92 MMHG | BODY MASS INDEX: 45.58 KG/M2 | WEIGHT: 267 LBS | DIASTOLIC BLOOD PRESSURE: 62 MMHG

## 2023-09-20 DIAGNOSIS — R26.9 GAIT DIFFICULTY: ICD-10-CM

## 2023-09-20 DIAGNOSIS — I63.9 OCCIPITAL STROKE (HCC): ICD-10-CM

## 2023-09-20 DIAGNOSIS — M54.16 RADICULOPATHY, LUMBAR REGION: Primary | ICD-10-CM

## 2023-09-20 DIAGNOSIS — G47.9 SLEEP DISTURBANCE: ICD-10-CM

## 2023-09-20 PROCEDURE — 1123F ACP DISCUSS/DSCN MKR DOCD: CPT | Performed by: NURSE PRACTITIONER

## 2023-09-20 PROCEDURE — 99214 OFFICE O/P EST MOD 30 MIN: CPT | Performed by: NURSE PRACTITIONER

## 2023-09-20 RX ORDER — BUTALBITAL, ACETAMINOPHEN AND CAFFEINE 50; 325; 40 MG/1; MG/1; MG/1
1 TABLET ORAL EVERY 4 HOURS PRN
COMMUNITY

## 2023-09-20 ASSESSMENT — PATIENT HEALTH QUESTIONNAIRE - PHQ9
SUM OF ALL RESPONSES TO PHQ9 QUESTIONS 1 & 2: 2
2. FEELING DOWN, DEPRESSED OR HOPELESS: 1
1. LITTLE INTEREST OR PLEASURE IN DOING THINGS: 1
SUM OF ALL RESPONSES TO PHQ QUESTIONS 1-9: 2

## 2023-09-20 ASSESSMENT — ENCOUNTER SYMPTOMS
BACK PAIN: 0
EYES NEGATIVE: 1

## 2023-09-20 NOTE — PROGRESS NOTES
Chief Complaint   Patient presents with    Neurologic Problem     Follow up s/p occipital CVA     1. Have you been to the ER, urgent care clinic since your last visit? Hospitalized since your last visit? No     2. Have you seen or consulted any other health care providers outside of the [address] [place] [address] Avenue since your last visit? Include any pap smears or colon screening.  Yes Dr [facility]

## 2023-12-04 NOTE — PROGRESS NOTES
Conjuntivae and eyelids appear normal,  Sclerae : White without injection Hospitalist Progress Note    NAME: Christian Garcia   :  1950   MRN:  [id number]       Assessment / Plan:  Septic shock POA rectal temp 96, , lactic 9.2 -->5. 3   Streptococcus anginosus epidural/right iliopsoas abscess POA  Right paraspinal pain at level L3-4 and R iliac crest pain (complains of Right hip pain), POA   Paraspinal abscess  - Followed by Dr Marbella Jones and Infectious diseases, needs close follow-up as outpatient  -Stop vancomycin  as recommended by ID  -Appreciated orthopedic consult, recommended IR placed drain. Orthopedics plans no surgery for now. Surgical management on hold for now    -First Drain was placed  draining purulent discharge, repeat CT  showed Resolution, drain removed .  -Though CT on  showed undrained fluid collection on right lower quadrant  -s/p , IR guided Drainage catheter, so far 25 cc output past 24 hours    -Culture from drain fluid is growing Streptococcus anginosus  -Antibiotics as per ID  -Recommendation 8 to 12 weeks, ID will finalize abx and duration and choice  Orthopedic surgery was called by ID due to concern of ongoing pus/blood drainage.   Still awaiting final ID recommendations and spinal surgeon evaluation.      - MRI done on , Rim-enhancing paraspinal soft tissue collections may reflect abscesses as  well, and partially visualized abscess inferior to the right kidney again seen, but  incompletely evaluated on this exam.  -For paraspinal abscess, orthopedic recommends conservative management,   -Successful aspiration of posterior paraspinal abscess     -FOLLOW cultures from paraspinal and right lower quadrant abscess aspirate    Celiac Artery Thrombosis with splenic Infarcts:  -CT ABD/Pelvis  showed celiac artery thrombosis with splenic infarcts  CT abdomen pelvis today confirmed the celiac artery occlusion with splenic infarcts  Vascular consult appreciated  Renal function stable, switched heparin drip to Lovenox injection 1 mg/kg every 12 hours, continue until definitive decision about surgical debridement  I will change to Eliquis on discharge      UTI  -Ucx E coli  -sensitive to cefepime   -Rea placed in ER due to poor mobility from hip pain and polyuria, with severe candidal infection in perineum and labia and buttocks. Removing Rea catheter and start voiding trial.  Patient advised to schedule empty bladder every 2 hours. Use pure wick catheter    Type 2 DM, uncontrolled with hyperosmolar hyperglycemia and early DKA POA  - presented with , AG 15, trace ketone in urine  - Required insulin gtt  - A1c 11.7 up from 7.6 in July. - continue holding metformin  - Continue Lantus 35 units. Continue SSI prn  - Blood sugars are better controlled    Bilateral feet neuropathy, suspected DM related  -K55 and folic acid within normal limits  No epidural abscess was seen on previous MRI    Prerenal MERCEDES Cr 1.5  -resolved with IVF hydration.       Constipation  -added pericolace and miralax    Severe candida infection in perineum, labia, buttocks  -continue mystatin cream ordered in ER. Status post Diflucan  -wound care consult appreciated     Generalized weakness and gait difficulty   -consult pt/ot     SVT   HTN / HLD  -continue metoprolol and statin       Hypothyroid  -continue levothyroxine. TSH 3.6     Depression and anxiety  -continue paxil with xanax prn     Severe obesity  40 or above Morbid obesity / Body mass index is 44.42 kg/m². Code:  Discussed, full  DVT prophylaxis: lovenox  Surrogate decision maker:   (but has some dementia per patient) or sister Giselle Wayne    HANY: Likely 11/30  Awaiting surgery and ID final recommendations and plan     Subjective:     Patient was seen and examined. No acute events overnight. Discussed with RN overnight events. All patient's questions were answered.     \"doing well\"    review of Systems:  Symptom Y/N Comments Symptom Y/N Comments   Fever/Chills n   Chest Pain n    Poor Appetite    Edema n    Cough n   Abdominal Pain y    Sputum    Joint Pain     SOB/AVALOS n   Pruritis/Rash     Nausea/vomit n   Tolerating PT/OT     Diarrhea n   Tolerating Diet y    Constipation    Other       Could NOT obtain due to:      PO intake: No data found. Objective:     VITALS:   Last 24hrs VS reviewed since prior progress note. Most recent are:  Patient Vitals for the past 24 hrs:   Temp Pulse Resp BP SpO2   11/30/21 1144 (!) 96.1 °F (35.6 °C) 76 18 105/73 97 %   11/30/21 0756 97 °F (36.1 °C) 80 16 107/75 96 %   11/30/21 0420 97.9 °F (36.6 °C) 74 17 130/69 94 %   11/29/21 2330 97.8 °F (36.6 °C)  18  95 %   11/29/21 2255  70  131/79    11/29/21 2102 97.8 °F (36.6 °C) 77  120/73 96 %   11/29/21 1624 98 °F (36.7 °C) 69 17 124/80 94 %       Intake/Output Summary (Last 24 hours) at 11/30/2021 1352  Last data filed at 11/30/2021 0514  Gross per 24 hour   Intake    Output 1700 ml   Net -1700 ml        I had a face to face encounter, and independently examined this patient on 11/30/2021, as outlined below:  PHYSICAL EXAM:  General: WD, WN. Alert, cooperative, no acute distress    EENT:  EOMI. Anicteric sclerae. MMM  Resp:  CTA bilaterally, no wheezing or rales. No accessory muscle use  CV:  Regular  rhythm,  No edema  GI:               Soft nontender  Neurologic:  Alert and oriented X 3, normal speech,   Psych:   Good insight. Not anxious nor agitated  Skin:  No rashes. No jaundice.      Reviewed most current lab test results and cultures  YES  Reviewed most current radiology test results   YES  Review and summation of old records today    NO  Reviewed patient's current orders and MAR    YES  PMH/SH reviewed - no change compared to H&P  ________________________________________________________________________  Care Plan discussed with:    Comments   Patient x    Family      RN x    Care Manager x    Consultant                       x Multidiciplinary team rounds were held today with , nursing, pharmacist and clinical coordinator. Patient's plan of care was discussed; medications were reviewed and discharge planning was addressed. ________________________________________________________________________    Total NON critical care TIME:  30   Minutes     Total CRITICAL CARE TIME Spent:   Minutes non procedure based      Comments   >50% of visit spent in counseling and coordination of care x     This includes time during multidisciplinary rounds if indicated above   ________________________________________________________________________  Fabián Quintanilla MD     Procedures: see electronic medical records for all procedures/Xrays and details which were not copied into this note but were reviewed prior to creation of Plan. LABS:  I reviewed today's most current labs and imaging studies. Pertinent labs include:  No results for input(s): WBC, HGB, HCT, PLT, HGBEXT, HCTEXT, PLTEXT, HGBEXT, HCTEXT, PLTEXT in the last 72 hours. No results for input(s): NA, K, CL, CO2, GLU, BUN, CREA, CA, MG, PHOS, ALB, TBIL, TBILI, ALT, INR, INREXT, INREXT in the last 72 hours.     No lab exists for component: SGOT

## 2024-04-04 ENCOUNTER — TELEPHONE (OUTPATIENT)
Age: 74
End: 2024-04-04

## 2024-04-04 NOTE — TELEPHONE ENCOUNTER
Patient left a voice message requesting a new script to be sent to her pharmacy for short pen needles.  She stated that last refill she had the needles were too long.  She also would like to know if it is possible for her to be put on  Ozempic.  She is not scheduled for a follow up until June.

## 2024-04-08 RX ORDER — PEN NEEDLE, DIABETIC 32GX 5/32"
1 NEEDLE, DISPOSABLE MISCELLANEOUS DAILY
Qty: 100 EACH | Refills: 3 | Status: SHIPPED | OUTPATIENT
Start: 2024-04-08

## 2024-04-08 NOTE — TELEPHONE ENCOUNTER
The script was sent for the 4mm, we have only sent 4mm scripts before, she may have received over-the-counter short pen needles that were longer

## 2024-05-21 RX ORDER — INSULIN GLARGINE 100 [IU]/ML
INJECTION, SOLUTION SUBCUTANEOUS
Qty: 15 ML | Refills: 5 | Status: SHIPPED | OUTPATIENT
Start: 2024-05-21

## 2024-06-20 NOTE — PROGRESS NOTES
Bedside and Verbal shift change report given to Faith (oncoming nurse) by Waunita Rubinstein RN (offgoing nurse). Report included the following information SBAR, Kardex, Intake/Output, MAR, Accordion, Recent Results and Med Rec Status. How to Take Your Medication Before Surgery  Preoperative Medication Instructions   Patient is on no additional chronic medications       Patient Education   Before Your Child s Surgery or Sedated Procedure    Please call the doctor if there s any change in your child s health, including signs of a cold or flu (sore throat, runny nose, cough, rash or fever). If your child is having surgery, call the surgeon s office. If your child is having another procedure, call your family doctor.  Do not give over-the-counter medicine within 24 hours of the surgery or procedure (unless the doctor tells you to).  If your child takes prescribed drugs: Ask the doctor which medicines are safe to take before the surgery or procedure.  Follow the care team s instructions for eating and drinking before surgery or procedure.   Have your child take a shower or bath the night before surgery, cleaning their skin gently. Use the soap the surgeon gave you. If you were not given special soap, use your regular soap. Do not shave or scrub the surgery site.  Have your child wear clean pajamas and use clean sheets on their bed.

## 2024-08-13 ENCOUNTER — OFFICE VISIT (OUTPATIENT)
Age: 74
End: 2024-08-13
Payer: MEDICARE

## 2024-08-13 VITALS
WEIGHT: 268.2 LBS | HEART RATE: 67 BPM | DIASTOLIC BLOOD PRESSURE: 63 MMHG | SYSTOLIC BLOOD PRESSURE: 96 MMHG | HEIGHT: 63 IN | BODY MASS INDEX: 47.52 KG/M2

## 2024-08-13 DIAGNOSIS — E78.2 MIXED HYPERLIPIDEMIA: ICD-10-CM

## 2024-08-13 DIAGNOSIS — E11.65 TYPE 2 DIABETES MELLITUS WITH HYPERGLYCEMIA, WITH LONG-TERM CURRENT USE OF INSULIN (HCC): Primary | ICD-10-CM

## 2024-08-13 DIAGNOSIS — E66.01 MORBID OBESITY (HCC): ICD-10-CM

## 2024-08-13 DIAGNOSIS — E03.9 ACQUIRED HYPOTHYROIDISM: ICD-10-CM

## 2024-08-13 DIAGNOSIS — Z79.4 TYPE 2 DIABETES MELLITUS WITH HYPERGLYCEMIA, WITH LONG-TERM CURRENT USE OF INSULIN (HCC): Primary | ICD-10-CM

## 2024-08-13 LAB — HBA1C MFR BLD: 7.3 %

## 2024-08-13 PROCEDURE — 3046F HEMOGLOBIN A1C LEVEL >9.0%: CPT | Performed by: GENERAL ACUTE CARE HOSPITAL

## 2024-08-13 PROCEDURE — G8427 DOCREV CUR MEDS BY ELIG CLIN: HCPCS | Performed by: GENERAL ACUTE CARE HOSPITAL

## 2024-08-13 PROCEDURE — 3017F COLORECTAL CA SCREEN DOC REV: CPT | Performed by: GENERAL ACUTE CARE HOSPITAL

## 2024-08-13 PROCEDURE — 99214 OFFICE O/P EST MOD 30 MIN: CPT | Performed by: GENERAL ACUTE CARE HOSPITAL

## 2024-08-13 PROCEDURE — 2022F DILAT RTA XM EVC RTNOPTHY: CPT | Performed by: GENERAL ACUTE CARE HOSPITAL

## 2024-08-13 PROCEDURE — 83036 HEMOGLOBIN GLYCOSYLATED A1C: CPT | Performed by: GENERAL ACUTE CARE HOSPITAL

## 2024-08-13 PROCEDURE — 1123F ACP DISCUSS/DSCN MKR DOCD: CPT | Performed by: GENERAL ACUTE CARE HOSPITAL

## 2024-08-13 PROCEDURE — G8400 PT W/DXA NO RESULTS DOC: HCPCS | Performed by: GENERAL ACUTE CARE HOSPITAL

## 2024-08-13 PROCEDURE — G8417 CALC BMI ABV UP PARAM F/U: HCPCS | Performed by: GENERAL ACUTE CARE HOSPITAL

## 2024-08-13 PROCEDURE — 1036F TOBACCO NON-USER: CPT | Performed by: GENERAL ACUTE CARE HOSPITAL

## 2024-08-13 PROCEDURE — 1090F PRES/ABSN URINE INCON ASSESS: CPT | Performed by: GENERAL ACUTE CARE HOSPITAL

## 2024-08-13 RX ORDER — PEN NEEDLE, DIABETIC 32GX 5/32"
NEEDLE, DISPOSABLE MISCELLANEOUS
Qty: 100 EACH | Refills: 5 | Status: SHIPPED | OUTPATIENT
Start: 2024-08-13

## 2024-08-13 RX ORDER — INSULIN GLARGINE 100 [IU]/ML
INJECTION, SOLUTION SUBCUTANEOUS
Qty: 15 ML | Refills: 5 | Status: SHIPPED | OUTPATIENT
Start: 2024-08-13

## 2024-08-13 RX ORDER — ACYCLOVIR 400 MG/1
TABLET ORAL
Qty: 1 EACH | Refills: 0 | Status: SHIPPED | OUTPATIENT
Start: 2024-08-13

## 2024-08-13 RX ORDER — EZETIMIBE 10 MG/1
10 TABLET ORAL DAILY
COMMUNITY

## 2024-08-13 RX ORDER — SEMAGLUTIDE 0.68 MG/ML
INJECTION, SOLUTION SUBCUTANEOUS
Qty: 3 ML | Refills: 5 | Status: SHIPPED | OUTPATIENT
Start: 2024-08-13

## 2024-08-13 RX ORDER — ACYCLOVIR 400 MG/1
TABLET ORAL
Qty: 3 EACH | Refills: 3 | Status: SHIPPED | OUTPATIENT
Start: 2024-08-13

## 2024-08-13 NOTE — PATIENT INSTRUCTIONS
PLAN FOR TODAY    We will plan to make the following changes to your diabetes medications:    Continue Lantus 40 units at bedtime  Take Ozempic Take 0.25mg weekly and after 4 weeks take 0.5mg weekly      There are some foods that can worsen the possible side effects of Ozempic.  Foods that can make side effects worse include:    High-fat foods  Fried foods  High-sugar food and drink    If you experience nausea or other gastrointestinal side effects when taking Ozempic, some strategies may help:    Avoid high-fat and high-sugar food/drink  Eat bland, low-fat foods (like crackers, toast, or rice), less spicy foods  Eat more slowly  Eat foods high in water, like soups and gelatin  Drink clear or ice-cold beverages  Don’t lie down immediately after eating     It will be important to continue checking your glucose just as you did previously.   I would like you at the very least to check you glucose during:     + AM fasting before breakfast   + Bedtime   + Any other time that you are not feeling well.     Always provide a glucose log that is completed at every visit so that we can review the results of your home glucose together. Without this, it is not possible to make accurate changes to your diabetes regimen.    MD [facility]     Diabetes and Meal Planning    Meal planning can be a key part of managing diabetes. Planning meals and snacks with the right balance of carbohydrate, protein, and fat can help you keep your blood sugar at the target level.  You don't have to eat special foods. You can eat what your family eats, including sweets once in a while. But you do have to pay attention to how often you eat and how much you eat of certain foods.    Your plate  The plate format is a simple way to help you manage how you eat. You plan meals by learning how much space each food should take on a plate. It can make it easier to keep your blood sugar level within your target range. It also

## 2024-08-13 NOTE — PROGRESS NOTES
[facility]  DR MUNIR GIRON        ASSESSMENT AND PLAN:   Cathy Griffin is a 73 y.o. female with a PMHx as noted above who presents for evaluation of uncontrolled type 2 diabetes.     Type 2 diabetes suboptimal control  Hyperlipidemia  Hypertension    Patient stopped taking metformin due to GI side effects  Otherwise feels she has been doing okay overall    PLAN  Type 2 Diabetes  Medications:   Continue Lantus 40 units at bedtime  Take Ozempic 0.25mg weekly and increase to 0.5 mg after 4 weeks    Advised to check glucose 2x/day, can use CGM Dexcom G7  I am recommending a CMG system for this patient, and they meet the following criteria:   1. Patient is diabetic and is on insulin   2. Patient makes frequent self-adjustments to their insulin regimen  3. I recommend a CGM for this patient    Check blood sugar 2 times per day  Discussed hypoglycemia management  Annual Ophthalm, Regular foot self checks and regular dental care  Discussed important lifestyle aspects and importance of staying active    Labs prior to next visit    HTN: BP stable on current medications, no changes today.  HLD: Fasting lipids reviewed. LDL 52, obtain repeat fasting labs  Morbid Obesity: discussed lifestyle modif, started on GLP-1 agonist    Lab Results   Component Value Date/Time    CHOL 118 01/11/2023 01:02 AM    TRIG 145 01/11/2023 01:02 AM    LDL 52 01/11/2023 01:02 AM    HDL 37 01/11/2023 01:02 AM    CHOLHDLRATIO 3.2 01/11/2023 01:02 AM     Lab Results   Component Value Date    MALBCR 81 (H) 08/21/2023        Hypothyroidism    Patient has been consistent with levothyroxine, we discussed the correct way to take it, continue on levothyroxine 137 mcg daily labs within normal limits, obtain repeat labs prior to next visit    Review of most recent thyroid function:  Lab Results   Component Value Date    TSH 2.20 01/11/2023    TSH 3.62 11/06/2021    TSH 2.40 07/09/2021      Thyroid Lab Key:  [email or web address] = Thyroid

## 2024-08-23 PROBLEM — E78.2 MIXED HYPERLIPIDEMIA: Status: ACTIVE | Noted: 2024-08-23

## 2024-08-23 PROBLEM — E03.9 ACQUIRED HYPOTHYROIDISM: Status: ACTIVE | Noted: 2024-08-23

## 2024-08-23 PROBLEM — Z79.4 TYPE 2 DIABETES MELLITUS WITH HYPERGLYCEMIA, WITH LONG-TERM CURRENT USE OF INSULIN (HCC): Status: ACTIVE | Noted: 2024-08-23

## 2024-08-23 PROBLEM — E11.65 TYPE 2 DIABETES MELLITUS WITH HYPERGLYCEMIA, WITH LONG-TERM CURRENT USE OF INSULIN (HCC): Status: ACTIVE | Noted: 2024-08-23

## 2024-09-19 ENCOUNTER — OFFICE VISIT (OUTPATIENT)
Age: 74
End: 2024-09-19
Payer: MEDICARE

## 2024-09-19 VITALS
RESPIRATION RATE: 18 BRPM | WEIGHT: 266.8 LBS | BODY MASS INDEX: 47.27 KG/M2 | DIASTOLIC BLOOD PRESSURE: 70 MMHG | HEIGHT: 63 IN | OXYGEN SATURATION: 97 % | SYSTOLIC BLOOD PRESSURE: 110 MMHG | HEART RATE: 67 BPM

## 2024-09-19 DIAGNOSIS — R26.89 BALANCE DISORDER: ICD-10-CM

## 2024-09-19 DIAGNOSIS — M54.16 RADICULOPATHY, LUMBAR REGION: ICD-10-CM

## 2024-09-19 DIAGNOSIS — I65.23 BILATERAL CAROTID ARTERY STENOSIS: ICD-10-CM

## 2024-09-19 DIAGNOSIS — Z86.73 HISTORY OF STROKE: Primary | ICD-10-CM

## 2024-09-19 PROCEDURE — 1123F ACP DISCUSS/DSCN MKR DOCD: CPT | Performed by: NURSE PRACTITIONER

## 2024-09-19 PROCEDURE — G8427 DOCREV CUR MEDS BY ELIG CLIN: HCPCS | Performed by: NURSE PRACTITIONER

## 2024-09-19 PROCEDURE — G8417 CALC BMI ABV UP PARAM F/U: HCPCS | Performed by: NURSE PRACTITIONER

## 2024-09-19 PROCEDURE — 1090F PRES/ABSN URINE INCON ASSESS: CPT | Performed by: NURSE PRACTITIONER

## 2024-09-19 PROCEDURE — 3017F COLORECTAL CA SCREEN DOC REV: CPT | Performed by: NURSE PRACTITIONER

## 2024-09-19 PROCEDURE — 99214 OFFICE O/P EST MOD 30 MIN: CPT | Performed by: NURSE PRACTITIONER

## 2024-09-19 PROCEDURE — 1036F TOBACCO NON-USER: CPT | Performed by: NURSE PRACTITIONER

## 2024-09-19 PROCEDURE — G8400 PT W/DXA NO RESULTS DOC: HCPCS | Performed by: NURSE PRACTITIONER

## 2024-09-19 RX ORDER — IBUPROFEN 200 MG
200 TABLET ORAL EVERY 6 HOURS PRN
COMMUNITY

## 2024-09-19 ASSESSMENT — PATIENT HEALTH QUESTIONNAIRE - PHQ9
1. LITTLE INTEREST OR PLEASURE IN DOING THINGS: SEVERAL DAYS
2. FEELING DOWN, DEPRESSED OR HOPELESS: SEVERAL DAYS
SUM OF ALL RESPONSES TO PHQ QUESTIONS 1-9: 2
SUM OF ALL RESPONSES TO PHQ9 QUESTIONS 1 & 2: 2
SUM OF ALL RESPONSES TO PHQ QUESTIONS 1-9: 2

## 2024-09-19 ASSESSMENT — ENCOUNTER SYMPTOMS: BACK PAIN: 0

## 2024-10-02 ENCOUNTER — HOSPITAL ENCOUNTER (OUTPATIENT)
Facility: HOSPITAL | Age: 74
Discharge: HOME | End: 2024-10-02
Attending: INTERNAL MEDICINE | Admitting: INTERNAL MEDICINE
Payer: MEDICARE

## 2024-10-02 VITALS
HEART RATE: 63 BPM | RESPIRATION RATE: 16 BRPM | OXYGEN SATURATION: 92 % | BODY MASS INDEX: 40.97 KG/M2 | HEIGHT: 64 IN | TEMPERATURE: 97.7 F | SYSTOLIC BLOOD PRESSURE: 149 MMHG | DIASTOLIC BLOOD PRESSURE: 82 MMHG | WEIGHT: 240 LBS

## 2024-10-02 DIAGNOSIS — R94.39 ABNORMAL STRESS TEST: ICD-10-CM

## 2024-10-02 LAB
ANION GAP SERPL CALC-SCNC: 8 MMOL/L (ref 2–12)
BUN SERPL-MCNC: 23 MG/DL (ref 6–20)
BUN/CREAT SERPL: 21 (ref 12–20)
CALCIUM SERPL-MCNC: 9.8 MG/DL (ref 8.5–10.1)
CHLORIDE SERPL-SCNC: 109 MMOL/L (ref 97–108)
CO2 SERPL-SCNC: 23 MMOL/L (ref 21–32)
CREAT SERPL-MCNC: 1.08 MG/DL (ref 0.55–1.02)
ECHO BSA: 2.22 M2
ERYTHROCYTE [DISTWIDTH] IN BLOOD BY AUTOMATED COUNT: 15.3 % (ref 11.5–14.5)
GLUCOSE BLD STRIP.AUTO-MCNC: 103 MG/DL (ref 65–117)
GLUCOSE SERPL-MCNC: 111 MG/DL (ref 65–100)
HCT VFR BLD AUTO: 43.3 % (ref 35–47)
HGB BLD-MCNC: 13.9 G/DL (ref 11.5–16)
MCH RBC QN AUTO: 28.1 PG (ref 26–34)
MCHC RBC AUTO-ENTMCNC: 32.1 G/DL (ref 30–36.5)
MCV RBC AUTO: 87.7 FL (ref 80–99)
NRBC # BLD: 0 K/UL (ref 0–0.01)
NRBC BLD-RTO: 0 PER 100 WBC
PLATELET # BLD AUTO: 341 K/UL (ref 150–400)
PMV BLD AUTO: 10.4 FL (ref 8.9–12.9)
POTASSIUM SERPL-SCNC: 3.8 MMOL/L (ref 3.5–5.1)
RBC # BLD AUTO: 4.94 M/UL (ref 3.8–5.2)
SERVICE CMNT-IMP: NORMAL
SODIUM SERPL-SCNC: 140 MMOL/L (ref 136–145)
WBC # BLD AUTO: 9.6 K/UL (ref 3.6–11)

## 2024-10-02 PROCEDURE — 93454 CORONARY ARTERY ANGIO S&I: CPT | Performed by: INTERNAL MEDICINE

## 2024-10-02 PROCEDURE — 85027 COMPLETE CBC AUTOMATED: CPT

## 2024-10-02 PROCEDURE — 2709999900 HC NON-CHARGEABLE SUPPLY: Performed by: INTERNAL MEDICINE

## 2024-10-02 PROCEDURE — 82962 GLUCOSE BLOOD TEST: CPT

## 2024-10-02 PROCEDURE — 99152 MOD SED SAME PHYS/QHP 5/>YRS: CPT | Performed by: INTERNAL MEDICINE

## 2024-10-02 PROCEDURE — 80048 BASIC METABOLIC PNL TOTAL CA: CPT

## 2024-10-02 PROCEDURE — 6360000004 HC RX CONTRAST MEDICATION: Performed by: INTERNAL MEDICINE

## 2024-10-02 PROCEDURE — 2500000003 HC RX 250 WO HCPCS: Performed by: INTERNAL MEDICINE

## 2024-10-02 PROCEDURE — 6360000002 HC RX W HCPCS: Performed by: INTERNAL MEDICINE

## 2024-10-02 PROCEDURE — 36415 COLL VENOUS BLD VENIPUNCTURE: CPT

## 2024-10-02 PROCEDURE — C1894 INTRO/SHEATH, NON-LASER: HCPCS | Performed by: INTERNAL MEDICINE

## 2024-10-02 PROCEDURE — C1713 ANCHOR/SCREW BN/BN,TIS/BN: HCPCS | Performed by: INTERNAL MEDICINE

## 2024-10-02 RX ORDER — ACETAMINOPHEN 325 MG/1
650 TABLET ORAL EVERY 4 HOURS PRN
Status: DISCONTINUED | OUTPATIENT
Start: 2024-10-02 | End: 2024-10-02 | Stop reason: HOSPADM

## 2024-10-02 RX ORDER — IOPAMIDOL 755 MG/ML
INJECTION, SOLUTION INTRAVASCULAR PRN
Status: DISCONTINUED | OUTPATIENT
Start: 2024-10-02 | End: 2024-10-02 | Stop reason: HOSPADM

## 2024-10-02 RX ORDER — SODIUM CHLORIDE 9 MG/ML
INJECTION, SOLUTION INTRAVENOUS PRN
Status: DISCONTINUED | OUTPATIENT
Start: 2024-10-02 | End: 2024-10-02 | Stop reason: HOSPADM

## 2024-10-02 RX ORDER — HEPARIN SODIUM 1000 [USP'U]/ML
INJECTION, SOLUTION INTRAVENOUS; SUBCUTANEOUS PRN
Status: DISCONTINUED | OUTPATIENT
Start: 2024-10-02 | End: 2024-10-02 | Stop reason: HOSPADM

## 2024-10-02 RX ORDER — LIDOCAINE HYDROCHLORIDE 10 MG/ML
INJECTION, SOLUTION INFILTRATION; PERINEURAL PRN
Status: DISCONTINUED | OUTPATIENT
Start: 2024-10-02 | End: 2024-10-02 | Stop reason: HOSPADM

## 2024-10-02 RX ORDER — FENTANYL CITRATE 50 UG/ML
INJECTION, SOLUTION INTRAMUSCULAR; INTRAVENOUS PRN
Status: DISCONTINUED | OUTPATIENT
Start: 2024-10-02 | End: 2024-10-02 | Stop reason: HOSPADM

## 2024-10-02 RX ORDER — VERAPAMIL HYDROCHLORIDE 2.5 MG/ML
INJECTION, SOLUTION INTRAVENOUS PRN
Status: DISCONTINUED | OUTPATIENT
Start: 2024-10-02 | End: 2024-10-02 | Stop reason: HOSPADM

## 2024-10-02 RX ORDER — MIDAZOLAM HYDROCHLORIDE 1 MG/ML
INJECTION INTRAMUSCULAR; INTRAVENOUS PRN
Status: DISCONTINUED | OUTPATIENT
Start: 2024-10-02 | End: 2024-10-02 | Stop reason: HOSPADM

## 2024-10-02 RX ORDER — DIPHENHYDRAMINE HYDROCHLORIDE 50 MG/ML
INJECTION INTRAMUSCULAR; INTRAVENOUS PRN
Status: DISCONTINUED | OUTPATIENT
Start: 2024-10-02 | End: 2024-10-02 | Stop reason: HOSPADM

## 2024-10-02 RX ORDER — SODIUM CHLORIDE 0.9 % (FLUSH) 0.9 %
5-40 SYRINGE (ML) INJECTION EVERY 12 HOURS SCHEDULED
Status: DISCONTINUED | OUTPATIENT
Start: 2024-10-02 | End: 2024-10-02 | Stop reason: HOSPADM

## 2024-10-02 RX ORDER — SODIUM CHLORIDE 0.9 % (FLUSH) 0.9 %
5-40 SYRINGE (ML) INJECTION PRN
Status: DISCONTINUED | OUTPATIENT
Start: 2024-10-02 | End: 2024-10-02 | Stop reason: HOSPADM

## 2024-10-02 NOTE — PROGRESS NOTES
CATH LAB to RECOVERY ROOM REPORT    Procedure: [facility]    MD: LENIN Chandra MD    The procedure was diagnostic only.    Verbal Report given to Recovery Nurse on patient being transferred to Cardiac Cath Lab  for routine post-op. Patient stable upon transfer to .  Vitals, mental status, MAR, procedural summary discussed with recovery RN.    Medication given during procedure:    Contrast:40mL                          Heparin:1000units     Versed:3mg                                                               Fentanyl:50mcg                                                           Benadryl 25MG    Sheaths:    Left radial sheath pulled at 0925 am, band at 11mL of air

## 2024-10-02 NOTE — PROGRESS NOTES
Ambulated patient to the bathroom with a steady gait with standby assist with cane, voided without difficulty. Returned to stretcher. Vital signs stable.     Site is clean, dry, and intact. No bleeding, no hematoma.     Assisted patient in dressing/Patient dressed self.   Educated patient about their sedation precautions such as not driving, operating any machinery, or signing legal documents 24 hours post procedure.     Reviewed discharge instructions, including medications, and site care using the teach back method. Answered all questions. Verbalized understanding.     Removed peripheral IV    Escorted to discharge area in a wheelchair with all of their belongings including cell phone, cane, clothing.    Spouse present to take patient home. Reviewed discharge instructions with spouse. Verbalized understanding.

## 2024-10-02 NOTE — PROGRESS NOTES
TRANSFER - IN Cath Lab RR REPORT:    Verbal report received from Daily on Cathy Griffin  being received from the Cardiac Cath Lab for routine progression of care. Report consisted of patient’s Situation, Background, Assessment and Recommendations(SBAR). Procedural summary and MAR reviewed with receiving nurse. Opportunity for questions and clarification was provided. Assessment completed upon patient’s arrival to Cardiac Cath Lab RECOVERY AREA and care assumed.       Cardiac Cath Lab Recovery Arrival Note:    Cathy Griffin arrived to CCL recovery area.  Patient procedure= LHC. Patient on cardiac monitor, non-invasive blood pressure, SPO2 monitor. On RA.  Patient is A&Ox 4. Patient reports no complaints.    PROCEDURE SITE CHECK:    Procedure site: No bleeding and no hematoma, no pain/discomfort reported at procedure site.     No change in patient status. Continue to monitor patient and status.

## 2024-10-02 NOTE — PROCEDURES
Cardiac Catheterization Procedure Note   Patient: Cathy Griffin  MRN: [id number]  SSN: [id number]   YOB: 1950 Age: 73 y.o.  Sex: female    Date of Procedure: 10/2/2024   Pre-procedure Diagnosis: Shortness of Breath and Abnormal stress test  Post-procedure Diagnosis: Non-obstructive CAD  Procedure: Left Heart Cath  :  Dr. Adams Chandra MD    Assistant(s):  None  Anesthesia: Moderate Sedation   Estimated Blood Loss: Less than 10 mL   Specimens Removed: None    Access: left radial artery    Subclavian tortuosity was moderate and straightened with a wire.  Catheters used: JR4, JL4 (JL3.5 may have fit slightly easier)    Findings:   Left main: normal  LAD: mild non-obstructive disease  LCX: mild non-obstructive disease. OM1: 50% proximal stenosis (stable compared to 2019)  RCA: minimal luminal irregularities    Complications: None   Implants:  None  Signed by:  Adams Chandra MD  10/2/2024  9:25 AM

## 2024-10-02 NOTE — DISCHARGE INSTRUCTIONS
Radial Cardiac Catheterization / Angiography Discharge Instructions    It is normal to feel tired the first couple days.  Take it easy and follow the physician’s instructions.    CHECK THE CATHETER INSERTION SITE DAILY:  Remove the wrist dressing 24 hours after the procedure.  You may shower 24 hours after the procedure.  Wash with soap and water and pat dry.  Gentle cleaning of the site with soap and water is sufficient, cover with a dry clean dressing or bandage.  Do not apply creams or powders to the area.  No soaking the wrist for 3 days.  Leave the puncture site open to air after 24 hours post-procedure.    CALL THE PHYSICIAN:  If the site becomes red, swollen or feels warm to the touch.  If there is bleeding or drainage or if there is unusual pain at the radial site.  If there is any minor oozing, you may apply a band-aid and remove after 12 hours.  If the bleeding continues, hold pressure with the middle finger against the puncture site and the thumb against the back of the wrist, call 911 to be transported to the hospital.  DO NOT DRIVE YOURSELF, OR HAVE ANYONE ELSE DRIVE YOU  - CALL 911.    ACTIVITY:  For the first 24 hours do not manipulate the wrist.  No lifting, pushing or pulling over 3-5 pounds with the affected wrist for 7 days and no straining the insertion site.  Do not lift grocery bags or the garbage can, do not run the vacuum  or  for 7 days.  Start with short walks as in the hospital and gradually increase as tolerated each day.  It is recommended to walk 30 minutes 5-7 days per week.  Follow your physician’s instructions on activity.  Avoid walking outside in extremes of heat or cold. Walk inside when it is cold and windy or hot and humid.    THINGS TO KEEP IN MIND:  No driving for at least 24 hours, or as designated by your physician.  Limit the number of times you go up and down the stairs  Take rests and pace yourself with activity.  Be careful and do not strain with bowel

## 2024-10-02 NOTE — PROGRESS NOTES
Cardiac Cath Lab Recovery Arrival Note:      Cathy Griffin arrived to Cardiac Cath Lab, Recovery Area. Staff introduced to patient. Patient identifiers verified with NAME and DATE OF BIRTH. Procedure verified with patient. Consent forms reviewed and signed by patient or authorized representative and verified. Allergies verified.     Patient and family oriented to department. Patient and family informed of procedure and plan of care.     Questions answered with review. Patient prepped for procedure, per orders from physician, prior to arrival.    Patient on cardiac monitor, non-invasive blood pressure, SPO2 monitor. On room air. Patient is A&Ox 4. Patient reports no complaints.     Patient in stretcher, in low position, with side rails up, call bell within reach, patient instructed to call if assistance as needed.    Patient prep in: [facility], [place] [address] [place] [address].   Patient family has pager # [phone]  Family in:   [place]  [phone] in to talk with pt  Pre cath teaching completed.

## 2024-10-16 ENCOUNTER — TELEPHONE (OUTPATIENT)
Age: 74
End: 2024-10-16

## 2024-10-16 NOTE — TELEPHONE ENCOUNTER
Message: (any additional details from patient/caller not covered above)  Patient would like a call back to discuss her doppler results. Please call her at [phone]        Level 1 Calls - attempted to reach practice?      Reason Call Marked High Priority (if applicable):

## 2024-10-18 NOTE — TELEPHONE ENCOUNTER
Patient states she needs results on her doppler. She states she does not want to talk to the nurse. She only wants to speak with Maryann Tracy. Please call her back at . Thank you.

## 2024-10-22 NOTE — TELEPHONE ENCOUNTER
Patient calling back about her doppler results. She would like to speak with Maryann Tracy only. She does not want to speak with the nurse. Please call her at . Thank you.

## 2024-11-13 DIAGNOSIS — E03.9 ACQUIRED HYPOTHYROIDISM: ICD-10-CM

## 2024-11-13 DIAGNOSIS — Z79.4 TYPE 2 DIABETES MELLITUS WITH HYPERGLYCEMIA, WITH LONG-TERM CURRENT USE OF INSULIN (HCC): ICD-10-CM

## 2024-11-13 DIAGNOSIS — E11.65 TYPE 2 DIABETES MELLITUS WITH HYPERGLYCEMIA, WITH LONG-TERM CURRENT USE OF INSULIN (HCC): ICD-10-CM

## (undated) DEVICE — TRAY CATH 16F URIN MTR LTX -- CONVERT TO ITEM 363111

## (undated) DEVICE — BONE WAX WHITE: Brand: BONE WAX WHITE

## (undated) DEVICE — SYSTEM SKIN CLSR 22CM DERMBND PRINEO

## (undated) DEVICE — C-ARM: Brand: UNBRANDED

## (undated) DEVICE — INFECTION CONTROL KIT SYS

## (undated) DEVICE — 4-PORT MANIFOLD: Brand: NEPTUNE 2

## (undated) DEVICE — PINNACLE INTRODUCER SHEATH: Brand: PINNACLE

## (undated) DEVICE — MASTISOL ADHESIVE LIQ 2/3ML

## (undated) DEVICE — TRANSFER SET 3": Brand: MEDLINE INDUSTRIES, INC.

## (undated) DEVICE — KIT MFLD ISOLATN NACL CNTRST PRT TBNG SPIK W/ PRSS TRNSDUC

## (undated) DEVICE — TOOL 14MH30 LEGEND 14CM 3MM: Brand: MIDAS REX ™

## (undated) DEVICE — LAMINECTOMY RICHMOND-LF: Brand: MEDLINE INDUSTRIES, INC.

## (undated) DEVICE — WILSON FRAME KIT: Brand: MEDLINE INDUSTRIES, INC.

## (undated) DEVICE — STERILE POLYISOPRENE POWDER-FREE SURGICAL GLOVES WITH EMOLLIENT COATING: Brand: PROTEXIS

## (undated) DEVICE — SOLUTION IV 250ML 0.9% SOD CHL CLR INJ FLX BG CONT PRT CLSR

## (undated) DEVICE — Device

## (undated) DEVICE — GUIDEWIRE VASC L185CM DIA0.014IN HYDRPHLC PTFE STR TIP

## (undated) DEVICE — BIPOLAR IRRIGATOR INTEGRATED TUBING AND BIPOLAR CORD SET, DISPOSABLE

## (undated) DEVICE — SUTURE ABSRB BRAID COAT UD OS-6 NO 1 27IN VCRL J535H

## (undated) DEVICE — DRAPE XR C ARM 41X74IN LF --

## (undated) DEVICE — DRAIN KT WND 10FR RND 400ML --

## (undated) DEVICE — SOLUTION SURG PREP 26 CC PURPREP

## (undated) DEVICE — KIT MED IMAG CNTRST AGNT W/ IOPAMIDOL REUSE

## (undated) DEVICE — ANGIOGRAPHY KIT

## (undated) DEVICE — GLIDESHEATH SLENDER ACCESS KIT: Brand: GLIDESHEATH SLENDER

## (undated) DEVICE — TOTAL TRAY, 16FR 10ML SIL FOLEY, URN: Brand: MEDLINE

## (undated) DEVICE — WASTE KIT - ST MARY: Brand: MEDLINE INDUSTRIES, INC.

## (undated) DEVICE — PREP SKN PREVAIL 40ML APPL --

## (undated) DEVICE — PREP KIT PEEL PTCH POVIDONE IOD

## (undated) DEVICE — SPLINT WR VELC FOAM NEUT POS DISP FOR RAD ART ACC SFT STRP

## (undated) DEVICE — COVER,MAYO STAND,STERILE: Brand: MEDLINE

## (undated) DEVICE — GLOVE SURG SZ 65 THK91MIL LTX FREE SYN POLYISOPRENE

## (undated) DEVICE — POSITIONER HD REST FOAM CMFRT TCH

## (undated) DEVICE — GLOVE SURG SZ 7 L12IN FNGR THK79MIL GRN LTX FREE

## (undated) DEVICE — KIT COR DIAG CATH ANGIO 5FR CURVES JL 4.0 100CM JR 4.0

## (undated) DEVICE — GARMENT,MEDLINE,DVT,INT,CALF,MED, GEN2: Brand: MEDLINE

## (undated) DEVICE — PAD,ABDOMINAL,5"X9",ST,LF,25/BX: Brand: MEDLINE INDUSTRIES, INC.

## (undated) DEVICE — SPONGE GZ W4XL4IN COT 12 PLY TYP VII WVN C FLD DSGN

## (undated) DEVICE — HEARTRAIL III GUIDING CATHETER: Brand: HEARTRAIL

## (undated) DEVICE — CATH GUID COR EB30 6FR 100CM -- LAUNCHER

## (undated) DEVICE — CATH DIAG WR5 EXPO 5FRX100CM -- EXPO

## (undated) DEVICE — SUTURE VCRL 2-0 L27IN ABSRB UD CP-2 L26MM 1/2 CIR REV CUT J869H

## (undated) DEVICE — HANDPIECE SET WITH BONE CLEANING TIP AND SUCTION TUBE: Brand: INTERPULSE

## (undated) DEVICE — TR BAND RADIAL ARTERY COMPRESSION DEVICE: Brand: TR BAND

## (undated) DEVICE — FLOSEAL MATRIX IS INDICATED IN SURGICAL PROCEDURES (OTHER THAN IN OPHTHALMIC) AS AN ADJUNCT TO HEMOSTASIS WHEN CONTROL OF BLEEDING BY LIGATURE OR CONVENTIONAL PROCEDURES IS INEFFECTIVE OR IMPRACTICAL.: Brand: FLOSEAL HEMOSTATIC MATRIX

## (undated) DEVICE — 450 ML BOTTLE OF 0.05% CHLORHEXIDINE GLUCONATE IN 99.95% STERILE WATER FOR IRRIGATION, USP AND APPLICATOR.: Brand: IRRISEPT ANTIMICROBIAL WOUND LAVAGE

## (undated) DEVICE — ANGIOGRAPHIC CATHETER: Brand: IMPULSE™

## (undated) DEVICE — SUTURE VCRL SZ 3-0 L27IN ABSRB UD CP-2 L26MM 1/2 CIR REV J868H

## (undated) DEVICE — TOOL 14BA50 LEGEND 14CM 5MM BA: Brand: MIDAS REX ™

## (undated) DEVICE — HANDPIECE SET WITH COAXIAL HIGH FLOW TIP AND SUCTION TUBE: Brand: INTERPULSE

## (undated) DEVICE — STERILE SLEEVE: Brand: CONVERTORS

## (undated) DEVICE — AEGIS 1" DISK 4MM HOLE, PEEL OPEN: Brand: MEDLINE

## (undated) DEVICE — PILLOW POS AD L7IN R FOAM HD REST INTUB SLOT DISP

## (undated) DEVICE — DRAPE, RADIAL, STERILE: Brand: MEDLINE

## (undated) DEVICE — SPLINT WR POS F/ARTERIAL ACC -- BX/10

## (undated) DEVICE — RADIFOCUS OPTITORQUE ANGIOGRAPHIC CATHETER: Brand: OPTITORQUE

## (undated) DEVICE — KIT AT-X65 ANGIOTOUCH HAND CONTROLLER

## (undated) DEVICE — FLOSEAL HEMOSTATIC MATRIX, 10ML: Brand: FLOSEAL HEMOSTATIC MATRIX

## (undated) DEVICE — SOL IRR SOD CL 0.9% 3000ML --

## (undated) DEVICE — SOLUTION IRRIG 3000ML 0.9% SOD CHL FLX CONT 0797208] ICU MEDICAL INC]

## (undated) DEVICE — SUT ETHLN 2-0 18IN FS BLK --

## (undated) DEVICE — GOWN,SIRUS,NONRNF,SETINSLV,2XL,18/CS: Brand: MEDLINE

## (undated) DEVICE — GDWIRE ANGIO SUP STF 0.035IN --

## (undated) DEVICE — DEVICE TRNSF SPIK STL 2008S] MICROTEK MEDICAL INC]

## (undated) DEVICE — GLOVE ORANGE PI 7 1/2   MSG9075

## (undated) DEVICE — KIT HND CTRL 3 W STPCOCK ROT END 54IN PREM HI PRSS TBNG AT

## (undated) DEVICE — SUTURE

## (undated) DEVICE — SOLUTION IRRIG 1000ML 0.9% SOD CHL USP POUR PLAS BTL

## (undated) DEVICE — SPONGE GZ W4XL4IN COT RADPQ HIGHLY ABSRB

## (undated) DEVICE — X-RAY SPONGES,16 PLY: Brand: DERMACEA

## (undated) DEVICE — NEEDLE HYPO 22GA L1.5IN BLK S STL HUB POLYPR SHLD REG BVL

## (undated) DEVICE — ALCOHOL RUBBING ISO 16OZ 70%

## (undated) DEVICE — CUSTOM KT PTCA INFL DEV K05 00052M

## (undated) DEVICE — SPECIAL PROCEDURE DRAPE 32" X 34": Brand: SPECIAL PROCEDURE DRAPE

## (undated) DEVICE — COVER,TABLE,HEAVY DUTY,60"X90",STRL: Brand: MEDLINE

## (undated) DEVICE — CODMAN® INTEGRATED BIPOLAR CORD AND TUBING SET FLYING LEADS, GRAVITY FEED: Brand: CODMAN®

## (undated) DEVICE — PACK PROCEDURE SURG HRT CATH

## (undated) DEVICE — GAUZE SPONGES,12 PLY: Brand: CURITY

## (undated) DEVICE — SUTURE ETHLN SZ 2-0 L18IN NONABSORBABLE BLK L19MM PS-2 PRIM 593H

## (undated) DEVICE — LAMINECTOMY-MRMC: Brand: MEDLINE INDUSTRIES, INC.

## (undated) DEVICE — SUTURE STRATAFIX SPRL PDS + SZ 2-0 L9IN ABSRB VLT CT-1 SXPP1B456

## (undated) DEVICE — KIT EVAC 0.13IN RECT TB DIA10FR 400CC PVC 3 SPR Y CONN DRN

## (undated) DEVICE — SUTURE STRATAFIX SYMMETRIC SZ 1 L18IN ABSRB VLT CT1 L36CM SXPP1A404

## (undated) DEVICE — GUIDEWIRE VASC L260CM DIA0.035IN TIP L3MM STD EXCHG PTFE J

## (undated) DEVICE — STERILE-Z SURGICAL PATIENT COVERS CLEAR POLYETHYLENE STERILE UNIVERSAL FIT 10 PER CASE: Brand: STERILE-Z

## (undated) DEVICE — SUTURE ABSRB L30CM 2-0 VLT SPRL PDS + STRATAFIX SXPP1B410

## (undated) DEVICE — KENDALL SCD EXPRESS SLEEVES, KNEE LENGTH, MEDIUM: Brand: KENDALL SCD

## (undated) DEVICE — HI-TORQUE VERSACORE MODIFIED J GUIDE WIRE SYSTEM 145 CM: Brand: HI-TORQUE VERSACORE

## (undated) DEVICE — GLOVE SURG SZ 8 L12IN FNGR THK94MIL STD WHT LTX FREE